# Patient Record
Sex: FEMALE | Race: WHITE | NOT HISPANIC OR LATINO | Employment: OTHER | URBAN - METROPOLITAN AREA
[De-identification: names, ages, dates, MRNs, and addresses within clinical notes are randomized per-mention and may not be internally consistent; named-entity substitution may affect disease eponyms.]

---

## 2018-03-07 ENCOUNTER — TELEPHONE (OUTPATIENT)
Dept: VASCULAR LAB | Facility: MEDICAL CENTER | Age: 83
End: 2018-03-07

## 2018-03-26 DIAGNOSIS — I48.91 ATRIAL FIBRILLATION, UNSPECIFIED TYPE (HCC): ICD-10-CM

## 2018-03-27 ENCOUNTER — TELEPHONE (OUTPATIENT)
Dept: VASCULAR LAB | Facility: MEDICAL CENTER | Age: 83
End: 2018-03-27

## 2018-03-27 NOTE — TELEPHONE ENCOUNTER
[facility]    Pt missed appt on 3/26.  Left message for pt to contact the clinic to establish another appt and establish care.    Chadd Celeste, PharmD

## 2018-03-29 ENCOUNTER — ANTICOAGULATION MONITORING (OUTPATIENT)
Dept: VASCULAR LAB | Facility: MEDICAL CENTER | Age: 83
End: 2018-03-29

## 2018-03-29 DIAGNOSIS — Z79.01 LONG TERM CURRENT USE OF ANTICOAGULANT: ICD-10-CM

## 2018-03-29 DIAGNOSIS — I48.91 ATRIAL FIBRILLATION, UNSPECIFIED TYPE (HCC): ICD-10-CM

## 2018-03-29 LAB — INR PPP: 1.7 (ref 2–3.5)

## 2018-03-29 NOTE — PROGRESS NOTES
Anticoagulation Summary  As of 3/29/2018    INR goal:   2.0-3.0   TTR:   --   Today's INR:      Maintenance plan:   5 mg (2.5 mg x 2) on Wed; 3.75 mg (2.5 mg x 1.5) all other days   Weekly total:   27.5 mg   Plan last modified:   SANDRITA Chacon (3/29/2018)   Next INR check:   4/5/2018   Target end date:   Indefinite    Indications    Atrial fibrillation (CMS-HCC) [I48.91] [I48.91]  Long term current use of anticoagulant [Z79.01]             Anticoagulation Episode Summary     INR check location:       Preferred lab:       Send INR reminders to:       Comments:         Anticoagulation Care Providers     Provider Role Specialty Phone number    [facility] Responsible  [phone]        Anticoagulation Patient Findings        Spoke with patient by phone. Patient is sub-therapeutic. She takes 2.5 mg tab and takes 1.5 tabs every day except on wed. Wed she takes 2 tabs. She has just moved to [place] from [place]. Is trying to find out from her grand-daughter when they can come into our clinic. She will call us back and let us know. History of At fib         Changes to current medical/health status since last appt: none.   Denies signs/symptoms of bleeding and/or thrombosis since the last appt.   Denies any interval changes to diet  Denies any interval changes to medications since last appt.   Denies any complications or cost restrictions with current therapy  Follow up appointment in 1 week(s).      Took 5 mg yesterday and will take 5 mg today and then back to 5 mg on Wed/ 3.75 mg rest of the week. Will need to call back for an appt in the clinic to establish care.   The patient is on a high risk medication and is sub- therapeutic. This could lead to clot formation or risk of stroke. Therefore this patient requires close monitoring and follow up.     The patient will go to the ER for falls with a head injury,  blood in urine or stool or any bleeding that last longer than 20 min.   .         CHUCKIE Chacon.

## 2018-04-03 ENCOUNTER — ANTICOAGULATION VISIT (OUTPATIENT)
Dept: VASCULAR LAB | Facility: MEDICAL CENTER | Age: 83
End: 2018-04-03
Attending: INTERNAL MEDICINE
Payer: MEDICARE

## 2018-04-03 VITALS — SYSTOLIC BLOOD PRESSURE: 129 MMHG | RESPIRATION RATE: 16 BRPM | DIASTOLIC BLOOD PRESSURE: 58 MMHG | HEART RATE: 63 BPM

## 2018-04-03 DIAGNOSIS — I48.91 ATRIAL FIBRILLATION, UNSPECIFIED TYPE (HCC): ICD-10-CM

## 2018-04-03 DIAGNOSIS — I50.9 ACUTE ON CHRONIC CONGESTIVE HEART FAILURE, UNSPECIFIED CONGESTIVE HEART FAILURE TYPE: ICD-10-CM

## 2018-04-03 DIAGNOSIS — I15.9 SECONDARY HYPERTENSION: ICD-10-CM

## 2018-04-03 DIAGNOSIS — Z95.0 PACEMAKER: ICD-10-CM

## 2018-04-03 DIAGNOSIS — Z79.01 LONG TERM CURRENT USE OF ANTICOAGULANT: ICD-10-CM

## 2018-04-03 DIAGNOSIS — R25.1 TREMORS OF NERVOUS SYSTEM: ICD-10-CM

## 2018-04-03 LAB
INR BLD: 1.9 (ref 0.9–1.2)
INR PPP: 1.9 (ref 2–3.5)

## 2018-04-03 PROCEDURE — 99212 OFFICE O/P EST SF 10 MIN: CPT | Performed by: NURSE PRACTITIONER

## 2018-04-03 PROCEDURE — 85610 PROTHROMBIN TIME: CPT

## 2018-04-03 RX ORDER — WARFARIN SODIUM 2.5 MG/1
TABLET ORAL
Qty: 180 TAB | Refills: 3 | Status: SHIPPED | OUTPATIENT
Start: 2018-04-03 | End: 2018-05-18

## 2018-04-03 NOTE — PROGRESS NOTES
Anticoagulation Summary  As of 4/3/2018    INR goal:   2.0-3.0   TTR:   --   Today's INR:   1.9!   Maintenance plan:   5 mg (2.5 mg x 2) on Tue, Thu; 3.75 mg (2.5 mg x 1.5) all other days   Weekly total:   28.75 mg   Plan last modified:   SANDRITA Chacon (4/3/2018)   Next INR check:   4/10/2018   Target end date:   Indefinite    Indications    Atrial fibrillation (CMS-HCC) [I48.91] [I48.91]  Long term current use of anticoagulant [Z79.01]             Anticoagulation Episode Summary     INR check location:       Preferred lab:       Send INR reminders to:       Comments:         Anticoagulation Care Providers     Provider Role Specialty Phone number    [facility] Referring  [phone]    Razia Sims D.O. Responsible Family Medicine [phone]    Sebastian Thomas M.D. Responsible Interventional Cardiology [phone]        Anticoagulation Patient Findings  New to this clinic not new to Warfarin. Has been on Warfarin for a long time due to history of at fib. Not sure of how long.  New to the area CHADS-VAS =5(age, female chf, hts). Has a new PCP not seen yet, and new cardiologist, not seen yet. Does not know all her meds they will fax back the information. Is not taking ASA. Denies bleeding issues on the this medication.     Andree Egan seen in clinic today, is on anticoagulation therapy with warfarin for At fib.   INR  sub-therapeutic.    Changes to current medical/health status since last appt: none.   Denies signs/symptoms of bleeding and/or thrombosis since the last appt.   Denies any interval changes to diet  Denies any interval changes to medications since last appt.   Denies any complications or cost restrictions with current therapy  Follow up appointment in 1 week(s).      The patient is on a high risk medication and is sub- therapeutic. This could lead to clot formation or risk of stroke. Therefore this patient requires close monitoring and follow up.     The  patient will go to the ER for falls with a head injury,  blood in urine or stool or any bleeding that last longer than 20 min.   Provided pt education on warfarin. Including how to take, what to do if missed dose, importance of INR monitoring, drug and food interactions. Patient is aware to avoid NSAIDs and ASA (unless directed by provider). Educated pt on s/s of bleeding and thrombosis. Patient is aware to seek medical attention for severe falls or injury to their heads, to report all medication changes to our office and to notify our office of unusual bleeding or bruising. Medication reviewed and updated.           CHUCKIE Chacon.

## 2018-04-07 ENCOUNTER — TELEPHONE (OUTPATIENT)
Dept: VASCULAR LAB | Facility: MEDICAL CENTER | Age: 83
End: 2018-04-07

## 2018-04-07 NOTE — TELEPHONE ENCOUNTER
Initial anticoag note and most recent coag referral reviewed.  Patient with afib and chads vasc = at least 5    Pending further recommendations, we will continue with indefinite anticoagulation with warfarin as directed by previous team.  Does have appt scheduled with PCP and cards  Will defer all management of rhythm, rate, and other cv issues, aside from anticoagulation, to PCP and cards    Michael Bloch, MD  Anticoagulation Clinic

## 2018-04-10 ENCOUNTER — ANTICOAGULATION VISIT (OUTPATIENT)
Dept: VASCULAR LAB | Facility: MEDICAL CENTER | Age: 83
End: 2018-04-10
Attending: INTERNAL MEDICINE
Payer: MEDICARE

## 2018-04-10 DIAGNOSIS — Z79.01 LONG TERM CURRENT USE OF ANTICOAGULANT: ICD-10-CM

## 2018-04-10 LAB
INR BLD: 2 (ref 0.9–1.2)
INR PPP: 2 (ref 2–3.5)

## 2018-04-10 PROCEDURE — 99211 OFF/OP EST MAY X REQ PHY/QHP: CPT

## 2018-04-10 PROCEDURE — 85610 PROTHROMBIN TIME: CPT

## 2018-04-10 NOTE — PROGRESS NOTES
Anticoagulation Summary  As of 4/10/2018    INR goal:   2.0-3.0   TTR:   0.0 % (2 d)   Today's INR:   2   Maintenance plan:   5 mg (2.5 mg x 2) on Tue, Thu; 3.75 mg (2.5 mg x 1.5) all other days   Weekly total:   28.75 mg   Plan last modified:   SANDRITA Chacon (4/3/2018)   Next INR check:   4/24/2018   Target end date:   Indefinite    Indications    Atrial fibrillation (CMS-HCC) [I48.91] [I48.91]  Long term current use of anticoagulant [Z79.01]             Anticoagulation Episode Summary     INR check location:       Preferred lab:       Send INR reminders to:       Comments:         Anticoagulation Care Providers     Provider Role Specialty Phone number    [facility] Referring  [phone]    Razia Sims D.O. Responsible Family Medicine [phone]    Sebastian Thomas M.D. Responsible Interventional Cardiology [phone]        Anticoagulation Patient Findings      HPI:  Andree Egan seen in clinic today, on anticoagulation therapy with warfarin for AF.  Changes to current medical/health status since last appt: none.   Denies signs/symptoms of bleeding and/or thrombosis since the last appt.    Denies any interval changes to diet  Denies any interval changes to medications since last appt.   Denies any complications or cost restrictions with current therapy.   Declined to remove jacket for vitals.  Confirmed dosing regimen.     A/P   INR  therapeutic.   Pt is to continue with current warfarin dosing regimen.     Follow up appointment in 2 week(s).    Chadd Celeste, PharmD

## 2018-04-24 ENCOUNTER — ANTICOAGULATION VISIT (OUTPATIENT)
Dept: VASCULAR LAB | Facility: MEDICAL CENTER | Age: 83
End: 2018-04-24
Attending: INTERNAL MEDICINE
Payer: MEDICARE

## 2018-04-24 DIAGNOSIS — Z79.01 LONG TERM CURRENT USE OF ANTICOAGULANT: ICD-10-CM

## 2018-04-24 LAB — INR PPP: 2.7 (ref 2–3.5)

## 2018-04-24 PROCEDURE — 99211 OFF/OP EST MAY X REQ PHY/QHP: CPT

## 2018-04-24 PROCEDURE — 85610 PROTHROMBIN TIME: CPT

## 2018-04-24 NOTE — PROGRESS NOTES
Anticoagulation Summary  As of 4/24/2018    INR goal:   2.0-3.0   TTR:   87.5 % (2.3 wk)   Today's INR:   2.7   Maintenance plan:   5 mg (2.5 mg x 2) on Tue, Thu; 3.75 mg (2.5 mg x 1.5) all other days   Weekly total:   28.75 mg   Plan last modified:   SANDRITA Chacon (4/3/2018)   Next INR check:   5/8/2018   Target end date:   Indefinite    Indications    Atrial fibrillation (CMS-HCC) [I48.91] [I48.91]  Long term current use of anticoagulant [Z79.01]             Anticoagulation Episode Summary     INR check location:       Preferred lab:       Send INR reminders to:       Comments:         Anticoagulation Care Providers     Provider Role Specialty Phone number    [facility] Referring  [phone]    Razia Sims D.O. Responsible Family Medicine [phone]    Sebastian Thomas M.D. Responsible Interventional Cardiology [phone]        Anticoagulation Patient Findings      HPI:  Andree Egan seen in clinic today, on anticoagulation therapy with warfarin for stroke prevention due to history of atrial fibrillation    Changes to current medical/health status since last appt: None  Denies signs/symptoms of bleeding and/or thrombosis since the last appt.    Denies any interval changes to diet  Denies any interval changes to medications since last appt.   Denies any complications or cost restrictions with current therapy.   BP recorded in vitals.  Confirmed dosing regimen.      A/P   INR  is therapeutic at 2.7    Pt is to continue with current warfarin dosing regimen.    Discussed possibility of changing to a DOAC, as she appears to be a good candidate; she will discuss with Dr. Thomas at her upcoming visit.    Follow up appointment in 2 week(s).    Chadd Celeste, PharmD

## 2018-04-26 LAB — INR BLD: 2.7 (ref 0.9–1.2)

## 2018-05-04 ENCOUNTER — OFFICE VISIT (OUTPATIENT)
Dept: CARDIOLOGY | Facility: MEDICAL CENTER | Age: 83
End: 2018-05-04
Payer: MEDICARE

## 2018-05-04 VITALS
WEIGHT: 136 LBS | HEIGHT: 64 IN | HEART RATE: 74 BPM | BODY MASS INDEX: 23.22 KG/M2 | SYSTOLIC BLOOD PRESSURE: 100 MMHG | DIASTOLIC BLOOD PRESSURE: 60 MMHG

## 2018-05-04 DIAGNOSIS — I48.91 ATRIAL FIBRILLATION, UNSPECIFIED TYPE (HCC): ICD-10-CM

## 2018-05-04 DIAGNOSIS — I15.9 SECONDARY HYPERTENSION: ICD-10-CM

## 2018-05-04 DIAGNOSIS — Z79.01 LONG TERM CURRENT USE OF ANTICOAGULANT: ICD-10-CM

## 2018-05-04 DIAGNOSIS — R06.09 DOE (DYSPNEA ON EXERTION): ICD-10-CM

## 2018-05-04 DIAGNOSIS — R60.0 BILATERAL LEG EDEMA: ICD-10-CM

## 2018-05-04 DIAGNOSIS — Z95.0 PACEMAKER: ICD-10-CM

## 2018-05-04 DIAGNOSIS — R01.1 UNDIAGNOSED CARDIAC MURMURS: ICD-10-CM

## 2018-05-04 PROCEDURE — 99203 OFFICE O/P NEW LOW 30 MIN: CPT | Mod: 25 | Performed by: INTERNAL MEDICINE

## 2018-05-04 PROCEDURE — 93000 ELECTROCARDIOGRAM COMPLETE: CPT | Performed by: INTERNAL MEDICINE

## 2018-05-04 RX ORDER — ALBUTEROL SULFATE 90 UG/1
2 AEROSOL, METERED RESPIRATORY (INHALATION) EVERY 6 HOURS PRN
COMMUNITY
End: 2018-07-31 | Stop reason: SDUPTHER

## 2018-05-04 RX ORDER — TORSEMIDE 20 MG/1
20 TABLET ORAL DAILY
COMMUNITY
End: 2018-05-14 | Stop reason: SDUPTHER

## 2018-05-04 RX ORDER — POTASSIUM CHLORIDE 750 MG/1
10 TABLET, EXTENDED RELEASE ORAL EVERY EVENING
COMMUNITY
End: 2018-06-13 | Stop reason: SDUPTHER

## 2018-05-04 RX ORDER — METOPROLOL SUCCINATE 25 MG/1
25 TABLET, EXTENDED RELEASE ORAL EVERY MORNING
Status: ON HOLD | COMMUNITY
End: 2018-08-20

## 2018-05-04 RX ORDER — TOPIRAMATE 25 MG/1
25 TABLET ORAL 2 TIMES DAILY
Status: ON HOLD | COMMUNITY
End: 2018-06-27 | Stop reason: SDUPTHER

## 2018-05-04 RX ORDER — OMEPRAZOLE 20 MG/1
20 CAPSULE, DELAYED RELEASE ORAL 2 TIMES DAILY
Status: ON HOLD | COMMUNITY
End: 2018-08-20

## 2018-05-04 RX ORDER — AMLODIPINE BESYLATE 5 MG/1
5 TABLET ORAL DAILY
COMMUNITY
End: 2018-05-14

## 2018-05-04 NOTE — PROGRESS NOTES
Chief Complaint   Patient presents with   • Atrial Fibrillation       Subjective:   Andree Egan is a 86 y.o. female who presents today for initial consultation after moving to the area from [place].  Her son is my patient.  She has a history of paroxysmal atrial fibrillation on oral anticoagulation with warfarin, permanent pacemaker placement, hypertension, and congestive heart failure for which no records are available.  She takes torsemide for lower extremity edema which has been worse since moving up here and she has been more short of breath.  She has a murmur and again no records are available.  Otherwise no exertional chest pain or shortness of breath and has no history of diabetes or stroke.    Denies any other cardiovascular symptoms including chest pain, shortness of breath, lightheadedness, syncope or presyncope,  PND, orthopnea or palpitations.      Past Medical History:   Diagnosis Date   • Atrial fibrillation (CMS-HCC) [I48.91] 3/29/2018   • Congestive heart failure (CHF) ([facility]) 4/3/2018   • BERTRAND (dyspnea on exertion) 5/4/2018   • Long term current use of anticoagulant 3/29/2018   • Pacemaker 4/3/2018   • Secondary hypertension 4/3/2018   • Tremors of nervous system 4/3/2018     History reviewed. No pertinent surgical history.  Family History   Problem Relation Age of Onset   • Fainting Neg Hx      Social History     Social History   • Marital status:      Spouse name: N/A   • Number of children: N/A   • Years of education: N/A     Occupational History   • Not on file.     Social History Main Topics   • Smoking status: Former Smoker     Packs/day: 0.50     Types: Cigarettes     Quit date: 1/1/1970   • Smokeless tobacco: Never Used   • Alcohol use 1.8 oz/week     3 Glasses of wine per week      Comment: glasses a night before Dinner   • Drug use: No   • Sexual activity: Not on file     Other Topics Concern   • Not on file     Social History Narrative   • No narrative on file     Allergies  "  Allergen Reactions   • Codeine      Outpatient Encounter Prescriptions as of 5/4/2018   Medication Sig Dispense Refill   • albuterol 108 (90 Base) MCG/ACT Aero Soln inhalation aerosol Inhale 2 Puffs by mouth every 6 hours as needed for Shortness of Breath.     • amLODIPine (NORVASC) 5 MG Tab Take 5 mg by mouth every day.     • sertraline (ZOLOFT) 50 MG Tab Take 50 mg by mouth every day.     • topiramate (TOPAMAX) 25 MG Tab Take 25 mg by mouth 2 times a day.     • potassium chloride SA (K-DUR) 10 MEQ Tab CR Take 10 mEq by mouth 2 times a day.     • metoprolol SR (TOPROL XL) 25 MG TABLET SR 24 HR Take 25 mg by mouth every day.     • torsemide (DEMADEX) 20 MG Tab Take 20 mg by mouth every day.     • omeprazole (PRILOSEC) 20 MG delayed-release capsule Take 20 mg by mouth every day.     • warfarin (COUMADIN) 2.5 MG Tab Take 1.5 to 2 tablets daily or as directed by [facility] 180 Tab 3     No facility-administered encounter medications on file as of 5/4/2018.      Review of Systems   All other systems reviewed and are negative.       Objective:   /60   Pulse 74   Ht 1.613 m (5' 3.5\")   Wt 61.7 kg (136 lb)   BMI 23.71 kg/m²     Physical Exam   Constitutional: She is oriented to person, place, and time. She appears well-developed and well-nourished. No distress.   HENT:   Head: Normocephalic and atraumatic.   Right Ear: External ear normal.   Left Ear: External ear normal.   Mouth/Throat: No oropharyngeal exudate.   Eyes: Conjunctivae and EOM are normal. Pupils are equal, round, and reactive to light. Right eye exhibits no discharge. Left eye exhibits no discharge. No scleral icterus.   Neck: Normal range of motion. Neck supple. No JVD present. No tracheal deviation present. No thyromegaly present.   Cardiovascular: Normal rate, regular rhythm, S1 normal, S2 normal and intact distal pulses.  PMI is not displaced.  Exam reveals no gallop, no S3, no S4 and no friction rub.    Murmur (2/6 systolic ejection " murmur right upper sternal border with preserved S2.  There is also an apical murmur.) heard.  Pulses:       Carotid pulses are 2+ on the right side, and 2+ on the left side.       Radial pulses are 2+ on the right side, and 2+ on the left side.        Popliteal pulses are 2+ on the right side, and 2+ on the left side.        Dorsalis pedis pulses are 2+ on the right side, and 2+ on the left side.        Posterior tibial pulses are 2+ on the right side, and 2+ on the left side.   Pulmonary/Chest: Effort normal and breath sounds normal. No respiratory distress. She has no wheezes. She has no rales. She exhibits no tenderness.   Abdominal: Soft. Bowel sounds are normal. She exhibits no distension. There is no tenderness.   Musculoskeletal: Normal range of motion. She exhibits edema (2-3+ bilateral lower extremities to knees). She exhibits no tenderness.   Neurological: She is alert and oriented to person, place, and time. No cranial nerve deficit (Cranial nerves II through XII grossly intact).   Skin: Skin is warm and dry. No rash noted. She is not diaphoretic. No erythema.   Psychiatric: She has a normal mood and affect. Her behavior is normal. Judgment and thought content normal.   Vitals reviewed.    LABS:    Lab Results   Component Value Date/Time    INR 2.7 04/24/2018          Assessment:     1. Atrial fibrillation, unspecified type (HCC)  EKG    LIPID PROFILE    COMP METABOLIC PANEL    BTYPE NATRIURETIC PEPTIDE   2. Pacemaker  PACER CHECK   3. Secondary hypertension     4. Long term current use of anticoagulant     5. Undiagnosed cardiac murmurs  ECHOCARDIOGRAM COMP W/O CONT   6. Bilateral leg edema  ECHOCARDIOGRAM COMP W/O CONT   7. BERTRAND (dyspnea on exertion)         Medical Decision Making:  Today's Assessment / Status / Plan:     Overall she is doing quite well from a cardiac perspective.  Paraventricular rates are well controlled.  She has not been seen in pacemaker clinic in quite some time, but was followed  closely by her cardiologist in [place].  She has a murmur.  I would like to request records.  She also like to switch from warfarin to 1 of the direct anticoagulants.  Given her murmurs I suggest we complete a echocardiogram as well as metabolic profile to assess her renal function.  With regard to her medications her blood pressure is excellent however the amlodipine may be contributing to her edema.  Recommend discontinuing this and increase her beta-blocker as needed.    1.  Echocardiogram  2.  Labs  3.  Pacemaker clinic  4.  If the above is reasonable then a change from warfarin to Xarelto would be recommended.  5.  Discontinue amlodipine and increase beta-blocker as needed for blood pressure control.    Follow-up 3 months

## 2018-05-05 LAB — EKG IMPRESSION: NORMAL

## 2018-05-07 ENCOUNTER — HOSPITAL ENCOUNTER (OUTPATIENT)
Dept: LAB | Facility: MEDICAL CENTER | Age: 83
End: 2018-05-07
Attending: INTERNAL MEDICINE
Payer: MEDICARE

## 2018-05-07 DIAGNOSIS — I48.91 ATRIAL FIBRILLATION, UNSPECIFIED TYPE (HCC): ICD-10-CM

## 2018-05-07 LAB
ALBUMIN SERPL BCP-MCNC: 3.8 G/DL (ref 3.2–4.9)
ALBUMIN/GLOB SERPL: 1.4 G/DL
ALP SERPL-CCNC: 114 U/L (ref 30–99)
ALT SERPL-CCNC: 17 U/L (ref 2–50)
ANION GAP SERPL CALC-SCNC: 9 MMOL/L (ref 0–11.9)
AST SERPL-CCNC: 28 U/L (ref 12–45)
BILIRUB SERPL-MCNC: 1.1 MG/DL (ref 0.1–1.5)
BNP SERPL-MCNC: 386 PG/ML (ref 0–100)
BUN SERPL-MCNC: 40 MG/DL (ref 8–22)
CALCIUM SERPL-MCNC: 9 MG/DL (ref 8.5–10.5)
CHLORIDE SERPL-SCNC: 102 MMOL/L (ref 96–112)
CHOLEST SERPL-MCNC: 83 MG/DL (ref 100–199)
CO2 SERPL-SCNC: 30 MMOL/L (ref 20–33)
CREAT SERPL-MCNC: 1.5 MG/DL (ref 0.5–1.4)
GLOBULIN SER CALC-MCNC: 2.7 G/DL (ref 1.9–3.5)
GLUCOSE SERPL-MCNC: 90 MG/DL (ref 65–99)
HDLC SERPL-MCNC: 54 MG/DL
LDLC SERPL CALC-MCNC: 18 MG/DL
POTASSIUM SERPL-SCNC: 3.9 MMOL/L (ref 3.6–5.5)
PROT SERPL-MCNC: 6.5 G/DL (ref 6–8.2)
SODIUM SERPL-SCNC: 141 MMOL/L (ref 135–145)
TRIGL SERPL-MCNC: 53 MG/DL (ref 0–149)

## 2018-05-07 PROCEDURE — 80061 LIPID PANEL: CPT

## 2018-05-07 PROCEDURE — 36415 COLL VENOUS BLD VENIPUNCTURE: CPT

## 2018-05-07 PROCEDURE — 83880 ASSAY OF NATRIURETIC PEPTIDE: CPT

## 2018-05-07 PROCEDURE — 80053 COMPREHEN METABOLIC PANEL: CPT

## 2018-05-08 ENCOUNTER — TELEPHONE (OUTPATIENT)
Dept: CARDIOLOGY | Facility: MEDICAL CENTER | Age: 83
End: 2018-05-08

## 2018-05-08 ENCOUNTER — ANTICOAGULATION VISIT (OUTPATIENT)
Dept: VASCULAR LAB | Facility: MEDICAL CENTER | Age: 83
End: 2018-05-08
Attending: INTERNAL MEDICINE
Payer: MEDICARE

## 2018-05-08 DIAGNOSIS — Z79.01 LONG TERM CURRENT USE OF ANTICOAGULANT: ICD-10-CM

## 2018-05-08 LAB — INR PPP: 3.1 (ref 2–3.5)

## 2018-05-08 PROCEDURE — 99212 OFFICE O/P EST SF 10 MIN: CPT

## 2018-05-08 PROCEDURE — 85610 PROTHROMBIN TIME: CPT

## 2018-05-08 NOTE — PROGRESS NOTES
[facility] Anticoagulation Service Note    Date: 5/8/2018  There were no vitals filed for this visit.    Anticoagulation Summary  As of 5/8/2018    INR goal:   2.0-3.0   TTR:   81.7 % (1 mo)   Today's INR:   3.1!   Warfarin maintenance plan:   5 mg (2.5 mg x 2) on Tue, Thu; 3.75 mg (2.5 mg x 1.5) all other days   Weekly warfarin total:   28.75 mg   Plan last modified:   SANDRITA Chacon (4/3/2018)   Next INR check:   6/1/2018   Target end date:   Indefinite    Indications    Atrial fibrillation (CMS-HCC) [I48.91] [I48.91]  Long term current use of anticoagulant [Z79.01]             Anticoagulation Episode Summary     INR check location:       Preferred lab:       Send INR reminders to:       Comments:         Anticoagulation Care Providers     Provider Role Specialty Phone number    [facility] Referring  [phone]    Razia Sims D.O. Responsible Family Medicine [phone]    Sebastian Thomas M.D. Responsible Interventional Cardiology [phone]        Anticoagulation Patient Findings      HPI:   Andree Egan seen in clinic today, they are here today for a INR check on anticoagulation therapy with warfarin because they have atrial fib    The reason for today's visit is to prevent morbidity and mortality from a stroke  and to reduce the risk of bleeding while on a anticoagulant.     Reason for today's visit (per our collaborative practice policy) is because their last INR was 2.7  on  4/24.   Intervention at the last visit:  none    Additional education provided today regarding reducing bleed risk and dietary constraints:  About diet     Any upcoming procedures:   none    Confirmed warfarin dosing regimen  Interval Changes with foods rich in vitamin K: No  Interval Changes in ETOH:   No  Interval Changes in smoking status:  No  Interval Changes in medication:  No   Cost restriction:  No    S/S of bleeding or bruising:  No  Signs/symptoms  thrombosis since the last appt:  No  Bleed  risk is:  moderate,     3 vitals included with today's appt :to many clothes   (BP, HR, weight, ht, RR)     Assessment:   INR  supra-therapeutic.     Possible reason(s) for INR today:   she went camping last weekend and might have taken her dose wrong.     Intervention required today to prevent:    They are at a increased risk of bleeding because INR above goal.      They have a TTR of 81.7  which is not at target (TTR target/goal is 100%) and requires close follow up to prevent a adverse event (the lower the TTR the higher risk of clots, strokes, or bleeding).       Plan:  2.5mg today then continue weekly warfarin dose as noted      Follow up:  Follow up appointment in 3 week(s)       Other info:  Pt educated to contact our clinic with any changes in medications or s/s of bleeding or thrombosis    CHEST guidelines recommend frequent INR monitoring at regular intervals (a few days up to a max of 12 weeks) to ensure they are on the proper dose of warfarin and not having any complications from therapy.  INRs can dramatically change over a short time period due to diet, medications, and medical conditions.

## 2018-05-10 NOTE — TELEPHONE ENCOUNTER
Her renal function and BNP are abnormal, and I am awaiting her echocardiogram results to give her final recommendations on anticoagulation.  If she is having more leg swelling despite stopping her amlodipine I would recommend increasing her diuresis with an extra pill daily.

## 2018-05-10 NOTE — TELEPHONE ENCOUNTER
"S/w pt about lab results. She notes that she is currently taking Torsemide 20mg, 2 tabs BID. Educated her that it is not recommended to take in the evening time due to having to go to the bathroom all the time. She states, \"some times I don't go very often or at all\". Educated her that I will FU with Dr. Thomas on recommendations   Pt reports and increase in edema even after stopping amlodipine.     To Dr. Thomas, please advise on recommendations.  "

## 2018-05-11 DIAGNOSIS — N28.9 RENAL INSUFFICIENCY: ICD-10-CM

## 2018-05-11 NOTE — TELEPHONE ENCOUNTER
"S/w Dr. Thomas on recommendations. Per TW, \"Please place referral to nephrology for pt to follow up on renal insufficiency. Also have her FU with her primary care provider. Will review echo and advise on final recommendations.\"    Called and s/w pt. Educated her on Dr. Thomas's recommendations. Informed her that referral will be placed for her to follow up with a nephrologist to evaluate her kidney function. Also educated her to please schedule a FU with her PCP as well. Pt states verbal understanding and will follow up next week if any issues.     Referral to nephrology placed.   "

## 2018-05-14 ENCOUNTER — TELEPHONE (OUTPATIENT)
Dept: MEDICAL GROUP | Facility: MEDICAL CENTER | Age: 83
End: 2018-05-14

## 2018-05-14 ENCOUNTER — TELEPHONE (OUTPATIENT)
Dept: CARDIOLOGY | Facility: MEDICAL CENTER | Age: 83
End: 2018-05-14

## 2018-05-14 ENCOUNTER — OFFICE VISIT (OUTPATIENT)
Dept: MEDICAL GROUP | Facility: MEDICAL CENTER | Age: 83
End: 2018-05-14
Payer: MEDICARE

## 2018-05-14 VITALS
RESPIRATION RATE: 16 BRPM | HEIGHT: 64 IN | DIASTOLIC BLOOD PRESSURE: 58 MMHG | SYSTOLIC BLOOD PRESSURE: 100 MMHG | OXYGEN SATURATION: 90 % | WEIGHT: 148 LBS | BODY MASS INDEX: 25.27 KG/M2 | TEMPERATURE: 97.8 F | HEART RATE: 67 BPM

## 2018-05-14 DIAGNOSIS — R60.0 BILATERAL LOWER EXTREMITY EDEMA: ICD-10-CM

## 2018-05-14 DIAGNOSIS — R09.02 HYPOXIA: ICD-10-CM

## 2018-05-14 DIAGNOSIS — J44.9 CHRONIC OBSTRUCTIVE PULMONARY DISEASE, UNSPECIFIED COPD TYPE (HCC): ICD-10-CM

## 2018-05-14 DIAGNOSIS — N18.30 CKD (CHRONIC KIDNEY DISEASE) STAGE 3, GFR 30-59 ML/MIN (HCC): ICD-10-CM

## 2018-05-14 DIAGNOSIS — I48.20 CHRONIC ATRIAL FIBRILLATION (HCC): ICD-10-CM

## 2018-05-14 DIAGNOSIS — I50.32 CHRONIC DIASTOLIC CONGESTIVE HEART FAILURE (HCC): ICD-10-CM

## 2018-05-14 DIAGNOSIS — Z79.899 POLYPHARMACY: ICD-10-CM

## 2018-05-14 PROBLEM — L40.9 PSORIASIS: Status: ACTIVE | Noted: 2018-05-14

## 2018-05-14 PROBLEM — F41.1 GAD (GENERALIZED ANXIETY DISORDER): Status: ACTIVE | Noted: 2017-11-29

## 2018-05-14 PROBLEM — M81.0 OSTEOPOROSIS: Status: ACTIVE | Noted: 2018-05-14

## 2018-05-14 PROBLEM — F43.21 GRIEF: Status: ACTIVE | Noted: 2018-02-28

## 2018-05-14 PROBLEM — K21.9 GERD (GASTROESOPHAGEAL REFLUX DISEASE): Status: ACTIVE | Noted: 2018-05-14

## 2018-05-14 PROBLEM — Z79.01 LONG TERM CURRENT USE OF ANTICOAGULANT: Status: RESOLVED | Noted: 2018-03-29 | Resolved: 2018-05-14

## 2018-05-14 PROBLEM — F43.22 ADJUSTMENT DISORDER WITH ANXIETY: Status: ACTIVE | Noted: 2018-02-28

## 2018-05-14 PROBLEM — I35.0 NONRHEUMATIC AORTIC VALVE STENOSIS: Status: ACTIVE | Noted: 2017-03-01

## 2018-05-14 PROBLEM — N39.0 RECURRENT UTI: Status: ACTIVE | Noted: 2017-10-23

## 2018-05-14 PROCEDURE — 99204 OFFICE O/P NEW MOD 45 MIN: CPT | Performed by: NURSE PRACTITIONER

## 2018-05-14 RX ORDER — LEVOTHYROXINE SODIUM 0.05 MG/1
50 TABLET ORAL
COMMUNITY
End: 2018-06-08 | Stop reason: SDUPTHER

## 2018-05-14 RX ORDER — TORSEMIDE 20 MG/1
40 TABLET ORAL 2 TIMES DAILY
Qty: 120 TAB | Refills: 11 | Status: ON HOLD | OUTPATIENT
Start: 2018-05-14 | End: 2018-05-24

## 2018-05-14 RX ORDER — TORSEMIDE 20 MG/1
20 TABLET ORAL 2 TIMES DAILY
Qty: 60 TAB | Refills: 11 | Status: SHIPPED | OUTPATIENT
Start: 2018-05-14 | End: 2018-05-14 | Stop reason: SDUPTHER

## 2018-05-14 RX ORDER — GABAPENTIN 300 MG/1
1200 CAPSULE ORAL EVERY EVENING
Status: ON HOLD | COMMUNITY
End: 2018-08-20

## 2018-05-14 RX ORDER — ALLOPURINOL 300 MG/1
300 TABLET ORAL EVERY EVENING
COMMUNITY
End: 2018-06-13 | Stop reason: SDUPTHER

## 2018-05-14 RX ORDER — WARFARIN SODIUM 2.5 MG/1
TABLET ORAL
COMMUNITY
End: 2018-05-14

## 2018-05-14 RX ORDER — CLOBETASOL PROPIONATE 0.5 MG/G
OINTMENT TOPICAL
COMMUNITY
Start: 2017-11-21 | End: 2018-05-18

## 2018-05-14 RX ORDER — ACETAMINOPHEN 500 MG
1000 TABLET ORAL
Status: ON HOLD | COMMUNITY
End: 2018-08-20

## 2018-05-14 ASSESSMENT — PATIENT HEALTH QUESTIONNAIRE - PHQ9: CLINICAL INTERPRETATION OF PHQ2 SCORE: 0

## 2018-05-14 NOTE — ASSESSMENT & PLAN NOTE
CMP revealed stage III CKD, no records were available at that time to see if this is a new or existing problem.  In reviewing past medical records, it appears that this has been a long-standing issue for the patient since around 2012, GFR has been ranging between 29 and 60, currently 33.  Patient states she drinks about 16 ounces of water per day.  Is currently taking torsemide 40 mg in the morning and 40 mg in the afternoon for a total of 80 mg per day per the recommendation of her previous cardiologist due to lower extremity edema and congestive heart failure.  She does not recall ever being seen by nephrology.  Past records do not indicate that she has had a renal ultrasound done.  Patient states that when she urinates it is only a trickle each time.  She feels that she is emptying her bladder, she just does not feel like she is producing very much urine.  She is not currently using NSAIDs. She does drink about 3 glasses of  wine per night with dinner.

## 2018-05-14 NOTE — ASSESSMENT & PLAN NOTE
Patient's granddaughter states she has very low exercise tolerance without becoming significantly short of breath and needing to rest, this is been going on for quite some time.  Her resting oxygen saturation today was between 88 and 90%.  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  She was previously seeing a pulmonologist about once every 3 years, she has not established locally.  She has never required oxygen during the day or at night.  She denies shortness of breath at rest.

## 2018-05-14 NOTE — ASSESSMENT & PLAN NOTE
Patient was recently seen by cardiology to establish care.      Last echocardiogram done March/2017 revealed:  Aortic stenosis has progressed. PA pressure is significantly elevated. Ejection fraction is: 65 %. The aortic cusps are mildly thickened. Aortic cusps appear moderately calcified. Trace aortic valve regurgitation. Severe aortic valve stenosis. Moderately severe tricuspid regurgitation. Right ventricular systolic pressure is consistent with severe pulmonary   hypertension. Estimated peak RVSP is 70 mmHg.

## 2018-05-14 NOTE — ASSESSMENT & PLAN NOTE
Last pulmonary function test was February 2017.  Showed mixed obstructive and restrictive defect per spirometry with severe reduction in diffusion capacity. Was following up with pulmonology every 3 years. No maintenance medications prescribed.

## 2018-05-14 NOTE — PATIENT INSTRUCTIONS
Call doctor office IF:    Weight gain of 2 POUNDS IN ONE DAY  OR 5 POUNDS IN ONE WEEK.      Weigh every morning, after urinating, without clothes.

## 2018-05-14 NOTE — ASSESSMENT & PLAN NOTE
Stable on coumadin and rate controlled with metoprolol ER 25 mg daily. Followed at [facility] anticoagulation clinic and [facility].

## 2018-05-14 NOTE — TELEPHONE ENCOUNTER
The referral to Nephrology has been faxed.    Sent to:   [facility]   [phone]     Noted.

## 2018-05-14 NOTE — PROGRESS NOTES
Andree Egan is a 86 y.o. female here to establish care:    HPI:      Chronic diastolic congestive heart failure (HCC)  Patient was recently seen by cardiology to establish care.      Last echocardiogram done March/2017 revealed:  Aortic stenosis has progressed. PA pressure is significantly elevated. Ejection fraction is: 65 %. The aortic cusps are mildly thickened. Aortic cusps appear moderately calcified. Trace aortic valve regurgitation. Severe aortic valve stenosis. Moderately severe tricuspid regurgitation. Right ventricular systolic pressure is consistent with severe pulmonary   hypertension. Estimated peak RVSP is 70 mmHg.    CKD (chronic kidney disease) stage 3, GFR 30-59 ml/min  CMP revealed stage III CKD, no records were available at that time to see if this is a new or existing problem.  In reviewing past medical records, it appears that this has been a long-standing issue for the patient since around 2012, GFR has been ranging between 29 and 60, currently 33.  Patient states she drinks about 16 ounces of water per day.  Is currently taking torsemide 40 mg in the morning and 40 mg in the afternoon for a total of 80 mg per day per the recommendation of her previous cardiologist due to lower extremity edema and congestive heart failure.  She does not recall ever being seen by nephrology.  Past records do not indicate that she has had a renal ultrasound done.  Patient states that when she urinates it is only a trickle each time.  She feels that she is emptying her bladder, she just does not feel like she is producing very much urine.  She is not currently using NSAIDs. She does drink about 3 glasses of  wine per night with dinner.     Hypoxia  Patient's granddaughter states she has very low exercise tolerance without becoming significantly short of breath and needing to rest, this is been going on for quite some time.  Her resting oxygen saturation today was between 88 and 90%.  She does have a history of  obstructive and restrictive COPD, currently only on albuterol as needed for shortness of breath.  She was previously seeing a pulmonologist about once every 3 years, she has not established locally.  She has never required oxygen during the day or at night.  She denies shortness of breath at rest.    Bilateral lower extremity edema  Baseline is generally 2-3+ pitting.  Patient wears compression stockings almost daily to help with this.  Currently taking torsemide 40 mg twice daily.    COPD (chronic obstructive pulmonary disease) ([facility])  Last pulmonary function test was February 2017.  Showed mixed obstructive and restrictive defect per spirometry with severe reduction in diffusion capacity. Was following up with pulmonology every 3 years. No maintenance medications prescribed.     Chronic atrial fibrillation (HCC)  Stable on coumadin and rate controlled with metoprolol ER 25 mg daily. Followed at [facility] anticoagulation clinic and [facility] Cardiology.     Current medicines (including changes today)  Current Outpatient Prescriptions   Medication Sig Dispense Refill   • levothyroxine (SYNTHROID) 50 MCG Tab Take 50 mcg by mouth Every morning on an empty stomach.     • allopurinol (ZYLOPRIM) 300 MG Tab Take 300 mg by mouth every day.     • gabapentin (NEURONTIN) 300 MG Cap Take 300 mg by mouth 3 times a day.     • torsemide (DEMADEX) 20 MG Tab Take 1 Tab by mouth 2 times a day. 60 Tab 11   • clobetasol (TEMOVATE) 0.05 % Ointment Apply to effected area daily, as needed.     • sertraline (ZOLOFT) 50 MG Tab Take 50 mg by mouth every day.     • topiramate (TOPAMAX) 25 MG Tab Take 25 mg by mouth 2 times a day.     • potassium chloride SA (K-DUR) 10 MEQ Tab CR Take 10 mEq by mouth 2 times a day.     • metoprolol SR (TOPROL XL) 25 MG TABLET SR 24 HR Take 25 mg by mouth every day.     • omeprazole (PRILOSEC) 20 MG delayed-release capsule Take 20 mg by mouth every day.     • warfarin (COUMADIN) 2.5 MG Tab Take 1.5 to 2 tablets daily  "or as directed by [facility] 180 Tab 3   • acetaminophen (TYLENOL) 500 MG Tab Take 1,000 mg by mouth 1 time daily as needed.     • Calcium-Vitamin D 500-125 MG-UNIT Tab Take 1 tablet by mouth.     • albuterol 108 (90 Base) MCG/ACT Aero Soln inhalation aerosol Inhale 2 Puffs by mouth every 6 hours as needed for Shortness of Breath.       No current facility-administered medications for this visit.      She  has a past medical history of Atrial fibrillation ([facility]) [I48.91] (3/29/2018); Congestive heart failure (CHF) ([facility]) (4/3/2018); BERTRAND (dyspnea on exertion) (5/4/2018); Long term current use of anticoagulant (3/29/2018); Pacemaker (4/3/2018); Secondary hypertension (4/3/2018); and Tremors of nervous system (4/3/2018).  She  has no past surgical history on file.  Social History   Substance Use Topics   • Smoking status: Former Smoker     Packs/day: 0.50     Types: Cigarettes     Quit date: 1/1/1970   • Smokeless tobacco: Never Used   • Alcohol use 1.8 oz/week     3 Glasses of wine per week      Comment: glasses a night before Dinner     Social History     Social History Narrative   • No narrative on file     Family History   Problem Relation Age of Onset   • Fainting Neg Hx      Family Status   Relation Status   • Neg Hx          ROS  No chest pain, no abdominal pain, no rash.  Positive ROS as per HPI.  All other systems reviewed and are negative      Objective:     Blood pressure 100/58, pulse 67, temperature 36.6 °C (97.8 °F), resp. rate 16, height 1.613 m (5' 3.5\"), weight 67.1 kg (148 lb), SpO2 90 %, not currently breastfeeding. Body mass index is 25.8 kg/m².     Physical Exam:    Constitutional: Alert, no distress.  Skin: Warm, dry, good turgor, no rashes in visible areas.  Eye: Equal, round and reactive, conjunctiva clear, lids normal.  ENMT: Lips without lesions, good dentition, oropharynx clear.  Neck: Trachea midline, no masses, no thyromegaly. No cervical or supraclavicular " lymphadenopathy.  Respiratory: Unlabored respiratory effort, lungs clear to auscultation, no wheezes, no ronchi.  Cardiovascular: Irregularly irregular rhythm. Murmur heard. 2-3+ bilateral lower extremity edema.   Abdomen: Soft, non-tender, no masses, no hepatosplenomegaly.  Psych: Alert and oriented x3, normal affect and mood.      Assessment and Plan:   The following treatment plan was discussed    1. Chronic diastolic congestive heart failure (HCC)  Stable.   Continue torsemide 40mg twice daily per cardiology recommendation.   Continue Metoprolol ER 25mg daily.   Stop Amlodipine to see if lower extremity edema improves. BP is well controlled.    - torsemide (DEMADEX) 20 MG Tab; Take 1 Tab by mouth 2 times a day.  Dispense: 60 Tab; Refill: 11      2. Bilateral lower extremity edema  Stable.  Continue torsemide 40 mg twice daily.  Stop amlodipine 5 mg daily.  Patient states she discussed this with the cardiologist but has not stopped the medication yet.  - torsemide (DEMADEX) 20 MG Tab; Take 2 Tabs by mouth 2 times a day.  Dispense: 120 Tab; Refill: 11      3. Polypharmacy  Follow-up with vascular medicine clinic for polypharmacy visit as patient would like to get off some of these medications if possible.  - REFERRAL TO VASCULAR MEDICINE Reason for Referral? Established Vascular Disease    4. Chronic obstructive pulmonary disease, unspecified COPD type (HCC)  Unstable.  Unsure if shortness of breath and hypoxia are due to worsening of COPD or due to progression of aortic valve stenosis to severe, last echo done over one year ago, awaiting repeat echo.   continue albuterol inhaler as needed, patient states she does not need to use it often.  - REFERRAL TO PULMONOLOGY    5. Hypoxia  Unstable.  Worsening COPD versus worsening aortic stenosis versus worsening congestive heart failure.  Ambulated patient about 5 feet in office, her oxygen saturation immediately dropped to 86% on room air.  Her oxygen saturation at rest  was 90%.  Continuous oxygen ordered at 2 L for home concentrator and portable O2 tank.    Follow-up with pulmonology for follow-up on PFTs and COPD.  Follow-up with cardiology after echocardiogram.  - REFERRAL TO PULMONOLOGY      6. CKD (chronic kidney disease) stage 3, GFR 30-59 ml/min  Stable.  GFR has remained fairly stable over the past few years.  BUN and creatinine are slightly more elevated than normal.  Patient is having very little urine output throughout the day, only drinking about 16 ounces of water per day and taking 80 mg of torsemide daily.  Advised patient to slightly increase her water intake to 24-32 ounces per day.  If she notices increased weight gain or increased lower extremity edema or shortness of breath, she can reduce her water intake again.  Follow-up with nephrology, they are ready to schedule.     7. Chronic atrial fibrillation (HCC)  Stable.  Continue metoprolol ER 25 mg daily.  Continue Coumadin per anticoagulation clinic dosing.      Records reviewed.  Followup: Return in about 4 weeks (around 6/11/2018) for Kidney function, echocardiogram.    I have placed the below orders and discussed them with an approved delegating provider. The MA is performing the below orders under the direction of Dr. Wang

## 2018-05-14 NOTE — TELEPHONE ENCOUNTER
Please notify patient that after reviewing her chart further it appears that the cardiologist that she recently saw does not want her torsemide lowered at this time.  Please have her continue her torsemide 40 mg twice daily as previously prescribed.  She still needs to discontinue the amlodipine and weigh herself daily.  We will follow-up on all of this after she has her echocardiogram done and we have more information on her heart function.  Please make sure patient has the phone number to schedule an appointment with nephrology as soon as possible.    MARIJA Patel.

## 2018-05-14 NOTE — ASSESSMENT & PLAN NOTE
Baseline is generally 2-3+ pitting.  Patient wears compression stockings almost daily to help with this.  Currently taking torsemide 40 mg twice daily.

## 2018-05-15 ENCOUNTER — TELEPHONE (OUTPATIENT)
Dept: VASCULAR LAB | Facility: MEDICAL CENTER | Age: 83
End: 2018-05-15

## 2018-05-15 NOTE — TELEPHONE ENCOUNTER
Left VM for pt regarding referral to pharmacothearpy Asked pt to please call back to establish care.     Lori Chaudhry, VeraD

## 2018-05-16 ENCOUNTER — TELEPHONE (OUTPATIENT)
Dept: MEDICAL GROUP | Facility: MEDICAL CENTER | Age: 83
End: 2018-05-16

## 2018-05-16 DIAGNOSIS — R09.02 HYPOXIA: ICD-10-CM

## 2018-05-16 DIAGNOSIS — J44.9 CHRONIC OBSTRUCTIVE PULMONARY DISEASE, UNSPECIFIED COPD TYPE (HCC): ICD-10-CM

## 2018-05-16 NOTE — TELEPHONE ENCOUNTER
1. Caller Name: Pt's grand-daughter                                         Call Back Number: [phone]      Patient approves a detailed voicemail message: yes    Spoke pt's grand-daughter who states that patient denied the delivery of the home oxygen tanks. She is requesting the shoulder carrying concentrator.   -Per Yudelka Sanchez Pt must use home unit and portable tanks for 3 month in order for insurance to cover shoulder carrying concentrator.

## 2018-05-16 NOTE — TELEPHONE ENCOUNTER
Informed Palma Rankin's granddaughter of insurance qualifications. She states she will talk to pt and call us back.

## 2018-05-16 NOTE — TELEPHONE ENCOUNTER
Received new VM from Palma requesting we place another order for oxygen   Form Completed and placed in [place] to complete.

## 2018-05-16 NOTE — TELEPHONE ENCOUNTER
Due to shortness of breath, history of COPD, and hypoxia in office, will check chest xray to rule out acute cardiopulmonary process.     MARIJA Patel.

## 2018-05-18 ENCOUNTER — HOSPITAL ENCOUNTER (INPATIENT)
Facility: MEDICAL CENTER | Age: 83
LOS: 6 days | DRG: 291 | End: 2018-05-25
Attending: EMERGENCY MEDICINE | Admitting: INTERNAL MEDICINE
Payer: MEDICARE

## 2018-05-18 ENCOUNTER — APPOINTMENT (OUTPATIENT)
Dept: RADIOLOGY | Facility: MEDICAL CENTER | Age: 83
DRG: 291 | End: 2018-05-18
Attending: EMERGENCY MEDICINE
Payer: MEDICARE

## 2018-05-18 ENCOUNTER — OFFICE VISIT (OUTPATIENT)
Dept: MEDICAL GROUP | Facility: CLINIC | Age: 83
End: 2018-05-18
Payer: MEDICARE

## 2018-05-18 VITALS
BODY MASS INDEX: 25.8 KG/M2 | RESPIRATION RATE: 16 BRPM | DIASTOLIC BLOOD PRESSURE: 64 MMHG | TEMPERATURE: 97.7 F | OXYGEN SATURATION: 92 % | WEIGHT: 148 LBS | SYSTOLIC BLOOD PRESSURE: 120 MMHG | HEART RATE: 84 BPM

## 2018-05-18 DIAGNOSIS — I48.20 CHRONIC ATRIAL FIBRILLATION (HCC): ICD-10-CM

## 2018-05-18 DIAGNOSIS — I27.20 MODERATE TO SEVERE PULMONARY HYPERTENSION (HCC): ICD-10-CM

## 2018-05-18 DIAGNOSIS — I50.33 ACUTE ON CHRONIC DIASTOLIC (CONGESTIVE) HEART FAILURE (HCC): ICD-10-CM

## 2018-05-18 DIAGNOSIS — I50.9 CONGESTIVE HEART FAILURE, UNSPECIFIED HF CHRONICITY, UNSPECIFIED HEART FAILURE TYPE (HCC): ICD-10-CM

## 2018-05-18 DIAGNOSIS — J44.9 CHRONIC OBSTRUCTIVE PULMONARY DISEASE, UNSPECIFIED COPD TYPE (HCC): ICD-10-CM

## 2018-05-18 DIAGNOSIS — I50.32 CHRONIC DIASTOLIC CONGESTIVE HEART FAILURE (HCC): ICD-10-CM

## 2018-05-18 LAB
ALBUMIN SERPL BCP-MCNC: 3.7 G/DL (ref 3.2–4.9)
ALBUMIN/GLOB SERPL: 1.4 G/DL
ALP SERPL-CCNC: 110 U/L (ref 30–99)
ALT SERPL-CCNC: 9 U/L (ref 2–50)
ANION GAP SERPL CALC-SCNC: 9 MMOL/L (ref 0–11.9)
APTT PPP: 43.9 SEC (ref 24.7–36)
AST SERPL-CCNC: 18 U/L (ref 12–45)
BASOPHILS # BLD AUTO: 0.9 % (ref 0–1.8)
BASOPHILS # BLD: 0.03 K/UL (ref 0–0.12)
BILIRUB SERPL-MCNC: 0.8 MG/DL (ref 0.1–1.5)
BNP SERPL-MCNC: 517 PG/ML (ref 0–100)
BUN SERPL-MCNC: 41 MG/DL (ref 8–22)
CALCIUM SERPL-MCNC: 8.7 MG/DL (ref 8.5–10.5)
CHLORIDE SERPL-SCNC: 104 MMOL/L (ref 96–112)
CO2 SERPL-SCNC: 27 MMOL/L (ref 20–33)
CREAT SERPL-MCNC: 1.47 MG/DL (ref 0.5–1.4)
EKG IMPRESSION: NORMAL
EOSINOPHIL # BLD AUTO: 0 K/UL (ref 0–0.51)
EOSINOPHIL NFR BLD: 0 % (ref 0–6.9)
ERYTHROCYTE [DISTWIDTH] IN BLOOD BY AUTOMATED COUNT: 55.9 FL (ref 35.9–50)
GLOBULIN SER CALC-MCNC: 2.7 G/DL (ref 1.9–3.5)
GLUCOSE SERPL-MCNC: 93 MG/DL (ref 65–99)
HCT VFR BLD AUTO: 34.6 % (ref 37–47)
HGB BLD-MCNC: 10.4 G/DL (ref 12–16)
IMM GRANULOCYTES # BLD AUTO: 0.01 K/UL (ref 0–0.11)
IMM GRANULOCYTES NFR BLD AUTO: 0.3 % (ref 0–0.9)
INR PPP: 2.81 (ref 0.87–1.13)
LIPASE SERPL-CCNC: 26 U/L (ref 11–82)
LYMPHOCYTES # BLD AUTO: 0.95 K/UL (ref 1–4.8)
LYMPHOCYTES NFR BLD: 30 % (ref 22–41)
MCH RBC QN AUTO: 28.9 PG (ref 27–33)
MCHC RBC AUTO-ENTMCNC: 30.1 G/DL (ref 33.6–35)
MCV RBC AUTO: 96.1 FL (ref 81.4–97.8)
MONOCYTES # BLD AUTO: 0.21 K/UL (ref 0–0.85)
MONOCYTES NFR BLD AUTO: 6.6 % (ref 0–13.4)
NEUTROPHILS # BLD AUTO: 1.97 K/UL (ref 2–7.15)
NEUTROPHILS NFR BLD: 62.2 % (ref 44–72)
NRBC # BLD AUTO: 0 K/UL
NRBC BLD-RTO: 0 /100 WBC
PLATELET # BLD AUTO: 153 K/UL (ref 164–446)
PMV BLD AUTO: 10.8 FL (ref 9–12.9)
POTASSIUM SERPL-SCNC: 4.2 MMOL/L (ref 3.6–5.5)
PROT SERPL-MCNC: 6.4 G/DL (ref 6–8.2)
PROTHROMBIN TIME: 29.3 SEC (ref 12–14.6)
RBC # BLD AUTO: 3.6 M/UL (ref 4.2–5.4)
SODIUM SERPL-SCNC: 140 MMOL/L (ref 135–145)
TROPONIN I SERPL-MCNC: 0.03 NG/ML (ref 0–0.04)
TSH SERPL DL<=0.005 MIU/L-ACNC: 1.99 UIU/ML (ref 0.38–5.33)
WBC # BLD AUTO: 3.2 K/UL (ref 4.8–10.8)

## 2018-05-18 PROCEDURE — 700111 HCHG RX REV CODE 636 W/ 250 OVERRIDE (IP): Performed by: INTERNAL MEDICINE

## 2018-05-18 PROCEDURE — 84484 ASSAY OF TROPONIN QUANT: CPT

## 2018-05-18 PROCEDURE — G0378 HOSPITAL OBSERVATION PER HR: HCPCS

## 2018-05-18 PROCEDURE — 96376 TX/PRO/DX INJ SAME DRUG ADON: CPT

## 2018-05-18 PROCEDURE — 96374 THER/PROPH/DIAG INJ IV PUSH: CPT

## 2018-05-18 PROCEDURE — 99212 OFFICE O/P EST SF 10 MIN: CPT | Performed by: NURSE PRACTITIONER

## 2018-05-18 PROCEDURE — 84443 ASSAY THYROID STIM HORMONE: CPT

## 2018-05-18 PROCEDURE — 83880 ASSAY OF NATRIURETIC PEPTIDE: CPT

## 2018-05-18 PROCEDURE — 80053 COMPREHEN METABOLIC PANEL: CPT

## 2018-05-18 PROCEDURE — 83690 ASSAY OF LIPASE: CPT

## 2018-05-18 PROCEDURE — 85610 PROTHROMBIN TIME: CPT

## 2018-05-18 PROCEDURE — 99285 EMERGENCY DEPT VISIT HI MDM: CPT

## 2018-05-18 PROCEDURE — 99220 PR INITIAL OBSERVATION CARE,LEVL III: CPT | Performed by: INTERNAL MEDICINE

## 2018-05-18 PROCEDURE — 85025 COMPLETE CBC W/AUTO DIFF WBC: CPT

## 2018-05-18 PROCEDURE — 93005 ELECTROCARDIOGRAM TRACING: CPT | Performed by: EMERGENCY MEDICINE

## 2018-05-18 PROCEDURE — 700111 HCHG RX REV CODE 636 W/ 250 OVERRIDE (IP): Performed by: EMERGENCY MEDICINE

## 2018-05-18 PROCEDURE — 36415 COLL VENOUS BLD VENIPUNCTURE: CPT

## 2018-05-18 PROCEDURE — 93005 ELECTROCARDIOGRAM TRACING: CPT

## 2018-05-18 PROCEDURE — 85730 THROMBOPLASTIN TIME PARTIAL: CPT

## 2018-05-18 PROCEDURE — 71045 X-RAY EXAM CHEST 1 VIEW: CPT

## 2018-05-18 RX ORDER — POLYETHYLENE GLYCOL 3350 17 G/17G
1 POWDER, FOR SOLUTION ORAL
Status: DISCONTINUED | OUTPATIENT
Start: 2018-05-18 | End: 2018-05-25 | Stop reason: HOSPADM

## 2018-05-18 RX ORDER — WARFARIN SODIUM 5 MG/1
5 TABLET ORAL
Status: DISCONTINUED | OUTPATIENT
Start: 2018-05-22 | End: 2018-05-20

## 2018-05-18 RX ORDER — LEVOTHYROXINE SODIUM 0.05 MG/1
50 TABLET ORAL
Status: DISCONTINUED | OUTPATIENT
Start: 2018-05-19 | End: 2018-05-25 | Stop reason: HOSPADM

## 2018-05-18 RX ORDER — BISACODYL 10 MG
10 SUPPOSITORY, RECTAL RECTAL
Status: DISCONTINUED | OUTPATIENT
Start: 2018-05-18 | End: 2018-05-25 | Stop reason: HOSPADM

## 2018-05-18 RX ORDER — POTASSIUM CHLORIDE 20 MEQ/1
10 TABLET, EXTENDED RELEASE ORAL 2 TIMES DAILY
Status: DISCONTINUED | OUTPATIENT
Start: 2018-05-19 | End: 2018-05-19

## 2018-05-18 RX ORDER — ALBUTEROL SULFATE 90 UG/1
2 AEROSOL, METERED RESPIRATORY (INHALATION) EVERY 6 HOURS PRN
Status: DISCONTINUED | OUTPATIENT
Start: 2018-05-18 | End: 2018-05-25 | Stop reason: HOSPADM

## 2018-05-18 RX ORDER — ONDANSETRON 2 MG/ML
4 INJECTION INTRAMUSCULAR; INTRAVENOUS EVERY 4 HOURS PRN
Status: DISCONTINUED | OUTPATIENT
Start: 2018-05-18 | End: 2018-05-18

## 2018-05-18 RX ORDER — METOPROLOL SUCCINATE 25 MG/1
25 TABLET, EXTENDED RELEASE ORAL DAILY
Status: DISCONTINUED | OUTPATIENT
Start: 2018-05-19 | End: 2018-05-25 | Stop reason: HOSPADM

## 2018-05-18 RX ORDER — TOPIRAMATE 25 MG/1
25 TABLET ORAL 2 TIMES DAILY
Status: DISCONTINUED | OUTPATIENT
Start: 2018-05-18 | End: 2018-05-25 | Stop reason: HOSPADM

## 2018-05-18 RX ORDER — OMEPRAZOLE 20 MG/1
20 CAPSULE, DELAYED RELEASE ORAL DAILY
Status: DISCONTINUED | OUTPATIENT
Start: 2018-05-19 | End: 2018-05-25 | Stop reason: HOSPADM

## 2018-05-18 RX ORDER — GABAPENTIN 300 MG/1
300 CAPSULE ORAL DAILY
Status: DISCONTINUED | OUTPATIENT
Start: 2018-05-19 | End: 2018-05-20

## 2018-05-18 RX ORDER — WARFARIN SODIUM 2.5 MG/1
2.5-3.75 TABLET ORAL EVERY EVENING
Status: ON HOLD | COMMUNITY
End: 2018-08-20

## 2018-05-18 RX ORDER — ACETAMINOPHEN 325 MG/1
650 TABLET ORAL EVERY 6 HOURS PRN
Status: DISCONTINUED | OUTPATIENT
Start: 2018-05-18 | End: 2018-05-25 | Stop reason: HOSPADM

## 2018-05-18 RX ORDER — ONDANSETRON 4 MG/1
4 TABLET, ORALLY DISINTEGRATING ORAL EVERY 4 HOURS PRN
Status: DISCONTINUED | OUTPATIENT
Start: 2018-05-18 | End: 2018-05-19

## 2018-05-18 RX ORDER — IPRATROPIUM BROMIDE AND ALBUTEROL SULFATE 2.5; .5 MG/3ML; MG/3ML
3 SOLUTION RESPIRATORY (INHALATION)
Status: DISCONTINUED | OUTPATIENT
Start: 2018-05-18 | End: 2018-05-25 | Stop reason: HOSPADM

## 2018-05-18 RX ORDER — WARFARIN SODIUM 7.5 MG/1
3.75 TABLET ORAL
Status: DISCONTINUED | OUTPATIENT
Start: 2018-05-19 | End: 2018-05-20

## 2018-05-18 RX ORDER — FUROSEMIDE 10 MG/ML
40 INJECTION INTRAMUSCULAR; INTRAVENOUS ONCE
Status: COMPLETED | OUTPATIENT
Start: 2018-05-18 | End: 2018-05-18

## 2018-05-18 RX ORDER — AMOXICILLIN 250 MG
2 CAPSULE ORAL 2 TIMES DAILY
Status: DISCONTINUED | OUTPATIENT
Start: 2018-05-18 | End: 2018-05-25 | Stop reason: HOSPADM

## 2018-05-18 RX ORDER — FUROSEMIDE 10 MG/ML
40 INJECTION INTRAMUSCULAR; INTRAVENOUS
Status: DISCONTINUED | OUTPATIENT
Start: 2018-05-18 | End: 2018-05-18

## 2018-05-18 RX ORDER — FUROSEMIDE 10 MG/ML
40 INJECTION INTRAMUSCULAR; INTRAVENOUS
Status: DISCONTINUED | OUTPATIENT
Start: 2018-05-19 | End: 2018-05-19

## 2018-05-18 RX ADMIN — FUROSEMIDE 40 MG: 10 INJECTION, SOLUTION INTRAMUSCULAR; INTRAVENOUS at 21:46

## 2018-05-18 RX ADMIN — FUROSEMIDE 40 MG: 10 INJECTION, SOLUTION INTRAMUSCULAR; INTRAVENOUS at 19:10

## 2018-05-18 ASSESSMENT — CHA2DS2 SCORE
AGE 65 TO 74: NO
DIABETES: NO
CHA2DS2 VASC SCORE: 6
SEX: FEMALE
AGE 75 OR GREATER: YES
VASCULAR DISEASE: YES
HYPERTENSION: YES
CHF OR LEFT VENTRICULAR DYSFUNCTION: YES
PRIOR STROKE OR TIA OR THROMBOEMBOLISM: NO

## 2018-05-18 ASSESSMENT — ENCOUNTER SYMPTOMS
ORTHOPNEA: 0
CHILLS: 0
FOCAL WEAKNESS: 0
BLOOD IN STOOL: 0
VOMITING: 0
COUGH: 1
FLANK PAIN: 0
PND: 0
DIARRHEA: 0
SHORTNESS OF BREATH: 1
ORTHOPNEA: 0
SHORTNESS OF BREATH: 1
BLURRED VISION: 0
SORE THROAT: 0
SEIZURES: 0
PALPITATIONS: 0
DEPRESSION: 0
MYALGIAS: 0
DIAPHORESIS: 0
HEADACHES: 0
DIZZINESS: 0
ABDOMINAL PAIN: 0
SPUTUM PRODUCTION: 0
FEVER: 0
FEVER: 0
PALPITATIONS: 0
COUGH: 0
SPUTUM PRODUCTION: 0
CHILLS: 0
BACK PAIN: 0
BRUISES/BLEEDS EASILY: 0
NAUSEA: 0
NECK PAIN: 0
WHEEZING: 0
WHEEZING: 0

## 2018-05-18 ASSESSMENT — COPD QUESTIONNAIRES
COPD SCREENING SCORE: 6
DURING THE PAST 4 WEEKS HOW MUCH DID YOU FEEL SHORT OF BREATH: SOME OF THE TIME
DO YOU EVER COUGH UP ANY MUCUS OR PHLEGM?: NO/ONLY WITH OCCASIONAL COLDS OR INFECTIONS
HAVE YOU SMOKED AT LEAST 100 CIGARETTES IN YOUR ENTIRE LIFE: YES

## 2018-05-18 ASSESSMENT — LIFESTYLE VARIABLES
DO YOU DRINK ALCOHOL: NO
EVER_SMOKED: YES

## 2018-05-18 ASSESSMENT — PATIENT HEALTH QUESTIONNAIRE - PHQ9
SUM OF ALL RESPONSES TO PHQ9 QUESTIONS 1 AND 2: 0
2. FEELING DOWN, DEPRESSED, IRRITABLE, OR HOPELESS: NOT AT ALL
1. LITTLE INTEREST OR PLEASURE IN DOING THINGS: NOT AT ALL

## 2018-05-18 ASSESSMENT — PAIN SCALES - GENERAL: PAINLEVEL_OUTOF10: 0

## 2018-05-18 NOTE — ED TRIAGE NOTES
c/o SOB, lower ext swelling, has gained 10 lbs since first of May, weighs self BID, hx of CHF, takes meds as prescribed, EKG done

## 2018-05-18 NOTE — PROGRESS NOTES
Chief Complaint   Patient presents with   • Edema     bilateral lower and upper extremity edemea associated w/redness        HISTORY OF PRESENT ILLNESS: Patient is a 86 y.o. female established patient who presents today due to intermittent sob, increase in BW 12 lbs since the beginning of May, bilateral lower extremity, moving from [place] in Feb, no medical record here but family reports pt has hx of CHF already, taking BB and torsemide 40 mg bid with little help. Pt was prescribed with oxygen by PCP but pt has returned it because she did not feel she needs it. She reports no chest pain, sob or wheezing at this time but the bilateral legs edema have gone worse from ankle up to thigh.     5/7 Cr. 1.5, GFR 33,     In addition, she is also noted to have supratherapeutic INR 3.1 on 5/8. She is on coumadin for pafib. Pt has no sign of bleeding at this time. She is scheduled to follow up with AC clinic but not until 6/1.      Patient Active Problem List    Diagnosis Date Noted   • Chronic diastolic congestive heart failure (HCC) 05/14/2018   • COPD (chronic obstructive pulmonary disease) ([facility]) 05/14/2018   • GERD (gastroesophageal reflux disease) 05/14/2018   • Osteoporosis 05/14/2018   • Psoriasis 05/14/2018   • Bilateral lower extremity edema 05/14/2018   • Polypharmacy 05/14/2018   • Hypoxia 05/14/2018   • CKD (chronic kidney disease) stage 3, GFR 30-59 ml/min 05/14/2018   • BERTRAND (dyspnea on exertion) 05/04/2018   • Secondary hypertension 04/03/2018   • Cardiac pacemaker in situ 04/03/2018   • Congestive heart failure (CHF) ([facility]) 04/03/2018   • Tremors of nervous system 04/03/2018   • Chronic atrial fibrillation (HCC) 03/29/2018   • Grief 02/28/2018   • Adjustment disorder with anxiety 02/28/2018   • ARELY (generalized anxiety disorder) 11/29/2017   • Recurrent UTI 10/23/2017   • Nonrheumatic aortic valve stenosis 03/01/2017   • Vaginal atrophy 05/27/2016   • Warfarin anticoagulation 02/02/2016   • ASCVD  (arteriosclerotic cardiovascular disease) 03/10/2015   • Persistent insomnia 05/26/2011   • Hypothyroidism 12/09/2009   • Sinoatrial node dysfunction (HCC) 12/09/2009   • Gout 06/01/2009   • Essential tremor 08/25/2008   • Essential hypertension 07/17/2008       Allergies:Codeine    Current Outpatient Prescriptions   Medication Sig Dispense Refill   • levothyroxine (SYNTHROID) 50 MCG Tab Take 50 mcg by mouth Every morning on an empty stomach.     • allopurinol (ZYLOPRIM) 300 MG Tab Take 300 mg by mouth every day.     • gabapentin (NEURONTIN) 300 MG Cap Take 300 mg by mouth 3 times a day.     • acetaminophen (TYLENOL) 500 MG Tab Take 1,000 mg by mouth 1 time daily as needed.     • Calcium-Vitamin D 500-125 MG-UNIT Tab Take 1 tablet by mouth.     • clobetasol (TEMOVATE) 0.05 % Ointment Apply to effected area daily, as needed.     • torsemide (DEMADEX) 20 MG Tab Take 2 Tabs by mouth 2 times a day. 120 Tab 11   • albuterol 108 (90 Base) MCG/ACT Aero Soln inhalation aerosol Inhale 2 Puffs by mouth every 6 hours as needed for Shortness of Breath.     • sertraline (ZOLOFT) 50 MG Tab Take 50 mg by mouth every day.     • topiramate (TOPAMAX) 25 MG Tab Take 25 mg by mouth 2 times a day.     • potassium chloride SA (K-DUR) 10 MEQ Tab CR Take 10 mEq by mouth 2 times a day.     • metoprolol SR (TOPROL XL) 25 MG TABLET SR 24 HR Take 25 mg by mouth every day.     • omeprazole (PRILOSEC) 20 MG delayed-release capsule Take 20 mg by mouth every day.     • warfarin (COUMADIN) 2.5 MG Tab Take 1.5 to 2 tablets daily or as directed by [facility] 180 Tab 3     No current facility-administered medications for this visit.        Social History   Substance Use Topics   • Smoking status: Former Smoker     Packs/day: 0.50     Types: Cigarettes     Quit date: 1/1/1970   • Smokeless tobacco: Never Used   • Alcohol use 1.8 oz/week     3 Glasses of wine per week      Comment: glasses a night before Dinner       Family Status   Relation Status    • Neg Hx      Family History   Problem Relation Age of Onset   • Fainting Neg Hx          ROS:  Review of Systems   Constitutional: Positive for malaise/fatigue. Negative for chills and fever.   Respiratory: Positive for cough and shortness of breath (intermittent). Negative for sputum production and wheezing.    Cardiovascular: Positive for leg swelling. Negative for chest pain, palpitations and orthopnea ( pt reports she is using one pillow and able to lay flat).        Objective:     Blood pressure 120/64, pulse 84, temperature 36.5 °C (97.7 °F), resp. rate 16, weight 67.1 kg (148 lb), SpO2 92 %.     Physical Exam:  Physical Exam   Constitutional: She is oriented to person, place, and time and well-developed, well-nourished, and in no distress.   HENT:   Head: Normocephalic and atraumatic.   Eyes: Conjunctivae are normal.   Neck: Neck supple.   Cardiovascular: Normal rate and regular rhythm.    Pulmonary/Chest: Effort normal. She has rales ( bilateral lower lobes).   Musculoskeletal: She exhibits edema.   Neurological: She is alert and oriented to person, place, and time.   Skin: Skin is warm. No erythema.   Vitals reviewed.        Assessment/Plan:  1. Chronic diastolic congestive heart failure (HCC)  5/7 Cr. 1.5, GFR 33,   Pt is in no acute distress but given her CHF hx with sign of CHF exacerbation, not responding to oral diuresis (torsemide 40 mg bid) with CKD (no baseline available). Pt is instructed to go to ER since she will need IV diuresis with close monitor for kidney function . She will also need CXR, Echo. These are all unable to be done over the weekend. In addition, she will also need to monitor on her INR level. ER visit is the better option for her at this time. Family verbalized understanding and they will drive pt over main hospital now.     Will request pt's medical record from previous hospital for future reference.     - Obtain Results: Other (see comment) (medical record regarding  heart failure, cardiac ultrasound. ); Obtain Results From: Other (see comment) ([facility] )

## 2018-05-18 NOTE — LETTER
[facility]  ELIZABETH Patel  [address]  [place] [address]  Fax: [phone]   Authorization for Release/Disclosure of   Protected Health Information   Name: JENIFFER BENEDICT : 1932 SSN: [id number]   Address: [address] [place] [address] [address]  [place] [address] Phone:    [phone] (home)    I authorize the entity listed below to release/disclose the PHI below to:   [facility]/ELIZABETH Patel and ELIZABETH Olivier   Provider or Entity Name:  [facility]      Address [place], [facility]  [address]  [place] [address]        Phone:  [phone]    Fax:     Reason for request: continuity of care   Information to be released:    [  ] LAST COLONOSCOPY,  including any PATH REPORT and follow-up  [  ] LAST FIT/COLOGUARD RESULT [  ] LAST DEXA  [  ] LAST MAMMOGRAM  [  ] LAST PAP  [  ] LAST LABS [  ] RETINA EXAM REPORT  [  ] IMMUNIZATION RECORDS  [X] Release all info      [  ] Check here and initial the line next to each item to release ALL health information INCLUDING  _____ Care and treatment for drug and / or alcohol abuse  _____ HIV testing, infection status, or AIDS  _____ Genetic Testing    DATES OF SERVICE OR TIME PERIOD TO BE DISCLOSED: 2005 til present   I understand and acknowledge that:  * This Authorization may be revoked at any time by you in writing, except if your health information has already been used or disclosed.  * Your health information that will be used or disclosed as a result of you signing this authorization could be re-disclosed by the recipient. If this occurs, your re-disclosed health information may no longer be protected by State or Federal laws.  * You may refuse to sign this Authorization. Your refusal will not affect your ability to obtain treatment.  * This Authorization becomes effective upon signing and will  on (date) __________.      If no date is indicated, this Authorization will  one (1) year from  the signature date.    Name: Andree Egan    Signature:   Date:     5/18/2018       PLEASE FAX REQUESTED RECORDS BACK TO: [phone]

## 2018-05-19 ENCOUNTER — PATIENT OUTREACH (OUTPATIENT)
Dept: HEALTH INFORMATION MANAGEMENT | Facility: OTHER | Age: 83
End: 2018-05-19

## 2018-05-19 PROBLEM — I50.33 ACUTE ON CHRONIC DIASTOLIC (CONGESTIVE) HEART FAILURE (HCC): Status: ACTIVE | Noted: 2018-05-18

## 2018-05-19 LAB
ALBUMIN SERPL BCP-MCNC: 3.5 G/DL (ref 3.2–4.9)
ALBUMIN/GLOB SERPL: 1.5 G/DL
ALP SERPL-CCNC: 111 U/L (ref 30–99)
ALT SERPL-CCNC: 8 U/L (ref 2–50)
ANION GAP SERPL CALC-SCNC: 10 MMOL/L (ref 0–11.9)
AST SERPL-CCNC: 14 U/L (ref 12–45)
BILIRUB SERPL-MCNC: 1 MG/DL (ref 0.1–1.5)
BUN SERPL-MCNC: 38 MG/DL (ref 8–22)
CALCIUM SERPL-MCNC: 8.5 MG/DL (ref 8.5–10.5)
CHLORIDE SERPL-SCNC: 106 MMOL/L (ref 96–112)
CO2 SERPL-SCNC: 27 MMOL/L (ref 20–33)
CREAT SERPL-MCNC: 1.29 MG/DL (ref 0.5–1.4)
GLOBULIN SER CALC-MCNC: 2.4 G/DL (ref 1.9–3.5)
GLUCOSE SERPL-MCNC: 88 MG/DL (ref 65–99)
INR PPP: 2.8 (ref 0.87–1.13)
LV EJECT FRACT  99904: 55
LV EJECT FRACT MOD 2C 99903: 53.95
LV EJECT FRACT MOD 4C 99902: 59.53
LV EJECT FRACT MOD BP 99901: 57.86
MAGNESIUM SERPL-MCNC: 2.2 MG/DL (ref 1.5–2.5)
POTASSIUM SERPL-SCNC: 3.3 MMOL/L (ref 3.6–5.5)
POTASSIUM SERPL-SCNC: 3.7 MMOL/L (ref 3.6–5.5)
PROT SERPL-MCNC: 5.9 G/DL (ref 6–8.2)
PROTHROMBIN TIME: 29.2 SEC (ref 12–14.6)
SODIUM SERPL-SCNC: 143 MMOL/L (ref 135–145)

## 2018-05-19 PROCEDURE — 700102 HCHG RX REV CODE 250 W/ 637 OVERRIDE(OP): Performed by: PHARMACIST

## 2018-05-19 PROCEDURE — 85610 PROTHROMBIN TIME: CPT

## 2018-05-19 PROCEDURE — 99233 SBSQ HOSP IP/OBS HIGH 50: CPT | Performed by: INTERNAL MEDICINE

## 2018-05-19 PROCEDURE — 700102 HCHG RX REV CODE 250 W/ 637 OVERRIDE(OP): Performed by: INTERNAL MEDICINE

## 2018-05-19 PROCEDURE — 93306 TTE W/DOPPLER COMPLETE: CPT

## 2018-05-19 PROCEDURE — 80053 COMPREHEN METABOLIC PANEL: CPT

## 2018-05-19 PROCEDURE — 83735 ASSAY OF MAGNESIUM: CPT

## 2018-05-19 PROCEDURE — 84132 ASSAY OF SERUM POTASSIUM: CPT

## 2018-05-19 PROCEDURE — 93010 ELECTROCARDIOGRAM REPORT: CPT | Mod: 26 | Performed by: INTERNAL MEDICINE

## 2018-05-19 PROCEDURE — A9270 NON-COVERED ITEM OR SERVICE: HCPCS | Performed by: INTERNAL MEDICINE

## 2018-05-19 PROCEDURE — 96376 TX/PRO/DX INJ SAME DRUG ADON: CPT

## 2018-05-19 PROCEDURE — 700111 HCHG RX REV CODE 636 W/ 250 OVERRIDE (IP): Performed by: INTERNAL MEDICINE

## 2018-05-19 PROCEDURE — 770020 HCHG ROOM/CARE - TELE (206)

## 2018-05-19 PROCEDURE — 700102 HCHG RX REV CODE 250 W/ 637 OVERRIDE(OP): Performed by: NURSE PRACTITIONER

## 2018-05-19 PROCEDURE — A9270 NON-COVERED ITEM OR SERVICE: HCPCS | Performed by: NURSE PRACTITIONER

## 2018-05-19 PROCEDURE — A9270 NON-COVERED ITEM OR SERVICE: HCPCS | Performed by: PHARMACIST

## 2018-05-19 RX ORDER — FUROSEMIDE 10 MG/ML
40 INJECTION INTRAMUSCULAR; INTRAVENOUS
Status: DISCONTINUED | OUTPATIENT
Start: 2018-05-20 | End: 2018-05-22

## 2018-05-19 RX ORDER — POTASSIUM CHLORIDE 20 MEQ/1
20 TABLET, EXTENDED RELEASE ORAL 2 TIMES DAILY
Status: DISCONTINUED | OUTPATIENT
Start: 2018-05-19 | End: 2018-05-24

## 2018-05-19 RX ORDER — POTASSIUM CHLORIDE 20 MEQ/1
40 TABLET, EXTENDED RELEASE ORAL ONCE
Status: COMPLETED | OUTPATIENT
Start: 2018-05-19 | End: 2018-05-19

## 2018-05-19 RX ADMIN — MAGNESIUM HYDROXIDE 30 ML: 400 SUSPENSION ORAL at 10:23

## 2018-05-19 RX ADMIN — TOPIRAMATE 25 MG: 25 TABLET, FILM COATED ORAL at 18:26

## 2018-05-19 RX ADMIN — FUROSEMIDE 40 MG: 10 INJECTION, SOLUTION INTRAMUSCULAR; INTRAVENOUS at 06:02

## 2018-05-19 RX ADMIN — TOPIRAMATE 25 MG: 25 TABLET, FILM COATED ORAL at 10:23

## 2018-05-19 RX ADMIN — POTASSIUM CHLORIDE 20 MEQ: 1500 TABLET, EXTENDED RELEASE ORAL at 18:27

## 2018-05-19 RX ADMIN — SERTRALINE 50 MG: 50 TABLET, FILM COATED ORAL at 10:23

## 2018-05-19 RX ADMIN — WARFARIN SODIUM 3.75 MG: 7.5 TABLET ORAL at 18:27

## 2018-05-19 RX ADMIN — GABAPENTIN 300 MG: 300 CAPSULE ORAL at 10:23

## 2018-05-19 RX ADMIN — LEVOTHYROXINE SODIUM 50 MCG: 50 TABLET ORAL at 06:20

## 2018-05-19 RX ADMIN — METOPROLOL SUCCINATE 25 MG: 25 TABLET, EXTENDED RELEASE ORAL at 10:23

## 2018-05-19 RX ADMIN — POTASSIUM CHLORIDE 40 MEQ: 1500 TABLET, EXTENDED RELEASE ORAL at 10:23

## 2018-05-19 RX ADMIN — OMEPRAZOLE 20 MG: 20 CAPSULE, DELAYED RELEASE ORAL at 10:23

## 2018-05-19 RX ADMIN — ACETAMINOPHEN 650 MG: 325 TABLET, FILM COATED ORAL at 06:17

## 2018-05-19 ASSESSMENT — ENCOUNTER SYMPTOMS
CHILLS: 0
SENSORY CHANGE: 0
NEUROLOGICAL NEGATIVE: 1
SPUTUM PRODUCTION: 0
PALPITATIONS: 0
CONSTIPATION: 0
BRUISES/BLEEDS EASILY: 0
HALLUCINATIONS: 0
SHORTNESS OF BREATH: 1
DIZZINESS: 0
NERVOUS/ANXIOUS: 0
DEPRESSION: 0
BRUISES/BLEEDS EASILY: 1
DIARRHEA: 0
NAUSEA: 0
HEADACHES: 0
WHEEZING: 0
FEVER: 0
FOCAL WEAKNESS: 0
SPEECH CHANGE: 0
DIAPHORESIS: 0
BLOOD IN STOOL: 0
INSOMNIA: 0
MEMORY LOSS: 0
EYES NEGATIVE: 1
FALLS: 0
VOMITING: 0
HEMOPTYSIS: 0
ABDOMINAL PAIN: 0
WEAKNESS: 0
COUGH: 0
WEIGHT LOSS: 0

## 2018-05-19 ASSESSMENT — COGNITIVE AND FUNCTIONAL STATUS - GENERAL
MOVING FROM LYING ON BACK TO SITTING ON SIDE OF FLAT BED: A LITTLE
WALKING IN HOSPITAL ROOM: A LITTLE
STANDING UP FROM CHAIR USING ARMS: A LITTLE
SUGGESTED CMS G CODE MODIFIER DAILY ACTIVITY: CJ
MOBILITY SCORE: 19
MOVING TO AND FROM BED TO CHAIR: A LITTLE
HELP NEEDED FOR BATHING: A LITTLE
CLIMB 3 TO 5 STEPS WITH RAILING: A LITTLE
SUGGESTED CMS G CODE MODIFIER MOBILITY: CK
DRESSING REGULAR UPPER BODY CLOTHING: A LITTLE
DAILY ACTIVITIY SCORE: 20
DRESSING REGULAR LOWER BODY CLOTHING: A LITTLE
TOILETING: A LITTLE

## 2018-05-19 ASSESSMENT — PAIN SCALES - GENERAL
PAINLEVEL_OUTOF10: 0
PAINLEVEL_OUTOF10: 6
PAINLEVEL_OUTOF10: 0
PAINLEVEL_OUTOF10: 0

## 2018-05-19 ASSESSMENT — LIFESTYLE VARIABLES
SUBSTANCE_ABUSE: 0
EVER_SMOKED: YES
ALCOHOL_USE: NO

## 2018-05-19 ASSESSMENT — PATIENT HEALTH QUESTIONNAIRE - PHQ9
2. FEELING DOWN, DEPRESSED, IRRITABLE, OR HOPELESS: NOT AT ALL
SUM OF ALL RESPONSES TO PHQ9 QUESTIONS 1 AND 2: 0
1. LITTLE INTEREST OR PLEASURE IN DOING THINGS: NOT AT ALL
2. FEELING DOWN, DEPRESSED, IRRITABLE, OR HOPELESS: NOT AT ALL
1. LITTLE INTEREST OR PLEASURE IN DOING THINGS: NOT AT ALL
SUM OF ALL RESPONSES TO PHQ9 QUESTIONS 1 AND 2: 0

## 2018-05-19 NOTE — ED PROVIDER NOTES
ED Provider Note    CHIEF COMPLAINT  Chief Complaint   Patient presents with   • Shortness of Breath   • Leg Swelling       HPI  Andree Egan is a 86 y.o. female who presents complaining of severe dyspnea and bipedal edema.  Patient states she has been getting progressively worse over the last 7 days.  Patient states she has gained 10 pounds.  She denies chest pain.  She has had dyspnea.  No further details of the history of present illness can be obtained within the constraints of the urgency of the patient's clinical condition    REVIEW OF SYSTEMS  See HPI for further details.  No fever and chills.  No cough or cold symptoms.  Positive dyspnea.  No chest pain.  No abdominal pain.  No vomiting or diarrhea.  All other systems are negative.    PAST MEDICAL HISTORY  Past Medical History:   Diagnosis Date   • Atrial fibrillation (CMS-HCC) [I48.91] 3/29/2018   • Congestive heart failure (CHF) (HCC) 4/3/2018   • BERTRAND (dyspnea on exertion) 5/4/2018   • Hyperlipidemia    • Hypertension    • Kidney disease    • Long term current use of anticoagulant 3/29/2018   • Pacemaker 4/3/2018   • Pulmonary emphysema (HCC)    • Secondary hypertension 4/3/2018   • Thyroid disease    • Tremors of nervous system 4/3/2018   • Urinary tract infection        FAMILY HISTORY  Family History   Problem Relation Age of Onset   • Fainting Neg Hx        SOCIAL HISTORY  Social History     Social History   • Marital status:      Spouse name: N/A   • Number of children: N/A   • Years of education: N/A     Social History Main Topics   • Smoking status: Former Smoker     Packs/day: 0.50     Types: Cigarettes     Quit date: 1/1/1970   • Smokeless tobacco: Never Used   • Alcohol use 1.8 oz/week     3 Glasses of wine per week      Comment: glasses a night before Dinner   • Drug use: No   • Sexual activity: Not on file     Other Topics Concern   • Not on file     Social History Narrative   • No narrative on file       SURGICAL HISTORY  No past  surgical history on file.    CURRENT MEDICATIONS  Home Medications     Reviewed by Odessa Pina (Pharmacy Tech) on 05/18/18 at 2148  Med List Status: Complete   Medication Last Dose Status   acetaminophen (TYLENOL) 500 MG Tab >4 days Active   albuterol 108 (90 Base) MCG/ACT Aero Soln inhalation aerosol >2 days Active   allopurinol (ZYLOPRIM) 300 MG Tab 5/17/2018 Active   Calcium-Vitamin D 500-125 MG-UNIT Tab 5/17/2018 Active   gabapentin (NEURONTIN) 300 MG Cap 5/18/2018 Active   levothyroxine (SYNTHROID) 50 MCG Tab 5/18/2018 Active   metoprolol SR (TOPROL XL) 25 MG TABLET SR 24 HR 5/18/2018 Active   omeprazole (PRILOSEC) 20 MG delayed-release capsule 5/18/2018 Active   potassium chloride SA (K-DUR) 10 MEQ Tab CR 5/17/2018 Active   sertraline (ZOLOFT) 50 MG Tab 5/17/2018 Active   topiramate (TOPAMAX) 25 MG Tab 5/18/2018 Active   torsemide (DEMADEX) 20 MG Tab 5/18/2018 Active   warfarin (COUMADIN) 2.5 MG Tab 5/17/2018 Active                ALLERGIES  Allergies   Allergen Reactions   • Codeine Vomiting       PHYSICAL EXAM  VITAL SIGNS: /77   Pulse 80   Temp 37 °C (98.6 °F)   Resp 16   Wt 67.1 kg (148 lb)   SpO2 95%   BMI 25.80 kg/m²   Constitutional: Well developed, Well nourished, pleasant elderly female  HENT: Normocephalic, Atraumatic, Bilateral external ears normal, Oropharynx moist, No oral exudates, Nose normal.   Eyes: TREVIN, EOMI, Conjunctiva normal, No discharge.   Neck: Normal range of motion, mild JVD  Lymphatic: No lymphadenopathy noted.   Cardiovascular: Irregular rate and rhythm with a loud systolic ejection murmur  Thorax & Lungs: Rales bilaterally  Abdomen: Bowel sounds normal, Soft, No tenderness, No masses, No pulsatile masses.   Skin: Warm, Dry, No erythema, No rash.   Back: No tenderness, No CVA tenderness.   Extremities:  Dorsalis pedis pulses 2+ equal bilaterally. Radial pulses 2+ equal bilaterally.  3+ pitting edema to the thigh  Neurologic: Alert & oriented x 3, Normal motor  function, Normal sensory function, No focal deficits noted.     EKG  Paced rhythm at a rate of 70.  No P waves.  Abnormal QRS.  Abnormal ST segments abnormal T waves.  Abnormal EKG showing a paced rhythm    RADIOLOGY/PROCEDURES  DX-CHEST-LIMITED (1 VIEW)   Final Result      1.  Enlarged cardiac silhouette with probable vascular congestion.   2.  Right lower lobe airspace disease with possible small right pleural effusion.      ECHOCARDIOGRAM COMP W/O CONT    (Results Pending)       Results for orders placed or performed during the hospital encounter of 05/18/18   Troponin   Result Value Ref Range    Troponin I 0.03 0.00 - 0.04 ng/mL   Btype Natriuretic Peptide   Result Value Ref Range    B Natriuretic Peptide 517 (H) 0 - 100 pg/mL   CBC with Differential   Result Value Ref Range    WBC 3.2 (L) 4.8 - 10.8 K/uL    RBC 3.60 (L) 4.20 - 5.40 M/uL    Hemoglobin 10.4 (L) 12.0 - 16.0 g/dL    Hematocrit 34.6 (L) 37.0 - 47.0 %    MCV 96.1 81.4 - 97.8 fL    MCH 28.9 27.0 - 33.0 pg    MCHC 30.1 (L) 33.6 - 35.0 g/dL    RDW 55.9 (H) 35.9 - 50.0 fL    Platelet Count 153 (L) 164 - 446 K/uL    MPV 10.8 9.0 - 12.9 fL    Neutrophils-Polys 62.20 44.00 - 72.00 %    Lymphocytes 30.00 22.00 - 41.00 %    Monocytes 6.60 0.00 - 13.40 %    Eosinophils 0.00 0.00 - 6.90 %    Basophils 0.90 0.00 - 1.80 %    Immature Granulocytes 0.30 0.00 - 0.90 %    Nucleated RBC 0.00 /100 WBC    Neutrophils (Absolute) 1.97 (L) 2.00 - 7.15 K/uL    Lymphs (Absolute) 0.95 (L) 1.00 - 4.80 K/uL    Monos (Absolute) 0.21 0.00 - 0.85 K/uL    Eos (Absolute) 0.00 0.00 - 0.51 K/uL    Baso (Absolute) 0.03 0.00 - 0.12 K/uL    Immature Granulocytes (abs) 0.01 0.00 - 0.11 K/uL    NRBC (Absolute) 0.00 K/uL   Complete Metabolic Panel (CMP)   Result Value Ref Range    Sodium 140 135 - 145 mmol/L    Potassium 4.2 3.6 - 5.5 mmol/L    Chloride 104 96 - 112 mmol/L    Co2 27 20 - 33 mmol/L    Anion Gap 9.0 0.0 - 11.9    Glucose 93 65 - 99 mg/dL    Bun 41 (H) 8 - 22 mg/dL     Creatinine 1.47 (H) 0.50 - 1.40 mg/dL    Calcium 8.7 8.5 - 10.5 mg/dL    AST(SGOT) 18 12 - 45 U/L    ALT(SGPT) 9 2 - 50 U/L    Alkaline Phosphatase 110 (H) 30 - 99 U/L    Total Bilirubin 0.8 0.1 - 1.5 mg/dL    Albumin 3.7 3.2 - 4.9 g/dL    Total Protein 6.4 6.0 - 8.2 g/dL    Globulin 2.7 1.9 - 3.5 g/dL    A-G Ratio 1.4 g/dL   Prothrombin Time   Result Value Ref Range    PT 29.3 (H) 12.0 - 14.6 sec    INR 2.81 (H) 0.87 - 1.13   APTT   Result Value Ref Range    APTT 43.9 (H) 24.7 - 36.0 sec   Lipase   Result Value Ref Range    Lipase 26 11 - 82 U/L   ESTIMATED GFR   Result Value Ref Range    GFR If  41 (A) >60 mL/min/1.73 m 2    GFR If Non  34 (A) >60 mL/min/1.73 m 2   TSH (Thyroid Stimulating Hormone)   Result Value Ref Range    TSH 1.990 0.380 - 5.330 uIU/mL   EKG (ER)   Result Value Ref Range    Report       [facility] Emergency Dept.    Test Date:  2018  Pt Name:    JENIFFER BENEDICT                  Department: ER  MRN:        [id number]                      Room:       12  Gender:     Female                       Technician: [id number]  :        [id number]                   Requested By:ER TRIAGE PROTOCOL  Order #:    [id number]                    Reading MD: REYNALDO RAMACHANDRAN MD    Measurements  Intervals                                Axis  Rate:       69                           P:          0  AL:         175                          QRS:        -66  QRSD:       160                          T:          102  QT:         508  QTc:        545    Interpretive Statements  VENTRICULAR-PACED RHYTHM  NO FURTHER ANALYSIS ATTEMPTED DUE TO PACED RHYTHM  BASELINE WANDER IN LEAD(S) I,II,aVR  Compared to ECG 2018 09:41:47  Sinus rhythm no longer present    Electronically Signed On 2018 23:17:11 PDT by REYNALDO RAMACHANDRAN MD         COURSE & MEDICAL DECISION MAKING  Pertinent Labs & Imaging studies reviewed. (See chart for details)  Patient was admitted by the [facility]  hospitalist.  Cardiology consultation was obtained.  Patient was given IV Lasix.  Patient has aortic stenosis and likely needs a repeat echo.  Patient was admitted in guarded condition      FINAL IMPRESSION  1.  CHF  2.  Aortic stenosis  3.      Please note that this dictation was created using voice recognition software. I have worked with consultants from the vendor as well as technical experts from [facility] to optimize the interface. I have made every reasonable attempt to correct obvious errors, but I expect that there are errors of grammar and possibly content that I did not discover before finalizing the note.      Electronically signed by: Keyur Osorio, 5/18/2018 10:20 PM

## 2018-05-19 NOTE — ASSESSMENT & PLAN NOTE
- Creatine improved, slowing rising w lasix  - Change lasix to PO  - BMP in am  - Continue to avoid nephrotoxic agents if possible

## 2018-05-19 NOTE — ASSESSMENT & PLAN NOTE
- Permanent pacemaker for bradycardia - ventricularly paced on a Medtronic pacemaker, single   chamber and has chronic atrial fibrillation

## 2018-05-19 NOTE — PROGRESS NOTES
Assessment completed. Pt A&O x4. Denies pain at this time. Respirations even and unlabored on room air. Swelling noted to BLE, per pt better than yesterday. Murmur heard on auscultation.  Updated on POC, communication board updated. Family at bedside. Bed locked and in lowest position. Call light and belongings within reach. Non-skid socks in place. Needs met, will continue to monitor.

## 2018-05-19 NOTE — PROGRESS NOTES
Pt transported from ED via gurney/  accompanied by ED tech and CDU RN, and pt's granddaughters. Weight and VS taken. Patient oriented to unit, room and BR location. POC discussed w/ pt and and family, verbalizes understanding. Admit profile and initial assessment done. Denies any SOB, bilat lower extrem edema noted. Elevated feet and legs w/ pillows. Toileting offered, BSC provided. Diet instructed. Safety and measures and fall precautions in place. Patient questions answered. Promoted rest and comfort. Call light within reach. Needs met.

## 2018-05-19 NOTE — ASSESSMENT & PLAN NOTE
- Abnormal Echo - Severe bi-atrial enlargement with EF of 55% further recommendations per cardiology   - Lasix 20 PO BID  - Metolazone 5 daily  - Continue to monitor I/O  - Increase activity  - Keep K+ > 4.0 and Mg > 2.0   - KCL 20mEq po BID ordered   - Fluid and salt restriction  - Metop Xl 25

## 2018-05-19 NOTE — PROGRESS NOTES
[facility] Hospitalist Progress Note    Date of Service: 2018    Chief Complaint  86 y.o. female admitted 2018 with CHF, AS, chronic A-Fib, pacemaker placement who presented 2018 with progressive SOB and dyspnea on exertion (BERTRAND) and BLE edema for the past 2 weeks.      Interval Problem Update  - BERTRAND which is significant with ambulation  - Approximately 3L output since last pm with improvement in breathing and BLE edema  - Abnormal Echo - Severe bi-atrial enlargement with EF of 55% further recommendations per cardiology     Consultants/Specialty  Cardiology     Disposition  Likely to d/c in am    Review of Systems   Constitutional: Positive for malaise/fatigue. Negative for chills, diaphoresis, fever and weight loss.   HENT: Negative.    Eyes: Negative.    Respiratory: Positive for shortness of breath. Negative for cough, hemoptysis, sputum production and wheezing.    Cardiovascular: Positive for leg swelling. Negative for chest pain and palpitations.   Gastrointestinal: Negative for abdominal pain, blood in stool, constipation, diarrhea, melena, nausea and vomiting.   Genitourinary: Negative.    Musculoskeletal: Negative for falls.   Skin: Negative for itching and rash.   Neurological: Negative for dizziness, sensory change, speech change, focal weakness, weakness and headaches.   Endo/Heme/Allergies: Bruises/bleeds easily.        Pt on Coumadin   Psychiatric/Behavioral: Negative for depression, hallucinations, memory loss, substance abuse and suicidal ideas. The patient is not nervous/anxious and does not have insomnia.         Recent death in family ( and son)      Physical Exam  Laboratory/Imaging   Hemodynamics  Temp (24hrs), Av.5 °C (97.7 °F), Min:36.3 °C (97.4 °F), Max:36.8 °C (98.2 °F)   Temperature: 36.8 °C (98.2 °F)  Pulse  Av.6  Min: 61  Max: 80    Blood Pressure : 109/65, NIBP: 131/61      Respiratory      Respiration: 18, Pulse Oximetry: 92 %     Work Of Breathing / Effort:  Mild  RUL Breath Sounds: (P) Diminished, RML Breath Sounds: (P) Diminished, RLL Breath Sounds: (P) Fine Crackles, ASHTYN Breath Sounds: (P) Diminished, LLL Breath Sounds: (P) Fine Crackles    Fluids    Intake/Output Summary (Last 24 hours) at 05/19/18 1611  Last data filed at 05/19/18 1300   Gross per 24 hour   Intake              240 ml   Output             3280 ml   Net            -3040 ml       Nutrition  Orders Placed This Encounter   Procedures   • Diet Order     Standing Status:   Standing     Number of Occurrences:   1     Order Specific Question:   Diet:     Answer:   2 Gram Sodium [7]     Order Specific Question:   Diet:     Answer:   Cardiac [6]     Order Specific Question:   Consistency/Fluid modifications:     Answer:   1500 ml Fluid Restriction [9]     Physical Exam   Constitutional: She is oriented to person, place, and time. Vital signs are normal. No distress.   HENT:   Head: Normocephalic.   Eyes: Pupils are equal, round, and reactive to light.   Neck: Trachea normal and normal range of motion. Neck supple. No JVD present. Carotid bruit is not present. No thyroid mass and no thyromegaly present.   Cardiovascular: Normal rate, regular rhythm and intact distal pulses.    Murmur heard.  Ventricular pacer   Pulmonary/Chest: Effort normal. No accessory muscle usage. No respiratory distress. She has rales in the right lower field and the left lower field.   Increased work of breathing with exertion    Abdominal: Soft. Normal appearance and bowel sounds are normal.   Musculoskeletal: Normal range of motion.   Neurological: She is alert and oriented to person, place, and time. She has normal strength. GCS eye subscore is 4. GCS verbal subscore is 5. GCS motor subscore is 6.   Skin: Skin is warm, dry and intact.   Psychiatric: She has a normal mood and affect. Her speech is normal and behavior is normal.   Recent death of patients  and son       Recent Labs      05/18/18   1820   WBC  3.2*   RBC  3.60*    HEMOGLOBIN  10.4*   HEMATOCRIT  34.6*   MCV  96.1   MCH  28.9   MCHC  30.1*   RDW  55.9*   PLATELETCT  153*   MPV  10.8     Recent Labs      05/18/18 1820  05/19/18 0408  05/19/18   1333   SODIUM  140  143   --    POTASSIUM  4.2  3.3*  3.7   CHLORIDE  104  106   --    CO2  27  27   --    GLUCOSE  93  88   --    BUN  41*  38*   --    CREATININE  1.47*  1.29   --    CALCIUM  8.7  8.5   --      Recent Labs      05/18/18 1820 05/19/18 0408   APTT  43.9*   --    INR  2.81*  2.80*     Recent Labs      05/18/18 1820   BNPBTYPENAT  517*              Assessment/Plan     Acute on chronic diastolic (congestive) heart failure (HCC)- (present on admission)   Assessment & Plan    - Abnormal Echo - Severe bi-atrial enlargement with EF of 55% further recommendations per cardiology   - Decreased Lasix to 40 mg once a day ( 3L output since last pm)  - Continue to monitor I/O  - Increase activity  - Keep K+ > 4.0 and Mg > 2.0   - KCL 20mEq po BID ordered   - Fluid and salt restriction  - Probable d/c in am              CKD (chronic kidney disease) stage 3, GFR 30-59 ml/min- (present on admission)   Assessment & Plan    - Creatine improved now 1.29 from 1.47 (baseline Cr 1.5)  - Lasix decreased to 40 mg IV daily starting in am (5/20/18)  - BMP in am  - Continue to avoid nephrotoxic agents if possible            Warfarin anticoagulation- (present on admission)   Assessment & Plan    - Continue Coumadin pharmacy to dose         Hypothyroidism- (present on admission)   Assessment & Plan    - Continue Synthroid         GERD (gastroesophageal reflux disease)- (present on admission)   Assessment & Plan    - Continue PPI        Essential hypertension- (present on admission)   Assessment & Plan    - Well controlled will continue Toprol XL 25 mg daily        Cardiac pacemaker in situ- (present on admission)   Assessment & Plan    - Permanent pacemaker for bradycardia - ventricularly paced on a Medtronic pacemaker, single   chamber  and has chronic atrial fibrillation        Chronic atrial fibrillation (HCC)- (present on admission)   Assessment & Plan    - Ventricular paced rhythm - Rate controlled  - Continue Toprol 25 mg daily  - INR 2.8 theraputic- Continue Coumadin, goal INR 2-3          Quality-Core Measures   Reviewed items::  EKG reviewed, Labs reviewed, Medications reviewed and Radiology images reviewed  Hunt catheter::  No Hunt  DVT prophylaxis pharmacological::  Warfarin (Coumadin)        ELIZABETH King

## 2018-05-19 NOTE — PROGRESS NOTES
Inpatient Anticoagulation Service Note    Date: 5/19/2018  Reason for Anticoagulation: Atrial Fibrillation   [id number] VASc Score: 6    Hemoglobin Value: (!) 10.4  Hematocrit Value: (!) 34.6  Lab Platelet Value: (!) 153  Target INR: 2.0 to 3.0    INR from last 7 days     Date/Time INR Value    05/18/18 1820 (!)  2.81        Dose from last 7 days     None        Significant Interactions: Proton Pump Inhibitor, Thyroid Medications, Other (Zoloft)  Bridge Therapy: No     Comments: Pt presents to ED with c/o bilateral leg swelling, weight gain, and increasing SOB. Pt on chronic anticoagulation with warfarin 5 mg po Tues and Thurs and warfarin 3.75 mg po AOD per anticoagulation clinic notes for afib with goal INR 2.0-3.0. Most recent INR therapeutic at 2.81. Most recent dose per med rec 5/17 pm.     Plan: Will continue home regimen per anticoagulation clinic and recheck INR. Pharmacy will monitor.     Pharmacist suggested discharge dosing: current home regimen     Nereida Johnson, PharmD

## 2018-05-19 NOTE — PROGRESS NOTES
Cardiology Progress Note               Author: James Martinez Date & Time created: 2018  10:25 AM     Interval History:  BERTRAND and edema.  Seen in consult last night for above.  Good diuresis and legs much less swollen.  Significant BERTRAND walking around house per daughter.  Echo is pending.    Review of Systems   Constitutional: Negative for fever.   Respiratory: Positive for shortness of breath.         Dyspnea on exertion.   Cardiovascular: Positive for leg swelling. Negative for chest pain and palpitations.   Neurological: Negative.    Endo/Heme/Allergies: Does not bruise/bleed easily.       Physical Exam   Constitutional: She is oriented to person, place, and time. No distress.   Cardiovascular: Normal rate and regular rhythm.    Murmur heard.   Crescendo decrescendo systolic murmur is present with a grade of 2/6   Carotid upstroke not delayed   Pulmonary/Chest: Breath sounds normal. No respiratory distress. She has no wheezes.   Musculoskeletal: She exhibits edema.   Neurological: She is alert and oriented to person, place, and time.   Skin: Skin is warm and dry.       Hemodynamics:  Temp (24hrs), Av.6 °C (97.9 °F), Min:36.4 °C (97.5 °F), Max:37 °C (98.6 °F)  Temperature: 36.5 °C (97.7 °F)  Pulse  Av.9  Min: 62  Max: 80   Blood Pressure : (!) 94/55, NIBP: 131/61     Respiratory:    Respiration: 20, Pulse Oximetry: 90 %     Work Of Breathing / Effort: Mild  RUL Breath Sounds: Diminished, RML Breath Sounds: Diminished, RLL Breath Sounds: Fine Crackles, ASHTYN Breath Sounds: Diminished, LLL Breath Sounds: Fine Crackles  Fluids:     Weight: 66.5 kg (146 lb 9.7 oz)  GI/Nutrition:  Orders Placed This Encounter   Procedures   • Diet Order     Standing Status:   Standing     Number of Occurrences:   1     Order Specific Question:   Diet:     Answer:   2 Gram Sodium [7]     Order Specific Question:   Diet:     Answer:   Cardiac [6]     Order Specific Question:   Consistency/Fluid modifications:     Answer:    1500 ml Fluid Restriction [9]     Lab Results:  Recent Labs      05/18/18 1820   WBC  3.2*   RBC  3.60*   HEMOGLOBIN  10.4*   HEMATOCRIT  34.6*   MCV  96.1   MCH  28.9   MCHC  30.1*   RDW  55.9*   PLATELETCT  153*   MPV  10.8     Recent Labs      05/18/18 1820  05/19/18   0408   SODIUM  140  143   POTASSIUM  4.2  3.3*   CHLORIDE  104  106   CO2  27  27   GLUCOSE  93  88   BUN  41*  38*   CREATININE  1.47*  1.29   CALCIUM  8.7  8.5     Recent Labs      05/18/18 1820  05/19/18   0408   APTT  43.9*   --    INR  2.81*  2.80*     Recent Labs      05/18/18 1820   BNPBTYPENAT  517*     Recent Labs      05/18/18 1820   TROPONINI  0.03   BNPBTYPENAT  517*             Medical Decision Making, by Problem:  Active Hospital Problems    Diagnosis   • Acute CHF (congestive heart failure) ([facility]) [I50.9]   • CKD (chronic kidney disease) stage 3, GFR 30-59 ml/min [N18.3]   • Chronic atrial fibrillation ([facility]) [I48.2]   • Essential hypertension [I10]       Plan:  Edema- improved.  Aortic stenosis: probably moderate by exam. Echo pending.  S/P PPM: paced rhythm  Increase activity.  Slow down on IV diuretics.  Anticipate discharge in the morning    Quality-Core Measures

## 2018-05-19 NOTE — H&P
Hospital Medicine History and Physical    Date of Service  5/18/2018    Chief Complaint  Chief Complaint   Patient presents with   • Shortness of Breath   • Leg Swelling       History of Presenting Illness  86 y.o. female with congestive heart failure, aortic stenosis, chronic atrial fibrillation, pacemaker placement who presented 5/18/2018 with progressive shortness of breath and lower extremity edema for the past 2 weeks. Patient reports dyspnea on exertion. She denies any fever, cough or chest pain. She has been taking her medications as prescribed. She denies any increase in salt salt or fluid intake. In the ER her vitals are stable. Initial workup revealed hemoglobin 10.4, BUN 41, creatinine 1.47, troponin 0.03, , INR 2.8. Chest x-ray revealed cardiomegaly with vascular congestion and small right pleural effusion.      Primary Care Physician  MARIJA Patel.    Consultants  Cardiology    Code Status  DNR    Review of Systems  Review of Systems   Constitutional: Positive for malaise/fatigue. Negative for chills, diaphoresis and fever.   HENT: Negative for hearing loss and sore throat.    Eyes: Negative for blurred vision.   Respiratory: Positive for shortness of breath. Negative for cough, sputum production and wheezing.    Cardiovascular: Positive for leg swelling. Negative for chest pain, palpitations, orthopnea and PND.   Gastrointestinal: Negative for abdominal pain, blood in stool, diarrhea, nausea and vomiting.   Genitourinary: Negative for dysuria, flank pain and urgency.   Musculoskeletal: Negative for back pain, joint pain, myalgias and neck pain.   Skin: Negative for rash.   Neurological: Negative for dizziness, focal weakness, seizures and headaches.   Endo/Heme/Allergies: Does not bruise/bleed easily.   Psychiatric/Behavioral: Negative for depression and suicidal ideas.   All other systems reviewed and are negative.       Past Medical History  Past Medical History:   Diagnosis Date    • BERTRAND (dyspnea on exertion) 5/4/2018   • Secondary hypertension 4/3/2018   • Pacemaker 4/3/2018   • Congestive heart failure (CHF) (Formerly [facility]) 4/3/2018   • Tremors of nervous system 4/3/2018   • Atrial fibrillation (CMS-HCC) [I48.91] 3/29/2018   • Long term current use of anticoagulant 3/29/2018   • Hyperlipidemia    • Hypertension    • Kidney disease    • Pulmonary emphysema (HCC)    • Thyroid disease    • Urinary tract infection        Surgical History  No pertinent surgical history    Medications  No current facility-administered medications on file prior to encounter.      Current Outpatient Prescriptions on File Prior to Encounter   Medication Sig Dispense Refill   • levothyroxine (SYNTHROID) 50 MCG Tab Take 50 mcg by mouth Every morning on an empty stomach.     • allopurinol (ZYLOPRIM) 300 MG Tab Take 300 mg by mouth every day.     • gabapentin (NEURONTIN) 300 MG Cap Take 300 mg by mouth 3 times a day.     • acetaminophen (TYLENOL) 500 MG Tab Take 1,000 mg by mouth 1 time daily as needed.     • Calcium-Vitamin D 500-125 MG-UNIT Tab Take 1 tablet by mouth.     • clobetasol (TEMOVATE) 0.05 % Ointment Apply to effected area daily, as needed.     • torsemide (DEMADEX) 20 MG Tab Take 2 Tabs by mouth 2 times a day. 120 Tab 11   • albuterol 108 (90 Base) MCG/ACT Aero Soln inhalation aerosol Inhale 2 Puffs by mouth every 6 hours as needed for Shortness of Breath.     • sertraline (ZOLOFT) 50 MG Tab Take 50 mg by mouth every day.     • topiramate (TOPAMAX) 25 MG Tab Take 25 mg by mouth 2 times a day.     • potassium chloride SA (K-DUR) 10 MEQ Tab CR Take 10 mEq by mouth 2 times a day.     • metoprolol SR (TOPROL XL) 25 MG TABLET SR 24 HR Take 25 mg by mouth every day.     • omeprazole (PRILOSEC) 20 MG delayed-release capsule Take 20 mg by mouth every day.     • warfarin (COUMADIN) 2.5 MG Tab Take 1.5 to 2 tablets daily or as directed by [facility] 180 Tab 3       Family History  Family History   Problem Relation Age  of Onset   • Fainting Neg Hx        Social History  Social History   Substance Use Topics   • Smoking status: Former Smoker     Packs/day: 0.50     Types: Cigarettes     Quit date: 1970   • Smokeless tobacco: Never Used   • Alcohol use 1.8 oz/week     3 Glasses of wine per week      Comment: glasses a night before Dinner       Allergies  Allergies   Allergen Reactions   • Codeine Vomiting        Physical Exam  Laboratory   Hemodynamics  Temp (24hrs), Av °C (98.6 °F), Min:37 °C (98.6 °F), Max:37 °C (98.6 °F)   Temperature: 37 °C (98.6 °F)  Pulse  Av  Min: 72  Max: 72    Blood Pressure : 130/62, NIBP: 137/73      Respiratory      Respiration: 16, Pulse Oximetry: 96 %     Work Of Breathing / Effort: Mild;Shallow       Physical Exam   Constitutional: She is oriented to person, place, and time. She appears well-developed and well-nourished. No distress.   HENT:   Head: Normocephalic and atraumatic.   Mouth/Throat: Oropharynx is clear and moist.   Eyes: Conjunctivae are normal. Pupils are equal, round, and reactive to light.   Neck: Neck supple.   Cardiovascular: Normal rate, regular rhythm and normal heart sounds.    Pulmonary/Chest: No respiratory distress. She has no wheezes. She has rales.   Bilateral crackles   Abdominal: Soft. Bowel sounds are normal. She exhibits no distension. There is no tenderness. There is no rebound.   Musculoskeletal: Normal range of motion. She exhibits edema (2+ pitting edema up to knees). She exhibits no tenderness.   Neurological: She is alert and oriented to person, place, and time. No cranial nerve deficit. Coordination normal.   Skin: Skin is warm and dry.   Psychiatric: She has a normal mood and affect. Her behavior is normal.   Nursing note and vitals reviewed.      Recent Labs      18   1820   WBC  3.2*   RBC  3.60*   HEMOGLOBIN  10.4*   HEMATOCRIT  34.6*   MCV  96.1   MCH  28.9   MCHC  30.1*   RDW  55.9*   PLATELETCT  153*   MPV  10.8     Recent Labs       05/18/18   1820   SODIUM  140   POTASSIUM  4.2   CHLORIDE  104   CO2  27   GLUCOSE  93   BUN  41*   CREATININE  1.47*   CALCIUM  8.7     Recent Labs      05/18/18 1820   ALTSGPT  9   ASTSGOT  18   ALKPHOSPHAT  110*   TBILIRUBIN  0.8   LIPASE  26   GLUCOSE  93     Recent Labs      05/18/18 1820   APTT  43.9*   INR  2.81*     Recent Labs      05/18/18 1820   BNPBTYPENAT  517*         Lab Results   Component Value Date    TROPONINI 0.03 05/18/2018     Urinalysis:  No results found for: SPECGRAVITY, GLUCOSEUR, KETONES, NITRITE, WBCURINE, RBCURINE, BACTERIA, EPITHELCELL     Imaging  DX-CHEST-LIMITED (1 VIEW)   Final Result      1.  Enlarged cardiac silhouette with probable vascular congestion.   2.  Right lower lobe airspace disease with possible small right pleural effusion.      ECHOCARDIOGRAM COMP W/O CONT    (Results Pending)        Assessment/Plan     I anticipate this patient is appropriate for observation status at this time.    Acute CHF (congestive heart failure) (HCC)- (present on admission)   Assessment & Plan    Patient will be started on IV Lasix 40 mg twice a day  Monitor I/O  Fluid and salt restriction  Check 2-D echo          CKD (chronic kidney disease) stage 3, GFR 30-59 ml/min- (present on admission)   Assessment & Plan    Lab Results   Component Value Date    BUN 41 (H) 05/18/2018    CREATININE 1.47 (H) 05/18/2018      Baseline Cr 1.5  Avoiding nephrotoxics: NSAIDs etc  Following Cr closely; to keep near baseline as possible          Essential hypertension- (present on admission)   Assessment & Plan    Well-controlled  Continue metoprolol        Chronic atrial fibrillation (HCC)- (present on admission)   Assessment & Plan    Rate controlled  Continue Toprol 5 mg daily  Continue Coumadin, goal INR 2-3            VTE prophylaxis: SCD.

## 2018-05-19 NOTE — CONSULTS
DATE OF SERVICE:  05/18/2018    CARDIOLOGY CONSULTATION    REFERRING PHYSICIAN:  Keyur Osorio MD    REASON FOR CONSULT:  Congestive heart failure, aortic stenosis, chronic atrial   fibrillation, ventricular pacing.    HISTORY OF PRESENT ILLNESS:  This is a pleasant 86-year-old white female.    History is obtained from the chart, the patient, and the patient's   granddaughter.  She comes in with 3-week history of worsening lower extremity   edema, shortness of breath, dyspnea on exertion.  Denies any PND or orthopnea.    Denied any chest pain.  Previous history of congestive heart failure,   history of preserved LV function with heart failure,  history of some aortic   stenosis by echocardiogram up in [place].   Also, permanent pacemaker for   bradycardia, which is ventricularly paced on a Medtronic pacemaker, single   chamber and has chronic atrial fibrillation.  He denies any chest pain,    Denies any PND, orthopnea.  Has had weakness and fatigue.  No syncope or   presyncope.  Echocardiogram was ordered for next week, also was to be referred   to pulmonary.    PAST MEDICAL HISTORY:  Includes some component of aortic stenosis, chronic   atrial fibrillation, bradycardia,  chronic anticoagulation, congestive heart   failure.  Past medical history also includes tremor, some insomnia,   hypertension,  sinoatrial atrial dysfunction,  hypothyroidism, chronic   diastolic congestive heart failure, GERD, aortic valve stenosis.    PAST SURGICAL HISTORY:  Includes pacemaker on July 21, Medtronic pacemaker,   cataract removal, cholecystectomy, cystocele repair, hemorrhoid surgery,   hysterectomy, mastectomy on the left, pacemaker is on the right,   tonsillectomy, adenoidectomy, urethral sling, vein ligation.    OUTPATIENT MEDICATIONS:  Include Tylenol, albuterol p.r.n., allopurinol,   amlodipine was recently discontinued, calcium, vitamin D, Clobetasol,   gabapentin,  levothyroxine, metoprolol, Bactroban,  nitrofurantoin,     omeprazole, potassium chloride, Demadex 40 mg b.i.d.,  Coumadin, Zoloft, and   potassium.    ALLERGIES:  SHE HAS ALLERGIES TO CODEINE OR INTOLERANCE TO CODEINE.    SOCIAL HISTORY:  The patient is .  She has 3 children, 1 son recently   passed away.  She recently lost her , was living in [place] and moved   out here to be closer to family.  Does not smoke.  Minimal alcohol use.    FAMILY HISTORY:  Positive for heart disease.    REVIEW OF SYSTEMS:  CONSTITUTIONAL:  No fever, chills, or sweat.  PULMONARY:  Shortness of breath.  GASTROINTESTINAL:  No change in bowels.  GENITOURINARY:  Negative.  CARDIAC:  As above.  PULMONARY:   As above.     Rest of review of systems is negative.    PHYSICAL EXAMINATION:  VITAL SIGNS:  Blood pressure is 137/73, pulse 89,  respiratory rate 16.  HEENT:   JVD is elevated.  Carotids are somewhat undelayed.  No bruits.  LUNGS:  Show scattered crackles at the base.  CARDIAC:  Regular rate and rhythm, normal S1, S2 with a loud late peaking   midsystolic murmur heard throughout the precordium.  ABDOMEN:  Soft, nontender without hepatosplenomegaly.  EXTREMITIES:  Without clubbing or cyanosis.  There is evidence of 3+ edema.  NEUROLOGIC:  Alert and oriented x3.  Mood and affect appropriate.  BACK:  No evidence of kyphosis.    LABORATORY DATA:  Showed white count 3.2,  hemoglobin 10.4, hematocrit 34.6,   platelet count 153,000.  Chemistries; BUN of 41, creatinine 1.7.  BNP of 517.    Troponin 0.03.  Other electrolytes appear to be normal.  EKG shows   ventricular pacing with underlying atrial fibrillation.  Chest x-ray showed   small right pleural effusion, some vascular congestion, permanent pacemaker   via the right subclavian vein, single lead, in the right ventricle.    ASSESSMENT:  Congestive heart failure with chronic atrial fibrillation,   ventricular pacing, and some component of aortic stenosis.    RECOMMENDATIONS:  1.  Gentle diuresis.  The patient needs a repeat  echocardiogram, certainly may   have had progression of aortic stenosis, which is precipitating the patient's   worsening congestive heart failure.  2.  Right ventricular pacing.  Medtronic device needs to be interrogated, may   benefit from a CRTP.  3.  Chronic atrial fibrillation, on anticoagulation.  4.  Previous history of mastectomy.       ____________________________________     MD BRITTON LINO / NTS    DD:  05/18/2018 20:49:26  DT:  05/18/2018 21:23:29    D#:  [id number]  Job#:  [id number]

## 2018-05-19 NOTE — ASSESSMENT & PLAN NOTE
- Ventricular paced rhythm - Rate controlled  - Continue Toprol 25 mg daily  - INR 2.8 theraputic- Continue Coumadin, goal INR 2-3

## 2018-05-19 NOTE — RESPIRATORY CARE
COPD EDUCATION by COPD CLINICAL EDUCATOR  5/19/2018 at 7:32 AM by Chelsea Winters     Patient reviewed by COPD education team. Patient does not qualify for COPD program.

## 2018-05-19 NOTE — ED NOTES
"Pt refusing to wear BP cuff and leads.  Pt states ,\"Her skin is so fragile and she is allergic to the adhesive.\"  RN educated patient and family about importance of continuous vitals, pt still refusing leads and BP cuff.  Vitals to be done hourly and before medication administration.   "

## 2018-05-19 NOTE — PROGRESS NOTES
Report called to T 824/02, All personal belongings, chart, and medications sent. Family at bedside. Pt transferred with ACLS RN.

## 2018-05-19 NOTE — DISCHARGE PLANNING
This CM spoke to the pt and gave her an Advance Directives packet.  No safety or other issues identified at this time.  This does not appear to be a difficult discharge.    Care Transition Team Assessment    Information Source  Orientation : Oriented x 4  Information Given By: Patient    Readmission Evaluation  Is this a readmission?: No    Elopement Risk  Legal Hold: No  Ambulatory or Self Mobile in Wheelchair: Yes  Elopement Risk: Not at Risk for Elopement    Interdisciplinary Discharge Planning  Does Admitting Nurse Feel This Could be a Complex Discharge?: No  Lives with - Patient's Self Care Capacity: Alone and Able to Care For Self  Support Systems: Family Member(s)  Housing / Facility: 1 Story Apartment / Condo  Do You Take your Prescribed Medications Regularly: Yes  Able to Return to Previous ADL's: Yes  Mobility Issues: No  Patient Expects to be Discharged to:: Home  Assistance Needed: No  Durable Medical Equipment: Walker, Other - Specify (Pt has wheelchair when needed.)    Discharge Preparedness  What is your plan after discharge?: Home with help  What are your discharge supports?: Other (comment) (Family)  Prior Functional Level: Ambulatory, Independent with Activities of Daily Living  Difficulity with ADLs: None    Functional Assesment  Prior Functional Level: Ambulatory, Independent with Activities of Daily Living    Finances  Financial Barriers to Discharge: No  Prescription Coverage: Yes         Values / Beliefs / Concerns  Values / Beliefs Concerns : No    Advance Directive  Advance Directive?: None  Advance Directive offered?: AD Booklet given              Discharge Risks or Barriers  Discharge risks or barriers?: No

## 2018-05-20 ENCOUNTER — APPOINTMENT (OUTPATIENT)
Dept: RADIOLOGY | Facility: MEDICAL CENTER | Age: 83
DRG: 291 | End: 2018-05-20
Attending: INTERNAL MEDICINE
Payer: MEDICARE

## 2018-05-20 PROBLEM — J90 PLEURAL EFFUSION: Status: ACTIVE | Noted: 2018-05-20

## 2018-05-20 LAB
ANION GAP SERPL CALC-SCNC: 8 MMOL/L (ref 0–11.9)
BASOPHILS # BLD AUTO: 0.8 % (ref 0–1.8)
BASOPHILS # BLD: 0.03 K/UL (ref 0–0.12)
BNP SERPL-MCNC: 677 PG/ML (ref 0–100)
BUN SERPL-MCNC: 34 MG/DL (ref 8–22)
CALCIUM SERPL-MCNC: 8.4 MG/DL (ref 8.5–10.5)
CHLORIDE SERPL-SCNC: 107 MMOL/L (ref 96–112)
CO2 SERPL-SCNC: 25 MMOL/L (ref 20–33)
CREAT SERPL-MCNC: 1.67 MG/DL (ref 0.5–1.4)
EOSINOPHIL # BLD AUTO: 0 K/UL (ref 0–0.51)
EOSINOPHIL NFR BLD: 0 % (ref 0–6.9)
ERYTHROCYTE [DISTWIDTH] IN BLOOD BY AUTOMATED COUNT: 53.5 FL (ref 35.9–50)
GLUCOSE SERPL-MCNC: 107 MG/DL (ref 65–99)
HCT VFR BLD AUTO: 33.7 % (ref 37–47)
HGB BLD-MCNC: 10.5 G/DL (ref 12–16)
IMM GRANULOCYTES # BLD AUTO: 0.02 K/UL (ref 0–0.11)
IMM GRANULOCYTES NFR BLD AUTO: 0.5 % (ref 0–0.9)
INR PPP: 2.17 (ref 0.87–1.13)
LYMPHOCYTES # BLD AUTO: 0.97 K/UL (ref 1–4.8)
LYMPHOCYTES NFR BLD: 26.4 % (ref 22–41)
MAGNESIUM SERPL-MCNC: 2.4 MG/DL (ref 1.5–2.5)
MCH RBC QN AUTO: 29.1 PG (ref 27–33)
MCHC RBC AUTO-ENTMCNC: 31.2 G/DL (ref 33.6–35)
MCV RBC AUTO: 93.4 FL (ref 81.4–97.8)
MONOCYTES # BLD AUTO: 0.22 K/UL (ref 0–0.85)
MONOCYTES NFR BLD AUTO: 6 % (ref 0–13.4)
NEUTROPHILS # BLD AUTO: 2.44 K/UL (ref 2–7.15)
NEUTROPHILS NFR BLD: 66.3 % (ref 44–72)
NRBC # BLD AUTO: 0 K/UL
NRBC BLD-RTO: 0 /100 WBC
PLATELET # BLD AUTO: 121 K/UL (ref 164–446)
PMV BLD AUTO: 10.9 FL (ref 9–12.9)
POTASSIUM SERPL-SCNC: 3.9 MMOL/L (ref 3.6–5.5)
PROTHROMBIN TIME: 23.9 SEC (ref 12–14.6)
RBC # BLD AUTO: 3.61 M/UL (ref 4.2–5.4)
SODIUM SERPL-SCNC: 140 MMOL/L (ref 135–145)
WBC # BLD AUTO: 3.7 K/UL (ref 4.8–10.8)

## 2018-05-20 PROCEDURE — 700102 HCHG RX REV CODE 250 W/ 637 OVERRIDE(OP): Performed by: NURSE PRACTITIONER

## 2018-05-20 PROCEDURE — 36415 COLL VENOUS BLD VENIPUNCTURE: CPT

## 2018-05-20 PROCEDURE — 85025 COMPLETE CBC W/AUTO DIFF WBC: CPT

## 2018-05-20 PROCEDURE — A9270 NON-COVERED ITEM OR SERVICE: HCPCS | Performed by: NURSE PRACTITIONER

## 2018-05-20 PROCEDURE — 700117 HCHG RX CONTRAST REV CODE 255: Performed by: INTERNAL MEDICINE

## 2018-05-20 PROCEDURE — A9270 NON-COVERED ITEM OR SERVICE: HCPCS | Performed by: INTERNAL MEDICINE

## 2018-05-20 PROCEDURE — 71275 CT ANGIOGRAPHY CHEST: CPT

## 2018-05-20 PROCEDURE — A9270 NON-COVERED ITEM OR SERVICE: HCPCS | Performed by: HOSPITALIST

## 2018-05-20 PROCEDURE — 80048 BASIC METABOLIC PNL TOTAL CA: CPT

## 2018-05-20 PROCEDURE — 700111 HCHG RX REV CODE 636 W/ 250 OVERRIDE (IP): Performed by: INTERNAL MEDICINE

## 2018-05-20 PROCEDURE — 700102 HCHG RX REV CODE 250 W/ 637 OVERRIDE(OP): Performed by: HOSPITALIST

## 2018-05-20 PROCEDURE — 99233 SBSQ HOSP IP/OBS HIGH 50: CPT | Performed by: HOSPITALIST

## 2018-05-20 PROCEDURE — 770020 HCHG ROOM/CARE - TELE (206)

## 2018-05-20 PROCEDURE — 700102 HCHG RX REV CODE 250 W/ 637 OVERRIDE(OP): Performed by: INTERNAL MEDICINE

## 2018-05-20 PROCEDURE — 83880 ASSAY OF NATRIURETIC PEPTIDE: CPT

## 2018-05-20 PROCEDURE — 83735 ASSAY OF MAGNESIUM: CPT

## 2018-05-20 PROCEDURE — 85610 PROTHROMBIN TIME: CPT

## 2018-05-20 RX ORDER — PHYTONADIONE 5 MG/1
1.25 TABLET ORAL ONCE
Status: COMPLETED | OUTPATIENT
Start: 2018-05-20 | End: 2018-05-20

## 2018-05-20 RX ORDER — ALUMINA, MAGNESIA, AND SIMETHICONE 2400; 2400; 240 MG/30ML; MG/30ML; MG/30ML
10 SUSPENSION ORAL 4 TIMES DAILY PRN
Status: DISCONTINUED | OUTPATIENT
Start: 2018-05-20 | End: 2018-05-25 | Stop reason: HOSPADM

## 2018-05-20 RX ORDER — GABAPENTIN 300 MG/1
300 CAPSULE ORAL EVERY EVENING
Status: DISCONTINUED | OUTPATIENT
Start: 2018-05-20 | End: 2018-05-25 | Stop reason: HOSPADM

## 2018-05-20 RX ADMIN — POTASSIUM CHLORIDE 20 MEQ: 1500 TABLET, EXTENDED RELEASE ORAL at 17:20

## 2018-05-20 RX ADMIN — OMEPRAZOLE 20 MG: 20 CAPSULE, DELAYED RELEASE ORAL at 05:42

## 2018-05-20 RX ADMIN — TOPIRAMATE 25 MG: 25 TABLET, FILM COATED ORAL at 17:20

## 2018-05-20 RX ADMIN — SERTRALINE 50 MG: 50 TABLET, FILM COATED ORAL at 17:20

## 2018-05-20 RX ADMIN — PHYTONADIONE 1.25 MG: 5 TABLET ORAL at 17:20

## 2018-05-20 RX ADMIN — LEVOTHYROXINE SODIUM 50 MCG: 50 TABLET ORAL at 05:43

## 2018-05-20 RX ADMIN — FUROSEMIDE 40 MG: 10 INJECTION, SOLUTION INTRAMUSCULAR; INTRAVENOUS at 05:42

## 2018-05-20 RX ADMIN — ACETAMINOPHEN 650 MG: 325 TABLET, FILM COATED ORAL at 00:14

## 2018-05-20 RX ADMIN — POTASSIUM CHLORIDE 20 MEQ: 1500 TABLET, EXTENDED RELEASE ORAL at 05:43

## 2018-05-20 RX ADMIN — ALUMINUM HYDROXIDE, MAGNESIUM HYDROXIDE,SIMETHICONE 10 ML: 400; 400; 40 LIQUID ORAL at 20:44

## 2018-05-20 RX ADMIN — METOPROLOL SUCCINATE 25 MG: 25 TABLET, EXTENDED RELEASE ORAL at 05:42

## 2018-05-20 RX ADMIN — IOHEXOL 70 ML: 350 INJECTION, SOLUTION INTRAVENOUS at 12:25

## 2018-05-20 RX ADMIN — GABAPENTIN 300 MG: 300 CAPSULE ORAL at 17:20

## 2018-05-20 RX ADMIN — TOPIRAMATE 25 MG: 25 TABLET, FILM COATED ORAL at 05:42

## 2018-05-20 ASSESSMENT — ENCOUNTER SYMPTOMS
ABDOMINAL PAIN: 0
CHILLS: 0
SENSORY CHANGE: 0
DIAPHORESIS: 0
WHEEZING: 0
EYES NEGATIVE: 1
HEADACHES: 0
WEIGHT LOSS: 0
SHORTNESS OF BREATH: 1
FALLS: 0
NAUSEA: 0
DEPRESSION: 0
FOCAL WEAKNESS: 0
PALPITATIONS: 0
HALLUCINATIONS: 0
WEAKNESS: 0
DIARRHEA: 0
HEMOPTYSIS: 0
DIZZINESS: 0
BRUISES/BLEEDS EASILY: 1
FEVER: 0
COUGH: 0
CONSTIPATION: 0
BLOOD IN STOOL: 0
SPEECH CHANGE: 0
MEMORY LOSS: 0
INSOMNIA: 0
SPUTUM PRODUCTION: 0
NERVOUS/ANXIOUS: 0
VOMITING: 0

## 2018-05-20 ASSESSMENT — PAIN SCALES - GENERAL
PAINLEVEL_OUTOF10: 0
PAINLEVEL_OUTOF10: 4

## 2018-05-20 ASSESSMENT — LIFESTYLE VARIABLES: SUBSTANCE_ABUSE: 0

## 2018-05-20 NOTE — PROGRESS NOTES
Cardiology Progress Note               Author: James Martinez Date & Time created: 2018  12:26 PM     Interval History:  Chief complaint: Shortness of breath.  Patient seen in consultation on the , with a three-week history of edema and shortness of breath.  Echo revealed EF of 55%, mean aortic valve gradient of 10 mmHg and severe pulmonary hypertension at 70 mmHg.  She was sent for CTA of the chest today to exclude pulmonary emboli. Results are pending.    ROS    Physical Exam    Hemodynamics:  Temp (24hrs), Av.6 °C (97.9 °F), Min:36.3 °C (97.4 °F), Max:36.8 °C (98.2 °F)  Temperature: 36.7 °C (98.1 °F)  Pulse  Av.5  Min: 61  Max: 80   Blood Pressure : 148/90     Respiratory:    Respiration: 16, Pulse Oximetry: 89 %     Work Of Breathing / Effort: Mild  RUL Breath Sounds: Clear, RML Breath Sounds: Diminished, RLL Breath Sounds: Diminished;Fine Crackles, ASHTYN Breath Sounds: Clear, LLL Breath Sounds: Fine Crackles;Diminished  Fluids:     Weight: 64.8 kg (142 lb 13.7 oz)  GI/Nutrition:  Orders Placed This Encounter   Procedures   • Diet Order     Standing Status:   Standing     Number of Occurrences:   1     Order Specific Question:   Diet:     Answer:   2 Gram Sodium [7]     Order Specific Question:   Diet:     Answer:   Cardiac [6]     Order Specific Question:   Consistency/Fluid modifications:     Answer:   1500 ml Fluid Restriction [9]     Lab Results:  Recent Labs      18   WBC  3.2*   RBC  3.60*   HEMOGLOBIN  10.4*   HEMATOCRIT  34.6*   MCV  96.1   MCH  28.9   MCHC  30.1*   RDW  55.9*   PLATELETCT  153*   MPV  10.8     Recent Labs      18   1820  18   0408  18   1333   SODIUM  140  143   --    POTASSIUM  4.2  3.3*  3.7   CHLORIDE  104  106   --    CO2  27  27   --    GLUCOSE  93  88   --    BUN  41*  38*   --    CREATININE  1.47*  1.29   --    CALCIUM  8.7  8.5   --      Recent Labs      18   1820  18   0408  18   0357   APTT  43.9*   --    --     INR  2.81*  2.80*  2.17*     Recent Labs      05/18/18   1820   BNPBTYPENAT  517*     Recent Labs      05/18/18   1820   TROPONINI  0.03   BNPBTYPENAT  517*             Medical Decision Making, by Problem:  Active Hospital Problems    Diagnosis   • Acute on chronic diastolic (congestive) heart failure (HCC) [I50.33]   • CKD (chronic kidney disease) stage 3, GFR 30-59 ml/min [N18.3]   • GERD (gastroesophageal reflux disease) [K21.9]   • Cardiac pacemaker in situ [Z95.0]   • Chronic atrial fibrillation (HCC) [I48.2]   • Warfarin anticoagulation [Z79.01]   • Hypothyroidism [E03.9]   • Essential hypertension [I10]       Plan:  Dyspnea: Appears to be secondary to primary lung process. She may have restrictive disease from her kyphosis. Pulmonary pressures are significantly elevated which explains her edema. Her LV function is preserved and should there is no severity stenosis.  She has a prior history of DVT. Need to exclude pulmonary emboli.    Quality-Core Measures

## 2018-05-20 NOTE — PROGRESS NOTES
Report received by day shift RN. Pt laying in bed awake alert and oriented x 4 with no c/o SOB. Pt updated to POC and verbalized understanding. Bed locked in low position with fall precautions in place and call light in reach.

## 2018-05-20 NOTE — CARE PLAN
Problem: Safety  Goal: Will remain free from injury    Intervention: Provide assistance with mobility  Pt ambulated with one assist and cane to bathroom.  Intervention: Collaborate with Interdisciplinary Team for safe transfer and mobilization techniques  Report given to CCT related to mobility.  Intervention: Educate patient and significant other/support system about adaptive mobility strategies and safe transfers  Pt oriented to call light and need for assistance when OOB. Pt verbalized and demonstrated understanding.       Problem: Knowledge Deficit  Goal: Knowledge of disease process/condition, treatment plan, diagnostic tests, and medications will improve    Intervention: Explain information regarding disease process/condition, treatment plan, diagnostic tests, and medications and document in education  Pt educated to the indication of lasix r/t disease process and lower extremity. Pt verbalized understanding.

## 2018-05-20 NOTE — PROGRESS NOTES
[facility] Hospitalist Progress Note    Date of Service: 2018    Chief Complaint  86 y.o. female admitted 2018 with CHF, AS, chronic A-Fib, pacemaker placement who presented 2018 with progressive SOB and dyspnea on exertion (BERTRAND) and BLE edema for the past 2 weeks.      Interval Problem Update  Patient seen and examined today.    Patient tolerating treatment and therapies.  All Data, Medication data reviewed.  Case discussed with nursing as available.  Plan of Care reviewed with patient and notified of changes.   the patient still has significant dyspnea and swelling although somewhat improved, she was undergoing CT scanning with cardiology orders to rule out embolic disease to account for severe pulmonary hypertension, patient denies lower extremity pain.    Consultants/Specialty  Cardiology     Disposition  TBA        Review of Systems   Constitutional: Positive for malaise/fatigue. Negative for chills, diaphoresis, fever and weight loss.   HENT: Negative.    Eyes: Negative.    Respiratory: Positive for shortness of breath. Negative for cough, hemoptysis, sputum production and wheezing.    Cardiovascular: Positive for leg swelling. Negative for chest pain and palpitations.   Gastrointestinal: Negative for abdominal pain, blood in stool, constipation, diarrhea, melena, nausea and vomiting.   Genitourinary: Negative.    Musculoskeletal: Negative for falls.   Skin: Negative for itching and rash.   Neurological: Negative for dizziness, sensory change, speech change, focal weakness, weakness and headaches.   Endo/Heme/Allergies: Bruises/bleeds easily.        Pt on Coumadin   Psychiatric/Behavioral: Negative for depression, hallucinations, memory loss, substance abuse and suicidal ideas. The patient is not nervous/anxious and does not have insomnia.         Recent death in family ( and son)      Physical Exam  Laboratory/Imaging   Hemodynamics  Temp (24hrs), Av.7 °C (98.1 °F), Min:36.3 °C (97.4  °F), Max:37.3 °C (99.1 °F)   Temperature: 37.3 °C (99.1 °F)  Pulse  Av.6  Min: 61  Max: 80    Blood Pressure : 129/77      Respiratory      Respiration: 16, Pulse Oximetry: 95 %     Work Of Breathing / Effort: Mild  RUL Breath Sounds: Clear, RML Breath Sounds: Diminished, RLL Breath Sounds: Diminished;Fine Crackles, ASHTYN Breath Sounds: Clear, LLL Breath Sounds: Fine Crackles;Diminished    Fluids    Intake/Output Summary (Last 24 hours) at 18 1637  Last data filed at 18 0000   Gross per 24 hour   Intake              220 ml   Output                0 ml   Net              220 ml       Nutrition  Orders Placed This Encounter   Procedures   • Diet Order     Standing Status:   Standing     Number of Occurrences:   1     Order Specific Question:   Diet:     Answer:   2 Gram Sodium [7]     Order Specific Question:   Diet:     Answer:   Cardiac [6]     Order Specific Question:   Consistency/Fluid modifications:     Answer:   1500 ml Fluid Restriction [9]     Physical Exam   Constitutional: She is oriented to person, place, and time. Vital signs are normal. No distress.   HENT:   Head: Normocephalic.   Eyes: Pupils are equal, round, and reactive to light.   Neck: Trachea normal and normal range of motion. Neck supple. No JVD present. Carotid bruit is not present. No thyroid mass and no thyromegaly present.   Cardiovascular: Normal rate, regular rhythm and intact distal pulses.    Murmur heard.  Ventricular pacer   Pulmonary/Chest: Effort normal. No accessory muscle usage. No respiratory distress. She has rales in the right lower field and the left lower field.   Increased work of breathing with exertion   Significance kyphosis of the spine  Scoliosis changes  Decreased breath sounds on the right   Abdominal: Soft. Normal appearance and bowel sounds are normal.   Musculoskeletal: Normal range of motion.   Neurological: She is alert and oriented to person, place, and time. She has normal strength. GCS eye  subscore is 4. GCS verbal subscore is 5. GCS motor subscore is 6.   Skin: Skin is warm, dry and intact.   Psychiatric: She has a normal mood and affect. Her speech is normal and behavior is normal.   Recent death of patients  and son       Recent Labs      05/18/18 1820 05/20/18   1508   WBC  3.2*  3.7*   RBC  3.60*  3.61*   HEMOGLOBIN  10.4*  10.5*   HEMATOCRIT  34.6*  33.7*   MCV  96.1  93.4   MCH  28.9  29.1   MCHC  30.1*  31.2*   RDW  55.9*  53.5*   PLATELETCT  153*  121*   MPV  10.8  10.9     Recent Labs      05/18/18 1820 05/19/18   0408  05/19/18   1333  05/20/18   1508   SODIUM  140  143   --   140   POTASSIUM  4.2  3.3*  3.7  3.9   CHLORIDE  104  106   --   107   CO2  27  27   --   25   GLUCOSE  93  88   --   107*   BUN  41*  38*   --   34*   CREATININE  1.47*  1.29   --   1.67*   CALCIUM  8.7  8.5   --   8.4*     Recent Labs      05/18/18 1820 05/19/18   0408  05/20/18   0357   APTT  43.9*   --    --    INR  2.81*  2.80*  2.17*     Recent Labs      05/18/18 1820 05/20/18   1508   BNPBTYPENAT  517*  677*              Assessment/Plan     Acute on chronic diastolic (congestive) heart failure (HCC)- (present on admission)   Assessment & Plan    - Abnormal Echo - Severe bi-atrial enlargement with EF of 55% further recommendations per cardiology   - Decreased Lasix to 40 mg once a day ( 3L output since last pm)  - Continue to monitor I/O  - Increase activity  - Keep K+ > 4.0 and Mg > 2.0   - KCL 20mEq po BID ordered   - Fluid and salt restriction                Pleural effusion   Assessment & Plan    Large lesion on the right  Compressive atelectasis  We likely unable to be resolving easily without removal        CKD (chronic kidney disease) stage 3, GFR 30-59 ml/min- (present on admission)   Assessment & Plan    - Creatine improved now 1.29 from 1.47 (baseline Cr 1.5)  - Lasix decreased to 40 mg IV daily starting in am (5/20/18)  - BMP in am  - Continue to avoid nephrotoxic agents if  possible            Warfarin anticoagulation- (present on admission)   Assessment & Plan    - Continue Coumadin pharmacy to dose         Hypothyroidism- (present on admission)   Assessment & Plan    - Continue Synthroid         GERD (gastroesophageal reflux disease)- (present on admission)   Assessment & Plan    - Continue PPI        Essential hypertension- (present on admission)   Assessment & Plan    - Well controlled will continue Toprol XL 25 mg daily        Cardiac pacemaker in situ- (present on admission)   Assessment & Plan    - Permanent pacemaker for bradycardia - ventricularly paced on a Medtronic pacemaker, single   chamber and has chronic atrial fibrillation        Chronic atrial fibrillation (HCC)- (present on admission)   Assessment & Plan    - Ventricular paced rhythm - Rate controlled  - Continue Toprol 25 mg daily  - INR 2.8 theraputic- Continue Coumadin, goal INR 2-3          Quality-Core Measures   Reviewed items::  EKG reviewed, Labs reviewed, Medications reviewed and Radiology images reviewed  Hunt catheter::  No Hunt  DVT prophylaxis pharmacological::  Warfarin (Coumadin)      Plan  CT scan reviewed  No evidence of emboli  Large right-sided effusion  We will order low-dose vitamin K to reverse INR and order a right-sided thoracentesis  Continue with current diuresis  Monitor renal function closely since the patient received IV contrast  See orders  Medically complex  More than 35 minutes was spent in pt exam, interview, and more than 50 % of this in discussion of diagnoses, prognosis and disposition with patient, family, nurse and case management coordinator.  Jesus Perales M.D.

## 2018-05-20 NOTE — ASSESSMENT & PLAN NOTE
Large lesion on the right, suspect transudative from CHF.  Attempt for R thora 5/21 w 80 ml removed (pt developed hemoptysis so procedure aborted)  -Repeat CXR in AM  -Compressive atelectasis  -No pneumothorax on follow-up chest x-ray  -Repeat US guided thora done 5/23 w 650mL removed

## 2018-05-20 NOTE — PROGRESS NOTES
Inpatient Anticoagulation Service Note    Date: 5/20/2018  Reason for Anticoagulation: Atrial Fibrillation   [id number] VASc Score: 6    Hemoglobin Value: (!) 10.4  Hematocrit Value: (!) 34.6  Lab Platelet Value: (!) 153  Target INR: 2.0 to 3.0    INR from last 7 days     Date/Time INR Value    05/20/18 0357 (!)  2.17    05/19/18 0408 (!)  2.8    05/18/18 1820 (!)  2.81        Dose from last 7 days     None        Significant Interactions: Proton Pump Inhibitor, Thyroid Medications, Other (Comments)  Bridge Therapy: No     Comments:   · Pt presents to ED with c/o bilateral leg swelling, weight gain, and increasing SOB.   · Pt on chronic anticoagulation with warfarin 5 mg po Tues and Thurs and warfarin 3.75 mg po AOD per anticoagulation clinic notes for afib with goal INR 2.0-3.0.   · INR @ goal.     Plan:   · Will continue home regimen per anticoagulation clinic and recheck INR. Warfarin 3.75mg po tonight. Pharmacy will monitor.     Pharmacist suggested discharge dosing: Current home regimen     Jerald Schmidt  PharmD BCPS

## 2018-05-21 ENCOUNTER — APPOINTMENT (OUTPATIENT)
Dept: RADIOLOGY | Facility: MEDICAL CENTER | Age: 83
DRG: 291 | End: 2018-05-21
Attending: HOSPITALIST
Payer: MEDICARE

## 2018-05-21 LAB
AMYLASE FLD-CCNC: 23 U/L
ANION GAP SERPL CALC-SCNC: 4 MMOL/L (ref 0–11.9)
APPEARANCE FLD: NORMAL
BNP SERPL-MCNC: 809 PG/ML (ref 0–100)
BODY FLD TYPE: NORMAL
BUN SERPL-MCNC: 32 MG/DL (ref 8–22)
CALCIUM SERPL-MCNC: 8.8 MG/DL (ref 8.5–10.5)
CHLORIDE SERPL-SCNC: 109 MMOL/L (ref 96–112)
CO2 SERPL-SCNC: 27 MMOL/L (ref 20–33)
COLOR FLD: NORMAL
CREAT SERPL-MCNC: 1.39 MG/DL (ref 0.5–1.4)
CSF COMMENTS 1658: NORMAL
EOSINOPHIL NFR FLD: 1 %
ERYTHROCYTE [DISTWIDTH] IN BLOOD BY AUTOMATED COUNT: 55.6 FL (ref 35.9–50)
GLUCOSE FLD-MCNC: 104 MG/DL
GLUCOSE SERPL-MCNC: 91 MG/DL (ref 65–99)
HCT VFR BLD AUTO: 32 % (ref 37–47)
HGB BLD-MCNC: 9.9 G/DL (ref 12–16)
INR PPP: 1.69 (ref 0.87–1.13)
LDH FLD L TO P-CCNC: 65 U/L
LYMPHOCYTES NFR FLD: 15 %
MCH RBC QN AUTO: 29.6 PG (ref 27–33)
MCHC RBC AUTO-ENTMCNC: 30.9 G/DL (ref 33.6–35)
MCV RBC AUTO: 95.5 FL (ref 81.4–97.8)
MESOTHL CELL NFR FLD: 1 %
NEUTROPHILS NFR FLD: 83 %
PH FLD: 7.6 [PH]
PLATELET # BLD AUTO: 136 K/UL (ref 164–446)
PMV BLD AUTO: 11 FL (ref 9–12.9)
POTASSIUM SERPL-SCNC: 4.2 MMOL/L (ref 3.6–5.5)
PROT FLD-MCNC: 2.3 G/DL
PROTHROMBIN TIME: 19.6 SEC (ref 12–14.6)
RBC # BLD AUTO: 3.35 M/UL (ref 4.2–5.4)
RBC # FLD: 25 CELLS/UL
SODIUM SERPL-SCNC: 140 MMOL/L (ref 135–145)
WBC # BLD AUTO: 2.6 K/UL (ref 4.8–10.8)
WBC # FLD: NORMAL CELLS/UL

## 2018-05-21 PROCEDURE — A9270 NON-COVERED ITEM OR SERVICE: HCPCS | Performed by: HOSPITALIST

## 2018-05-21 PROCEDURE — 82945 GLUCOSE OTHER FLUID: CPT

## 2018-05-21 PROCEDURE — 83880 ASSAY OF NATRIURETIC PEPTIDE: CPT

## 2018-05-21 PROCEDURE — 71045 X-RAY EXAM CHEST 1 VIEW: CPT

## 2018-05-21 PROCEDURE — 88112 CYTOPATH CELL ENHANCE TECH: CPT

## 2018-05-21 PROCEDURE — 84157 ASSAY OF PROTEIN OTHER: CPT

## 2018-05-21 PROCEDURE — 99233 SBSQ HOSP IP/OBS HIGH 50: CPT | Performed by: HOSPITALIST

## 2018-05-21 PROCEDURE — 89051 BODY FLUID CELL COUNT: CPT

## 2018-05-21 PROCEDURE — 85027 COMPLETE CBC AUTOMATED: CPT

## 2018-05-21 PROCEDURE — A9270 NON-COVERED ITEM OR SERVICE: HCPCS | Performed by: INTERNAL MEDICINE

## 2018-05-21 PROCEDURE — 32555 ASPIRATE PLEURA W/ IMAGING: CPT | Mod: RT

## 2018-05-21 PROCEDURE — 700102 HCHG RX REV CODE 250 W/ 637 OVERRIDE(OP): Performed by: INTERNAL MEDICINE

## 2018-05-21 PROCEDURE — 82150 ASSAY OF AMYLASE: CPT

## 2018-05-21 PROCEDURE — 770020 HCHG ROOM/CARE - TELE (206)

## 2018-05-21 PROCEDURE — 700102 HCHG RX REV CODE 250 W/ 637 OVERRIDE(OP): Performed by: HOSPITALIST

## 2018-05-21 PROCEDURE — 36415 COLL VENOUS BLD VENIPUNCTURE: CPT

## 2018-05-21 PROCEDURE — A9270 NON-COVERED ITEM OR SERVICE: HCPCS | Performed by: NURSE PRACTITIONER

## 2018-05-21 PROCEDURE — 83986 ASSAY PH BODY FLUID NOS: CPT

## 2018-05-21 PROCEDURE — 0W993ZX DRAINAGE OF RIGHT PLEURAL CAVITY, PERCUTANEOUS APPROACH, DIAGNOSTIC: ICD-10-PCS | Performed by: RADIOLOGY

## 2018-05-21 PROCEDURE — 700102 HCHG RX REV CODE 250 W/ 637 OVERRIDE(OP): Performed by: NURSE PRACTITIONER

## 2018-05-21 PROCEDURE — 80048 BASIC METABOLIC PNL TOTAL CA: CPT

## 2018-05-21 PROCEDURE — 85610 PROTHROMBIN TIME: CPT

## 2018-05-21 PROCEDURE — 88305 TISSUE EXAM BY PATHOLOGIST: CPT

## 2018-05-21 PROCEDURE — 87205 SMEAR GRAM STAIN: CPT

## 2018-05-21 PROCEDURE — 87070 CULTURE OTHR SPECIMN AEROBIC: CPT

## 2018-05-21 PROCEDURE — 83615 LACTATE (LD) (LDH) ENZYME: CPT

## 2018-05-21 PROCEDURE — 700111 HCHG RX REV CODE 636 W/ 250 OVERRIDE (IP): Performed by: INTERNAL MEDICINE

## 2018-05-21 RX ORDER — METOLAZONE 5 MG/1
5 TABLET ORAL
Status: DISCONTINUED | OUTPATIENT
Start: 2018-05-22 | End: 2018-05-25 | Stop reason: HOSPADM

## 2018-05-21 RX ORDER — OXYCODONE HYDROCHLORIDE 5 MG/1
2.5-5 TABLET ORAL EVERY 4 HOURS PRN
Status: DISCONTINUED | OUTPATIENT
Start: 2018-05-21 | End: 2018-05-25 | Stop reason: HOSPADM

## 2018-05-21 RX ADMIN — POTASSIUM CHLORIDE 20 MEQ: 1500 TABLET, EXTENDED RELEASE ORAL at 05:06

## 2018-05-21 RX ADMIN — ACETAMINOPHEN 650 MG: 325 TABLET, FILM COATED ORAL at 10:49

## 2018-05-21 RX ADMIN — HYDROCORTISONE 2.5%: 25 CREAM TOPICAL at 18:33

## 2018-05-21 RX ADMIN — LEVOTHYROXINE SODIUM 50 MCG: 50 TABLET ORAL at 05:06

## 2018-05-21 RX ADMIN — GABAPENTIN 300 MG: 300 CAPSULE ORAL at 18:31

## 2018-05-21 RX ADMIN — METOPROLOL SUCCINATE 25 MG: 25 TABLET, EXTENDED RELEASE ORAL at 05:07

## 2018-05-21 RX ADMIN — POTASSIUM CHLORIDE 20 MEQ: 1500 TABLET, EXTENDED RELEASE ORAL at 18:30

## 2018-05-21 RX ADMIN — OXYCODONE HYDROCHLORIDE 5 MG: 5 TABLET ORAL at 18:30

## 2018-05-21 RX ADMIN — FUROSEMIDE 40 MG: 10 INJECTION, SOLUTION INTRAMUSCULAR; INTRAVENOUS at 05:07

## 2018-05-21 RX ADMIN — TOPIRAMATE 25 MG: 25 TABLET, FILM COATED ORAL at 18:31

## 2018-05-21 RX ADMIN — OMEPRAZOLE 20 MG: 20 CAPSULE, DELAYED RELEASE ORAL at 05:08

## 2018-05-21 RX ADMIN — SERTRALINE 50 MG: 50 TABLET, FILM COATED ORAL at 18:31

## 2018-05-21 RX ADMIN — TOPIRAMATE 25 MG: 25 TABLET, FILM COATED ORAL at 05:09

## 2018-05-21 ASSESSMENT — ENCOUNTER SYMPTOMS
BRUISES/BLEEDS EASILY: 1
CLAUDICATION: 0
CONSTIPATION: 0
PND: 0
VOMITING: 0
DIZZINESS: 0
SENSORY CHANGE: 0
HALLUCINATIONS: 0
MEMORY LOSS: 0
WEIGHT LOSS: 0
COUGH: 0
DEPRESSION: 0
EYES NEGATIVE: 1
DIARRHEA: 0
NAUSEA: 0
HEMOPTYSIS: 1
BLOOD IN STOOL: 0
DIAPHORESIS: 0
ORTHOPNEA: 0
SPUTUM PRODUCTION: 0
WEAKNESS: 0
SHORTNESS OF BREATH: 0
NERVOUS/ANXIOUS: 0
HEMOPTYSIS: 0
HEADACHES: 0
CHILLS: 0
SHORTNESS OF BREATH: 1
ABDOMINAL PAIN: 0
INSOMNIA: 0
FEVER: 0
WHEEZING: 0
FOCAL WEAKNESS: 0
PALPITATIONS: 0
FALLS: 0
SPEECH CHANGE: 0

## 2018-05-21 ASSESSMENT — PAIN SCALES - GENERAL
PAINLEVEL_OUTOF10: 7
PAINLEVEL_OUTOF10: 0
PAINLEVEL_OUTOF10: 5
PAINLEVEL_OUTOF10: 0
PAINLEVEL_OUTOF10: 6
PAINLEVEL_OUTOF10: 0
PAINLEVEL_OUTOF10: 0
PAINLEVEL_OUTOF10: 8

## 2018-05-21 ASSESSMENT — LIFESTYLE VARIABLES: SUBSTANCE_ABUSE: 0

## 2018-05-21 NOTE — DISCHARGE PLANNING
Anticipated Discharge Disposition: Home with HH     Action: Per TCN note, pt may benefit from HH. Per IDT rounds, pt is not medically clear at this time      Barriers: None identified     Plan: Awaiting home health order, if appropriate, and medical clearance

## 2018-05-21 NOTE — DISCHARGE PLANNING
Transitional Care Navigator:    Chart reviewed for post acute needs.  Pt is an 86 yof who lives locally, admitted for CHF, progressive SOB and dyspnea on exertion.  Her LACE+ is 71, with current mobility of 15 feet.    This patient is an appropriate candidate for home health support for cardiac and respiratory monitoring.  Please consider an order for referral to HH.

## 2018-05-21 NOTE — PROGRESS NOTES
Report received at 7:00 AM from NOC BRONWYN Bernal, pt is resting in bed, awake and alert with no reports of pain. Pt reports discomfort of shortness of breath; thoracentesis scheduled for 9AM. Bed is locked in lowest position with call light, belongings within reach, white board updated, and POC discussed. All needs met at this time.

## 2018-05-21 NOTE — HEART FAILURE PROGRAM
Cardiovascular Nurse Navigator () Advanced Heart Failure Program Inpatient Progress Note:   Acutely decompensated HFpEF (55%). Ongoing IV diuresis, Cardiology following. INR reversal in process to facilitate a thoracentesis. Cardiology following. Patient has had one visit with Dr. Thomas in clinic.     [facility] Plan Notes:   CTTA indicating no concerns  Therapy Notes:   none  Insurance Coverage:  SCP and Medicaid  Patient Residence:  [place]  Advanced Care Planning:  POA on file. Full code status at the time of this note's filing.    Because the POA on file appears only to address financial legal matters, strongly recommend engaging palliative care when clinically appropriate to solicit goals of care and talk about and hopefully document patient's statements of desire.    Follow up appointment:   If discharged from acute care to home (exception hospice discharge), pt must have an appointment scheduled within 7 days of discharge (Cardiology, PCP, or DC Clinic).    If discharged to Transitional Care Facility (LTAC, SNF, IRH), appointment should be made about a month out for after TCF.     This [facility] has placed an order for Hospital Schedulers to arrange f/u appointment within seven calendar days of anticipated discharge date from acute on 5/23. Get into [facility] eventually if not for seven day appt for ongoing HF management.    Bedside Nursing Education:  Please provide HF packet and repeated, ongoing education while administering medications, weighing patient, discussing management of symptoms, diet and need to follow up and act on changes.     Bedside Nursing Weights:  Please weigh patient daily (standing scale if not clinically contraindicated) and have her write weight on HF Calendar. RN/CNA to document weight in Epic.    Please assess pt's understanding of what to do if weight gain occurs. Reinforce education to act (call telephone number on their HF Book) if they gain 3 or more pounds in a 24 hr period or 5 or  "more pounds in a week.    If pt does not own a working scale and cannot afford to purchase one, please provide one. Scales can be found in the Care Coordination Rooms on T-7&T[address]8.    Bedside Nursing Discharge:  When completing the after visit summary (discharge instructions) please select \"Cardiac Diagnosis, and Heart Failure\" in the special instructions section to populate the heart failure specific discharge instructions.     Referrals Placed:    Social Work   Please assess transportation and copay resources for: acquisition of medications and attendance at follow up appointments.  Registered Dietician  To provide education on HF diet.    Thank you and please call NERY Piper with any questions: [phone].     May 30, 2018  8:15 AM PDT  Heart Failure New Patient with Trinity Sharp M.D.  [facility] (--) [address], [facility] [address]  [place] [id number]  [phone]       "

## 2018-05-21 NOTE — PROGRESS NOTES
Report received from BRONWYN Elias. Assumed care of patient at 1845. Telemetry functioning properly. Fall precautions in place. Will continue to monitor.

## 2018-05-21 NOTE — PROGRESS NOTES
[facility] Hospitalist Progress Note    Date of Service: 5/21/2018    Chief Complaint  86 y.o. female admitted 5/18/2018 with CHF, AS, chronic A-Fib, pacemaker placement who presented 5/18/2018 with progressive SOB and dyspnea on exertion (BERTRAND) and BLE edema for the past 2 weeks.      Interval Problem Update  Patient seen and examined today.    Patient tolerating treatment and therapies.  All Data, Medication data reviewed.  Case discussed with nursing as available.  Plan of Care reviewed with patient and notified of changes.  5/20 the patient still has significant dyspnea and swelling although somewhat improved, she was undergoing CT scanning with cardiology orders to rule out embolic disease to account for severe pulmonary hypertension, patient denies lower extremity pain.  5/21 per CT the patient without pulmonary emboli, but significant right-sided pleural effusion, likely contributing to her dyspnea, attempted thoracentesis unfortunately did not result in much fluid removal and had some bloody fluid return.  Discussed with Dr. Pickens from radiology, patient anxious  Consultants/Specialty  Cardiology     Disposition  TBA        Review of Systems   Constitutional: Positive for malaise/fatigue. Negative for chills, diaphoresis, fever and weight loss.   HENT: Negative.    Eyes: Negative.    Respiratory: Positive for shortness of breath. Negative for cough, hemoptysis, sputum production and wheezing.    Cardiovascular: Positive for leg swelling. Negative for chest pain and palpitations.   Gastrointestinal: Negative for abdominal pain, blood in stool, constipation, diarrhea, melena, nausea and vomiting.   Genitourinary: Negative.    Musculoskeletal: Negative for falls.   Skin: Negative for itching and rash.   Neurological: Negative for dizziness, sensory change, speech change, focal weakness, weakness and headaches.   Endo/Heme/Allergies: Bruises/bleeds easily.        Pt on Coumadin   Psychiatric/Behavioral: Negative for  depression, hallucinations, memory loss, substance abuse and suicidal ideas. The patient is not nervous/anxious and does not have insomnia.         Recent death in family ( and son)      Physical Exam  Laboratory/Imaging   Hemodynamics  Temp (24hrs), Av.8 °C (98.2 °F), Min:36.4 °C (97.5 °F), Max:36.9 °C (98.5 °F)   Temperature: 36.9 °C (98.5 °F)  Pulse  Av.2  Min: 59  Max: 80    Blood Pressure : 147/65      Respiratory      Respiration: 16, Pulse Oximetry: 96 %     Work Of Breathing / Effort: Mild  RUL Breath Sounds: Clear, RML Breath Sounds: Diminished, RLL Breath Sounds: Diminished, ASHTYN Breath Sounds: Clear, LLL Breath Sounds: Diminished;Clear    Fluids    Intake/Output Summary (Last 24 hours) at 18 1533  Last data filed at 18 0816   Gross per 24 hour   Intake              270 ml   Output                0 ml   Net              270 ml       Nutrition  Orders Placed This Encounter   Procedures   • Diet Order     Standing Status:   Standing     Number of Occurrences:   1     Order Specific Question:   Diet:     Answer:   2 Gram Sodium [7]     Order Specific Question:   Diet:     Answer:   Cardiac [6]     Order Specific Question:   Consistency/Fluid modifications:     Answer:   1500 ml Fluid Restriction [9]     Physical Exam   Constitutional: She is oriented to person, place, and time. Vital signs are normal. No distress.   HENT:   Head: Normocephalic.   Eyes: Pupils are equal, round, and reactive to light.   Neck: Trachea normal and normal range of motion. Neck supple. No JVD present. Carotid bruit is not present. No thyroid mass and no thyromegaly present.   Cardiovascular: Normal rate, regular rhythm and intact distal pulses.    Murmur heard.  Ventricular pacer   Pulmonary/Chest: Effort normal. No accessory muscle usage. No respiratory distress. She has rales in the right lower field and the left lower field.   Increased work of breathing with exertion   Significance kyphosis of the  spine  Scoliosis changes  Decreased breath sounds on the right   Abdominal: Soft. Normal appearance and bowel sounds are normal.   Musculoskeletal: Normal range of motion.   Neurological: She is alert and oriented to person, place, and time. She has normal strength. GCS eye subscore is 4. GCS verbal subscore is 5. GCS motor subscore is 6.   Skin: Skin is warm, dry and intact.   Psychiatric: She has a normal mood and affect. Her speech is normal and behavior is normal.   Recent death of patients  and son       Recent Labs      05/18/18   1820  05/20/18   1508  05/21/18   0336   WBC  3.2*  3.7*  2.6*   RBC  3.60*  3.61*  3.35*   HEMOGLOBIN  10.4*  10.5*  9.9*   HEMATOCRIT  34.6*  33.7*  32.0*   MCV  96.1  93.4  95.5   MCH  28.9  29.1  29.6   MCHC  30.1*  31.2*  30.9*   RDW  55.9*  53.5*  55.6*   PLATELETCT  153*  121*  136*   MPV  10.8  10.9  11.0     Recent Labs      05/19/18   0408  05/19/18   1333  05/20/18   1508  05/21/18   0336   SODIUM  143   --   140  140   POTASSIUM  3.3*  3.7  3.9  4.2   CHLORIDE  106   --   107  109   CO2  27   --   25  27   GLUCOSE  88   --   107*  91   BUN  38*   --   34*  32*   CREATININE  1.29   --   1.67*  1.39   CALCIUM  8.5   --   8.4*  8.8     Recent Labs      05/18/18   1820  05/19/18   0408  05/20/18   0357  05/21/18   0336   APTT  43.9*   --    --    --    INR  2.81*  2.80*  2.17*  1.69*     Recent Labs      05/18/18   1820  05/20/18   1508  05/21/18   0336   BNPBTYPENAT  517*  677*  809*              Assessment/Plan     Acute on chronic diastolic (congestive) heart failure (HCC)- (present on admission)   Assessment & Plan    - Abnormal Echo - Severe bi-atrial enlargement with EF of 55% further recommendations per cardiology   - Decreased Lasix to 40 mg once a day ( 3L output since last pm)  - Continue to monitor I/O  - Increase activity  - Keep K+ > 4.0 and Mg > 2.0   - KCL 20mEq po BID ordered   - Fluid and salt restriction                Pleural effusion   Assessment &  Plan    Large lesion on the right  Compressive atelectasis  Unsuccessful thoracentesis attempt  No pneumothorax on follow-up chest x-ray        CKD (chronic kidney disease) stage 3, GFR 30-59 ml/min- (present on admission)   Assessment & Plan    - Creatine improved now 1.29 from 1.47 (baseline Cr 1.5)  - Lasix decreased to 40 mg IV daily starting in am (5/20/18)  - BMP in am  - Continue to avoid nephrotoxic agents if possible            Warfarin anticoagulation- (present on admission)   Assessment & Plan    - Continue Coumadin pharmacy to dose         Hypothyroidism- (present on admission)   Assessment & Plan    - Continue Synthroid         GERD (gastroesophageal reflux disease)- (present on admission)   Assessment & Plan    - Continue PPI        Essential hypertension- (present on admission)   Assessment & Plan    - Well controlled will continue Toprol XL 25 mg daily        Cardiac pacemaker in situ- (present on admission)   Assessment & Plan    - Permanent pacemaker for bradycardia - ventricularly paced on a Medtronic pacemaker, single   chamber and has chronic atrial fibrillation        Chronic atrial fibrillation (HCC)- (present on admission)   Assessment & Plan    - Ventricular paced rhythm - Rate controlled  - Continue Toprol 25 mg daily  - INR 2.8 theraputic- Continue Coumadin, goal INR 2-3          Quality-Core Measures   Reviewed items::  EKG reviewed, Labs reviewed, Medications reviewed and Radiology images reviewed  Hunt catheter::  No Hunt  DVT prophylaxis pharmacological::  Warfarin (Coumadin)      Plan  Post thoracentesis attempts treatment  No evidence of emboli  Moderate right-sided effusion, unable to be drained  Pulmonary hypertension for medical treatment only  Continue with current diuresis  Monitor renal function closely since the patient received IV contrast  See orders  Medically complex  More than 35 minutes was spent in pt exam, interview, and more than 50 % of this in discussion of  diagnoses, prognosis and disposition with patient, family, nurse and case management coordinator as well as consultants  A.m. labs and coy follow-up chest x-ray  Jesus Perales M.D.

## 2018-05-21 NOTE — CARE PLAN
Problem: Safety  Goal: Will remain free from falls  Outcome: PROGRESSING AS EXPECTED  Fall precautions in place; pt calling for assistance appropriately before getting up. Will continue to monitor.

## 2018-05-21 NOTE — PROGRESS NOTES
Cardiology Progress Note               Author: Didi Pleitez Date & Time created: 5/21/2018  9:48 AM     Interval History:  86 years old female who presented with worsening lower extremity edema and shortness of breath.  History of permanent pacemaker for bradycardia, congestive heart failure with preserved LV function, chronic atrial fibrillation, hypothyroidism, and aortic stenosis.    Consultation:  Dyspnea     5/21/18: got thoracentesis today, very anxious and having hemoptysis     Echocardiography Laboratory  5/19/18  Severe bi-atrial enlargement. Normal LV and RV systolic function. LVEF   is 55% by visual estimation. Diastolic function difficult to assess.   There is at least mild aortic stenosis but valve is opening. Mean   gradient is 10 mmHg, with V max of 2.01 m/s. RVSP is elevated at 70   mmHg suggesting pulmonary hypertension. No prior study is available for   comparison.     Review of Systems   Respiratory: Positive for hemoptysis. Negative for cough, sputum production, shortness of breath and wheezing.    Cardiovascular: Negative for chest pain, palpitations, orthopnea, claudication, leg swelling and PND.   Neurological: Negative for dizziness.       Physical Exam   Constitutional: She is oriented to person, place, and time. She appears well-developed and well-nourished. No distress.   HENT:   Head: Normocephalic.   Neck: Normal range of motion. No JVD present.   Cardiovascular: Normal rate, regular rhythm, normal heart sounds and intact distal pulses.    No murmur heard.  Pulmonary/Chest: Effort normal. No respiratory distress. She has no wheezes.   Abdominal: She exhibits no distension. There is no tenderness.   Musculoskeletal: She exhibits no edema or tenderness.   Neurological: She is alert and oriented to person, place, and time.   Skin: Skin is warm and dry. No rash noted. She is not diaphoretic.   Psychiatric: Her behavior is normal. Judgment normal.   Nursing note and vitals  reviewed.      Hemodynamics:  Temp (24hrs), Av.8 °C (98.3 °F), Min:36.4 °C (97.5 °F), Max:37.3 °C (99.1 °F)  Temperature: 36.9 °C (98.5 °F)  Pulse  Av.9  Min: 60  Max: 80   Blood Pressure : 154/99     Respiratory:    Respiration: 16, Pulse Oximetry: 95 %     Work Of Breathing / Effort: (P) Mild  RUL Breath Sounds: (P) Clear, RML Breath Sounds: (P) Diminished, RLL Breath Sounds: (P) Diminished, ASHTYN Breath Sounds: (P) Clear, LLL Breath Sounds: (P) Diminished;Clear  Fluids:  Date 18 0700 - 18 0659   Shift 0430-2898 8134-6448 2586-4993 24 Hour Total   I  N  T  A  K  E   P.O. 270   270    Shift Total 270   270   O  U  T  P  U  T   Shift Total       Weight (kg) 64.7 64.7 64.7 64.7       Weight: 64.7 kg (142 lb 10.2 oz)  GI/Nutrition:  Orders Placed This Encounter   Procedures   • Diet Order     Standing Status:   Standing     Number of Occurrences:   1     Order Specific Question:   Diet:     Answer:   2 Gram Sodium [7]     Order Specific Question:   Diet:     Answer:   Cardiac [6]     Order Specific Question:   Consistency/Fluid modifications:     Answer:   1500 ml Fluid Restriction [9]     Lab Results:  Recent Labs      18   1820  18   1508  18   0336   WBC  3.2*  3.7*  2.6*   RBC  3.60*  3.61*  3.35*   HEMOGLOBIN  10.4*  10.5*  9.9*   HEMATOCRIT  34.6*  33.7*  32.0*   MCV  96.1  93.4  95.5   MCH  28.9  29.1  29.6   MCHC  30.1*  31.2*  30.9*   RDW  55.9*  53.5*  55.6*   PLATELETCT  153*  121*  136*   MPV  10.8  10.9  11.0     Recent Labs      18   0408  18   1333  18   1508  18   0336   SODIUM  143   --   140  140   POTASSIUM  3.3*  3.7  3.9  4.2   CHLORIDE  106   --   107  109   CO2  27   --   25  27   GLUCOSE  88   --   107*  91   BUN  38*   --   34*  32*   CREATININE  1.29   --   1.67*  1.39   CALCIUM  8.5   --   8.4*  8.8     Recent Labs      18   1820  18   0408  18   0357  18   0336   APTT  43.9*   --    --    --    INR  2.81*   2.80*  2.17*  1.69*     Recent Labs      05/18/18   1820  05/20/18   1508  05/21/18   0336   BNPBTYPENAT  517*  677*  809*     Recent Labs      05/18/18   1820  05/20/18   1508  05/21/18   0336   TROPONINI  0.03   --    --    BNPBTYPENAT  517*  677*  809*             Medical Decision Making, by Problem:  Active Hospital Problems    Diagnosis   • Acute on chronic diastolic (congestive) heart failure (HCC) [I50.33]   • Pleural effusion [J90]   • CKD (chronic kidney disease) stage 3, GFR 30-59 ml/min [N18.3]   • GERD (gastroesophageal reflux disease) [K21.9]   • Cardiac pacemaker in situ [Z95.0]   • Chronic atrial fibrillation (HCC) [I48.2]   • Warfarin anticoagulation [Z79.01]   • Hypothyroidism [E03.9]   • Essential hypertension [I10]       Plan:  1. Dypnea:  - CT showed large pleural effusion on the right side. Thoracentesis today postprocedure had hemoptysis. Stat CXR was  performed and showed no evidence of right-sided pneumothorax.  Small to moderate right sided pleural effusion.  Small left-sided pleural effusion.  -On 2 L oxygen and no sign of distress noted    2. Acute on chronic diastolic congestive heart failure:  - ECHO; severe bi-atrial enlargement with ef 55%  - IV lasix 40mg qd and continue toprolo XL 25mg qd   - I/O -2.5K    Future Appointments  Date Time Provider Department Center   5/30/2018 8:15 AM Trinity Sharp M.D. [facility] None   6/1/2018 9:30 AM V EXAM 5 VMED None   6/1/2018 11:00 AM [facility] EXAM 4 [facility] None   6/18/2018 10:20 AM ELIZABETH Patel [facility]   7/5/2018 11:00 AM Sebastian Thomas M.D. [facility] None   7/11/2018 1:30 PM Neela Awad M.D. [facility] [address]   10/31/2018 9:45 AM PULMONARY DX 1 IPUL [address]   10/31/2018 10:00 AM PFT-RM1 PULM None   10/31/2018 11:00 AM A Rotation PULM None         Quality-Core Measures

## 2018-05-21 NOTE — PROGRESS NOTES
Pelon RN called to ultrasound to assist Dr. Pickens and Elham.  Pt coughing marlys blood, sitting in tripod position. RN placed pt on 2 liters NC, stat chest xray ordered.  The pt was monitored for 20 minutes in ultrasound, coughing and bleeding slowly resolved.  Pt was transported to T8 on 2 liters and bedside report given to Fran BARNHART, pt placed on continuous O2 monitoring.

## 2018-05-21 NOTE — PROGRESS NOTES
PROCEDURE: RT thoracentesis  SONOGRAPHER: Stacy DORSEY  RADIOLOGIST: Dr. Pickens    PT upright for US guided RT thoracentesis. PT's vitals were monitored and charted in epic. During procedure, PT began coughing blood and procedure was terminated. Joseph, IR RN, was notified and began monitoring PT. After several moments, PT started to feel better. Chest xray was done bedside and Joseph transported PT back up to room. Floor RNFran was notified of PT's arrival. 60 mL was removed from RT chest and sent to lab.

## 2018-05-22 ENCOUNTER — APPOINTMENT (OUTPATIENT)
Dept: RADIOLOGY | Facility: MEDICAL CENTER | Age: 83
DRG: 291 | End: 2018-05-22
Attending: HOSPITALIST
Payer: MEDICARE

## 2018-05-22 ENCOUNTER — APPOINTMENT (OUTPATIENT)
Dept: CARDIOLOGY | Facility: MEDICAL CENTER | Age: 83
End: 2018-05-22
Attending: INTERNAL MEDICINE
Payer: MEDICARE

## 2018-05-22 PROBLEM — D64.9 NORMOCYTIC ANEMIA: Status: ACTIVE | Noted: 2018-05-22

## 2018-05-22 PROBLEM — I27.20 MODERATE TO SEVERE PULMONARY HYPERTENSION (HCC): Status: ACTIVE | Noted: 2018-05-22

## 2018-05-22 PROBLEM — J96.01 ACUTE RESPIRATORY FAILURE WITH HYPOXIA (HCC): Status: ACTIVE | Noted: 2018-05-22

## 2018-05-22 LAB
ALBUMIN SERPL BCP-MCNC: 2.9 G/DL (ref 3.2–4.9)
ALBUMIN/GLOB SERPL: 1.1 G/DL
ALP SERPL-CCNC: 83 U/L (ref 30–99)
ALT SERPL-CCNC: <5 U/L (ref 2–50)
ANION GAP SERPL CALC-SCNC: 9 MMOL/L (ref 0–11.9)
AST SERPL-CCNC: 12 U/L (ref 12–45)
BASOPHILS # BLD AUTO: 1 % (ref 0–1.8)
BASOPHILS # BLD: 0.03 K/UL (ref 0–0.12)
BILIRUB SERPL-MCNC: 1.1 MG/DL (ref 0.1–1.5)
BUN SERPL-MCNC: 28 MG/DL (ref 8–22)
CALCIUM SERPL-MCNC: 8.8 MG/DL (ref 8.5–10.5)
CHLORIDE SERPL-SCNC: 110 MMOL/L (ref 96–112)
CO2 SERPL-SCNC: 24 MMOL/L (ref 20–33)
CREAT SERPL-MCNC: 1.08 MG/DL (ref 0.5–1.4)
EOSINOPHIL # BLD AUTO: 0 K/UL (ref 0–0.51)
EOSINOPHIL NFR BLD: 0 % (ref 0–6.9)
ERYTHROCYTE [DISTWIDTH] IN BLOOD BY AUTOMATED COUNT: 57 FL (ref 35.9–50)
GLOBULIN SER CALC-MCNC: 2.6 G/DL (ref 1.9–3.5)
GLUCOSE SERPL-MCNC: 96 MG/DL (ref 65–99)
GRAM STN SPEC: NORMAL
HCT VFR BLD AUTO: 31.9 % (ref 37–47)
HGB BLD-MCNC: 9.7 G/DL (ref 12–16)
IMM GRANULOCYTES # BLD AUTO: 0.02 K/UL (ref 0–0.11)
IMM GRANULOCYTES NFR BLD AUTO: 0.7 % (ref 0–0.9)
INR PPP: 1.25 (ref 0.87–1.13)
IRON SATN MFR SERPL: 9 % (ref 15–55)
IRON SERPL-MCNC: 28 UG/DL (ref 40–170)
LYMPHOCYTES # BLD AUTO: 0.83 K/UL (ref 1–4.8)
LYMPHOCYTES NFR BLD: 27.8 % (ref 22–41)
MAGNESIUM SERPL-MCNC: 2.4 MG/DL (ref 1.5–2.5)
MCH RBC QN AUTO: 29.5 PG (ref 27–33)
MCHC RBC AUTO-ENTMCNC: 30.4 G/DL (ref 33.6–35)
MCV RBC AUTO: 97 FL (ref 81.4–97.8)
MONOCYTES # BLD AUTO: 0.23 K/UL (ref 0–0.85)
MONOCYTES NFR BLD AUTO: 7.7 % (ref 0–13.4)
NEUTROPHILS # BLD AUTO: 1.88 K/UL (ref 2–7.15)
NEUTROPHILS NFR BLD: 62.8 % (ref 44–72)
NRBC # BLD AUTO: 0 K/UL
NRBC BLD-RTO: 0 /100 WBC
PHOSPHATE SERPL-MCNC: 3.5 MG/DL (ref 2.5–4.5)
PLATELET # BLD AUTO: 136 K/UL (ref 164–446)
PMV BLD AUTO: 10.8 FL (ref 9–12.9)
POTASSIUM SERPL-SCNC: 4.4 MMOL/L (ref 3.6–5.5)
PROT SERPL-MCNC: 5.5 G/DL (ref 6–8.2)
PROTHROMBIN TIME: 15.4 SEC (ref 12–14.6)
RBC # BLD AUTO: 3.29 M/UL (ref 4.2–5.4)
SIGNIFICANT IND 70042: NORMAL
SITE SITE: NORMAL
SODIUM SERPL-SCNC: 143 MMOL/L (ref 135–145)
SOURCE SOURCE: NORMAL
TIBC SERPL-MCNC: 295 UG/DL (ref 250–450)
WBC # BLD AUTO: 3 K/UL (ref 4.8–10.8)

## 2018-05-22 PROCEDURE — 85610 PROTHROMBIN TIME: CPT

## 2018-05-22 PROCEDURE — 36415 COLL VENOUS BLD VENIPUNCTURE: CPT

## 2018-05-22 PROCEDURE — 99232 SBSQ HOSP IP/OBS MODERATE 35: CPT | Performed by: INTERNAL MEDICINE

## 2018-05-22 PROCEDURE — 83540 ASSAY OF IRON: CPT

## 2018-05-22 PROCEDURE — A9270 NON-COVERED ITEM OR SERVICE: HCPCS | Performed by: INTERNAL MEDICINE

## 2018-05-22 PROCEDURE — A9270 NON-COVERED ITEM OR SERVICE: HCPCS | Performed by: HOSPITALIST

## 2018-05-22 PROCEDURE — 700111 HCHG RX REV CODE 636 W/ 250 OVERRIDE (IP): Performed by: INTERNAL MEDICINE

## 2018-05-22 PROCEDURE — 700111 HCHG RX REV CODE 636 W/ 250 OVERRIDE (IP): Performed by: NURSE PRACTITIONER

## 2018-05-22 PROCEDURE — 80053 COMPREHEN METABOLIC PANEL: CPT

## 2018-05-22 PROCEDURE — 71045 X-RAY EXAM CHEST 1 VIEW: CPT

## 2018-05-22 PROCEDURE — 770020 HCHG ROOM/CARE - TELE (206)

## 2018-05-22 PROCEDURE — 83550 IRON BINDING TEST: CPT

## 2018-05-22 PROCEDURE — 84100 ASSAY OF PHOSPHORUS: CPT

## 2018-05-22 PROCEDURE — 700102 HCHG RX REV CODE 250 W/ 637 OVERRIDE(OP): Performed by: INTERNAL MEDICINE

## 2018-05-22 PROCEDURE — 700102 HCHG RX REV CODE 250 W/ 637 OVERRIDE(OP): Performed by: HOSPITALIST

## 2018-05-22 PROCEDURE — 85025 COMPLETE CBC W/AUTO DIFF WBC: CPT

## 2018-05-22 PROCEDURE — 700102 HCHG RX REV CODE 250 W/ 637 OVERRIDE(OP): Performed by: NURSE PRACTITIONER

## 2018-05-22 PROCEDURE — 83735 ASSAY OF MAGNESIUM: CPT

## 2018-05-22 PROCEDURE — A9270 NON-COVERED ITEM OR SERVICE: HCPCS | Performed by: NURSE PRACTITIONER

## 2018-05-22 RX ORDER — FUROSEMIDE 10 MG/ML
40 INJECTION INTRAMUSCULAR; INTRAVENOUS
Status: DISCONTINUED | OUTPATIENT
Start: 2018-05-22 | End: 2018-05-23

## 2018-05-22 RX ADMIN — METOLAZONE 5 MG: 5 TABLET ORAL at 05:09

## 2018-05-22 RX ADMIN — TOPIRAMATE 25 MG: 25 TABLET, FILM COATED ORAL at 05:12

## 2018-05-22 RX ADMIN — LEVOTHYROXINE SODIUM 50 MCG: 50 TABLET ORAL at 05:11

## 2018-05-22 RX ADMIN — TOPIRAMATE 25 MG: 25 TABLET, FILM COATED ORAL at 17:13

## 2018-05-22 RX ADMIN — ENOXAPARIN SODIUM 60 MG: 100 INJECTION SUBCUTANEOUS at 17:18

## 2018-05-22 RX ADMIN — POTASSIUM CHLORIDE 20 MEQ: 1500 TABLET, EXTENDED RELEASE ORAL at 17:13

## 2018-05-22 RX ADMIN — GABAPENTIN 300 MG: 300 CAPSULE ORAL at 17:13

## 2018-05-22 RX ADMIN — METOPROLOL SUCCINATE 25 MG: 25 TABLET, EXTENDED RELEASE ORAL at 05:12

## 2018-05-22 RX ADMIN — POTASSIUM CHLORIDE 20 MEQ: 1500 TABLET, EXTENDED RELEASE ORAL at 05:11

## 2018-05-22 RX ADMIN — FUROSEMIDE 40 MG: 10 INJECTION, SOLUTION INTRAMUSCULAR; INTRAVENOUS at 12:41

## 2018-05-22 RX ADMIN — FUROSEMIDE 40 MG: 10 INJECTION, SOLUTION INTRAMUSCULAR; INTRAVENOUS at 05:49

## 2018-05-22 RX ADMIN — OXYCODONE HYDROCHLORIDE 5 MG: 5 TABLET ORAL at 20:59

## 2018-05-22 RX ADMIN — OMEPRAZOLE 20 MG: 20 CAPSULE, DELAYED RELEASE ORAL at 05:12

## 2018-05-22 RX ADMIN — SERTRALINE 50 MG: 50 TABLET, FILM COATED ORAL at 17:13

## 2018-05-22 ASSESSMENT — ENCOUNTER SYMPTOMS
HEADACHES: 0
WEAKNESS: 0
BLOOD IN STOOL: 0
ORTHOPNEA: 0
DIZZINESS: 0
FEVER: 0
SENSORY CHANGE: 0
INSOMNIA: 0
DIARRHEA: 0
CLAUDICATION: 0
CONSTIPATION: 0
EYES NEGATIVE: 1
SPEECH CHANGE: 0
WHEEZING: 0
FOCAL WEAKNESS: 0
FALLS: 0
SHORTNESS OF BREATH: 1
SPUTUM PRODUCTION: 0
WEIGHT LOSS: 0
MEMORY LOSS: 0
HEMOPTYSIS: 0
PALPITATIONS: 0
CHILLS: 0
HALLUCINATIONS: 0
NAUSEA: 0
PND: 0
BRUISES/BLEEDS EASILY: 1
ABDOMINAL PAIN: 0
DEPRESSION: 0
VOMITING: 0
DIAPHORESIS: 0
COUGH: 0
NERVOUS/ANXIOUS: 0

## 2018-05-22 ASSESSMENT — PAIN SCALES - GENERAL
PAINLEVEL_OUTOF10: 0
PAINLEVEL_OUTOF10: 6
PAINLEVEL_OUTOF10: 0
PAINLEVEL_OUTOF10: 0

## 2018-05-22 ASSESSMENT — LIFESTYLE VARIABLES: SUBSTANCE_ABUSE: 0

## 2018-05-22 NOTE — PROGRESS NOTES
Cardiology Progress Note               Author: Didi Pleitez Date & Time created: 5/22/2018  9:01 AM     Interval History:  86 years old female who presented with worsening lower extremity edema and shortness of breath.  History of permanent pacemaker for bradycardia, congestive heart failure with preserved LV function, chronic atrial fibrillation, hypothyroidism, and aortic stenosis.    Consultation:  Dyspnea     5/21/18: got thoracentesis today, very anxious and having hemoptysis     Echocardiography Laboratory  5/19/18  Severe bi-atrial enlargement. Normal LV and RV systolic function. LVEF   is 55% by visual estimation. Diastolic function difficult to assess.   There is at least mild aortic stenosis but valve is opening. Mean   gradient is 10 mmHg, with V max of 2.01 m/s. RVSP is elevated at 70   mmHg suggesting pulmonary hypertension. No prior study is available for   comparison.     Review of Systems   Constitutional: Positive for malaise/fatigue.   Respiratory: Positive for shortness of breath. Negative for cough, hemoptysis, sputum production and wheezing.    Cardiovascular: Negative for chest pain, palpitations, orthopnea, claudication, leg swelling and PND.   Neurological: Negative for dizziness.       Physical Exam   Constitutional: She is oriented to person, place, and time. She appears well-developed and well-nourished. No distress.   HENT:   Head: Normocephalic.   Neck: Normal range of motion. No JVD present.   Cardiovascular: Normal rate, regular rhythm, normal heart sounds and intact distal pulses.    No murmur heard.  Pacemaker present in left upper chest without evidence   of erosion, drainage,or erythema.     Pulmonary/Chest: Effort normal. No respiratory distress. She has decreased breath sounds in the right upper field, the right lower field and the left lower field. She has no wheezes.   Abdominal: She exhibits no distension. There is no tenderness.   Musculoskeletal: She exhibits no edema or  tenderness.   Neurological: She is alert and oriented to person, place, and time.   Skin: Skin is warm and dry. No rash noted. She is not diaphoretic.   Psychiatric: Her behavior is normal. Judgment normal.   Nursing note and vitals reviewed.      Hemodynamics:  Temp (24hrs), Av.6 °C (97.8 °F), Min:36.2 °C (97.2 °F), Max:36.9 °C (98.5 °F)  Temperature: 36.2 °C (97.2 °F)  Pulse  Av.6  Min: 59  Max: 80   Blood Pressure : 107/59     Respiratory:    Respiration: 16, Pulse Oximetry: 96 %     Work Of Breathing / Effort: Mild  RUL Breath Sounds: Clear, RML Breath Sounds: Diminished, RLL Breath Sounds: Diminished, ASHTYN Breath Sounds: Clear, LLL Breath Sounds: Diminished  Fluids:  Date 18 0700 - 18 0659   Shift 7353-8999 6185-9771 5899-0806 24 Hour Total   I  N  T  A  K  E   P.O. 270   270    Shift Total 270   270   O  U  T  P  U  T   Shift Total       Weight (kg) 64.7 64.7 64.7 64.7       Weight: 64.8 kg (142 lb 13.7 oz)  GI/Nutrition:  Orders Placed This Encounter   Procedures   • Diet Order     Standing Status:   Standing     Number of Occurrences:   1     Order Specific Question:   Diet:     Answer:   2 Gram Sodium [7]     Order Specific Question:   Diet:     Answer:   Cardiac [6]     Order Specific Question:   Consistency/Fluid modifications:     Answer:   1500 ml Fluid Restriction [9]     Lab Results:  Recent Labs      18   1508  18   0336  18   0552   WBC  3.7*  2.6*  3.0*   RBC  3.61*  3.35*  3.29*   HEMOGLOBIN  10.5*  9.9*  9.7*   HEMATOCRIT  33.7*  32.0*  31.9*   MCV  93.4  95.5  97.0   MCH  29.1  29.6  29.5   MCHC  31.2*  30.9*  30.4*   RDW  53.5*  55.6*  57.0*   PLATELETCT  121*  136*  136*   MPV  10.9  11.0  10.8     Recent Labs      18   1508  18   0336  18   0552   SODIUM  140  140  143   POTASSIUM  3.9  4.2  4.4   CHLORIDE  107  109  110   CO2  25  27  24   GLUCOSE  107*  91  96   BUN  34*  32*  28*   CREATININE  1.67*  1.39  1.08   CALCIUM  8.4*  8.8   8.8     Recent Labs      05/20/18   0357  05/21/18   0336  05/22/18   0552   INR  2.17*  1.69*  1.25*     Recent Labs      05/20/18   1508  05/21/18   0336   BNPBTYPENAT  677*  809*     Recent Labs      05/20/18   1508  05/21/18   0336   BNPBTYPENAT  677*  809*             Medical Decision Making, by Problem:  Active Hospital Problems    Diagnosis   • Acute on chronic diastolic (congestive) heart failure (HCC) [I50.33]   • Pleural effusion [J90]   • CKD (chronic kidney disease) stage 3, GFR 30-59 ml/min [N18.3]   • GERD (gastroesophageal reflux disease) [K21.9]   • Cardiac pacemaker in situ [Z95.0]   • Chronic atrial fibrillation (HCC) [I48.2]   • Warfarin anticoagulation [Z79.01]   • Hypothyroidism [E03.9]   • Essential hypertension [I10]       Plan:  1. Dypnea in the setting of pulmonary HTN/pleural effusions  -failed thoracentesis of R side yesterday, repeat today  -cont diuresis with lasix 40 IV, consider up titration to 40 BID or switching to torsemide-will discuss with Dr. Thomas  -cont metolazone 5 mg QD  -wean oxygen as tolerated    2. Acute on chronic diastolic congestive heart failure  - ECHO; severe bi-atrial enlargement with ef 55%  - I/O 24 hours: -200 ml  -cont bb, diuresis with lasix/metolazone    3. Chronic afib with PPM (Medtronic) on coumadin  -coumadin on hold for repeat thoracentesis today  -rate controlled and asymptomatic, occ palps  -restart coumadin post thoracentesis this evening  -cont metoprolol XL for rate control    We will follow alongside you in the care of this patient.    Future Appointments  Date Time Provider Department Center   5/30/2018 8:15 AM Trinity Sharp M.D. [facility] None   6/1/2018 9:30 AM [facility] EXAM 5 VMED None   6/1/2018 11:00 AM [facility] EXAM 4 VMED None   6/18/2018 10:20 AM ELIZABETH Patel [facility]IE Flor   7/5/2018 11:00 AM Sebastian Thomas M.D. [facility] None   7/11/2018 1:30 PM Neela Awad M.D. [facility] [address]   10/31/2018 9:45 AM PULMONARY DX 1 IPUL [address]    10/31/2018 10:00 AM PFT-RM1 PULM None   10/31/2018 11:00 AM A Rotation PULM None       Quality-Core Measures   Reviewed items::  EKG reviewed, Radiology images reviewed, Labs reviewed and Medications reviewed  Hunt catheter::  No Hunt  DVT prophylaxis pharmacological::  Contraindicated - High bleeding risk  DVT prophylaxis - mechanical:  Not indicated at this time, ambulatory  Ulcer Prophylaxis::  Not indicated

## 2018-05-22 NOTE — PROGRESS NOTES
Received bedside shift report from night  RN. PT AOx 4. PT educated on safety precautions and how to use call light, call bell within reach. Will continue to monitor.

## 2018-05-22 NOTE — PROGRESS NOTES
Pt arrived to the floor with IR RN. Patient alert and oriented with pain in her right upper back at the puncture sight. Pt is coughing up minimal amount of blood, on 2L NC. Family updated about outcome of procedure. Pt placed on , continue to monitor closely.

## 2018-05-22 NOTE — PROGRESS NOTES
Pt has not coughed up any blood in the past three hours, oxygen d/c and pt stating 93-96% on room air.

## 2018-05-22 NOTE — CARE PLAN
Problem: Mobility  Goal: Risk for activity intolerance will decrease  Outcome: PROGRESSING AS EXPECTED      Problem: Urinary Elimination:  Goal: Ability to reestablish a normal urinary elimination pattern will improve  Outcome: PROGRESSING SLOWER THAN EXPECTED

## 2018-05-22 NOTE — CARE PLAN
Problem: Safety  Goal: Will remain free from falls  Outcome: PROGRESSING AS EXPECTED  Fall risk assessed every shift and fall precautions in place.  Pt educated to not get up without assistance.  Verbalized understanding.   Patient is up with a single point cane, calls appropriately    Problem: Knowledge Deficit  Goal: Knowledge of the prescribed therapeutic regimen will improve  Outcome: PROGRESSING AS EXPECTED  Pt educated regarding activity, diet, meds and plan of care. Verbalized understanding.

## 2018-05-22 NOTE — ASSESSMENT & PLAN NOTE
Due to pulmonary edema and R effusion.  CTA negative.  No e/o RAD  -Lasix/ metolazone  -Wean o2  -Ambulate  -Repeat thora 5/23 w 650ml removed

## 2018-05-22 NOTE — PROGRESS NOTES
[facility] Hospitalist Progress Note    Date of Service: 2018    Chief Complaint  86 y.o. female admitted 2018 with chronic diastolic CHF, AS, chronic A-Fib, pacemaker placement on coumadin, who presented 2018 with progressive SOB and dyspnea on exertion (BERTRAND) and BLE edema for the past 2 weeks.      Interval Problem Update  : Still w edema, CTA negative for PE.  Echo w severe PAH  : R pleural effusion, attempted thoracentesis, 80mL drained, then pt got hemoptysis.  Coumadin held  : Coumadin held, pt would like repeat thora.  Tolerating lasix/metolazone.  Weaned to RA at rest, 1L w ambulation.  INR 1.25    Consultants/Specialty  Cardiology     Disposition  Thora in AM, wean o2        Review of Systems   Constitutional: Positive for malaise/fatigue. Negative for chills, diaphoresis, fever and weight loss.   HENT: Negative.    Eyes: Negative.    Respiratory: Positive for shortness of breath. Negative for cough, hemoptysis, sputum production and wheezing.    Cardiovascular: Positive for leg swelling (Resolved). Negative for chest pain and palpitations.   Gastrointestinal: Negative for abdominal pain, blood in stool, constipation, diarrhea, melena, nausea and vomiting.   Genitourinary: Negative.    Musculoskeletal: Negative for falls.   Skin: Negative for itching and rash.   Neurological: Negative for dizziness, sensory change, speech change, focal weakness, weakness and headaches.   Endo/Heme/Allergies: Bruises/bleeds easily.   Psychiatric/Behavioral: Negative for depression, hallucinations, memory loss, substance abuse and suicidal ideas. The patient is not nervous/anxious and does not have insomnia.       Physical Exam  Laboratory/Imaging   Hemodynamics  Temp (24hrs), Av.5 °C (97.7 °F), Min:36.2 °C (97.2 °F), Max:36.7 °C (98 °F)   Temperature: 36.7 °C (98 °F)  Pulse  Av.4  Min: 59  Max: 80    Blood Pressure : 123/56      Respiratory      Respiration: 17, Pulse Oximetry: 98 %     Work  Of Breathing / Effort: Mild  RUL Breath Sounds: Clear, RML Breath Sounds: Diminished, RLL Breath Sounds: Diminished, ASHTYN Breath Sounds: Clear, LLL Breath Sounds: Diminished    Fluids    Intake/Output Summary (Last 24 hours) at 05/22/18 1647  Last data filed at 05/22/18 1600   Gross per 24 hour   Intake              550 ml   Output             1430 ml   Net             -880 ml       Nutrition  Orders Placed This Encounter   Procedures   • Diet Order     Standing Status:   Standing     Number of Occurrences:   1     Order Specific Question:   Diet:     Answer:   2 Gram Sodium [7]     Order Specific Question:   Diet:     Answer:   Cardiac [6]     Order Specific Question:   Consistency/Fluid modifications:     Answer:   1500 ml Fluid Restriction [9]     Physical Exam   Constitutional: She is oriented to person, place, and time. Vital signs are normal. No distress.   HENT:   Head: Normocephalic.   Eyes: Pupils are equal, round, and reactive to light.   Neck: Trachea normal and normal range of motion. Neck supple. No JVD present. Carotid bruit is not present. No thyroid mass and no thyromegaly present.   Cardiovascular: Normal rate, regular rhythm and intact distal pulses.    Murmur heard.  Ventricular pacer   Pulmonary/Chest: Effort normal. No accessory muscle usage. No respiratory distress. She has decreased breath sounds (Right side). She has no rales.   Abdominal: Soft. Normal appearance and bowel sounds are normal.   Musculoskeletal: Normal range of motion.   Neurological: She is alert and oriented to person, place, and time. She has normal strength.   Skin: Skin is warm, dry and intact.   Psychiatric: She has a normal mood and affect. Her speech is normal and behavior is normal.       Recent Labs      05/20/18   1508  05/21/18   0336  05/22/18   0552   WBC  3.7*  2.6*  3.0*   RBC  3.61*  3.35*  3.29*   HEMOGLOBIN  10.5*  9.9*  9.7*   HEMATOCRIT  33.7*  32.0*  31.9*   MCV  93.4  95.5  97.0   MCH  29.1  29.6  29.5    MCHC  31.2*  30.9*  30.4*   RDW  53.5*  55.6*  57.0*   PLATELETCT  121*  136*  136*   MPV  10.9  11.0  10.8     Recent Labs      05/20/18   1508  05/21/18   0336  05/22/18   0552   SODIUM  140  140  143   POTASSIUM  3.9  4.2  4.4   CHLORIDE  107  109  110   CO2  25  27  24   GLUCOSE  107*  91  96   BUN  34*  32*  28*   CREATININE  1.67*  1.39  1.08   CALCIUM  8.4*  8.8  8.8     Recent Labs      05/20/18   0357  05/21/18   0336  05/22/18   0552   INR  2.17*  1.69*  1.25*     Recent Labs      05/20/18   1508  05/21/18   0336   BNPBTYPENAT  677*  809*              Assessment/Plan     * Acute on chronic diastolic (congestive) heart failure (HCC)- (present on admission)   Assessment & Plan    - Abnormal Echo - Severe bi-atrial enlargement with EF of 55% further recommendations per cardiology   - Lasix 40 IV daily (has had significant output)  - Metolazone 5 daily  - Continue to monitor I/O  - Increase activity  - Keep K+ > 4.0 and Mg > 2.0   - KCL 20mEq po BID ordered   - Fluid and salt restriction  - Metop Xl 25        Acute respiratory failure with hypoxia (HCC)   Assessment & Plan    Due to pulmonary edema and R effusion.  CTA negative.  No e/o RAD  -Lasix/ metolazone  -Wean o2  -Ambulate  -Repeat thora 5/23        Normocytic anemia   Assessment & Plan    Mild, stable  -Check iron studies        Moderate to severe pulmonary hypertension (HCC)   Assessment & Plan    RVSP 70        Pleural effusion   Assessment & Plan    Large lesion on the right, suspect transudative from CHF.  Attempt for R thora 5/21 w 80 ml removed (pt developed hemoptysis so procedure aborted)  -Repeat CXR in AM  -Compressive atelectasis  -No pneumothorax on follow-up chest x-ray  -Repeat US guided thora ordered, will be done 5/23        CKD (chronic kidney disease) stage 3, GFR 30-59 ml/min- (present on admission)   Assessment & Plan    - Creatine improved now 1.08  - Lasix decreased to 40 mg IV daily starting in am (5/20/18)  - BMP in am  -  Continue to avoid nephrotoxic agents if possible            Warfarin anticoagulation- (present on admission)   Assessment & Plan    Currently being held for thora  -Continue Coumadin pharmacy to dose after procedures are complete        Hypothyroidism- (present on admission)   Assessment & Plan    - Continue Synthroid         GERD (gastroesophageal reflux disease)- (present on admission)   Assessment & Plan    - Continue PPI        Essential hypertension- (present on admission)   Assessment & Plan    - Well controlled will continue Toprol XL 25 mg daily        COPD (chronic obstructive pulmonary disease) (HCC)- (present on admission)   Assessment & Plan    No e/o exacerbation        Cardiac pacemaker in situ- (present on admission)   Assessment & Plan    - Permanent pacemaker for bradycardia - ventricularly paced on a Medtronic pacemaker, single   chamber and has chronic atrial fibrillation        Chronic atrial fibrillation (HCC)- (present on admission)   Assessment & Plan    - Ventricular paced rhythm - Rate controlled  - Continue Toprol 25 mg daily  - INR 2.8 theraputic- Continue Coumadin, goal INR 2-3          Quality-Core Measures   Reviewed items::  EKG reviewed, Labs reviewed, Medications reviewed and Radiology images reviewed  Hunt catheter::  No Hunt  DVT prophylaxis pharmacological::  Enoxaparin (Lovenox)  DVT prophylaxis - mechanical:  SCDs  Lovenox until coumadin is resumed

## 2018-05-23 ENCOUNTER — APPOINTMENT (OUTPATIENT)
Dept: RADIOLOGY | Facility: MEDICAL CENTER | Age: 83
DRG: 291 | End: 2018-05-23
Attending: INTERNAL MEDICINE
Payer: MEDICARE

## 2018-05-23 ENCOUNTER — APPOINTMENT (OUTPATIENT)
Dept: RADIOLOGY | Facility: MEDICAL CENTER | Age: 83
DRG: 291 | End: 2018-05-23
Attending: NURSE PRACTITIONER
Payer: MEDICARE

## 2018-05-23 LAB
ALBUMIN SERPL BCP-MCNC: 3.4 G/DL (ref 3.2–4.9)
AMYLASE FLD-CCNC: 22 U/L
APPEARANCE FLD: NORMAL
BASOPHILS # BLD AUTO: 1.2 % (ref 0–1.8)
BASOPHILS # BLD: 0.04 K/UL (ref 0–0.12)
BASOPHILS NFR FLD: 1 %
BODY FLD TYPE: NORMAL
BUN SERPL-MCNC: 29 MG/DL (ref 8–22)
CALCIUM SERPL-MCNC: 9 MG/DL (ref 8.5–10.5)
CHLORIDE SERPL-SCNC: 108 MMOL/L (ref 96–112)
CO2 SERPL-SCNC: 27 MMOL/L (ref 20–33)
COLOR FLD: NORMAL
CREAT SERPL-MCNC: 1.26 MG/DL (ref 0.5–1.4)
CSF COMMENTS 1658: NORMAL
EOSINOPHIL # BLD AUTO: 0 K/UL (ref 0–0.51)
EOSINOPHIL NFR BLD: 0 % (ref 0–6.9)
EOSINOPHIL NFR FLD: 1 %
ERYTHROCYTE [DISTWIDTH] IN BLOOD BY AUTOMATED COUNT: 57.1 FL (ref 35.9–50)
FERRITIN SERPL-MCNC: 37 NG/ML (ref 10–291)
GLUCOSE FLD-MCNC: 106 MG/DL
GLUCOSE SERPL-MCNC: 100 MG/DL (ref 65–99)
HCT VFR BLD AUTO: 31.9 % (ref 37–47)
HGB BLD-MCNC: 9.7 G/DL (ref 12–16)
IMM GRANULOCYTES # BLD AUTO: 0.01 K/UL (ref 0–0.11)
IMM GRANULOCYTES NFR BLD AUTO: 0.3 % (ref 0–0.9)
INR PPP: 1.16 (ref 0.87–1.13)
LDH FLD L TO P-CCNC: 88 U/L
LYMPHOCYTES # BLD AUTO: 0.77 K/UL (ref 1–4.8)
LYMPHOCYTES NFR BLD: 23.5 % (ref 22–41)
LYMPHOCYTES NFR FLD: 16 %
MCH RBC QN AUTO: 29.6 PG (ref 27–33)
MCHC RBC AUTO-ENTMCNC: 30.4 G/DL (ref 33.6–35)
MCV RBC AUTO: 97.3 FL (ref 81.4–97.8)
MONOCYTES # BLD AUTO: 0.26 K/UL (ref 0–0.85)
MONOCYTES NFR BLD AUTO: 7.9 % (ref 0–13.4)
MONONUC CELLS NFR FLD: 4 %
NEUTROPHILS # BLD AUTO: 2.2 K/UL (ref 2–7.15)
NEUTROPHILS NFR BLD: 67.1 % (ref 44–72)
NEUTROPHILS NFR FLD: 75 %
NRBC # BLD AUTO: 0 K/UL
NRBC BLD-RTO: 0 /100 WBC
PH FLD: 7.6 [PH]
PHOSPHATE SERPL-MCNC: 3.8 MG/DL (ref 2.5–4.5)
PLATELET # BLD AUTO: 133 K/UL (ref 164–446)
PMV BLD AUTO: 10.7 FL (ref 9–12.9)
POTASSIUM SERPL-SCNC: 4.1 MMOL/L (ref 3.6–5.5)
PROT FLD-MCNC: 2.4 G/DL
PROTHROMBIN TIME: 14.5 SEC (ref 12–14.6)
RBC # BLD AUTO: 3.28 M/UL (ref 4.2–5.4)
RBC # FLD: NORMAL CELLS/UL
SODIUM SERPL-SCNC: 140 MMOL/L (ref 135–145)
WBC # BLD AUTO: 3.3 K/UL (ref 4.8–10.8)
WBC # FLD: 1953 CELLS/UL
WBC OTHER NFR FLD: 3 %

## 2018-05-23 PROCEDURE — 97165 OT EVAL LOW COMPLEX 30 MIN: CPT

## 2018-05-23 PROCEDURE — G8987 SELF CARE CURRENT STATUS: HCPCS | Mod: CI

## 2018-05-23 PROCEDURE — 80069 RENAL FUNCTION PANEL: CPT

## 2018-05-23 PROCEDURE — 87206 SMEAR FLUORESCENT/ACID STAI: CPT

## 2018-05-23 PROCEDURE — 82945 GLUCOSE OTHER FLUID: CPT

## 2018-05-23 PROCEDURE — 83615 LACTATE (LD) (LDH) ENZYME: CPT

## 2018-05-23 PROCEDURE — 87015 SPECIMEN INFECT AGNT CONCNTJ: CPT

## 2018-05-23 PROCEDURE — 84157 ASSAY OF PROTEIN OTHER: CPT

## 2018-05-23 PROCEDURE — A9270 NON-COVERED ITEM OR SERVICE: HCPCS | Performed by: INTERNAL MEDICINE

## 2018-05-23 PROCEDURE — 87102 FUNGUS ISOLATION CULTURE: CPT

## 2018-05-23 PROCEDURE — 700111 HCHG RX REV CODE 636 W/ 250 OVERRIDE (IP): Performed by: INTERNAL MEDICINE

## 2018-05-23 PROCEDURE — 770020 HCHG ROOM/CARE - TELE (206)

## 2018-05-23 PROCEDURE — 99232 SBSQ HOSP IP/OBS MODERATE 35: CPT | Performed by: INTERNAL MEDICINE

## 2018-05-23 PROCEDURE — 0W993ZZ DRAINAGE OF RIGHT PLEURAL CAVITY, PERCUTANEOUS APPROACH: ICD-10-PCS | Performed by: RADIOLOGY

## 2018-05-23 PROCEDURE — 36415 COLL VENOUS BLD VENIPUNCTURE: CPT

## 2018-05-23 PROCEDURE — 700102 HCHG RX REV CODE 250 W/ 637 OVERRIDE(OP): Performed by: INTERNAL MEDICINE

## 2018-05-23 PROCEDURE — G8989 SELF CARE D/C STATUS: HCPCS | Mod: CI

## 2018-05-23 PROCEDURE — 89051 BODY FLUID CELL COUNT: CPT

## 2018-05-23 PROCEDURE — 32555 ASPIRATE PLEURA W/ IMAGING: CPT | Mod: RT

## 2018-05-23 PROCEDURE — 700102 HCHG RX REV CODE 250 W/ 637 OVERRIDE(OP): Performed by: HOSPITALIST

## 2018-05-23 PROCEDURE — 87205 SMEAR GRAM STAIN: CPT

## 2018-05-23 PROCEDURE — 85025 COMPLETE CBC W/AUTO DIFF WBC: CPT

## 2018-05-23 PROCEDURE — 82728 ASSAY OF FERRITIN: CPT

## 2018-05-23 PROCEDURE — 85610 PROTHROMBIN TIME: CPT

## 2018-05-23 PROCEDURE — A9270 NON-COVERED ITEM OR SERVICE: HCPCS | Performed by: HOSPITALIST

## 2018-05-23 PROCEDURE — 82150 ASSAY OF AMYLASE: CPT

## 2018-05-23 PROCEDURE — A9270 NON-COVERED ITEM OR SERVICE: HCPCS | Performed by: NURSE PRACTITIONER

## 2018-05-23 PROCEDURE — 700111 HCHG RX REV CODE 636 W/ 250 OVERRIDE (IP): Performed by: NURSE PRACTITIONER

## 2018-05-23 PROCEDURE — 87070 CULTURE OTHR SPECIMN AEROBIC: CPT

## 2018-05-23 PROCEDURE — G8988 SELF CARE GOAL STATUS: HCPCS | Mod: CI

## 2018-05-23 PROCEDURE — 71045 X-RAY EXAM CHEST 1 VIEW: CPT

## 2018-05-23 PROCEDURE — 87116 MYCOBACTERIA CULTURE: CPT

## 2018-05-23 PROCEDURE — 700102 HCHG RX REV CODE 250 W/ 637 OVERRIDE(OP): Performed by: NURSE PRACTITIONER

## 2018-05-23 PROCEDURE — 83986 ASSAY PH BODY FLUID NOS: CPT

## 2018-05-23 RX ORDER — WARFARIN SODIUM 5 MG/1
5 TABLET ORAL
Status: COMPLETED | OUTPATIENT
Start: 2018-05-23 | End: 2018-05-23

## 2018-05-23 RX ORDER — FUROSEMIDE 20 MG/1
20 TABLET ORAL
Status: DISCONTINUED | OUTPATIENT
Start: 2018-05-24 | End: 2018-05-24

## 2018-05-23 RX ORDER — FUROSEMIDE 10 MG/ML
20 INJECTION INTRAMUSCULAR; INTRAVENOUS
Status: DISCONTINUED | OUTPATIENT
Start: 2018-05-24 | End: 2018-05-23

## 2018-05-23 RX ADMIN — OXYCODONE HYDROCHLORIDE 5 MG: 5 TABLET ORAL at 05:22

## 2018-05-23 RX ADMIN — LEVOTHYROXINE SODIUM 50 MCG: 50 TABLET ORAL at 05:16

## 2018-05-23 RX ADMIN — SERTRALINE 50 MG: 50 TABLET, FILM COATED ORAL at 17:17

## 2018-05-23 RX ADMIN — POTASSIUM CHLORIDE 20 MEQ: 1500 TABLET, EXTENDED RELEASE ORAL at 05:16

## 2018-05-23 RX ADMIN — ALUMINUM HYDROXIDE, MAGNESIUM HYDROXIDE,SIMETHICONE 10 ML: 400; 400; 40 LIQUID ORAL at 05:22

## 2018-05-23 RX ADMIN — METOLAZONE 5 MG: 5 TABLET ORAL at 05:15

## 2018-05-23 RX ADMIN — POTASSIUM CHLORIDE 20 MEQ: 1500 TABLET, EXTENDED RELEASE ORAL at 17:17

## 2018-05-23 RX ADMIN — TOPIRAMATE 25 MG: 25 TABLET, FILM COATED ORAL at 05:16

## 2018-05-23 RX ADMIN — FUROSEMIDE 40 MG: 10 INJECTION, SOLUTION INTRAMUSCULAR; INTRAVENOUS at 05:47

## 2018-05-23 RX ADMIN — FUROSEMIDE 40 MG: 10 INJECTION, SOLUTION INTRAMUSCULAR; INTRAVENOUS at 12:34

## 2018-05-23 RX ADMIN — ENOXAPARIN SODIUM 60 MG: 100 INJECTION SUBCUTANEOUS at 20:49

## 2018-05-23 RX ADMIN — OXYCODONE HYDROCHLORIDE 5 MG: 5 TABLET ORAL at 20:42

## 2018-05-23 RX ADMIN — METOPROLOL SUCCINATE 25 MG: 25 TABLET, EXTENDED RELEASE ORAL at 05:17

## 2018-05-23 RX ADMIN — TOPIRAMATE 25 MG: 25 TABLET, FILM COATED ORAL at 17:17

## 2018-05-23 RX ADMIN — OMEPRAZOLE 20 MG: 20 CAPSULE, DELAYED RELEASE ORAL at 05:16

## 2018-05-23 RX ADMIN — WARFARIN SODIUM 5 MG: 5 TABLET ORAL at 20:49

## 2018-05-23 RX ADMIN — GABAPENTIN 300 MG: 300 CAPSULE ORAL at 17:17

## 2018-05-23 RX ADMIN — OXYCODONE HYDROCHLORIDE 5 MG: 5 TABLET ORAL at 15:14

## 2018-05-23 ASSESSMENT — ENCOUNTER SYMPTOMS
SENSORY CHANGE: 0
FOCAL WEAKNESS: 0
EYES NEGATIVE: 1
NERVOUS/ANXIOUS: 0
PALPITATIONS: 0
DEPRESSION: 0
WHEEZING: 0
ABDOMINAL PAIN: 0
PND: 0
CLAUDICATION: 0
WEAKNESS: 0
BLOOD IN STOOL: 0
SHORTNESS OF BREATH: 1
VOMITING: 0
HEMOPTYSIS: 0
BRUISES/BLEEDS EASILY: 0
MEMORY LOSS: 0
SPEECH CHANGE: 0
DIARRHEA: 0
CONSTIPATION: 0
ORTHOPNEA: 0
NAUSEA: 0
HEADACHES: 0
COUGH: 0
INSOMNIA: 0
DIZZINESS: 0
CHILLS: 0
SPUTUM PRODUCTION: 0
HALLUCINATIONS: 0
WEIGHT LOSS: 0
DIAPHORESIS: 0
FALLS: 0
FEVER: 0

## 2018-05-23 ASSESSMENT — PAIN SCALES - GENERAL
PAINLEVEL_OUTOF10: 5
PAINLEVEL_OUTOF10: 0
PAINLEVEL_OUTOF10: 5
PAINLEVEL_OUTOF10: 5
PAINLEVEL_OUTOF10: 0

## 2018-05-23 ASSESSMENT — COGNITIVE AND FUNCTIONAL STATUS - GENERAL
HELP NEEDED FOR BATHING: A LITTLE
SUGGESTED CMS G CODE MODIFIER DAILY ACTIVITY: CI
DAILY ACTIVITIY SCORE: 23

## 2018-05-23 ASSESSMENT — ACTIVITIES OF DAILY LIVING (ADL): TOILETING: INDEPENDENT

## 2018-05-23 ASSESSMENT — CHA2DS2 SCORE
AGE 75 OR GREATER: YES
PRIOR STROKE OR TIA OR THROMBOEMBOLISM: NO
DIABETES: NO
SEX: FEMALE
CHF OR LEFT VENTRICULAR DYSFUNCTION: YES
VASCULAR DISEASE: NO
HYPERTENSION: YES
AGE 65 TO 74: NO
CHA2DS2 VASC SCORE: 5

## 2018-05-23 ASSESSMENT — LIFESTYLE VARIABLES: SUBSTANCE_ABUSE: 0

## 2018-05-23 NOTE — CARE PLAN
Problem: Safety  Goal: Will remain free from injury  Outcome: PROGRESSING AS EXPECTED  Pt calls for assistance appropriately, standby assist with cane to BR, steady gait noted    Problem: Respiratory:  Goal: Respiratory status will improve  Outcome: PROGRESSING AS EXPECTED  Pt tolerating RA at rest, ambulated with OT in hallway but required 1LNCO2, tolerated Thoracentesis well, no resp distress, cont to monitor, continuous pulse ox in place    Problem: Fluid Volume:  Goal: Will maintain balanced intake and output  Outcome: PROGRESSING AS EXPECTED  Pt had 650ml serosang drainage pulled off thoracentesis, IV Lasix as ordered, voiding freq, VSS, cont to monitor

## 2018-05-23 NOTE — CARE PLAN
Problem: Venous Thromboembolism (VTW)/Deep Vein Thrombosis (DVT) Prevention:  Goal: Patient will participate in Venous Thrombosis (VTE)/Deep Vein Thrombosis (DVT)Prevention Measures  Outcome: PROGRESSING AS EXPECTED   05/22/18 1925   Mechanical/VTE Prophylaxis   Mechanical Prophylaxis  MILAN Hose (Graduated Compression Stockings)   MILAN Hose (Graduated Compression Stockings) On   SCDs, Sequential Compression Device Off  (ambulating to bathroom)   OTHER   Risk Assessment Score 1   VTE RISK Moderate   Pharmacologic Prophylaxis Used LMWH: Enoxaparin(Lovenox)

## 2018-05-23 NOTE — DISCHARGE PLANNING
Anticipated Discharge Disposition: Home with HH.     Action: LSW met with pt at bedside to discuss discharge plan. LSW discussed HH with pt and pt's granddaughter, Palma. Pt signed choice form for [facility]. LSW explained Medicare rights to pt and pt's daughter signed IMM.     LSW faxed HH choice form to [facility].     Barriers to Discharge: None    Plan: Awaiting HH acceptance.

## 2018-05-23 NOTE — PROGRESS NOTES
650 ml was removed from right chest by thoracentesis without problems.  Chest x ray was ordered and fluid was taken to lab for testing.

## 2018-05-23 NOTE — FACE TO FACE
Face to Face Supporting Documentation - Home Health    The encounter with this patient was in whole or in part the primary reason for home health admission.    Date of encounter:   Patient:                    MRN:                       YOB: 2018  Andree Egan  [id number]  4/21/1932     Home health to see patient for:  Skilled Nursing care for assessment, interventions & education, Registered dietitian consult, Medical social work consult, Home health aide, Physical Therapy evaluation and treatment and Occupational therapy evaluation and treatment    Skilled need for:  Exacerbation of Chronic Disease State Heart Failure    Skilled nursing interventions to include:  Comment: None    Homebound status evidenced by:  Need the aid of supportive devices such as crutches, canes, wheelchairs or walkers. Leaving home requires a considerable and taxing effort. There is a normal inability to leave the home.    Community Physician to provide follow up care: ELIZABETH Patel     Optional Interventions? No      I certify the face to face encounter for this home health care referral meets the CMS requirements and the encounter/clinical assessment with the patient was, in whole, or in part, for the medical condition(s) listed above, which is the primary reason for home health care. Based on my clinical findings: the service(s) are medically necessary, support the need for home health care, and the homebound criteria are met.  I certify that this patient has had a face to face encounter by myself.  Sherita Gill D.O. - NPI: [id number]

## 2018-05-23 NOTE — THERAPY
"Occupational Therapy Evaluation completed.   Functional Status:  spv w/bed mobility, spv w/sit>stand walking in room w/spc and SBA, spv w/toilet txf, hygiene and standing at sink. Walked in room and hallway w/SPC did have c/o SOB after ~25feet, VSS. Attempted to monitor O2 sats however sensor was not reading RN aware. Pt remained up EOB w/family present and RN aware of session and pts efforts   Plan of Care: Patient with no further skilled OT needs in the acute care setting at this time  Discharge Recommendations:  Equipment: Shower Chair. Post-acute therapy Discharge to home with outpatient or home health for additional skilled therapy services.    See \"Rehab Therapy-Acute\" Patient Summary Report for complete documentation.      86 yr old female admitted for SOB and leg swelling, pt has a hx of CHF, acrotic stenosis, Afib, PPM, HTN, and CKD. Currently dc w/ acute on chronic CHF, acute respiratory failure w/hypoxia currently on 1L and pleural effusion. Pt is awaiting a thoracentesis, she does express generalized fatigue and deconditioning, however was able to demonstrate ability to complete basic ADL's. To note pt reports she recently lost her spouse and had been his primary caregiver, she states she is now trying to take better care of herself. Currently recommend continued daily OOB activity as well as HH to address home safety and modifications to bathing/shower task and environment. Pt also has good family support at d/c   "

## 2018-05-23 NOTE — PROGRESS NOTES
Report received from BRONWYN Hill. Assumed care of patient at 1845. Telemetry functioning properly. Fall precautions in place. Will continue to monitor.

## 2018-05-23 NOTE — THERAPY
PT eval order received. Pt currently receiving thoracentesis, will defer eval until appropriate. Based on patient's good mobility with OT and nursing combined with strong family support anticipate no barriers to home with home health.

## 2018-05-23 NOTE — OR SURGEON
HISTORY/REASON FOR EXAM:  Right pleural effusion         TECHNIQUE/EXAM DESCRIPTION:   Ultrasound-guided thoracentesis.    Indication:  RIGHT pleural fluid collection.    COMPARISON:  Chest CT May 20, 2018    PROCEDURE:     Informed consent was obtained. A timeout was taken. A right pleural effusion was localized with real-time ultrasound guidance. The right posterior chest wall was prepped and draped in a sterile manner. Following local anesthesia with 1% lidocaine, a 5 [employer] pigtail catheter was advanced into the pleural space with trocar technique and pleural fluid was drained. The patient tolerated the procedure well without evidence of complication. A post thoracentesis chest radiograph is forthcoming.    FINDINGS:  Ultrasound images demonstrate a moderate to large right pleural effusion.    Fluid was sent to the laboratory.    Fluid character: bloody    IMPRESSION:  1. Ultrasound guided right sided therapeutic and diagnostic thoracentesis.    2. 650 mL of fluid withdrawn.

## 2018-05-23 NOTE — PROGRESS NOTES
Assumed care of pt, call light w/in reach, and fall prec in place.  Bed locked in lowest position, instructed to call for assistance, pt placed on RA-was tolerating 1LNCO2 at 98%, will monitor.

## 2018-05-23 NOTE — PROGRESS NOTES
Cardiology Progress Note               Author: Razia Schwarz DNP Date & Time created: 5/23/2018  9:12 AM     Interval History:  86 years old female who presented with worsening lower extremity edema and shortness of breath.  History of permanent pacemaker for bradycardia, congestive heart failure with preserved LV function, chronic atrial fibrillation, hypothyroidism, and aortic stenosis.    Consultation:  Dyspnea     5/21/18: got thoracentesis today, very anxious and having hemoptysis   5/22/18: thoracentesis today, breathing still remains short with exertion  5/23/18: thoracentesis today hopefully, breathing slightly more improved with increase in diuretic    Review of Systems   Constitutional: Positive for malaise/fatigue.   Respiratory: Positive for shortness of breath. Negative for cough, hemoptysis, sputum production and wheezing.    Cardiovascular: Negative for chest pain, palpitations, orthopnea, claudication, leg swelling and PND.   Neurological: Negative for dizziness.       Physical Exam   Constitutional: She is oriented to person, place, and time. She appears well-developed and well-nourished. No distress.   HENT:   Head: Normocephalic.   Neck: Normal range of motion. No JVD present.   Cardiovascular: Normal rate, regular rhythm, normal heart sounds and intact distal pulses.    No murmur heard.  Pacemaker present in left upper chest without evidence   of erosion, drainage,or erythema.     Pulmonary/Chest: Effort normal. No respiratory distress. She has decreased breath sounds in the right upper field, the right lower field and the left lower field. She has no wheezes.   Abdominal: She exhibits no distension. There is no tenderness.   Musculoskeletal: She exhibits no edema or tenderness.   Neurological: She is alert and oriented to person, place, and time.   Skin: Skin is warm and dry. No rash noted. She is not diaphoretic.   Psychiatric: Her behavior is normal. Judgment normal.   Nursing note and  vitals reviewed.      Hemodynamics:  Temp (24hrs), Av.8 °C (98.2 °F), Min:36.2 °C (97.2 °F), Max:37.2 °C (99 °F)  Temperature: 36.6 °C (97.9 °F)  Pulse  Av.9  Min: 59  Max: 84   Blood Pressure : 115/55     Respiratory:    Respiration: 16, Pulse Oximetry: 100 %     Work Of Breathing / Effort: Mild  RUL Breath Sounds: Clear, RML Breath Sounds: Clear, RLL Breath Sounds: Diminished;Clear, ASHTYN Breath Sounds: Clear, LLL Breath Sounds: Diminished;Clear  Fluids:  Date 18 0700 - 18 0659   Shift 8426-0237 4968-1261 5721-5383 24 Hour Total   I  N  T  A  K  E   P.O. 270   270    Shift Total 270   270   O  U  T  P  U  T   Shift Total       Weight (kg) 64.7 64.7 64.7 64.7       Weight: 62.1 kg (136 lb 14.5 oz)  GI/Nutrition:  Orders Placed This Encounter   Procedures   • Diet Order     Standing Status:   Standing     Number of Occurrences:   1     Order Specific Question:   Diet:     Answer:   2 Gram Sodium [7]     Order Specific Question:   Diet:     Answer:   Cardiac [6]     Order Specific Question:   Consistency/Fluid modifications:     Answer:   1500 ml Fluid Restriction [9]     Lab Results:  Recent Labs      18   1508  18   03318   0552   WBC  3.7*  2.6*  3.0*   RBC  3.61*  3.35*  3.29*   HEMOGLOBIN  10.5*  9.9*  9.7*   HEMATOCRIT  33.7*  32.0*  31.9*   MCV  93.4  95.5  97.0   MCH  29.1  29.6  29.5   MCHC  31.2*  30.9*  30.4*   RDW  53.5*  55.6*  57.0*   PLATELETCT  121*  136*  136*   MPV  10.9  11.0  10.8     Recent Labs      18   1508  18   03318   0552   SODIUM  140  140  143   POTASSIUM  3.9  4.2  4.4   CHLORIDE  107  109  110   CO2  25  27  24   GLUCOSE  107*  91  96   BUN  34*  32*  28*   CREATININE  1.67*  1.39  1.08   CALCIUM  8.4*  8.8  8.8     Recent Labs      18   0336  18   0552   INR  1.69*  1.25*     Recent Labs      18   1508  18   0336   BNPBTYPENAT  677*  809*     Recent Labs      18   1508  18   0336    BNPBTYPENAT  677*  809*             Medical Decision Making, by Problem:  Active Hospital Problems    Diagnosis   • Acute on chronic diastolic (congestive) heart failure (HCC) [I50.33]   • Pleural effusion [J90]   • CKD (chronic kidney disease) stage 3, GFR 30-59 ml/min [N18.3]   • GERD (gastroesophageal reflux disease) [K21.9]   • Cardiac pacemaker in situ [Z95.0]   • Chronic atrial fibrillation (HCC) [I48.2]   • Warfarin anticoagulation [Z79.01]   • Hypothyroidism [E03.9]   • Essential hypertension [I10]     Plan:  1. Dypnea in the setting of pulmonary HTN/pleural effusions  -failed thoracentesis of R side previously due to complications, yesterday she was not added to schedule, repeat today  -cont diuresis with lasix 40 IV BID  -cont metolazone 5 mg QD  -wean oxygen as tolerated    2. Acute on chronic diastolic congestive heart failure  - ECHO; severe bi-atrial enlargement with ef 55%  - I/O 24 hours: -380 ml, consider switch to torsemide- would like to increase output post thoracentesis if able  -cont bb, diuresis with lasix/metolazone    3. Chronic afib with PPM (Medtronic) on coumadin  -coumadin on hold for repeat thoracentesis today  -rate controlled and asymptomatic, occ palps  -restart coumadin post thoracentesis this evening  -cont metoprolol XL for rate control    We will follow alongside you in the care of this patient.    Quality-Core Measures   Reviewed items::  EKG reviewed, Radiology images reviewed, Labs reviewed and Medications reviewed  Hunt catheter::  No Hunt  DVT prophylaxis pharmacological::  Contraindicated - High bleeding risk  DVT prophylaxis - mechanical:  Not indicated at this time, ambulatory  Ulcer Prophylaxis::  Not indicated

## 2018-05-24 ENCOUNTER — HOME HEALTH ADMISSION (OUTPATIENT)
Dept: HOME HEALTH SERVICES | Facility: HOME HEALTHCARE | Age: 83
End: 2018-05-24
Payer: MEDICARE

## 2018-05-24 ENCOUNTER — APPOINTMENT (OUTPATIENT)
Dept: RADIOLOGY | Facility: MEDICAL CENTER | Age: 83
DRG: 291 | End: 2018-05-24
Attending: NURSE PRACTITIONER
Payer: MEDICARE

## 2018-05-24 ENCOUNTER — TELEPHONE (OUTPATIENT)
Dept: MEDICAL GROUP | Facility: MEDICAL CENTER | Age: 83
End: 2018-05-24

## 2018-05-24 DIAGNOSIS — N28.9 ABNORMAL KIDNEY FUNCTION: ICD-10-CM

## 2018-05-24 LAB
ALBUMIN SERPL BCP-MCNC: 3.4 G/DL (ref 3.2–4.9)
BACTERIA FLD AEROBE CULT: NORMAL
BUN SERPL-MCNC: 29 MG/DL (ref 8–22)
CALCIUM SERPL-MCNC: 8.8 MG/DL (ref 8.5–10.5)
CHLORIDE SERPL-SCNC: 103 MMOL/L (ref 96–112)
CO2 SERPL-SCNC: 28 MMOL/L (ref 20–33)
CREAT SERPL-MCNC: 1.51 MG/DL (ref 0.5–1.4)
GLUCOSE SERPL-MCNC: 99 MG/DL (ref 65–99)
GRAM STN SPEC: NORMAL
GRAM STN SPEC: NORMAL
INR PPP: 1.17 (ref 0.87–1.13)
PHOSPHATE SERPL-MCNC: 3.5 MG/DL (ref 2.5–4.5)
POTASSIUM SERPL-SCNC: 3.9 MMOL/L (ref 3.6–5.5)
PROTHROMBIN TIME: 14.6 SEC (ref 12–14.6)
RHODAMINE-AURAMINE STN SPEC: NORMAL
SIGNIFICANT IND 70042: NORMAL
SITE SITE: NORMAL
SODIUM SERPL-SCNC: 141 MMOL/L (ref 135–145)
SOURCE SOURCE: NORMAL

## 2018-05-24 PROCEDURE — 700102 HCHG RX REV CODE 250 W/ 637 OVERRIDE(OP): Performed by: INTERNAL MEDICINE

## 2018-05-24 PROCEDURE — 36415 COLL VENOUS BLD VENIPUNCTURE: CPT

## 2018-05-24 PROCEDURE — G8978 MOBILITY CURRENT STATUS: HCPCS | Mod: CI

## 2018-05-24 PROCEDURE — 700102 HCHG RX REV CODE 250 W/ 637 OVERRIDE(OP): Performed by: HOSPITALIST

## 2018-05-24 PROCEDURE — 700111 HCHG RX REV CODE 636 W/ 250 OVERRIDE (IP): Performed by: INTERNAL MEDICINE

## 2018-05-24 PROCEDURE — G8980 MOBILITY D/C STATUS: HCPCS | Mod: CI

## 2018-05-24 PROCEDURE — 770020 HCHG ROOM/CARE - TELE (206)

## 2018-05-24 PROCEDURE — 700102 HCHG RX REV CODE 250 W/ 637 OVERRIDE(OP): Performed by: NURSE PRACTITIONER

## 2018-05-24 PROCEDURE — A9270 NON-COVERED ITEM OR SERVICE: HCPCS | Performed by: HOSPITALIST

## 2018-05-24 PROCEDURE — A9270 NON-COVERED ITEM OR SERVICE: HCPCS | Performed by: INTERNAL MEDICINE

## 2018-05-24 PROCEDURE — 80069 RENAL FUNCTION PANEL: CPT

## 2018-05-24 PROCEDURE — 99232 SBSQ HOSP IP/OBS MODERATE 35: CPT | Performed by: INTERNAL MEDICINE

## 2018-05-24 PROCEDURE — 85610 PROTHROMBIN TIME: CPT

## 2018-05-24 PROCEDURE — 97162 PT EVAL MOD COMPLEX 30 MIN: CPT

## 2018-05-24 PROCEDURE — G8979 MOBILITY GOAL STATUS: HCPCS | Mod: CI

## 2018-05-24 PROCEDURE — A9270 NON-COVERED ITEM OR SERVICE: HCPCS | Performed by: NURSE PRACTITIONER

## 2018-05-24 RX ORDER — WARFARIN SODIUM 7.5 MG/1
3.75 TABLET ORAL
Status: DISCONTINUED | OUTPATIENT
Start: 2018-05-25 | End: 2018-05-25

## 2018-05-24 RX ORDER — ALUMINA, MAGNESIA, AND SIMETHICONE 2400; 2400; 240 MG/30ML; MG/30ML; MG/30ML
10 SUSPENSION ORAL 4 TIMES DAILY PRN
Qty: 560 ML | COMMUNITY
Start: 2018-05-24 | End: 2018-06-01

## 2018-05-24 RX ORDER — FUROSEMIDE 20 MG/1
20 TABLET ORAL DAILY
Qty: 30 TAB | Refills: 2 | Status: SHIPPED | OUTPATIENT
Start: 2018-05-24 | End: 2018-06-13 | Stop reason: SDUPTHER

## 2018-05-24 RX ORDER — FUROSEMIDE 20 MG/1
20 TABLET ORAL
Status: DISCONTINUED | OUTPATIENT
Start: 2018-05-24 | End: 2018-05-25 | Stop reason: HOSPADM

## 2018-05-24 RX ORDER — METOLAZONE 5 MG/1
2.5 TABLET ORAL DAILY
Qty: 30 TAB | Refills: 2 | Status: ON HOLD | OUTPATIENT
Start: 2018-05-25 | End: 2018-07-05

## 2018-05-24 RX ORDER — SPIRONOLACTONE 25 MG/1
25 TABLET ORAL
Status: DISCONTINUED | OUTPATIENT
Start: 2018-05-24 | End: 2018-05-25 | Stop reason: HOSPADM

## 2018-05-24 RX ORDER — SPIRONOLACTONE 25 MG/1
25 TABLET ORAL DAILY
Qty: 30 TAB | Refills: 3 | Status: ON HOLD | OUTPATIENT
Start: 2018-05-25 | End: 2018-07-05

## 2018-05-24 RX ORDER — WARFARIN SODIUM 5 MG/1
5 TABLET ORAL
Status: DISCONTINUED | OUTPATIENT
Start: 2018-05-24 | End: 2018-05-25

## 2018-05-24 RX ORDER — FUROSEMIDE 40 MG/1
40 TABLET ORAL
Status: DISCONTINUED | OUTPATIENT
Start: 2018-05-24 | End: 2018-05-24

## 2018-05-24 RX ORDER — POTASSIUM CHLORIDE 20 MEQ/1
10 TABLET, EXTENDED RELEASE ORAL DAILY
Status: DISCONTINUED | OUTPATIENT
Start: 2018-05-25 | End: 2018-05-25 | Stop reason: HOSPADM

## 2018-05-24 RX ADMIN — METOPROLOL SUCCINATE 25 MG: 25 TABLET, EXTENDED RELEASE ORAL at 05:42

## 2018-05-24 RX ADMIN — TOPIRAMATE 25 MG: 25 TABLET, FILM COATED ORAL at 16:51

## 2018-05-24 RX ADMIN — ENOXAPARIN SODIUM 60 MG: 100 INJECTION SUBCUTANEOUS at 05:42

## 2018-05-24 RX ADMIN — METOLAZONE 5 MG: 5 TABLET ORAL at 05:43

## 2018-05-24 RX ADMIN — GABAPENTIN 300 MG: 300 CAPSULE ORAL at 16:51

## 2018-05-24 RX ADMIN — WARFARIN SODIUM 5 MG: 5 TABLET ORAL at 16:51

## 2018-05-24 RX ADMIN — FUROSEMIDE 20 MG: 20 TABLET ORAL at 06:26

## 2018-05-24 RX ADMIN — ENOXAPARIN SODIUM 60 MG: 100 INJECTION SUBCUTANEOUS at 16:57

## 2018-05-24 RX ADMIN — FUROSEMIDE 20 MG: 20 TABLET ORAL at 16:51

## 2018-05-24 RX ADMIN — TOPIRAMATE 25 MG: 25 TABLET, FILM COATED ORAL at 05:45

## 2018-05-24 RX ADMIN — POTASSIUM CHLORIDE 20 MEQ: 1500 TABLET, EXTENDED RELEASE ORAL at 05:45

## 2018-05-24 RX ADMIN — OMEPRAZOLE 20 MG: 20 CAPSULE, DELAYED RELEASE ORAL at 05:44

## 2018-05-24 RX ADMIN — SERTRALINE 50 MG: 50 TABLET, FILM COATED ORAL at 16:51

## 2018-05-24 RX ADMIN — SPIRONOLACTONE 25 MG: 25 TABLET, FILM COATED ORAL at 11:30

## 2018-05-24 RX ADMIN — LEVOTHYROXINE SODIUM 50 MCG: 50 TABLET ORAL at 05:44

## 2018-05-24 ASSESSMENT — GAIT ASSESSMENTS
GAIT LEVEL OF ASSIST: SUPERVISED
DISTANCE (FEET): 125
ASSISTIVE DEVICE: SINGLE POINT CANE
DEVIATION: ANTALGIC

## 2018-05-24 ASSESSMENT — ENCOUNTER SYMPTOMS
COUGH: 0
DIZZINESS: 0
CLAUDICATION: 0
PALPITATIONS: 0
PND: 0
HEMOPTYSIS: 0
SPUTUM PRODUCTION: 0
NAUSEA: 0
SHORTNESS OF BREATH: 0
ORTHOPNEA: 0
WEAKNESS: 0
VOMITING: 0
WHEEZING: 0

## 2018-05-24 ASSESSMENT — PAIN SCALES - GENERAL
PAINLEVEL_OUTOF10: 0

## 2018-05-24 ASSESSMENT — COGNITIVE AND FUNCTIONAL STATUS - GENERAL
WALKING IN HOSPITAL ROOM: A LITTLE
SUGGESTED CMS G CODE MODIFIER MOBILITY: CJ
MOBILITY SCORE: 22
CLIMB 3 TO 5 STEPS WITH RAILING: A LITTLE

## 2018-05-24 NOTE — PROGRESS NOTES
[facility] Hospitalist Progress Note    Date of Service: 2018    Chief Complaint  86 y.o. female admitted 2018 with chronic diastolic CHF, AS, chronic A-Fib, pacemaker placement on coumadin, who presented 2018 with progressive SOB and dyspnea on exertion (BERTRAND) and BLE edema for the past 2 weeks.      Interval Problem Update  : Still w edema, CTA negative for PE.  Echo w severe PAH  : R pleural effusion, attempted thoracentesis, 80mL drained, then pt got hemoptysis.  Coumadin held  : Coumadin held, pt would like repeat thora.  Tolerating lasix/metolazone.  Weaned to RA at rest, 1L w ambulation.  INR 1.25  : Thora 650mL removed, coumadin resumed.  Weaned to .5L O2    Consultants/Specialty  Cardiology     Disposition  Wean O2, ambulate  Poss DC in AM        Review of Systems   Constitutional: Positive for malaise/fatigue. Negative for chills, diaphoresis, fever and weight loss.   HENT: Negative.    Eyes: Negative.    Respiratory: Positive for shortness of breath. Negative for cough, hemoptysis, sputum production and wheezing.    Cardiovascular: Positive for leg swelling (Resolved). Negative for chest pain and palpitations.   Gastrointestinal: Negative for abdominal pain, blood in stool, constipation, diarrhea, melena, nausea and vomiting.   Genitourinary: Negative.    Musculoskeletal: Negative for falls.   Skin: Negative for itching and rash.   Neurological: Negative for dizziness, sensory change, speech change, focal weakness, weakness and headaches.   Endo/Heme/Allergies: Does not bruise/bleed easily.   Psychiatric/Behavioral: Negative for depression, hallucinations, memory loss, substance abuse and suicidal ideas. The patient is not nervous/anxious and does not have insomnia.       Physical Exam  Laboratory/Imaging   Hemodynamics  Temp (24hrs), Av.7 °C (98 °F), Min:36.2 °C (97.2 °F), Max:37.2 °C (99 °F)   Temperature: 36.7 °C (98.1 °F)  Pulse  Av.8  Min: 59  Max: 84    Blood  Pressure : 126/71      Respiratory      Respiration: 16, Pulse Oximetry: 98 %     Work Of Breathing / Effort: Mild  RUL Breath Sounds: Diminished, RML Breath Sounds: Diminished, RLL Breath Sounds: Diminished, ASHTYN Breath Sounds: Diminished, LLL Breath Sounds: Diminished    Fluids    Intake/Output Summary (Last 24 hours) at 05/23/18 1715  Last data filed at 05/23/18 1555   Gross per 24 hour   Intake              610 ml   Output              875 ml   Net             -265 ml       Nutrition  Orders Placed This Encounter   Procedures   • Diet Order     Standing Status:   Standing     Number of Occurrences:   1     Order Specific Question:   Diet:     Answer:   2 Gram Sodium [7]     Order Specific Question:   Diet:     Answer:   Cardiac [6]     Order Specific Question:   Consistency/Fluid modifications:     Answer:   1500 ml Fluid Restriction [9]     Physical Exam   Constitutional: She is oriented to person, place, and time. Vital signs are normal. No distress.   HENT:   Head: Normocephalic.   Eyes: Pupils are equal, round, and reactive to light.   Neck: Trachea normal and normal range of motion. Neck supple. No JVD present. Carotid bruit is not present. No thyroid mass and no thyromegaly present.   Cardiovascular: Normal rate, regular rhythm and intact distal pulses.    Murmur heard.  Pulmonary/Chest: Effort normal. No accessory muscle usage. No respiratory distress. She has decreased breath sounds (Right side). She has no rales.   Pacemaker   Abdominal: Soft. Normal appearance and bowel sounds are normal.   Musculoskeletal: Normal range of motion.   Neurological: She is alert and oriented to person, place, and time. She has normal strength.   Skin: Skin is warm, dry and intact.   Psychiatric: She has a normal mood and affect. Her speech is normal and behavior is normal.       Recent Labs      05/21/18   0336  05/22/18   0552  05/23/18   0854   WBC  2.6*  3.0*  3.3*   RBC  3.35*  3.29*  3.28*   HEMOGLOBIN  9.9*  9.7*   9.7*   HEMATOCRIT  32.0*  31.9*  31.9*   MCV  95.5  97.0  97.3   MCH  29.6  29.5  29.6   MCHC  30.9*  30.4*  30.4*   RDW  55.6*  57.0*  57.1*   PLATELETCT  136*  136*  133*   MPV  11.0  10.8  10.7     Recent Labs      05/21/18   0336  05/22/18   0552  05/23/18   0854   SODIUM  140  143  140   POTASSIUM  4.2  4.4  4.1   CHLORIDE  109  110  108   CO2  27  24  27   GLUCOSE  91  96  100*   BUN  32*  28*  29*   CREATININE  1.39  1.08  1.26   CALCIUM  8.8  8.8  9.0     Recent Labs      05/21/18   0336  05/22/18   0552  05/23/18   0854   INR  1.69*  1.25*  1.16*     Recent Labs      05/21/18   0336   BNPBTYPENAT  809*              Assessment/Plan     * Acute on chronic diastolic (congestive) heart failure (HCC)- (present on admission)   Assessment & Plan    - Abnormal Echo - Severe bi-atrial enlargement with EF of 55% further recommendations per cardiology   - Lasix 20 PO BID  - Metolazone 5 daily  - Continue to monitor I/O  - Increase activity  - Keep K+ > 4.0 and Mg > 2.0   - KCL 20mEq po BID ordered   - Fluid and salt restriction  - Metop Xl 25        Acute respiratory failure with hypoxia (HCC)   Assessment & Plan    Due to pulmonary edema and R effusion.  CTA negative.  No e/o RAD  -Lasix/ metolazone  -Wean o2  -Ambulate  -Repeat thora 5/23 w 650ml removed        Normocytic anemia   Assessment & Plan    Mild, stable  -Iron studies c/w mild deficiency        Moderate to severe pulmonary hypertension (HCC)   Assessment & Plan    RVSP 70        Pleural effusion   Assessment & Plan    Large lesion on the right, suspect transudative from CHF.  Attempt for R thora 5/21 w 80 ml removed (pt developed hemoptysis so procedure aborted)  -Repeat CXR in AM  -Compressive atelectasis  -No pneumothorax on follow-up chest x-ray  -Repeat US guided thora done 5/23 w 650mL removed        CKD (chronic kidney disease) stage 3, GFR 30-59 ml/min- (present on admission)   Assessment & Plan    - Creatine improved, slowing rising w lasix  -  Change lasix to PO  - BMP in am  - Continue to avoid nephrotoxic agents if possible            Warfarin anticoagulation- (present on admission)   Assessment & Plan    Was held for thora  -Resume coumadin  -Bridge w lovenox while in house  -INR in AM        Hypothyroidism- (present on admission)   Assessment & Plan    - Continue Synthroid         GERD (gastroesophageal reflux disease)- (present on admission)   Assessment & Plan    - Continue PPI        Essential hypertension- (present on admission)   Assessment & Plan    - Well controlled will continue Toprol XL 25 mg daily        COPD (chronic obstructive pulmonary disease) (HCC)- (present on admission)   Assessment & Plan    No e/o exacerbation        Cardiac pacemaker in situ- (present on admission)   Assessment & Plan    - Permanent pacemaker for bradycardia - ventricularly paced on a Medtronic pacemaker, single   chamber and has chronic atrial fibrillation        Chronic atrial fibrillation (HCC)- (present on admission)   Assessment & Plan    - Ventricular paced rhythm - Rate controlled  - Continue Toprol 25 mg daily  - INR 2.8 theraputic- Continue Coumadin, goal INR 2-3          Quality-Core Measures   Reviewed items::  EKG reviewed, Labs reviewed, Medications reviewed and Radiology images reviewed  Hunt catheter::  No Hunt  DVT prophylaxis pharmacological::  Enoxaparin (Lovenox)  DVT prophylaxis - mechanical:  SCDs  Coumadin/Lovenox

## 2018-05-24 NOTE — PROGRESS NOTES
Report received from BRONWYN Mckeon. Assumed care of patient at 1845. Telemetry functioning properly. Fall precautions in place. Will continue to monitor.

## 2018-05-24 NOTE — DISCHARGE PLANNING
Received Choice form at 7041  Agency/Facility Name:   Referral sent to [facility] per Choice form @ 2354.

## 2018-05-24 NOTE — FACE TO FACE
Face to Face Note  -  Durable Medical Equipment    Sherita Gill D.O. - NPI: [id number]  I certify that this patient is under my care and that they had a durable medical equipment(DME)face to face encounter by myself that meets the physician DME face-to-face encounter requirements with this patient on:    Date of encounter:   Patient:                    MRN:                       YOB: 2018  Andree Egan  [id number]  4/21/1932     The encounter with the patient was in whole, or in part, for the following medical condition, which is the primary reason for durable medical equipment:  COPD    I certify that, based on my findings, the following durable medical equipment is medically necessary:  Oxygen.    HOME O2 Saturation Measurements:(Values must be present for Home Oxygen orders)  Room air sat at rest: 87  Room air sat with amb: 80  With liters of O2: 96, O2 sat at rest with O2: 1  With Liters of O2: 2, O2 sat with amb with O2 : 93  Is the patient mobile?: Yes    My Clinical findings support the need for the above equipment due to:  Hypoxia    Supporting Symptoms: SOB with rest and exercise/ambulation

## 2018-05-24 NOTE — DISCHARGE PLANNING
ATTN: Case Management  RE: Referral for Home Health                We would like to take this opportunity to thank you for submitting a referral for your patient to continue care with [facility]. Our skilled team is dedicated to helping all patients recover and gain independence in the home setting.            As of 5/24/18  we have accepted the above patient into our service. A [facility] clinician will be out to see the patient within 48 hours to conduct our initial visit. If you have any questions or concerns regarding the patient’s transition to Home Health, please do not hesitate to contact us. We are open for referrals 7 days a week from 8AM to 5PM at [phone].      We look forward to collaborating with you,  [facility] Team

## 2018-05-24 NOTE — DISCHARGE PLANNING
Received Choice form at 0738  Agency/Facility Name:   Referral sent to [facility] per Choice form @ 0764.

## 2018-05-24 NOTE — HEART FAILURE PROGRAM
Cardiovascular Nurse Navigator () Advanced Heart Failure Program Inpatient Progress Note:    Cardiology signed off today. Patient has been accepted by [facility].   May 30, 2018  8:15 AM PDT  Heart Failure New Patient with Trinity Sharp M.D.  [facility] (--) [address], [address]  [place] [id number]  [phone]           Thank you and please call with questions, Veronique

## 2018-05-24 NOTE — PROGRESS NOTES
Cardiology Progress Note               Author: Didi Pleitez Date & Time created: 5/24/2018  7:51 AM     Interval History:  86 years old female who presented with worsening lower extremity edema and shortness of breath.  History of permanent pacemaker for bradycardia, congestive heart failure with preserved LV function, chronic atrial fibrillation, hypothyroidism, and aortic stenosis.    Chief Complaint:  Dyspnea     5/24: feeling much better, got up and ambulated to the bathroom without oxygen and tolerated well   5/23: thoracentesis with 650ml fluid on the right side   5/21: Failed thoracentesis for pleural effusion. Continued dyspnea, now hemoptysis post procedure.    Echocardiography Laboratory  5/19/18  Severe bi-atrial enlargement. Normal LV and RV systolic function. LVEF   is 55% by visual estimation. Diastolic function difficult to assess.   There is at least mild aortic stenosis but valve is opening. Mean   gradient is 10 mmHg, with V max of 2.01 m/s. RVSP is elevated at 70   mmHg suggesting pulmonary hypertension. No prior study is available for   comparison.     Review of Systems   Constitutional: Negative for malaise/fatigue.   Respiratory: Negative for cough, hemoptysis, sputum production, shortness of breath and wheezing.    Cardiovascular: Negative for chest pain, palpitations, orthopnea, claudication, leg swelling and PND.   Gastrointestinal: Negative for nausea and vomiting.   Neurological: Negative for dizziness and weakness.       Physical Exam   Constitutional: She is oriented to person, place, and time. She appears well-developed and well-nourished. No distress.   HENT:   Head: Normocephalic.   Neck: Normal range of motion. No JVD present.   Cardiovascular: Normal rate, regular rhythm, normal heart sounds and intact distal pulses.    No murmur heard.  Pulmonary/Chest: Effort normal and breath sounds normal. No respiratory distress. She has no wheezes.   Abdominal: She exhibits no distension.  There is no tenderness.   Musculoskeletal: She exhibits no edema or tenderness.   Neurological: She is alert and oriented to person, place, and time.   Skin: Skin is warm and dry. No rash noted. She is not diaphoretic.   Psychiatric: Her behavior is normal. Judgment normal.   Nursing note and vitals reviewed.      Hemodynamics:  Temp (24hrs), Av.6 °C (97.8 °F), Min:36.4 °C (97.5 °F), Max:36.7 °C (98.1 °F)  Temperature: 36.4 °C (97.6 °F)  Pulse  Av.6  Min: 59  Max: 84   Blood Pressure : 111/62     Respiratory:    Respiration: 12, Pulse Oximetry: 90 %     Work Of Breathing / Effort: Mild  RUL Breath Sounds: Clear, RML Breath Sounds: Diminished;Fine Crackles, RLL Breath Sounds: Diminished;Fine Crackles, ASHTYN Breath Sounds: Clear, LLL Breath Sounds: Diminished;Clear  Fluids:     Weight: 60.5 kg (133 lb 6.1 oz)  GI/Nutrition:  Orders Placed This Encounter   Procedures   • Diet Order     Standing Status:   Standing     Number of Occurrences:   1     Order Specific Question:   Diet:     Answer:   2 Gram Sodium [7]     Order Specific Question:   Diet:     Answer:   Cardiac [6]     Order Specific Question:   Consistency/Fluid modifications:     Answer:   1500 ml Fluid Restriction [9]     Lab Results:  Recent Labs      18   0552  18   0854   WBC  3.0*  3.3*   RBC  3.29*  3.28*   HEMOGLOBIN  9.7*  9.7*   HEMATOCRIT  31.9*  31.9*   MCV  97.0  97.3   MCH  29.5  29.6   MCHC  30.4*  30.4*   RDW  57.0*  57.1*   PLATELETCT  136*  133*   MPV  10.8  10.7     Recent Labs      18   0552  18   0854   SODIUM  143  140   POTASSIUM  4.4  4.1   CHLORIDE  110  108   CO2  24  27   GLUCOSE  96  100*   BUN  28*  29*   CREATININE  1.08  1.26   CALCIUM  8.8  9.0     Recent Labs      18   0552  18   0854   INR  1.25*  1.16*                     Medical Decision Making, by Problem:  Active Hospital Problems    Diagnosis   • *Acute on chronic diastolic (congestive) heart failure (HCC) [I50.33]   • Moderate  to severe pulmonary hypertension (HCC) [I27.20]   • Normocytic anemia [D64.9]   • Acute respiratory failure with hypoxia (HCC) [J96.01]   • Pleural effusion [J90]   • CKD (chronic kidney disease) stage 3, GFR 30-59 ml/min [N18.3]   • GERD (gastroesophageal reflux disease) [K21.9]   • COPD (chronic obstructive pulmonary disease) (HCC) [J44.9]   • Cardiac pacemaker in situ [Z95.0]   • Chronic atrial fibrillation (HCC) [I48.2]   • Warfarin anticoagulation [Z79.01]   • Hypothyroidism [E03.9]   • Essential hypertension [I10]       Plan:  1. Dypnea in the setting of pulmonary HTN/pleural effusions  -failed thoracentesis of R side previously due to complications 5/21  - repeat thoracentesis 5/23 650ml pulled on the right side   -switch to furosemide 20mg BID qd  -cont metolazone 5 mg QD   - off oxygen      2. Acute on chronic diastolic congestive heart failure  - ECHO; severe bi-atrial enlargement with ef 55%  - I/O 24 hours: -315, weight down 15lbs since admission   -cont bb, diuresis with lasix/metolazone  - fluid restriction 1500     3. Chronic afib with PPM (Medtronic) on coumadin  - resume coumadin   - cont metoprolol XL for rate control    Cardiology will sign off, follow up as outpatient. Please call us with any questions.     Future Appointments  Date Time Provider Department Center   5/30/2018 8:15 AM Trinity Sharp M.D. [facility] None   6/1/2018 9:30 AM [facility] EXAM 5 [facility]MED None   6/1/2018 11:00 AM [facility] EXAM 4 VMED None   6/18/2018 10:20 AM ELIZABETH Patel [facility]   7/5/2018 11:00 AM Sebastian Thomas M.D. [facility] None   7/11/2018 1:30 PM Neela Awad M.D. [facility] [address]   10/31/2018 9:45 AM PULMONARY DX 1 IPUL [address]   10/31/2018 10:00 AM PFT-RM1 [facility]ULM None   10/31/2018 11:00 AM A Rotation PULM None         Quality-Core Measures

## 2018-05-24 NOTE — THERAPY
"Physical Therapy Evaluation completed.   Bed Mobility:  Supine to Sit: Modified Independent  Transfers: Sit to Stand: Supervised  Gait: Level Of Assist: Supervised with Single Point Cane       Plan of Care: Patient with no further skilled PT needs in the acute care setting at this time  Discharge Recommendations: Equipment: No Equipment Needed. Post-acute therapy Discharge to home with outpatient or home health for additional skilled therapy services.    See \"Rehab Therapy-Acute\" Patient Summary Report for complete documentation.   Ms. Egan presents with slightly decreased activity tolerance although this may be her baseline. She has decreased balance with head turns and pivot turns to the left but not to the right; she states this is her baseline. She desaturated to 85% SaO2 with ambualtion without supplemental oxygen and only partially recovered with deep breaths; she required 2L O2 to fully recover SaO2 levels. Since she is largely at baseline, she will currently not be actively followed for physical therapy services at this time. However, she may be seen at the request of the attending provider for an additional visit to address any discharge or equipment needs within 30 days from evaluation. She would benefit from additional physical therapy at home or outpatient to address her painful hip and antalgic gait as well as for higher level balance training.   "

## 2018-05-24 NOTE — TELEPHONE ENCOUNTER
1. Caller Name:Petrona-Patient care intake                    Call Back Number: [phone]  2. Message: Patient is being released from [facility] today, she needs to re-check creatinine level Monday. PLease place orders.     3. Patient approves office to leave a detailed voicemail/MyChart message: yes

## 2018-05-24 NOTE — CARE PLAN
Problem: Bowel/Gastric:  Goal: Will not experience complications related to bowel motility  Outcome: PROGRESSING AS EXPECTED   05/23/18 2000   OTHER   Last BM 05/22/18   Number of Times Stooled 0     Pt denies complications related to bowel motility. Will continue to monitor.

## 2018-05-24 NOTE — DISCHARGE PLANNING
Anticipated Discharge Disposition: Home with HH and Home O2    Action: LSW called [facility] to inquire about status of referral for home O2. [facility] stated that they are currently evaluating referral and will call when they have an update. LSW gave [facility] bedside RN's number.     Barriers to Discharge: None    Plan: Awaiting DME acceptance for O2.

## 2018-05-24 NOTE — PROGRESS NOTES
Inpatient Anticoagulation Service Note    Date: 5/23/2018  Reason for Anticoagulation: Atrial Fibrillation   [id number] VASc Score: 5    Hemoglobin Value: (!) 9.7  Hematocrit Value: (!) 31.9  Lab Platelet Value: (!) 133  Target INR: 2.0 to 3.0    INR from last 7 days     Date/Time INR Value    05/23/18 0854 (!)  1.16    05/22/18 0552 (!)  1.25    05/21/18 0336 (!)  1.69    05/20/18 0357 (!)  2.17    05/19/18 0408 (!)  2.8    05/18/18 1820 (!)  2.81        Dose from last 7 days     Date/Time Dose (mg)    05/23/18 1700  5    05/20/18 1000  3.75        Significant Interactions: Proton Pump Inhibitor, Thyroid Medications (SSRI)  Bridge Therapy: Yes     Assessment: 87 yo female chronically anticoagulated with warfarin for h/o Afib ([id number] VASc 5, moderate risk).   INR subtherapeutic, back to baseline 1.16. Patient has not received warfarin inpatient since 5/19/18. Underwent US guided thoracentesis this afternoon. Bridging back to therapeutic INR with Lovenox 1mg/kg subQ every 12 hours. Patient is followed outpatient by [facility] and per records patient is prescribed warfarin 5mg po every Tue, Thurs and warfarin 3.75mg po on all other days (Patient has warfarin 2.5mg tablets at home to achieve this dosing regimen).     Plan:  Warfarin 5mg po x one tonight then likely will resume usual home dosing regimen. INR monitoring daily for now.     Education Material Provided?: No (Chronic tx)    Pharmacist suggested discharge dosing: warfarin 5mg po every Tue, Thurs and warfarin 3.75mg po on all other days      Shae Tucker, PharmD

## 2018-05-24 NOTE — DISCHARGE PLANNING
Anticipated Discharge Disposition: Home with HH and Home O2    Action: LSW called [facility] to inquire about referral for O2. [facility] stated they cancelled the order due to pt having [employer] as the insurance. [facility] does not take [employer]. LSW discussed DME companies with pt and pt agreed for referral to be sent to [facility]. LSW faxed referral to [facility] (F:[phone]).     Barriers to Discharge: None    Plan: Follow-up with [place] re: O2 referral.

## 2018-05-24 NOTE — PROGRESS NOTES
Bedside report received from night shift RN. Assumed care. Pt is A&Ox4. Pt is in bed. Pt denies pain at this time. Pt was updated on the plan of care for the day. All questions answered. Pt has call light within reach and bed is in lowest position. All fall precautions in place. Pt has non-slip socks on, and appropriate signs are on the door. Pt has no other needs at this time.

## 2018-05-24 NOTE — PROGRESS NOTES
Inpatient Anticoagulation Service Note    Date: 5/24/2018  Reason for Anticoagulation: Atrial Fibrillation   [id number] VASc Score: 5    Hemoglobin Value: (!) 9.7  Hematocrit Value: (!) 31.9  Lab Platelet Value: (!) 133  Target INR: 2.0 to 3.0    INR from last 7 days     Date/Time INR Value    05/24/18 0902 (!)  1.17    05/23/18 0854 (!)  1.16    05/22/18 0552 (!)  1.25    05/21/18 0336 (!)  1.69    05/20/18 0357 (!)  2.17    05/19/18 0408 (!)  2.8    05/18/18 1820 (!)  2.81        Dose from last 7 days     Date/Time Dose (mg)    05/24/18 1400  5    05/23/18 1700  5    05/20/18 1000  3.75        Significant Interactions: Proton Pump Inhibitor, Thyroid Medications, Other (Comments) (SSRI)  Bridge Therapy: Yes     HPI: 87 yo female chronically anticoagulated with warfarin for h/o Afib ([id number] VASc 5, moderate risk).   Patient has not received warfarin inpatient since 5/19/18. US guided ultrasound attempted 5/21 w/ 80mL removal, repeated 5/23 w/ 650mL removal. Bridging back to therapeutic INR with Lovenox 1mg/kg subQ every 12 hours. [facility] and per records patient is prescribed warfarin 5mg po every Tue, Thurs and warfarin 3.75mg po on all other days (Patient has warfarin 2.5mg tablets at home to achieve this dosing regimen).     Assessment: INR remains SUbtherapeutic, 1.17 from 1.16, following a dose of warfarin 5mg last night. Warfarin has been held since 5/19/18.     Potential drug-drug interactions identified with acute inpatient medications: None   Potential drug-drug interactions identified with chronic home medications: Omeprazole, Levothyroxine and Sertraline which should be established interactions with warfarin.   Inpatient Diet: Cardiac, 2gm sodium     Plan: Resume usual home dosing regimen. Warfarin 5mg again tonight. INR monitoring daily until re-stabilized within therapeutic range. DC lovenox when INR is therapeutic.     Education Material Provided?: No (Chronic  tx)    Pharmacist suggested discharge dosing: warfarin 5mg po every Tue, Thurs and warfarin 3.75mg po on all other days (Patient has warfarin 2.5mg tablets at home to achieve this dosing regimen).     Vera PatelD

## 2018-05-25 VITALS
HEIGHT: 63 IN | WEIGHT: 135.36 LBS | TEMPERATURE: 97.4 F | OXYGEN SATURATION: 97 % | BODY MASS INDEX: 23.98 KG/M2 | HEART RATE: 66 BPM | SYSTOLIC BLOOD PRESSURE: 108 MMHG | RESPIRATION RATE: 16 BRPM | DIASTOLIC BLOOD PRESSURE: 54 MMHG

## 2018-05-25 LAB
INR PPP: 1.22 (ref 0.87–1.13)
PROTHROMBIN TIME: 15.1 SEC (ref 12–14.6)

## 2018-05-25 PROCEDURE — 700102 HCHG RX REV CODE 250 W/ 637 OVERRIDE(OP): Performed by: INTERNAL MEDICINE

## 2018-05-25 PROCEDURE — 700111 HCHG RX REV CODE 636 W/ 250 OVERRIDE (IP): Performed by: INTERNAL MEDICINE

## 2018-05-25 PROCEDURE — 36415 COLL VENOUS BLD VENIPUNCTURE: CPT

## 2018-05-25 PROCEDURE — 700102 HCHG RX REV CODE 250 W/ 637 OVERRIDE(OP): Performed by: HOSPITALIST

## 2018-05-25 PROCEDURE — 85610 PROTHROMBIN TIME: CPT

## 2018-05-25 PROCEDURE — A9270 NON-COVERED ITEM OR SERVICE: HCPCS | Performed by: HOSPITALIST

## 2018-05-25 PROCEDURE — A9270 NON-COVERED ITEM OR SERVICE: HCPCS | Performed by: INTERNAL MEDICINE

## 2018-05-25 RX ORDER — WARFARIN SODIUM 5 MG/1
5 TABLET ORAL
Status: DISCONTINUED | OUTPATIENT
Start: 2018-05-25 | End: 2018-05-25 | Stop reason: HOSPADM

## 2018-05-25 RX ADMIN — LEVOTHYROXINE SODIUM 50 MCG: 50 TABLET ORAL at 05:33

## 2018-05-25 RX ADMIN — POTASSIUM CHLORIDE 10 MEQ: 1500 TABLET, EXTENDED RELEASE ORAL at 05:34

## 2018-05-25 RX ADMIN — TOPIRAMATE 25 MG: 25 TABLET, FILM COATED ORAL at 05:33

## 2018-05-25 RX ADMIN — METOLAZONE 5 MG: 5 TABLET ORAL at 05:33

## 2018-05-25 RX ADMIN — ENOXAPARIN SODIUM 60 MG: 100 INJECTION SUBCUTANEOUS at 05:34

## 2018-05-25 RX ADMIN — SPIRONOLACTONE 25 MG: 25 TABLET, FILM COATED ORAL at 06:10

## 2018-05-25 RX ADMIN — FUROSEMIDE 20 MG: 20 TABLET ORAL at 06:10

## 2018-05-25 RX ADMIN — METOPROLOL SUCCINATE 25 MG: 25 TABLET, EXTENDED RELEASE ORAL at 05:33

## 2018-05-25 RX ADMIN — OMEPRAZOLE 20 MG: 20 CAPSULE, DELAYED RELEASE ORAL at 05:33

## 2018-05-25 ASSESSMENT — PAIN SCALES - GENERAL
PAINLEVEL_OUTOF10: 0
PAINLEVEL_OUTOF10: 0

## 2018-05-25 NOTE — CARE PLAN
Problem: Knowledge Deficit  Goal: Knowledge of disease process/condition, treatment plan, diagnostic tests, and medications will improve  Outcome: PROGRESSING AS EXPECTED  Pt receptive of care plan and hospital processes. Pt used teach back method to demonstrate understanding regarding pending home oxygen arrival and discharge to follow.

## 2018-05-25 NOTE — DISCHARGE SUMMARY
CHIEF COMPLAINT ON ADMISSION  Chief Complaint   Patient presents with   • Shortness of Breath   • Leg Swelling       CODE STATUS  DNR    HPI & HOSPITAL COURSE  86 y.o. female admitted 5/18/2018 with chronic diastolic CHF, AS, chronic A-Fib, pacemaker placement on coumadin, who presented 5/18/2018 with progressive SOB and dyspnea on exertion (BERTRAND) and BLE edema for 2 weeks prior to admission       5/20: Still w edema, CTA negative for PE.  Echo w severe PAH  5/21: R pleural effusion, attempted thoracentesis, 80mL drained, then pt got hemoptysis.  Coumadin held  5/22: Coumadin held, pt would like repeat thora.  Tolerating lasix/metolazone.  Weaned to RA at rest, 1L w ambulation.  INR 1.25  5/23: Thora 650mL removed, coumadin resumed.  Weaned to .5L O2  5/24: Unable to wean from O2, home O2 arranged.    The patient met 2-midnight criteria for an inpatient stay at the time of discharge.    Therefore, she is discharged in good and stable condition with close outpatient follow-up.    SPECIFIC OUTPATIENT FOLLOW-UP  Primary care  cardiology    DISCHARGE PROBLEM LIST  Principal Problem:    Acute on chronic diastolic (congestive) heart failure (HCC) POA: Yes  Active Problems:    Chronic atrial fibrillation (HCC) POA: Yes      Overview: Overview:       History of AF, Coumadin initiated 03/2015.              Last Assessment & Plan:       Chronic atrial fibrillation for which patient is asymptomatic.  Rate is       well controlled and she remained therapeutically anticoagulated with       warfarin as monitored by the [facility] anticoagulation clinic.    Cardiac pacemaker in situ POA: Yes      Overview: Overview:       Medtronic Sensia [id number].  Single chamber pacemaker.  Implanted 07/25/2011.        Dr Decker.  No landline, no longer using [employer].              Last Assessment & Plan:       Device evaluation shows that device is functioning appropriately.      Estimated remaining longevity is 2.5 - 4 years       Recheck in 3  months via home monitor       Pacemaker dependent for surgical purposes, underlying AF with rates in the       30's     COPD (chronic obstructive pulmonary disease) (HCC) POA: Yes      Overview: Overview:       PFT 2017            Last Assessment & Plan:       Mixed obstructive and restrictive defect per spirometry with severe       reduction in diffusion capacity per PFTs 2/2017.  Continue routine       follow-up with PCP in pulmonology as indicated.    Essential hypertension POA: Yes      Overview: Last Assessment & Plan:       Blood pressure is well controlled with current therapy.    GERD (gastroesophageal reflux disease) POA: Yes      Overview: Overview:       (with diaphragmatic hernia)            Last Assessment & Plan:       On omeprazole daily.    Hypothyroidism POA: Yes      Overview: Last Assessment & Plan:       On thyroid replacement, normal labs 11/2017.    Warfarin anticoagulation POA: Yes      Overview: Last Assessment & Plan:       Noted    CKD (chronic kidney disease) stage 3, GFR 30-59 ml/min POA: Yes    Pleural effusion POA: Unknown    Moderate to severe pulmonary hypertension (HCC) POA: Unknown    Normocytic anemia POA: Unknown    Acute respiratory failure with hypoxia (HCC) POA: Unknown  Resolved Problems:    * No resolved hospital problems. *      FOLLOW UP  Future Appointments  Date Time Provider Department Center   5/30/2018 8:15 AM Trinity Sharp M.D. [facility] None   6/1/2018 9:30 AM [facility] EXAM 5 VMED None   6/1/2018 11:00 AM [facility] EXAM 4 VMED None   6/18/2018 10:20 AM ELIZABETH Patel [facility] VALERIE Flor   7/5/2018 11:00 AM Sebastian Thomas M.D. [facility] None   7/11/2018 1:30 PM Neela Awad M.D. [facility] [address]   10/31/2018 9:45 AM PULMONARY DX 1 IPUL [address]   10/31/2018 10:00 AM PFT-RM1 PULM None   10/31/2018 11:00 AM A Rotation PULM None     No follow-up provider specified.    MEDICATIONS ON DISCHARGE   Andree Egan   Home Medication Instructions ERIN:[id number]    Printed on:05/25/18  1305   Medication Information                      acetaminophen (TYLENOL) 500 MG Tab  Take 1,000 mg by mouth 1 time daily as needed.             albuterol 108 (90 Base) MCG/ACT Aero Soln inhalation aerosol  Inhale 2 Puffs by mouth every 6 hours as needed for Shortness of Breath.             allopurinol (ZYLOPRIM) 300 MG Tab  Take 300 mg by mouth every evening.             Calcium-Vitamin D 500-125 MG-UNIT Tab  Take 1 tablet by mouth every evening.             furosemide (LASIX) 20 MG Tab  Take 1 Tab by mouth every day.             gabapentin (NEURONTIN) 300 MG Cap  Take 300 mg by mouth 3 times a day.             levothyroxine (SYNTHROID) 50 MCG Tab  Take 50 mcg by mouth Every morning on an empty stomach.             mag hydrox-al hydrox-simeth (MAALOX PLUS ES OR MYLANTA DS) 400-400-40 MG/5ML Suspension  Take 10 mL by mouth 4 times a day as needed (acid reflux).             metOLazone (ZAROXOLYN) 5 MG Tab  Take 0.5 Tabs by mouth every day.             metoprolol SR (TOPROL XL) 25 MG TABLET SR 24 HR  Take 25 mg by mouth every morning.             omeprazole (PRILOSEC) 20 MG delayed-release capsule  Take 20 mg by mouth every morning.             potassium chloride SA (K-DUR) 10 MEQ Tab CR  Take 10 mEq by mouth every evening.             sertraline (ZOLOFT) 50 MG Tab  Take 50 mg by mouth every evening.             spironolactone (ALDACTONE) 25 MG Tab  Take 1 Tab by mouth every day.             topiramate (TOPAMAX) 25 MG Tab  Take 25 mg by mouth 2 times a day.             warfarin (COUMADIN) 2.5 MG Tab  Take 2.5-3.75 mg by mouth every evening. 3.75mg on Tuesday/Thursday, 2.5mg on all other days                 DIET  Orders Placed This Encounter   Procedures   • Diet Order     Standing Status:   Standing     Number of Occurrences:   1     Order Specific Question:   Diet:     Answer:   2 Gram Sodium [7]     Order Specific Question:   Diet:     Answer:   Cardiac [6]     Order Specific Question:   Consistency/Fluid  modifications:     Answer:   1500 ml Fluid Restriction [9]       ACTIVITY  As tolerated    CONSULTATIONS  Cardiology (Martha Triana)  NGOC Naqvi MD    PROCEDURES  HISTORY/REASON FOR EXAM:  Right pleural effusion          TECHNIQUE/EXAM DESCRIPTION:   Ultrasound-guided thoracentesis.     Indication:  RIGHT pleural fluid collection.     COMPARISON:  Chest CT May 20, 2018     PROCEDURE:     Informed consent was obtained. A timeout was taken. A right pleural effusion was localized with real-time ultrasound guidance. The right posterior chest wall was prepped and draped in a sterile manner. Following local anesthesia with 1% lidocaine, a 5 Yoruba Yueh pigtail catheter was advanced into the pleural space with trocar technique and pleural fluid was drained. The patient tolerated the procedure well without evidence of complication. A post thoracentesis chest radiograph is forthcoming.     FINDINGS:  Ultrasound images demonstrate a moderate to large right pleural effusion.     Fluid was sent to the laboratory.     Fluid character: bloody     IMPRESSION:  1. Ultrasound guided right sided therapeutic and diagnostic thoracentesis.     2. 650 mL of fluid withdrawn.       LABORATORY  Lab Results   Component Value Date/Time    SODIUM 141 05/24/2018 09:02 AM    POTASSIUM 3.9 05/24/2018 09:02 AM    CHLORIDE 103 05/24/2018 09:02 AM    CO2 28 05/24/2018 09:02 AM    GLUCOSE 99 05/24/2018 09:02 AM    BUN 29 (H) 05/24/2018 09:02 AM    CREATININE 1.51 (H) 05/24/2018 09:02 AM        Lab Results   Component Value Date/Time    WBC 3.3 (L) 05/23/2018 08:54 AM    HEMOGLOBIN 9.7 (L) 05/23/2018 08:54 AM    HEMATOCRIT 31.9 (L) 05/23/2018 08:54 AM    PLATELETCT 133 (L) 05/23/2018 08:54 AM        ECHO  FINDINGS  Left Ventricle  Normal left ventricular chamber size. Mild concentric left ventricular   hypertrophy. Normal left ventricular systolic function. Left   ventricular ejection fraction is visually estimated to be 55%. Abnormal    septal motion. Diastolic function is abnormal, but grade cannot be   determined.    Right Ventricle  Right ventricle size is upper limits of normal. Normal right   ventricular systolic function.  Pacer/ICD wire seen in right ventricle.    Right Atrium  Severely enlarged right atrium. Dilated inferior vena cava without   inspiratory collapse.    Left Atrium  Severely dilated left atrium. Left atrial volume index is 50 mL/sq m.    Mitral Valve  Mitral annular calcification. No mitral stenosis. Mild mitral   regurgitation.    Aortic Valve  Tricuspid aortic valve. Calcified aortic valve leaflets. Aortic valve   area calculated from the continuity equation is 1.1 cm². Vmax is 2.01   m/s. Transvalvular gradients are - Peak: 16 mmHg, Mean: 10  mmHg.   Dimensionless index is 0.41.    Tricuspid Valve  Structurally normal tricuspid valve without significant stenosis.   Severe tricuspid regurgitation. Estimated right ventricular systolic   pressure is greater than 70 mmHg. Right atrial pressure is estimated to   be 15 mmHg.    Pulmonic Valve  Structurally normal pulmonic valve without significant stenosis. Mild   pulmonic insufficiency.    Pericardium  Normal pericardium without effusion.    Aorta  The aortic root is normal.  Ascending aorta diameter is 3.7 cm.

## 2018-05-25 NOTE — DISCHARGE PLANNING
Anticipated Discharge Disposition: Home with Home O2    Action: LSW called [facility] and inquired about status of pt's O2. [facility] stated that pt's O2 was delivered at 1200. LSW checked with pt and O2 has been delivered to bedside.     Barriers to Discharge: None    Plan: No further discharge needs at this time.

## 2018-05-25 NOTE — DISCHARGE PLANNING
Anticipated Discharge Disposition: Home with Home O2    Action: LSW called [facility] to inquire about status of referral for home O2. [facility] stated that they are no longer in contract with [employer] insurance. LSW sent choice form to ROSA Shearer for [facility].     Barriers to Discharge: Insurance coverage of O2.     Plan: Awaiting DME acceptance for O2.

## 2018-05-25 NOTE — PROGRESS NOTES
Received report from nightshift RN, assumed care of patient. Patient is A&O x 4, no bed alarm indicated. Patient educated on importance of calling if in need of assistance. Verbalizes understanding. Patient declines pain at this time. Patient assisted to BR to void. No difficulties noted. Patient updated on plan of care, voices no concerns at this time. Will continue to monitor for safety and comfort.

## 2018-05-25 NOTE — PROGRESS NOTES
Report received from BRONWYN Díaz. Assumed care of patient at 1845. Telemetry functioning properly. Fall precautions in place. Will continue to monitor.

## 2018-05-25 NOTE — CARE PLAN
Problem: Knowledge Deficit  Goal: Knowledge of disease process/condition, treatment plan, diagnostic tests, and medications will improve  Patient updated on POC. All questions answered at this time.

## 2018-05-25 NOTE — DISCHARGE PLANNING
Received Choice form at 0851  Agency/Facility Name:   Referral sent to [facility] per Choice form @ 0945.

## 2018-05-25 NOTE — DISCHARGE INSTRUCTIONS
Discharge Instructions    Discharged to home by car with relative. Discharged via wheelchair, hospital escort: Yes.  Special equipment needed: Oxygen    Be sure to schedule a follow-up appointment with your primary care doctor or any specialists as instructed.     Discharge Plan:   Diet Plan: Discussed  Activity Level: Discussed  Confirmed Follow up Appointment: Appointment Scheduled  Confirmed Symptoms Management: Discussed  Medication Reconciliation Updated: Yes  Influenza Vaccine Indication: Not indicated: Previously immunized this influenza season and > 8 years of age    I understand that a diet low in cholesterol, fat, and sodium is recommended for good health. Unless I have been given specific instructions below for another diet, I accept this instruction as my diet prescription.   Other diet: 2 gram sodium, low fat    Special Instructions:   [facility] Patient Discharge Instructions  · Monitor your weight daily, and maintain a weight chart, to track your weight changes.   · Activity as tolerated, unless your Doctor has ordered otherwise. Other activity order: n/a.  · Follow a low fat, low cholesterol, low salt diet unless instructed otherwise by your Doctor. Read the labels on the back of food products and track your intake of fat, cholesterol and salt.   · Fluid Restriction Yes. If a Fluid Restriction has been ordered by your Doctor, measure fluids with a measuring cup to ensure that you are not exceeding the restriction. 1500 mL  · No smoking.  · Oxygen Yes. If your Doctor has ordered that you wear Oxygen at home, it is important to wear it as ordered.  · Did you receive an explanation from staff on the importance of taking each of your medications and why it is necessary to keep taking them unless your doctor says to stop? Yes  · Were all of your questions answered about how to manage your heart failure and what to do if you have increased signs and symptoms after you go home? Yes  · Do you feel like your heart  failure care team involved you in the care treatment plan and allowed you to make decisions regarding your care while in the hospital and addressed any discharge needs you might have? Yes    See the educational handout provided at discharge for more information on monitoring your daily weight, activity and diet. This also explains more about Heart Failure, symptoms of a flare-up and some of the tests that you have undergone.     Warning Signs of a Flare-Up include:  · Swelling in the ankles or lower legs.  · Shortness of breath, while at rest, or while doing normal activities.   · Shortness of breath at night when in bed, or coughing in bed.   · Requiring more pillows to sleep at night, or needing to sit up at night to sleep.  · Feeling weak, dizzy or fatigued.     When to call your Doctor:  · Call [facility] seven days a week from 8:00 a.m. to 8:00 p.m. for medical questions [phone].  · Call your Primary Care Physician or Cardiologist if:   1. You experience any pain radiating to your jaw or neck.  2. You have any difficulty breathing.  3. You experience weight gain of 3 lbs in a day or 5 lbs in a week.   4. You feel any palpitations or irregular heartbeats.  5. You become dizzy or lose consciousness.   If you have had an angiogram or had a pacemaker or AICD placed, and experience:  1. Bleeding, drainage or swelling at the surgical / puncture site.  2. Fever greater than 100.0 F  3. Shock from internal defibrillator.  4. Cool and / or numb extremities.      · Is patient discharged on Warfarin / Coumadin?   Yes    You are receiving the drug warfarin. Please understand the importance of monitoring warfarin with scheduled PT/INR blood draws.  Follow-up with the [facility] in one week for INR lab..    IMPORTANT: HOW TO USE THIS INFORMATION:  This is a summary and does NOT have all possible information about this product. This information does not assure that this product is safe, effective, or  "appropriate for you. This information is not individual medical advice and does not substitute for the advice of your health care professional. Always ask your health care professional for complete information about this product and your specific health needs.      WARFARIN - ORAL (WARF-uh-rin)      COMMON BRAND NAME(S): Coumadin      WARNING:  Warfarin can cause very serious (possibly fatal) bleeding. This is more likely to occur when you first start taking this medication or if you take too much warfarin. To decrease your risk for bleeding, your doctor or other health care provider will monitor you closely and check your lab results (INR test) to make sure you are not taking too much warfarin. Keep all medical and laboratory appointments. Tell your doctor right away if you notice any signs of serious bleeding. See also Side Effects section.      USES:  This medication is used to treat blood clots (such as in deep vein thrombosis-DVT or pulmonary embolus-PE) and/or to prevent new clots from forming in your body. Preventing harmful blood clots helps to reduce the risk of a stroke or heart attack. Conditions that increase your risk of developing blood clots include a certain type of irregular heart rhythm (atrial fibrillation), heart valve replacement, recent heart attack, and certain surgeries (such as hip/knee replacement). Warfarin is commonly called a \"blood thinner,\" but the more correct term is \"anticoagulant.\" It helps to keep blood flowing smoothly in your body by decreasing the amount of certain substances (clotting proteins) in your blood.      HOW TO USE:  Read the Medication Guide provided by your pharmacist before you start taking warfarin and each time you get a refill. If you have any questions, ask your doctor or pharmacist. Take this medication by mouth with or without food as directed by your doctor or other health care professional, usually once a day. It is very important to take it exactly as " directed. Do not increase the dose, take it more frequently, or stop using it unless directed by your doctor. Dosage is based on your medical condition, laboratory tests (such as INR), and response to treatment. Your doctor or other health care provider will monitor you closely while you are taking this medication to determine the right dose for you. Use this medication regularly to get the most benefit from it. To help you remember, take it at the same time each day. It is important to eat a balanced, consistent diet while taking warfarin. Some foods can affect how warfarin works in your body and may affect your treatment and dose. Avoid sudden large increases or decreases in your intake of foods high in vitamin K (such as broccoli, cauliflower, cabbage, brussels sprouts, kale, spinach, and other green leafy vegetables, liver, green tea, certain vitamin supplements). If you are trying to lose weight, check with your doctor before you try to go on a diet. Cranberry products may also affect how your warfarin works. Limit the amount of cranberry juice (16 ounces/480 milliliters a day) or other cranberry products you may drink or eat.      SIDE EFFECTS:  Nausea, loss of appetite, or stomach/abdominal pain may occur. If any of these effects persist or worsen, tell your doctor or pharmacist promptly. Remember that your doctor has prescribed this medication because he or she has judged that the benefit to you is greater than the risk of side effects. Many people using this medication do not have serious side effects. This medication can cause serious bleeding if it affects your blood clotting proteins too much (shown by unusually high INR lab results). Even if your doctor stops your medication, this risk of bleeding can continue for up to a week. Tell your doctor right away if you have any signs of serious bleeding, including: unusual pain/swelling/discomfort, unusual/easy bruising, prolonged bleeding from cuts or gums,  persistent/frequent nosebleeds, unusually heavy/prolonged menstrual flow, pink/dark urine, coughing up blood, vomit that is bloody or looks like coffee grounds, severe headache, dizziness/fainting, unusual or persistent tiredness/weakness, bloody/black/tarry stools, chest pain, shortness of breath, difficulty swallowing. Tell your doctor right away if any of these unlikely but serious side effects occur: persistent nausea/vomiting, severe stomach/abdominal pain, yellowing eyes/skin. This drug rarely has caused very serious (possibly fatal) problems if its effects lead to small blood clots (usually at the beginning of treatment). This can lead to severe skin/tissue damage that may require surgery or amputation if left untreated. Patients with certain blood conditions (protein C or S deficiency) may be at greater risk. Get medical help right away if any of these rare but serious side effects occur: painful/red/purplish patches on the skin (such as on the toe, breast, abdomen), change in the amount of urine, vision changes, confusion, slurred speech, weakness on one side of the body. A very serious allergic reaction to this drug is rare. However, get medical help right away if you notice any symptoms of a serious allergic reaction, including: rash, itching/swelling (especially of the face/tongue/throat), severe dizziness, trouble breathing. This is not a complete list of possible side effects. If you notice other effects not listed above, contact your doctor or pharmacist. In the [place] - Call your doctor for medical advice about side effects. You may report side effects to FDA at 5-601-NJE-5283. In [place] - Call your doctor for medical advice about side effects. You may report side effects to [facility] [place] at 1-237.973.5991.      PRECAUTIONS:  Before taking warfarin, tell your doctor or pharmacist if you are allergic to it; or if you have any other allergies. This product may contain inactive ingredients, which can cause  allergic reactions or other problems. Talk to your pharmacist for more details. Before using this medication, tell your doctor or pharmacist your medical history, especially of: blood disorders (such as anemia, hemophilia), bleeding problems (such as bleeding of the stomach/intestines, bleeding in the brain), blood vessel disorders (such as aneurysms), recent major injury/surgery, liver disease, alcohol use, mental/mood disorders (including memory problems), frequent falls/injuries. It is important that all your doctors and dentists know that you take warfarin. Before having surgery or any medical/dental procedures, tell your doctor or dentist that you are taking this medication and about all the products you use (including prescription drugs, nonprescription drugs, and herbal products). Avoid getting injections into the muscles. If you must have an injection into a muscle (for example, a flu shot), it should be given in the arm. This way, it will be easier to check for bleeding and/or apply pressure bandages. This medication may cause stomach bleeding. Daily use of alcohol while using this medicine will increase your risk for stomach bleeding and may also affect how this medication works. Limit or avoid alcoholic beverages. If you have not been eating well, if you have an illness or infection that causes fever, vomiting, or diarrhea for more than 2 days, or if you start using any antibiotic medications, contact your doctor or pharmacist immediately because these conditions can affect how warfarin works. This medication can cause heavy bleeding. To lower the chance of getting cut, bruised, or injured, use great caution with sharp objects like safety razors and nail cutters. Use an electric razor when shaving and a soft toothbrush when brushing your teeth. Avoid activities such as contact sports. If you fall or injure yourself, especially if you hit your head, call your doctor immediately. Your doctor may need to  "check you. The Food & Drug Administration has stated that generic warfarin products are interchangeable. However, consult your doctor or pharmacist before switching warfarin products. Be careful not to take more than one medication that contains warfarin unless specifically directed by the doctor or health care provider who is monitoring your warfarin treatment. Older adults may be at greater risk for bleeding while using this drug. This medication is not recommended for use during pregnancy because of serious (possibly fatal) harm to an unborn baby. Discuss the use of reliable forms of birth control with your doctor. If you become pregnant or think you may be pregnant, tell your doctor immediately. If you are planning pregnancy, discuss a plan for managing your condition with your doctor before you become pregnant. Your doctor may switch the type of medication you use during pregnancy. Very small amounts of this medication may pass into breast milk but is unlikely to harm a nursing infant. Consult your doctor before breast-feeding.      DRUG INTERACTIONS:  Drug interactions may change how your medications work or increase your risk for serious side effects. This document does not contain all possible drug interactions. Keep a list of all the products you use (including prescription/nonprescription drugs and herbal products) and share it with your doctor and pharmacist. Do not start, stop, or change the dosage of any medicines without your doctor's approval. Warfarin interacts with many prescription, nonprescription, vitamin, and herbal products. This includes medications that are applied to the skin or inside the vagina or rectum. The interactions with warfarin usually result in an increase or decrease in the \"blood-thinning\" (anticoagulant) effect. Your doctor or other health care professional should closely monitor you to prevent serious bleeding or clotting problems. While taking warfarin, it is very important " to tell your doctor or pharmacist of any changes in medications, vitamins, or herbal products that you are taking. Some products that may interact with this drug include: capecitabine, imatinib, mifepristone. Aspirin, aspirin-like drugs (salicylates), and nonsteroidal anti-inflammatory drugs (NSAIDs such as ibuprofen, naproxen, celecoxib) may have effects similar to warfarin. These drugs may increase the risk of bleeding problems if taken during treatment with warfarin. Carefully check all prescription/nonprescription product labels (including drugs applied to the skin such as pain-relieving creams) since the products may contain NSAIDs or salicylates. Talk to your doctor about using a different medication (such as acetaminophen) to treat pain/fever. Low-dose aspirin and related drugs (such as clopidogrel, ticlopidine) should be continued if prescribed by your doctor for specific medical reasons such as heart attack or stroke prevention. Consult your doctor or pharmacist for more details. Many herbal products interact with warfarin. Tell your doctor before taking any herbal products, especially bromelains, coenzyme Q10, cranberry, danshen, dong quai, fenugreek, garlic, ginkgo biloba, ginseng, and Diego's wort, among others. This medication may interfere with a certain laboratory test to measure theophylline levels, possibly causing false test results. Make sure laboratory personnel and all your doctors know you use this drug.      OVERDOSE:  If overdose is suspected, contact a poison control center or emergency room immediately. [place] residents can call the [place] National Poison Hotline at 1-352.171.4801. [place] residents can call a provincial poison control center. Symptoms of overdose may include: bloody/black/tarry stools, pink/dark urine, unusual/prolonged bleeding.      NOTES:  Do not share this medication with others. Laboratory and/or medical tests (such as INR, complete blood count) must be performed  periodically to monitor your progress or check for side effects. Consult your doctor for more details.      MISSED DOSE:  For the best possible benefit, do not miss any doses. If you do miss a dose and remember on the same day, take it as soon as you remember. If you remember on the next day, skip the missed dose and resume your usual dosing schedule. Do not double the dose to catch up because this could increase your risk for bleeding. Keep a record of missed doses to give to your doctor or pharmacist. Contact your doctor or pharmacist if you miss 2 or more doses in a row.      STORAGE:  Store at room temperature away from light and moisture. Do not store in the bathroom. Keep all medications away from children and pets. Do not flush medications down the toilet or pour them into a drain unless instructed to do so. Properly discard this product when it is  or no longer needed. Consult your pharmacist or local waste disposal company for more details about how to safely discard your product.      MEDICAL ALERT:  Your condition and medication can cause complications in a medical emergency. For information about enrolling in MedicAlert, call 1-168.773.7330 ([place]) or 1-440.924.4854 ([place]).      Information last revised 2010 Copyright(c) 2010 [employer]             Depression / Suicide Risk    As [facility] are [facility], it is important to learn how to keep safe from harming yourself.    Recognize the warning signs:  · Abrupt changes in personality, positive or negative- including increase in energy   · Giving away possessions  · Change in eating patterns- significant weight changes-  positive or negative  · Change in sleeping patterns- unable to sleep or sleeping all the time   · Unwillingness or inability to communicate  · Depression  · Unusual sadness, discouragement and loneliness  · Talk of wanting to die  · Neglect of personal appearance   · Rebelliousness-  reckless behavior  · Withdrawal from people/activities they love  · Confusion- inability to concentrate     If you or a loved one observes any of these behaviors or has concerns about self-harm, here's what you can do:  · Talk about it- your feelings and reasons for harming yourself  · Remove any means that you might use to hurt yourself (examples: pills, rope, extension cords, firearm)  · Get professional help from the community (Mental Health, Substance Abuse, psychological counseling)  · Do not be alone:Call your Safe Contact- someone whom you trust who will be there for you.  · Call your local CRISIS HOTLINE 161-3338 or 764-601-7915  · Call your local Children's Mobile Crisis Response Team [place] (858) 032-7317 or www.[employer]  · Call the toll free National Suicide Prevention Hotlines   · National Suicide Prevention Lifeline 172-001-AMFU (6925)  · National Hope Line Network 800-SUICIDE (028-9159)

## 2018-05-25 NOTE — PROGRESS NOTES
Inpatient Anticoagulation Service Note    Date: 5/25/2018  Reason for Anticoagulation: Atrial Fibrillation   [id number] VASc Score: 5    Hemoglobin Value: (!) 9.7  Hematocrit Value: (!) 31.9  Lab Platelet Value: (!) 133  Target INR: 2.0 to 3.0    INR from last 7 days     Date/Time INR Value    05/25/18 0836 (!)  1.22    05/24/18 0902 (!)  1.17    05/23/18 0854 (!)  1.16    05/22/18 0552 (!)  1.25    05/21/18 0336 (!)  1.69    05/20/18 0357 (!)  2.17    05/19/18 0408 (!)  2.8    05/18/18 1820 (!)  2.81        Dose from last 7 days     Date/Time Dose (mg)    05/25/18 0900  5    05/24/18 1400  5    05/23/18 1700  5    05/20/18 1000 0        Significant Interactions: Proton Pump Inhibitor, Thyroid Medications, Other (Comments) (SSRI)  Bridge Therapy: Yes     HPI: 85 yo female chronically anticoagulated with warfarin for h/o Afib (ECO3OG2 VASc 5, moderate risk).   Patient has not received warfarin inpatient since 5/19/18. US guided ultrasound attempted 5/21 w/ 80mL removal, repeated 5/23 w/ 650mL removal. Bridging back to therapeutic INR with Lovenox 1mg/kg subQ every 12 hours. Patient is followed outpatient by the [facility] and per records patient is prescribed warfarin 5mg po every Tue, Thurs and warfarin 3.75mg po on all other days (Patient has warfarin 2.5mg tablets at home to achieve this dosing regimen).      Assessment:   · INR remains SUbtherapeutic. Warfarin has been held since 5/19/18.      Plan:    · Warfarin 5mg again tonight. INR monitoring daily until re-stabilized within therapeutic range. DC lovenox when INR is therapeutic.      Education Material Provided?: No (Chronic tx)  Pharmacist suggested discharge dosing: warfarin 5mg po every Tue, Thurs and warfarin 3.75mg po on all other days (Patient has warfarin 2.5mg tablets at home to achieve this dosing regimen).      Jerald Schmidt, PharmD, BCPS

## 2018-05-26 LAB
BACTERIA FLD AEROBE CULT: NORMAL
GRAM STN SPEC: NORMAL
SIGNIFICANT IND 70042: NORMAL
SITE SITE: NORMAL
SOURCE SOURCE: NORMAL

## 2018-05-29 ENCOUNTER — PATIENT OUTREACH (OUTPATIENT)
Dept: HEALTH INFORMATION MANAGEMENT | Facility: OTHER | Age: 83
End: 2018-05-29

## 2018-05-30 ENCOUNTER — TELEPHONE (OUTPATIENT)
Dept: OTHER | Facility: MEDICAL CENTER | Age: 83
End: 2018-05-30

## 2018-05-30 ENCOUNTER — NON-PROVIDER VISIT (OUTPATIENT)
Dept: CARDIOLOGY | Facility: MEDICAL CENTER | Age: 83
End: 2018-05-30
Payer: MEDICARE

## 2018-05-30 ENCOUNTER — OFFICE VISIT (OUTPATIENT)
Dept: CARDIOLOGY | Facility: MEDICAL CENTER | Age: 83
End: 2018-05-30
Payer: MEDICARE

## 2018-05-30 VITALS
WEIGHT: 132.4 LBS | BODY MASS INDEX: 22.61 KG/M2 | HEART RATE: 92 BPM | OXYGEN SATURATION: 90 % | SYSTOLIC BLOOD PRESSURE: 130 MMHG | HEIGHT: 64 IN | DIASTOLIC BLOOD PRESSURE: 70 MMHG | RESPIRATION RATE: 16 BRPM

## 2018-05-30 DIAGNOSIS — I50.30 ACC/AHA STAGE C HEART FAILURE WITH PRESERVED EJECTION FRACTION (HCC): ICD-10-CM

## 2018-05-30 DIAGNOSIS — Z79.899 HIGH RISK MEDICATION USE: ICD-10-CM

## 2018-05-30 DIAGNOSIS — I27.20 PULMONARY HYPERTENSION (HCC): ICD-10-CM

## 2018-05-30 DIAGNOSIS — Z95.0 PACEMAKER: ICD-10-CM

## 2018-05-30 DIAGNOSIS — I48.20 CHRONIC ATRIAL FIBRILLATION (HCC): ICD-10-CM

## 2018-05-30 DIAGNOSIS — I07.1 SEVERE TRICUSPID REGURGITATION: ICD-10-CM

## 2018-05-30 DIAGNOSIS — I50.30 NYHA CLASS 3 HEART FAILURE WITH PRESERVED EJECTION FRACTION (HCC): ICD-10-CM

## 2018-05-30 DIAGNOSIS — R06.09 DOE (DYSPNEA ON EXERTION): ICD-10-CM

## 2018-05-30 DIAGNOSIS — Z95.0 CARDIAC PACEMAKER IN SITU: ICD-10-CM

## 2018-05-30 DIAGNOSIS — Z79.01 LONG TERM CURRENT USE OF ANTICOAGULANT: ICD-10-CM

## 2018-05-30 PROCEDURE — 93279 PRGRMG DEV EVAL PM/LDLS PM: CPT | Performed by: INTERNAL MEDICINE

## 2018-05-30 PROCEDURE — 99215 OFFICE O/P EST HI 40 MIN: CPT | Mod: 25 | Performed by: INTERNAL MEDICINE

## 2018-05-30 PROCEDURE — 94618 PULMONARY STRESS TESTING: CPT | Performed by: INTERNAL MEDICINE

## 2018-05-30 ASSESSMENT — ENCOUNTER SYMPTOMS
BLURRED VISION: 0
MEMORY LOSS: 0
DIAPHORESIS: 0
ABDOMINAL PAIN: 0
DEPRESSION: 0
DOUBLE VISION: 0
HEADACHES: 0
SHORTNESS OF BREATH: 1
DIZZINESS: 0
BRUISES/BLEEDS EASILY: 0
PALPITATIONS: 0
SENSORY CHANGE: 0
FALLS: 0
MYALGIAS: 0
COUGH: 0
FEVER: 0

## 2018-05-30 ASSESSMENT — MINNESOTA LIVING WITH HEART FAILURE QUESTIONNAIRE (MLHF)
DIFFICULTY GOING AWAY FROM HOME: 5
DIFFICULTY SOCIALIZING WITH FAMILY OR FRIENDS: 4
DIFFICULTY WITH SEXUAL ACTIVITIES: 0
SWELLING IN ANKLES OR LEGS: 5
EATING LESS FOODS YOU LIKE: 1
MAKING YOU WORRY: 5
GIVING YOU SIDE EFFECTS FROM TREATMENTS: 0
COSTING YOU MONEY FOR MEDICAL CARE: 5
DIFFICULTY SLEEPING WELL AT NIGHT: 3
FEELING LIKE A BURDEN TO FAMILY AND FRIENDS: 5
MAKING YOU FEEL DEPRESSED: 0
WALKING ABOUT OR CLIMBING STAIRS DIFFICULT: 5
DIFFICULTY TO CONCENTRATE OR REMEMBERING THINGS: 5
HAVING TO SIT OR LIE DOWN DURING THE DAY: 5
WORKING AROUND THE HOUSE OR YARD DIFFICULT: 5
LOSS OF SELF CONTROL IN YOUR LIFE: 0
MAKING YOU STAY IN A HOSPITAL: 5
DIFFICULTY WITH RECREATIONAL PASTIMES, SPORTS, HOBBIES: 4
TIRED, FATIGUED OR LOW ON ENERGY: 5
TOTAL_SCORE: 72
MAKING YOU SHORT OF BREATH: 5
DIFFICULTY WORKING TO EARN A LIVING: 0

## 2018-05-30 ASSESSMENT — 6 MINUTE WALK TEST (6MWT): TOTAL DISTANCE WALKED (METERS): 73.1

## 2018-05-30 NOTE — LETTER
[facility]   [address] [facility], [facility] [address]  [place] [address]  Phone: [phone]  Fax: [phone]              Andree Egan  4/21/1932    Encounter Date: 5/30/2018    Trinity Sharp M.D.          PROGRESS NOTE:  Chief Complaint   Patient presents with   • CHF (Diastolic)       Subjective:   Andree Egan is a 86 y.o. female who presents today for cardiac care and evaluation in our heart failure program because of recent hospital stay for heart failure exacerbation. Patient was diagnosed with heart failure with preserved left ventricular systolic function.  She was diuresed.  During her hospital stay, she was scheduled to undergo thoracentesis but that was not successful. She developed hemoptysis afterwards. Her left ventricular systolic function was diagnosed to be 55%. Patient also has history of chronic atrial fibrillation. She also had pacemaker placement. Of note, there was some mentioning of restrictive cardiomyopathy in her documentation. However, I do not have any records specific workup for restrictive cardiomyopathy such as cardiac MRI nor cardiac catheterization. She has moved from [place] to [place].    Patient was able to complete 73 m during his 6 minute walk test. her O2 saturation at baseline was 93% and at the end of the test, the O2 saturation was 92%. she reported 4 level of dyspnea on Ganga scale.      Past Medical History:   Diagnosis Date   • Atrial fibrillation (CMS-HCC) [I48.91] 3/29/2018   • Congestive heart failure (CHF) (HCC) 4/3/2018   • BERTRAND (dyspnea on exertion) 5/4/2018   • Hyperlipidemia    • Hypertension    • Kidney disease    • Long term current use of anticoagulant 3/29/2018   • Pacemaker 4/3/2018   • Pulmonary emphysema (HCC)    • Secondary hypertension 4/3/2018   • Thyroid disease    • Tremors of nervous system 4/3/2018   • Urinary tract infection      History reviewed. No pertinent surgical history.  Family History   Problem  Relation Age of Onset   • Fainting Neg Hx      Social History     Social History   • Marital status:      Spouse name: N/A   • Number of children: N/A   • Years of education: N/A     Occupational History   • Not on file.     Social History Main Topics   • Smoking status: Former Smoker     Packs/day: 0.50     Types: Cigarettes     Quit date: 1/1/1970   • Smokeless tobacco: Never Used   • Alcohol use 1.8 oz/week     3 Glasses of wine per week      Comment: glasses a night before Dinner   • Drug use: No   • Sexual activity: Not on file     Other Topics Concern   • Not on file     Social History Narrative   • No narrative on file     Allergies   Allergen Reactions   • Codeine Vomiting     Outpatient Encounter Prescriptions as of 5/30/2018   Medication Sig Dispense Refill   • mag hydrox-al hydrox-simeth (MAALOX PLUS ES OR MYLANTA DS) 400-400-40 MG/5ML Suspension Take 10 mL by mouth 4 times a day as needed (acid reflux). 560 mL    • furosemide (LASIX) 20 MG Tab Take 1 Tab by mouth every day. 30 Tab 2   • metOLazone (ZAROXOLYN) 5 MG Tab Take 0.5 Tabs by mouth every day. 30 Tab 2   • spironolactone (ALDACTONE) 25 MG Tab Take 1 Tab by mouth every day. 30 Tab 3   • warfarin (COUMADIN) 2.5 MG Tab Take 2.5-3.75 mg by mouth every evening. 3.75mg on Tuesday/Thursday, 2.5mg on all other days     • levothyroxine (SYNTHROID) 50 MCG Tab Take 50 mcg by mouth Every morning on an empty stomach.     • allopurinol (ZYLOPRIM) 300 MG Tab Take 300 mg by mouth every evening.     • gabapentin (NEURONTIN) 300 MG Cap Take 300 mg by mouth 3 times a day.     • acetaminophen (TYLENOL) 500 MG Tab Take 1,000 mg by mouth 1 time daily as needed.     • Calcium-Vitamin D 500-125 MG-UNIT Tab Take 1 tablet by mouth every evening.     • albuterol 108 (90 Base) MCG/ACT Aero Soln inhalation aerosol Inhale 2 Puffs by mouth every 6 hours as needed for Shortness of Breath.     • sertraline (ZOLOFT) 50 MG Tab Take 50 mg by mouth every evening.     •  "topiramate (TOPAMAX) 25 MG Tab Take 25 mg by mouth 2 times a day.     • potassium chloride SA (K-DUR) 10 MEQ Tab CR Take 10 mEq by mouth every evening.     • metoprolol SR (TOPROL XL) 25 MG TABLET SR 24 HR Take 25 mg by mouth every morning.     • omeprazole (PRILOSEC) 20 MG delayed-release capsule Take 20 mg by mouth every morning.       No facility-administered encounter medications on file as of 5/30/2018.      Review of Systems   Constitutional: Negative for diaphoresis and fever.   HENT: Negative for nosebleeds.    Eyes: Negative for blurred vision and double vision.   Respiratory: Positive for shortness of breath. Negative for cough.    Cardiovascular: Negative for chest pain and palpitations.   Gastrointestinal: Negative for abdominal pain.   Genitourinary: Negative for dysuria and frequency.   Musculoskeletal: Negative for falls and myalgias.   Skin: Negative for rash.   Neurological: Negative for dizziness, sensory change and headaches.   Endo/Heme/Allergies: Does not bruise/bleed easily.   Psychiatric/Behavioral: Negative for depression and memory loss.        Objective:   /70   Pulse 92   Resp 16   Ht 1.613 m (5' 3.5\")   Wt 60.1 kg (132 lb 6.4 oz)   SpO2 90%   BMI 23.09 kg/m²      Physical Exam   Constitutional: She is oriented to person, place, and time. No distress.   HENT:   Head: Normocephalic and atraumatic.   Right Ear: External ear normal.   Left Ear: External ear normal.   Eyes: Right eye exhibits no discharge. Left eye exhibits no discharge.   Neck: No JVD present. No thyromegaly present.   Cardiovascular: Normal rate, regular rhythm, normal heart sounds and intact distal pulses.  Exam reveals no gallop and no friction rub.    No murmur heard.  Pulmonary/Chest: Breath sounds normal. No respiratory distress.   Abdominal: Bowel sounds are normal. She exhibits no distension. There is no tenderness.   Musculoskeletal: She exhibits edema. She exhibits no tenderness.   Neurological: She is " alert and oriented to person, place, and time. No cranial nerve deficit.   Skin: Skin is warm and dry. She is not diaphoretic.   Psychiatric: She has a normal mood and affect. Her behavior is normal.   Nursing note and vitals reviewed.      Assessment:     1. ACC/AHA stage C heart failure with preserved ejection fraction (HCC)     2. NYHA class 3 heart failure with preserved ejection fraction (HCC)     3. Chronic atrial fibrillation (HCC)     4. BERTRAND (dyspnea on exertion)     5. Pacemaker     6. Severe tricuspid regurgitation     7. Pulmonary hypertension (HCC)     8. High risk medication use     9. Long term current use of anticoagulant         Medical Decision Making:  Today's Assessment / Status / Plan:   Today, based on physical examination findings, patient is euvolemic. No JVD, lungs are clear to auscultation, no pitting edema in bilateral lower extremities, no ascites.    Dry weight is 132 lbs.    Condition is guarded although patient is functional at home.    We will continue current regimen of diuretics including furosemide 20 mg by mouth once a day, Metolazone 2.5 mg po daily. Spironolactone 25 mg by mouth once a day.    Continue Toprol-XL 25 mg by mouth once a day. Rate control strategy. No benefits from sinus restoration or maintenance at this time.    Continue anticoagulation therapy with Coumadin.    Based on the overall clinical history and profile, patient is a good candidate for Cardiomems implantation system to remotely monitor intracardiac pressures. she will benefit from reduced recurrent hospitalization for heart failure exacerbation and also quality of life improvement. Patient also has a good support system and has shown compliance to medical therapy. she is motivated and will be a successful candidate. We will screen her for procedure.    Will continue to closely monitor for side effects of patient's high risk medication(s) including liver, renal function and electrolytes.    I will see  patient back in our Heart Failure Clinic with lab tests and studies results in 1 weeks.    I thank you for referring patient to our Heart Failure Clinic today.        Yudelka Sanchez, IMELDAR.N.  [address] [facility] [address]  [facility] [address]  VIA In Basket

## 2018-05-30 NOTE — PROGRESS NOTES
Chief Complaint   Patient presents with   • CHF (Diastolic)       Subjective:   Andree Egan is a 86 y.o. female who presents today for cardiac care and evaluation in our heart failure program because of recent hospital stay for heart failure exacerbation. Patient was diagnosed with heart failure with preserved left ventricular systolic function.  She was diuresed.  During her hospital stay, she was scheduled to undergo thoracentesis but that was not successful. She developed hemoptysis afterwards. Her left ventricular systolic function was diagnosed to be 55%. Patient also has history of chronic atrial fibrillation. She also had pacemaker placement. Of note, there was some mentioning of restrictive cardiomyopathy in her documentation. However, I do not have any records specific workup for restrictive cardiomyopathy such as cardiac MRI nor cardiac catheterization. She has moved from [place] to [place].    Patient was able to complete 73 m during his 6 minute walk test. her O2 saturation at baseline was 93% and at the end of the test, the O2 saturation was 92%. she reported 4 level of dyspnea on Ganga scale.      Past Medical History:   Diagnosis Date   • Atrial fibrillation (CMS-HCC) [I48.91] 3/29/2018   • Congestive heart failure (CHF) (HCC) 4/3/2018   • BERTRAND (dyspnea on exertion) 5/4/2018   • Hyperlipidemia    • Hypertension    • Kidney disease    • Long term current use of anticoagulant 3/29/2018   • Pacemaker 4/3/2018   • Pulmonary emphysema (HCC)    • Secondary hypertension 4/3/2018   • Thyroid disease    • Tremors of nervous system 4/3/2018   • Urinary tract infection      History reviewed. No pertinent surgical history.  Family History   Problem Relation Age of Onset   • Fainting Neg Hx      Social History     Social History   • Marital status:      Spouse name: N/A   • Number of children: N/A   • Years of education: N/A     Occupational History   • Not on file.     Social History Main Topics   • Smoking  status: Former Smoker     Packs/day: 0.50     Types: Cigarettes     Quit date: 1/1/1970   • Smokeless tobacco: Never Used   • Alcohol use 1.8 oz/week     3 Glasses of wine per week      Comment: glasses a night before Dinner   • Drug use: No   • Sexual activity: Not on file     Other Topics Concern   • Not on file     Social History Narrative   • No narrative on file     Allergies   Allergen Reactions   • Codeine Vomiting     Outpatient Encounter Prescriptions as of 5/30/2018   Medication Sig Dispense Refill   • mag hydrox-al hydrox-simeth (MAALOX PLUS ES OR MYLANTA DS) 400-400-40 MG/5ML Suspension Take 10 mL by mouth 4 times a day as needed (acid reflux). 560 mL    • furosemide (LASIX) 20 MG Tab Take 1 Tab by mouth every day. 30 Tab 2   • metOLazone (ZAROXOLYN) 5 MG Tab Take 0.5 Tabs by mouth every day. 30 Tab 2   • spironolactone (ALDACTONE) 25 MG Tab Take 1 Tab by mouth every day. 30 Tab 3   • warfarin (COUMADIN) 2.5 MG Tab Take 2.5-3.75 mg by mouth every evening. 3.75mg on Tuesday/Thursday, 2.5mg on all other days     • levothyroxine (SYNTHROID) 50 MCG Tab Take 50 mcg by mouth Every morning on an empty stomach.     • allopurinol (ZYLOPRIM) 300 MG Tab Take 300 mg by mouth every evening.     • gabapentin (NEURONTIN) 300 MG Cap Take 300 mg by mouth 3 times a day.     • acetaminophen (TYLENOL) 500 MG Tab Take 1,000 mg by mouth 1 time daily as needed.     • Calcium-Vitamin D 500-125 MG-UNIT Tab Take 1 tablet by mouth every evening.     • albuterol 108 (90 Base) MCG/ACT Aero Soln inhalation aerosol Inhale 2 Puffs by mouth every 6 hours as needed for Shortness of Breath.     • sertraline (ZOLOFT) 50 MG Tab Take 50 mg by mouth every evening.     • topiramate (TOPAMAX) 25 MG Tab Take 25 mg by mouth 2 times a day.     • potassium chloride SA (K-DUR) 10 MEQ Tab CR Take 10 mEq by mouth every evening.     • metoprolol SR (TOPROL XL) 25 MG TABLET SR 24 HR Take 25 mg by mouth every morning.     • omeprazole (PRILOSEC) 20 MG  "delayed-release capsule Take 20 mg by mouth every morning.       No facility-administered encounter medications on file as of 5/30/2018.      Review of Systems   Constitutional: Negative for diaphoresis and fever.   HENT: Negative for nosebleeds.    Eyes: Negative for blurred vision and double vision.   Respiratory: Positive for shortness of breath. Negative for cough.    Cardiovascular: Negative for chest pain and palpitations.   Gastrointestinal: Negative for abdominal pain.   Genitourinary: Negative for dysuria and frequency.   Musculoskeletal: Negative for falls and myalgias.   Skin: Negative for rash.   Neurological: Negative for dizziness, sensory change and headaches.   Endo/Heme/Allergies: Does not bruise/bleed easily.   Psychiatric/Behavioral: Negative for depression and memory loss.        Objective:   /70   Pulse 92   Resp 16   Ht 1.613 m (5' 3.5\")   Wt 60.1 kg (132 lb 6.4 oz)   SpO2 90%   BMI 23.09 kg/m²     Physical Exam   Constitutional: She is oriented to person, place, and time. No distress.   HENT:   Head: Normocephalic and atraumatic.   Right Ear: External ear normal.   Left Ear: External ear normal.   Eyes: Right eye exhibits no discharge. Left eye exhibits no discharge.   Neck: No JVD present. No thyromegaly present.   Cardiovascular: Normal rate, regular rhythm, normal heart sounds and intact distal pulses.  Exam reveals no gallop and no friction rub.    No murmur heard.  Pulmonary/Chest: Breath sounds normal. No respiratory distress.   Abdominal: Bowel sounds are normal. She exhibits no distension. There is no tenderness.   Musculoskeletal: She exhibits edema. She exhibits no tenderness.   Neurological: She is alert and oriented to person, place, and time. No cranial nerve deficit.   Skin: Skin is warm and dry. She is not diaphoretic.   Psychiatric: She has a normal mood and affect. Her behavior is normal.   Nursing note and vitals reviewed.      Assessment:     1. ACC/AHA stage C " heart failure with preserved ejection fraction (HCC)     2. NYHA class 3 heart failure with preserved ejection fraction (HCC)     3. Chronic atrial fibrillation (HCC)     4. BERTRAND (dyspnea on exertion)     5. Pacemaker     6. Severe tricuspid regurgitation     7. Pulmonary hypertension (HCC)     8. High risk medication use     9. Long term current use of anticoagulant         Medical Decision Making:  Today's Assessment / Status / Plan:   Today, based on physical examination findings, patient is euvolemic. No JVD, lungs are clear to auscultation, no pitting edema in bilateral lower extremities, no ascites.    Dry weight is 132 lbs.    Condition is guarded although patient is functional at home.    We will continue current regimen of diuretics including furosemide 20 mg by mouth once a day, Metolazone 2.5 mg po daily. Spironolactone 25 mg by mouth once a day.    Continue Toprol-XL 25 mg by mouth once a day. Rate control strategy. No benefits from sinus restoration or maintenance at this time.    Continue anticoagulation therapy with Coumadin.    Based on the overall clinical history and profile, patient is a good candidate for Cardiomems implantation system to remotely monitor intracardiac pressures. she will benefit from reduced recurrent hospitalization for heart failure exacerbation and also quality of life improvement. Patient also has a good support system and has shown compliance to medical therapy. she is motivated and will be a successful candidate. We will screen her for procedure.    Will continue to closely monitor for side effects of patient's high risk medication(s) including liver, renal function and electrolytes.    I will see patient back in our Heart Failure Clinic with lab tests and studies results in 1 weeks.    I thank you for referring patient to our Heart Failure Clinic today.

## 2018-05-31 ENCOUNTER — PATIENT OUTREACH (OUTPATIENT)
Dept: HEALTH INFORMATION MANAGEMENT | Facility: OTHER | Age: 83
End: 2018-05-31

## 2018-06-01 ENCOUNTER — NON-PROVIDER VISIT (OUTPATIENT)
Dept: VASCULAR LAB | Facility: MEDICAL CENTER | Age: 83
End: 2018-06-01
Attending: INTERNAL MEDICINE
Payer: MEDICARE

## 2018-06-01 VITALS — SYSTOLIC BLOOD PRESSURE: 110 MMHG | DIASTOLIC BLOOD PRESSURE: 60 MMHG | HEART RATE: 65 BPM

## 2018-06-01 DIAGNOSIS — I48.20 CHRONIC ATRIAL FIBRILLATION (HCC): ICD-10-CM

## 2018-06-01 LAB — INR PPP: 1.5 (ref 2–3.5)

## 2018-06-01 PROCEDURE — 99213 OFFICE O/P EST LOW 20 MIN: CPT

## 2018-06-01 PROCEDURE — 85610 PROTHROMBIN TIME: CPT

## 2018-06-01 NOTE — PROGRESS NOTES
Anticoagulation Summary  As of 6/1/2018    INR goal:   2.0-3.0   TTR:   77.6 % (1.4 mo)   Today's INR:   1.5!   Warfarin maintenance plan:   5 mg (2.5 mg x 2) on Tue, Thu; 3.75 mg (2.5 mg x 1.5) all other days   Weekly warfarin total:   28.75 mg   Plan last modified:   SANDRITA Chacon (4/3/2018)   Next INR check:   6/8/2018   Target end date:   Indefinite    Indications    Chronic atrial fibrillation (HCC) [I48.2]  Long term current use of anticoagulant (Resolved) [Z79.01]             Anticoagulation Episode Summary     INR check location:       Preferred lab:       Send INR reminders to:       Comments:         Anticoagulation Care Providers     Provider Role Specialty Phone number    [facility] Referring  [phone]    Razia Sims D.O. Responsible Family Medicine [phone]    Sebastian Thomas M.D. Responsible Interventional Cardiology [phone]        Anticoagulation Patient Findings      HPI:  Andree Egan seen in clinic today, on anticoagulation therapy with warfarin for AF.  Changes to current medical/health status since last appt: Pt has been hospitalized recently.  Pt's diuretics were changed.  No new DDI of clinical significance.   Denies signs/symptoms of bleeding and/or thrombosis since the last appt.    Denies any interval changes to diet  Denies any interval changes to medications since last appt.   Denies any complications or cost restrictions with current therapy.   BP recorded in vitals.  Pt was discharged on a reduced regimen of warfarin.     A/P   INR  SUB-therapeutic.   Bolus today then resume original regimen.    Follow up appointment in 1 week for anticoagulation.        Polypharmacy visit:    HPI:  Pt was told by her physician that she was on too many medications and the pt was concerned about what she should be taking.  However since that visit with her provider she was hospitalized for BERTRAND and underwent thoracentesis which removed 650 mL.   Appears she has been fluid positive and thus diuretics where changed.  Pt is on metolazone blockade to improve furosemide MOA.     Pt states her medications were changed from her recent hospitalization and has multiple questions.    Current Outpatient Prescriptions on File Prior to Visit   Medication Sig Dispense Refill   • furosemide (LASIX) 20 MG Tab Take 1 Tab by mouth every day. 30 Tab 2   • metOLazone (ZAROXOLYN) 5 MG Tab Take 0.5 Tabs by mouth every day. 30 Tab 2   • spironolactone (ALDACTONE) 25 MG Tab Take 1 Tab by mouth every day. 30 Tab 3   • warfarin (COUMADIN) 2.5 MG Tab Take 2.5-3.75 mg by mouth every evening. 3.75mg on Tuesday/Thursday, 2.5mg on all other days     • levothyroxine (SYNTHROID) 50 MCG Tab Take 50 mcg by mouth Every morning on an empty stomach.     • allopurinol (ZYLOPRIM) 300 MG Tab Take 300 mg by mouth every evening.     • gabapentin (NEURONTIN) 300 MG Cap Take 600 mg by mouth every bedtime.     • acetaminophen (TYLENOL) 500 MG Tab Take 1,000 mg by mouth 1 time daily as needed.     • Calcium-Vitamin D 500-125 MG-UNIT Tab Take 1 tablet by mouth every evening.     • albuterol 108 (90 Base) MCG/ACT Aero Soln inhalation aerosol Inhale 2 Puffs by mouth every 6 hours as needed for Shortness of Breath.     • sertraline (ZOLOFT) 50 MG Tab Take 25 mg by mouth every morning.     • topiramate (TOPAMAX) 25 MG Tab Take 25 mg by mouth 2 times a day.     • potassium chloride SA (K-DUR) 10 MEQ Tab CR Take 10 mEq by mouth every evening.     • metoprolol SR (TOPROL XL) 25 MG TABLET SR 24 HR Take 25 mg by mouth every morning.     • omeprazole (PRILOSEC) 20 MG delayed-release capsule Take 20 mg by mouth every morning.       No current facility-administered medications on file prior to visit.       Lab Results   Component Value Date/Time    SODIUM 141 05/24/2018 09:02 AM    POTASSIUM 3.9 05/24/2018 09:02 AM    CHLORIDE 103 05/24/2018 09:02 AM    CO2 28 05/24/2018 09:02 AM    GLUCOSE 99 05/24/2018 09:02 AM     BUN 29 (H) 05/24/2018 09:02 AM    CREATININE 1.51 (H) 05/24/2018 09:02 AM       Changes to medication are shown in Bold below:    Diuretics and KCl:  No changes, pt is following up with cardiology on 6/4.  Denies orthostatic hypotension or s/s of hypovolemia.   Medications are renally dosed and no high concern with recent BMP.    Allopurinol: 300 mg daily  No changes given recent changes to diuretics, but once she is stable obtaining uric acid level and determining if allopurinol is still required should be assessed.  Will follow up at next visit.    Topiramate: 25 mg BID  Pt states it was started for her tremors.  She is going to schedule with neurology to follow up with topiramate.  No changes.    Levothyroxine:50 mcg daily  Could be exacerbating tremors, however recent TSH WNL.  No unexplained weight loss, but difficult to determine in setting of fluid imbalance.  Pt is taking medication correctly.  Continue to follow.     Gabapentin: Unclear how pt is taking it at home at this time.   Prior to hospital admission pt was taking only at night for restless leg syndrome.  Denies daytime/nighttime dizziness.  Currently she was instructed to take it TID. Will have pt resume taking 600 mg QHS.    Acetaminophen: 500 mg PRN pain  Pt never exceeds 1500 mg/day.  No change.    Sertraline: 50 mg daily (nighttime)  Pt has taken this medication for years.  She is unsure why she started the medication, possibly due to a death in the family.  She feels stable and doesn't feel her grief should be as dramatic as it was in the past.  She is willing to trial a reduce dose to start tapering off of the medication.  Pt to start taking reduced dose of 25 mg and take it in the AM instead of PM.    Metoprolol: XL 25 mg daily  HR is WNL, no change.  After pt is seen by neurologist, may want to see if non-selective beta-blocker would improve rate control and tremors.    Omeprazole: 20 mg  Pt has hiatal hernia, no changes.  She takes it  correctly and is on Calcium-VitaminD supplementation.  Pt prefers to take tums over Malox. D/C Malox.    Warfarin:  See above.    Answered pt questions and clarified how pt should be taking her medications.  Will follow up with the status of her medication changes in 6 weeks.    Chadd Celeste, PharmD   CC Dr Michael Bloch, Yudelka BARTON

## 2018-06-02 ENCOUNTER — HOME CARE VISIT (OUTPATIENT)
Dept: HOME HEALTH SERVICES | Facility: HOME HEALTHCARE | Age: 83
End: 2018-06-02
Payer: MEDICARE

## 2018-06-02 VITALS
WEIGHT: 129 LBS | DIASTOLIC BLOOD PRESSURE: 64 MMHG | SYSTOLIC BLOOD PRESSURE: 140 MMHG | HEIGHT: 64 IN | BODY MASS INDEX: 22.02 KG/M2 | RESPIRATION RATE: 18 BRPM | TEMPERATURE: 97.8 F | HEART RATE: 61 BPM | OXYGEN SATURATION: 98 %

## 2018-06-02 PROCEDURE — 665001 SOC-HOME HEALTH

## 2018-06-02 PROCEDURE — G0493 RN CARE EA 15 MIN HH/HOSPICE: HCPCS

## 2018-06-02 SDOH — ECONOMIC STABILITY: HOUSING INSECURITY: HOME SAFETY: IALS PRESENT IN THE HOME.

## 2018-06-02 SDOH — ECONOMIC STABILITY: HOUSING INSECURITY
HOME SAFETY: OXYGEN SAFETY RISK ASSESSMENT PERFORMED. PATIENT DOES NOT HAVE A NO SMOKING SIGN POSTED IN THE HOME., PT WAS GIVEN A NO SMOKING SIGN AND PROVIDED EDUCATION ABOUT WHY IT IS IMPORTANT TO PLACE ONE. PATIENT DOES HAVE A WORKING FIRE EXTINGUISHER PRESENT

## 2018-06-02 SDOH — ECONOMIC STABILITY: HOUSING INSECURITY: UNSAFE APPLIANCES: 0

## 2018-06-02 SDOH — ECONOMIC STABILITY: HOUSING INSECURITY
HOME SAFETY: IN THE HOME. SMOKE ALARMS ARE PRESENT AND FUNCTIONAL ON EACH LEVEL OF THE HOME. PATIENT DOES HAVE A FIRE ESCAPE PLAN DEVELOPED. PATIENT DOES NOT HAVE FLAMMABLE MATERIALS PRESENT IN THE HOME PRESENTING A FIRE HAZARD. NO EVIDENCE FOUND OF SMOKING MATER

## 2018-06-02 SDOH — ECONOMIC STABILITY: HOUSING INSECURITY: UNSAFE COOKING RANGE AREA: 0

## 2018-06-02 ASSESSMENT — ACTIVITIES OF DAILY LIVING (ADL)
OASIS_M1830: 03
HOME_HEALTH_OASIS: 01

## 2018-06-02 ASSESSMENT — PATIENT HEALTH QUESTIONNAIRE - PHQ9
1. LITTLE INTEREST OR PLEASURE IN DOING THINGS: 00
2. FEELING DOWN, DEPRESSED, IRRITABLE, OR HOPELESS: 00

## 2018-06-04 ENCOUNTER — OFFICE VISIT (OUTPATIENT)
Dept: CARDIOLOGY | Facility: MEDICAL CENTER | Age: 83
End: 2018-06-04
Payer: MEDICARE

## 2018-06-04 ENCOUNTER — HOME CARE VISIT (OUTPATIENT)
Dept: HOME HEALTH SERVICES | Facility: HOME HEALTHCARE | Age: 83
End: 2018-06-04
Payer: MEDICARE

## 2018-06-04 ENCOUNTER — TELEPHONE (OUTPATIENT)
Dept: HEALTH INFORMATION MANAGEMENT | Facility: OTHER | Age: 83
End: 2018-06-04

## 2018-06-04 VITALS
WEIGHT: 132 LBS | HEIGHT: 63 IN | HEART RATE: 76 BPM | OXYGEN SATURATION: 93 % | DIASTOLIC BLOOD PRESSURE: 68 MMHG | BODY MASS INDEX: 23.39 KG/M2 | SYSTOLIC BLOOD PRESSURE: 114 MMHG

## 2018-06-04 DIAGNOSIS — Z95.0 PACEMAKER: ICD-10-CM

## 2018-06-04 DIAGNOSIS — I48.20 CHRONIC ATRIAL FIBRILLATION (HCC): ICD-10-CM

## 2018-06-04 DIAGNOSIS — I50.9 HEART FAILURE, NYHA CLASS 2 (HCC): ICD-10-CM

## 2018-06-04 DIAGNOSIS — I50.30 ACC/AHA STAGE C HEART FAILURE WITH PRESERVED EJECTION FRACTION (HCC): ICD-10-CM

## 2018-06-04 DIAGNOSIS — Z79.01 LONG TERM CURRENT USE OF ANTICOAGULANT: ICD-10-CM

## 2018-06-04 DIAGNOSIS — I27.20 PULMONARY HYPERTENSION (HCC): ICD-10-CM

## 2018-06-04 PROCEDURE — 99214 OFFICE O/P EST MOD 30 MIN: CPT | Performed by: NURSE PRACTITIONER

## 2018-06-04 ASSESSMENT — ENCOUNTER SYMPTOMS
COUGH: 0
PND: 0
MYALGIAS: 0
ABDOMINAL PAIN: 0
PALPITATIONS: 0
ORTHOPNEA: 0
CLAUDICATION: 0
SHORTNESS OF BREATH: 0
FEVER: 0
DIZZINESS: 0

## 2018-06-04 NOTE — TELEPHONE ENCOUNTER
Received referral from [facility]. Medications reviewed against discharge summary. Interactions noted between allopurinol and warfarin and levothyroxine and warfarin. Patient is being followed closely by [facility]. Interaction noted between spironolactone and potassium. Patient's last potassium level on 5/24/18 was 3.9 which is WNL. Continue to monitor. No other clinically significant medication issues noted.     Nellie Pierre, PharmD

## 2018-06-04 NOTE — PROGRESS NOTES
Chief Complaint   Patient presents with   • Congestive Heart Failure     F/V: 1 WEEK       Subjective:   Andree Egan is a 86 y.o. female who presents today for follow up on her Heart failure with her granddaughter, Palma.    New patient to the heart failure clinic.  Patient was initially seen in the heart failure clinic on 5/30/2018 with Dr. Sharp.  During her last visit, no changes were made to her medication therapy. Patient and granddaughter did discuss the CardioMEMs intracardiac pressure monitoring device and would like proceed with the implantation.     For her symptoms, patient reports she has been feeling better through the week.  Patient states she essentially has no symptoms of being winded with at rest and with ADLs.  Patient does not exert herself much outside of her apartment.  Patient continues to have mild lower extremity edema.  She denies chest pain, palpitations, orthopnea, PND, dizziness/lightheadedness.    Her home weights have been stable at 129 pounds.    Patient recently relocated to the [facility] from [place].    Additonally, patient has the following medical problems:    -Recent hospitalization for heart failure exacerbation. Pt had a thoracentesis (failed 1st attempt and 2nd attempt successful) for a right Pleural effusion.     -Chronic atrial fibrillation: Rate controlled, taking warfarin, followed by the anticoagulation clinic    -Has permanent pacemaker    -Aortic stenosis    -Pulmonary HTN    -Hypertension    -Hyperlipidemia    -Kidney disease: has an upcoming appointment with Neph.    -Hypothyroidism: Taking levothyroxine.    -COPD    Past Medical History:   Diagnosis Date   • Atrial fibrillation (CMS-HCC) [I48.91] 3/29/2018   • Congestive heart failure (CHF) ([facility]) 4/3/2018   • BERTRAND (dyspnea on exertion) 5/4/2018   • Hyperlipidemia    • Hypertension    • Kidney disease    • Long term current use of anticoagulant 3/29/2018   • Pacemaker 4/3/2018   • Pulmonary emphysema ([facility])    •  Secondary hypertension 4/3/2018   • Thyroid disease    • Tremors of nervous system 4/3/2018   • Urinary tract infection      History reviewed. No pertinent surgical history.  Family History   Problem Relation Age of Onset   • Fainting Neg Hx      Social History     Social History   • Marital status:      Spouse name: N/A   • Number of children: N/A   • Years of education: N/A     Occupational History   • Not on file.     Social History Main Topics   • Smoking status: Former Smoker     Packs/day: 0.50     Types: Cigarettes     Quit date: 1/1/1970   • Smokeless tobacco: Never Used   • Alcohol use 1.8 oz/week     3 Glasses of wine per week      Comment: glasses a night before Dinner   • Drug use: No   • Sexual activity: Not on file     Other Topics Concern   • Not on file     Social History Narrative   • No narrative on file     Allergies   Allergen Reactions   • Codeine Vomiting     Outpatient Encounter Prescriptions as of 6/4/2018   Medication Sig Dispense Refill   • furosemide (LASIX) 20 MG Tab Take 1 Tab by mouth every day. 30 Tab 2   • metOLazone (ZAROXOLYN) 5 MG Tab Take 0.5 Tabs by mouth every day. 30 Tab 2   • spironolactone (ALDACTONE) 25 MG Tab Take 1 Tab by mouth every day. 30 Tab 3   • warfarin (COUMADIN) 2.5 MG Tab Take 2.5-3.75 mg by mouth every evening. 3.75mg on Tuesday/Thursday, 2.5mg on all other days      • levothyroxine (SYNTHROID) 50 MCG Tab Take 50 mcg by mouth Every morning on an empty stomach.     • allopurinol (ZYLOPRIM) 300 MG Tab Take 300 mg by mouth every evening.     • gabapentin (NEURONTIN) 300 MG Cap Take 600 mg by mouth every bedtime.     • acetaminophen (TYLENOL) 500 MG Tab Take 1,000 mg by mouth 1 time daily as needed for Mild Pain.     • Calcium-Vitamin D 500-125 MG-UNIT Tab Take 1 tablet by mouth every evening.     • albuterol 108 (90 Base) MCG/ACT Aero Soln inhalation aerosol Inhale 2 Puffs by mouth every 6 hours as needed for Shortness of Breath.     • sertraline (ZOLOFT) 50  "MG Tab Take 25 mg by mouth every bedtime.     • topiramate (TOPAMAX) 25 MG Tab Take 25 mg by mouth 2 times a day.     • potassium chloride SA (K-DUR) 10 MEQ Tab CR Take 10 mEq by mouth every evening.     • metoprolol SR (TOPROL XL) 25 MG TABLET SR 24 HR Take 25 mg by mouth every morning.     • omeprazole (PRILOSEC) 20 MG delayed-release capsule Take 20 mg by mouth every morning.     • non-formulary med Spray 2 L/min in nose as needed (shortness of breath).       No facility-administered encounter medications on file as of 6/4/2018.      Review of Systems   Constitutional: Negative for fever and malaise/fatigue.   Respiratory: Negative for cough and shortness of breath.    Cardiovascular: Positive for leg swelling. Negative for chest pain, palpitations, orthopnea, claudication and PND.   Gastrointestinal: Negative for abdominal pain.   Musculoskeletal: Negative for myalgias.   Neurological: Negative for dizziness.   All other systems reviewed and are negative.       Objective:   /68   Pulse 76   Ht 1.6 m (5' 3\")   Wt 59.9 kg (132 lb)   SpO2 93%   BMI 23.38 kg/m²     Physical Exam   Constitutional: She is oriented to person, place, and time. She appears well-developed and well-nourished.   HENT:   Head: Normocephalic and atraumatic.   Eyes: EOM are normal. Pupils are equal, round, and reactive to light.   Neck: Normal range of motion. Neck supple. No JVD present.   Cardiovascular: Normal rate and regular rhythm.    Murmur heard.  Pulmonary/Chest: Effort normal and breath sounds normal. No respiratory distress. She has no wheezes. She has no rales.   Right Chest PPM-no erosion drainage or  erythema   Musculoskeletal: She exhibits edema (trace Bilateral LE edema).   Neurological: She is alert and oriented to person, place, and time.   Skin: Skin is warm and dry.   Psychiatric: She has a normal mood and affect. Her behavior is normal.     Lab Results   Component Value Date/Time    CHOLSTRLTOT 83 (L) 05/07/2018 " 12:03 PM    LDL 18 05/07/2018 12:03 PM    HDL 54 05/07/2018 12:03 PM    TRIGLYCERIDE 53 05/07/2018 12:03 PM       Lab Results   Component Value Date/Time    SODIUM 141 05/24/2018 09:02 AM    POTASSIUM 3.9 05/24/2018 09:02 AM    CHLORIDE 103 05/24/2018 09:02 AM    CO2 28 05/24/2018 09:02 AM    GLUCOSE 99 05/24/2018 09:02 AM    BUN 29 (H) 05/24/2018 09:02 AM    CREATININE 1.51 (H) 05/24/2018 09:02 AM     Lab Results   Component Value Date/Time    ALKPHOSPHAT 83 05/22/2018 05:52 AM    ASTSGOT 12 05/22/2018 05:52 AM    ALTSGPT <5 05/22/2018 05:52 AM    TBILIRUBIN 1.1 05/22/2018 05:52 AM      Lab Results   Component Value Date/Time    BNPBTYPENAT 809 (H) 05/21/2018 03:36 AM      Transthoracic Echo Report 5/19/18  Severe bi-atrial enlargement. Normal LV and RV systolic function. LVEF is 55% by visual estimation. Diastolic function difficult to assess.   There is at least mild aortic stenosis but valve is opening. Mean   gradient is 10 mmHg, with V max of 2.01 m/s. RVSP is elevated at 70   mmHg suggesting pulmonary hypertension. No prior study is available for   comparison.       Assessment:     1. ACC/AHA stage C heart failure with preserved ejection fraction (HCC)  BASIC METABOLIC PANEL   2. Heart failure, NYHA class 2 (HCC)  BASIC METABOLIC PANEL   3. Pulmonary hypertension (HCC)     4. Chronic atrial fibrillation (HCC)     5. Long term current use of anticoagulant     6. Pacemaker         Medical Decision Making:  Today's Assessment / Status / Plan:   1. HFpEF, stage C, class 2 and Pulmonary HTN: pt continues to have LE edema  -Continue furosemide 20 mg daily  -Continue metolazone 2.5 mg daily  -Continue spironolactone 25 mg daily  -Continue potassium (patient and granddaughter to bring in correct dose at next visit)   -BMP in 1 week (if med adjustment needed contact Granddaughter, Palma villasenor)  -Patient to monitor weights at home daily. Pt to call office if weight increasing >3 lbs in 1 day or >5 lbs in 1 week.    -Reinforced s/sx of worsening heart failure with patient and weight monitoring. Pt verbalizes understanding. Pt to call office or RTC if present.   -Discussed with patient and granddaughter that they will be contacted by our nurses to schedule formal CardioMEMs teaching and schedule for procedure in the upcoming month.  -Reviewed risks and benefits of the procedure and Answered all questions related to the cardioMEMs, no other questions at this time    2.  Atrial fibrillation: Rate controlled  -Continue Toprol XL 25 mg daily  -Continue Coumadin, followed by the anticoagulation clinic    3.  Pacemaker:  -Patient is established with the device clinic last check 5/30/2018    FU in clinic in 1 month with Dr. Sharp. Sooner if needed.    Patient verbalizes understanding and agrees with the plan of care.     Collaborating MD: Tanner Sun MD

## 2018-06-05 ENCOUNTER — HOME CARE VISIT (OUTPATIENT)
Dept: HOME HEALTH SERVICES | Facility: HOME HEALTHCARE | Age: 83
End: 2018-06-05
Payer: MEDICARE

## 2018-06-06 ENCOUNTER — HOME CARE VISIT (OUTPATIENT)
Dept: HOME HEALTH SERVICES | Facility: HOME HEALTHCARE | Age: 83
End: 2018-06-06
Payer: MEDICARE

## 2018-06-06 VITALS
TEMPERATURE: 99 F | OXYGEN SATURATION: 99 % | HEART RATE: 64 BPM | SYSTOLIC BLOOD PRESSURE: 115 MMHG | RESPIRATION RATE: 17 BRPM | DIASTOLIC BLOOD PRESSURE: 60 MMHG

## 2018-06-06 VITALS
TEMPERATURE: 99 F | SYSTOLIC BLOOD PRESSURE: 115 MMHG | DIASTOLIC BLOOD PRESSURE: 60 MMHG | RESPIRATION RATE: 17 BRPM | OXYGEN SATURATION: 99 % | HEART RATE: 64 BPM

## 2018-06-06 DIAGNOSIS — I48.20 CHRONIC ATRIAL FIBRILLATION (HCC): ICD-10-CM

## 2018-06-06 PROCEDURE — G0151 HHCP-SERV OF PT,EA 15 MIN: HCPCS

## 2018-06-06 PROCEDURE — G0156 HHCP-SVS OF AIDE,EA 15 MIN: HCPCS

## 2018-06-06 ASSESSMENT — ENCOUNTER SYMPTOMS
INSOMNIA: 0
BLOOD IN STOOL: 0
NAUSEA: 0
PALPITATIONS: 0
NERVOUS/ANXIOUS: 0
HALLUCINATIONS: 0
DIZZINESS: 0
FALLS: 0
WHEEZING: 0
CONSTIPATION: 0
SHORTNESS OF BREATH: 1
DIARRHEA: 0
ABDOMINAL PAIN: 0
VOMITING: 0
SPEECH CHANGE: 0
DEPRESSION: 0
COUGH: 0
DIAPHORESIS: 0
FOCAL WEAKNESS: 0
MEMORY LOSS: 0
HEMOPTYSIS: 0
WEIGHT LOSS: 0
FEVER: 0
EYES NEGATIVE: 1
BRUISES/BLEEDS EASILY: 0
WEAKNESS: 0
HEADACHES: 0
CHILLS: 0
SPUTUM PRODUCTION: 0
SENSORY CHANGE: 0

## 2018-06-06 ASSESSMENT — LIFESTYLE VARIABLES: SUBSTANCE_ABUSE: 0

## 2018-06-06 NOTE — PROGRESS NOTES
[facility] Hospitalist Progress Note    Date of Service: 2018    Chief Complaint  86 y.o. female admitted 2018 with chronic diastolic CHF, AS, chronic A-Fib, pacemaker placement on coumadin, who presented 2018 with progressive SOB and dyspnea on exertion (BERTRAND) and BLE edema for the past 2 weeks.      Interval Problem Update  : Still w edema, CTA negative for PE.  Echo w severe PAH  : R pleural effusion, attempted thoracentesis, 80mL drained, then pt got hemoptysis.  Coumadin held  : Coumadin held, pt would like repeat thora.  Tolerating lasix/metolazone.  Weaned to RA at rest, 1L w ambulation.  INR 1.25  : Thora 650mL removed, coumadin resumed.  Weaned to .5L O2  : Pending Home O2 to be arranged    Consultants/Specialty  Cardiology     Disposition  Wean O2, ambulate  Poss DC in AM        Review of Systems   Constitutional: Positive for malaise/fatigue. Negative for chills, diaphoresis, fever and weight loss.   HENT: Negative.    Eyes: Negative.    Respiratory: Positive for shortness of breath. Negative for cough, hemoptysis, sputum production and wheezing.    Cardiovascular: Positive for leg swelling (Resolved). Negative for chest pain and palpitations.   Gastrointestinal: Negative for abdominal pain, blood in stool, constipation, diarrhea, melena, nausea and vomiting.   Genitourinary: Negative.    Musculoskeletal: Negative for falls.   Skin: Negative for itching and rash.   Neurological: Negative for dizziness, sensory change, speech change, focal weakness, weakness and headaches.   Endo/Heme/Allergies: Does not bruise/bleed easily.   Psychiatric/Behavioral: Negative for depression, hallucinations, memory loss, substance abuse and suicidal ideas. The patient is not nervous/anxious and does not have insomnia.       Physical Exam  Laboratory/Imaging   Hemodynamics  No data recorded.      Pulse  Av.2  Min: 59  Max: 84           Respiratory                    Fluids  No intake or  output data in the 24 hours ending 06/06/18 1530    Nutrition  No orders of the defined types were placed in this encounter.    Physical Exam   Constitutional: She is oriented to person, place, and time. Vital signs are normal. No distress.   HENT:   Head: Normocephalic.   Eyes: Pupils are equal, round, and reactive to light.   Neck: Trachea normal and normal range of motion. Neck supple. No JVD present. Carotid bruit is not present. No thyroid mass and no thyromegaly present.   Cardiovascular: Normal rate, regular rhythm and intact distal pulses.    Murmur heard.  Pulmonary/Chest: Effort normal. No accessory muscle usage. No respiratory distress. She has decreased breath sounds (Right side). She has no rales.   Pacemaker   Abdominal: Soft. Normal appearance and bowel sounds are normal.   Musculoskeletal: Normal range of motion.   Neurological: She is alert and oriented to person, place, and time. She has normal strength.   Skin: Skin is warm, dry and intact.   Psychiatric: She has a normal mood and affect. Her speech is normal and behavior is normal.                                Assessment/Plan     * Acute on chronic diastolic (congestive) heart failure (HCC)- (present on admission)   Assessment & Plan    - Abnormal Echo - Severe bi-atrial enlargement with EF of 55% further recommendations per cardiology   - Lasix 20 PO BID  - Metolazone 5 daily  - Continue to monitor I/O  - Increase activity  - Keep K+ > 4.0 and Mg > 2.0   - KCL 20mEq po BID ordered   - Fluid and salt restriction  - Metop Xl 25        Acute respiratory failure with hypoxia (HCC)   Assessment & Plan    Due to pulmonary edema and R effusion.  CTA negative.  No e/o RAD  -Lasix/ metolazone  -Wean o2  -Ambulate  -Repeat thora 5/23 w 650ml removed        Normocytic anemia   Assessment & Plan    Mild, stable  -Iron studies c/w mild deficiency        Moderate to severe pulmonary hypertension (HCC)   Assessment & Plan    RVSP 70        Pleural effusion    Assessment & Plan    Large lesion on the right, suspect transudative from CHF.  Attempt for R thora 5/21 w 80 ml removed (pt developed hemoptysis so procedure aborted)  -Repeat CXR in AM  -Compressive atelectasis  -No pneumothorax on follow-up chest x-ray  -Repeat US guided thora done 5/23 w 650mL removed        CKD (chronic kidney disease) stage 3, GFR 30-59 ml/min- (present on admission)   Assessment & Plan    - Creatine improved, slowing rising w lasix  - Change lasix to PO  - BMP in am  - Continue to avoid nephrotoxic agents if possible            Warfarin anticoagulation- (present on admission)   Assessment & Plan    Was held for thora  -Resume coumadin  -Bridge w lovenox while in house  -INR in AM        Hypothyroidism- (present on admission)   Assessment & Plan    - Continue Synthroid         GERD (gastroesophageal reflux disease)- (present on admission)   Assessment & Plan    - Continue PPI        Essential hypertension- (present on admission)   Assessment & Plan    - Well controlled will continue Toprol XL 25 mg daily        COPD (chronic obstructive pulmonary disease) (HCC)- (present on admission)   Assessment & Plan    No e/o exacerbation        Cardiac pacemaker in situ- (present on admission)   Assessment & Plan    - Permanent pacemaker for bradycardia - ventricularly paced on a Medtronic pacemaker, single   chamber and has chronic atrial fibrillation        Chronic atrial fibrillation (HCC)- (present on admission)   Assessment & Plan    - Ventricular paced rhythm - Rate controlled  - Continue Toprol 25 mg daily  - INR 2.8 theraputic- Continue Coumadin, goal INR 2-3          Quality-Core Measures   Reviewed items::  EKG reviewed, Labs reviewed, Medications reviewed and Radiology images reviewed  Hunt catheter::  No Hunt  DVT prophylaxis pharmacological::  Enoxaparin (Lovenox)  DVT prophylaxis - mechanical:  SCDs  Coumadin/Lovenox

## 2018-06-07 ENCOUNTER — ANTICOAGULATION MONITORING (OUTPATIENT)
Dept: VASCULAR LAB | Facility: MEDICAL CENTER | Age: 83
End: 2018-06-07

## 2018-06-07 ENCOUNTER — HOME CARE VISIT (OUTPATIENT)
Dept: HOME HEALTH SERVICES | Facility: HOME HEALTHCARE | Age: 83
End: 2018-06-07
Payer: MEDICARE

## 2018-06-07 VITALS
DIASTOLIC BLOOD PRESSURE: 65 MMHG | OXYGEN SATURATION: 97 % | HEART RATE: 87 BPM | TEMPERATURE: 98.7 F | SYSTOLIC BLOOD PRESSURE: 118 MMHG | RESPIRATION RATE: 16 BRPM

## 2018-06-07 VITALS
HEART RATE: 87 BPM | OXYGEN SATURATION: 97 % | BODY MASS INDEX: 23.21 KG/M2 | DIASTOLIC BLOOD PRESSURE: 65 MMHG | SYSTOLIC BLOOD PRESSURE: 118 MMHG | TEMPERATURE: 98.7 F | WEIGHT: 131 LBS | RESPIRATION RATE: 16 BRPM

## 2018-06-07 DIAGNOSIS — I48.20 CHRONIC ATRIAL FIBRILLATION (HCC): ICD-10-CM

## 2018-06-07 LAB — INR PPP: 2.1 (ref 2–3.5)

## 2018-06-07 PROCEDURE — G0152 HHCP-SERV OF OT,EA 15 MIN: HCPCS

## 2018-06-07 PROCEDURE — G0299 HHS/HOSPICE OF RN EA 15 MIN: HCPCS

## 2018-06-07 ASSESSMENT — ENCOUNTER SYMPTOMS
SEVERE DYSPNEA: 1
VOMITING: DENIES
NAUSEA: DENIES
SEVERE DYSPNEA: 1

## 2018-06-07 ASSESSMENT — ACTIVITIES OF DAILY LIVING (ADL)
IADLS_COMMENTS: <!--EPICS-->SEE OT ASSESSMENT<!--EPICE-->
HOUSEKEEPING_ASSISTANCE: 5
SHOPPING_ASSISTANCE: 6
EATING_ASSISTANCE: 0
TOILETING_ASSISTANCE: 0
BATHING_ASSISTIVE_EQUIPMENT_NEEDED: SHOWER CHAIR
ORAL_CARE_ASSISTANCE: 0
DRESSING_UB_ASSISTANCE: 0
BATHING_ASSISTIVE_EQUIPMENT_USED: GRAB BARS, HANDHELD SHOWER
GROOMING_ASSISTANCE: 0
LAUNDRY_ASSISTANCE: 6
TRANSPORTATION_ASSISTANCE: 6
BATHING_ASSISTANCE: 4
DRESSING_LB_ASSISTANCE: 0
BATHING_ASSISTANCE: 2
TELEPHONE_ASSISTANCE: 0
ADLS_COMMENTS: <!--EPICS-->SEE OT ASSESSMENT<!--EPICE-->
MEAL_PREP_ASSISTANCE: 0

## 2018-06-07 NOTE — PROGRESS NOTES
Anticoagulation Summary  As of 6/7/2018    INR goal:   2.0-3.0   TTR:   69.9 % (1.6 mo)   Today's INR:   2.1   Warfarin maintenance plan:   5 mg (2.5 mg x 2) on Tue, Thu; 3.75 mg (2.5 mg x 1.5) all other days   Weekly warfarin total:   28.75 mg   Plan last modified:   SANDRITA Chacon (4/3/2018)   Next INR check:   6/14/2018   Target end date:   Indefinite    Indications    Chronic atrial fibrillation (HCC) [I48.2]  Long term current use of anticoagulant (Resolved) [Z79.01]             Anticoagulation Episode Summary     INR check location:       Preferred lab:       Send INR reminders to:       Comments:   [facility]      Anticoagulation Care Providers     Provider Role Specialty Phone number    [facility] Referring  [phone]    Razia Sims D.O. Responsible Family Medicine [phone]    Sebastian Thomas M.D. Responsible Interventional Cardiology [phone]        Anticoagulation Patient Findings    Spoke to patient on the phone.   INR  therapeutic.   Denies signs/symptoms of bleeding and/or thrombosis.   Denies changes to diet or medications.   Follow up appointment in 1 week(s).    Continue weekly warfarin dose as noted      Scottie Younger, PharmD

## 2018-06-08 ENCOUNTER — HOME CARE VISIT (OUTPATIENT)
Dept: HOME HEALTH SERVICES | Facility: HOME HEALTHCARE | Age: 83
End: 2018-06-08
Payer: MEDICARE

## 2018-06-08 ENCOUNTER — APPOINTMENT (OUTPATIENT)
Dept: VASCULAR LAB | Facility: MEDICAL CENTER | Age: 83
End: 2018-06-08
Attending: INTERNAL MEDICINE
Payer: MEDICARE

## 2018-06-08 RX ORDER — LEVOTHYROXINE SODIUM 0.05 MG/1
50 TABLET ORAL
Qty: 90 TAB | Refills: 3 | Status: ON HOLD | OUTPATIENT
Start: 2018-06-08 | End: 2018-08-20

## 2018-06-09 ENCOUNTER — HOME CARE VISIT (OUTPATIENT)
Dept: HOME HEALTH SERVICES | Facility: HOME HEALTHCARE | Age: 83
End: 2018-06-09
Payer: MEDICARE

## 2018-06-11 ENCOUNTER — HOME CARE VISIT (OUTPATIENT)
Dept: HOME HEALTH SERVICES | Facility: HOME HEALTHCARE | Age: 83
End: 2018-06-11
Payer: MEDICARE

## 2018-06-11 ENCOUNTER — HOSPITAL ENCOUNTER (OUTPATIENT)
Dept: LAB | Facility: MEDICAL CENTER | Age: 83
End: 2018-06-11
Attending: NURSE PRACTITIONER
Payer: MEDICARE

## 2018-06-11 DIAGNOSIS — I50.30 ACC/AHA STAGE C HEART FAILURE WITH PRESERVED EJECTION FRACTION (HCC): ICD-10-CM

## 2018-06-11 DIAGNOSIS — I50.9 HEART FAILURE, NYHA CLASS 2 (HCC): ICD-10-CM

## 2018-06-11 DIAGNOSIS — Z79.01 CHRONIC ANTICOAGULATION: ICD-10-CM

## 2018-06-11 LAB
ANION GAP SERPL CALC-SCNC: 9 MMOL/L (ref 0–11.9)
BUN SERPL-MCNC: 32 MG/DL (ref 8–22)
CALCIUM SERPL-MCNC: 9 MG/DL (ref 8.5–10.5)
CHLORIDE SERPL-SCNC: 100 MMOL/L (ref 96–112)
CO2 SERPL-SCNC: 27 MMOL/L (ref 20–33)
CREAT SERPL-MCNC: 1.29 MG/DL (ref 0.5–1.4)
GLUCOSE SERPL-MCNC: 81 MG/DL (ref 65–99)
POTASSIUM SERPL-SCNC: 4.2 MMOL/L (ref 3.6–5.5)
SODIUM SERPL-SCNC: 136 MMOL/L (ref 135–145)

## 2018-06-11 PROCEDURE — 36415 COLL VENOUS BLD VENIPUNCTURE: CPT

## 2018-06-11 PROCEDURE — 80048 BASIC METABOLIC PNL TOTAL CA: CPT

## 2018-06-12 ENCOUNTER — HOME CARE VISIT (OUTPATIENT)
Dept: HOME HEALTH SERVICES | Facility: HOME HEALTHCARE | Age: 83
End: 2018-06-12
Payer: MEDICARE

## 2018-06-13 ENCOUNTER — OFFICE VISIT (OUTPATIENT)
Dept: CARDIOLOGY | Facility: MEDICAL CENTER | Age: 83
End: 2018-06-13
Payer: MEDICARE

## 2018-06-13 ENCOUNTER — TELEPHONE (OUTPATIENT)
Dept: CARDIOLOGY | Facility: MEDICAL CENTER | Age: 83
End: 2018-06-13

## 2018-06-13 VITALS
DIASTOLIC BLOOD PRESSURE: 64 MMHG | HEIGHT: 63 IN | OXYGEN SATURATION: 96 % | WEIGHT: 133.2 LBS | BODY MASS INDEX: 23.6 KG/M2 | SYSTOLIC BLOOD PRESSURE: 110 MMHG | HEART RATE: 74 BPM

## 2018-06-13 DIAGNOSIS — I27.20 MODERATE TO SEVERE PULMONARY HYPERTENSION (HCC): ICD-10-CM

## 2018-06-13 DIAGNOSIS — I50.32 CHRONIC DIASTOLIC CONGESTIVE HEART FAILURE (HCC): ICD-10-CM

## 2018-06-13 DIAGNOSIS — I48.20 CHRONIC ATRIAL FIBRILLATION (HCC): ICD-10-CM

## 2018-06-13 DIAGNOSIS — J96.01 ACUTE RESPIRATORY FAILURE WITH HYPOXIA (HCC): ICD-10-CM

## 2018-06-13 DIAGNOSIS — I25.10 ASCVD (ARTERIOSCLEROTIC CARDIOVASCULAR DISEASE): ICD-10-CM

## 2018-06-13 DIAGNOSIS — I50.33 ACUTE ON CHRONIC DIASTOLIC (CONGESTIVE) HEART FAILURE (HCC): ICD-10-CM

## 2018-06-13 DIAGNOSIS — I15.9 SECONDARY HYPERTENSION: ICD-10-CM

## 2018-06-13 DIAGNOSIS — J44.9 CHRONIC OBSTRUCTIVE PULMONARY DISEASE, UNSPECIFIED COPD TYPE (HCC): ICD-10-CM

## 2018-06-13 DIAGNOSIS — I10 ESSENTIAL HYPERTENSION: ICD-10-CM

## 2018-06-13 DIAGNOSIS — J90 PLEURAL EFFUSION: ICD-10-CM

## 2018-06-13 DIAGNOSIS — G25.0 ESSENTIAL TREMOR: ICD-10-CM

## 2018-06-13 DIAGNOSIS — N18.30 CKD (CHRONIC KIDNEY DISEASE) STAGE 3, GFR 30-59 ML/MIN (HCC): ICD-10-CM

## 2018-06-13 DIAGNOSIS — I35.0 NONRHEUMATIC AORTIC VALVE STENOSIS: ICD-10-CM

## 2018-06-13 DIAGNOSIS — F41.1 GAD (GENERALIZED ANXIETY DISORDER): ICD-10-CM

## 2018-06-13 PROCEDURE — 99215 OFFICE O/P EST HI 40 MIN: CPT | Performed by: INTERNAL MEDICINE

## 2018-06-13 PROCEDURE — G0180 MD CERTIFICATION HHA PATIENT: HCPCS | Performed by: FAMILY MEDICINE

## 2018-06-13 RX ORDER — POTASSIUM CHLORIDE 750 MG/1
10 TABLET, EXTENDED RELEASE ORAL EVERY EVENING
Qty: 90 TAB | Refills: 1 | Status: SHIPPED | OUTPATIENT
Start: 2018-06-13 | End: 2018-07-17 | Stop reason: SDUPTHER

## 2018-06-13 RX ORDER — ALLOPURINOL 300 MG/1
300 TABLET ORAL EVERY EVENING
Qty: 90 TAB | Refills: 0 | Status: SHIPPED | OUTPATIENT
Start: 2018-06-13 | End: 2018-07-31 | Stop reason: SDUPTHER

## 2018-06-13 RX ORDER — FUROSEMIDE 20 MG/1
20 TABLET ORAL 2 TIMES DAILY
Qty: 60 TAB | Refills: 11 | Status: ON HOLD | OUTPATIENT
Start: 2018-06-13 | End: 2018-07-11

## 2018-06-13 ASSESSMENT — ENCOUNTER SYMPTOMS
GASTROINTESTINAL NEGATIVE: 1
PND: 0
SPUTUM PRODUCTION: 0
WHEEZING: 0
CHILLS: 0
LOSS OF CONSCIOUSNESS: 0
BRUISES/BLEEDS EASILY: 0
STRIDOR: 0
NEUROLOGICAL NEGATIVE: 1
EYES NEGATIVE: 1
CLAUDICATION: 0
PALPITATIONS: 0
WEAKNESS: 0
SHORTNESS OF BREATH: 1
SORE THROAT: 0
HEMOPTYSIS: 0
MUSCULOSKELETAL NEGATIVE: 1
CONSTITUTIONAL NEGATIVE: 1
COUGH: 0
ORTHOPNEA: 0
DIZZINESS: 0
FEVER: 0

## 2018-06-13 NOTE — PROGRESS NOTES
Chief Complaint   Patient presents with   • Congestive Heart Failure     HF est.       Subjective:   Andree Egan is a 86 y.o. female who presents today as an urgent visit for worsening lower extremity edema.  She was recently in the hospital for edema and was started on metolazone spironolactone and low-dose Lasix.  She was previously taking torsemide 20 mg twice per day which had this increased to 40 twice per day which was not working.  She is short of breath after walking.  During her hospitalization she had an echocardiogram that showed a preserved LV systolic function with a RV systolic pressure of 70.  Of note her aortic valve was noted to have a aortic valve area of 1.1 by planimetry however I think this is overestimated.  Furthermore her stroke-volume was given is 46 with an index of 26 giving her a low flow state.  Otherwise her blood pressure is controlled.  She has not had any syncope or chest discomfort.    Past Medical History:   Diagnosis Date   • Atrial fibrillation (CMS-[facility]) [I48.91] 3/29/2018   • Congestive heart failure (CHF) ([facility]) 4/3/2018   • BERTRAND (dyspnea on exertion) 5/4/2018   • Hyperlipidemia    • Hypertension    • Kidney disease    • Long term current use of anticoagulant 3/29/2018   • Pacemaker 4/3/2018   • Pulmonary emphysema ([facility])    • Secondary hypertension 4/3/2018   • Thyroid disease    • Tremors of nervous system 4/3/2018   • Urinary tract infection      No past surgical history on file.  Family History   Problem Relation Age of Onset   • Fainting Neg Hx      Social History     Social History   • Marital status:      Spouse name: N/A   • Number of children: N/A   • Years of education: N/A     Occupational History   • Not on file.     Social History Main Topics   • Smoking status: Former Smoker     Packs/day: 0.50     Types: Cigarettes     Quit date: 1/1/1970   • Smokeless tobacco: Never Used   • Alcohol use 1.8 oz/week     3 Glasses of wine per week      Comment: glasses a  night before Dinner   • Drug use: No   • Sexual activity: Not on file     Other Topics Concern   • Not on file     Social History Narrative   • No narrative on file     Allergies   Allergen Reactions   • Codeine Vomiting     Outpatient Encounter Prescriptions as of 6/13/2018   Medication Sig Dispense Refill   • furosemide (LASIX) 20 MG Tab Take 1 Tab by mouth 2 times a day. 60 Tab 11   • levothyroxine (SYNTHROID) 50 MCG Tab Take 1 Tab by mouth Every morning on an empty stomach. 90 Tab 3   • metOLazone (ZAROXOLYN) 5 MG Tab Take 0.5 Tabs by mouth every day. 30 Tab 2   • spironolactone (ALDACTONE) 25 MG Tab Take 1 Tab by mouth every day. 30 Tab 3   • warfarin (COUMADIN) 2.5 MG Tab Take 2.5-3.75 mg by mouth every evening. 3.75mg on Tuesday/Thursday, 2.5mg on all other days      • allopurinol (ZYLOPRIM) 300 MG Tab Take 300 mg by mouth every evening.     • gabapentin (NEURONTIN) 300 MG Cap Take 600 mg by mouth every bedtime.     • acetaminophen (TYLENOL) 500 MG Tab Take 1,000 mg by mouth 1 time daily as needed for Mild Pain.     • Calcium-Vitamin D 500-125 MG-UNIT Tab Take 1 tablet by mouth every evening.     • albuterol 108 (90 Base) MCG/ACT Aero Soln inhalation aerosol Inhale 2 Puffs by mouth every 6 hours as needed for Shortness of Breath.     • sertraline (ZOLOFT) 50 MG Tab Take 25 mg by mouth every bedtime.     • topiramate (TOPAMAX) 25 MG Tab Take 25 mg by mouth 2 times a day.     • potassium chloride SA (K-DUR) 10 MEQ Tab CR Take 10 mEq by mouth every evening.     • metoprolol SR (TOPROL XL) 25 MG TABLET SR 24 HR Take 25 mg by mouth every morning.     • omeprazole (PRILOSEC) 20 MG delayed-release capsule Take 20 mg by mouth every morning.     • non-formulary med Spray 2 L/min in nose as needed (shortness of breath).     • [DISCONTINUED] furosemide (LASIX) 20 MG Tab Take 1 Tab by mouth every day. 30 Tab 2     No facility-administered encounter medications on file as of 6/13/2018.      Review of Systems  "  Constitutional: Negative.  Negative for chills, fever and malaise/fatigue.   HENT: Negative.  Negative for sore throat.    Eyes: Negative.    Respiratory: Positive for shortness of breath. Negative for cough, hemoptysis, sputum production, wheezing and stridor.    Cardiovascular: Positive for leg swelling. Negative for chest pain, palpitations, orthopnea, claudication and PND.   Gastrointestinal: Negative.    Genitourinary: Negative.    Musculoskeletal: Negative.    Skin: Negative.    Neurological: Negative.  Negative for dizziness, loss of consciousness and weakness.   Endo/Heme/Allergies: Negative.  Does not bruise/bleed easily.   All other systems reviewed and are negative.       Objective:   /64   Pulse 74   Ht 1.6 m (5' 3\")   Wt 60.4 kg (133 lb 3.2 oz)   SpO2 96%   BMI 23.60 kg/m²     Physical Exam   Constitutional: She appears well-developed and well-nourished. No distress.   HENT:   Head: Normocephalic and atraumatic.   Right Ear: External ear normal.   Left Ear: External ear normal.   Nose: Nose normal.   Mouth/Throat: No oropharyngeal exudate.   Eyes: Conjunctivae and EOM are normal. Pupils are equal, round, and reactive to light. Right eye exhibits no discharge. Left eye exhibits no discharge. No scleral icterus.   Neck: Neck supple. No JVD present.   Cardiovascular: Normal rate, regular rhythm and intact distal pulses.  Exam reveals no gallop and no friction rub.    Murmur heard.  Pulmonary/Chest: Effort normal. No stridor. No respiratory distress. She has no wheezes. She has no rales. She exhibits no tenderness.   Abdominal: Soft. She exhibits no distension. There is no guarding.   Musculoskeletal: Normal range of motion. She exhibits no edema, tenderness or deformity.   Neurological: She is alert. She has normal reflexes. She displays normal reflexes. No cranial nerve deficit. She exhibits normal muscle tone. Coordination normal.   Skin: Skin is warm and dry. No rash noted. She is not " diaphoretic. No erythema. No pallor.   Psychiatric: She has a normal mood and affect. Her behavior is normal. Judgment and thought content normal.   Nursing note and vitals reviewed.      Assessment:     1. Acute on chronic diastolic (congestive) heart failure (HCC)     2. Acute respiratory failure with hypoxia (HCC)     3. ASCVD (arteriosclerotic cardiovascular disease)     4. Chronic atrial fibrillation (HCC)     5. Chronic diastolic congestive heart failure (HCC)     6. CKD (chronic kidney disease) stage 3, GFR 30-59 ml/min  furosemide (LASIX) 20 MG Tab    BASIC METABOLIC PANEL   7. Chronic obstructive pulmonary disease, unspecified COPD type (HCC)  furosemide (LASIX) 20 MG Tab   8. Essential hypertension     9. Essential tremor     10. ARELY (generalized anxiety disorder)     11. Moderate to severe pulmonary hypertension (HCC)     12. Nonrheumatic aortic valve stenosis     13. Pleural effusion     14. Secondary hypertension         Medical Decision Making:  Today's Assessment / Status / Plan:     86-year-old female with what appears to be at least moderate potentially severe aortic stenosis.  Given her symptoms and lower extremity edema which is refractory to diuretics I will have her increase her Lasix to 20 mg twice per day and continue on the metolazone and spironolactone.  In the meantime I think we need to further investigate her aortic valve.  We will send her for an invasive valve study.  If this is negative or showing only moderate aortic stenosis then at that point we can consider an implantable PA monitor however for now I think we need to clarify the valve issue.    Thank for you allowing me to take part in your patient's care, please call should you have any questions or would like to discuss this patient.

## 2018-06-13 NOTE — LETTER
[facility]   [address] [facility] [address]  [place] [address]  Phone: [phone]  Fax: [phone]              Andree Egan  4/21/1932    Encounter Date: 6/13/2018    Jose Simms M.D.          PROGRESS NOTE:  Chief Complaint   Patient presents with   • Congestive Heart Failure     HF est.       Subjective:   Andree Egan is a 86 y.o. female who presents today as an urgent visit for worsening lower extremity edema.  She was recently in the hospital for edema and was started on metolazone spironolactone and low-dose Lasix.  She was previously taking torsemide 20 mg twice per day which had this increased to 40 twice per day which was not working.  She is short of breath after walking.  During her hospitalization she had an echocardiogram that showed a preserved LV systolic function with a RV systolic pressure of 70.  Of note her aortic valve was noted to have a aortic valve area of 1.1 by planimetry however I think this is overestimated.  Furthermore her stroke-volume was given is 46 with an index of 26 giving her a low flow state.  Otherwise her blood pressure is controlled.  She has not had any syncope or chest discomfort.    Past Medical History:   Diagnosis Date   • Atrial fibrillation (CMS-HCC) [I48.91] 3/29/2018   • Congestive heart failure (CHF) ([facility]) 4/3/2018   • BERTRAND (dyspnea on exertion) 5/4/2018   • Hyperlipidemia    • Hypertension    • Kidney disease    • Long term current use of anticoagulant 3/29/2018   • Pacemaker 4/3/2018   • Pulmonary emphysema ([facility])    • Secondary hypertension 4/3/2018   • Thyroid disease    • Tremors of nervous system 4/3/2018   • Urinary tract infection      No past surgical history on file.  Family History   Problem Relation Age of Onset   • Fainting Neg Hx      Social History     Social History   • Marital status:      Spouse name: N/A   • Number of children: N/A   • Years of education: N/A     Occupational History    • Not on file.     Social History Main Topics   • Smoking status: Former Smoker     Packs/day: 0.50     Types: Cigarettes     Quit date: 1/1/1970   • Smokeless tobacco: Never Used   • Alcohol use 1.8 oz/week     3 Glasses of wine per week      Comment: glasses a night before Dinner   • Drug use: No   • Sexual activity: Not on file     Other Topics Concern   • Not on file     Social History Narrative   • No narrative on file     Allergies   Allergen Reactions   • Codeine Vomiting     Outpatient Encounter Prescriptions as of 6/13/2018   Medication Sig Dispense Refill   • furosemide (LASIX) 20 MG Tab Take 1 Tab by mouth 2 times a day. 60 Tab 11   • levothyroxine (SYNTHROID) 50 MCG Tab Take 1 Tab by mouth Every morning on an empty stomach. 90 Tab 3   • metOLazone (ZAROXOLYN) 5 MG Tab Take 0.5 Tabs by mouth every day. 30 Tab 2   • spironolactone (ALDACTONE) 25 MG Tab Take 1 Tab by mouth every day. 30 Tab 3   • warfarin (COUMADIN) 2.5 MG Tab Take 2.5-3.75 mg by mouth every evening. 3.75mg on Tuesday/Thursday, 2.5mg on all other days      • allopurinol (ZYLOPRIM) 300 MG Tab Take 300 mg by mouth every evening.     • gabapentin (NEURONTIN) 300 MG Cap Take 600 mg by mouth every bedtime.     • acetaminophen (TYLENOL) 500 MG Tab Take 1,000 mg by mouth 1 time daily as needed for Mild Pain.     • Calcium-Vitamin D 500-125 MG-UNIT Tab Take 1 tablet by mouth every evening.     • albuterol 108 (90 Base) MCG/ACT Aero Soln inhalation aerosol Inhale 2 Puffs by mouth every 6 hours as needed for Shortness of Breath.     • sertraline (ZOLOFT) 50 MG Tab Take 25 mg by mouth every bedtime.     • topiramate (TOPAMAX) 25 MG Tab Take 25 mg by mouth 2 times a day.     • potassium chloride SA (K-DUR) 10 MEQ Tab CR Take 10 mEq by mouth every evening.     • metoprolol SR (TOPROL XL) 25 MG TABLET SR 24 HR Take 25 mg by mouth every morning.     • omeprazole (PRILOSEC) 20 MG delayed-release capsule Take 20 mg by mouth every morning.     •  "non-formulary med Spray 2 L/min in nose as needed (shortness of breath).     • [DISCONTINUED] furosemide (LASIX) 20 MG Tab Take 1 Tab by mouth every day. 30 Tab 2     No facility-administered encounter medications on file as of 6/13/2018.      Review of Systems   Constitutional: Negative.  Negative for chills, fever and malaise/fatigue.   HENT: Negative.  Negative for sore throat.    Eyes: Negative.    Respiratory: Positive for shortness of breath. Negative for cough, hemoptysis, sputum production, wheezing and stridor.    Cardiovascular: Positive for leg swelling. Negative for chest pain, palpitations, orthopnea, claudication and PND.   Gastrointestinal: Negative.    Genitourinary: Negative.    Musculoskeletal: Negative.    Skin: Negative.    Neurological: Negative.  Negative for dizziness, loss of consciousness and weakness.   Endo/Heme/Allergies: Negative.  Does not bruise/bleed easily.   All other systems reviewed and are negative.       Objective:   /64   Pulse 74   Ht 1.6 m (5' 3\")   Wt 60.4 kg (133 lb 3.2 oz)   SpO2 96%   BMI 23.60 kg/m²      Physical Exam   Constitutional: She appears well-developed and well-nourished. No distress.   HENT:   Head: Normocephalic and atraumatic.   Right Ear: External ear normal.   Left Ear: External ear normal.   Nose: Nose normal.   Mouth/Throat: No oropharyngeal exudate.   Eyes: Conjunctivae and EOM are normal. Pupils are equal, round, and reactive to light. Right eye exhibits no discharge. Left eye exhibits no discharge. No scleral icterus.   Neck: Neck supple. No JVD present.   Cardiovascular: Normal rate, regular rhythm and intact distal pulses.  Exam reveals no gallop and no friction rub.    Murmur heard.  Pulmonary/Chest: Effort normal. No stridor. No respiratory distress. She has no wheezes. She has no rales. She exhibits no tenderness.   Abdominal: Soft. She exhibits no distension. There is no guarding.   Musculoskeletal: Normal range of motion. She " exhibits no edema, tenderness or deformity.   Neurological: She is alert. She has normal reflexes. She displays normal reflexes. No cranial nerve deficit. She exhibits normal muscle tone. Coordination normal.   Skin: Skin is warm and dry. No rash noted. She is not diaphoretic. No erythema. No pallor.   Psychiatric: She has a normal mood and affect. Her behavior is normal. Judgment and thought content normal.   Nursing note and vitals reviewed.      Assessment:     1. Acute on chronic diastolic (congestive) heart failure (HCC)     2. Acute respiratory failure with hypoxia (HCC)     3. ASCVD (arteriosclerotic cardiovascular disease)     4. Chronic atrial fibrillation (HCC)     5. Chronic diastolic congestive heart failure (HCC)     6. CKD (chronic kidney disease) stage 3, GFR 30-59 ml/min  furosemide (LASIX) 20 MG Tab    BASIC METABOLIC PANEL   7. Chronic obstructive pulmonary disease, unspecified COPD type (HCC)  furosemide (LASIX) 20 MG Tab   8. Essential hypertension     9. Essential tremor     10. ARELY (generalized anxiety disorder)     11. Moderate to severe pulmonary hypertension (HCC)     12. Nonrheumatic aortic valve stenosis     13. Pleural effusion     14. Secondary hypertension         Medical Decision Making:  Today's Assessment / Status / Plan:     86-year-old female with what appears to be at least moderate potentially severe aortic stenosis.  Given her symptoms and lower extremity edema which is refractory to diuretics I will have her increase her Lasix to 20 mg twice per day and continue on the metolazone and spironolactone.  In the meantime I think we need to further investigate her aortic valve.  We will send her for an invasive valve study.  If this is negative or showing only moderate aortic stenosis then at that point we can consider an implantable PA monitor however for now I think we need to clarify the valve issue.    Thank for you allowing me to take part in your patient's care, please call  should you have any questions or would like to discuss this patient.      IMELDA PatelR.N.  [address]  [facility] [address]  VIA In Basket

## 2018-06-13 NOTE — TELEPHONE ENCOUNTER
Patient is scheduled on 6-26-18 for a [facility] w/poss with Dr. Garcia per Dr. Simms. Patient was told to hold coumadin for 5 days prior and to hold lasix am day of procedure. Patient was told to check in at 6:00am for a 7:30 procedure. H&P was done on 6-13-18 by . Pre-admit is scheduled on 6-22-18 at 11:15.

## 2018-06-14 ENCOUNTER — HOME CARE VISIT (OUTPATIENT)
Dept: HOME HEALTH SERVICES | Facility: HOME HEALTHCARE | Age: 83
End: 2018-06-14
Payer: MEDICARE

## 2018-06-14 ENCOUNTER — ANTICOAGULATION MONITORING (OUTPATIENT)
Dept: VASCULAR LAB | Facility: MEDICAL CENTER | Age: 83
End: 2018-06-14

## 2018-06-14 VITALS
SYSTOLIC BLOOD PRESSURE: 128 MMHG | OXYGEN SATURATION: 96 % | HEART RATE: 83 BPM | TEMPERATURE: 98.5 F | RESPIRATION RATE: 18 BRPM | DIASTOLIC BLOOD PRESSURE: 70 MMHG

## 2018-06-14 DIAGNOSIS — I48.20 CHRONIC ATRIAL FIBRILLATION (HCC): ICD-10-CM

## 2018-06-14 LAB — INR PPP: 1.5 (ref 2–3.5)

## 2018-06-14 PROCEDURE — G0299 HHS/HOSPICE OF RN EA 15 MIN: HCPCS

## 2018-06-14 PROCEDURE — G0157 HHC PT ASSISTANT EA 15: HCPCS

## 2018-06-14 NOTE — PROGRESS NOTES
Anticoagulation Summary  As of 6/14/2018    INR goal:   2.0-3.0   TTR:   63.0 % (1.8 mo)   Today's INR:   1.5!   Warfarin maintenance plan:   3.75 mg (2.5 mg x 1.5) on Tue, Fri, Sat; 5 mg (2.5 mg x 2) on Thu; 2.5 mg (2.5 mg x 1) all other days   Weekly warfarin total:   23.75 mg   Plan last modified:   SANDRITA Chacon (6/14/2018)   Next INR check:   6/18/2018   Target end date:   Indefinite    Indications    Chronic atrial fibrillation (HCC) [I48.2]  Long term current use of anticoagulant (Resolved) [Z79.01]             Anticoagulation Episode Summary     INR check location:       Preferred lab:       Send INR reminders to:       Comments:   [facility]      Anticoagulation Care Providers     Provider Role Specialty Phone number    [facility] Referring  [phone]    Razia Sims D.O. Responsible Family Medicine [phone]    Sebastian Thomas M.D. Responsible Interventional Cardiology [phone]        Anticoagulation Patient Findings        Spoke with patient by phone. She was not taking the dose we had listed for her. She was taking 3.75 mg on Tue & thur and 2.5 mg rest of the week. Taking the tabs at noon. Her grand-daughter did not get the dosage change last time so she was going on the discharge summary dosage. Will start taking in pm starting today. Please call to grand daughter also POA. Pt is having a cardiac cath on 6/26. Her last Warfarin will be 6/20 per Dr. Garcia. ChadsVas 5 but chads 2, no bridge required. No history of stroke. Holding for 5 days.       Andree Egan seen in clinic today, is on anticoagulation therapy with warfarin for At fib   INR  sub-therapeutic.    Changes to current medical/health status since last appt: none.   Denies signs/symptoms of bleeding and/or thrombosis since the last appt.   Denies any interval changes to diet  Denies any interval changes to medications since last appt.   Denies any complications or cost restrictions with  current therapy  Follow up appointment in 4 days(s).      Take 5 mg tonight and tomorrow night then 2.5 mg on sat and 3.75 mg sun test on Monday with [facility]. Fx to granddaughter at [phone].   The patient is on a high risk medication and is sub- therapeutic. This could lead to clot formation or risk of stroke. Therefore this patient requires close monitoring and follow up.     The patient will go to the ER for falls with a head injury,  blood in urine or stool or any bleeding that last longer than 20 min.        CHUCKIE Chacon.

## 2018-06-15 ENCOUNTER — HOME CARE VISIT (OUTPATIENT)
Dept: HOME HEALTH SERVICES | Facility: HOME HEALTHCARE | Age: 83
End: 2018-06-15
Payer: MEDICARE

## 2018-06-15 PROCEDURE — G0157 HHC PT ASSISTANT EA 15: HCPCS

## 2018-06-15 ASSESSMENT — ENCOUNTER SYMPTOMS
RESPIRATORY SYMPTOMS COMMENTS: NO S/S OF RESP DSITRESS
SHORTNESS OF BREATH: T

## 2018-06-17 VITALS
HEART RATE: 80 BPM | WEIGHT: 131 LBS | RESPIRATION RATE: 18 BRPM | TEMPERATURE: 97.5 F | BODY MASS INDEX: 23.21 KG/M2 | OXYGEN SATURATION: 96 % | SYSTOLIC BLOOD PRESSURE: 110 MMHG | DIASTOLIC BLOOD PRESSURE: 70 MMHG

## 2018-06-17 VITALS
RESPIRATION RATE: 16 BRPM | HEART RATE: 72 BPM | OXYGEN SATURATION: 96 % | DIASTOLIC BLOOD PRESSURE: 60 MMHG | WEIGHT: 133 LBS | BODY MASS INDEX: 23.56 KG/M2 | SYSTOLIC BLOOD PRESSURE: 110 MMHG | TEMPERATURE: 98.9 F

## 2018-06-17 SDOH — ECONOMIC STABILITY: HOUSING INSECURITY: UNSAFE APPLIANCES: 0

## 2018-06-17 SDOH — ECONOMIC STABILITY: HOUSING INSECURITY: UNSAFE COOKING RANGE AREA: 0

## 2018-06-17 ASSESSMENT — ENCOUNTER SYMPTOMS
DEPRESSED MOOD: 1
SEVERE DYSPNEA: 1
DEBILITATING PAIN: 1

## 2018-06-18 ENCOUNTER — HOME CARE VISIT (OUTPATIENT)
Dept: HOME HEALTH SERVICES | Facility: HOME HEALTHCARE | Age: 83
End: 2018-06-18
Payer: MEDICARE

## 2018-06-18 ENCOUNTER — OFFICE VISIT (OUTPATIENT)
Dept: MEDICAL GROUP | Facility: MEDICAL CENTER | Age: 83
End: 2018-06-18
Payer: MEDICARE

## 2018-06-18 ENCOUNTER — HOSPITAL ENCOUNTER (OUTPATIENT)
Dept: LAB | Facility: MEDICAL CENTER | Age: 83
End: 2018-06-18
Attending: NURSE PRACTITIONER
Payer: MEDICARE

## 2018-06-18 ENCOUNTER — ANTICOAGULATION MONITORING (OUTPATIENT)
Dept: VASCULAR LAB | Facility: MEDICAL CENTER | Age: 83
End: 2018-06-18

## 2018-06-18 VITALS
DIASTOLIC BLOOD PRESSURE: 70 MMHG | SYSTOLIC BLOOD PRESSURE: 120 MMHG | TEMPERATURE: 98.4 F | BODY MASS INDEX: 22.85 KG/M2 | HEART RATE: 67 BPM | RESPIRATION RATE: 18 BRPM | OXYGEN SATURATION: 99 % | WEIGHT: 129 LBS

## 2018-06-18 VITALS
SYSTOLIC BLOOD PRESSURE: 110 MMHG | WEIGHT: 130 LBS | OXYGEN SATURATION: 92 % | TEMPERATURE: 98.4 F | HEART RATE: 80 BPM | BODY MASS INDEX: 23.04 KG/M2 | DIASTOLIC BLOOD PRESSURE: 60 MMHG | HEIGHT: 63 IN

## 2018-06-18 DIAGNOSIS — R60.0 PEDAL EDEMA: ICD-10-CM

## 2018-06-18 DIAGNOSIS — D64.9 NORMOCYTIC ANEMIA: ICD-10-CM

## 2018-06-18 DIAGNOSIS — I27.20 MODERATE TO SEVERE PULMONARY HYPERTENSION (HCC): ICD-10-CM

## 2018-06-18 DIAGNOSIS — I48.20 CHRONIC ATRIAL FIBRILLATION (HCC): ICD-10-CM

## 2018-06-18 LAB
ALBUMIN SERPL BCP-MCNC: 3.7 G/DL (ref 3.2–4.9)
ALBUMIN/GLOB SERPL: 1.5 G/DL
ALP SERPL-CCNC: 94 U/L (ref 30–99)
ALT SERPL-CCNC: 7 U/L (ref 2–50)
ANION GAP SERPL CALC-SCNC: 9 MMOL/L (ref 0–11.9)
AST SERPL-CCNC: 11 U/L (ref 12–45)
BASOPHILS # BLD AUTO: 0.9 % (ref 0–1.8)
BASOPHILS # BLD: 0.03 K/UL (ref 0–0.12)
BILIRUB SERPL-MCNC: 0.7 MG/DL (ref 0.1–1.5)
BNP SERPL-MCNC: 404 PG/ML (ref 0–100)
BUN SERPL-MCNC: 42 MG/DL (ref 8–22)
CALCIUM SERPL-MCNC: 8.8 MG/DL (ref 8.5–10.5)
CHLORIDE SERPL-SCNC: 97 MMOL/L (ref 96–112)
CO2 SERPL-SCNC: 31 MMOL/L (ref 20–33)
CREAT SERPL-MCNC: 1.5 MG/DL (ref 0.5–1.4)
EOSINOPHIL # BLD AUTO: 0 K/UL (ref 0–0.51)
EOSINOPHIL NFR BLD: 0 % (ref 0–6.9)
ERYTHROCYTE [DISTWIDTH] IN BLOOD BY AUTOMATED COUNT: 53.1 FL (ref 35.9–50)
GLOBULIN SER CALC-MCNC: 2.5 G/DL (ref 1.9–3.5)
GLUCOSE SERPL-MCNC: 105 MG/DL (ref 65–99)
HCT VFR BLD AUTO: 31.1 % (ref 37–47)
HGB BLD-MCNC: 9.9 G/DL (ref 12–16)
IMM GRANULOCYTES # BLD AUTO: 0.01 K/UL (ref 0–0.11)
IMM GRANULOCYTES NFR BLD AUTO: 0.3 % (ref 0–0.9)
INR PPP: 2.2 (ref 2–3.5)
LYMPHOCYTES # BLD AUTO: 0.87 K/UL (ref 1–4.8)
LYMPHOCYTES NFR BLD: 26.9 % (ref 22–41)
MCH RBC QN AUTO: 29.3 PG (ref 27–33)
MCHC RBC AUTO-ENTMCNC: 31.8 G/DL (ref 33.6–35)
MCV RBC AUTO: 92 FL (ref 81.4–97.8)
MONOCYTES # BLD AUTO: 0.25 K/UL (ref 0–0.85)
MONOCYTES NFR BLD AUTO: 7.7 % (ref 0–13.4)
NEUTROPHILS # BLD AUTO: 2.07 K/UL (ref 2–7.15)
NEUTROPHILS NFR BLD: 64.2 % (ref 44–72)
NRBC # BLD AUTO: 0 K/UL
NRBC BLD-RTO: 0 /100 WBC
PLATELET # BLD AUTO: 168 K/UL (ref 164–446)
PMV BLD AUTO: 11.6 FL (ref 9–12.9)
POTASSIUM SERPL-SCNC: 3.4 MMOL/L (ref 3.6–5.5)
PROT SERPL-MCNC: 6.2 G/DL (ref 6–8.2)
RBC # BLD AUTO: 3.38 M/UL (ref 4.2–5.4)
SODIUM SERPL-SCNC: 137 MMOL/L (ref 135–145)
WBC # BLD AUTO: 3.2 K/UL (ref 4.8–10.8)

## 2018-06-18 PROCEDURE — 83880 ASSAY OF NATRIURETIC PEPTIDE: CPT

## 2018-06-18 PROCEDURE — 80053 COMPREHEN METABOLIC PANEL: CPT

## 2018-06-18 PROCEDURE — 85025 COMPLETE CBC W/AUTO DIFF WBC: CPT

## 2018-06-18 PROCEDURE — G0299 HHS/HOSPICE OF RN EA 15 MIN: HCPCS

## 2018-06-18 PROCEDURE — 36415 COLL VENOUS BLD VENIPUNCTURE: CPT

## 2018-06-18 PROCEDURE — 99214 OFFICE O/P EST MOD 30 MIN: CPT | Performed by: NURSE PRACTITIONER

## 2018-06-18 SDOH — ECONOMIC STABILITY: HOUSING INSECURITY: UNSAFE COOKING RANGE AREA: 0

## 2018-06-18 SDOH — ECONOMIC STABILITY: HOUSING INSECURITY: UNSAFE APPLIANCES: 0

## 2018-06-18 ASSESSMENT — ENCOUNTER SYMPTOMS
RESPIRATORY SYMPTOMS COMMENTS: NO S/S OF RESP DSITRESS
DEPRESSED MOOD: 1

## 2018-06-18 NOTE — PROGRESS NOTES
Subjective:     Andree Egan is a 86 y.o. female who presents with hosp for CHF.    HPI:     Seen in f/u after hosp for CHF.  She is seen by C RN.  They heard crackles in her chest.  No change in SOB.  She has lost wt.  She is having some edema with left worse than rt.   Her last echo in hosp showed RVSP 70.  Severe biatrial enlargment.  EF 55%.    Her cardiac inc her lasix last week.  She is due updated lab.    Her cbc in hosp showed anemai.  Will recheck cbc.  No sx of bleeding.  No black tarry stool  Her lab BNP was 517.    Pt seen in w/u today with granddaughter.     Patient Active Problem List    Diagnosis Date Noted   • Acute on chronic diastolic (congestive) heart failure (HCC) 05/18/2018     Priority: High   • Moderate to severe pulmonary hypertension (HCC) 05/22/2018   • Normocytic anemia 05/22/2018   • Acute respiratory failure with hypoxia ([facility]) 05/22/2018   • Pleural effusion 05/20/2018   • Chronic diastolic congestive heart failure (HCC) 05/14/2018   • COPD (chronic obstructive pulmonary disease) ([facility]) 05/14/2018   • GERD (gastroesophageal reflux disease) 05/14/2018   • Osteoporosis 05/14/2018   • Psoriasis 05/14/2018   • Bilateral lower extremity edema 05/14/2018   • Polypharmacy 05/14/2018   • Hypoxia 05/14/2018   • CKD (chronic kidney disease) stage 3, GFR 30-59 ml/min 05/14/2018   • BERTRAND (dyspnea on exertion) 05/04/2018   • Secondary hypertension 04/03/2018   • Cardiac pacemaker in situ 04/03/2018   • Congestive heart failure (CHF) ([facility]) 04/03/2018   • Tremors of nervous system 04/03/2018   • Chronic atrial fibrillation (HCC) 03/29/2018   • Grief 02/28/2018   • Adjustment disorder with anxiety 02/28/2018   • ARELY (generalized anxiety disorder) 11/29/2017   • Recurrent UTI 10/23/2017   • Nonrheumatic aortic valve stenosis 03/01/2017   • Vaginal atrophy 05/27/2016   • Warfarin anticoagulation 02/02/2016   • ASCVD (arteriosclerotic cardiovascular disease) 03/10/2015   • Persistent insomnia  05/26/2011   • Hypothyroidism 12/09/2009   • Sinoatrial node dysfunction (HCC) 12/09/2009   • Gout 06/01/2009   • Essential tremor 08/25/2008   • Essential hypertension 07/17/2008       Current medicines (including changes today)  Current Outpatient Prescriptions   Medication Sig Dispense Refill   • allopurinol (ZYLOPRIM) 300 MG Tab Take 1 Tab by mouth every evening. 90 Tab 0   • potassium chloride SA (K-DUR) 10 MEQ Tab CR Take 1 Tab by mouth every evening. 90 Tab 1   • furosemide (LASIX) 20 MG Tab Take 1 Tab by mouth 2 times a day. 60 Tab 11   • levothyroxine (SYNTHROID) 50 MCG Tab Take 1 Tab by mouth Every morning on an empty stomach. 90 Tab 3   • metOLazone (ZAROXOLYN) 5 MG Tab Take 0.5 Tabs by mouth every day. 30 Tab 2   • spironolactone (ALDACTONE) 25 MG Tab Take 1 Tab by mouth every day. 30 Tab 3   • warfarin (COUMADIN) 2.5 MG Tab Take 2.5-3.75 mg by mouth every evening. 3.75mg on Tuesday/Thursday, 2.5mg on all other days      • gabapentin (NEURONTIN) 300 MG Cap Take 600 mg by mouth every bedtime.     • acetaminophen (TYLENOL) 500 MG Tab Take 1,000 mg by mouth 1 time daily as needed for Mild Pain.     • Calcium-Vitamin D 500-125 MG-UNIT Tab Take 1 tablet by mouth every evening.     • albuterol 108 (90 Base) MCG/ACT Aero Soln inhalation aerosol Inhale 2 Puffs by mouth every 6 hours as needed for Shortness of Breath.     • sertraline (ZOLOFT) 50 MG Tab Take 25 mg by mouth every bedtime.     • topiramate (TOPAMAX) 25 MG Tab Take 25 mg by mouth 2 times a day.     • metoprolol SR (TOPROL XL) 25 MG TABLET SR 24 HR Take 25 mg by mouth every morning.     • omeprazole (PRILOSEC) 20 MG delayed-release capsule Take 20 mg by mouth every morning.       No current facility-administered medications for this visit.        Allergies   Allergen Reactions   • Codeine Vomiting       ROS  Constitutional: Negative. Negative for fever, chills, weight loss, malaise/fatigue and diaphoresis.   HENT: Negative. Negative for hearing loss,  "ear pain, nosebleeds, congestion, sore throat, neck pain, tinnitus and ear discharge.   Respiratory: Negative. Negative for cough, hemoptysis, sputum production, wheezing and stridor.   Cardiovascular: Negative. Negative for chest pain, palpitations, orthopnea, claudication, PND.   Gastrointestinal: Denies nausea, vomiting, diarrhea, constipation, heartburn, melena or hematochezia.  Genitourinary: Denies dysuria, hematuria, urinary incontinence, frequency or urgency.        Objective:     Blood pressure 110/60, pulse 80, temperature 36.9 °C (98.4 °F), height 1.6 m (5' 3\"), weight 59 kg (130 lb), SpO2 92 %, not currently breastfeeding. Body mass index is 23.03 kg/m².    Physical Exam:  Vitals reviewed.  Constitutional: Oriented to person, place, and time. appears well-developed and well-nourished. No distress.   Cardiovascular: Normal rate, regular rhythm, normal heart sounds and intact distal pulses. Exam reveals no gallop and no friction rub. No murmur heard. No carotid bruits.   Pulmonary/Chest: Effort normal. No stridor. No respiratory distress. no wheezes.  Few crackles RLL.   exhibits no tenderness.   Musculoskeletal: exhibits 1+ rt and 2+ left pedal edema. josr pedal pulses 2+.  Lymphadenopathy: No cervical or supraclavicular adenopathy.   Neurological: Alert and oriented to person, place, and time.  Skin: Skin is warm and dry. No diaphoresis.   Psychiatric: Normal mood and affect. Behavior is normal.      Assessment and Plan:     The following treatment plan was discussed:    1. Moderate to severe pulmonary hypertension (HCC)  COMP METABOLIC PANEL    BTYPE NATRIURETIC PEPTIDE    f/u with cardaci as sched.  has cath set for 6/26/18.     2. Normocytic anemia  CBC WITH DIFFERENTIAL    recheck cbc.  f/u with pt with results.     3. Pedal edema  COMP METABOLIC PANEL    BTYPE NATRIURETIC PEPTIDE    inc since hosp dc.  no wt gain.  alb and protein low on cmp, willrecheck lab.  wear MILAN hose.  low na diet.  keep feet " elevated with sitting.          Followup: Return if symptoms worsen or fail to improve.

## 2018-06-19 ENCOUNTER — TELEPHONE (OUTPATIENT)
Dept: MEDICAL GROUP | Facility: MEDICAL CENTER | Age: 83
End: 2018-06-19

## 2018-06-19 ENCOUNTER — HOME CARE VISIT (OUTPATIENT)
Dept: HOME HEALTH SERVICES | Facility: HOME HEALTHCARE | Age: 83
End: 2018-06-19
Payer: MEDICARE

## 2018-06-19 VITALS
SYSTOLIC BLOOD PRESSURE: 130 MMHG | BODY MASS INDEX: 22.67 KG/M2 | RESPIRATION RATE: 18 BRPM | TEMPERATURE: 98.9 F | DIASTOLIC BLOOD PRESSURE: 60 MMHG | WEIGHT: 128 LBS | OXYGEN SATURATION: 96 % | HEART RATE: 78 BPM

## 2018-06-19 DIAGNOSIS — N18.30 CKD (CHRONIC KIDNEY DISEASE) STAGE 3, GFR 30-59 ML/MIN (HCC): ICD-10-CM

## 2018-06-19 PROCEDURE — G0157 HHC PT ASSISTANT EA 15: HCPCS

## 2018-06-19 NOTE — TELEPHONE ENCOUNTER
Please let pt know that the cbc shows her anemia is sl better.  BNP/monitor for CHF is still high but only 1/2 of what it was in hosp.   GFR is still dec and sl worse than last time.    CMP shows her cr/kidney function is worse.  Is she drinking enough water?  Does she see a neph?  If not she needs to see one and be sure to tell the cardiologist about her dec renal function before the cath. The dye in the cath can hurt the kidneys.

## 2018-06-20 LAB
FUNGUS SPEC CULT: NORMAL
SIGNIFICANT IND 70042: NORMAL
SITE SITE: NORMAL
SOURCE SOURCE: NORMAL

## 2018-06-21 ENCOUNTER — HOME CARE VISIT (OUTPATIENT)
Dept: HOME HEALTH SERVICES | Facility: HOME HEALTHCARE | Age: 83
End: 2018-06-21
Payer: MEDICARE

## 2018-06-21 VITALS
WEIGHT: 128 LBS | BODY MASS INDEX: 22.67 KG/M2 | RESPIRATION RATE: 18 BRPM | OXYGEN SATURATION: 95 % | DIASTOLIC BLOOD PRESSURE: 60 MMHG | SYSTOLIC BLOOD PRESSURE: 105 MMHG | HEART RATE: 70 BPM | TEMPERATURE: 98.9 F

## 2018-06-21 PROCEDURE — G0157 HHC PT ASSISTANT EA 15: HCPCS

## 2018-06-22 ENCOUNTER — TELEPHONE (OUTPATIENT)
Dept: CARDIOLOGY | Facility: MEDICAL CENTER | Age: 83
End: 2018-06-22

## 2018-06-22 NOTE — TELEPHONE ENCOUNTER
"calling about letter she just received with lab results   Received: Today   Message Contents   Chelita Lombardo R.N.             RO/Samina       Patient is calling about test results she got in the mail. She is calling to go over the results. She can be reached at [phone].      Contacted patient, she had received a lab slip \"order\" from 6/13 based on her description.  OV with RO was on 6/13. Only BMP ordered.  Most likely the lab slip wasn't given to patient and mailed to her.  Since then patient has had a CMP on 6/18.  Instructed patient she could throw order away that 6/18 labs (from another ordering provider) would suffice.   "

## 2018-06-25 ENCOUNTER — HOME CARE VISIT (OUTPATIENT)
Dept: HOME HEALTH SERVICES | Facility: HOME HEALTHCARE | Age: 83
End: 2018-06-25
Payer: MEDICARE

## 2018-06-25 NOTE — TELEPHONE ENCOUNTER
Pt informed- pt states she may not be drinking enough water- she does not see neph. Place referral please

## 2018-06-26 ENCOUNTER — APPOINTMENT (OUTPATIENT)
Dept: RADIOLOGY | Facility: MEDICAL CENTER | Age: 83
End: 2018-06-26
Attending: INTERNAL MEDICINE
Payer: MEDICARE

## 2018-06-26 ENCOUNTER — PATIENT OUTREACH (OUTPATIENT)
Dept: HEALTH INFORMATION MANAGEMENT | Facility: OTHER | Age: 83
End: 2018-06-26

## 2018-06-26 ENCOUNTER — HOSPITAL ENCOUNTER (OUTPATIENT)
Facility: MEDICAL CENTER | Age: 83
End: 2018-06-26
Attending: INTERNAL MEDICINE | Admitting: INTERNAL MEDICINE
Payer: MEDICARE

## 2018-06-26 VITALS
BODY MASS INDEX: 23.57 KG/M2 | OXYGEN SATURATION: 98 % | SYSTOLIC BLOOD PRESSURE: 125 MMHG | HEART RATE: 64 BPM | HEIGHT: 63 IN | DIASTOLIC BLOOD PRESSURE: 64 MMHG | RESPIRATION RATE: 18 BRPM | WEIGHT: 133 LBS | TEMPERATURE: 97 F

## 2018-06-26 LAB
ALBUMIN SERPL BCP-MCNC: 3.6 G/DL (ref 3.2–4.9)
ALBUMIN/GLOB SERPL: 1.2 G/DL
ALP SERPL-CCNC: 107 U/L (ref 30–99)
ALT SERPL-CCNC: 7 U/L (ref 2–50)
ANION GAP SERPL CALC-SCNC: 8 MMOL/L (ref 0–11.9)
APTT PPP: 34.2 SEC (ref 24.7–36)
AST SERPL-CCNC: 26 U/L (ref 12–45)
BILIRUB SERPL-MCNC: 1.2 MG/DL (ref 0.1–1.5)
BUN SERPL-MCNC: 46 MG/DL (ref 8–22)
CALCIUM SERPL-MCNC: 8.8 MG/DL (ref 8.5–10.5)
CHLORIDE SERPL-SCNC: 90 MMOL/L (ref 96–112)
CO2 SERPL-SCNC: 30 MMOL/L (ref 20–33)
CREAT SERPL-MCNC: 1.56 MG/DL (ref 0.5–1.4)
EKG IMPRESSION: NORMAL
ERYTHROCYTE [DISTWIDTH] IN BLOOD BY AUTOMATED COUNT: 53.8 FL (ref 35.9–50)
GLOBULIN SER CALC-MCNC: 3 G/DL (ref 1.9–3.5)
GLUCOSE SERPL-MCNC: 94 MG/DL (ref 65–99)
HCT VFR BLD AUTO: 29.6 % (ref 37–47)
HGB BLD-MCNC: 9.3 G/DL (ref 12–16)
INR PPP: 1.06 (ref 0.87–1.13)
MCH RBC QN AUTO: 28.6 PG (ref 27–33)
MCHC RBC AUTO-ENTMCNC: 31.4 G/DL (ref 33.6–35)
MCV RBC AUTO: 91.1 FL (ref 81.4–97.8)
PLATELET # BLD AUTO: 173 K/UL (ref 164–446)
PMV BLD AUTO: 10.9 FL (ref 9–12.9)
POTASSIUM SERPL-SCNC: 4.2 MMOL/L (ref 3.6–5.5)
PROT SERPL-MCNC: 6.6 G/DL (ref 6–8.2)
PROTHROMBIN TIME: 13.5 SEC (ref 12–14.6)
RBC # BLD AUTO: 3.25 M/UL (ref 4.2–5.4)
SODIUM SERPL-SCNC: 128 MMOL/L (ref 135–145)
WBC # BLD AUTO: 3.6 K/UL (ref 4.8–10.8)

## 2018-06-26 PROCEDURE — 700111 HCHG RX REV CODE 636 W/ 250 OVERRIDE (IP)

## 2018-06-26 PROCEDURE — 71046 X-RAY EXAM CHEST 2 VIEWS: CPT

## 2018-06-26 PROCEDURE — 93010 ELECTROCARDIOGRAM REPORT: CPT | Performed by: INTERNAL MEDICINE

## 2018-06-26 PROCEDURE — 85027 COMPLETE CBC AUTOMATED: CPT

## 2018-06-26 PROCEDURE — 85730 THROMBOPLASTIN TIME PARTIAL: CPT

## 2018-06-26 PROCEDURE — 85610 PROTHROMBIN TIME: CPT

## 2018-06-26 PROCEDURE — 93005 ELECTROCARDIOGRAM TRACING: CPT | Performed by: INTERNAL MEDICINE

## 2018-06-26 PROCEDURE — 80053 COMPREHEN METABOLIC PANEL: CPT

## 2018-06-26 RX ORDER — SODIUM CHLORIDE 9 MG/ML
INJECTION, SOLUTION INTRAVENOUS
Status: DISCONTINUED | OUTPATIENT
Start: 2018-06-26 | End: 2018-06-28 | Stop reason: HOSPADM

## 2018-06-26 RX ORDER — HEPARIN SODIUM,PORCINE 1000/ML
VIAL (ML) INJECTION
Status: COMPLETED
Start: 2018-06-26 | End: 2018-06-26

## 2018-06-26 RX ORDER — VERAPAMIL HYDROCHLORIDE 2.5 MG/ML
INJECTION, SOLUTION INTRAVENOUS
Status: COMPLETED
Start: 2018-06-26 | End: 2018-06-26

## 2018-06-26 RX ORDER — MIDAZOLAM HYDROCHLORIDE 1 MG/ML
INJECTION INTRAMUSCULAR; INTRAVENOUS
Status: COMPLETED
Start: 2018-06-26 | End: 2018-06-26

## 2018-06-26 RX ADMIN — VERAPAMIL HYDROCHLORIDE 5 MG: 2.5 INJECTION, SOLUTION INTRAVENOUS at 07:24

## 2018-06-26 RX ADMIN — SODIUM CHLORIDE: 9 INJECTION, SOLUTION INTRAVENOUS at 07:11

## 2018-06-26 RX ADMIN — NITROGLYCERIN 10 ML: 20 INJECTION INTRAVENOUS at 07:24

## 2018-06-26 RX ADMIN — HEPARIN SODIUM 2000 UNITS: 200 INJECTION, SOLUTION INTRAVENOUS at 07:24

## 2018-06-26 RX ADMIN — HEPARIN SODIUM: 1000 INJECTION, SOLUTION INTRAVENOUS; SUBCUTANEOUS at 07:24

## 2018-06-26 RX ADMIN — LIDOCAINE HYDROCHLORIDE 200 MG: 20 INJECTION, SOLUTION INTRAVENOUS at 07:15

## 2018-06-26 NOTE — TELEPHONE ENCOUNTER
Per Dr. Garcia patient had to be rescheduled due to her labs being abnormal. Per Dr. Garcia pt to hold dieretics for 2 days prior and to have pre admit 1 day prior. Patient has been rescheduled to 7-3-18 for [facility] w/poss with Dr. Garcia per Dr. Simms's request. Patient was told to call coumadin clinic to get instructions on what to do with coumadin. Patient was told to check in at 6:00am for an 8:00 procedure.

## 2018-06-26 NOTE — OR NURSING
Case canceled re: kidney function tests.  Pt discharged home. Discharge instructions given to pt prior to leaving unit including when to see PCP, and new medications if applicable. PIV removed. All questions answered. Verbalized understanding. All belongings with pt when leaving unit via wheelchair with CCT.

## 2018-06-27 ENCOUNTER — HOME CARE VISIT (OUTPATIENT)
Dept: HOME HEALTH SERVICES | Facility: HOME HEALTHCARE | Age: 83
End: 2018-06-27
Payer: MEDICARE

## 2018-06-27 ENCOUNTER — ANTICOAGULATION MONITORING (OUTPATIENT)
Dept: VASCULAR LAB | Facility: MEDICAL CENTER | Age: 83
End: 2018-06-27

## 2018-06-27 DIAGNOSIS — I48.20 CHRONIC ATRIAL FIBRILLATION (HCC): ICD-10-CM

## 2018-06-27 RX ORDER — TOPIRAMATE 25 MG/1
25 TABLET ORAL 2 TIMES DAILY
Qty: 60 TAB | Refills: 3 | Status: ON HOLD | OUTPATIENT
Start: 2018-06-27 | End: 2018-08-20

## 2018-06-27 NOTE — PROGRESS NOTES
Anticoagulation Summary  As of 6/27/2018    INR goal:   2.0-3.0   TTR:   55.3 % (2.2 mo)   Today's INR:      Warfarin maintenance plan:   2.5 mg (2.5 mg x 1) on Mon, Wed, Fri; 3.75 mg (2.5 mg x 1.5) all other days   Weekly warfarin total:   22.5 mg   Plan last modified:   Vera CordovaD (6/18/2018)   Next INR check:   7/3/2018   Target end date:   Indefinite    Indications    Chronic atrial fibrillation (HCC) [I48.2]  Long term current use of anticoagulant (Resolved) [Z79.01]             Anticoagulation Episode Summary     INR check location:       Preferred lab:       Send INR reminders to:       Comments:   [facility]         Anticoagulation Care Providers     Provider Role Specialty Phone number    [facility] Referring  [phone]    Razia Sims D.O. Responsible Family Medicine [phone]    Sebastian Thomas M.D. Responsible Interventional Cardiology [phone]        Anticoagulation Patient Findings    Spoke with patient.  Pt's procedure, which she has been holding her warfarin for 5 days prior, on 6/26/18, was rescheduled to 7/3/18.    Spoke with pt, she states that she was instructed to hold warfarin 3 days prior to the 7/3/18. So pt to take warfarin after discharge and then stop on Saturday, 6/30/18.    Repeat INR in 1 week after resumption of warfarin post procedure.     Elva Eddy, PharmD

## 2018-06-28 ENCOUNTER — HOME CARE VISIT (OUTPATIENT)
Dept: HOME HEALTH SERVICES | Facility: HOME HEALTHCARE | Age: 83
End: 2018-06-28
Payer: MEDICARE

## 2018-06-28 ENCOUNTER — TELEPHONE (OUTPATIENT)
Dept: HEALTH INFORMATION MANAGEMENT | Facility: OTHER | Age: 83
End: 2018-06-28

## 2018-06-28 VITALS
OXYGEN SATURATION: 96 % | TEMPERATURE: 98.5 F | RESPIRATION RATE: 16 BRPM | SYSTOLIC BLOOD PRESSURE: 110 MMHG | HEART RATE: 61 BPM | BODY MASS INDEX: 23.91 KG/M2 | DIASTOLIC BLOOD PRESSURE: 58 MMHG | WEIGHT: 135 LBS

## 2018-06-28 VITALS
RESPIRATION RATE: 16 BRPM | WEIGHT: 135 LBS | TEMPERATURE: 98.5 F | HEART RATE: 61 BPM | BODY MASS INDEX: 23.91 KG/M2 | SYSTOLIC BLOOD PRESSURE: 110 MMHG | OXYGEN SATURATION: 96 % | DIASTOLIC BLOOD PRESSURE: 50 MMHG

## 2018-06-28 PROCEDURE — G0299 HHS/HOSPICE OF RN EA 15 MIN: HCPCS

## 2018-06-28 PROCEDURE — G0151 HHCP-SERV OF PT,EA 15 MIN: HCPCS

## 2018-06-28 ASSESSMENT — ENCOUNTER SYMPTOMS
NAUSEA: DENIES
VOMITING: DENIES

## 2018-06-28 NOTE — TELEPHONE ENCOUNTER
Received referral from [facility]. Notified by [facility] RN that patient had GLF yesterday. Per [facility] RN patient was evaluated by paramedics who assessed patient and did not note signs of bleeding. Spoke with patient, she states she does not have any bleeding and is following plan from coumadin clinic to take dose tonight and tomorrow and stopping dose on 6/30 for procedure scheduled 7/03/18. Left message with [facility] coumadin clinic regarding patient's GLF. Medications reviewed. Interaction noted between spironolactone and potassium for increased risk of hyperkalemia. Patient's last potassium level on 6/26/18 was 4.2 which is WNL. Continue to monitor.     Nellie Pierre, VeraD

## 2018-06-29 ENCOUNTER — HOME CARE VISIT (OUTPATIENT)
Dept: HOME HEALTH SERVICES | Facility: HOME HEALTHCARE | Age: 83
End: 2018-06-29
Payer: MEDICARE

## 2018-06-30 ENCOUNTER — APPOINTMENT (OUTPATIENT)
Dept: RADIOLOGY | Facility: MEDICAL CENTER | Age: 83
End: 2018-06-30
Attending: EMERGENCY MEDICINE
Payer: MEDICARE

## 2018-06-30 ENCOUNTER — HOME CARE VISIT (OUTPATIENT)
Dept: HOME HEALTH SERVICES | Facility: HOME HEALTHCARE | Age: 83
End: 2018-06-30
Payer: MEDICARE

## 2018-06-30 ENCOUNTER — HOSPITAL ENCOUNTER (EMERGENCY)
Facility: MEDICAL CENTER | Age: 83
End: 2018-06-30
Attending: EMERGENCY MEDICINE
Payer: MEDICARE

## 2018-06-30 VITALS
HEIGHT: 63 IN | RESPIRATION RATE: 14 BRPM | DIASTOLIC BLOOD PRESSURE: 49 MMHG | BODY MASS INDEX: 24.61 KG/M2 | HEART RATE: 61 BPM | SYSTOLIC BLOOD PRESSURE: 114 MMHG | TEMPERATURE: 98.3 F | OXYGEN SATURATION: 97 % | WEIGHT: 138.89 LBS

## 2018-06-30 VITALS
OXYGEN SATURATION: 94 % | TEMPERATURE: 97.1 F | HEART RATE: 64 BPM | BODY MASS INDEX: 24.09 KG/M2 | DIASTOLIC BLOOD PRESSURE: 64 MMHG | RESPIRATION RATE: 18 BRPM | SYSTOLIC BLOOD PRESSURE: 122 MMHG | WEIGHT: 136 LBS

## 2018-06-30 DIAGNOSIS — M54.2 NECK PAIN: ICD-10-CM

## 2018-06-30 DIAGNOSIS — R53.1 WEAKNESS: ICD-10-CM
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
## 2018-06-30 DIAGNOSIS — S00.03XA CONTUSION OF SCALP, INITIAL ENCOUNTER: ICD-10-CM

## 2018-06-30 LAB
ALBUMIN SERPL BCP-MCNC: 3.4 G/DL (ref 3.2–4.9)
ALBUMIN/GLOB SERPL: 1.2 G/DL
ALP SERPL-CCNC: 98 U/L (ref 30–99)
ALT SERPL-CCNC: 7 U/L (ref 2–50)
ANION GAP SERPL CALC-SCNC: 10 MMOL/L (ref 0–11.9)
APTT PPP: 37.7 SEC (ref 24.7–36)
AST SERPL-CCNC: 16 U/L (ref 12–45)
BASOPHILS # BLD AUTO: 0.6 % (ref 0–1.8)
BASOPHILS # BLD: 0.02 K/UL (ref 0–0.12)
BILIRUB SERPL-MCNC: 1 MG/DL (ref 0.1–1.5)
BUN SERPL-MCNC: 38 MG/DL (ref 8–22)
CALCIUM SERPL-MCNC: 9.3 MG/DL (ref 8.5–10.5)
CHLORIDE SERPL-SCNC: 81 MMOL/L (ref 96–112)
CO2 SERPL-SCNC: 31 MMOL/L (ref 20–33)
CREAT SERPL-MCNC: 1.3 MG/DL (ref 0.5–1.4)
EOSINOPHIL # BLD AUTO: 0 K/UL (ref 0–0.51)
EOSINOPHIL NFR BLD: 0 % (ref 0–6.9)
ERYTHROCYTE [DISTWIDTH] IN BLOOD BY AUTOMATED COUNT: 48.3 FL (ref 35.9–50)
GLOBULIN SER CALC-MCNC: 2.9 G/DL (ref 1.9–3.5)
GLUCOSE SERPL-MCNC: 109 MG/DL (ref 65–99)
HCT VFR BLD AUTO: 28.5 % (ref 37–47)
HGB BLD-MCNC: 9.5 G/DL (ref 12–16)
IMM GRANULOCYTES # BLD AUTO: 0.02 K/UL (ref 0–0.11)
IMM GRANULOCYTES NFR BLD AUTO: 0.6 % (ref 0–0.9)
INR PPP: 1.03 (ref 0.87–1.13)
LYMPHOCYTES # BLD AUTO: 0.62 K/UL (ref 1–4.8)
LYMPHOCYTES NFR BLD: 18.7 % (ref 22–41)
MCH RBC QN AUTO: 28.7 PG (ref 27–33)
MCHC RBC AUTO-ENTMCNC: 33.3 G/DL (ref 33.6–35)
MCV RBC AUTO: 86.1 FL (ref 81.4–97.8)
MONOCYTES # BLD AUTO: 0.28 K/UL (ref 0–0.85)
MONOCYTES NFR BLD AUTO: 8.5 % (ref 0–13.4)
NEUTROPHILS # BLD AUTO: 2.37 K/UL (ref 2–7.15)
NEUTROPHILS NFR BLD: 71.6 % (ref 44–72)
NRBC # BLD AUTO: 0 K/UL
NRBC BLD-RTO: 0 /100 WBC
PLATELET # BLD AUTO: 146 K/UL (ref 164–446)
PMV BLD AUTO: 10.6 FL (ref 9–12.9)
POTASSIUM SERPL-SCNC: 3.4 MMOL/L (ref 3.6–5.5)
PROT SERPL-MCNC: 6.3 G/DL (ref 6–8.2)
PROTHROMBIN TIME: 13.2 SEC (ref 12–14.6)
RBC # BLD AUTO: 3.31 M/UL (ref 4.2–5.4)
SODIUM SERPL-SCNC: 122 MMOL/L (ref 135–145)
WBC # BLD AUTO: 3.3 K/UL (ref 4.8–10.8)

## 2018-06-30 PROCEDURE — G0493 RN CARE EA 15 MIN HH/HOSPICE: HCPCS

## 2018-06-30 PROCEDURE — 70450 CT HEAD/BRAIN W/O DYE: CPT

## 2018-06-30 PROCEDURE — 80053 COMPREHEN METABOLIC PANEL: CPT

## 2018-06-30 PROCEDURE — 85730 THROMBOPLASTIN TIME PARTIAL: CPT

## 2018-06-30 PROCEDURE — 99284 EMERGENCY DEPT VISIT MOD MDM: CPT | Mod: 25

## 2018-06-30 PROCEDURE — 85610 PROTHROMBIN TIME: CPT

## 2018-06-30 PROCEDURE — 85025 COMPLETE CBC W/AUTO DIFF WBC: CPT

## 2018-06-30 PROCEDURE — 72125 CT NECK SPINE W/O DYE: CPT

## 2018-06-30 ASSESSMENT — PAIN SCALES - GENERAL: PAINLEVEL_OUTOF10: 3

## 2018-06-30 NOTE — ED PROVIDER NOTES
ED Provider Note    Scribed for Casey Palomares M.D. by Sebastián Weinstein. 6/30/2018  3:22 PM    Primary Care Provider: ELIZABETH Patel  Means of arrival: Walk-in  History limited by: None    CHIEF COMPLAINT  Chief Complaint   Patient presents with   • T-5000 GLF     tripped and hit her head 4 days ago, takes coumadin. Denies headache, c/o dizziness and neck pain since event. no midline neck tenderness.    • Neck Pain   • Dizziness       HPI  Andree Egan is a 86 y.o. female who presents to the ED complaining of injuries sustained during a fall that occurred 4 days ago. Patient says she tripped on her own feet while walking, falling to the ground and striking her head. She denies sustaining any injuries but says she was unable to get off the ground for 2-3 minutes before EMS arrived. Since the fall, she has been experiencing light headedness and neck pain. Otherwise, she feels normal and had been doing her regular daily activities before her home care nurse recommended she present to the ED due to her symptoms. Prior to onset, patient has been experiencing dizziness when turning her head.   She confirms coumadin use secondary to atrial fibrillation, but has not taken a dose for the last 3 days. She has also been admitted in the past for removal of fluid in her lungs.    Patient denies headache, sore throat, blurred vision, abdominal pain, chest pain, shortness of breath, nausea, vomiting, skin rash, changes in weakness, numbness.    REVIEW OF SYSTEMS    CONSTITUTIONAL:  Denies changes in weakness.  EYES:  Denies blurred vision.  ENT:  Denies sore throat  CARDIOVASCULAR:  Denies chest pain  RESPIRATORY:  Denies shortness of breath  GI:  Denies nausea, vomiting  MUSCULOSKELETAL:  Denies changes in weakness  SKIN:  No rash  NEUROLOGIC:  Confirms dizziness. Denies focal weakness or numbness.  PSYCHIATRIC:  Denies depression.     PAST MEDICAL HISTORY  Past Medical History:   Diagnosis Date   • Atrial  fibrillation ([facility]) [I48.91] 3/29/2018   • Congestive heart failure (CHF) ([facility]) 4/3/2018   • BERTRAND (dyspnea on exertion) 5/4/2018   • Hyperlipidemia    • Hypertension    • Kidney disease    • Long term current use of anticoagulant 3/29/2018   • Pacemaker 4/3/2018   • Pulmonary emphysema ([facility])    • Secondary hypertension 4/3/2018   • Thyroid disease    • Tremors of nervous system 4/3/2018   • Urinary tract infection        FAMILY HISTORY  Family History   Problem Relation Age of Onset   • Fainting Neg Hx        SOCIAL HISTORY   reports that she quit smoking about 48 years ago. Her smoking use included Cigarettes. She smoked 0.50 packs per day. She has never used smokeless tobacco. She reports that she drinks about 1.8 oz of alcohol per week . She reports that she does not use drugs.    SURGICAL HISTORY  History reviewed. No pertinent surgical history.    CURRENT MEDICATIONS    Current Outpatient Prescriptions on File Prior to Encounter   Medication Sig Dispense Refill   • topiramate (TOPAMAX) 25 MG Tab Take 1 Tab by mouth 2 times a day. 60 Tab 3   • allopurinol (ZYLOPRIM) 300 MG Tab Take 1 Tab by mouth every evening. 90 Tab 0   • potassium chloride SA (K-DUR) 10 MEQ Tab CR Take 1 Tab by mouth every evening. 90 Tab 1   • furosemide (LASIX) 20 MG Tab Take 1 Tab by mouth 2 times a day. 60 Tab 11   • levothyroxine (SYNTHROID) 50 MCG Tab Take 1 Tab by mouth Every morning on an empty stomach. 90 Tab 3   • metOLazone (ZAROXOLYN) 5 MG Tab Take 0.5 Tabs by mouth every day. 30 Tab 2   • spironolactone (ALDACTONE) 25 MG Tab Take 1 Tab by mouth every day. 30 Tab 3   • warfarin (COUMADIN) 2.5 MG Tab Take 2.5-3.75 mg by mouth every evening. 3.75mg on Tuesday/Thursday, 2.5mg on all other days      • gabapentin (NEURONTIN) 300 MG Cap Take 600 mg by mouth every bedtime.     • acetaminophen (TYLENOL) 500 MG Tab Take 1,000 mg by mouth 1 time daily as needed for Mild Pain.     • Calcium-Vitamin D 500-125 MG-UNIT Tab Take 1 tablet by  "mouth every evening.     • albuterol 108 (90 Base) MCG/ACT Aero Soln inhalation aerosol Inhale 2 Puffs by mouth every 6 hours as needed for Shortness of Breath.     • sertraline (ZOLOFT) 50 MG Tab Take 25 mg by mouth every bedtime.     • metoprolol SR (TOPROL XL) 25 MG TABLET SR 24 HR Take 25 mg by mouth every morning.     • omeprazole (PRILOSEC) 20 MG delayed-release capsule Take 20 mg by mouth every morning.        ALLERGIES  Allergies   Allergen Reactions   • Codeine Vomiting       PHYSICAL EXAM  VITAL SIGNS: /49   Pulse 64   Temp 36.8 °C (98.3 °F) (Temporal)   Resp 14   Ht 1.6 m (5' 3\")   Wt 63 kg (138 lb 14.2 oz)   SpO2 97%   BMI 24.60 kg/m²      Constitutional: Patient is awake and alert. No acute respiratory distress. Well developed, Well nourished, Non-toxic appearance.  HENT: Normocephalic, 0.5 cm bruise to forehead, no step offs, valdovinos signs, or raccoon eyes. Bilateral external ears normal, Oropharynx pink moist with no exudates, Nose patent.  Eyes: PERRLA, EOMI, Sclera and conjunctiva clear, No discharge.   Neck:  Supple no nuchal rigidity, no thyromegaly or mass. Non-tender  Cardiovascular: Heart is regular rate and rhythm, large murmur, rub or thrill.   Thorax & Lungs: Chest is symmetrical, with good breath sounds. Crackles in the right base. No wheezing. No respiratory distress, No chest tenderness.   Abdomen: Soft, No tenderness no hepatosplenomegaly there is no guarding or rebound, No masses, No pulsatile masses. Bowel sounds are present.  Skin: Warm, Dry, no petechia, purpura, or rash.   Back: Non tender with palpation, No CVA tenderness.   Extremities: 1+ edema bilaterally. Non tender.   Musculoskeletal: Good range of motion to upper and lower extremities.    Neurologic: Alert & oriented to person, time, and place.  Strength is 5 over 5 and symmetric in bilateral upper and lower extremities.  Sensory is intact to light touch to face, arms, and legs.  DTRs are symmetrical in biceps " brachioradialis, patella and Achilles.   Psychiatric: Normal affect    LABS  Results for orders placed or performed during the hospital encounter of 06/30/18   CBC WITH DIFFERENTIAL   Result Value Ref Range    WBC 3.3 (L) 4.8 - 10.8 K/uL    RBC 3.31 (L) 4.20 - 5.40 M/uL    Hemoglobin 9.5 (L) 12.0 - 16.0 g/dL    Hematocrit 28.5 (L) 37.0 - 47.0 %    MCV 86.1 81.4 - 97.8 fL    MCH 28.7 27.0 - 33.0 pg    MCHC 33.3 (L) 33.6 - 35.0 g/dL    RDW 48.3 35.9 - 50.0 fL    Platelet Count 146 (L) 164 - 446 K/uL    MPV 10.6 9.0 - 12.9 fL    Neutrophils-Polys 71.60 44.00 - 72.00 %    Lymphocytes 18.70 (L) 22.00 - 41.00 %    Monocytes 8.50 0.00 - 13.40 %    Eosinophils 0.00 0.00 - 6.90 %    Basophils 0.60 0.00 - 1.80 %    Immature Granulocytes 0.60 0.00 - 0.90 %    Nucleated RBC 0.00 /100 WBC    Neutrophils (Absolute) 2.37 2.00 - 7.15 K/uL    Lymphs (Absolute) 0.62 (L) 1.00 - 4.80 K/uL    Monos (Absolute) 0.28 0.00 - 0.85 K/uL    Eos (Absolute) 0.00 0.00 - 0.51 K/uL    Baso (Absolute) 0.02 0.00 - 0.12 K/uL    Immature Granulocytes (abs) 0.02 0.00 - 0.11 K/uL    NRBC (Absolute) 0.00 K/uL   PROTHROMBIN TIME   Result Value Ref Range    PT 13.2 12.0 - 14.6 sec    INR 1.03 0.87 - 1.13   APTT   Result Value Ref Range    APTT 37.7 (H) 24.7 - 36.0 sec      All labs reviewed by me.    RADIOLOGY/PROCEDURES  CT-CSPINE WITHOUT PLUS RECONS   Final Result         1.  Negative CT scan of the cervical spine.  No fracture or subluxation.      2.  Degenerative changes at C4-5 and C5-6.      3.  2.7 mm anterolisthesis of C3 on C4 which appears to be secondary to bilateral facet arthropathy at this level.      CT-HEAD W/O   Final Result      1.  No acute intracranial process.      2.  Atrophy and small vessel ischemic change.          The radiologist's interpretations of all radiological studies have been reviewed by me.     COURSE & MEDICAL DECISION MAKING  Pertinent Labs & Imaging studies reviewed. (See chart for details)    Differential diagnoses  include but are not limited to intercranial bleed, medication side effects, electrolyte imbalance, neck fracture.    3:22 PM - Patient seen and examined at bedside. Informed patient that she will require CT scans and labs to assess. Coumadin use is concerning for bleeds. Ordered CT head, CT C-spine, CBC, CMP, prothrombin, and APTT.    1830.  Patient's resting comfortably wants to go home.  Long discussion with the patient and her son and daughter-in-law.  They understand that she has been anemic that her sodium level is low that her kidney function is actually little bit improved.  So there is a lot of underlying laboratory abnormalities although some of them looked more improved in the sodium levels little bit lower.  This time though she understands about these things she was offered admission she wants to go home and get followed up by her primary care doctors on Monday.  She plans to have surgery to correct her heart valve this coming week as well.  There is no intracranial bleed no neck fracture.  I believe that she could be discharged home in stable condition she knows that she will take it quite easy at home.  And she will return if she changes her mind about admission or any other problems arise.    Decision Making  Patient who had a fall 4 days ago bumped her head.  She has some neck pain just feeling generalized weak although this is not changed from previously.  She has a large heart murmur and is to have a bowel surgery of this coming week.  She been taken off her Coumadin.  She was seen by the home health care nurse for further evaluation.  CAT scans of her head and neck show no fractures or bleeding.  Her labs are abnormal but they appear to be an abnormal 4 days ago as well.  She is requesting to go home to follow-up as an outpatient with her primary care doctors.  She knows she is at risk for having severe illnesses or side effects or bad outcomes however again she is competent shows capacitance  would like to go home and be treated as an outpatient.    Stable for discharge    FINAL IMPRESSION  1.  Fall  2.  Forehead contusion  3.  Coagulopathy off Coumadin now  4.  Large heart murmur with progressive pleural effusions and leg edema  5.  Anemia chronic  6.  Renal failure chronic improving  7.  Hyponatremia    PLAN  1.  Follow-up primary care doctor on Monday and cardiology  2.  Rest.  3.  Head trauma information sheet/anemia information sheet/hyponatremia information sheet/renal failure information sheet  4.. Return to the emergency department for increased pains, fevers, vomiting or change in condition.     Sebastián FARIAS (Scribe), am scribing for, and in the presence of, Casey Palomares M.D..    Electronically signed by: Sebastián Weinstein (Misael), 6/30/2018    Casey FARIAS M.D. personally performed the services described in this documentation, as scribed by Sebastián Weinstein in my presence, and it is both accurate and complete.    The note accurately reflects work and decisions made by me.  Casey Palomares  6/30/2018  6:40 PM

## 2018-06-30 NOTE — ED TRIAGE NOTES
"Chief Complaint   Patient presents with   • T-5000 GLF     tripped and hit her head 4 days ago, takes coumadin. Denies headache, c/o dizziness and neck pain since event. no midline neck tenderness.    • Neck Pain   • Dizziness     BIB family to triage via w/c for above. Reports she was seen by medics whom recommended she come in following event, however didn't. Was seen by home health nurse today, told to come in. VSS. A&Ox4.     Pt to senior davy. Educated on triage process and to inform staff of any changes.      /49   Pulse 64   Temp 36.8 °C (98.3 °F) (Temporal)   Resp 14   Ht 1.6 m (5' 3\")   Wt 63 kg (138 lb 14.2 oz)   SpO2 97%   BMI 24.60 kg/m²     "

## 2018-07-01 NOTE — DISCHARGE INSTRUCTIONS
"Blunt Trauma  You have been evaluated for injuries. You have been examined and your caregiver has not found injuries serious enough to require hospitalization.  It is common to have multiple bruises and sore muscles following an accident. These tend to feel worse for the first 24 hours. You will feel more stiffness and soreness over the next several hours and worse when you wake up the first morning after your accident. After this point, you should begin to improve with each passing day. The amount of improvement depends on the amount of damage done in the accident.  Following your accident, if some part of your body does not work as it should, or if the pain in any area continues to increase, you should return to the Emergency Department for re-evaluation.   HOME CARE INSTRUCTIONS   Routine care for sore areas should include:  · Ice to sore areas every 2 hours for 20 minutes while awake for the next 2 days.  · Drink extra fluids (not alcohol).  · Take a hot or warm shower or bath once or twice a day to increase blood flow to sore muscles. This will help you \"limber up\".  · Activity as tolerated. Lifting may aggravate neck or back pain.  · Only take over-the-counter or prescription medicines for pain, discomfort, or fever as directed by your caregiver. Do not use aspirin. This may increase bruising or increase bleeding if there are small areas where this is happening.  SEEK IMMEDIATE MEDICAL CARE IF:  · Numbness, tingling, weakness, or problem with the use of your arms or legs.  · A severe headache is not relieved with medications.  · There is a change in bowel or bladder control.  · Increasing pain in any areas of the body.  · Short of breath or dizzy.  · Nauseated, vomiting, or sweating.  · Increasing belly (abdominal) discomfort.  · Blood in urine, stool, or vomiting blood.  · Pain in either shoulder in an area where a shoulder strap would be.  · Feelings of lightheadedness or if you have a fainting " episode.  Sometimes it is not possible to identify all injuries immediately after the trauma. It is important that you continue to monitor your condition after the emergency department visit. If you feel you are not improving, or improving more slowly than should be expected, call your physician. If you feel your symptoms (problems) are worsening, return to the Emergency Department immediately.  Document Released: 09/13/2002 Document Revised: 03/11/2013 Document Reviewed: 08/05/2009  [employer]® Patient Information ©2014 [employer].      Fatigue  Introduction  Fatigue is feeling tired all of the time, a lack of energy, or a lack of motivation. Occasional or mild fatigue is often a normal response to activity or life in general. However, long-lasting (chronic) or extreme fatigue may indicate an underlying medical condition.  Follow these instructions at home:  Watch your fatigue for any changes. The following actions may help to lessen any discomfort you are feeling:  · Talk to your health care provider about how much sleep you need each night. Try to get the required amount every night.  · Take medicines only as directed by your health care provider.  · Eat a healthy and nutritious diet. Ask your health care provider if you need help changing your diet.  · Drink enough fluid to keep your urine clear or pale yellow.  · Practice ways of relaxing, such as yoga, meditation, massage therapy, or acupuncture.  · Exercise regularly.  · Change situations that cause you stress. Try to keep your work and personal routine reasonable.  · Do not abuse illegal drugs.  · Limit alcohol intake to no more than 1 drink per day for nonpregnant women and 2 drinks per day for men. One drink equals 12 ounces of beer, 5 ounces of wine, or 1½ ounces of hard liquor.  · Take a multivitamin, if directed by your health care provider.  Contact a health care provider if:  · Your fatigue does not get better.  · You have a fever.  · You have  unintentional weight loss or gain.  · You have headaches.  · You have difficulty:  ¨ Falling asleep.  ¨ Sleeping throughout the night.  · You feel angry, guilty, anxious, or sad.  · You are unable to have a bowel movement (constipation).  · You skin is dry.  · Your legs or another part of your body is swollen.  Get help right away if:  · You feel confused.  · Your vision is blurry.  · You feel faint or pass out.  · You have a severe headache.  · You have severe abdominal, pelvic, or back pain.  · You have chest pain, shortness of breath, or an irregular or fast heartbeat.  · You are unable to urinate or you urinate less than normal.  · You develop abnormal bleeding, such as bleeding from the rectum, vagina, nose, lungs, or nipples.  · You vomit blood.  · You have thoughts about harming yourself or committing suicide.  · You are worried that you might harm someone else.  This information is not intended to replace advice given to you by your health care provider. Make sure you discuss any questions you have with your health care provider.  Document Released: 10/14/2008 Document Revised: 05/25/2017 Document Reviewed: 04/21/2015  © 2017 [employer]  Anemia, Nonspecific  Anemia is a condition in which the concentration of red blood cells or hemoglobin in the blood is below normal. Hemoglobin is a substance in red blood cells that carries oxygen to the tissues of the body. Anemia results in not enough oxygen reaching these tissues.  What are the causes?  Common causes of anemia include:  · Excessive bleeding. Bleeding may be internal or external. This includes excessive bleeding from periods (in women) or from the intestine.  · Poor nutrition.  · Chronic kidney, thyroid, and liver disease.  · Bone marrow disorders that decrease red blood cell production.  · Cancer and treatments for cancer.  · HIV, AIDS, and their treatments.  · Spleen problems that increase red blood cell destruction.  · Blood disorders.  · Excess  destruction of red blood cells due to infection, medicines, and autoimmune disorders.  What are the signs or symptoms?  · Minor weakness.  · Dizziness.  · Headache.  · Palpitations.  · Shortness of breath, especially with exercise.  · Paleness.  · Cold sensitivity.  · Indigestion.  · Nausea.  · Difficulty sleeping.  · Difficulty concentrating.  Symptoms may occur suddenly or they may develop slowly.  How is this diagnosed?  Additional blood tests are often needed. These help your health care provider determine the best treatment. Your health care provider will check your stool for blood and look for other causes of blood loss.  How is this treated?  Treatment varies depending on the cause of the anemia. Treatment can include:  · Supplements of iron, vitamin B12, or folic acid.  · Hormone medicines.  · A blood transfusion. This may be needed if blood loss is severe.  · Hospitalization. This may be needed if there is significant continual blood loss.  · Dietary changes.  · Spleen removal.  Follow these instructions at home:  Keep all follow-up appointments. It often takes many weeks to correct anemia, and having your health care provider check on your condition and your response to treatment is very important.  Get help right away if:  · You develop extreme weakness, shortness of breath, or chest pain.  · You become dizzy or have trouble concentrating.  · You develop heavy vaginal bleeding.  · You develop a rash.  · You have bloody or black, tarry stools.  · You faint.  · You vomit up blood.  · You vomit repeatedly.  · You have abdominal pain.  · You have a fever or persistent symptoms for more than 2-3 days.  · You have a fever and your symptoms suddenly get worse.  · You are dehydrated.  This information is not intended to replace advice given to you by your health care provider. Make sure you discuss any questions you have with your health care provider.  Document Released: 01/25/2006 Document Revised: 05/31/2017  Document Reviewed: 06/13/2014  [employer] Interactive Patient Education © 2017 [employer]    Hyponatremia  Introduction  Hyponatremia is when the amount of salt (sodium) in your blood is too low. When salt levels are low, your cells absorb extra water and they swell. The swelling happens throughout the body, but it mostly affects the brain.  Follow these instructions at home:  · Take medicines only as told by your doctor. Many medicines can make this condition worse. Talk with your doctor about any medicines that you are currently taking.  · Carefully follow a recommended diet as told by your doctor.  · Carefully follow instructions from your doctor about fluid restrictions.  · Keep all follow-up visits as told by your doctor. This is important.  · Do not drink alcohol.  Contact a doctor if:  · You feel sicker to your stomach (nauseous).  · You feel more confused.  · You feel more tired (fatigued).  · Your headache gets worse.  · You feel weaker.  · Your symptoms go away and then they come back.  · You have trouble following the diet instructions.  Get help right away if:  · You start to twitch and shake (have a seizure).  · You pass out (faint).  · You keep having watery poop (diarrhea).  · You keep throwing up (vomiting).  This information is not intended to replace advice given to you by your health care provider. Make sure you discuss any questions you have with your health care provider.  Document Released: 08/29/2012 Document Revised: 05/25/2017 Document Reviewed: 12/14/2015  © 2017 [employer]

## 2018-07-01 NOTE — ED NOTES
Discharge instructions given to pt and family, verbalized understanding, left with all belongings, wheeled to ED lobby.

## 2018-07-02 ENCOUNTER — HOME CARE VISIT (OUTPATIENT)
Dept: HOME HEALTH SERVICES | Facility: HOME HEALTHCARE | Age: 83
End: 2018-07-02
Payer: MEDICARE

## 2018-07-02 ENCOUNTER — APPOINTMENT (OUTPATIENT)
Dept: ADMISSIONS | Facility: MEDICAL CENTER | Age: 83
End: 2018-07-02
Attending: INTERNAL MEDICINE
Payer: MEDICARE

## 2018-07-02 ENCOUNTER — HOSPITAL ENCOUNTER (OUTPATIENT)
Dept: RADIOLOGY | Facility: MEDICAL CENTER | Age: 83
End: 2018-07-02
Attending: INTERNAL MEDICINE | Admitting: INTERNAL MEDICINE
Payer: MEDICARE

## 2018-07-02 DIAGNOSIS — Z01.811 PRE-OPERATIVE RESPIRATORY EXAMINATION: ICD-10-CM

## 2018-07-02 DIAGNOSIS — Z01.810 PRE-OPERATIVE CARDIOVASCULAR EXAMINATION: ICD-10-CM

## 2018-07-02 LAB — EKG IMPRESSION: NORMAL

## 2018-07-02 PROCEDURE — 71046 X-RAY EXAM CHEST 2 VIEWS: CPT

## 2018-07-03 ENCOUNTER — HOME CARE VISIT (OUTPATIENT)
Dept: HOME HEALTH SERVICES | Facility: HOME HEALTHCARE | Age: 83
End: 2018-07-03
Payer: MEDICARE

## 2018-07-03 ENCOUNTER — HOSPITAL ENCOUNTER (OUTPATIENT)
Facility: MEDICAL CENTER | Age: 83
End: 2018-07-05
Attending: INTERNAL MEDICINE | Admitting: INTERNAL MEDICINE
Payer: MEDICARE

## 2018-07-03 DIAGNOSIS — I48.20 CHRONIC ATRIAL FIBRILLATION (HCC): ICD-10-CM

## 2018-07-03 DIAGNOSIS — I50.32 CHRONIC DIASTOLIC CHF (CONGESTIVE HEART FAILURE) (HCC): ICD-10-CM

## 2018-07-03 LAB
ANION GAP SERPL CALC-SCNC: 8 MMOL/L (ref 0–11.9)
ANION GAP SERPL CALC-SCNC: 9 MMOL/L (ref 0–11.9)
BUN SERPL-MCNC: 26 MG/DL (ref 8–22)
BUN SERPL-MCNC: 27 MG/DL (ref 8–22)
CALCIUM SERPL-MCNC: 8.6 MG/DL (ref 8.5–10.5)
CALCIUM SERPL-MCNC: 8.8 MG/DL (ref 8.5–10.5)
CHLORIDE SERPL-SCNC: 87 MMOL/L (ref 96–112)
CHLORIDE SERPL-SCNC: 90 MMOL/L (ref 96–112)
CO2 SERPL-SCNC: 27 MMOL/L (ref 20–33)
CO2 SERPL-SCNC: 27 MMOL/L (ref 20–33)
CREAT SERPL-MCNC: 0.92 MG/DL (ref 0.5–1.4)
CREAT SERPL-MCNC: 1.08 MG/DL (ref 0.5–1.4)
EKG IMPRESSION: NORMAL
GLUCOSE SERPL-MCNC: 107 MG/DL (ref 65–99)
GLUCOSE SERPL-MCNC: 95 MG/DL (ref 65–99)
INR PPP: 1 (ref 0.87–1.13)
POTASSIUM SERPL-SCNC: 2.9 MMOL/L (ref 3.6–5.5)
POTASSIUM SERPL-SCNC: 3.4 MMOL/L (ref 3.6–5.5)
PROTHROMBIN TIME: 12.9 SEC (ref 12–14.6)
SODIUM SERPL-SCNC: 122 MMOL/L (ref 135–145)
SODIUM SERPL-SCNC: 126 MMOL/L (ref 135–145)

## 2018-07-03 PROCEDURE — C1769 GUIDE WIRE: HCPCS

## 2018-07-03 PROCEDURE — 360979 HCHG DIAGNOSTIC CATH

## 2018-07-03 PROCEDURE — 85610 PROTHROMBIN TIME: CPT

## 2018-07-03 PROCEDURE — C1887 CATHETER, GUIDING: HCPCS

## 2018-07-03 PROCEDURE — 99152 MOD SED SAME PHYS/QHP 5/>YRS: CPT

## 2018-07-03 PROCEDURE — 93460 R&L HRT ART/VENTRICLE ANGIO: CPT

## 2018-07-03 PROCEDURE — 304952 HCHG R 2 PADS

## 2018-07-03 PROCEDURE — G0378 HOSPITAL OBSERVATION PER HR: HCPCS

## 2018-07-03 PROCEDURE — C1894 INTRO/SHEATH, NON-LASER: HCPCS

## 2018-07-03 PROCEDURE — 36415 COLL VENOUS BLD VENIPUNCTURE: CPT

## 2018-07-03 PROCEDURE — 99153 MOD SED SAME PHYS/QHP EA: CPT

## 2018-07-03 PROCEDURE — A9270 NON-COVERED ITEM OR SERVICE: HCPCS | Performed by: NURSE PRACTITIONER

## 2018-07-03 PROCEDURE — A9270 NON-COVERED ITEM OR SERVICE: HCPCS | Performed by: INTERNAL MEDICINE

## 2018-07-03 PROCEDURE — 700111 HCHG RX REV CODE 636 W/ 250 OVERRIDE (IP): Mod: JG

## 2018-07-03 PROCEDURE — 700102 HCHG RX REV CODE 250 W/ 637 OVERRIDE(OP): Performed by: NURSE PRACTITIONER

## 2018-07-03 PROCEDURE — 93010 ELECTROCARDIOGRAM REPORT: CPT | Performed by: INTERNAL MEDICINE

## 2018-07-03 PROCEDURE — C1760 CLOSURE DEV, VASC: HCPCS

## 2018-07-03 PROCEDURE — 80048 BASIC METABOLIC PNL TOTAL CA: CPT

## 2018-07-03 PROCEDURE — C1725 CATH, TRANSLUMIN NON-LASER: HCPCS

## 2018-07-03 PROCEDURE — C9600 PERC DRUG-EL COR STENT SING: HCPCS | Mod: RC

## 2018-07-03 PROCEDURE — 305478 HCHG 7.5FR EDWARDS SWAN/THERMO

## 2018-07-03 PROCEDURE — 160002 HCHG RECOVERY MINUTES (STAT)

## 2018-07-03 PROCEDURE — 700117 HCHG RX CONTRAST REV CODE 255: Performed by: INTERNAL MEDICINE

## 2018-07-03 PROCEDURE — 93005 ELECTROCARDIOGRAM TRACING: CPT | Performed by: INTERNAL MEDICINE

## 2018-07-03 PROCEDURE — 700102 HCHG RX REV CODE 250 W/ 637 OVERRIDE(OP): Performed by: INTERNAL MEDICINE

## 2018-07-03 PROCEDURE — 700102 HCHG RX REV CODE 250 W/ 637 OVERRIDE(OP)

## 2018-07-03 PROCEDURE — C1874 STENT, COATED/COV W/DEL SYS: HCPCS

## 2018-07-03 PROCEDURE — A9270 NON-COVERED ITEM OR SERVICE: HCPCS

## 2018-07-03 PROCEDURE — 99219 PR INITIAL OBSERVATION CARE,LEVL II: CPT | Performed by: INTERNAL MEDICINE

## 2018-07-03 PROCEDURE — 700105 HCHG RX REV CODE 258: Performed by: INTERNAL MEDICINE

## 2018-07-03 RX ORDER — METOLAZONE 5 MG/1
2.5 TABLET ORAL DAILY
Status: DISCONTINUED | OUTPATIENT
Start: 2018-07-03 | End: 2018-07-05

## 2018-07-03 RX ORDER — WARFARIN SODIUM 7.5 MG/1
3.75 TABLET ORAL
Status: DISCONTINUED | OUTPATIENT
Start: 2018-07-05 | End: 2018-07-05 | Stop reason: HOSPADM

## 2018-07-03 RX ORDER — DEXAMETHASONE SODIUM PHOSPHATE 4 MG/ML
4 INJECTION, SOLUTION INTRA-ARTICULAR; INTRALESIONAL; INTRAMUSCULAR; INTRAVENOUS; SOFT TISSUE
Status: DISCONTINUED | OUTPATIENT
Start: 2018-07-03 | End: 2018-07-05 | Stop reason: HOSPADM

## 2018-07-03 RX ORDER — HALOPERIDOL 5 MG/ML
1 INJECTION INTRAMUSCULAR EVERY 6 HOURS PRN
Status: DISCONTINUED | OUTPATIENT
Start: 2018-07-03 | End: 2018-07-05 | Stop reason: HOSPADM

## 2018-07-03 RX ORDER — CLOPIDOGREL 300 MG/1
TABLET, FILM COATED ORAL
Status: COMPLETED
Start: 2018-07-03 | End: 2018-07-03

## 2018-07-03 RX ORDER — FUROSEMIDE 20 MG/1
20 TABLET ORAL 2 TIMES DAILY
Status: DISCONTINUED | OUTPATIENT
Start: 2018-07-03 | End: 2018-07-05 | Stop reason: HOSPADM

## 2018-07-03 RX ORDER — OMEPRAZOLE 20 MG/1
20 CAPSULE, DELAYED RELEASE ORAL EVERY MORNING
Status: DISCONTINUED | OUTPATIENT
Start: 2018-07-03 | End: 2018-07-05 | Stop reason: HOSPADM

## 2018-07-03 RX ORDER — WARFARIN SODIUM 2.5 MG/1
2.5 TABLET ORAL
Status: DISCONTINUED | OUTPATIENT
Start: 2018-07-04 | End: 2018-07-04

## 2018-07-03 RX ORDER — ACETAMINOPHEN 500 MG
1000 TABLET ORAL
Status: DISCONTINUED | OUTPATIENT
Start: 2018-07-03 | End: 2018-07-05 | Stop reason: HOSPADM

## 2018-07-03 RX ORDER — AMOXICILLIN 250 MG
2 CAPSULE ORAL 2 TIMES DAILY
Status: DISCONTINUED | OUTPATIENT
Start: 2018-07-03 | End: 2018-07-05 | Stop reason: HOSPADM

## 2018-07-03 RX ORDER — SPIRONOLACTONE 25 MG/1
25 TABLET ORAL DAILY
Status: DISCONTINUED | OUTPATIENT
Start: 2018-07-03 | End: 2018-07-05

## 2018-07-03 RX ORDER — BIVALIRUDIN 250 MG/5ML
INJECTION, POWDER, LYOPHILIZED, FOR SOLUTION INTRAVENOUS
Status: COMPLETED
Start: 2018-07-03 | End: 2018-07-03

## 2018-07-03 RX ORDER — HEPARIN SODIUM,PORCINE 1000/ML
VIAL (ML) INJECTION
Status: COMPLETED
Start: 2018-07-03 | End: 2018-07-03

## 2018-07-03 RX ORDER — BISACODYL 10 MG
10 SUPPOSITORY, RECTAL RECTAL
Status: DISCONTINUED | OUTPATIENT
Start: 2018-07-03 | End: 2018-07-05 | Stop reason: HOSPADM

## 2018-07-03 RX ORDER — POLYETHYLENE GLYCOL 3350 17 G/17G
1 POWDER, FOR SOLUTION ORAL
Status: DISCONTINUED | OUTPATIENT
Start: 2018-07-03 | End: 2018-07-05 | Stop reason: HOSPADM

## 2018-07-03 RX ORDER — SODIUM CHLORIDE 9 MG/ML
INJECTION, SOLUTION INTRAVENOUS
Status: DISCONTINUED | OUTPATIENT
Start: 2018-07-03 | End: 2018-07-03

## 2018-07-03 RX ORDER — SODIUM CHLORIDE 9 MG/ML
INJECTION, SOLUTION INTRAVENOUS CONTINUOUS
Status: DISPENSED | OUTPATIENT
Start: 2018-07-03 | End: 2018-07-03

## 2018-07-03 RX ORDER — GABAPENTIN 300 MG/1
600 CAPSULE ORAL
Status: DISCONTINUED | OUTPATIENT
Start: 2018-07-03 | End: 2018-07-05 | Stop reason: HOSPADM

## 2018-07-03 RX ORDER — DIPHENHYDRAMINE HYDROCHLORIDE 50 MG/ML
25 INJECTION INTRAMUSCULAR; INTRAVENOUS EVERY 6 HOURS PRN
Status: DISCONTINUED | OUTPATIENT
Start: 2018-07-03 | End: 2018-07-05 | Stop reason: HOSPADM

## 2018-07-03 RX ORDER — WARFARIN SODIUM 2.5 MG/1
2.5-3.75 TABLET ORAL EVERY EVENING
Status: DISCONTINUED | OUTPATIENT
Start: 2018-07-03 | End: 2018-07-03

## 2018-07-03 RX ORDER — SODIUM CHLORIDE 9 MG/ML
INJECTION, SOLUTION INTRAVENOUS CONTINUOUS
Status: ACTIVE | OUTPATIENT
Start: 2018-07-03 | End: 2018-07-04

## 2018-07-03 RX ORDER — ALBUTEROL SULFATE 90 UG/1
2 AEROSOL, METERED RESPIRATORY (INHALATION)
Status: DISCONTINUED | OUTPATIENT
Start: 2018-07-03 | End: 2018-07-05 | Stop reason: HOSPADM

## 2018-07-03 RX ORDER — POTASSIUM CHLORIDE 20 MEQ/1
40 TABLET, EXTENDED RELEASE ORAL ONCE
Status: COMPLETED | OUTPATIENT
Start: 2018-07-03 | End: 2018-07-03

## 2018-07-03 RX ORDER — ONDANSETRON 2 MG/ML
4 INJECTION INTRAMUSCULAR; INTRAVENOUS EVERY 4 HOURS PRN
Status: DISCONTINUED | OUTPATIENT
Start: 2018-07-03 | End: 2018-07-05 | Stop reason: HOSPADM

## 2018-07-03 RX ORDER — METOPROLOL SUCCINATE 25 MG/1
25 TABLET, EXTENDED RELEASE ORAL EVERY MORNING
Status: DISCONTINUED | OUTPATIENT
Start: 2018-07-03 | End: 2018-07-05 | Stop reason: HOSPADM

## 2018-07-03 RX ORDER — CLOPIDOGREL BISULFATE 75 MG/1
75 TABLET ORAL DAILY
Status: DISCONTINUED | OUTPATIENT
Start: 2018-07-04 | End: 2018-07-05 | Stop reason: HOSPADM

## 2018-07-03 RX ORDER — MIDAZOLAM HYDROCHLORIDE 1 MG/ML
INJECTION INTRAMUSCULAR; INTRAVENOUS
Status: COMPLETED
Start: 2018-07-03 | End: 2018-07-03

## 2018-07-03 RX ORDER — LEVOTHYROXINE SODIUM 0.05 MG/1
50 TABLET ORAL
Status: DISCONTINUED | OUTPATIENT
Start: 2018-07-03 | End: 2018-07-05 | Stop reason: HOSPADM

## 2018-07-03 RX ORDER — SCOLOPAMINE TRANSDERMAL SYSTEM 1 MG/1
1 PATCH, EXTENDED RELEASE TRANSDERMAL
Status: DISCONTINUED | OUTPATIENT
Start: 2018-07-03 | End: 2018-07-05 | Stop reason: HOSPADM

## 2018-07-03 RX ORDER — WARFARIN SODIUM 6 MG/1
6 TABLET ORAL
Status: COMPLETED | OUTPATIENT
Start: 2018-07-03 | End: 2018-07-03

## 2018-07-03 RX ORDER — IBUPROFEN 200 MG
500 CAPSULE ORAL EVERY EVENING
Status: DISCONTINUED | OUTPATIENT
Start: 2018-07-03 | End: 2018-07-05 | Stop reason: HOSPADM

## 2018-07-03 RX ORDER — VERAPAMIL HYDROCHLORIDE 2.5 MG/ML
INJECTION, SOLUTION INTRAVENOUS
Status: COMPLETED
Start: 2018-07-03 | End: 2018-07-03

## 2018-07-03 RX ORDER — ALLOPURINOL 300 MG/1
300 TABLET ORAL EVERY EVENING
Status: DISCONTINUED | OUTPATIENT
Start: 2018-07-03 | End: 2018-07-05 | Stop reason: HOSPADM

## 2018-07-03 RX ORDER — TOPIRAMATE 25 MG/1
25 TABLET ORAL 2 TIMES DAILY
Status: DISCONTINUED | OUTPATIENT
Start: 2018-07-03 | End: 2018-07-05 | Stop reason: HOSPADM

## 2018-07-03 RX ADMIN — LIDOCAINE HYDROCHLORIDE 100 MG: 20 INJECTION, SOLUTION INTRAVENOUS at 09:19

## 2018-07-03 RX ADMIN — LIDOCAINE HYDROCHLORIDE 100 MG: 20 INJECTION, SOLUTION INTRAVENOUS at 08:56

## 2018-07-03 RX ADMIN — HEPARIN SODIUM 2000 UNITS: 200 INJECTION, SOLUTION INTRAVENOUS at 08:57

## 2018-07-03 RX ADMIN — GABAPENTIN 600 MG: 300 CAPSULE ORAL at 18:23

## 2018-07-03 RX ADMIN — IOHEXOL 214 ML: 350 INJECTION, SOLUTION INTRAVENOUS at 10:48

## 2018-07-03 RX ADMIN — Medication 500 MG: at 18:25

## 2018-07-03 RX ADMIN — OMEPRAZOLE 20 MG: 20 CAPSULE, DELAYED RELEASE ORAL at 15:32

## 2018-07-03 RX ADMIN — CLOPIDOGREL BISULFATE 600 MG: 300 TABLET, FILM COATED ORAL at 10:02

## 2018-07-03 RX ADMIN — FUROSEMIDE 20 MG: 20 TABLET ORAL at 15:27

## 2018-07-03 RX ADMIN — CHOLECALCIFEROL TAB 10 MCG (400 UNIT) 200 UNITS: 10 TAB at 20:30

## 2018-07-03 RX ADMIN — LEVOTHYROXINE SODIUM 50 MCG: 50 TABLET ORAL at 15:22

## 2018-07-03 RX ADMIN — HEPARIN SODIUM: 1000 INJECTION, SOLUTION INTRAVENOUS; SUBCUTANEOUS at 08:56

## 2018-07-03 RX ADMIN — POTASSIUM CHLORIDE 40 MEQ: 1500 TABLET, EXTENDED RELEASE ORAL at 17:01

## 2018-07-03 RX ADMIN — FENTANYL CITRATE 100 MCG: 50 INJECTION, SOLUTION INTRAMUSCULAR; INTRAVENOUS at 09:58

## 2018-07-03 RX ADMIN — ALLOPURINOL 300 MG: 300 TABLET ORAL at 18:25

## 2018-07-03 RX ADMIN — ASPIRIN 81 MG: 81 TABLET, COATED ORAL at 15:26

## 2018-07-03 RX ADMIN — SPIRONOLACTONE 25 MG: 25 TABLET, FILM COATED ORAL at 15:26

## 2018-07-03 RX ADMIN — BIVALIRUDIN 250 MG: 250 INJECTION, POWDER, LYOPHILIZED, FOR SOLUTION INTRAVENOUS at 09:58

## 2018-07-03 RX ADMIN — SERTRALINE 25 MG: 50 TABLET, FILM COATED ORAL at 18:24

## 2018-07-03 RX ADMIN — SODIUM CHLORIDE: 9 INJECTION, SOLUTION INTRAVENOUS at 15:35

## 2018-07-03 RX ADMIN — MIDAZOLAM 2 MG: 1 INJECTION INTRAMUSCULAR; INTRAVENOUS at 09:29

## 2018-07-03 RX ADMIN — TOPIRAMATE 25 MG: 25 TABLET, FILM COATED ORAL at 20:30

## 2018-07-03 RX ADMIN — WARFARIN SODIUM 6 MG: 6 TABLET ORAL at 18:26

## 2018-07-03 RX ADMIN — NITROGLYCERIN 10 ML: 20 INJECTION INTRAVENOUS at 08:57

## 2018-07-03 RX ADMIN — METOLAZONE 2.5 MG: 2.5 TABLET ORAL at 15:22

## 2018-07-03 ASSESSMENT — COGNITIVE AND FUNCTIONAL STATUS - GENERAL
HELP NEEDED FOR BATHING: A LITTLE
TOILETING: A LITTLE
CLIMB 3 TO 5 STEPS WITH RAILING: A LITTLE
DRESSING REGULAR UPPER BODY CLOTHING: A LITTLE
EATING MEALS: A LITTLE
SUGGESTED CMS G CODE MODIFIER DAILY ACTIVITY: CK
SUGGESTED CMS G CODE MODIFIER MOBILITY: CK
PERSONAL GROOMING: A LITTLE
STANDING UP FROM CHAIR USING ARMS: A LITTLE
MOVING FROM LYING ON BACK TO SITTING ON SIDE OF FLAT BED: A LITTLE
MOVING TO AND FROM BED TO CHAIR: A LITTLE
TURNING FROM BACK TO SIDE WHILE IN FLAT BAD: A LITTLE
DAILY ACTIVITIY SCORE: 18
MOBILITY SCORE: 18
DRESSING REGULAR LOWER BODY CLOTHING: A LITTLE
WALKING IN HOSPITAL ROOM: A LITTLE

## 2018-07-03 ASSESSMENT — PAIN SCALES - GENERAL
PAINLEVEL_OUTOF10: 0

## 2018-07-03 ASSESSMENT — CHA2DS2 SCORE
PRIOR STROKE OR TIA OR THROMBOEMBOLISM: NO
DIABETES: NO
CHA2DS2 VASC SCORE: 6
SEX: FEMALE
AGE 65 TO 74: NO
VASCULAR DISEASE: YES
HYPERTENSION: YES
CHF OR LEFT VENTRICULAR DYSFUNCTION: YES
AGE 75 OR GREATER: YES

## 2018-07-03 ASSESSMENT — PATIENT HEALTH QUESTIONNAIRE - PHQ9
2. FEELING DOWN, DEPRESSED, IRRITABLE, OR HOPELESS: NOT AT ALL
SUM OF ALL RESPONSES TO PHQ9 QUESTIONS 1 AND 2: 0
1. LITTLE INTEREST OR PLEASURE IN DOING THINGS: NOT AT ALL

## 2018-07-03 NOTE — DISCHARGE PLANNING
Transitional Care Navigator:    Chart reviewed for post acute needs.  Pt is an 86 yof who lives alone locally. With LACE+ of 61, recent procedure and hx of CHF and COPD, this patient is an appropriate candidate for home health support.    Please consider an order for HH. Pt has been on service previously with [facility].

## 2018-07-03 NOTE — PROGRESS NOTES
Inpatient Anticoagulation Service Note    Date: 7/3/2018  Reason for Anticoagulation: Atrial Fibrillation   [id number] VASc Score: 6    Target INR: 2.0 to 3.0    INR from last 7 days     Date/Time INR Value    07/03/18 0718  1        Dose from last 7 days     Date/Time Dose (mg)    07/03/18 1500  6        Average Dose (mg): 3.21 (Home regimen: 2.5 mg M/W/F, 3.75 mg all other days)  Significant Interactions: Aspirin, Clopidogrel, Proton Pump Inhibitor, Thyroid Medications (sertraline, spironolactone)  Bridge Therapy: No  Reversal Agent Administered: Not Applicable    Comments: Warfarin to resume for hx afib s/p heart cath procedure and stent placement x2.  Warfarin has been on hold PTA for the procedure.  INR 1 on 7/3.  Patient recently had a fall on 6/26 and suffered a forehead contusion.  CBC 6/30 showed H/H and plt low, stable.  Patient is followed by the [facility].  Drug interactions noted above, most preexisting PTA, aspirin and plavix are new.  Home warfarin dosing is noted above.  CHADSVASc = 6, no bridge therapy ordered at this time.  Will resume dosing with warfarin 6 mg due today.  Plan to resume home regimen on 7/4.  INR daily x3 ordered.  Pharmacy will continue to monitor.    Plan:  Wrafarin 6 mg on 7/3.  Resume home regimen on 7/4.  INR daily x3.  Trend INR for further dose adjustments.  Education Material Provided?: No (Chronic warfarin therapy)  Pharmacist suggested discharge dosing: Warfarin 2.5 mg M/W/F, 3.75 mg all other days.  INR follow up within 3 days of discharge.     Sage GamezD

## 2018-07-03 NOTE — PROGRESS NOTES
1400: Pt received from [facility]PU, AOx4, denies any pain. Pt flat until 1500.  R groin site c/d/I, no hematoma. See flowsheet for full assessment.  Pt is paced on tele.    1530: Upon reviewing previous labs pts Na was 122 and K was 3.4--MD notified, orders for redraw obtained.     1630: Labs resulted --Na 122 and K 2.9.  Dr. Garcia paged.    1635: Verbal orders for PO potassium ordered and consult to medicine.    1940: Beef broth (containing sodium) given to patient.

## 2018-07-03 NOTE — OR NURSING
1111 Patient arrived from cath lab, arouses to voice.  Right groin CDI, soft.  Patient and family updated on plan of care.  Patient voided on bedpan.  1200 Oozing to right groin, pressure held x 5 minutes.  1330 Dressing changed to right groin, no bleeding noted when dressing removed.  Report called to Huong BARNHART.  1349 Patient transferred to [facility], hand off report to Huong BARNHART.

## 2018-07-03 NOTE — PROCEDURES
DATE OF SERVICE:  07/03/2018    REFERRING PHYSICIAN:  Jose Simms MD    PROCEDURES:  1.  Right heart catheterization.  2.  Left heart catheterization.  3.  Coronary angiography.  4.  PTCA/stent placement of the mid left anterior descending artery.  5.  PTCA/stent placement of the proximal left anterior descending artery.  6.  Left ventriculogram.  7.  Ultrasound-guided right femoral arterial and venous access.  8.  Angio-Seal closure.    PREPROCEDURE DIAGNOSES:  Aortic stenosis and congestive heart failure.    POSTPROCEDURE DIAGNOSES:  1.  No evidence of aortic stenosis with 0 mmHg peak to peak gradient, mean gradient   of 8 mmHg, calculated CHRISTIANE of 2.09 cm2.  2.  Coronary artery disease with high grade proximal and high grade mid left   anterior descending artery.  3.  Successful percutaneous transluminal coronary angioplasty/stent placement   of the mid left anterior descending artery with 2.25x12 mm Synergy   drug-eluting stent.  4.  Successful percutaneous transluminal coronary angioplasty/stent placement   of the proximal left anterior descending artery with 2.5x12 mm Synergy   drug-eluting stent.  5.  Normal left ventricular systolic function with ejection fraction of 55%.  6.  Normal left ventricular end-diastolic pressure.  7.  Elevated right heart pressure with PA systolic pressure of 60 mmHg.  8.  Successful Angio-Seal closure.    INDICATION:  The patient is an 86-year-old female with past medical history   significant for congestive heart failure, possible aortic stenosis, which is   mild, atrial fibrillation with pacemaker, on warfarin and COPD.  For her heart   failure she was scheduled for cardiac catheterization.    DESCRIPTION OF PROCEDURE:  After informed consent was signed by the   patient, the patient was brought to the cardiac catheterization laboratory.  She   was prepped and draped in usual sterile manner.  The right inguinal area was   anesthetized with 2% Xylocaine.  A 4-Malay  sheath was inserted into the right   femoral artery using the modified Seldinger technique under ultrasound   guidance.  A 6-Nauruan sheath was inserted into the right femoral vein using   ultrasound guidance.  Through the venous sheath, a Imboden-Miguel catheter   waspositioned at the pulmonary artery.  Thermodilution cardiac outputs were   obtained.  Right heart pressure measurements were obtained.  The Imboden-Miguel   catheter was removed.  Through the arterial sheath, a pigtail catheter was   positioned into the left ventricle.  Left angiography was performed.  Pullback   was performed measuring both left ventricular and aortic pressures.  This   catheter was exchanged for a JL 4.5 catheter, which was positioned into the   left main coronary artery.  Coronary angiography was performed.  This was   exchanged for a 3DRC catheter, which was positioned into the right coronary   artery.  Coronary angiography was performed.  This catheter was removed.  The   sheath was upgraded to 6-Nauruan.  IV Angiomax was started.  An EBU 4.0 guide   catheter was positioned into the left main coronary artery.  A Prowater wire   was used to cross the identified stenosis.  Due to difficulties of advancing   the balloon, an Iron Man wire was positioned for support as well as   Guidezilla.  Then mid stenosis was predilated with 2.5x8 mm Emerge balloon.  A   2.25x12 mm Synergy drug-eluting stent was successfully positioned and   deployed.  This stent was postdilated with 2.25x8 and 2.5x8 mm NC Emerge   balloons.  The proximal lesion was predilated with 2.5x8 mm Emerge balloon.  A   2.5x12 mm Synergy drug-eluting stent was successfully positioned and   deployed.  This stent was postdilated with 2.5x8 mm NC Emerge balloon.  The   patient tolerated the procedure well.  At the end of procedure, all wires,   balloons, guide, and sheaths were removed.  The right femoral arteriotomy site   was closed via the Angio-Seal system.  She was then transferred  to PPU to   telemetry in stable condition.    HEMODYNAMIC DATA:  Hemodynamic data shows right heart pressures of RA 20 mmHg,   RV 60/12 with RVEDP of 18 mmHg, PA 60/25 with mean of 38 mmHg.  Cardiac   output by thermodilution 3.0, cardiac index 1.82    AORTIC VALVE:  Peak to peak gradient 0 mmHg, mean gradient 8 mmHg, calculated   CHRISTIANE 2.09 cm2.    LEFT VENTRICULOGRAM:  A 10 mL of contrast was delivered for 3 seconds.    Ejection fraction was estimated to be 65%.  There was no segmental wall motion   abnormalities noted.    ANGIOGRAM:  Left main coronary artery:  Left main coronary artery is a moderate length,   large caliber vessel without significant stenosis.    Left anterior descending artery:  Left anterior descending artery is a long,   moderate to small caliber vessel which approaches the apex.  The proximal to   mid portion of the vessel is heavily calcified.  Mid portion of the vessel,   there is a concentric 99% stenosis.  Proximal portion of the vessel, there is   a concentric 80% stenosis.  There are small diagonal branch and one moderate   diagonal branch, all noted to be free of disease.    Left circumflex artery:  Left circumflex artery is a nondominant   moderate-caliber vessel with proximal eccentric 30-40% stenosis.  Distally,   there is a moderate caliber tortuous first obtuse marginal branch with   proximal eccentric 30% stenosis.  Distally, there is a small bifurcating   obtuse marginal branch noted free of disease.    Right coronary artery:  Right coronary artery is a dominant large caliber   vessel with diffuse luminal irregularities of 30-40%.  Distally, there is a   long large caliber posterior descending artery and posterolateral branches   noted free of disease.    PERCUTANEOUS INTERVENTION:  1.  Mid left anterior descending artery, concentric 99% stenosis with 20%   residual.  Predilatation with 2.5x8 mm Emerge balloon.  Stent with 2.25x12 mm   Synergy drug-eluting stent.  Postdilatation  with 2.25x8 and 2.5x8 mm NC Emerge   balloon.  2.  Proximal left anterior descending artery concentric 80% stenosis with 0%   residual.  Predilatation with 2.5x8 mm Emerge balloons.  Stent with 2.5x12 mm   Synergy drug-eluting stent, postdilatation with 2.5x8 mm NC Emerge balloon.    IMPRESSION:  1.  No evidence of aortic stenosis with 0 mmHg peak to peak gradient, mean gradient   of 8 mmHg, calculated CHRISTIANE of 2.09 cm2.  2.  Coronary artery disease with high grade proximal and high grade mid left   anterior descending artery.  3.  Successful percutaneous transluminal coronary angioplasty/stent placement   of the mid left anterior descending artery with 2.25x12 mm Synergy   drug-eluting stent.  4.  Successful percutaneous transluminal coronary angioplasty/stent placement   of the proximal left anterior descending artery with 2.5x12 mm Synergy   drug-eluting stent.  5.  Normal left ventricular systolic function with ejection fraction of 55%.  6.  Normal left ventricular end-diastolic pressure.  7.  Elevated right heart pressure with PA systolic pressure of 60 mmHg.  8.  Successful Angio-Seal closure.    RECOMMENDATIONS:  Recommend medical therapy with addition of Plavix.       ____________________________________     MD MARYAM CHOI / KAREN    DD:  07/03/2018 10:50:44  DT:  07/03/2018 11:32:27    D#:  [id number]  Job#:  [id number]

## 2018-07-04 ENCOUNTER — PATIENT OUTREACH (OUTPATIENT)
Dept: HEALTH INFORMATION MANAGEMENT | Facility: OTHER | Age: 83
End: 2018-07-04

## 2018-07-04 PROBLEM — E87.1 HYPONATREMIA: Status: ACTIVE | Noted: 2018-07-04

## 2018-07-04 PROBLEM — Z98.61 S/P CORONARY ANGIOPLASTY: Status: ACTIVE | Noted: 2018-07-04

## 2018-07-04 LAB
ANION GAP SERPL CALC-SCNC: 10 MMOL/L (ref 0–11.9)
ANION GAP SERPL CALC-SCNC: 8 MMOL/L (ref 0–11.9)
ANION GAP SERPL CALC-SCNC: 8 MMOL/L (ref 0–11.9)
BUN SERPL-MCNC: 24 MG/DL (ref 8–22)
BUN SERPL-MCNC: 26 MG/DL (ref 8–22)
BUN SERPL-MCNC: 29 MG/DL (ref 8–22)
CALCIUM SERPL-MCNC: 8.7 MG/DL (ref 8.5–10.5)
CALCIUM SERPL-MCNC: 8.8 MG/DL (ref 8.5–10.5)
CALCIUM SERPL-MCNC: 9.2 MG/DL (ref 8.5–10.5)
CHLORIDE SERPL-SCNC: 90 MMOL/L (ref 96–112)
CHLORIDE SERPL-SCNC: 91 MMOL/L (ref 96–112)
CHLORIDE SERPL-SCNC: 92 MMOL/L (ref 96–112)
CO2 SERPL-SCNC: 26 MMOL/L (ref 20–33)
CO2 SERPL-SCNC: 26 MMOL/L (ref 20–33)
CO2 SERPL-SCNC: 28 MMOL/L (ref 20–33)
CREAT SERPL-MCNC: 1.15 MG/DL (ref 0.5–1.4)
CREAT SERPL-MCNC: 1.24 MG/DL (ref 0.5–1.4)
CREAT SERPL-MCNC: 1.55 MG/DL (ref 0.5–1.4)
ERYTHROCYTE [DISTWIDTH] IN BLOOD BY AUTOMATED COUNT: 51.6 FL (ref 35.9–50)
GLUCOSE SERPL-MCNC: 114 MG/DL (ref 65–99)
GLUCOSE SERPL-MCNC: 87 MG/DL (ref 65–99)
GLUCOSE SERPL-MCNC: 91 MG/DL (ref 65–99)
HCT VFR BLD AUTO: 27.8 % (ref 37–47)
HGB BLD-MCNC: 8.7 G/DL (ref 12–16)
INR PPP: 1.09 (ref 0.87–1.13)
MCH RBC QN AUTO: 27.7 PG (ref 27–33)
MCHC RBC AUTO-ENTMCNC: 31.3 G/DL (ref 33.6–35)
MCV RBC AUTO: 88.5 FL (ref 81.4–97.8)
PLATELET # BLD AUTO: 147 K/UL (ref 164–446)
PMV BLD AUTO: 10.6 FL (ref 9–12.9)
POTASSIUM SERPL-SCNC: 3.3 MMOL/L (ref 3.6–5.5)
POTASSIUM SERPL-SCNC: 3.4 MMOL/L (ref 3.6–5.5)
POTASSIUM SERPL-SCNC: 4.1 MMOL/L (ref 3.6–5.5)
PROTHROMBIN TIME: 13.8 SEC (ref 12–14.6)
RBC # BLD AUTO: 3.14 M/UL (ref 4.2–5.4)
SODIUM SERPL-SCNC: 126 MMOL/L (ref 135–145)
SODIUM SERPL-SCNC: 126 MMOL/L (ref 135–145)
SODIUM SERPL-SCNC: 127 MMOL/L (ref 135–145)
WBC # BLD AUTO: 3 K/UL (ref 4.8–10.8)

## 2018-07-04 PROCEDURE — 80048 BASIC METABOLIC PNL TOTAL CA: CPT

## 2018-07-04 PROCEDURE — 700102 HCHG RX REV CODE 250 W/ 637 OVERRIDE(OP): Performed by: NURSE PRACTITIONER

## 2018-07-04 PROCEDURE — 700102 HCHG RX REV CODE 250 W/ 637 OVERRIDE(OP): Performed by: HOSPITALIST

## 2018-07-04 PROCEDURE — 97161 PT EVAL LOW COMPLEX 20 MIN: CPT

## 2018-07-04 PROCEDURE — 99225 PR SUBSEQUENT OBSERVATION CARE,LEVEL II: CPT | Performed by: HOSPITALIST

## 2018-07-04 PROCEDURE — G8979 MOBILITY GOAL STATUS: HCPCS | Mod: CI

## 2018-07-04 PROCEDURE — 85610 PROTHROMBIN TIME: CPT

## 2018-07-04 PROCEDURE — A9270 NON-COVERED ITEM OR SERVICE: HCPCS | Performed by: NURSE PRACTITIONER

## 2018-07-04 PROCEDURE — 85027 COMPLETE CBC AUTOMATED: CPT

## 2018-07-04 PROCEDURE — 36415 COLL VENOUS BLD VENIPUNCTURE: CPT

## 2018-07-04 PROCEDURE — 33210 INSERT ELECTRD/PM CATH SNGL: CPT

## 2018-07-04 PROCEDURE — G0378 HOSPITAL OBSERVATION PER HR: HCPCS

## 2018-07-04 PROCEDURE — A9270 NON-COVERED ITEM OR SERVICE: HCPCS | Performed by: INTERNAL MEDICINE

## 2018-07-04 PROCEDURE — G8980 MOBILITY D/C STATUS: HCPCS | Mod: CI

## 2018-07-04 PROCEDURE — G8978 MOBILITY CURRENT STATUS: HCPCS | Mod: CI

## 2018-07-04 PROCEDURE — 700102 HCHG RX REV CODE 250 W/ 637 OVERRIDE(OP): Performed by: INTERNAL MEDICINE

## 2018-07-04 PROCEDURE — A9270 NON-COVERED ITEM OR SERVICE: HCPCS | Performed by: HOSPITALIST

## 2018-07-04 RX ORDER — WARFARIN SODIUM 5 MG/1
5 TABLET ORAL
Status: COMPLETED | OUTPATIENT
Start: 2018-07-04 | End: 2018-07-04

## 2018-07-04 RX ORDER — SODIUM CHLORIDE 1 G/1
1 TABLET ORAL
Status: DISCONTINUED | OUTPATIENT
Start: 2018-07-04 | End: 2018-07-05 | Stop reason: HOSPADM

## 2018-07-04 RX ORDER — POTASSIUM CHLORIDE 20 MEQ/1
40 TABLET, EXTENDED RELEASE ORAL ONCE
Status: COMPLETED | OUTPATIENT
Start: 2018-07-04 | End: 2018-07-04

## 2018-07-04 RX ORDER — WARFARIN SODIUM 2.5 MG/1
2.5 TABLET ORAL
Status: DISCONTINUED | OUTPATIENT
Start: 2018-07-06 | End: 2018-07-05 | Stop reason: HOSPADM

## 2018-07-04 RX ADMIN — METOPROLOL SUCCINATE 25 MG: 25 TABLET, EXTENDED RELEASE ORAL at 05:01

## 2018-07-04 RX ADMIN — SODIUM CHLORIDE TAB 1 GM 1 G: 1 TAB at 17:45

## 2018-07-04 RX ADMIN — FUROSEMIDE 20 MG: 20 TABLET ORAL at 05:02

## 2018-07-04 RX ADMIN — LEVOTHYROXINE SODIUM 50 MCG: 50 TABLET ORAL at 05:01

## 2018-07-04 RX ADMIN — GABAPENTIN 600 MG: 300 CAPSULE ORAL at 17:46

## 2018-07-04 RX ADMIN — FUROSEMIDE 20 MG: 20 TABLET ORAL at 17:48

## 2018-07-04 RX ADMIN — SERTRALINE 25 MG: 50 TABLET, FILM COATED ORAL at 17:48

## 2018-07-04 RX ADMIN — WARFARIN SODIUM 5 MG: 5 TABLET ORAL at 17:45

## 2018-07-04 RX ADMIN — SPIRONOLACTONE 25 MG: 25 TABLET, FILM COATED ORAL at 05:02

## 2018-07-04 RX ADMIN — METOLAZONE 2.5 MG: 2.5 TABLET ORAL at 05:14

## 2018-07-04 RX ADMIN — ALLOPURINOL 300 MG: 300 TABLET ORAL at 17:48

## 2018-07-04 RX ADMIN — CLOPIDOGREL 75 MG: 75 TABLET, FILM COATED ORAL at 05:02

## 2018-07-04 RX ADMIN — POTASSIUM CHLORIDE 40 MEQ: 1500 TABLET, EXTENDED RELEASE ORAL at 13:37

## 2018-07-04 RX ADMIN — ACETAMINOPHEN 1000 MG: 500 TABLET, FILM COATED ORAL at 05:02

## 2018-07-04 RX ADMIN — TOPIRAMATE 25 MG: 25 TABLET, FILM COATED ORAL at 05:01

## 2018-07-04 RX ADMIN — Medication 500 MG: at 17:47

## 2018-07-04 RX ADMIN — ASPIRIN 81 MG: 81 TABLET, COATED ORAL at 05:02

## 2018-07-04 RX ADMIN — TOPIRAMATE 25 MG: 25 TABLET, FILM COATED ORAL at 17:47

## 2018-07-04 RX ADMIN — CHOLECALCIFEROL TAB 10 MCG (400 UNIT) 200 UNITS: 10 TAB at 17:44

## 2018-07-04 ASSESSMENT — ENCOUNTER SYMPTOMS
TREMORS: 0
PHOTOPHOBIA: 0
WEAKNESS: 0
COUGH: 0
NECK PAIN: 0
PND: 0
SHORTNESS OF BREATH: 0
BLURRED VISION: 0
EYE PAIN: 0
SPEECH CHANGE: 0
VOMITING: 0
BACK PAIN: 0
DIZZINESS: 0
MYALGIAS: 0
WEIGHT LOSS: 0
CLAUDICATION: 0
SENSORY CHANGE: 0
PALPITATIONS: 0
HEMOPTYSIS: 0
HEARTBURN: 0
ORTHOPNEA: 0
HEADACHES: 0
TINGLING: 0
ABDOMINAL PAIN: 0
DOUBLE VISION: 0

## 2018-07-04 ASSESSMENT — COGNITIVE AND FUNCTIONAL STATUS - GENERAL
SUGGESTED CMS G CODE MODIFIER MOBILITY: CJ
WALKING IN HOSPITAL ROOM: A LITTLE
CLIMB 3 TO 5 STEPS WITH RAILING: A LOT
STANDING UP FROM CHAIR USING ARMS: A LITTLE
MOBILITY SCORE: 20

## 2018-07-04 ASSESSMENT — PATIENT HEALTH QUESTIONNAIRE - PHQ9
2. FEELING DOWN, DEPRESSED, IRRITABLE, OR HOPELESS: NOT AT ALL
1. LITTLE INTEREST OR PLEASURE IN DOING THINGS: NOT AT ALL
SUM OF ALL RESPONSES TO PHQ9 QUESTIONS 1 AND 2: 0

## 2018-07-04 ASSESSMENT — PAIN SCALES - GENERAL
PAINLEVEL_OUTOF10: 4
PAINLEVEL_OUTOF10: 3
PAINLEVEL_OUTOF10: 0
PAINLEVEL_OUTOF10: 0
PAINLEVEL_OUTOF10: 4
PAINLEVEL_OUTOF10: 3
PAINLEVEL_OUTOF10: 0

## 2018-07-04 ASSESSMENT — GAIT ASSESSMENTS
DISTANCE (FEET): 60
GAIT LEVEL OF ASSIST: SUPERVISED
ASSISTIVE DEVICE: SINGLE POINT CANE
DEVIATION: ANTALGIC;BRADYKINETIC;SHUFFLED GAIT

## 2018-07-04 NOTE — CARE PLAN
Problem: Bowel/Gastric:  Goal: Normal bowel function is maintained or improved    Intervention: Educate patient and significant other/support system about diet, fluid intake, medications and activity to promote bowel function  Patient educated on proper diet, fluid intake and activity to help promote regular bowel movements. Patient verbalized understanding

## 2018-07-04 NOTE — PROGRESS NOTES
Inpatient Anticoagulation Service Note    Date: 7/4/2018  Reason for Anticoagulation: Atrial Fibrillation   ZEG0CK2 VASc Score: 6  Hemoglobin Value: (!) 8.7  Hematocrit Value: (!) 27.8  Lab Platelet Value: (!) 147  Target INR: 2.0 to 3.0    INR from last 7 days     Date/Time INR Value    07/04/18 0520  1.09    07/03/18 0718  1        Dose from last 7 days     Date/Time Dose (mg)    07/04/18 0800  5    07/03/18 1500  6        Average Dose (mg): 3.21 (Home regimen: 2.5 mg M/W/F, 3.75 mg all other days)  Significant Interactions: Aspirin, Clopidogrel, Proton Pump Inhibitor, Thyroid Medications (sertraline, spironolactone)  Bridge Therapy: No   Reversal Agent Administered: Not Applicable    Comments: Warfarin resumed 7/3 with initial dose of 6 mg given.  INR was 1 on admit.  H/H low, decreased today.  Plt low, stable.  INR 1.09 today.  Several drug interactions noted above.  CHADSVASc = 6, no bridge therapy ordered at this time. No s/sx of overt bleeding per chart.  Hospitalist is consulting for management of hyponatremia.  Plan for warfarin 5 mg x1 today, then resume home regimen on 7/5.  INR daily through 7/6 ordered.  Pharmacy will continue to monitor, trend INR and dose adjust accordingly.  [facility].     Plan:  Warfarin 5 mg x1 7/4.  Resume home regimen on 7/5.  INR daily through 7/6 ordered. Trend INR for further dose adjustments.  Education Material Provided?: No (Chronic warfarin therapy)  Pharmacist suggested discharge dosing: Warfarin 2.5 mg M/W/F, 3.75 mg all other days.  INR follow up within 3 days of discharge     Sage Dunn PharmD

## 2018-07-04 NOTE — PROGRESS NOTES
Bedside report received from AM RN; assumed pt care; pt in bed, awake, pt alert and oriented x4; pt denies pain or shortness of breath; right groin site with dressing on, clean, dry, soft; small bruising noted; plan of care discussed; pt verbalized understanding; needs met; pt denies other needs for now; call light within reach; bed in lowest position; will continue to monitor.

## 2018-07-04 NOTE — CARE PLAN
Problem: Safety  Goal: Will remain free from falls    Intervention: Implement fall precautions  Patient educated on fall prevention program. Patient verbalized understanding and calls appropriately

## 2018-07-04 NOTE — DIETARY
Nutrition services:  Day 0 of admit. Pt admitted secondary to hyponatremia which was noted during outpatient angiography. Consult received for Cardiac Rehab.  Diet education not indicated at this time.  Will follow per department policy.

## 2018-07-04 NOTE — CONSULTS
DATE OF SERVICE:  07/03/2018    CONSULT REQUESTED BY:  Cardiology, Dr. Garcia.    CHIEF COMPLAINT:  Hyponatremia.    HISTORY OF PRESENT ILLNESS:  The patient is an 86-year-old female who actually   was here in the hospital today for coronary angiography, which was done this   morning requiring some stent placement by cardiology and she tolerated the   procedure well, but she also was found to be hyponatremic, so hospitalist   service was also consulted for management of her hyponatremia.  At this time   she denies any chest pain, shortness of breath, nausea, or vomiting.  No fever   or chills.  Patient has a history of congestive heart failure and history of   atrial fibrillation, and history of hypothyroidism.    REVIEW OF SYSTEMS:  All negative except as per HPI.    PAST MEDICAL HISTORY:  As above.  The patient has a history of congestive   heart failure, atrial fibrillation, hyperlipidemia, and hypertension.    MEDICATIONS:  1.  Allopurinol 300 mg 1 time evening.  2.  Lasix 20 mg twice a day.  3.  Levothyroxine 50 mcg every morning.  4.  Metolazone 5 mg every day.  5.  Spironolactone 25 mg every day.  6.  Warfarin 2.5 to 3.7 mg every evening.  7.  Neurontin 600 mg every bedtime.  8.  Albuterol 2 puffs every 6 hours as needed.  9.  Metoprolol 25 mg by mouth every morning.  10.  Omeprazole 20 mg every morning.    PHYSICAL EXAMINATION:  VITAL SIGNS:  Blood pressure 113/64, respirations 18, pulse 61.  GENERAL:  No acute distress, nontoxic appearance.  HEENT:  Normocephalic, atraumatic.  Pupils are equal, round and reactive to   light.  NECK:  Supple, no adenopathy.  CARDIOVASCULAR:  S1 and S2.  No gallops appreciated.  LUNGS:  Clear to auscultation bilaterally.  No rales, rhonchi or wheezing.  ABDOMEN:  Soft, nontender, positive bowel sounds.  EXTREMITIES:  No edema, cyanosis, or clubbing.  NEUROLOGIC:  No focal deficit.  Alert and oriented x4.  EXTREMITIES:  No edema, cyanosis, or clubbing.  No erythema or rash  seen.    ASSESSMENT AND PLAN:  1.  Hyponatremia.  The patient stated that she has been feeling dehydrated   recently, starting her on IV fluid.  We will check urine sodium.  We will   continue to do BMPs every 6 hours to note go above the 10 mEq in the next 24   hours.  We will continue to monitor.  2.  History of coronary artery disease.  Patient had scheduled outpatient   coronary angiography today requiring placement in the left anterior descending   artery.  Cardiology has been following.  We appreciate their recommendation.  3.  History of chronic obstructive pulmonary disease, stable at this time.  We   will continue outpatient regimen.  4.  History of congestive heart failure.  We will continue outpatient regimen   and continue to monitor.  5.  History of atrial fibrillation.  Patient is on warfarin and metoprolol,   stable at this time.  Continue to monitor.  6.  Prophylactic deep venous thrombosis.  Patient is already on warfarin.  7.  Code status:  Patient is a full code.       ____________________________________     MD LEYLA DE LUNA / KAREN    DD:  07/03/2018 22:18:51  DT:  07/03/2018 23:39:55    D#:  [id number]  Job#:  [id number]

## 2018-07-04 NOTE — PROGRESS NOTES
Received report from previous shift. Plan of care discussed with patient. no concerns voiced at this time. Patient is A&O 4, bed alarm on.  Patient educated on the importance of calling if in need of assistance.  Patient verbalized understanding.  Bed controls on, bed locked and in lowest position. Patient with pain at this time. Will continue to monitor for safety and comfort.

## 2018-07-04 NOTE — PROGRESS NOTES
Cardiology Progress Note               Author: Nora Iraheta Date & Time created: 7/4/2018  4:34 PM     Interval History:  86 year old admitted on 7/3/18 after elective angiogram to evaluate her aortic stenosis her. Angiogram did not show aortic stenosis however did discover LAD CAD. Patient underwent successful PCI with BRITTON to the mLAD and pLAD. Post angiogram patient was noted to be hyponatremic so the hospitalist service was consulted.    Past medical history significant for  congestive heart failure, possible aortic stenosis, which is   mild, atrial fibrillation with pacemaker, on warfarin and COPD.     Chief Complaint:  Aortic stenosis    7/4/18: Patient sitting up in bed eating dinner. Has no complaints. Denies chest pain, shortness of breath or palpitations.     Monitor: Paced 60-66    Labs:  WBC 3.0  RBC 3.14  Hgb 8.7  Hct 27.8    Na 126  K 3.3  Cl 90  Bun 26  Cr 1.24  GFR 41    Review of Systems   Constitutional: Negative for malaise/fatigue and weight loss.   Respiratory: Negative for shortness of breath.    Cardiovascular: Negative for chest pain, palpitations, orthopnea, claudication, leg swelling and PND.   Neurological: Negative for dizziness and weakness.   All other systems reviewed and are negative.      Physical Exam   Constitutional: She is oriented to person, place, and time. She appears well-developed and well-nourished.   HENT:   Head: Normocephalic.   Eyes: EOM are normal.   Neck: No JVD present.   Cardiovascular: Normal rate, regular rhythm, normal heart sounds and intact distal pulses.    Pulmonary/Chest: Effort normal and breath sounds normal.   Abdominal: Soft. There is no tenderness.   Musculoskeletal: Normal range of motion. She exhibits edema.   Neurological: She is alert and oriented to person, place, and time.   Skin: Skin is warm and dry.   Psychiatric: She has a normal mood and affect. Her behavior is normal.   Nursing note and vitals reviewed.      Hemodynamics:  Temp  (24hrs), Av.7 °C (98 °F), Min:36.3 °C (97.4 °F), Max:37.2 °C (98.9 °F)  Temperature: 36.5 °C (97.7 °F)  Pulse  Av.9  Min: 60  Max: 82   Blood Pressure : 101/59     Respiratory:    Respiration: 16, Pulse Oximetry: 96 %           Fluids:  Date 18 07 - 18 0659   Shift 5322-1950 2718-8382 3129-6555 24 Hour Total   I  N  T  A  K  E   Shift Total       O  U  T  P  U  T   Urine 300   300    Shift Total 300   300   Weight (kg) 64.3 64.3 64.3 64.3       Weight: 64.3 kg (141 lb 12.1 oz)  GI/Nutrition:  Orders Placed This Encounter   Procedures   • Diet Order Cardiac     Standing Status:   Standing     Number of Occurrences:   1     Order Specific Question:   Diet:     Answer:   Cardiac [6]     Lab Results:  Recent Labs      18   0520   WBC  3.0*   RBC  3.14*   HEMOGLOBIN  8.7*   HEMATOCRIT  27.8*   MCV  88.5   MCH  27.7   MCHC  31.3*   RDW  51.6*   PLATELETCT  147*   MPV  10.6     Recent Labs      18   2227  18   0520  18   1152   SODIUM  126*  126*  126*   POTASSIUM  3.4*  3.4*  3.3*   CHLORIDE  90*  92*  90*   CO2  27  26  26   GLUCOSE  107*  91  114*   BUN  27*  24*  26*   CREATININE  1.08  1.15  1.24   CALCIUM  8.8  8.7  8.8     Recent Labs      18   0718  18   0520   INR  1.00  1.09                 Coronary Angiogram 7/3/18:  POSTPROCEDURE DIAGNOSES:  1.  No evidence of aortic stenosis with 0 mmHg peak to peak gradient, mean gradient   of 8 mmHg, calculated CHRISTIANE of 2.09 cm2.  2.  Coronary artery disease with high grade proximal and high grade mid left   anterior descending artery.  3.  Successful percutaneous transluminal coronary angioplasty/stent placement   of the mid left anterior descending artery with 2.25x12 mm Synergy   drug-eluting stent.  4.  Successful percutaneous transluminal coronary angioplasty/stent placement   of the proximal left anterior descending artery with 2.5x12 mm Synergy   drug-eluting stent.  5.  Normal left ventricular systolic function  with ejection fraction of 55%.  6.  Normal left ventricular end-diastolic pressure.  7.  Elevated right heart pressure with PA systolic pressure of 60 mmHg.  8.  Successful Angio-Seal closure.    Medical Decision Making, by Problem:  Active Hospital Problems    Diagnosis   • Hyponatremia [E87.1]   • S/P coronary angioplasty [Z98.61]   • Chronic diastolic CHF (congestive heart failure) ([facility]) [I50.32]       Plan:  CAD:  -S/P PCI with BRITTON X 2 to the LAD.  -DAPT(ASA 81 mg and Plavix 75 mg) X one year.     Chronic Diastolic CHF:  -Angiogram revealed that the aortic valve is not the culprit.  -Chronic BLE.   -Continue Lasix 20 mg BID and metolazone 2.5 mg qd.  -Order placed for strict I&O's and daily weights.     Hyponatremia:  -Hospitalist consulted and following.  -Na tablet replacement.     Atrial Fibrillation:  -PPM, paced 100%.  -AC with coumadin per pharmacy protocol.     No chest pain. No significant issues overnight or today. Will await electrolytes to stabilize prior to discharge.       Quality-Core Measures   Reviewed items::  EKG reviewed, Labs reviewed, Medications reviewed and Radiology images reviewed

## 2018-07-04 NOTE — PROGRESS NOTES
[facility] Hospitalist Progress Note    Date of Service: 2018    Chief Complaint  86 y.o. female admitted 7/3/2018 with CAD for heart cath    Interval Problem Update    s/p heart cath on 7/3 - see op report- 2 stents placed  Sodium was continuously dropping, as low as 122 so Medicine consulted    Patient denies drinking excessive free water    She is on lasix, zaroxyln, aldactone    Sodium now 126    Consultants/Specialty  medicine    Disposition  home        Review of Systems   Constitutional: Positive for malaise/fatigue.   HENT: Negative for ear discharge, ear pain, hearing loss, nosebleeds and tinnitus.    Eyes: Negative for blurred vision, double vision, photophobia and pain.   Respiratory: Negative for cough, hemoptysis and shortness of breath.    Cardiovascular: Positive for leg swelling.   Gastrointestinal: Negative for abdominal pain, heartburn and vomiting.   Genitourinary: Negative for dysuria, frequency and urgency.   Musculoskeletal: Negative for back pain, joint pain, myalgias and neck pain.   Neurological: Negative for dizziness, tingling, tremors, sensory change, speech change and headaches.   All other systems reviewed and are negative.     Physical Exam  Laboratory/Imaging   Hemodynamics  Temp (24hrs), Av.6 °C (97.8 °F), Min:36.2 °C (97.2 °F), Max:37.2 °C (98.9 °F)   Temperature: 36.5 °C (97.7 °F)  Pulse  Av.9  Min: 60  Max: 82 Heart Rate (Monitored): 61  Blood Pressure : 101/59, NIBP: 113/64      Respiratory      Respiration: 16, Pulse Oximetry: 96 %             Fluids    Intake/Output Summary (Last 24 hours) at 18 1257  Last data filed at 18 1100   Gross per 24 hour   Intake              290 ml   Output              300 ml   Net              -10 ml       Nutrition  Orders Placed This Encounter   Procedures   • Diet Order Cardiac     Standing Status:   Standing     Number of Occurrences:   1     Order Specific Question:   Diet:     Answer:   Cardiac [6]     Physical Exam    Constitutional: She is oriented to person, place, and time. No distress.   Eyes: Right eye exhibits no discharge. Left eye exhibits no discharge. No scleral icterus.   Neck: No JVD present. No tracheal deviation present. No thyromegaly present.   Cardiovascular: Normal rate.  Exam reveals no gallop and no friction rub.    No murmur heard.  Pulmonary/Chest: No respiratory distress. She has no wheezes. She has no rales.   Abdominal: She exhibits no distension. There is no tenderness. There is no rebound.   Musculoskeletal: She exhibits edema (+1 edema both lower ext).   Neurological: She is alert and oriented to person, place, and time. No cranial nerve deficit. Coordination normal.   Skin: She is not diaphoretic.       Recent Labs      07/04/18   0520   WBC  3.0*   RBC  3.14*   HEMOGLOBIN  8.7*   HEMATOCRIT  27.8*   MCV  88.5   MCH  27.7   MCHC  31.3*   RDW  51.6*   PLATELETCT  147*   MPV  10.6     Recent Labs      07/03/18   2227  07/04/18   0520  07/04/18   1152   SODIUM  126*  126*  126*   POTASSIUM  3.4*  3.4*  3.3*   CHLORIDE  90*  92*  90*   CO2  27  26  26   GLUCOSE  107*  91  114*   BUN  27*  24*  26*   CREATININE  1.08  1.15  1.24   CALCIUM  8.8  8.7  8.8     Recent Labs      07/03/18   0718  07/04/18   0520   INR  1.00  1.09                  Assessment/Plan     S/P coronary angioplasty   Assessment & Plan    2 stents    Medications as per cards        Hyponatremia   Assessment & Plan    Probably due to diurectics    Check urine studies    Restrict free water    Would recommends sodium > 130 prior to discharge    Salt tabs started        Chronic diastolic CHF (congestive heart failure) (HCC)- (present on admission)   Assessment & Plan    Not in acte exacerbation    Cont home diurectics , as per cardiology          Quality-Core Measures   Hunt catheter::  No Hunt  DVT prophylaxis - mechanical:  SCDs

## 2018-07-04 NOTE — CARE PLAN
Problem: Safety  Goal: Will remain free from falls  Outcome: PROGRESSING AS EXPECTED  Pt teaching given regarding safety and fall precautions; pt verbalized understanding; bed in lowest position; bed alarm on for pt safety; treaded socks on; appropriate sign on door placed for number of assistance needed; pt belongings and call light within reach.      Problem: Knowledge Deficit  Goal: Knowledge of disease process/condition, treatment plan, diagnostic tests, and medications will improve  Outcome: PROGRESSING AS EXPECTED  Pt teaching given about unit routine, medications, activity, plan of care discussed.

## 2018-07-04 NOTE — ASSESSMENT & PLAN NOTE
Probably due to diurectics- cardiology will remove one of the diurectics    urine studies unrevealing    Restrict free water    f/u bmp as outpatient      Salt tabs started

## 2018-07-04 NOTE — THERAPY
"Physical Therapy Evaluation completed.   Bed Mobility:  Supine to Sit: Supervised  Transfers: Sit to Stand: Supervised  Gait: Level Of Assist: Supervised with Single Point Cane       Plan of Care: Patient with no further skilled PT needs in the acute care setting at this time  Discharge Recommendations: Equipment: No Equipment Needed.     Ms. Egan is an 87 y/o female who presents to acute secondary to acute CHF s/p cardiac catheterization and stent placement. EF 65% per cath. As pt is on beta blockers, max HR calculated to be 103+/-15 bpm with exercise goal of 72 +/- 10 bpm. Toponins not taken this admit and patient's EF puts her at low risk. EOB vitals 109/62 mmHg, 82 bpm, and 93%. Pt asymptomatic with gait up to 45 ft. Severe SOB at 60ft, vitals 115/64 mmHg, 85 bpm, and 94%. Discussed that symptoms demonstrate failure response and how to modify activity at home. Provided with cardiac rehab handout and discussed walkng program, talk test, and RPE scale to manage activity. Pt demonstrated understanding. Due to patient's limited tolerance to acivity recommend outpaitent cardiac rehab. Additionally recommend home health therapies due to her limited standing tolerance and concerns this poses with IADLs. Of note- she has an antalgic gait pattern at baseline due to old hip injury, however reports current gait is her baseline. Functionally able to perform basic mobility without physical assist. No additional acute physical therapy needs at this time.    See \"Rehab Therapy-Acute\" Patient Summary Report for complete documentation.     "

## 2018-07-05 ENCOUNTER — HOME CARE VISIT (OUTPATIENT)
Dept: HOME HEALTH SERVICES | Facility: HOME HEALTHCARE | Age: 83
End: 2018-07-05
Payer: MEDICARE

## 2018-07-05 VITALS
DIASTOLIC BLOOD PRESSURE: 55 MMHG | BODY MASS INDEX: 26.99 KG/M2 | OXYGEN SATURATION: 92 % | TEMPERATURE: 97.9 F | SYSTOLIC BLOOD PRESSURE: 102 MMHG | WEIGHT: 152.34 LBS | HEIGHT: 63 IN | RESPIRATION RATE: 16 BRPM | HEART RATE: 65 BPM

## 2018-07-05 DIAGNOSIS — E87.1 HYPONATREMIA: ICD-10-CM

## 2018-07-05 LAB
ANION GAP SERPL CALC-SCNC: 6 MMOL/L (ref 0–11.9)
ANION GAP SERPL CALC-SCNC: 7 MMOL/L (ref 0–11.9)
BNP SERPL-MCNC: 696 PG/ML (ref 0–100)
BUN SERPL-MCNC: 30 MG/DL (ref 8–22)
BUN SERPL-MCNC: 31 MG/DL (ref 8–22)
CALCIUM SERPL-MCNC: 8.8 MG/DL (ref 8.5–10.5)
CALCIUM SERPL-MCNC: 9.4 MG/DL (ref 8.5–10.5)
CHLORIDE SERPL-SCNC: 93 MMOL/L (ref 96–112)
CHLORIDE SERPL-SCNC: 94 MMOL/L (ref 96–112)
CHLORIDE UR-SCNC: 51 MMOL/L
CO2 SERPL-SCNC: 27 MMOL/L (ref 20–33)
CO2 SERPL-SCNC: 30 MMOL/L (ref 20–33)
CREAT SERPL-MCNC: 1.55 MG/DL (ref 0.5–1.4)
CREAT SERPL-MCNC: 1.62 MG/DL (ref 0.5–1.4)
CREAT UR-MCNC: 83 MG/DL
GLUCOSE SERPL-MCNC: 107 MG/DL (ref 65–99)
GLUCOSE SERPL-MCNC: 98 MG/DL (ref 65–99)
INR PPP: 1.12 (ref 0.87–1.13)
OSMOLALITY UR: 335 MOSM/KG H2O (ref 300–900)
POTASSIUM SERPL-SCNC: 4.2 MMOL/L (ref 3.6–5.5)
POTASSIUM SERPL-SCNC: 4.8 MMOL/L (ref 3.6–5.5)
POTASSIUM UR-SCNC: 61.7 MMOL/L
PROT UR-MCNC: 24.2 MG/DL (ref 0–15)
PROTHROMBIN TIME: 14.1 SEC (ref 12–14.6)
SODIUM SERPL-SCNC: 127 MMOL/L (ref 135–145)
SODIUM SERPL-SCNC: 130 MMOL/L (ref 135–145)
SODIUM UR-SCNC: 14 MMOL/L

## 2018-07-05 PROCEDURE — 99225 PR SUBSEQUENT OBSERVATION CARE,LEVEL II: CPT | Performed by: HOSPITALIST

## 2018-07-05 PROCEDURE — A9270 NON-COVERED ITEM OR SERVICE: HCPCS | Performed by: HOSPITALIST

## 2018-07-05 PROCEDURE — 80048 BASIC METABOLIC PNL TOTAL CA: CPT | Mod: 91

## 2018-07-05 PROCEDURE — 36415 COLL VENOUS BLD VENIPUNCTURE: CPT

## 2018-07-05 PROCEDURE — 82436 ASSAY OF URINE CHLORIDE: CPT

## 2018-07-05 PROCEDURE — 84133 ASSAY OF URINE POTASSIUM: CPT

## 2018-07-05 PROCEDURE — 84156 ASSAY OF PROTEIN URINE: CPT

## 2018-07-05 PROCEDURE — 83880 ASSAY OF NATRIURETIC PEPTIDE: CPT

## 2018-07-05 PROCEDURE — G0378 HOSPITAL OBSERVATION PER HR: HCPCS

## 2018-07-05 PROCEDURE — 82570 ASSAY OF URINE CREATININE: CPT

## 2018-07-05 PROCEDURE — A9270 NON-COVERED ITEM OR SERVICE: HCPCS | Performed by: INTERNAL MEDICINE

## 2018-07-05 PROCEDURE — 85610 PROTHROMBIN TIME: CPT

## 2018-07-05 PROCEDURE — 700102 HCHG RX REV CODE 250 W/ 637 OVERRIDE(OP): Performed by: HOSPITALIST

## 2018-07-05 PROCEDURE — 84300 ASSAY OF URINE SODIUM: CPT

## 2018-07-05 PROCEDURE — 83935 ASSAY OF URINE OSMOLALITY: CPT

## 2018-07-05 PROCEDURE — A9270 NON-COVERED ITEM OR SERVICE: HCPCS | Performed by: NURSE PRACTITIONER

## 2018-07-05 PROCEDURE — 700102 HCHG RX REV CODE 250 W/ 637 OVERRIDE(OP): Performed by: NURSE PRACTITIONER

## 2018-07-05 PROCEDURE — 700102 HCHG RX REV CODE 250 W/ 637 OVERRIDE(OP): Performed by: INTERNAL MEDICINE

## 2018-07-05 RX ORDER — CLOPIDOGREL BISULFATE 75 MG/1
75 TABLET ORAL DAILY
Qty: 90 TAB | Refills: 3 | Status: ON HOLD | OUTPATIENT
Start: 2018-07-06 | End: 2018-08-20

## 2018-07-05 RX ORDER — SPIRONOLACTONE 25 MG/1
12.5 TABLET ORAL DAILY
Qty: 30 TAB | Refills: 3 | Status: SHIPPED | OUTPATIENT
Start: 2018-07-06 | End: 2018-07-05

## 2018-07-05 RX ORDER — SODIUM CHLORIDE 1 G/1
1 TABLET ORAL
Qty: 90 TAB | Refills: 0 | Status: SHIPPED | OUTPATIENT
Start: 2018-07-05 | End: 2018-07-20

## 2018-07-05 RX ORDER — SPIRONOLACTONE 25 MG/1
12.5 TABLET ORAL DAILY
Qty: 30 TAB | Refills: 3 | Status: ON HOLD | OUTPATIENT
Start: 2018-07-06 | End: 2018-07-11

## 2018-07-05 RX ORDER — ASPIRIN 81 MG/1
81 TABLET ORAL DAILY
Qty: 30 TAB | Refills: 0 | Status: ON HOLD | OUTPATIENT
Start: 2018-07-06 | End: 2018-07-11

## 2018-07-05 RX ORDER — SPIRONOLACTONE 25 MG/1
12.5 TABLET ORAL DAILY
Status: DISCONTINUED | OUTPATIENT
Start: 2018-07-06 | End: 2018-07-05 | Stop reason: HOSPADM

## 2018-07-05 RX ADMIN — TOPIRAMATE 25 MG: 25 TABLET, FILM COATED ORAL at 05:04

## 2018-07-05 RX ADMIN — STANDARDIZED SENNA CONCENTRATE AND DOCUSATE SODIUM 2 TABLET: 8.6; 5 TABLET, FILM COATED ORAL at 05:05

## 2018-07-05 RX ADMIN — LEVOTHYROXINE SODIUM 50 MCG: 50 TABLET ORAL at 05:03

## 2018-07-05 RX ADMIN — SODIUM CHLORIDE TAB 1 GM 1 G: 1 TAB at 05:05

## 2018-07-05 RX ADMIN — SPIRONOLACTONE 25 MG: 25 TABLET, FILM COATED ORAL at 05:04

## 2018-07-05 RX ADMIN — CLOPIDOGREL 75 MG: 75 TABLET, FILM COATED ORAL at 05:04

## 2018-07-05 RX ADMIN — METOLAZONE 2.5 MG: 2.5 TABLET ORAL at 05:04

## 2018-07-05 RX ADMIN — ACETAMINOPHEN 1000 MG: 500 TABLET, FILM COATED ORAL at 06:17

## 2018-07-05 RX ADMIN — FUROSEMIDE 20 MG: 20 TABLET ORAL at 05:03

## 2018-07-05 RX ADMIN — SODIUM CHLORIDE TAB 1 GM 1 G: 1 TAB at 11:25

## 2018-07-05 RX ADMIN — METOPROLOL SUCCINATE 25 MG: 25 TABLET, EXTENDED RELEASE ORAL at 05:04

## 2018-07-05 RX ADMIN — ASPIRIN 81 MG: 81 TABLET, COATED ORAL at 05:05

## 2018-07-05 ASSESSMENT — ENCOUNTER SYMPTOMS
VOMITING: 0
PHOTOPHOBIA: 0
TREMORS: 0
TINGLING: 0
SPEECH CHANGE: 0
HEADACHES: 0
ABDOMINAL PAIN: 0
HEMOPTYSIS: 0
SHORTNESS OF BREATH: 0
COUGH: 0
DIZZINESS: 0
BLURRED VISION: 0
EYE PAIN: 0
DOUBLE VISION: 0
HEARTBURN: 0
NECK PAIN: 0
BACK PAIN: 0
SENSORY CHANGE: 0
MYALGIAS: 0

## 2018-07-05 NOTE — PROGRESS NOTES
Patient discharged from hospital. Tele monitor and IV removed. Discussed prescriptions, discharge education, symptoms management with patient. Patient aware of follow-up appointment. Transported to car via wheelchair.  All personal belongings with patient.  Patient provided discharge instructions. All questions addressed.

## 2018-07-05 NOTE — CARE PLAN
Problem: Infection  Goal: Will remain free from infection  Outcome: PROGRESSING AS EXPECTED  Right groin cath site w/o redness or pain. No bruise noted. Vitals WNL.

## 2018-07-05 NOTE — PROGRESS NOTES
Received report from previous shift. Plan of care discussed with patient. no concerns voiced at this time. Patient is A&O 4, bed alarm on.  Patient educated on the importance of calling if in need of assistance.  Patient verbalized understanding.  Bed controls on, bed locked and in lowest position. Patient without pain at this time. Will continue to monitor for safety and comfort.

## 2018-07-05 NOTE — PROGRESS NOTES
Post Discharge Review  Cardiovascular Nurse Navigator ([id number]) Note:    Reviewed ACS/PCI medications:  Dual Antiplatelet Therapy (DAPT):  aspirin + clopidogrel  Beta-Blocker:  metoprolol  Statin:  Held due to LDL < 70 (per discussion with APRN)  ACEI/ARB:  N/A (EF 55%)  Aldosterone blocking agent:   Spironolactone    Intensive Cardiac Rehab (ICR) Referral:  Referred on 7/3/18; has current inpatient orders for nutrition consult & PT for Phase I ICR  ICR follow-up and contact info added to AVS:  Yes    Cardiology Follow-Up:  July 20, 2018      Inpatient & Discharge Patient Education:  Bedside nursing to continually provide patient education on ACS meds, signs and symptoms to monitor for, and risk factor modification. Also at discharge please complete the “ACS” special instructions on the AVS.  Thank you and please call with questions.

## 2018-07-05 NOTE — CARE PLAN
Problem: Safety  Goal: Will remain free from injury  Outcome: PROGRESSING AS EXPECTED  Pt calls for assistance bed alarm on. Pt alert oriented times 4. No injury at this time.

## 2018-07-05 NOTE — DISCHARGE PLANNING
Anticipated Discharge Disposition: D/C to Home with HH    Action: LSW met with pt at bedside to discuss d/c planning and referrals. Pt is aware of HH referral and would like this service. Pt signed choice for [facility]. LSW sent to [facility] for processing.    Barriers to Discharge: None.    Plan: Pt will d/c to home once HH has been approved. No further d/c planning needs at this time.

## 2018-07-05 NOTE — FACE TO FACE
Face to Face Supporting Documentation - Home Health    The encounter with this patient was in whole or in part the primary reason for home health admission.    Date of encounter:   Patient:                    MRN:                       YOB: 2018  Andree Egan  [id number]  4/21/1932     Home health to see patient for:  Skilled Nursing care for assessment, interventions & education    Skilled need for:  Exacerbation of Chronic Disease State chf    Skilled nursing interventions to include:  Comment: medications    Homebound status evidenced by:  Need the aid of supportive devices such as crutches, canes, wheelchairs or walkers. Leaving home requires a considerable and taxing effort. There is a normal inability to leave the home.    Community Physician to provide follow up care: ELIZABETH Patel     Optional Interventions? No      I certify the face to face encounter for this home health care referral meets the CMS requirements and the encounter/clinical assessment with the patient was, in whole, or in part, for the medical condition(s) listed above, which is the primary reason for home health care. Based on my clinical findings: the service(s) are medically necessary, support the need for home health care, and the homebound criteria are met.  I certify that this patient has had a face to face encounter by myself.  Mau Berry M.D. - NPI: [id number]

## 2018-07-05 NOTE — DISCHARGE INSTRUCTIONS
Discharge Instructions    Discharged to home by car with relative. Discharged via wheelchair, hospital escort: Yes.  Special equipment needed: Not Applicable    Be sure to schedule a follow-up appointment with your primary care doctor or any specialists as instructed.     Discharge Plan:   Diet Plan: Discussed  Activity Level: Discussed  Confirmed Follow up Appointment: Appointment Scheduled  Medication Reconciliation Updated: Yes  Influenza Vaccine Indication: Not indicated: Previously immunized this influenza season and > 8 years of age    I understand that a diet low in cholesterol, fat, and sodium is recommended for good health. Unless I have been given specific instructions below for another diet, I accept this instruction as my diet prescription.   Other diet: cardiac    Special Instructions:   HF Patient Discharge Instructions  · Monitor your weight daily, and maintain a weight chart, to track your weight changes.   · Activity as tolerated, unless your Doctor has ordered otherwise. Other activity order: none.  · Follow a low fat, low cholesterol, low salt diet unless instructed otherwise by your Doctor. Read the labels on the back of food products and track your intake of fat, cholesterol and salt.   · Fluid Restriction No. If a Fluid Restriction has been ordered by your Doctor, measure fluids with a measuring cup to ensure that you are not exceeding the restriction.   · No smoking.  · Oxygen No. If your Doctor has ordered that you wear Oxygen at home, it is important to wear it as ordered.  · Did you receive an explanation from staff on the importance of taking each of your medications and why it is necessary to keep taking them unless your doctor says to stop? Yes  · Were all of your questions answered about how to manage your heart failure and what to do if you have increased signs and symptoms after you go home? Yes  · Do you feel like your heart failure care team involved you in the care treatment plan  and allowed you to make decisions regarding your care while in the hospital and addressed any discharge needs you might have? Yes  ·     See the educational handout provided at discharge for more information on monitoring your daily weight, activity and diet. This also explains more about Heart Failure, symptoms of a flare-up and some of the tests that you have undergone.     Warning Signs of a Flare-Up include:  · Swelling in the ankles or lower legs.  · Shortness of breath, while at rest, or while doing normal activities.   · Shortness of breath at night when in bed, or coughing in bed.   · Requiring more pillows to sleep at night, or needing to sit up at night to sleep.  · Feeling weak, dizzy or fatigued.     When to call your Doctor:  · Call [employer] seven days a week from 8:00 a.m. to 8:00 p.m. for medical questions [phone].  · Call your Primary Care Physician or Cardiologist if:   1. You experience any pain radiating to your jaw or neck.  2. You have any difficulty breathing.  3. You experience weight gain of 3 lbs in a day or 5 lbs in a week.   4. You feel any palpitations or irregular heartbeats.  5. You become dizzy or lose consciousness.   If you have had an angiogram or had a pacemaker or AICD placed, and experience:  1. Bleeding, drainage or swelling at the surgical / puncture site.  2. Fever greater than 100.0 F  3. Shock from internal defibrillator.  4. Cool and / or numb extremities.      · Is patient discharged on Warfarin / Coumadin?   Yes    You are receiving the drug warfarin. Please understand the importance of monitoring warfarin with scheduled PT/INR blood draws.  Follow-up with the [facility] in one week for INR lab..    IMPORTANT: HOW TO USE THIS INFORMATION:  This is a summary and does NOT have all possible information about this product. This information does not assure that this product is safe, effective, or appropriate for you. This information is not individual  "medical advice and does not substitute for the advice of your health care professional. Always ask your health care professional for complete information about this product and your specific health needs.      WARFARIN - ORAL (WARF-uh-rin)      COMMON BRAND NAME(S): Coumadin      WARNING:  Warfarin can cause very serious (possibly fatal) bleeding. This is more likely to occur when you first start taking this medication or if you take too much warfarin. To decrease your risk for bleeding, your doctor or other health care provider will monitor you closely and check your lab results (INR test) to make sure you are not taking too much warfarin. Keep all medical and laboratory appointments. Tell your doctor right away if you notice any signs of serious bleeding. See also Side Effects section.      USES:  This medication is used to treat blood clots (such as in deep vein thrombosis-DVT or pulmonary embolus-PE) and/or to prevent new clots from forming in your body. Preventing harmful blood clots helps to reduce the risk of a stroke or heart attack. Conditions that increase your risk of developing blood clots include a certain type of irregular heart rhythm (atrial fibrillation), heart valve replacement, recent heart attack, and certain surgeries (such as hip/knee replacement). Warfarin is commonly called a \"blood thinner,\" but the more correct term is \"anticoagulant.\" It helps to keep blood flowing smoothly in your body by decreasing the amount of certain substances (clotting proteins) in your blood.      HOW TO USE:  Read the Medication Guide provided by your pharmacist before you start taking warfarin and each time you get a refill. If you have any questions, ask your doctor or pharmacist. Take this medication by mouth with or without food as directed by your doctor or other health care professional, usually once a day. It is very important to take it exactly as directed. Do not increase the dose, take it more " frequently, or stop using it unless directed by your doctor. Dosage is based on your medical condition, laboratory tests (such as INR), and response to treatment. Your doctor or other health care provider will monitor you closely while you are taking this medication to determine the right dose for you. Use this medication regularly to get the most benefit from it. To help you remember, take it at the same time each day. It is important to eat a balanced, consistent diet while taking warfarin. Some foods can affect how warfarin works in your body and may affect your treatment and dose. Avoid sudden large increases or decreases in your intake of foods high in vitamin K (such as broccoli, cauliflower, cabbage, brussels sprouts, kale, spinach, and other green leafy vegetables, liver, green tea, certain vitamin supplements). If you are trying to lose weight, check with your doctor before you try to go on a diet. Cranberry products may also affect how your warfarin works. Limit the amount of cranberry juice (16 ounces/480 milliliters a day) or other cranberry products you may drink or eat.      SIDE EFFECTS:  Nausea, loss of appetite, or stomach/abdominal pain may occur. If any of these effects persist or worsen, tell your doctor or pharmacist promptly. Remember that your doctor has prescribed this medication because he or she has judged that the benefit to you is greater than the risk of side effects. Many people using this medication do not have serious side effects. This medication can cause serious bleeding if it affects your blood clotting proteins too much (shown by unusually high INR lab results). Even if your doctor stops your medication, this risk of bleeding can continue for up to a week. Tell your doctor right away if you have any signs of serious bleeding, including: unusual pain/swelling/discomfort, unusual/easy bruising, prolonged bleeding from cuts or gums, persistent/frequent nosebleeds, unusually  heavy/prolonged menstrual flow, pink/dark urine, coughing up blood, vomit that is bloody or looks like coffee grounds, severe headache, dizziness/fainting, unusual or persistent tiredness/weakness, bloody/black/tarry stools, chest pain, shortness of breath, difficulty swallowing. Tell your doctor right away if any of these unlikely but serious side effects occur: persistent nausea/vomiting, severe stomach/abdominal pain, yellowing eyes/skin. This drug rarely has caused very serious (possibly fatal) problems if its effects lead to small blood clots (usually at the beginning of treatment). This can lead to severe skin/tissue damage that may require surgery or amputation if left untreated. Patients with certain blood conditions (protein C or S deficiency) may be at greater risk. Get medical help right away if any of these rare but serious side effects occur: painful/red/purplish patches on the skin (such as on the toe, breast, abdomen), change in the amount of urine, vision changes, confusion, slurred speech, weakness on one side of the body. A very serious allergic reaction to this drug is rare. However, get medical help right away if you notice any symptoms of a serious allergic reaction, including: rash, itching/swelling (especially of the face/tongue/throat), severe dizziness, trouble breathing. This is not a complete list of possible side effects. If you notice other effects not listed above, contact your doctor or pharmacist. In the [place] - Call your doctor for medical advice about side effects. You may report side effects to FDA at 5-727-WZE-4377. In [place] - Call your doctor for medical advice about side effects. You may report side effects to [facility] [place] at 1-566.677.9310.      PRECAUTIONS:  Before taking warfarin, tell your doctor or pharmacist if you are allergic to it; or if you have any other allergies. This product may contain inactive ingredients, which can cause allergic reactions or other problems. Talk  to your pharmacist for more details. Before using this medication, tell your doctor or pharmacist your medical history, especially of: blood disorders (such as anemia, hemophilia), bleeding problems (such as bleeding of the stomach/intestines, bleeding in the brain), blood vessel disorders (such as aneurysms), recent major injury/surgery, liver disease, alcohol use, mental/mood disorders (including memory problems), frequent falls/injuries. It is important that all your doctors and dentists know that you take warfarin. Before having surgery or any medical/dental procedures, tell your doctor or dentist that you are taking this medication and about all the products you use (including prescription drugs, nonprescription drugs, and herbal products). Avoid getting injections into the muscles. If you must have an injection into a muscle (for example, a flu shot), it should be given in the arm. This way, it will be easier to check for bleeding and/or apply pressure bandages. This medication may cause stomach bleeding. Daily use of alcohol while using this medicine will increase your risk for stomach bleeding and may also affect how this medication works. Limit or avoid alcoholic beverages. If you have not been eating well, if you have an illness or infection that causes fever, vomiting, or diarrhea for more than 2 days, or if you start using any antibiotic medications, contact your doctor or pharmacist immediately because these conditions can affect how warfarin works. This medication can cause heavy bleeding. To lower the chance of getting cut, bruised, or injured, use great caution with sharp objects like safety razors and nail cutters. Use an electric razor when shaving and a soft toothbrush when brushing your teeth. Avoid activities such as contact sports. If you fall or injure yourself, especially if you hit your head, call your doctor immediately. Your doctor may need to check you. The Food & Drug Administration has  "stated that generic warfarin products are interchangeable. However, consult your doctor or pharmacist before switching warfarin products. Be careful not to take more than one medication that contains warfarin unless specifically directed by the doctor or health care provider who is monitoring your warfarin treatment. Older adults may be at greater risk for bleeding while using this drug. This medication is not recommended for use during pregnancy because of serious (possibly fatal) harm to an unborn baby. Discuss the use of reliable forms of birth control with your doctor. If you become pregnant or think you may be pregnant, tell your doctor immediately. If you are planning pregnancy, discuss a plan for managing your condition with your doctor before you become pregnant. Your doctor may switch the type of medication you use during pregnancy. Very small amounts of this medication may pass into breast milk but is unlikely to harm a nursing infant. Consult your doctor before breast-feeding.      DRUG INTERACTIONS:  Drug interactions may change how your medications work or increase your risk for serious side effects. This document does not contain all possible drug interactions. Keep a list of all the products you use (including prescription/nonprescription drugs and herbal products) and share it with your doctor and pharmacist. Do not start, stop, or change the dosage of any medicines without your doctor's approval. Warfarin interacts with many prescription, nonprescription, vitamin, and herbal products. This includes medications that are applied to the skin or inside the vagina or rectum. The interactions with warfarin usually result in an increase or decrease in the \"blood-thinning\" (anticoagulant) effect. Your doctor or other health care professional should closely monitor you to prevent serious bleeding or clotting problems. While taking warfarin, it is very important to tell your doctor or pharmacist of any " changes in medications, vitamins, or herbal products that you are taking. Some products that may interact with this drug include: capecitabine, imatinib, mifepristone. Aspirin, aspirin-like drugs (salicylates), and nonsteroidal anti-inflammatory drugs (NSAIDs such as ibuprofen, naproxen, celecoxib) may have effects similar to warfarin. These drugs may increase the risk of bleeding problems if taken during treatment with warfarin. Carefully check all prescription/nonprescription product labels (including drugs applied to the skin such as pain-relieving creams) since the products may contain NSAIDs or salicylates. Talk to your doctor about using a different medication (such as acetaminophen) to treat pain/fever. Low-dose aspirin and related drugs (such as clopidogrel, ticlopidine) should be continued if prescribed by your doctor for specific medical reasons such as heart attack or stroke prevention. Consult your doctor or pharmacist for more details. Many herbal products interact with warfarin. Tell your doctor before taking any herbal products, especially bromelains, coenzyme Q10, cranberry, danshen, dong quai, fenugreek, garlic, ginkgo biloba, ginseng, and Diego's wort, among others. This medication may interfere with a certain laboratory test to measure theophylline levels, possibly causing false test results. Make sure laboratory personnel and all your doctors know you use this drug.      OVERDOSE:  If overdose is suspected, contact a poison control center or emergency room immediately. [place] residents can call the [place] National Poison Hotline at 1-612.554.3726. [place] residents can call a provincial poison control center. Symptoms of overdose may include: bloody/black/tarry stools, pink/dark urine, unusual/prolonged bleeding.      NOTES:  Do not share this medication with others. Laboratory and/or medical tests (such as INR, complete blood count) must be performed periodically to monitor your progress or check for  side effects. Consult your doctor for more details.      MISSED DOSE:  For the best possible benefit, do not miss any doses. If you do miss a dose and remember on the same day, take it as soon as you remember. If you remember on the next day, skip the missed dose and resume your usual dosing schedule. Do not double the dose to catch up because this could increase your risk for bleeding. Keep a record of missed doses to give to your doctor or pharmacist. Contact your doctor or pharmacist if you miss 2 or more doses in a row.      STORAGE:  Store at room temperature away from light and moisture. Do not store in the bathroom. Keep all medications away from children and pets. Do not flush medications down the toilet or pour them into a drain unless instructed to do so. Properly discard this product when it is  or no longer needed. Consult your pharmacist or local waste disposal company for more details about how to safely discard your product.      MEDICAL ALERT:  Your condition and medication can cause complications in a medical emergency. For information about enrolling in MedicAlert, call 1-383.185.8260 ([place]) or 1-728.821.5844 ([place]).      Information last revised 2010 Copyright(c) 2010 [employer]             Depression / Suicide Risk    As you are discharged from this [facility] facility, it is important to learn how to keep safe from harming yourself.    Recognize the warning signs:  · Abrupt changes in personality, positive or negative- including increase in energy   · Giving away possessions  · Change in eating patterns- significant weight changes-  positive or negative  · Change in sleeping patterns- unable to sleep or sleeping all the time   · Unwillingness or inability to communicate  · Depression  · Unusual sadness, discouragement and loneliness  · Talk of wanting to die  · Neglect of personal appearance   · Rebelliousness- reckless behavior  · Withdrawal from people/activities  they love  · Confusion- inability to concentrate     If you or a loved one observes any of these behaviors or has concerns about self-harm, here's what you can do:  · Talk about it- your feelings and reasons for harming yourself  · Remove any means that you might use to hurt yourself (examples: pills, rope, extension cords, firearm)  · Get professional help from the community (Mental Health, Substance Abuse, psychological counseling)  · Do not be alone:Call your Safe Contact- someone whom you trust who will be there for you.  · Call your local CRISIS HOTLINE 435-5968 or 299-631-8838  · Call your local Children's Mobile Crisis Response Team [place] (749) 665-6707 or www.[employer]  · Call the toll free National Suicide Prevention Hotlines   · National Suicide Prevention Lifeline 738-270-RPRA (0797)  · [place] [facility] 800-SUICIDE (645-2551)      Hyponatremia  Introduction  Hyponatremia is when the amount of salt (sodium) in your blood is too low. When salt levels are low, your cells absorb extra water and they swell. The swelling happens throughout the body, but it mostly affects the brain.  Follow these instructions at home:  · Take medicines only as told by your doctor. Many medicines can make this condition worse. Talk with your doctor about any medicines that you are currently taking.  · Carefully follow a recommended diet as told by your doctor.  · Carefully follow instructions from your doctor about fluid restrictions.  · Keep all follow-up visits as told by your doctor. This is important.  · Do not drink alcohol.  Contact a doctor if:  · You feel sicker to your stomach (nauseous).  · You feel more confused.  · You feel more tired (fatigued).  · Your headache gets worse.  · You feel weaker.  · Your symptoms go away and then they come back.  · You have trouble following the diet instructions.  Get help right away if:  · You start to twitch and shake (have a seizure).  · You pass out  (faint).  · You keep having watery poop (diarrhea).  · You keep throwing up (vomiting).  This information is not intended to replace advice given to you by your health care provider. Make sure you discuss any questions you have with your health care provider.  Document Released: 08/29/2012 Document Revised: 05/25/2017 Document Reviewed: 12/14/2015  © 2017 [employer]        Coronary Angiogram With Stent  Coronary angiogram with stent placement is a procedure to widen or open a narrow blood vessel of the heart (coronary artery). Arteries may become blocked by cholesterol buildup (plaques) in the lining or wall. When a coronary artery becomes partially blocked, blood flow to that area decreases. This may lead to chest pain or a heart attack (myocardial infarction).  A stent is a small piece of metal that looks like mesh or a spring. Stent placement may be done as treatment for a heart attack or right after a coronary angiogram in which a blocked artery is found.  Let your health care provider know about:  · Any allergies you have.  · All medicines you are taking, including vitamins, herbs, eye drops, creams, and over-the-counter medicines.  · Any problems you or family members have had with anesthetic medicines.  · Any blood disorders you have.  · Any surgeries you have had.  · Any medical conditions you have.  · Whether you are pregnant or may be pregnant.  What are the risks?  Generally, this is a safe procedure. However, problems may occur, including:  · Damage to the heart or its blood vessels.  · A return of blockage.  · Bleeding, infection, or bruising at the insertion site.  · A collection of blood under the skin (hematoma) at the insertion site.  · A blood clot in another part of the body.  · Kidney injury.  · Allergic reaction to the dye or contrast that is used.  · Bleeding into the abdomen (retroperitoneal bleeding).  What happens before the procedure?  Staying hydrated   Follow instructions from your health  care provider about hydration, which may include:  · Up to 2 hours before the procedure - you may continue to drink clear liquids, such as water, clear fruit juice, black coffee, and plain tea.  Eating and drinking restrictions   Follow instructions from your health care provider about eating and drinking, which may include:  · 8 hours before the procedure - stop eating heavy meals or foods such as meat, fried foods, or fatty foods.  · 6 hours before the procedure - stop eating light meals or foods, such as toast or cereal.  · 2 hours before the procedure - stop drinking clear liquids.  Ask your health care provider about:  · Changing or stopping your regular medicines. This is especially important if you are taking diabetes medicines or blood thinners.  · Taking medicines such as ibuprofen. These medicines can thin your blood. Do not take these medicines before your procedure if your health care provider instructs you not to. Generally, aspirin is recommended before a procedure of passing a small, thin tube (catheter) through a blood vessel and into the heart (cardiac catheterization).  What happens during the procedure?  · An IV tube will be inserted into one of your veins.  · You will be given one or more of the following:  ¨ A medicine to help you relax (sedative).  ¨ A medicine to numb the area where the catheter will be inserted into an artery (local anesthetic).  · To reduce your risk of infection:  ¨ Your health care team will wash or sanitize their hands.  ¨ Your skin will be washed with soap.  ¨ Hair may be removed from the area where the catheter will be inserted.  · Using a guide wire, the catheter will be inserted into an artery. The location may be in your groin, in your wrist, or in the fold of your arm (near your elbow).  · A type of X-ray (fluoroscopy) will be used to help guide the catheter to the opening of the arteries in the heart.  · A dye will be injected into the catheter, and X-rays will be  taken. The dye will help to show where any narrowing or blockages are located in the arteries.  · A tiny wire will be guided to the blocked spot, and a balloon will be inflated to make the artery wider.  · The stent will be expanded and will crush the plaques into the wall of the vessel. The stent will hold the area open and improve the blood flow. Most stents have a drug coating to reduce the risk of the stent narrowing over time.  · The artery may be made wider using a drill, laser, or other tools to remove plaques.  · When the blood flow is better, the catheter will be removed. The lining of the artery will grow over the stent, which stays where it was placed.  This procedure may vary among health care providers and hospitals.  What happens after the procedure?  · If the procedure is done through the leg, you will be kept in bed lying flat for about 6 hours. You will be instructed to not bend and not cross your legs.  · The insertion site will be checked frequently.  · The pulse in your foot or wrist will be checked frequently.  · You may have additional blood tests, X-rays, and a test that records the electrical activity of your heart (electrocardiogram, or ECG).  This information is not intended to replace advice given to you by your health care provider. Make sure you discuss any questions you have with your health care provider.  Document Released: 06/23/2004 Document Revised: 08/17/2017 Document Reviewed: 07/23/2017  Elsevier Interactive Patient Education © 2017 [employer]

## 2018-07-05 NOTE — CARE PLAN
Problem: Pain Management  Goal: Pain level will decrease to patient's comfort goal    Intervention: Follow pain managment plan developed in collaboration with patient and Interdisciplinary Team  Assessed patient for pain. Patient declined any pain at this time. Discussed pain management plan with patient

## 2018-07-05 NOTE — DISCHARGE PLANNING
Received Choice form at [id number]  Agency/Facility Name: [facility]  Referral sent per Choice form @ [phone]

## 2018-07-05 NOTE — DISCHARGE SUMMARY
Discharge Summary    CHIEF COMPLAINT ON ADMISSION  Post Elective Cardiac Catheterization on 7/3/18.    CODE STATUS  Full     HPI & HOSPITAL COURSE  This is a pleasant 86 year old female here for elective cardiac catheterization for aortic stenosis and diastolic heart failure. The patient presented to the office with symptoms of worsening lower extremit edema. Significant medical history of CHF, atrial fibrillation with PPM, chronic anticoagulation with Coumadin and COPD. They were therefore sent for elective cardiac catheterization and cardiac catheterization showed coronary artery disease with high grade proximal and high grade mid left   anterior descending artery and no evidence of aortic stenosis with a 00 mmHg peak to peak gradient. Post-cath, the patient was noted to have hyponatremia. Hospitalist services was consulted for assistance with management of her sodium levels. Patient was started on sodium tablets and her Metolazone was discontinued and aldactone dosage was decreased. Discussed with patient that she will be on triple therapy for one month and to not continue taking the ASA after one month unless cleared by Dr. Jose Simms.    The right femoral groin site was clean, dry, and intact with no signs of hematoma, bleeding, or infection. The patient understands discharge instructions related to groin site care and concerning signs to watch for. They were able to ambulate the halls without any exertional angina. Patient vitals are stable. The patient was given thorough discussion of his discharge instructions per nursing and myself. DAPT adherence was discussed in detail. LDL on 5/7/18 was 18, at goal of < 70 so need for statin at this time. Patient was discharged to home with family with no further questions/concerns, their outpatient follow up has been made by our office. Patient will continue to follow up with the anticoagulation clinic to manage her coumadin dosing.    Patient was given a  laboratory slip to have a BMP drawn in 3 days and in 7 days from today to recheck Na levels and renal function. Patient has a follow up appointment with her Nephrologist  on the 7/11/18 and Dr. Simms on 7/20/18. Patient will also be discharged with home health.     PROCEDURES  LEFT CARDIAC CATH on 7/3/18  POST-OPERATIVE DIAGNOSES:   1.  No evidence of aortic stenosis with 0 mmHg peak to peak gradient, mean gradient   of 8 mmHg, calculated CHRISTIANE of 2.09 cm2.  2.  Coronary artery disease with high grade proximal and high grade mid left   anterior descending artery.  3.  Successful percutaneous transluminal coronary angioplasty/stent placement   of the mid left anterior descending artery with 2.25x12 mm Synergy   drug-eluting stent.  4.  Successful percutaneous transluminal coronary angioplasty/stent placement   of the proximal left anterior descending artery with 2.5x12 mm Synergy   drug-eluting stent.  5.  Normal left ventricular systolic function with ejection fraction of 55%.  6.  Normal left ventricular end-diastolic pressure.  7.  Elevated right heart pressure with PA systolic pressure of 60 mmHg.  8.  Successful Angio-Seal closure.         FOLLOW UP  Future Appointments  Date Time Provider Department Center   7/6/2018 To Be Determined Elizabeth Moncada R.N. [facility] None   7/6/2018 To Be Determined Carlene Geiger MSW [facility] None   7/10/2018 9:00 AM Rena Ha, PTA [facility] None   7/11/2018 1:30 PM Neela Awad M.D. [facility].   7/12/2018 To Be Determined Elizabeth Moncada R.N. [facility] None   7/13/2018 9:15 AM [facility] EXAM 4 [facility] None   7/17/2018 To Be Determined Joan K Ormonde, PT [facility] None   7/18/2018 To Be Determined Elizabeth Moncada R.N. [facility] None   7/20/2018 9:15 AM Jose Simms M.D. [facility] None   7/23/2018 10:20 AM ELIZABETH Patel [facility] [place]   7/25/2018 To Be Determined Elizabeth Moncada R.N. [facility] None   7/31/2018 To Be Determined Elizabeth Moncada R.N. [facility] None   10/31/2018 9:45 AM PULMONARY DX 1 IPUL W  [address]   10/31/2018 10:00 AM PFT-RM1 PULM None   10/31/2018 11:00 AM A Rotation PULM None   11/27/2018 10:00 AM PACER CHECK-CAM B [facility] None     Yudelka Sanchez, A.P.R.N.  [address]  [address]  [place] [address]  [phone]    Schedule an appointment as soon as possible for a visit in 1 week  Follow up appointment      MEDICATIONS ON DISCHARGE     Medication List      START taking these medications      Instructions   aspirin 81 MG EC tablet  Start taking on:  7/6/2018   Doctor's comments:  Take for only one month. Talk to cardiologist about continuing after one month.  Take 1 Tab by mouth every day.  Dose:  81 mg     clopidogrel 75 MG Tabs  Start taking on:  7/6/2018  Commonly known as:  PLAVIX   Take 1 Tab by mouth every day.  Dose:  75 mg     sodium chloride 1 GM Tabs  Commonly known as:  SALT   Take 1 Tab by mouth 3 times a day, with meals.  Dose:  1 g        CHANGE how you take these medications      Instructions   spironolactone 25 MG Tabs  Start taking on:  7/6/2018  What changed:  how much to take  Commonly known as:  ALDACTONE   Take 0.5 Tabs by mouth every day.  Dose:  12.5 mg        CONTINUE taking these medications      Instructions   acetaminophen 500 MG Tabs  Commonly known as:  TYLENOL   Take 1,000 mg by mouth 1 time daily as needed for Mild Pain.  Dose:  1000 mg     albuterol 108 (90 Base) MCG/ACT Aers inhalation aerosol   Inhale 2 Puffs by mouth every 6 hours as needed for Shortness of Breath.  Dose:  2 Puff     allopurinol 300 MG Tabs  Commonly known as:  ZYLOPRIM   Take 1 Tab by mouth every evening.  Dose:  300 mg     Calcium-Vitamin D 500-125 MG-UNIT Tabs   Take 1 tablet by mouth every evening.  Dose:  1 tablet     furosemide 20 MG Tabs  Commonly known as:  LASIX   Take 1 Tab by mouth 2 times a day.  Dose:  20 mg     gabapentin 300 MG Caps  Commonly known as:  NEURONTIN   Take 600 mg by mouth every bedtime.  Dose:  600 mg     levothyroxine 50 MCG Tabs  Commonly known as:  SYNTHROID    Take 1 Tab by mouth Every morning on an empty stomach.  Dose:  50 mcg     metoprolol SR 25 MG Tb24  Commonly known as:  TOPROL XL   Take 25 mg by mouth every morning.  Dose:  25 mg     omeprazole 20 MG delayed-release capsule  Commonly known as:  PRILOSEC   Take 20 mg by mouth every morning.  Dose:  20 mg     potassium chloride SA 10 MEQ Tbcr  Commonly known as:  K-DUR   Take 1 Tab by mouth every evening.  Dose:  10 mEq     sertraline 50 MG Tabs  Commonly known as:  ZOLOFT   Take 25 mg by mouth every bedtime.  Dose:  25 mg     topiramate 25 MG Tabs  Commonly known as:  TOPAMAX   Take 1 Tab by mouth 2 times a day.  Dose:  25 mg     warfarin 2.5 MG Tabs  Commonly known as:  COUMADIN   Take 2.5-3.75 mg by mouth every evening. 3.75mg on Tuesday/Thursday, 2.5mg on all other days  Dose:  2.5-3.75 mg        STOP taking these medications    metOLazone 5 MG Tabs  Commonly known as:  ZAROXOLYN            Allergies  Allergies   Allergen Reactions   • Codeine Vomiting         CONSULTATIONS  Hospitalists        LABORATORY  Lab Results   Component Value Date    SODIUM 127 (L) 07/05/2018    POTASSIUM 4.2 07/05/2018    CHLORIDE 94 (L) 07/05/2018    CO2 27 07/05/2018    GLUCOSE 98 07/05/2018    BUN 30 (H) 07/05/2018    CREATININE 1.62 (H) 07/05/2018        Lab Results   Component Value Date    WBC 3.0 (L) 07/04/2018    HEMOGLOBIN 8.7 (L) 07/04/2018    HEMATOCRIT 27.8 (L) 07/04/2018    PLATELETCT 147 (L) 07/04/2018

## 2018-07-05 NOTE — PROGRESS NOTES
[facility] Hospitalist Progress Note    Date of Service: 2018    Chief Complaint  86 y.o. female admitted 7/3/2018 with CAD for heart cath    Interval Problem Update    s/p heart cath on 7/3 - see op report- 2 stents placed  Sodium was continuously dropping, as low as 122 so Medicine consulted    Patient denies drinking excessive free water    She feels fine.    No sob or dizziness    She is on lasix, zaroxyln, aldactone    Sodium now 126--> 130--> 127    Case d/w cardiology - they will try to remove 1 of her diurectics    Consultants/Specialty  medicine    Disposition  home        Review of Systems   Constitutional: Positive for malaise/fatigue.   HENT: Negative for ear discharge, ear pain, hearing loss, nosebleeds and tinnitus.    Eyes: Negative for blurred vision, double vision, photophobia and pain.   Respiratory: Negative for cough, hemoptysis and shortness of breath.    Cardiovascular: Positive for leg swelling.   Gastrointestinal: Negative for abdominal pain, heartburn and vomiting.   Genitourinary: Negative for dysuria, frequency and urgency.   Musculoskeletal: Negative for back pain, joint pain, myalgias and neck pain.   Neurological: Negative for dizziness, tingling, tremors, sensory change, speech change and headaches.   All other systems reviewed and are negative.     Physical Exam  Laboratory/Imaging   Hemodynamics  Temp (24hrs), Av.4 °C (97.6 °F), Min:36.2 °C (97.1 °F), Max:36.8 °C (98.3 °F)   Temperature: 36.6 °C (97.9 °F)  Pulse  Av.3  Min: 60  Max: 82    Blood Pressure : 102/55      Respiratory      Respiration: 16, Pulse Oximetry: 92 %        RUL Breath Sounds: Diminished, RML Breath Sounds: Diminished, RLL Breath Sounds: Diminished, ASHTYN Breath Sounds: Diminished, LLL Breath Sounds: Diminished    Fluids  No intake or output data in the 24 hours ending 18 1341    Nutrition  Orders Placed This Encounter   Procedures   • Diet Order Cardiac     Standing Status:   Standing     Number of  Occurrences:   1     Order Specific Question:   Diet:     Answer:   Cardiac [6]     Physical Exam   Constitutional: She is oriented to person, place, and time. No distress.   Eyes: Right eye exhibits no discharge. Left eye exhibits no discharge. No scleral icterus.   Neck: JVD present. No tracheal deviation present. No thyromegaly present.   Cardiovascular: Normal rate.  Exam reveals no gallop and no friction rub.    No murmur heard.  Pulmonary/Chest: No respiratory distress. She has no wheezes. She has no rales.   Abdominal: She exhibits no distension. There is no tenderness. There is no rebound.   Musculoskeletal: She exhibits edema (+1 edema both lower ext).   Neurological: She is alert and oriented to person, place, and time. No cranial nerve deficit. Coordination normal.   Skin: She is not diaphoretic.       Recent Labs      07/04/18   0520   WBC  3.0*   RBC  3.14*   HEMOGLOBIN  8.7*   HEMATOCRIT  27.8*   MCV  88.5   MCH  27.7   MCHC  31.3*   RDW  51.6*   PLATELETCT  147*   MPV  10.6     Recent Labs      07/04/18   1811  07/05/18   0055  07/05/18   0543   SODIUM  127*  130*  127*   POTASSIUM  4.1  4.8  4.2   CHLORIDE  91*  93*  94*   CO2  28  30  27   GLUCOSE  87  107*  98   BUN  29*  31*  30*   CREATININE  1.55*  1.55*  1.62*   CALCIUM  9.2  9.4  8.8     Recent Labs      07/03/18   0718  07/04/18   0520  07/05/18   0055   INR  1.00  1.09  1.12     Recent Labs      07/05/18   0810   BNPBTYPENAT  696*              Assessment/Plan     S/P coronary angioplasty   Assessment & Plan    2 stents    Medications as per cards        Hyponatremia   Assessment & Plan    Probably due to diurectics- cardiology will remove one of the diurectics    urine studies unrevealing    Restrict free water    f/u bmp as outpatient      Salt tabs started        Chronic diastolic CHF (congestive heart failure) (HCC)- (present on admission)   Assessment & Plan    Not in acte exacerbation    Cont home diurectics , as per cardiology         ok for discharge medically  Quality-Core Measures   Hunt catheter::  No Hunt  DVT prophylaxis - mechanical:  SCDs

## 2018-07-05 NOTE — CARE PLAN
Problem: Knowledge Deficit  Goal: Knowledge of disease process/condition, treatment plan, diagnostic tests, and medications will improve    Intervention: Explain information regarding disease process/condition, treatment plan, diagnostic tests, and medications and document in education  Discussed todays plan of care, testing and medications with patient. Patient verbalized understanding

## 2018-07-05 NOTE — PROGRESS NOTES
Informed Dr. Berry of patient INR at 1.1. Dr. Berry ok'ed patient to discharge and follow up with coumadin clinic.

## 2018-07-06 DIAGNOSIS — I48.20 CHRONIC ATRIAL FIBRILLATION (HCC): ICD-10-CM

## 2018-07-06 NOTE — PROGRESS NOTES
Pt DC with [facility] .   Placed order for INR draw on 7/9/18.  Pt was provided with dosing instructions.     Vera MartinezD

## 2018-07-07 ENCOUNTER — APPOINTMENT (OUTPATIENT)
Dept: RADIOLOGY | Facility: MEDICAL CENTER | Age: 83
DRG: 699 | End: 2018-07-07
Attending: EMERGENCY MEDICINE
Payer: MEDICARE

## 2018-07-07 ENCOUNTER — HOSPITAL ENCOUNTER (INPATIENT)
Facility: MEDICAL CENTER | Age: 83
LOS: 3 days | DRG: 699 | End: 2018-07-11
Attending: EMERGENCY MEDICINE | Admitting: HOSPITALIST
Payer: MEDICARE

## 2018-07-07 DIAGNOSIS — R79.89 ELEVATED TROPONIN: ICD-10-CM

## 2018-07-07 DIAGNOSIS — J96.01 ACUTE RESPIRATORY FAILURE WITH HYPOXIA (HCC): ICD-10-CM

## 2018-07-07 DIAGNOSIS — J44.9 CHRONIC OBSTRUCTIVE PULMONARY DISEASE, UNSPECIFIED COPD TYPE (HCC): ICD-10-CM

## 2018-07-07 DIAGNOSIS — R06.09 DOE (DYSPNEA ON EXERTION): ICD-10-CM

## 2018-07-07 DIAGNOSIS — I50.9 ACUTE ON CHRONIC CONGESTIVE HEART FAILURE, UNSPECIFIED HEART FAILURE TYPE (HCC): ICD-10-CM

## 2018-07-07 DIAGNOSIS — J90 PLEURAL EFFUSION: ICD-10-CM

## 2018-07-07 DIAGNOSIS — I27.20 MODERATE TO SEVERE PULMONARY HYPERTENSION (HCC): ICD-10-CM

## 2018-07-07 DIAGNOSIS — N17.9 ACUTE RENAL FAILURE, UNSPECIFIED ACUTE RENAL FAILURE TYPE (HCC): ICD-10-CM

## 2018-07-07 DIAGNOSIS — Z95.0 CARDIAC PACEMAKER IN SITU: ICD-10-CM

## 2018-07-07 DIAGNOSIS — N18.30 CKD (CHRONIC KIDNEY DISEASE) STAGE 3, GFR 30-59 ML/MIN (HCC): ICD-10-CM

## 2018-07-07 DIAGNOSIS — N18.3 ACUTE RENAL FAILURE SUPERIMPOSED ON STAGE 3 CHRONIC KIDNEY DISEASE, UNSPECIFIED ACUTE RENAL FAILURE TYPE: ICD-10-CM

## 2018-07-07 DIAGNOSIS — Z98.61 S/P CORONARY ANGIOPLASTY: ICD-10-CM

## 2018-07-07 DIAGNOSIS — I48.20 CHRONIC ATRIAL FIBRILLATION (HCC): ICD-10-CM

## 2018-07-07 DIAGNOSIS — Z79.01 WARFARIN ANTICOAGULATION: ICD-10-CM

## 2018-07-07 DIAGNOSIS — N17.9 ACUTE RENAL FAILURE SUPERIMPOSED ON STAGE 3 CHRONIC KIDNEY DISEASE, UNSPECIFIED ACUTE RENAL FAILURE TYPE: ICD-10-CM

## 2018-07-07 DIAGNOSIS — R25.1 TREMORS OF NERVOUS SYSTEM: ICD-10-CM

## 2018-07-07 LAB
APTT PPP: 38.9 SEC (ref 24.7–36)
BASOPHILS # BLD AUTO: 1.4 % (ref 0–1.8)
BASOPHILS # BLD: 0.05 K/UL (ref 0–0.12)
EKG IMPRESSION: NORMAL
EOSINOPHIL # BLD AUTO: 0 K/UL (ref 0–0.51)
EOSINOPHIL NFR BLD: 0 % (ref 0–6.9)
ERYTHROCYTE [DISTWIDTH] IN BLOOD BY AUTOMATED COUNT: 55.8 FL (ref 35.9–50)
HCT VFR BLD AUTO: 27.7 % (ref 37–47)
HGB BLD-MCNC: 8.5 G/DL (ref 12–16)
IMM GRANULOCYTES # BLD AUTO: 0.02 K/UL (ref 0–0.11)
IMM GRANULOCYTES NFR BLD AUTO: 0.5 % (ref 0–0.9)
INR PPP: 1.34 (ref 0.87–1.13)
LYMPHOCYTES # BLD AUTO: 0.79 K/UL (ref 1–4.8)
LYMPHOCYTES NFR BLD: 21.6 % (ref 22–41)
MCH RBC QN AUTO: 28.1 PG (ref 27–33)
MCHC RBC AUTO-ENTMCNC: 30.7 G/DL (ref 33.6–35)
MCV RBC AUTO: 91.7 FL (ref 81.4–97.8)
MONOCYTES # BLD AUTO: 0.23 K/UL (ref 0–0.85)
MONOCYTES NFR BLD AUTO: 6.3 % (ref 0–13.4)
NEUTROPHILS # BLD AUTO: 2.57 K/UL (ref 2–7.15)
NEUTROPHILS NFR BLD: 70.2 % (ref 44–72)
NRBC # BLD AUTO: 0 K/UL
NRBC BLD-RTO: 0 /100 WBC
PLATELET # BLD AUTO: 150 K/UL (ref 164–446)
PMV BLD AUTO: 10.3 FL (ref 9–12.9)
PROTHROMBIN TIME: 16.3 SEC (ref 12–14.6)
RBC # BLD AUTO: 3.02 M/UL (ref 4.2–5.4)
WBC # BLD AUTO: 3.7 K/UL (ref 4.8–10.8)

## 2018-07-07 PROCEDURE — 85730 THROMBOPLASTIN TIME PARTIAL: CPT

## 2018-07-07 PROCEDURE — 85025 COMPLETE CBC W/AUTO DIFF WBC: CPT

## 2018-07-07 PROCEDURE — 85610 PROTHROMBIN TIME: CPT

## 2018-07-07 PROCEDURE — 83880 ASSAY OF NATRIURETIC PEPTIDE: CPT

## 2018-07-07 PROCEDURE — 93005 ELECTROCARDIOGRAM TRACING: CPT | Performed by: EMERGENCY MEDICINE

## 2018-07-07 PROCEDURE — 93005 ELECTROCARDIOGRAM TRACING: CPT

## 2018-07-07 PROCEDURE — 94760 N-INVAS EAR/PLS OXIMETRY 1: CPT

## 2018-07-07 PROCEDURE — 36415 COLL VENOUS BLD VENIPUNCTURE: CPT

## 2018-07-07 PROCEDURE — 71045 X-RAY EXAM CHEST 1 VIEW: CPT

## 2018-07-07 PROCEDURE — 84484 ASSAY OF TROPONIN QUANT: CPT

## 2018-07-07 PROCEDURE — 80053 COMPREHEN METABOLIC PANEL: CPT

## 2018-07-07 PROCEDURE — 99285 EMERGENCY DEPT VISIT HI MDM: CPT

## 2018-07-07 ASSESSMENT — PAIN SCALES - GENERAL: PAINLEVEL_OUTOF10: 0

## 2018-07-08 ENCOUNTER — APPOINTMENT (OUTPATIENT)
Dept: RADIOLOGY | Facility: MEDICAL CENTER | Age: 83
DRG: 699 | End: 2018-07-08
Attending: HOSPITALIST
Payer: MEDICARE

## 2018-07-08 ENCOUNTER — HOME CARE VISIT (OUTPATIENT)
Dept: HOME HEALTH SERVICES | Facility: HOME HEALTHCARE | Age: 83
End: 2018-07-08
Payer: MEDICARE

## 2018-07-08 PROBLEM — N17.9 ACUTE ON CHRONIC RENAL FAILURE (HCC): Status: ACTIVE | Noted: 2018-07-08

## 2018-07-08 PROBLEM — R79.89 ELEVATED TROPONIN: Status: ACTIVE | Noted: 2018-07-08

## 2018-07-08 PROBLEM — N18.9 ACUTE ON CHRONIC RENAL FAILURE (HCC): Status: ACTIVE | Noted: 2018-07-08

## 2018-07-08 LAB
ALBUMIN SERPL BCP-MCNC: 3.5 G/DL (ref 3.2–4.9)
ALBUMIN/GLOB SERPL: 1.3 G/DL
ALP SERPL-CCNC: 99 U/L (ref 30–99)
ALT SERPL-CCNC: <5 U/L (ref 2–50)
ANION GAP SERPL CALC-SCNC: 10 MMOL/L (ref 0–11.9)
ANION GAP SERPL CALC-SCNC: 11 MMOL/L (ref 0–11.9)
AST SERPL-CCNC: 15 U/L (ref 12–45)
BILIRUB SERPL-MCNC: 0.6 MG/DL (ref 0.1–1.5)
BNP SERPL-MCNC: 620 PG/ML (ref 0–100)
BUN SERPL-MCNC: 50 MG/DL (ref 8–22)
BUN SERPL-MCNC: 53 MG/DL (ref 8–22)
CALCIUM SERPL-MCNC: 8.5 MG/DL (ref 8.5–10.5)
CALCIUM SERPL-MCNC: 8.8 MG/DL (ref 8.5–10.5)
CHLORIDE SERPL-SCNC: 93 MMOL/L (ref 96–112)
CHLORIDE SERPL-SCNC: 95 MMOL/L (ref 96–112)
CHLORIDE UR-SCNC: 93 MMOL/L
CK MB SERPL-MCNC: 1.1 NG/ML (ref 0–5)
CO2 SERPL-SCNC: 21 MMOL/L (ref 20–33)
CO2 SERPL-SCNC: 24 MMOL/L (ref 20–33)
CREAT SERPL-MCNC: 3.13 MG/DL (ref 0.5–1.4)
CREAT SERPL-MCNC: 3.48 MG/DL (ref 0.5–1.4)
CREAT UR-MCNC: 28.2 MG/DL
EST. AVERAGE GLUCOSE BLD GHB EST-MCNC: 114 MG/DL
FERRITIN SERPL-MCNC: 30.4 NG/ML (ref 10–291)
FOLATE SERPL-MCNC: 9.2 NG/ML
GLOBULIN SER CALC-MCNC: 2.6 G/DL (ref 1.9–3.5)
GLUCOSE SERPL-MCNC: 111 MG/DL (ref 65–99)
GLUCOSE SERPL-MCNC: 95 MG/DL (ref 65–99)
HBA1C MFR BLD: 5.6 % (ref 0–5.6)
HGB RETIC QN AUTO: 25.2 PG/CELL (ref 29–35)
IMM RETICS NFR: 10.1 % (ref 9.3–17.4)
IRON SATN MFR SERPL: 5 % (ref 15–55)
IRON SERPL-MCNC: 15 UG/DL (ref 40–170)
LV EJECT FRACT  99904: 50
LV EJECT FRACT MOD 2C 99903: 68
LV EJECT FRACT MOD 4C 99902: 40.52
LV EJECT FRACT MOD BP 99901: 55.72
POTASSIUM SERPL-SCNC: 4.9 MMOL/L (ref 3.6–5.5)
POTASSIUM SERPL-SCNC: 5.4 MMOL/L (ref 3.6–5.5)
POTASSIUM UR-SCNC: 50 MMOL/L
PROT SERPL-MCNC: 6.1 G/DL (ref 6–8.2)
PROT UR-MCNC: 13.3 MG/DL (ref 0–15)
RETICS # AUTO: 0.04 M/UL (ref 0.04–0.06)
RETICS/RBC NFR: 1.6 % (ref 0.8–2.1)
SODIUM SERPL-SCNC: 125 MMOL/L (ref 135–145)
SODIUM SERPL-SCNC: 129 MMOL/L (ref 135–145)
SODIUM UR-SCNC: 58 MMOL/L
TIBC SERPL-MCNC: 328 UG/DL (ref 250–450)
TROPONIN I SERPL-MCNC: 0.17 NG/ML (ref 0–0.04)
TROPONIN I SERPL-MCNC: 0.18 NG/ML (ref 0–0.04)
TROPONIN I SERPL-MCNC: 0.21 NG/ML (ref 0–0.04)
TSH SERPL DL<=0.005 MIU/L-ACNC: 1.61 UIU/ML (ref 0.38–5.33)
VIT B12 SERPL-MCNC: 176 PG/ML (ref 211–911)

## 2018-07-08 PROCEDURE — 93325 DOPPLER ECHO COLOR FLOW MAPG: CPT

## 2018-07-08 PROCEDURE — 76936 ECHO GUIDE FOR ARTERY REPAIR: CPT

## 2018-07-08 PROCEDURE — 99223 1ST HOSP IP/OBS HIGH 75: CPT | Mod: AI | Performed by: HOSPITALIST

## 2018-07-08 PROCEDURE — 93306 TTE W/DOPPLER COMPLETE: CPT | Mod: 26 | Performed by: INTERNAL MEDICINE

## 2018-07-08 PROCEDURE — 36415 COLL VENOUS BLD VENIPUNCTURE: CPT

## 2018-07-08 PROCEDURE — A9270 NON-COVERED ITEM OR SERVICE: HCPCS | Performed by: HOSPITALIST

## 2018-07-08 PROCEDURE — 93308 TTE F-UP OR LMTD: CPT

## 2018-07-08 PROCEDURE — 83540 ASSAY OF IRON: CPT

## 2018-07-08 PROCEDURE — 82570 ASSAY OF URINE CREATININE: CPT

## 2018-07-08 PROCEDURE — 84133 ASSAY OF URINE POTASSIUM: CPT

## 2018-07-08 PROCEDURE — 84443 ASSAY THYROID STIM HORMONE: CPT

## 2018-07-08 PROCEDURE — 82607 VITAMIN B-12: CPT

## 2018-07-08 PROCEDURE — 84484 ASSAY OF TROPONIN QUANT: CPT

## 2018-07-08 PROCEDURE — 83550 IRON BINDING TEST: CPT

## 2018-07-08 PROCEDURE — 96376 TX/PRO/DX INJ SAME DRUG ADON: CPT

## 2018-07-08 PROCEDURE — 96374 THER/PROPH/DIAG INJ IV PUSH: CPT

## 2018-07-08 PROCEDURE — 76775 US EXAM ABDO BACK WALL LIM: CPT

## 2018-07-08 PROCEDURE — 82436 ASSAY OF URINE CHLORIDE: CPT

## 2018-07-08 PROCEDURE — 84300 ASSAY OF URINE SODIUM: CPT

## 2018-07-08 PROCEDURE — 700111 HCHG RX REV CODE 636 W/ 250 OVERRIDE (IP): Performed by: EMERGENCY MEDICINE

## 2018-07-08 PROCEDURE — 82728 ASSAY OF FERRITIN: CPT

## 2018-07-08 PROCEDURE — 85046 RETICYTE/HGB CONCENTRATE: CPT

## 2018-07-08 PROCEDURE — 700102 HCHG RX REV CODE 250 W/ 637 OVERRIDE(OP): Performed by: INTERNAL MEDICINE

## 2018-07-08 PROCEDURE — 82553 CREATINE MB FRACTION: CPT

## 2018-07-08 PROCEDURE — 93321 DOPPLER ECHO F-UP/LMTD STD: CPT

## 2018-07-08 PROCEDURE — 84156 ASSAY OF PROTEIN URINE: CPT

## 2018-07-08 PROCEDURE — 83036 HEMOGLOBIN GLYCOSYLATED A1C: CPT

## 2018-07-08 PROCEDURE — 700111 HCHG RX REV CODE 636 W/ 250 OVERRIDE (IP): Performed by: HOSPITALIST

## 2018-07-08 PROCEDURE — 770020 HCHG ROOM/CARE - TELE (206)

## 2018-07-08 PROCEDURE — 82746 ASSAY OF FOLIC ACID SERUM: CPT

## 2018-07-08 PROCEDURE — 80048 BASIC METABOLIC PNL TOTAL CA: CPT

## 2018-07-08 PROCEDURE — 99223 1ST HOSP IP/OBS HIGH 75: CPT | Performed by: INTERNAL MEDICINE

## 2018-07-08 PROCEDURE — A9270 NON-COVERED ITEM OR SERVICE: HCPCS | Performed by: INTERNAL MEDICINE

## 2018-07-08 PROCEDURE — 700102 HCHG RX REV CODE 250 W/ 637 OVERRIDE(OP): Performed by: HOSPITALIST

## 2018-07-08 RX ORDER — BISACODYL 10 MG
10 SUPPOSITORY, RECTAL RECTAL
Status: DISCONTINUED | OUTPATIENT
Start: 2018-07-08 | End: 2018-07-11 | Stop reason: HOSPADM

## 2018-07-08 RX ORDER — FUROSEMIDE 10 MG/ML
40 INJECTION INTRAMUSCULAR; INTRAVENOUS ONCE
Status: COMPLETED | OUTPATIENT
Start: 2018-07-08 | End: 2018-07-08

## 2018-07-08 RX ORDER — SODIUM CHLORIDE 1 G/1
1 TABLET ORAL
Status: DISCONTINUED | OUTPATIENT
Start: 2018-07-08 | End: 2018-07-08

## 2018-07-08 RX ORDER — ALBUTEROL SULFATE 90 UG/1
2 AEROSOL, METERED RESPIRATORY (INHALATION) EVERY 4 HOURS PRN
Status: DISCONTINUED | OUTPATIENT
Start: 2018-07-08 | End: 2018-07-11 | Stop reason: HOSPADM

## 2018-07-08 RX ORDER — METOPROLOL SUCCINATE 25 MG/1
25 TABLET, EXTENDED RELEASE ORAL EVERY MORNING
Status: DISCONTINUED | OUTPATIENT
Start: 2018-07-08 | End: 2018-07-11 | Stop reason: HOSPADM

## 2018-07-08 RX ORDER — FUROSEMIDE 10 MG/ML
40 INJECTION INTRAMUSCULAR; INTRAVENOUS
Status: DISCONTINUED | OUTPATIENT
Start: 2018-07-08 | End: 2018-07-09

## 2018-07-08 RX ORDER — TRAZODONE HYDROCHLORIDE 50 MG/1
50 TABLET ORAL ONCE
Status: COMPLETED | OUTPATIENT
Start: 2018-07-08 | End: 2018-07-08

## 2018-07-08 RX ORDER — TRAZODONE HYDROCHLORIDE 50 MG/1
50 TABLET ORAL
Status: DISCONTINUED | OUTPATIENT
Start: 2018-07-08 | End: 2018-07-08

## 2018-07-08 RX ORDER — OMEPRAZOLE 20 MG/1
20 CAPSULE, DELAYED RELEASE ORAL EVERY MORNING
Status: DISCONTINUED | OUTPATIENT
Start: 2018-07-08 | End: 2018-07-11 | Stop reason: HOSPADM

## 2018-07-08 RX ORDER — ONDANSETRON 4 MG/1
4 TABLET, ORALLY DISINTEGRATING ORAL EVERY 4 HOURS PRN
Status: DISCONTINUED | OUTPATIENT
Start: 2018-07-08 | End: 2018-07-11 | Stop reason: HOSPADM

## 2018-07-08 RX ORDER — CLOPIDOGREL BISULFATE 75 MG/1
75 TABLET ORAL DAILY
Status: DISCONTINUED | OUTPATIENT
Start: 2018-07-08 | End: 2018-07-11 | Stop reason: HOSPADM

## 2018-07-08 RX ORDER — ALBUTEROL SULFATE 90 UG/1
2 AEROSOL, METERED RESPIRATORY (INHALATION)
Status: DISCONTINUED | OUTPATIENT
Start: 2018-07-08 | End: 2018-07-08

## 2018-07-08 RX ORDER — FUROSEMIDE 10 MG/ML
20 INJECTION INTRAMUSCULAR; INTRAVENOUS
Status: DISCONTINUED | OUTPATIENT
Start: 2018-07-08 | End: 2018-07-08

## 2018-07-08 RX ORDER — LEVOTHYROXINE SODIUM 0.05 MG/1
50 TABLET ORAL
Status: DISCONTINUED | OUTPATIENT
Start: 2018-07-08 | End: 2018-07-11 | Stop reason: HOSPADM

## 2018-07-08 RX ORDER — WARFARIN SODIUM 5 MG/1
5 TABLET ORAL
Status: COMPLETED | OUTPATIENT
Start: 2018-07-08 | End: 2018-07-08

## 2018-07-08 RX ORDER — TOPIRAMATE 25 MG/1
25 TABLET ORAL 2 TIMES DAILY
Status: DISCONTINUED | OUTPATIENT
Start: 2018-07-08 | End: 2018-07-11 | Stop reason: HOSPADM

## 2018-07-08 RX ORDER — SPIRONOLACTONE 25 MG/1
12.5 TABLET ORAL DAILY
Status: DISCONTINUED | OUTPATIENT
Start: 2018-07-08 | End: 2018-07-09

## 2018-07-08 RX ORDER — AMOXICILLIN 250 MG
2 CAPSULE ORAL 2 TIMES DAILY
Status: DISCONTINUED | OUTPATIENT
Start: 2018-07-08 | End: 2018-07-11 | Stop reason: HOSPADM

## 2018-07-08 RX ORDER — ONDANSETRON 2 MG/ML
4 INJECTION INTRAMUSCULAR; INTRAVENOUS EVERY 4 HOURS PRN
Status: DISCONTINUED | OUTPATIENT
Start: 2018-07-08 | End: 2018-07-11 | Stop reason: HOSPADM

## 2018-07-08 RX ORDER — POLYETHYLENE GLYCOL 3350 17 G/17G
1 POWDER, FOR SOLUTION ORAL
Status: DISCONTINUED | OUTPATIENT
Start: 2018-07-08 | End: 2018-07-11 | Stop reason: HOSPADM

## 2018-07-08 RX ADMIN — ASPIRIN 81 MG: 81 TABLET, COATED ORAL at 09:41

## 2018-07-08 RX ADMIN — TOPIRAMATE 25 MG: 25 TABLET, FILM COATED ORAL at 17:26

## 2018-07-08 RX ADMIN — SODIUM CHLORIDE TAB 1 GM 1 G: 1 TAB at 12:49

## 2018-07-08 RX ADMIN — FUROSEMIDE 40 MG: 10 INJECTION, SOLUTION INTRAMUSCULAR; INTRAVENOUS at 16:54

## 2018-07-08 RX ADMIN — SODIUM CHLORIDE TAB 1 GM 1 G: 1 TAB at 07:34

## 2018-07-08 RX ADMIN — OMEPRAZOLE 20 MG: 20 CAPSULE, DELAYED RELEASE ORAL at 09:41

## 2018-07-08 RX ADMIN — LEVOTHYROXINE SODIUM 50 MCG: 50 TABLET ORAL at 07:35

## 2018-07-08 RX ADMIN — SERTRALINE 25 MG: 50 TABLET, FILM COATED ORAL at 17:27

## 2018-07-08 RX ADMIN — WARFARIN SODIUM 5 MG: 5 TABLET ORAL at 10:37

## 2018-07-08 RX ADMIN — FUROSEMIDE 40 MG: 10 INJECTION, SOLUTION INTRAMUSCULAR; INTRAVENOUS at 07:34

## 2018-07-08 RX ADMIN — METOPROLOL SUCCINATE 25 MG: 25 TABLET, EXTENDED RELEASE ORAL at 09:41

## 2018-07-08 RX ADMIN — CLOPIDOGREL 75 MG: 75 TABLET, FILM COATED ORAL at 09:41

## 2018-07-08 RX ADMIN — SPIRONOLACTONE 12.5 MG: 25 TABLET, FILM COATED ORAL at 09:41

## 2018-07-08 RX ADMIN — TRAZODONE HYDROCHLORIDE 50 MG: 50 TABLET ORAL at 22:54

## 2018-07-08 RX ADMIN — FUROSEMIDE 40 MG: 10 INJECTION, SOLUTION INTRAMUSCULAR; INTRAVENOUS at 00:54

## 2018-07-08 RX ADMIN — TOPIRAMATE 25 MG: 25 TABLET, FILM COATED ORAL at 10:37

## 2018-07-08 ASSESSMENT — CHA2DS2 SCORE
HYPERTENSION: YES
DIABETES: NO
CHF OR LEFT VENTRICULAR DYSFUNCTION: YES
SEX: FEMALE
AGE 75 OR GREATER: YES
VASCULAR DISEASE: NO
AGE 65 TO 74: NO
PRIOR STROKE OR TIA OR THROMBOEMBOLISM: NO
CHA2DS2 VASC SCORE: 5

## 2018-07-08 ASSESSMENT — ENCOUNTER SYMPTOMS
DIZZINESS: 0
FOCAL WEAKNESS: 0
HEARTBURN: 0
FLANK PAIN: 0
MYALGIAS: 0
NAUSEA: 0
DEPRESSION: 0
FEVER: 0
CHILLS: 0
SENSORY CHANGE: 0
HALLUCINATIONS: 0
WEAKNESS: 0
BRUISES/BLEEDS EASILY: 0
SHORTNESS OF BREATH: 1
COUGH: 0
ABDOMINAL PAIN: 0
VOMITING: 0
BLURRED VISION: 0
EYE DISCHARGE: 0
PALPITATIONS: 0
DOUBLE VISION: 0
SPEECH CHANGE: 0
HEMOPTYSIS: 0

## 2018-07-08 ASSESSMENT — COPD QUESTIONNAIRES
HAVE YOU SMOKED AT LEAST 100 CIGARETTES IN YOUR ENTIRE LIFE: YES
COPD SCREENING SCORE: 8
DURING THE PAST 4 WEEKS HOW MUCH DID YOU FEEL SHORT OF BREATH: MOST  OR ALL OF THE TIME
DO YOU EVER COUGH UP ANY MUCUS OR PHLEGM?: YES, A FEW DAYS A WEEK OR MONTH

## 2018-07-08 ASSESSMENT — LIFESTYLE VARIABLES
SUBSTANCE_ABUSE: 0
EVER_SMOKED: YES

## 2018-07-08 ASSESSMENT — PAIN SCALES - GENERAL
PAINLEVEL_OUTOF10: 0

## 2018-07-08 NOTE — ASSESSMENT & PLAN NOTE
Cardiology md feels fluid overload is related to oliguric renal failure     BB  7/10 no spironolactone per nephrology for Acute contrast induced nephropathy.

## 2018-07-08 NOTE — ASSESSMENT & PLAN NOTE
This patient presents with acute on chronic renal failure after cardiac cath  Suspect contrast-induced nephropathy      Monitor bmp    Avoid nephrotoxins    Renal dose all medications

## 2018-07-08 NOTE — PROGRESS NOTES
2 RN Skin Assessment done with Chelsea BARNHART    Generalized bruising on BULE  Bruising to Right groin from old cath site  +3 pitting edema bilat LE  Bilat red/blanching/boggy heels - floating on pillow  Sacrum - pink/blanching  Large bruise on back of right thigh  Scab on left knee

## 2018-07-08 NOTE — PROGRESS NOTES
Received report from BRONWYN Kwon from ED.  Pt transported to [place] [id number] room 827 bed 2.  Pt on telemetry no c/o sob or chest pain.  Report given to day shift RN.

## 2018-07-08 NOTE — H&P
Hospital Medicine History and Physical    Date of Service  7/8/2018    Chief Complaint  Chief Complaint   Patient presents with   • Post-Op Complications     Pt. was discharged Thursday for a heart cath with two stents placed through her right femoral. Pt. and pt's family pt. has been gaining water weight since that time with no results with Lasix use. Pt. states increasing SOB but no s/s of diff breathing noted in triage. Per pt's family pt. has been low on potassium and sodium electrolytes as well.       History of Presenting Illness  86 y.o. female who presented 7/7/2018 with postoperative volume overload patient has a history of congestive heart failure has bilateral lower extremity swelling started about 2 days ago.  Recently had a cardiac catheterization was discharged on Thursday.  She has been gaining water weight since that time she has been taking oral Lasix without any improvement of urine output.  She was decreased on her diuretic dose at the time of discharge.  She is to be on metolazone they discontinue that at the time of discharge.  She has had increased shortness of breath and difficulty breathing.  Lower extremity edema all the way up to her mid abdomen.  She has also had decreased urine output she has had very little urine output since the time of surgery.  She has chronic kidney disease and now has acute renal failure.  She is currently on anticoagulation with warfarin for atrial fibrillation.  No other alleviating or exacerbating factors to her symptoms.    Primary Care Physician  MARIJA Patel.    Consultants  none    Code Status  full    Review of Systems  Review of Systems   Constitutional: Negative for chills and fever.   HENT: Negative for congestion, hearing loss and tinnitus.    Eyes: Negative for blurred vision, double vision and discharge.   Respiratory: Positive for shortness of breath. Negative for cough and hemoptysis.    Cardiovascular: Positive for leg swelling.  Negative for chest pain and palpitations.   Gastrointestinal: Negative for abdominal pain, heartburn, nausea and vomiting.   Genitourinary: Negative for dysuria and flank pain.   Musculoskeletal: Negative for joint pain and myalgias.   Skin: Negative for rash.   Neurological: Negative for dizziness, sensory change, speech change, focal weakness and weakness.   Endo/Heme/Allergies: Negative for environmental allergies. Does not bruise/bleed easily.   Psychiatric/Behavioral: Negative for depression, hallucinations and substance abuse.        Past Medical History  Past Medical History:   Diagnosis Date   • BERTRAND (dyspnea on exertion) 5/4/2018   • Secondary hypertension 4/3/2018   • Pacemaker 4/3/2018   • Congestive heart failure (CHF) ([facility]) 4/3/2018   • Tremors of nervous system 4/3/2018   • Atrial fibrillation (CMS-HCC) [I48.91] 3/29/2018   • Long term current use of anticoagulant 3/29/2018   • Arthritis     back/neck   • Blood clotting disorder (HCC)    • Breath shortness     water retention   • Cancer (HCC)     breast   • Cataract    • Heart burn    • Heart valve disease    • Hemorrhagic disorder (HCC)    • Hiatus hernia syndrome    • Hyperlipidemia    • Hypertension    • Kidney disease    • Pulmonary emphysema (HCC)    • Thyroid disease    • Urinary incontinence    • Urinary tract infection        Surgical History  No past surgical history on file.    Medications  No current facility-administered medications on file prior to encounter.      Current Outpatient Prescriptions on File Prior to Encounter   Medication Sig Dispense Refill   • aspirin EC 81 MG EC tablet Take 1 Tab by mouth every day. 30 Tab 0   • sodium chloride (SALT) 1 GM Tab Take 1 Tab by mouth 3 times a day, with meals. 90 Tab 0   • clopidogrel (PLAVIX) 75 MG Tab Take 1 Tab by mouth every day. 90 Tab 3   • spironolactone (ALDACTONE) 25 MG Tab Take 0.5 Tabs by mouth every day. 30 Tab 3   • topiramate (TOPAMAX) 25 MG Tab Take 1 Tab by mouth 2 times a day.  60 Tab 3   • allopurinol (ZYLOPRIM) 300 MG Tab Take 1 Tab by mouth every evening. 90 Tab 0   • potassium chloride SA (K-DUR) 10 MEQ Tab CR Take 1 Tab by mouth every evening. 90 Tab 1   • furosemide (LASIX) 20 MG Tab Take 1 Tab by mouth 2 times a day. 60 Tab 11   • levothyroxine (SYNTHROID) 50 MCG Tab Take 1 Tab by mouth Every morning on an empty stomach. 90 Tab 3   • warfarin (COUMADIN) 2.5 MG Tab Take 2.5-3.75 mg by mouth every evening. 3.75mg on Tuesday/Thursday, 2.5mg on all other days      • gabapentin (NEURONTIN) 300 MG Cap Take 600 mg by mouth every bedtime.     • acetaminophen (TYLENOL) 500 MG Tab Take 1,000 mg by mouth 1 time daily as needed for Mild Pain.     • Calcium-Vitamin D 500-125 MG-UNIT Tab Take 1 tablet by mouth every evening.     • albuterol 108 (90 Base) MCG/ACT Aero Soln inhalation aerosol Inhale 2 Puffs by mouth every 6 hours as needed for Shortness of Breath.     • sertraline (ZOLOFT) 50 MG Tab Take 25 mg by mouth every bedtime.     • metoprolol SR (TOPROL XL) 25 MG TABLET SR 24 HR Take 25 mg by mouth every morning.     • omeprazole (PRILOSEC) 20 MG delayed-release capsule Take 20 mg by mouth every morning.         Family History  Family History   Problem Relation Age of Onset   • Fainting Neg Hx        Social History  Social History   Substance Use Topics   • Smoking status: Former Smoker     Packs/day: 0.50     Types: Cigarettes     Quit date: 1970   • Smokeless tobacco: Never Used   • Alcohol use 1.8 oz/week     3 Glasses of wine per week      Comment: 1-2 glasses wine before dinner       Allergies  Allergies   Allergen Reactions   • Codeine Vomiting        Physical Exam  Laboratory   Hemodynamics  Temp (24hrs), Av.8 °C (98.2 °F), Min:36.8 °C (98.2 °F), Max:36.8 °C (98.2 °F)   Temperature: 36.8 °C (98.2 °F)  Pulse  Av.4  Min: 61  Max: 86 Heart Rate (Monitored): 66  Blood Pressure : 115/59, NIBP: 125/62      Respiratory      Respiration: 17, Pulse Oximetry: 97 %              Physical Exam   Constitutional: She is oriented to person, place, and time. She appears well-developed and well-nourished. No distress.   HENT:   Head: Normocephalic and atraumatic.   Eyes: Conjunctivae and EOM are normal. Pupils are equal, round, and reactive to light.   Neck: Normal range of motion. Neck supple. No JVD present.   Cardiovascular: Normal rate, regular rhythm and intact distal pulses.    Murmur heard.  Pulmonary/Chest: Effort normal and breath sounds normal. No respiratory distress. She has no wheezes.   Abdominal: Soft. Bowel sounds are normal. She exhibits no distension. There is no tenderness.   Musculoskeletal: Normal range of motion. She exhibits edema.   Pitting edema bilateral from legs to abdomen   Neurological: She is alert and oriented to person, place, and time. She exhibits normal muscle tone.   Skin: Skin is warm and dry.   Psychiatric: She has a normal mood and affect. Her behavior is normal. Judgment and thought content normal.   Nursing note and vitals reviewed.      Recent Labs      07/07/18 2302   WBC  3.7*   RBC  3.02*   HEMOGLOBIN  8.5*   HEMATOCRIT  27.7*   MCV  91.7   MCH  28.1   MCHC  30.7*   RDW  55.8*   PLATELETCT  150*   MPV  10.3     Recent Labs      07/05/18 0055 07/05/18 0543 07/07/18 2302   SODIUM  130*  127*  125*   POTASSIUM  4.8  4.2  5.4   CHLORIDE  93*  94*  93*   CO2  30  27  21   GLUCOSE  107*  98  111*   BUN  31*  30*  50*   CREATININE  1.55*  1.62*  3.13*   CALCIUM  9.4  8.8  8.5     Recent Labs      07/05/18 0055 07/05/18 0543  07/07/18 2302   ALTSGPT   --    --   <5   ASTSGOT   --    --   15   ALKPHOSPHAT   --    --   99   TBILIRUBIN   --    --   0.6   GLUCOSE  107*  98  111*     Recent Labs      07/05/18 0055  07/07/18 2302   APTT   --   38.9*   INR  1.12  1.34*     Recent Labs      07/05/18   0810  07/07/18 2302   BNPBTYPENAT  696*  620*         Lab Results   Component Value Date    TROPONINI 0.21 (H) 07/07/2018     Urinalysis:  No  results found for: SPECGRAVITY, GLUCOSEUR, KETONES, NITRITE, WBCURINE, RBCURINE, BACTERIA, EPITHELCELL     Imaging  DX-CHEST-PORTABLE (1 VIEW) [[id number]] Resulted: 07/07/18 2351   Order Status: Completed Updated: 07/07/18 2353   Narrative:       7/7/2018 11:34 PM    HISTORY/REASON FOR EXAM:  Chest Pain.      TECHNIQUE/EXAM DESCRIPTION AND NUMBER OF VIEWS:  Single portable view of the chest.    COMPARISON: 7/2/2018    FINDINGS:    The cardiomediastinal silhouette is enlarged. There is a right-sided pacer. Atherosclerotic calcification is seen.  There is a prominent hiatal hernia. There is a small right pleural effusion. Minimal interstitial prominence may represent minimal edema.   No pneumothorax is identified.   Impression:       Prominent cardiomegaly.    Prominent hiatal hernia.    Minimal interstitial prominence may represent minimal edema.    Small right pleural effusion.        Assessment/Plan     I anticipate this patient will require at least two midnights for appropriate medical management, necessitating inpatient admission.    * Acute on chronic renal failure (HCC)   Assessment & Plan    This patient presents with acute on chronic renal failure after cardiac cath  Suspect contrast-induced nephropathy  Patient with oliguria will give Lasix as patient is volume overloaded  Urine lites renal ultrasound ordered  Would consider nephrology consultation in the morning        Elevated troponin   Assessment & Plan    Likely demand from volume overload   Continue to trend   Check echo        S/P coronary angioplasty- (present on admission)   Assessment & Plan    Recently discharged on Thursday          Normocytic anemia- (present on admission)   Assessment & Plan    Lab workup ordered no signs of acute bleeding        Moderate to severe pulmonary hypertension (HCC)- (present on admission)   Assessment & Plan    Hx of   Repeating echo        Pleural effusion- (present on admission)   Assessment & Plan    Small  continue to monitor        Warfarin anticoagulation- (present on admission)   Assessment & Plan    2/2 to afibb   Pharmacy to dose        GERD (gastroesophageal reflux disease)- (present on admission)   Assessment & Plan    Cont ppi        COPD (chronic obstructive pulmonary disease) ([facility])- (present on admission)   Assessment & Plan    resp care protocol ordered  Not in acute exaceration        Congestive heart failure (CHF) ([facility])- (present on admission)   Assessment & Plan    Hx of repeating echo   Suspect volume overload 2/2 to oliguric renal failure   Will continue on diuresis   BB, SPrinolactone           Secondary hypertension- (present on admission)   Assessment & Plan    Resume home aldactone and BB        Chronic atrial fibrillation (HCC)- (present on admission)   Assessment & Plan    Resume comadin   And BB            VTE prophylaxis: Coumadin

## 2018-07-08 NOTE — ED PROVIDER NOTES
ED Provider Note    Scribed for Taiwo Emanuel M.D. by Cherelle Willis. 7/7/2018, 11:04 PM.    Primary care provider: ELIZABETH Patel  Means of arrival: walk-in  History obtained from: patient  History limited by: none     CHIEF COMPLAINT  Chief Complaint   Patient presents with   • Post-Op Complications     Pt. was discharged Thursday for a heart cath with two stents placed through her right femoral. Pt. and pt's family pt. has been gaining water weight since that time with no results with Lasix use. Pt. states increasing SOB but no s/s of diff breathing noted in triage. Per pt's family pt. has been low on potassium and sodium electrolytes as well.       HPI  Andree Egan is a 86 y.o. female with a history of congestive heart failure who presents to the Emergency Department for evaluation of bilateral leg swelling onset two days ago after being discharged for cardiac catherization by Dr. Garcia, Cardiologist. Patient endorses an associated shortness of breath, which is exacerbated while walking. Per patient's daughter, the patient's diuretics dosage was decreased by half following surgery. Patient denies chest pain coughing, fever, chills, nausea, or vomiting. Patient has a history of DVT. She is currently on Warfarin.       REVIEW OF SYSTEMS  Pertinent positives include bilateral leg swelling, and shortness of breath. Pertinent negatives include chest pain coughing, fever, chills, nausea, or vomiting. All other systems negative. C.     PAST MEDICAL HISTORY   has a past medical history of Arthritis; Atrial fibrillation (CMS-HCC) [I48.91] (3/29/2018); Blood clotting disorder ([facility]); Breath shortness; Cancer ([facility]); Cataract; Congestive heart failure (CHF) (Formerly [facility]) (4/3/2018); BERTRAND (dyspnea on exertion) (5/4/2018); Heart burn; Heart valve disease; Hemorrhagic disorder ([facility]); Hiatus hernia syndrome; Hyperlipidemia; Hypertension; Kidney disease; Long term current use of anticoagulant (3/29/2018); Pacemaker  (4/3/2018); Pulmonary emphysema (HCC); Secondary hypertension (4/3/2018); Thyroid disease; Tremors of nervous system (4/3/2018); Urinary incontinence; and Urinary tract infection.    SURGICAL HISTORY  patient denies any surgical history    SOCIAL HISTORY  Social History   Substance Use Topics   • Smoking status: Former Smoker     Packs/day: 0.50     Types: Cigarettes     Quit date: 1/1/1970   • Smokeless tobacco: Never Used   • Alcohol use 1.8 oz/week     3 Glasses of wine per week      Comment: 1-2 glasses wine before dinner      History   Drug Use No       FAMILY HISTORY  Family History   Problem Relation Age of Onset   • Fainting Neg Hx        CURRENT MEDICATIONS    Current Facility-Administered Medications:   •  aspirin EC (ECOTRIN) tablet 81 mg, 81 mg, Oral, DAILY, Adam Parr M.D.  •  clopidogrel (PLAVIX) tablet 75 mg, 75 mg, Oral, DAILY, Adam Parr M.D.  •  levothyroxine (SYNTHROID) tablet 50 mcg, 50 mcg, Oral, AM , Adam Parr M.D.  •  metoprolol SR (TOPROL XL) tablet 25 mg, 25 mg, Oral, QA, Adam Parr M.D.  •  omeprazole (PRILOSEC) capsule 20 mg, 20 mg, Oral, LAUREN, Adam Parr M.D.  •  sertraline (ZOLOFT) tablet 25 mg, 25 mg, Oral, QHS, Adam Parr M.D.  •  sodium chloride (SALT) tablet 1 g, 1 g, Oral, TID WITH MEALS, Adam Parr M.D.  •  spironolactone (ALDACTONE) tablet 12.5 mg, 12.5 mg, Oral, DAILY, Adam Parr M.D.  •  topiramate (TOPAMAX) tablet 25 mg, 25 mg, Oral, BID, Adam Parr M.D.  •  MD ALERT... warfarin (COUMADIN) per pharmacy protocol, , Other, pharmacy to dose, Adam Parr M.D.  •  senna-docusate (PERICOLACE or SENOKOT S) 8.6-50 MG per tablet 2 Tab, 2 Tab, Oral, BID **AND** polyethylene glycol/lytes (MIRALAX) PACKET 1 Packet, 1 Packet, Oral, QDAY PRN **AND** magnesium hydroxide (MILK OF MAGNESIA) suspension 30 mL, 30 mL, Oral, QDAY PRN **AND** bisacodyl (DULCOLAX) suppository 10 mg, 10 mg, Rectal, QDAY PRN, Adam Parr,  M.D.  •  Respiratory Care per Protocol, , Nebulization, Continuous RT, Adam Parr M.D.  •  ondansetron (ZOFRAN) syringe/vial injection 4 mg, 4 mg, Intravenous, Q4HRS PRN, Adam Parr M.D.  •  ondansetron (ZOFRAN ODT) dispertab 4 mg, 4 mg, Oral, Q4HRS PRN, Adam Parr M.D.  •  warfarin (COUMADIN) tablet 5 mg, 5 mg, Oral, COUMADIN-ONCE, Adam Parr M.D.  •  albuterol inhaler 2 Puff, 2 Puff, Inhalation, Q4HRS PRN, Adam Parr M.D.  •  furosemide (LASIX) injection 40 mg, 40 mg, Intravenous, BID DIURETIC, Adam Parr M.D.    Current Outpatient Prescriptions:   •  aspirin EC 81 MG EC tablet, Take 1 Tab by mouth every day., Disp: 30 Tab, Rfl: 0  •  sodium chloride (SALT) 1 GM Tab, Take 1 Tab by mouth 3 times a day, with meals., Disp: 90 Tab, Rfl: 0  •  clopidogrel (PLAVIX) 75 MG Tab, Take 1 Tab by mouth every day., Disp: 90 Tab, Rfl: 3  •  spironolactone (ALDACTONE) 25 MG Tab, Take 0.5 Tabs by mouth every day., Disp: 30 Tab, Rfl: 3  •  topiramate (TOPAMAX) 25 MG Tab, Take 1 Tab by mouth 2 times a day., Disp: 60 Tab, Rfl: 3  •  allopurinol (ZYLOPRIM) 300 MG Tab, Take 1 Tab by mouth every evening., Disp: 90 Tab, Rfl: 0  •  potassium chloride SA (K-DUR) 10 MEQ Tab CR, Take 1 Tab by mouth every evening., Disp: 90 Tab, Rfl: 1  •  furosemide (LASIX) 20 MG Tab, Take 1 Tab by mouth 2 times a day., Disp: 60 Tab, Rfl: 11  •  levothyroxine (SYNTHROID) 50 MCG Tab, Take 1 Tab by mouth Every morning on an empty stomach., Disp: 90 Tab, Rfl: 3  •  warfarin (COUMADIN) 2.5 MG Tab, Take 2.5-3.75 mg by mouth every evening. 3.75mg on Tuesday/Thursday, 2.5mg on all other days , Disp: , Rfl:   •  gabapentin (NEURONTIN) 300 MG Cap, Take 600 mg by mouth every bedtime., Disp: , Rfl:   •  acetaminophen (TYLENOL) 500 MG Tab, Take 1,000 mg by mouth 1 time daily as needed for Mild Pain., Disp: , Rfl:   •  Calcium-Vitamin D 500-125 MG-UNIT Tab, Take 1 tablet by mouth every evening., Disp: , Rfl:   •  albuterol 108  "(90 Base) MCG/ACT Aero Soln inhalation aerosol, Inhale 2 Puffs by mouth every 6 hours as needed for Shortness of Breath., Disp: , Rfl:   •  sertraline (ZOLOFT) 50 MG Tab, Take 25 mg by mouth every bedtime., Disp: , Rfl:   •  metoprolol SR (TOPROL XL) 25 MG TABLET SR 24 HR, Take 25 mg by mouth every morning., Disp: , Rfl:   •  omeprazole (PRILOSEC) 20 MG delayed-release capsule, Take 20 mg by mouth every morning., Disp: , Rfl:       ALLERGIES  Allergies   Allergen Reactions   • Codeine Vomiting       PHYSICAL EXAM  VITAL SIGNS: /59   Pulse 63   Temp 36.8 °C (98.2 °F) (Temporal)   Resp 17   Ht 1.6 m (5' 3\")   Wt 64 kg (141 lb)   LMP  (LMP Unknown)   SpO2 96%   BMI 24.98 kg/m²     Constitutional: Well developed, Well nourished, mild distress.   HENT: Normocephalic, Atraumatic, Oropharynx moist.   Eyes: Conjunctiva normal, No discharge.   Cardiovascular: Normal heart rate, Normal rhythm, No murmurs, equal pulses.   Pulmonary: Bilateral rales from base to mid. Normal breath sounds, No respiratory distress, No wheezing, No rhonchi.  Chest: No chest wall tenderness or deformity.   Abdomen:Soft, No tenderness, No masses, no rebound, no guarding.   Back: No CVA tenderness.   Musculoskeletal: No major deformities noted, No tenderness. No palpable mass in right thigh. 2+ pitting edema to mid thigh.  Skin: Warm, Dry, No erythema, No rash. Ecchymosis over right linguinal area and down mid thigh.  Neurologic: Alert & oriented x 3, Normal motor function,  No focal deficits noted.   Psychiatric: Affect normal, Judgment normal, Mood normal.     LABS  Results for orders placed or performed during the hospital encounter of 07/07/18   CBC w/ Differential   Result Value Ref Range    WBC 3.7 (L) 4.8 - 10.8 K/uL    RBC 3.02 (L) 4.20 - 5.40 M/uL    Hemoglobin 8.5 (L) 12.0 - 16.0 g/dL    Hematocrit 27.7 (L) 37.0 - 47.0 %    MCV 91.7 81.4 - 97.8 fL    MCH 28.1 27.0 - 33.0 pg    MCHC 30.7 (L) 33.6 - 35.0 g/dL    RDW 55.8 (H) " 35.9 - 50.0 fL    Platelet Count 150 (L) 164 - 446 K/uL    MPV 10.3 9.0 - 12.9 fL    Neutrophils-Polys 70.20 44.00 - 72.00 %    Lymphocytes 21.60 (L) 22.00 - 41.00 %    Monocytes 6.30 0.00 - 13.40 %    Eosinophils 0.00 0.00 - 6.90 %    Basophils 1.40 0.00 - 1.80 %    Immature Granulocytes 0.50 0.00 - 0.90 %    Nucleated RBC 0.00 /100 WBC    Neutrophils (Absolute) 2.57 2.00 - 7.15 K/uL    Lymphs (Absolute) 0.79 (L) 1.00 - 4.80 K/uL    Monos (Absolute) 0.23 0.00 - 0.85 K/uL    Eos (Absolute) 0.00 0.00 - 0.51 K/uL    Baso (Absolute) 0.05 0.00 - 0.12 K/uL    Immature Granulocytes (abs) 0.02 0.00 - 0.11 K/uL    NRBC (Absolute) 0.00 K/uL   Complete Metabolic Panel (CMP)   Result Value Ref Range    Sodium 125 (L) 135 - 145 mmol/L    Potassium 5.4 3.6 - 5.5 mmol/L    Chloride 93 (L) 96 - 112 mmol/L    Co2 21 20 - 33 mmol/L    Anion Gap 11.0 0.0 - 11.9    Glucose 111 (H) 65 - 99 mg/dL    Bun 50 (H) 8 - 22 mg/dL    Creatinine 3.13 (H) 0.50 - 1.40 mg/dL    Calcium 8.5 8.5 - 10.5 mg/dL    AST(SGOT) 15 12 - 45 U/L    ALT(SGPT) <5 2 - 50 U/L    Alkaline Phosphatase 99 30 - 99 U/L    Total Bilirubin 0.6 0.1 - 1.5 mg/dL    Albumin 3.5 3.2 - 4.9 g/dL    Total Protein 6.1 6.0 - 8.2 g/dL    Globulin 2.6 1.9 - 3.5 g/dL    A-G Ratio 1.3 g/dL   Troponin STAT   Result Value Ref Range    Troponin I 0.21 (H) 0.00 - 0.04 ng/mL   Btype Natriuretic Peptide   Result Value Ref Range    B Natriuretic Peptide 620 (H) 0 - 100 pg/mL   PT/INR   Result Value Ref Range    PT 16.3 (H) 12.0 - 14.6 sec    INR 1.34 (H) 0.87 - 1.13   APTT   Result Value Ref Range    APTT 38.9 (H) 24.7 - 36.0 sec   ESTIMATED GFR   Result Value Ref Range    GFR If  17 (A) >60 mL/min/1.73 m 2    GFR If Non  14 (A) >60 mL/min/1.73 m 2   CKMB   Result Value Ref Range    CK-Mb 1.1 0.0 - 5.0 ng/mL   Troponin - Q4 Hrs X 3   Result Value Ref Range    Troponin I 0.18 (H) 0.00 - 0.04 ng/mL   Troponin - Q4 Hrs X 3   Result Value Ref Range    Troponin I  0.17 (H) 0.00 - 0.04 ng/mL   TSH (Thyroid Stimulating Hormone)   Result Value Ref Range    TSH 1.610 0.380 - 5.330 uIU/mL   IRON/TOTAL IRON BIND   Result Value Ref Range    Iron 15 (L) 40 - 170 ug/dL    Total Iron Binding 328 250 - 450 ug/dL    % Saturation 5 (L) 15 - 55 %   FERRITIN   Result Value Ref Range    Ferritin 30.4 10.0 - 291.0 ng/mL   VITAMIN B12   Result Value Ref Range    Vitamin B12 -True Cobalamin 176 (L) 211 - 911 pg/mL   FOLATE   Result Value Ref Range    Folate -Folic Acid 9.2 >4.0 ng/mL   EKG (ER)   Result Value Ref Range    Report       [facility] Emergency Dept.    Test Date:  2018  Pt Name:    JENIFFER BENEDICT                  Department: ER  MRN:        [id number]                      Room:  Gender:     Female                       Technician: [id number]  :        [id number]                   Requested By:ER TRIAGE PROTOCOL  Order #:    [id number]                    Reading MD:    Measurements  Intervals                                Axis  Rate:       62                           P:  OR:                                      QRS:        -73  QRSD:       164                          T:          111  QT:         480  QTc:        488    Interpretive Statements  AFIB/FLUTTER AND VENTRICULAR-PACED RHYTHM  NO FURTHER ANALYSIS ATTEMPTED DUE TO PACED RHYTHM  Compared to ECG 2018 11:42:13  No significant changes     All labs reviewed by me.    EKG  12 Lead EKG interpreted by me shows atrial ventricular paced rhythm rhythm at a rate of 62. Axis normal. No ST elevations. No T wave inversions. Final impression: atrial ventricular paced rhythm rhythm.    RADIOLOGY  LE ART DUPLX/IMAG (Specify in Comments Left, Right Or Bilateral)   Final Result      DX-CHEST-PORTABLE (1 VIEW)   Final Result      Prominent cardiomegaly.      Prominent hiatal hernia.      Minimal interstitial prominence may represent minimal edema.      Small right pleural effusion.         US-RENAL    (Results Pending)    ECHOCARDIOGRAM COMP W/O CONT    (Results Pending)   The radiologist's interpretation of all radiological studies have been reviewed by me.    COURSE & MEDICAL DECISION MAKING  Pertinent Labs & Imaging studies reviewed. (See chart for details)    11:04 PM - Patient seen and examined at bedside. Ordered EKG, CBC, CMP, Troponin, BNP, PT/INR, APTT, DX-Chest, and Estimated GFR to evaluate her symptoms. The differential diagnoses include but are not limited to: CHF, volume overload, and renal failure.     12:20 AM - I reviewed patient's labs and radiology results at this time that indicates    12:24 AM - Consulted Dr. Parr Hospitalist, who agrees to admit the patient.     12:26 AM - Patient was reevaluated at bedside. She is resting comfortably in bed. Discussed lab and radiology results with the patient and informed them that they will be admitted. Patient agrees with admit.     Medical Decision Making: At this point time it appears patient's increased edema is secondary to acute renal failure and decrease in her diuretics.  We will give her a dose of Lasix.  I think patient will need to be admitted for careful further diuresis.  Ultrasound was done of the patient's right thigh because she is having increased pain and significant ecchymosis this does not show any pseudoaneurysm.      DISPOSITION:  Patient will be admitted to Dr. Parr, Hospitalist, in guarded condition.       FINAL IMPRESSION  1. Acute renal failure, unspecified acute renal failure type (HCC)    2. Acute on chronic congestive heart failure, unspecified heart failure type (HCC)    3. Elevated troponin          I, Cherelle Willis (Misael), am scribing for, and in the presence of, Taiwo Emanuel M.D.    Electronically signed by: Cherelle Willis (Misael), 7/7/2018    Taiwo FARIAS M.D. personally performed the services described in this documentation, as scribed by Cherelle Willis in my presence, and it is both accurate and complete.    The note  accurately reflects work and decisions made by me.  Taiwo Emanuel  7/8/2018  4:10 AM

## 2018-07-08 NOTE — PROGRESS NOTES
Just admitted    See h+p    Cr 3    Fluid over/overload/chf    Hx afib, pacer, CAD    I have consulted renal md dr amaro and cardiology dr gambino

## 2018-07-08 NOTE — PROGRESS NOTES
Inpatient Anticoagulation Service Note    Date: 7/8/2018  Reason for Anticoagulation: Atrial Fibrillation   [id number] VASc Score: 5    Hemoglobin Value: (!) 8.5  Hematocrit Value: (!) 27.7  Lab Platelet Value: (!) 150  Target INR: 2.0 to 3.0    INR from last 7 days     Date/Time INR Value    07/07/18 2302 (!)  1.34        Dose from last 7 days     Date/Time Dose (mg)    07/08/18 0000  5        Significant Interactions: Aspirin, Clopidogrel, Thyroid Medications, Proton Pump Inhibitor  Bridge Therapy: No     Reversal Agent Administered: Not Applicable  Comments: Warfarin resumed from home for Afib.  INR is subtherapeutic on arrival.  Patient was recently holding warfarin due to a procedure.  No bridge therapy ordered at this time.  Drug interactions noted.      Plan:  Give warfarin 5 mg now.  Follow daily INRs until therapeutic.    Education Material Provided?: No  Pharmacist suggested discharge dosing: Resume home dose once INR is therapeutic.  Follow up INR within 3 days of discharge.      Scottie Flores, PharmD

## 2018-07-08 NOTE — ED NOTES
Pt assisted to commode, minimal void amount, but had BM. Unable to send urine sample as urine contaminated by BM

## 2018-07-08 NOTE — PROGRESS NOTES
Received report from night shift RN. Assumed care of patient. Patient is paced on the monitor at this time. Patient is laying in bed with family present at bedside at this time. Patient declines any needs at this time. Plan of care discussed with patient. Bed locked and in the lowest position with call light within reach.

## 2018-07-08 NOTE — DISCHARGE PLANNING
[facility]. Please write home care orders upon discharge to home for continuum of care.[facility] at  /[phone] if there are any questions.

## 2018-07-08 NOTE — CONSULTS
Cardiology Consult Note    Date & Time note created:    7/8/2018   1:50 PM       Patient ID:   Name:             Andree Egan   YOB: 1932  Age:                 86 y.o.  female   MRN:               [id number]               Referring Physician: Dr. Berry                                                  Reason for Consult:      Cardiomyopathy, worsening renal function.    History of Present Illness:    86-year-old with hypertension, hyperlipidemia, atrial fibrillation and history of cardiomyopathy with a known normal ejection fraction who presented to the hospital with weight gain and lower extremity edema.      5 days ago patient underwent a coronary angiogram that showed severe disease and she underwent PCI to the proximal and mid LAD with 2 drug-eluting stents.  Right ventricular systolic pressure 60 mmHg.  She was started on triple therapy at this time with plan to discontinue aspirin and 1 month after the PCI.  Also during her last admission, she was found to be hyponatremic for which hospitalist was consulted.  Patient's metolazone was discontinued, Aldactone dose was decreased and she was started on sodium tablets.    Since discharge, patient reports worsening lower extremity edema.  She has gained 4 pounds since discharge.  She has been using her Lasix as prescribed without much benefit.  She also reports worsening dyspnea on exertion.  Previously she could walk about 10-20 yards but now she can only walk to the bathroom before she has to stop due to dyspnea.  Denies any dyspnea at rest.  No associated chest discomfort.  Since admission, reports that her lower extremity edema has improved.  Reports compliance with all medications.    Review of Systems:      A full review of systems was completed and all pertinent positives and negatives are included in the HPI above. All others reviewed and negative.     Past Medical History:   Past Medical History:   Diagnosis Date   • Arthritis      back/neck   • Atrial fibrillation (CMS-HCC) [I48.91] 3/29/2018   • Blood clotting disorder (HCC)    • Breath shortness     water retention   • Cancer (HCC)     breast   • Cataract    • Congestive heart failure (CHF) (HCC) 4/3/2018   • BERTRAND (dyspnea on exertion) 5/4/2018   • Heart burn    • Heart valve disease    • Hemorrhagic disorder (HCC)    • Hiatus hernia syndrome    • Hyperlipidemia    • Hypertension    • Kidney disease    • Long term current use of anticoagulant 3/29/2018   • Pacemaker 4/3/2018   • Pulmonary emphysema (HCC)    • Secondary hypertension 4/3/2018   • Thyroid disease    • Tremors of nervous system 4/3/2018   • Urinary incontinence    • Urinary tract infection        Past Surgical History:  No past surgical history on file.    Family History:  Family History   Problem Relation Age of Onset   • Fainting Neg Hx        Social History:  Social History     Social History   • Marital status: Single     Spouse name: N/A   • Number of children: N/A   • Years of education: N/A     Occupational History   • Not on file.     Social History Main Topics   • Smoking status: Former Smoker     Packs/day: 0.50     Types: Cigarettes     Quit date: 1/1/1970   • Smokeless tobacco: Never Used   • Alcohol use 1.8 oz/week     3 Glasses of wine per week      Comment: 1-2 glasses wine before dinner   • Drug use: No   • Sexual activity: Not on file     Other Topics Concern   • Not on file     Social History Narrative   • No narrative on file       Hospital Medications:    Current Facility-Administered Medications:   •  aspirin EC (ECOTRIN) tablet 81 mg, 81 mg, Oral, DAILY, Adam Parr M.D., 81 mg at 07/08/18 0941  •  clopidogrel (PLAVIX) tablet 75 mg, 75 mg, Oral, DAILY, Adam Parr M.D., 75 mg at 07/08/18 0941  •  levothyroxine (SYNTHROID) tablet 50 mcg, 50 mcg, Oral, Adam ALCAZAR M.D., 50 mcg at 07/08/18 0735  •  metoprolol SR (TOPROL XL) tablet 25 mg, 25 mg, Oral, Adam CARD M.D., 25 mg  "at 07/08/18 0941  •  omeprazole (PRILOSEC) capsule 20 mg, 20 mg, Oral, QAM, Adam Parr M.D., 20 mg at 07/08/18 0941  •  sertraline (ZOLOFT) tablet 25 mg, 25 mg, Oral, QHS, Adam Parr M.D.  •  sodium chloride (SALT) tablet 1 g, 1 g, Oral, TID WITH MEALS, Adam Parr M.D., 1 g at 07/08/18 1249  •  spironolactone (ALDACTONE) tablet 12.5 mg, 12.5 mg, Oral, DAILY, Adam Parr M.D., 12.5 mg at 07/08/18 0941  •  topiramate (TOPAMAX) tablet 25 mg, 25 mg, Oral, BID, Adam Parr M.D., 25 mg at 07/08/18 1037  •  MD ALERT... warfarin (COUMADIN) per pharmacy protocol, , Other, pharmacy to dose, Adam Parr M.D.  •  senna-docusate (PERICOLACE or SENOKOT S) 8.6-50 MG per tablet 2 Tab, 2 Tab, Oral, BID, Stopped at 07/08/18 0936 **AND** polyethylene glycol/lytes (MIRALAX) PACKET 1 Packet, 1 Packet, Oral, QDAY PRN **AND** magnesium hydroxide (MILK OF MAGNESIA) suspension 30 mL, 30 mL, Oral, QDAY PRN **AND** bisacodyl (DULCOLAX) suppository 10 mg, 10 mg, Rectal, QDAY PRN, Adam Parr M.D.  •  Respiratory Care per Protocol, , Nebulization, Continuous RT, Adam Parr M.D.  •  ondansetron (ZOFRAN) syringe/vial injection 4 mg, 4 mg, Intravenous, Q4HRS PRN, Adam Parr M.D.  •  ondansetron (ZOFRAN ODT) dispertab 4 mg, 4 mg, Oral, Q4HRS PRN, Adam Parr M.D.  •  albuterol inhaler 2 Puff, 2 Puff, Inhalation, Q4HRS PRN, Adam Parr M.D.  •  furosemide (LASIX) injection 40 mg, 40 mg, Intravenous, BID DIURETIC, Adam Parr M.D., 40 mg at 07/08/18 0734    Medication Allergy:  Allergies   Allergen Reactions   • Codeine Vomiting       Physical Exam:  Vitals/ General Appearance:   Weight/BMI: Body mass index is 25.58 kg/m².  Blood pressure 116/76, pulse 76, temperature 36.8 °C (98.3 °F), resp. rate 18, height 1.6 m (5' 3\"), weight 65.5 kg (144 lb 6.4 oz), SpO2 98 %.  Vitals:    07/08/18 0501 07/08/18 0700 07/08/18 0924 07/08/18 1229   BP:  124/74 (!) 90/56 116/76   Pulse: 60 " 65 86 76   Resp: 14 15 16 18   Temp:  36.3 °C (97.4 °F) 36.3 °C (97.3 °F) 36.8 °C (98.3 °F)   TempSrc:       SpO2: 98% 99% 98% 98%   Weight:  65.5 kg (144 lb 6.4 oz)     Height:           Constitutional:   Well developed, Well nourished, No acute distress   HEENT:  Normocephalic, Atraumatic  Eyes: Conjunctiva normal  Neck:  Normal range of motion, no JVD.  Cardiovascular:  Normal heart rate, Normal rhythm, No rubs, No gallops.  Grade 3/6 holosystolic murmur heard best at the base.  Extremities with 2+ pitting edema.  Lungs:  Normal breath sounds, breath sounds clear to auscultation bilaterally,  no crackles, no wheezing.   Abdomen:  Soft, No tenderness  Skin: No rash  Neurologic: Alert & oriented x 3   Psychiatric: Affect normal     MDM (Data Review):     Records reviewed and summarized in current documentation    Lab Data Review:  Recent Labs      07/07/18 2302   WBC  3.7*   RBC  3.02*   HEMOGLOBIN  8.5*   HEMATOCRIT  27.7*   MCV  91.7   MCH  28.1   MCHC  30.7*   RDW  55.8*   PLATELETCT  150*   MPV  10.3     Recent Labs      07/07/18 2302 07/08/18   1139   SODIUM  125*  129*   POTASSIUM  5.4  4.9   CHLORIDE  93*  95*   CO2  21  24   GLUCOSE  111*  95   BUN  50*  53*   CREATININE  3.13*  3.48*   CALCIUM  8.5  8.8     Recent Labs      07/07/18 2302 07/08/18   0228   CKMB   --   1.1   TROPONINI  0.21*  0.17*  0.18*   BNPBTYPENAT  620*   --        Labs reviewed as noted above.    Imaging/Procedures Review:      EKG: Performed on admission was personally reviewed and showed ventricular paced rhythm.  Underlying rhythm atrial fibrillation.    ECHO: Performed in May 2018 was personally reviewed and showed a normal LV systolic function.  Concern for mild aortic stenosis.  RVSP at 70 mmHg.    MDM (Assessment and Plan):     Active Hospital Problems    Diagnosis   • Acute on chronic renal failure (HCC) [N17.9, N18.9]   • Elevated troponin [R74.8]   • S/P coronary angioplasty [Z98.61]   • Normocytic anemia [D64.9]   •  Moderate to severe pulmonary hypertension (HCC) [I27.20]   • Pleural effusion [J90]   • GERD (gastroesophageal reflux disease) [K21.9]   • COPD (chronic obstructive pulmonary disease) (HCC) [J44.9]   • Secondary hypertension [I15.9]   • Congestive heart failure (CHF) (HCC) [I50.9]   • Chronic atrial fibrillation (HCC) [I48.2]   • Warfarin anticoagulation [Z79.01]     Acute kidney injury:  Pulmonary hypertension:  Worsening renal function could be secondary to contrast-induced nephropathy along with a combination of being off of metolazone in the setting of salt tablets.  Would not recommend continuation of salt tablets.  I have discontinued those today.  I also ordered a limited echocardiogram to evaluate her LV function along with the right-sided pressures.  I personally reviewed this echocardiogram which shows no significant change in the right ventricular systolic pressure.  IVC is dilated and collapsible.  Overall I think she needs further diuresis and restarting metolazone or starting diuril.  However nephrology has been consulted as well.  Will await their recommendations as well.    Coronary artery disease: Status post PCI.  Patient is free of anginal symptoms.  On aspirin and Plavix.  After 1 month of therapy, her aspirin will be discontinued as she is also on Coumadin.    Patient's caregiver is Palma, her granddaughter. Patient has requested that we update her granddaughter at [phone].  We will plan to update caregiver tomorrow once we have a plan established.    Will follow.     Thank you for allowing me to participate in the care of this patient. Please do not hesitate to contact me with any questions.    Freya Avina MD  Cardiologist  [facility]

## 2018-07-08 NOTE — ED TRIAGE NOTES
"Andree Egan  86 y.o. female  Chief Complaint   Patient presents with   • Post-Op Complications     Pt. was discharged Thursday for a heart cath with two stents placed through her right femoral. Pt. and pt's family pt. has been gaining water weight since that time with no results with Lasix use. Pt. states increasing SOB but no s/s of diff breathing noted in triage. Per pt's family pt. has been low on potassium and sodium electrolytes as well.     /59   Pulse 86   Temp 36.8 °C (98.2 °F) (Temporal)   Resp 16   Ht 1.6 m (5' 3\")   Wt 64 kg (141 lb)   LMP  (LMP Unknown)   SpO2 92%   BMI 24.98 kg/m²     Pt amb to triage via wheelchair. Pt. Has bilateral 3+ pitting edema in both legs. EKG to be completed in triage. Per pt. And pt's family pt. Was told to come to ER due to possible kidney problems secondary to recent heart cath and stent placement. Pt. Does have a pacemaker in place. Charge RN notified of this pt.  Pt is alert and oriented, speaking in full sentences, follows commands and responds appropriately to questions. NAD. Resp are even and unlabored.  Pt placed in lobby. Pt educated on triage process. Pt encouraged to alert staff for any changes.  "

## 2018-07-08 NOTE — ED NOTES
Tele 8 called to notify US being completed, and then pt will be ready for transport to floor in approx 15 mins.

## 2018-07-08 NOTE — ED NOTES
Pt asking when room will be ready, tele 8 called and room remains dirty at this time but housekeeping is aware. Updated pt, pt is understanding

## 2018-07-08 NOTE — CONSULTS
DATE OF SERVICE:  07/08/2018    NEPHROLOGY CONSULTATION    REQUESTING PHYSICIAN:  Mau Berry MD    REASON FOR CONSULTATION:  Evaluate patient with acute kidney injury and   hypervolemia.    HISTORY OF PRESENT ILLNESS:  The patient is an 86-year-old female with chronic   kidney disease stage III, baseline creatinine level of 1.4-1.6, who recently   underwent coronary catheterization with coronary stenting placement,   readmitted within next 24 hours with worsening lower extremity edema as well   as dyspnea on exertion.  Currently, the patient states doing better, less   short of breath after received Lasix.  Denies any headache or dizziness.  No   fever or chills.  No chest pain.    REVIEW OF SYSTEMS:  GENERAL:  Positive for fatigue.  No fever or chills.  HEENT:  No nasal congestion, no sore throat.  No difficulties to swallow.  EYES:  No double or blurry vision, no pain.  NECK:  No pain, no stiffness.  RESPIRATORY:  No cough, no hemoptysis, no wheezes.  CARDIOVASCULAR:  Positive for leg swelling, shortness of breath.  No chest   pain, no palpitations.  GASTROINTESTINAL:  No nausea, vomiting, diarrhea.  GENITOURINARY:  No dysuria or hematuria.  No flank pain.  All other systems reviewed and are negative.    PAST MEDICAL HISTORY:  Arthritis, atrial fibrillation, congestive heart   failure, heart valve disease, hiatus hernia, hyperlipidemia, hypertension,   chronic kidney disease, thyroid disease, urinary tract infection, urinary   incontinence.    PAST SURGICAL HISTORY:  Recent coronary stenting.    SOCIAL HISTORY:  Used to smoke tobacco, quit long time ago.  Occasionally   drinks wine.  No drugs.    FAMILY HISTORY:  No history of kidney disease.    ALLERGIES:  ALLERGIC TO CODEINE.    OUTPATIENT MEDICATIONS:  Reviewed.    PHYSICAL EXAMINATION:  VITAL SIGNS:  Blood pressure 90/56, heart rate 86, temperature 36.3 Celsius.  GENERAL APPEARANCE:  Well-developed, well-nourished female, in no acute   distress.  HEENT:   Normocephalic, atraumatic.  Pupils are equal, round, reactive to   light.  Extraocular movements intact.  Nares patent.  Oropharynx clear, moist   mucosa, no erythema or exudate.  NECK:  Supple.  No lymphadenopathy, no thyromegaly appreciated.  LUNGS:  Decreased breath sounds at bases with basilar crackles.  No wheezing,   no rhonchi.  HEART:  Regular rate.  No rub or gallop.  ABDOMEN:  Soft, nontender, and nondistended.  Bowel sounds present.  EXTREMITIES:  2+ pitting edema bilaterally.  No cyanosis.  NEUROLOGIC:  Alert and oriented x3.  No focal deficits.  Cranial nerves II-XII   grossly intact.    LABORATORY RESULTS:  Hemoglobin level 8.5.  Sodium 125, potassium 5.4, BUN 15,   creatinine 3.1.  Urine spot sodium 58, total protein 13.3.  Urinalysis   pending.    Renal ultrasound, no hydronephrosis.    ASSESSMENT AND PLAN:  The patient is a very pleasant 86-year-old female with   multiple medical problems, recently underwent coronary artery stenting, now in   acute kidney injury and hypervolemia.  1.  Acute kidney injury could be multifactorial from IV contrast nephropathy   as well as cardiorenal syndrome, hypoperfusion.  2.  Electrolytes, hyponatremia, can be hypervolemic.  Continue Lasix.  3.  Hypotension, likely through poor systolic function.  Repeat   echocardiogram, cardiology consulted.  4.  Volume, continue Lasix.    RECOMMENDATIONS:  1.  No need for emergent dialysis; however, if renal function continues to   worsen, the patient may need renal replacement therapy soon.  2.  Cardiology consult.  3.  Continue Lasix.  4.  Daily monitoring of basic metabolic panel.  5.  Avoid nephrotoxic agents.  6.  We will follow the patient closely.    Thank you for the consult.  Above plan was discussed with Dr. Mau Berry.       ____________________________________     MD ALLEN FREY / KAREN    DD:  07/08/2018 14:48:34  DT:  07/08/2018 15:28:34    D#:  [id number]  Job#:  [id number]

## 2018-07-09 PROBLEM — I50.32 CHRONIC DIASTOLIC CONGESTIVE HEART FAILURE (HCC): Status: ACTIVE | Noted: 2018-04-03

## 2018-07-09 LAB
ALBUMIN SERPL BCP-MCNC: 3.3 G/DL (ref 3.2–4.9)
ALBUMIN/GLOB SERPL: 1.5 G/DL
ALP SERPL-CCNC: 97 U/L (ref 30–99)
ALT SERPL-CCNC: 5 U/L (ref 2–50)
ANION GAP SERPL CALC-SCNC: 10 MMOL/L (ref 0–11.9)
AST SERPL-CCNC: 11 U/L (ref 12–45)
BASOPHILS # BLD AUTO: 1.9 % (ref 0–1.8)
BASOPHILS # BLD: 0.05 K/UL (ref 0–0.12)
BILIRUB SERPL-MCNC: 0.7 MG/DL (ref 0.1–1.5)
BUN SERPL-MCNC: 53 MG/DL (ref 8–22)
CALCIUM SERPL-MCNC: 8.4 MG/DL (ref 8.5–10.5)
CHLORIDE SERPL-SCNC: 97 MMOL/L (ref 96–112)
CHOLEST SERPL-MCNC: 67 MG/DL (ref 100–199)
CO2 SERPL-SCNC: 25 MMOL/L (ref 20–33)
CREAT SERPL-MCNC: 3.35 MG/DL (ref 0.5–1.4)
EOSINOPHIL # BLD AUTO: 0 K/UL (ref 0–0.51)
EOSINOPHIL NFR BLD: 0 % (ref 0–6.9)
ERYTHROCYTE [DISTWIDTH] IN BLOOD BY AUTOMATED COUNT: 54.2 FL (ref 35.9–50)
GLOBULIN SER CALC-MCNC: 2.2 G/DL (ref 1.9–3.5)
GLUCOSE SERPL-MCNC: 113 MG/DL (ref 65–99)
HCT VFR BLD AUTO: 26 % (ref 37–47)
HDLC SERPL-MCNC: 40 MG/DL
HGB BLD-MCNC: 8.2 G/DL (ref 12–16)
IMM GRANULOCYTES # BLD AUTO: 0.02 K/UL (ref 0–0.11)
IMM GRANULOCYTES NFR BLD AUTO: 0.8 % (ref 0–0.9)
INR PPP: 1.58 (ref 0.87–1.13)
LDLC SERPL CALC-MCNC: 17 MG/DL
LYMPHOCYTES # BLD AUTO: 0.65 K/UL (ref 1–4.8)
LYMPHOCYTES NFR BLD: 25.2 % (ref 22–41)
MCH RBC QN AUTO: 28.2 PG (ref 27–33)
MCHC RBC AUTO-ENTMCNC: 31.5 G/DL (ref 33.6–35)
MCV RBC AUTO: 89.3 FL (ref 81.4–97.8)
MONOCYTES # BLD AUTO: 0.24 K/UL (ref 0–0.85)
MONOCYTES NFR BLD AUTO: 9.3 % (ref 0–13.4)
NEUTROPHILS # BLD AUTO: 1.62 K/UL (ref 2–7.15)
NEUTROPHILS NFR BLD: 62.8 % (ref 44–72)
NRBC # BLD AUTO: 0 K/UL
NRBC BLD-RTO: 0 /100 WBC
PLATELET # BLD AUTO: 139 K/UL (ref 164–446)
PMV BLD AUTO: 9.8 FL (ref 9–12.9)
POTASSIUM SERPL-SCNC: 4 MMOL/L (ref 3.6–5.5)
PROT SERPL-MCNC: 5.5 G/DL (ref 6–8.2)
PROTHROMBIN TIME: 18.5 SEC (ref 12–14.6)
RBC # BLD AUTO: 2.91 M/UL (ref 4.2–5.4)
SODIUM SERPL-SCNC: 132 MMOL/L (ref 135–145)
TRIGL SERPL-MCNC: 50 MG/DL (ref 0–149)
WBC # BLD AUTO: 2.6 K/UL (ref 4.8–10.8)

## 2018-07-09 PROCEDURE — 85025 COMPLETE CBC W/AUTO DIFF WBC: CPT

## 2018-07-09 PROCEDURE — 99233 SBSQ HOSP IP/OBS HIGH 50: CPT | Performed by: INTERNAL MEDICINE

## 2018-07-09 PROCEDURE — 80061 LIPID PANEL: CPT

## 2018-07-09 PROCEDURE — 36415 COLL VENOUS BLD VENIPUNCTURE: CPT

## 2018-07-09 PROCEDURE — 700111 HCHG RX REV CODE 636 W/ 250 OVERRIDE (IP): Performed by: HOSPITALIST

## 2018-07-09 PROCEDURE — A9270 NON-COVERED ITEM OR SERVICE: HCPCS | Performed by: HOSPITALIST

## 2018-07-09 PROCEDURE — 770020 HCHG ROOM/CARE - TELE (206)

## 2018-07-09 PROCEDURE — 85610 PROTHROMBIN TIME: CPT

## 2018-07-09 PROCEDURE — 99232 SBSQ HOSP IP/OBS MODERATE 35: CPT | Performed by: HOSPITALIST

## 2018-07-09 PROCEDURE — 700102 HCHG RX REV CODE 250 W/ 637 OVERRIDE(OP): Performed by: HOSPITALIST

## 2018-07-09 PROCEDURE — 80053 COMPREHEN METABOLIC PANEL: CPT

## 2018-07-09 PROCEDURE — 96376 TX/PRO/DX INJ SAME DRUG ADON: CPT

## 2018-07-09 RX ORDER — WARFARIN SODIUM 7.5 MG/1
3.75 TABLET ORAL
Status: DISCONTINUED | OUTPATIENT
Start: 2018-07-10 | End: 2018-07-11 | Stop reason: HOSPADM

## 2018-07-09 RX ORDER — WARFARIN SODIUM 2.5 MG/1
2.5 TABLET ORAL
Status: DISCONTINUED | OUTPATIENT
Start: 2018-07-09 | End: 2018-07-11 | Stop reason: HOSPADM

## 2018-07-09 RX ORDER — FUROSEMIDE 10 MG/ML
20 INJECTION INTRAMUSCULAR; INTRAVENOUS ONCE
Status: ACTIVE | OUTPATIENT
Start: 2018-07-09 | End: 2018-07-10

## 2018-07-09 RX ADMIN — WARFARIN SODIUM 2.5 MG: 5 TABLET ORAL at 17:31

## 2018-07-09 RX ADMIN — TOPIRAMATE 25 MG: 25 TABLET, FILM COATED ORAL at 17:30

## 2018-07-09 RX ADMIN — ASPIRIN 81 MG: 81 TABLET, COATED ORAL at 05:59

## 2018-07-09 RX ADMIN — SERTRALINE 25 MG: 50 TABLET, FILM COATED ORAL at 17:30

## 2018-07-09 RX ADMIN — OMEPRAZOLE 20 MG: 20 CAPSULE, DELAYED RELEASE ORAL at 05:59

## 2018-07-09 RX ADMIN — METOPROLOL SUCCINATE 25 MG: 25 TABLET, EXTENDED RELEASE ORAL at 05:59

## 2018-07-09 RX ADMIN — CLOPIDOGREL 75 MG: 75 TABLET, FILM COATED ORAL at 06:00

## 2018-07-09 RX ADMIN — LEVOTHYROXINE SODIUM 50 MCG: 50 TABLET ORAL at 06:00

## 2018-07-09 RX ADMIN — FUROSEMIDE 40 MG: 10 INJECTION, SOLUTION INTRAMUSCULAR; INTRAVENOUS at 06:00

## 2018-07-09 RX ADMIN — SPIRONOLACTONE 12.5 MG: 25 TABLET, FILM COATED ORAL at 05:57

## 2018-07-09 ASSESSMENT — ENCOUNTER SYMPTOMS
HEMOPTYSIS: 0
MUSCULOSKELETAL NEGATIVE: 1
PALPITATIONS: 0
CONSTITUTIONAL NEGATIVE: 1
DIZZINESS: 0
SPUTUM PRODUCTION: 0
CLAUDICATION: 0
WEAKNESS: 0
NAUSEA: 0
CHILLS: 0
FEVER: 0
NEUROLOGICAL NEGATIVE: 1
VOMITING: 0
RESPIRATORY NEGATIVE: 1
SHORTNESS OF BREATH: 0
ORTHOPNEA: 0
PSYCHIATRIC NEGATIVE: 1
WHEEZING: 0
GASTROINTESTINAL NEGATIVE: 1
COUGH: 0
PND: 0

## 2018-07-09 ASSESSMENT — PAIN SCALES - GENERAL
PAINLEVEL_OUTOF10: 0

## 2018-07-09 NOTE — PROGRESS NOTES
Inpatient Anticoagulation Service Note    Date: 7/8/2018  Reason for Anticoagulation: Atrial Fibrillation   [id number] VASc Score: 5    Hemoglobin Value: (!) 8.5  Hematocrit Value: (!) 27.7  Lab Platelet Value: (!) 150  Target INR: 2.0 to 3.0    INR from last 7 days     Date/Time INR Value    07/07/18 2302 (!)  1.34        Dose from last 7 days     Date/Time Dose (mg)    07/08/18 1700  --    07/08/18 0000  5        Average Dose (mg):  (Reported warfarin home regimen: 2.5 mg AOD, 3.75 Tue/Thurs)  Significant Interactions: Aspirin, Clopidogrel, Thyroid Medications, Proton Pump Inhibitor  Bridge Therapy: No     Reversal Agent Administered: Not Applicable    Comments:   Warfarin resumed for Afib.  INR is subtherapeutic on arrival.  Patient was recently holding warfarin due to a procedure.  No bridge therapy ordered at this time.  Drug interactions noted.      Plan:   Patient received  warfarin 5 mg this AM @ 1037, no dose tonight.  Follow daily INRs until therapeutic.      Education Material Provided?: No  Pharmacist suggested discharge dosing: Resume home dose once INR is therapeutic.  Follow up INR within 3 days of discharge.      Jerald Schmidt PharmD BCPS

## 2018-07-09 NOTE — PROGRESS NOTES
Received report from night shift RN. Assumed care of patient. Patient is paced on the monitor at this time. Patient is sitting up in bed with no family present at this time. Patient declines any needs at this time. Plan of care discussed with patient. Bed locked and in the lowest position with call light within reach.

## 2018-07-09 NOTE — RESPIRATORY CARE
COPD EDUCATION by COPD CLINICAL EDUCATOR  7/9/2018  at  9:30 AM by Tequila Lewis     Patient interviewed by COPD education team.  Patient unable to participate in full program.  Short intervention has been conducted.  A comprehensive packet including information about COPD, treatments, and smoking cessation given.

## 2018-07-09 NOTE — PROGRESS NOTES
Patient Andree Egan admitted for shortness of breath and leg swelling on 5/18/18, and was discharged on 5/25/18. [facility] Patient Advocate assisted with discharge orders including one cardiology appointment with Dr. Sharp, one INR check, one cardiology appointment with Karina Lopez, and one PCP appointment with Twyla Li. Patient is scheduled for a nephrology appointment with Dr. Awad on 7/11/18, an INR check on 7/13/18, a Cardiology appointment with Dr. Simms on 7/20/18, a pulmonology appointment on 10/31/18, and a pacer check on 11/27/18. Patient is also on services with [facility], and receives Oxygen from [facility]. Patient Advocate referred patient to the Homemaker Program through [facility] as well as through [facility]. PPS score is 70%.

## 2018-07-09 NOTE — PROGRESS NOTES
Cardiovascular Nurse Navigator () Advanced Heart Failure Program Inpatient Progress Note:    Conversation with Dr. Sharp who is rounding on Meliza today. Per Dr. Sharp, patient is not in acute heart failure.    LE likely related to MARICRUZ secondary to contrast induced nephropathy.    Therefore, patient will not require a seven day HF exacerbation f/u appointment.    Thank you and please call with questions, Veronique

## 2018-07-09 NOTE — PROGRESS NOTES
Cardiology Progress Note               Author: Didi Pleitez Date & Time created: 7/9/2018  8:32 AM     Interval History:  85 years old male present with weight gain and lower extremity edema.  HX: hypertension, hyperlipidemia, atrial fibrillation and history of cardiomyopathy with normal ef. Had PCI to proximal and mid LAD. RVSP 60 mmHg on 7/3/18.     Chief Complaint:  Worsening edema    Cardiology consult: cardiomyopathy and worsening renal function    7/9: swelling has improved, denies any chest pain or BERTRAND. She is sitting up in the edge of the bed, talking in full sentence.     Paced overnight       Echocardiography Laboratory  7/8/18  Prior done 05-.  Compared to prior study, the right-sided   chambers appear dilated.    Left ventricular systolic function is low normal.  Left ventricular ejection fraction is visually estimated to be 50-55%.  Abnormal septal motion consistent with right ventricular (RV) volume overload and/or elevated RV end-diastolic pressure.  Dilated right ventricle with reduced systolic function.  Severe tricuspid regurgitation. Estimated right ventricular systolic   pressure  is 55 mmHg.    Review of Systems   Constitutional: Negative for malaise/fatigue.   Respiratory: Negative for cough, hemoptysis, sputum production, shortness of breath and wheezing.    Cardiovascular: Positive for leg swelling. Negative for chest pain, palpitations, orthopnea, claudication and PND.   Gastrointestinal: Negative for nausea and vomiting.   Neurological: Negative for dizziness and weakness.       Physical Exam   Constitutional: She is oriented to person, place, and time. She appears well-developed and well-nourished. No distress.   HENT:   Head: Normocephalic.   Neck: Normal range of motion. No JVD present.   Cardiovascular: Normal rate, regular rhythm and intact distal pulses.    Murmur heard.  Pulmonary/Chest: Effort normal and breath sounds normal. No respiratory distress. She has no wheezes.    Abdominal: She exhibits no distension. There is no tenderness.   Musculoskeletal: She exhibits edema (2+ pitting edema bilateral  lower extermity ). She exhibits no tenderness.   Neurological: She is alert and oriented to person, place, and time.   Skin: Skin is warm and dry. No rash noted. She is not diaphoretic.   Psychiatric: Her behavior is normal. Judgment normal.   Nursing note and vitals reviewed.      Hemodynamics:  Temp (24hrs), Av.5 °C (97.7 °F), Min:36.2 °C (97.1 °F), Max:36.8 °C (98.3 °F)  Temperature: 36.2 °C (97.1 °F)  Pulse  Av.9  Min: 56  Max: 91   Blood Pressure : 110/65     Respiratory:    Respiration: 18, Pulse Oximetry: 100 %     Work Of Breathing / Effort: Mild  RUL Breath Sounds: Clear, RML Breath Sounds: Diminished, RLL Breath Sounds: Diminished, ASHTYN Breath Sounds: Clear, LLL Breath Sounds: Diminished  Fluids:     Weight: 66.3 kg (146 lb 2.6 oz)  GI/Nutrition:  Orders Placed This Encounter   Procedures   • Diet Order Regular     Standing Status:   Standing     Number of Occurrences:   1     Order Specific Question:   Diet:     Answer:   Regular [1]     Lab Results:  Recent Labs      18   WBC  3.7*  2.6*   RBC  3.02*  2.91*   HEMOGLOBIN  8.5*  8.2*   HEMATOCRIT  27.7*  26.0*   MCV  91.7  89.3   MCH  28.1  28.2   MCHC  30.7*  31.5*   RDW  55.8*  54.2*   PLATELETCT  150*  139*   MPV  10.3  9.8     Recent Labs      189  18   SODIUM  125*  129*  132*   POTASSIUM  5.4  4.9  4.0   CHLORIDE  93*  95*  97   CO2  21  24  25   GLUCOSE  111*  95  113*   BUN  50*  53*  53*   CREATININE  3.13*  3.48*  3.35*   CALCIUM  8.5  8.8  8.4*     Recent Labs      18   APTT  38.9*   --    INR  1.34*  1.58*     Recent Labs      18   BNPBTYPENAT  620*     Recent Labs      18   0228   CKMB   --   1.1   TROPONINI  0.21*  0.17*  0.18*   BNPBTYPENAT  620*   --      Recent Labs       07/09/18   0222   TRIGLYCERIDE  50   HDL  40   LDL  17         Medical Decision Making, by Problem:  Active Hospital Problems    Diagnosis   • *Acute on chronic renal failure (HCC) [N17.9, N18.9]   • Elevated troponin [R74.8]   • S/P coronary angioplasty [Z98.61]   • Normocytic anemia [D64.9]   • Moderate to severe pulmonary hypertension (HCC) [I27.20]   • Pleural effusion [J90]   • GERD (gastroesophageal reflux disease) [K21.9]   • COPD (chronic obstructive pulmonary disease) (HCC) [J44.9]   • Secondary hypertension [I15.9]   • Congestive heart failure (CHF) ([facility]) [I50.9]   • Chronic atrial fibrillation (HCC) [I48.2]   • Warfarin anticoagulation [Z79.01]       Plan:  1. Acute kidney injury:  - nephrology consulted   - creatinine 3.35 improving     2. CAD:  - s/p PCI   - chest pain free   - continue DAPT therapy (asa and plavix)  - continue metoprolol XL 25mg qd       3. Pulmonary hypertension:  - ECHO: ef 50-55% and RVSP 55mHg  - continue furosemide 40mg BID   - I/O : -1.4 K in 24 hrs     4. Severe tricuspid regurgitation:  - most likely related to pulmonary hypertension    Future Appointments  Date Time Provider Department Center   7/11/2018 1:30 PM Neela Awad M.D. [facility] [address]   7/13/2018 9:15 AM [facility] EXAM 4 VMED None   7/20/2018 9:15 AM Jose Simms M.D. [facility] None   7/23/2018 10:20 AM ELIZABETH Patel [facility] [place]   10/31/2018 9:45 AM PULMONARY DX 1 IP[facility] [address]   10/31/2018 10:00 AM PFT-RM1 PULM None   10/31/2018 11:00 AM A Rotation PULM None   11/27/2018 10:00 AM PACER CHECK-CAM B [facility] None         Quality-Core Measures   Reviewed items::  EKG reviewed, Labs reviewed and Medications reviewed

## 2018-07-09 NOTE — PROGRESS NOTES
Hospital Medicine Progress Note, Adult, Complex               Author: Fadi Najjar Date & Time created: 7/9/2018  4:17 PM     Interval History:  Pt is 85 YO lady with CHF, CKD, S/P coronary angiogram, developed MARICRUZ / contrast induced nephropathy  Responding to Lasix    Review of Systems:  Review of Systems   Constitutional: Negative for chills and fever.   Respiratory: Negative for cough and hemoptysis.    Cardiovascular: Negative for chest pain and leg swelling.   All other systems reviewed and are negative.      Physical Exam:  Physical Exam   Constitutional: She is oriented to person, place, and time. No distress.   HENT:   Head: Normocephalic and atraumatic.   Nose: Nose normal.   Eyes: Conjunctivae are normal. Right eye exhibits no discharge. Left eye exhibits no discharge. No scleral icterus.   Neck: No JVD present. No tracheal deviation present. No thyromegaly present.   Cardiovascular: Normal rate and regular rhythm.    Murmur heard.  Pulmonary/Chest: Effort normal and breath sounds normal. No respiratory distress. She has no wheezes. She has no rales.   Abdominal: Soft. Bowel sounds are normal. She exhibits no distension. There is no tenderness. There is no rebound.   Musculoskeletal: She exhibits edema. She exhibits no tenderness.   Neurological: She is alert and oriented to person, place, and time. No cranial nerve deficit.   Skin: Skin is warm and dry. She is not diaphoretic. No erythema.   Psychiatric: She has a normal mood and affect. Her behavior is normal.   Nursing note and vitals reviewed.      Labs:        Invalid input(s): [id number]  Recent Labs      07/07/18   2302  07/08/18   0228   CKMB   --   1.1   TROPONINI  0.21*  0.17*  0.18*   BNPBTYPENAT  620*   --      Recent Labs      07/07/18   2302  07/08/18   1139  07/09/18   0222   SODIUM  125*  129*  132*   POTASSIUM  5.4  4.9  4.0   CHLORIDE  93*  95*  97   CO2  21  24  25   BUN  50*  53*  53*   CREATININE  3.13*  3.48*  3.35*   CALCIUM  8.5   8.8  8.4*     Recent Labs      18   2302  18   1139  18   0222   ALTSGPT  <5   --   5   ASTSGOT  15   --   11*   ALKPHOSPHAT  99   --   97   TBILIRUBIN  0.6   --   0.7   GLUCOSE  111*  95  113*     Recent Labs      18   2302  188  18   RBC  3.02*   --   2.91*   HEMOGLOBIN  8.5*   --   8.2*   HEMATOCRIT  27.7*   --   26.0*   PLATELETCT  150*   --   139*   PROTHROMBTM  16.3*   --   18.5*   APTT  38.9*   --    --    INR  1.34*   --   1.58*   IRON   --   15*   --    FERRITIN   --   30.4   --    TOTIRONBC   --   328   --      Recent Labs      18   WBC  3.7*  2.6*   NEUTSPOLYS  70.20  62.80   LYMPHOCYTES  21.60*  25.20   MONOCYTES  6.30  9.30   EOSINOPHILS  0.00  0.00   BASOPHILS  1.40  1.90*   ASTSGOT  15  11*   ALTSGPT  <5  5   ALKPHOSPHAT  99  97   TBILIRUBIN  0.6  0.7           Hemodynamics:  Temp (24hrs), Av.6 °C (97.9 °F), Min:36.2 °C (97.1 °F), Max:36.9 °C (98.4 °F)  Temperature: 36.9 °C (98.4 °F)  Pulse  Av.8  Min: 56  Max: 91   Blood Pressure : 109/70     Respiratory:    Respiration: 15, Pulse Oximetry: 94 %     Work Of Breathing / Effort: Mild  RUL Breath Sounds: Clear, RML Breath Sounds: Diminished, RLL Breath Sounds: Diminished, ASHTYN Breath Sounds: Clear, LLL Breath Sounds: Diminished  Fluids:    Intake/Output Summary (Last 24 hours) at 18 1617  Last data filed at 18 1400   Gross per 24 hour   Intake                0 ml   Output             2090 ml   Net            -2090 ml     Weight: 66.3 kg (146 lb 2.6 oz)  GI/Nutrition:  Orders Placed This Encounter   Procedures   • Diet Order 2 Gram Sodium, Cardiac     Standing Status:   Standing     Number of Occurrences:   1     Order Specific Question:   Diet:     Answer:   2 Gram Sodium [7]     Order Specific Question:   Diet:     Answer:   Cardiac [6]     Medical Decision Making, by Problem:  Active Hospital Problems    Diagnosis   • *Acute on chronic renal failure  (HCC) [N17.9, N18.9]   • Elevated troponin [R74.8]   • S/P coronary angioplasty [Z98.61]   • Normocytic anemia [D64.9]   • Moderate to severe pulmonary hypertension (HCC) [I27.20]   • Pleural effusion [J90]   • GERD (gastroesophageal reflux disease) [K21.9]   • COPD (chronic obstructive pulmonary disease) ([facility]) [J44.9]   • Secondary hypertension [I15.9]   • Chronic congestive heart failure ([facility]) NO HEART FAILURE EXACERBATION DURING THIS HOSPITAL STAY [I50.9]   • Chronic atrial fibrillation ([facility]) [I48.2]   • Warfarin anticoagulation [Z79.01]       Quality-Core Measures   Reviewed items::  Labs reviewed, Medications reviewed and Radiology images reviewed    1 MARICRUZ on CKD: sec to BETSY.  2 volume overload  3 Anemia  4 Hyponatremia  Plan  no acute need for HD  Lasix as needed  D/C spironolactoe   Daily labs  Renal dose all meds  Avoid nephrotoxins  Prognosis guarded.

## 2018-07-09 NOTE — PROGRESS NOTES
Inpatient Anticoagulation Service Note    Date: 7/9/2018  Reason for Anticoagulation: Atrial Fibrillation   [id number] VASc Score: 5    Hemoglobin Value: (!) 8.2  Hematocrit Value: (!) 26  Lab Platelet Value: (!) 139  Target INR: 2.0 to 3.0    INR from last 7 days     Date/Time INR Value    07/09/18 0222 (!)  1.58    07/07/18 2302 (!)  1.34        Dose from last 7 days     Date/Time Dose (mg)    07/08/18 1700  --    07/08/18 0000  5        Average Dose (mg):  (Reported warfarin home regimen: 2.5 mg AOD, 3.75 Tue/Thurs)  Significant Interactions: Aspirin, Clopidogrel, Proton Pump Inhibitor, Thyroid Medications, Other (Comments) (SSRI)  Bridge Therapy: No   Reversal Agent Administered: Not Applicable    HPI: 85 yo female admitted on 7/7/18 for Post-op Complications, MARICRUZ following LHC (discharged last Thursday). Warfarin on hold prior to LHC. Warfarin is managed outpatient by the [facility] and per records, patient is prescribed warfarin 2.5mg po every Mon, Wed, Fri and warfarin 3.75mg po on all other days with mostly therapeutic INRs on file on this dosing regimen. Patient is also on DAPT s/p PCI for one month per cardiology then ASA will be discontinued.     Assessment: INR remains subtherapeutic trending upward, 1.58 from 1.34.   Potential drug-drug interactions listed above.   Inpatient diet: 2gm sodium, cardiac     Plan:  Re-initiate usual home dosing regimen and continue to monitor INR daily until re-stabilized within therapeutic range. Foregoing bolus dosing for now d/t concomitant DAPT.     Education Material Provided?: No (Chronic warfarin patient)    Pharmacist suggested discharge dosing:  warfarin 2.5mg po every Mon, Wed, Fri and warfarin 3.75mg po on all other days     Shae Tucker, PharmD

## 2018-07-10 ENCOUNTER — HOME CARE VISIT (OUTPATIENT)
Dept: HOME HEALTH SERVICES | Facility: HOME HEALTHCARE | Age: 83
End: 2018-07-10
Payer: MEDICARE

## 2018-07-10 LAB
ANION GAP SERPL CALC-SCNC: 12 MMOL/L (ref 0–11.9)
BUN SERPL-MCNC: 57 MG/DL (ref 8–22)
CALCIUM SERPL-MCNC: 8 MG/DL (ref 8.5–10.5)
CHLORIDE SERPL-SCNC: 96 MMOL/L (ref 96–112)
CO2 SERPL-SCNC: 23 MMOL/L (ref 20–33)
CREAT SERPL-MCNC: 2.76 MG/DL (ref 0.5–1.4)
GLUCOSE SERPL-MCNC: 89 MG/DL (ref 65–99)
INR PPP: 1.7 (ref 0.87–1.13)
POTASSIUM SERPL-SCNC: 3.5 MMOL/L (ref 3.6–5.5)
PROTHROMBIN TIME: 19.7 SEC (ref 12–14.6)
SODIUM SERPL-SCNC: 131 MMOL/L (ref 135–145)

## 2018-07-10 PROCEDURE — 700102 HCHG RX REV CODE 250 W/ 637 OVERRIDE(OP): Performed by: HOSPITALIST

## 2018-07-10 PROCEDURE — A9270 NON-COVERED ITEM OR SERVICE: HCPCS | Performed by: HOSPITALIST

## 2018-07-10 PROCEDURE — 99232 SBSQ HOSP IP/OBS MODERATE 35: CPT | Performed by: INTERNAL MEDICINE

## 2018-07-10 PROCEDURE — 99232 SBSQ HOSP IP/OBS MODERATE 35: CPT | Performed by: HOSPITALIST

## 2018-07-10 PROCEDURE — 770020 HCHG ROOM/CARE - TELE (206)

## 2018-07-10 PROCEDURE — 85610 PROTHROMBIN TIME: CPT

## 2018-07-10 PROCEDURE — 80048 BASIC METABOLIC PNL TOTAL CA: CPT

## 2018-07-10 PROCEDURE — 36415 COLL VENOUS BLD VENIPUNCTURE: CPT

## 2018-07-10 RX ADMIN — SERTRALINE 25 MG: 50 TABLET, FILM COATED ORAL at 17:01

## 2018-07-10 RX ADMIN — OMEPRAZOLE 20 MG: 20 CAPSULE, DELAYED RELEASE ORAL at 06:13

## 2018-07-10 RX ADMIN — LEVOTHYROXINE SODIUM 50 MCG: 50 TABLET ORAL at 06:13

## 2018-07-10 RX ADMIN — CLOPIDOGREL 75 MG: 75 TABLET, FILM COATED ORAL at 06:13

## 2018-07-10 RX ADMIN — WARFARIN SODIUM 3.75 MG: 7.5 TABLET ORAL at 17:02

## 2018-07-10 RX ADMIN — TOPIRAMATE 25 MG: 25 TABLET, FILM COATED ORAL at 06:13

## 2018-07-10 RX ADMIN — TOPIRAMATE 25 MG: 25 TABLET, FILM COATED ORAL at 17:02

## 2018-07-10 RX ADMIN — METOPROLOL SUCCINATE 25 MG: 25 TABLET, EXTENDED RELEASE ORAL at 06:13

## 2018-07-10 RX ADMIN — ASPIRIN 81 MG: 81 TABLET, COATED ORAL at 06:13

## 2018-07-10 ASSESSMENT — COGNITIVE AND FUNCTIONAL STATUS - GENERAL
MOBILITY SCORE: 18
WALKING IN HOSPITAL ROOM: A LITTLE
TOILETING: A LITTLE
SUGGESTED CMS G CODE MODIFIER MOBILITY: CK
HELP NEEDED FOR BATHING: A LITTLE
MOVING TO AND FROM BED TO CHAIR: A LITTLE
EATING MEALS: A LITTLE
PERSONAL GROOMING: A LITTLE
SUGGESTED CMS G CODE MODIFIER DAILY ACTIVITY: CK
DAILY ACTIVITIY SCORE: 18
DRESSING REGULAR LOWER BODY CLOTHING: A LITTLE
MOVING FROM LYING ON BACK TO SITTING ON SIDE OF FLAT BED: A LITTLE
STANDING UP FROM CHAIR USING ARMS: A LITTLE
DRESSING REGULAR UPPER BODY CLOTHING: A LITTLE
CLIMB 3 TO 5 STEPS WITH RAILING: A LOT

## 2018-07-10 ASSESSMENT — ENCOUNTER SYMPTOMS
CLAUDICATION: 0
HEMOPTYSIS: 0
NAUSEA: 0
CHILLS: 0
COUGH: 0
NEUROLOGICAL NEGATIVE: 1
HEADACHES: 0
PND: 0
PSYCHIATRIC NEGATIVE: 1
SPUTUM PRODUCTION: 0
PALPITATIONS: 0
MUSCULOSKELETAL NEGATIVE: 1
ORTHOPNEA: 0
FEVER: 0
RESPIRATORY NEGATIVE: 1
CONSTITUTIONAL NEGATIVE: 1
WHEEZING: 0
VOMITING: 0
GASTROINTESTINAL NEGATIVE: 1
SHORTNESS OF BREATH: 0
DIZZINESS: 0

## 2018-07-10 ASSESSMENT — PAIN SCALES - GENERAL
PAINLEVEL_OUTOF10: 0

## 2018-07-10 NOTE — PROGRESS NOTES
Spoke to Dr Ramirez regarding pt's Lasix order placed by Dr Recinos. Pt's Creatinine 3.35, concerned about pt's kidneys. Orders to hold lasix per Dr Ramirez.

## 2018-07-10 NOTE — CARE PLAN
Problem: Communication  Goal: The ability to communicate needs accurately and effectively will improve  Outcome: PROGRESSING AS EXPECTED  Pt communicates needs effectively and calls for assistance appropriately.    Problem: Safety  Goal: Will remain free from falls  Outcome: PROGRESSING AS EXPECTED  Pt verbalizes understanding of fall precautions, and calls for assistance appropriately. Bed lock and in lowest position, non slip socks in place. Call light within reach.

## 2018-07-10 NOTE — PROGRESS NOTES
Spoke to Dr Recinos regarding clarification on pt's spironolactone. Conflicting notes in place from nephrology and Dr Berry regarding whether or not to d/c spironolactone and give PRN lasix, but no orders in place to d/c spironolactone. Dr Recinos to put in orders.

## 2018-07-10 NOTE — PROGRESS NOTES
Inpatient Anticoagulation Service Note    Date: 7/10/2018  Reason for Anticoagulation: Atrial Fibrillation   [id number] VASc Score: 5    Hemoglobin Value: (!) 8.2  Hematocrit Value: (!) 26  Lab Platelet Value: (!) 139  Target INR: 2.0 to 3.0    INR from last 7 days     Date/Time INR Value    07/10/18 0217 (!)  1.7    07/09/18 0222 (!)  1.58    07/07/18 2302 (!)  1.34        Dose from last 7 days     Date/Time Dose (mg)    07/08/18 1700  --    07/08/18 0000  5        Average Dose (mg):  (Reported warfarin home regimen: 2.5 mg AOD, 3.75 Tue/Thurs)  Significant Interactions: Aspirin, Clopidogrel, Proton Pump Inhibitor, Thyroid Medications, Other (Comments) (SSRI)  Bridge Therapy: No   Reversal Agent Administered: Not Applicable    HPI: 87 yo female admitted on 7/7/18 for Post-op Complications, MARICRUZ following LHC (discharged last Thursday). Warfarin on hold prior to LHC. Warfarin is managed outpatient by the [facility] and per records, patient is prescribed warfarin 2.5mg po every Mon, Wed, Fri and warfarin 3.75mg po on all other days with mostly therapeutic INRs on file on this dosing regimen. Patient is also on DAPT s/p PCI for one month per cardiology then ASA will be discontinued.      Assessment: INR remains subtherapeutic with trend upward 1.7 from 1.58.   Potential drug-drug interactions listed above.   Inpatient diet: 2gm sodium, cardiac      Plan: Continue usual home dosing regimen and continue to monitor INR daily until re-stabilized within therapeutic range. Foregoing bolus dosing for now d/t concomitant DAPT.      Education Material Provided?: No (Chronic warfarin patient)     Pharmacist suggested discharge dosing:  warfarin 2.5mg po every Mon, Wed, Fri and warfarin 3.75mg po on all other days      Shae K. Garrett, PharmD

## 2018-07-10 NOTE — PROGRESS NOTES
Cardiology Progress Note               Author: ORALIA Resendez Date & Time created: 7/10/2018  9:55 AM     Interval History:  85 years old male present with weight gain and lower extremity edema.  Patient has  hypertension, hyperlipidemia, atrial fibrillation and history of cardiomyopathy with normal EF. Had PCI to proximal and mid LAD. RVSP 60 mmHg on 7/3/18.     Chief Complaint:  Worsening edema    Cardiology consult: cardiomyopathy and worsening renal function    7/9/18: Swelling has improved, denies any chest pain or BERTRAND. She is sitting up in the edge of the bed, talking in full sentence.   7/10/18: Patient lying in bed in no distress. She has some dyspnea with ambulation. She denies CP and palpitations.     Telemetry: Paced 60-80s, F PVCs r couplets and trigeminy  Labs: Na 131, K 3.5, BUN 57, Creat 2.76, GFR 16    Review of Systems   Constitutional: Negative for chills and fever.   Respiratory: Negative for cough, hemoptysis, sputum production, shortness of breath and wheezing.    Cardiovascular: Positive for leg swelling. Negative for chest pain, palpitations, orthopnea, claudication and PND.   Gastrointestinal: Negative for nausea and vomiting.   Neurological: Negative for dizziness and headaches.     Physical Exam   Constitutional: She is oriented to person, place, and time. She appears well-developed and well-nourished. No distress.   HENT:   Head: Normocephalic and atraumatic.   Eyes: EOM are normal.   Neck: Normal range of motion. Neck supple. No JVD present.   Cardiovascular: Normal rate, regular rhythm and intact distal pulses.    Murmur heard.   Systolic murmur is present with a grade of 2/6   Pulmonary/Chest: Effort normal and breath sounds normal. No respiratory distress. She has no wheezes.   Musculoskeletal: She exhibits edema (2+ pitting edema bilateral  lower extermity ). She exhibits no tenderness.   2+ BLE   Neurological: She is alert and oriented to person, place, and time.   Skin: Skin is  warm and dry. No rash noted. She is not diaphoretic.   Psychiatric: She has a normal mood and affect. Her behavior is normal. Judgment and thought content normal.   Nursing note and vitals reviewed.    Hemodynamics:  Temp (24hrs), Av.7 °C (98.1 °F), Min:36.4 °C (97.6 °F), Max:37.2 °C (99 °F)  Temperature: 36.8 °C (98.3 °F)  Pulse  Av.1  Min: 56  Max: 91   Blood Pressure : 113/57     Respiratory:    Respiration: 16, Pulse Oximetry: 94 %  Fluids:  Weight: 63.4 kg (139 lb 12.4 oz)  GI/Nutrition:  Orders Placed This Encounter   Procedures   • Diet Order 2 Gram Sodium, Cardiac     Standing Status:   Standing     Number of Occurrences:   1     Order Specific Question:   Diet:     Answer:   2 Gram Sodium [7]     Order Specific Question:   Diet:     Answer:   Cardiac [6]     Lab Results:  Recent Labs      18   WBC  3.7*  2.6*   RBC  3.02*  2.91*   HEMOGLOBIN  8.5*  8.2*   HEMATOCRIT  27.7*  26.0*   MCV  91.7  89.3   MCH  28.1  28.2   MCHC  30.7*  31.5*   RDW  55.8*  54.2*   PLATELETCT  150*  139*   MPV  10.3  9.8     Recent Labs      07/08/18   1139  07/09/18   0222  07/10/18   0217   SODIUM  129*  132*  131*   POTASSIUM  4.9  4.0  3.5*   CHLORIDE  95*  97  96   CO2  24  25  23   GLUCOSE  95  113*  89   BUN  53*  53*  57*   CREATININE  3.48*  3.35*  2.76*   CALCIUM  8.8  8.4*  8.0*     Recent Labs      07/07/18   2302  07/09/18   0222  07/10/18   0217   APTT  38.9*   --    --    INR  1.34*  1.58*  1.70*     Recent Labs      18   BNPBTYPENAT  620*     Recent Labs      18   CKMB   --   1.1   TROPONINI  0.21*  0.17*  0.18*   BNPBTYPENAT  620*   --      Recent Labs      07/09/18   0222   TRIGLYCERIDE  50   HDL  40   LDL  17     CARDIAC CATHETERIZATION 7/3/18  POSTPROCEDURE DIAGNOSES:  1.  No evidence of aortic stenosis with 0 mmHg peak to peak gradient, mean gradient of 8 mmHg, calculated CHRISTIANE of 2.09 cm2.  2.  Coronary artery disease with high  grade proximal and high grade mid left anterior descending artery.  3.  Successful percutaneous transluminal coronary angioplasty/stent placement of the mid left anterior descending artery with 2.25x12 mm Synergy drug-eluting stent.  4.  Successful percutaneous transluminal coronary angioplasty/stent placement of the proximal left anterior descending artery with 2.5x12 mm Synergy drug-eluting stent.  5.  Normal left ventricular systolic function with ejection fraction of 55%.  6.  Normal left ventricular end-diastolic pressure.  7.  Elevated right heart pressure with PA systolic pressure of 60 mmHg.  8.  Successful Angio-Seal closure.    ECHOCARDIOGRAM 7/8/18  Prior done 05-.  Compared to prior study, the right-sided chambers appear dilated.    Left ventricular systolic function is low normal.  Left ventricular ejection fraction is visually estimated to be 50-55%.  Abnormal septal motion consistent with right ventricular (RV) volume overload and/or elevated RV end-diastolic pressure.  Dilated right ventricle with reduced systolic function.  Severe tricuspid regurgitation. Estimated right ventricular systolic pressure  is 55 mmHg.    Medical Decision Making, by Problem:  Active Hospital Problems    Diagnosis   • *Acute on chronic renal failure (HCC) [N17.9, N18.9]   • Elevated troponin [R74.8]   • S/P coronary angioplasty [Z98.61]   • Normocytic anemia [D64.9]   • Moderate to severe pulmonary hypertension ([facility]) [I27.20]   • Pleural effusion [J90]   • GERD (gastroesophageal reflux disease) [K21.9]   • COPD (chronic obstructive pulmonary disease) ([facility]) [J44.9]   • Secondary hypertension [I15.9]   • Congestive heart failure (CHF) ([facility]) [I50.9]   • Chronic atrial fibrillation ([facility]) [I48.2]   • Warfarin anticoagulation [Z79.01]     Plan:  1. Acute kidney injury:  -Question related to   -Nephrology consulted   -Creat 2.76 improving  -Hold furosemide and Aldactone    2. CAD:  -S/P BRITTON to mLAD, BRITTON pLAD (Prior  admission)  -Chest pain free   -Continue ASA 81 and clopidogrel 75 mg daily  -Continue metoprolol XL 25mg qd     3. Pulmonary hypertension:  - ECHO: ef 50-55% and RVSP 55mHg   - I/O : -3,075 since admit weight, is down almost 3 kg    4. Severe tricuspid regurgitation:  - most likely related to pulmonary hypertension    Future Appointments  Date Time Provider Department Center   7/11/2018 1:30 PM Neela Awad M.D. NE[facility] [address]   7/13/2018 9:15 AM [facility] EXAM 4 VMED None   7/20/2018 9:15 AM Jose Simms M.D. [facility] None   7/23/2018 10:20 AM ELIZABETH Patel [facility] [place]   10/31/2018 9:45 AM PULMONARY DX 1 IP[facility] [address]   10/31/2018 10:00 AM PFT-RM1 PULM None   10/31/2018 11:00 AM A Rotation PULM None   11/27/2018 10:00 AM PACER CHECK-CAM B [facility] None     Quality-Core Measures   Reviewed items::  EKG reviewed, Labs reviewed and Medications reviewed  Hunt catheter::  No Hunt  DVT prophylaxis - mechanical:  SCDs

## 2018-07-10 NOTE — DISCHARGE PLANNING
Anticipated Discharge Disposition: Home with HH    Action: LSW spoke with pt at bedside to discuss discharge planning. Pt was previously on service with [facility]. Pt stated she would like to continue with [facility]. LSW faxed choice form to [facility].     Pt stated that she has a ride home upon discharge. LSW explained Medicare rights to pt and pt does not wish to appeal at this time.     Barriers to Discharge: None    Plan: Awaiting HH acceptance.

## 2018-07-10 NOTE — PROGRESS NOTES
[facility]    Date of Service: 2018    Chief Complaint  86 y.o. female admitted 2018 with fluid retention and mehdi    Interval Problem Update  Cr 3.35 today    Still has leg edema    Denies sob at rest    Afebrile    Echo noted    Consultants/Specialty  Cards dr kathleen berry renal    Disposition    home        Review of Systems   Constitutional: Negative.    HENT: Negative.    Respiratory: Negative.    Cardiovascular: Positive for leg swelling.   Gastrointestinal: Negative.    Genitourinary: Negative.    Musculoskeletal: Negative.    Skin: Negative.    Neurological: Negative.    Psychiatric/Behavioral: Negative.    All other systems reviewed and are negative.     Physical Exam  Laboratory/Imaging   Hemodynamics  Temp (24hrs), Av.7 °C (98.1 °F), Min:36.2 °C (97.1 °F), Max:37.2 °C (99 °F)   Temperature: 37.2 °C (99 °F)  Pulse  Av.2  Min: 56  Max: 91    Blood Pressure : 129/80      Respiratory      Respiration: 15, Pulse Oximetry: 96 %     Work Of Breathing / Effort: Mild  RUL Breath Sounds: Clear, RML Breath Sounds: Diminished, RLL Breath Sounds: Diminished, ASHTYN Breath Sounds: Clear, LLL Breath Sounds: Diminished    Fluids    Intake/Output Summary (Last 24 hours) at 18 1833  Last data filed at 18 1800   Gross per 24 hour   Intake                0 ml   Output             2390 ml   Net            -2390 ml       Nutrition  Orders Placed This Encounter   Procedures   • Diet Order 2 Gram Sodium, Cardiac     Standing Status:   Standing     Number of Occurrences:   1     Order Specific Question:   Diet:     Answer:   2 Gram Sodium [7]     Order Specific Question:   Diet:     Answer:   Cardiac [6]     Physical Exam   Constitutional: She is oriented to person, place, and time. No distress.   HENT:   Head: Atraumatic.   Mouth/Throat: No oropharyngeal exudate.   Eyes: Left eye exhibits no discharge. No scleral icterus.   Neck: Normal range of motion. Neck supple. JVD present.  No thyromegaly present.   Cardiovascular: Normal rate and regular rhythm.  Exam reveals no gallop and no friction rub.    No murmur heard.  Pulmonary/Chest: Effort normal and breath sounds normal. No respiratory distress. She has no wheezes. She has no rales.   Abdominal: Soft. Bowel sounds are normal. She exhibits no distension. There is no tenderness. There is no rebound.   Musculoskeletal: She exhibits edema (+1 pitting edema both legs).   Neurological: She is alert and oriented to person, place, and time. No cranial nerve deficit. Coordination normal.   Skin: No rash noted. She is not diaphoretic. No erythema. No pallor.       Recent Labs      07/07/18 2302 07/09/18 0222   WBC  3.7*  2.6*   RBC  3.02*  2.91*   HEMOGLOBIN  8.5*  8.2*   HEMATOCRIT  27.7*  26.0*   MCV  91.7  89.3   MCH  28.1  28.2   MCHC  30.7*  31.5*   RDW  55.8*  54.2*   PLATELETCT  150*  139*   MPV  10.3  9.8     Recent Labs      07/07/18 2302 07/08/18   1139  07/09/18 0222   SODIUM  125*  129*  132*   POTASSIUM  5.4  4.9  4.0   CHLORIDE  93*  95*  97   CO2  21  24  25   GLUCOSE  111*  95  113*   BUN  50*  53*  53*   CREATININE  3.13*  3.48*  3.35*   CALCIUM  8.5  8.8  8.4*     Recent Labs      07/07/18 2302 07/09/18 0222   APTT  38.9*   --    INR  1.34*  1.58*     Recent Labs      07/07/18 2302   BNPBTYPENAT  620*     Recent Labs      07/09/18 0222   TRIGLYCERIDE  50   HDL  40   LDL  17          Assessment/Plan     * Acute on chronic renal failure (HCC)- (present on admission)   Assessment & Plan    This patient presents with acute on chronic renal failure after cardiac cath  Suspect contrast-induced nephropathy      Monitor bmp    Avoid nephrotoxins    Renal dose all medications        Elevated troponin- (present on admission)   Assessment & Plan    Likely demand from volume overload   Continue to trend           S/P coronary angioplasty- (present on admission)   Assessment & Plan    Recently discharged on Thursday           Hyponatremia- (present on admission)   Assessment & Plan    Appears chronic  Check am bmp        Normocytic anemia- (present on admission)   Assessment & Plan     no signs of acute bleeding        Moderate to severe pulmonary hypertension (HCC)- (present on admission)   Assessment & Plan    Hx of     Echo noted          Pleural effusion- (present on admission)   Assessment & Plan    Small     continue to monitor        Warfarin anticoagulation- (present on admission)   Assessment & Plan    2/2 to afib   Pharmacy to dose        GERD (gastroesophageal reflux disease)- (present on admission)   Assessment & Plan    Cont ppi        COPD (chronic obstructive pulmonary disease) (HCC)- (present on admission)   Assessment & Plan    resp care protocol ordered  Not in acute exaceration        Chronic diastolic congestive heart failure (HCC)- (present on admission)   Assessment & Plan    Cardiology md feels fluid overload is related to oliguric renal failure     BB, SPrinolactone           Secondary hypertension- (present on admission)   Assessment & Plan    Cont lopressor        Chronic atrial fibrillation (HCC)- (present on admission)   Assessment & Plan    Resume comadin   And BB          Quality-Core Measures   Hunt catheter::  No Hunt  DVT prophylaxis - mechanical:  SCDs      check am bmp, bnp

## 2018-07-10 NOTE — DISCHARGE PLANNING
Received Choice form at 1519  Agency/Facility Name: [facility]  Referral sent per Choice form @ 4802

## 2018-07-10 NOTE — CARE PLAN
Problem: Safety  Goal: Will remain free from injury  Outcome: PROGRESSING AS EXPECTED  Discussed with the patient the importance of calling for assistance prior to getting out of bed in order to help prevent falls. Patient accepting of the information. All questions answered at this time.     Problem: Venous Thromboembolism (VTW)/Deep Vein Thrombosis (DVT) Prevention:  Goal: Patient will participate in Venous Thrombosis (VTE)/Deep Vein Thrombosis (DVT)Prevention Measures  Outcome: PROGRESSING AS EXPECTED  Discussed with the patient the importance of taking her VTE while in the hospital to help prevent blood clots. Patient accepting of the information. All questions answered at this time.

## 2018-07-10 NOTE — DISCHARGE PLANNING
ATTN: Case Management  RE: Referral for Home Health                We would like to take this opportunity to thank you for submitting a referral for your patient to continue care with [facility]. Our skilled team is dedicated to helping all patients recover and gain independence in the home setting.            As of 7/10/2018, we have accepted the above patient into our service. A [facility] clinician will be out to see the patient within 48 hours to conduct our initial visit. If you have any questions or concerns regarding the patient’s transition to Home Health, please do not hesitate to contact us. We are open for referrals 7 days a week from 8AM to 5PM at [phone].      We look forward to collaborating with you,  [facility] Team

## 2018-07-10 NOTE — FACE TO FACE
Face to Face Note  -  Durable Medical Equipment    Shellie Ulloa M.D. - NPI: [id number]  I certify that this patient is under my care and that they have had a durable medical equipment(DME)face to face encounter by myself that meets the physician DME face-to-face encounter requirements with this patient on:    Date of encounter:   Patient:                    MRN:                       YOB: 2018  Andree gEan  [id number]  4/21/1932     The encounter with the patient was in whole, or in part, for the following medical condition, which is the primary reason for durable medical equipment:  Cardiac Disease    I certify that, based on my findings, the following durable medical equipment is medically necessary:  Walkers.    HOME O2 Saturation Measurements:(Values must be present for Home Oxygen orders)         ,     ,         My Clinical findings support the need for the above equipment due to:  Abnormal Gait    Supporting Symptoms: resume home health services for PT/OT/strengthening    ------------------------------------------------------------------------------------------------------------------    Face to Face Supporting Documentation - Home Health    The encounter with this patient was in whole or in part the primary reason for home health admission.    Date of encounter:   Patient:                    MRN:                       YOB: 2018  Andree Egan  [id number]  4/21/1932     Home health to see patient for:  Skilled Nursing care for assessment, interventions & education, Home health aide, Physical Therapy evaluation and treatment and Occupational therapy evaluation and treatment    Skilled need for:  Recent Deterioration of Health Status had cardiac stent with MARICRUZ, generalized weakness on coumadin    Skilled nursing interventions to include:  Comment: home safety    Homebound evidenced status by:  Need the aid of supportive devices such as crutches, canes, wheelchairs  or walkers. Leaving home must require a considerable and taxing effort. There must exist a normal inability to leave the home.    Community Physician to provide follow up care: ELIZABETH Patel     Optional Interventions    Wound information & treatment:    Home Infusion Therapy orders:    Line/Drain/Airway:    I certify the face to face encounter for this home care referral meets the CMS requirements and the encounter/clinical assessment with the patient was, in whole, or in part, for the medical condition(s) listed above, which is the primary reason for home health care. Based on my clinical findings: the service(s) are medically necessary, support the need for home health care, and the homebound criteria are met.  I certify that this patient has had a face to face encounter by myself.  Shellie Ulloa M.D. - NPI: [id number]    *Debility, frailty and advanced age in the absence of an acute deterioration or exacerbation of a condition do not qualify a patient for home health.

## 2018-07-10 NOTE — PROGRESS NOTES
Received report from night shift RN Don/Sara. Assumed care of patient. Patient is paced 100% on the monitor at this time. Patient is sitting up in bed with no family present at bedside at this time. Patient declines any needs a this time. Plan of care discussed with patient. Bed locked and in the lowest position with call light within reach.

## 2018-07-11 ENCOUNTER — APPOINTMENT (OUTPATIENT)
Dept: NEPHROLOGY | Facility: MEDICAL CENTER | Age: 83
End: 2018-07-11
Payer: MEDICARE

## 2018-07-11 VITALS
TEMPERATURE: 97.7 F | RESPIRATION RATE: 16 BRPM | HEART RATE: 66 BPM | HEIGHT: 63 IN | OXYGEN SATURATION: 96 % | DIASTOLIC BLOOD PRESSURE: 62 MMHG | SYSTOLIC BLOOD PRESSURE: 125 MMHG | BODY MASS INDEX: 24.65 KG/M2 | WEIGHT: 139.11 LBS

## 2018-07-11 PROBLEM — D50.9 IRON DEFICIENCY ANEMIA: Status: ACTIVE | Noted: 2018-05-22

## 2018-07-11 PROBLEM — R79.89 ELEVATED TROPONIN: Status: RESOLVED | Noted: 2018-07-08 | Resolved: 2018-07-11

## 2018-07-11 PROBLEM — I07.1 SEVERE TRICUSPID REGURGITATION: Status: ACTIVE | Noted: 2018-07-11

## 2018-07-11 LAB
ANION GAP SERPL CALC-SCNC: 8 MMOL/L (ref 0–11.9)
BUN SERPL-MCNC: 43 MG/DL (ref 8–22)
CALCIUM SERPL-MCNC: 8.6 MG/DL (ref 8.5–10.5)
CHLORIDE SERPL-SCNC: 98 MMOL/L (ref 96–112)
CO2 SERPL-SCNC: 27 MMOL/L (ref 20–33)
CREAT SERPL-MCNC: 1.87 MG/DL (ref 0.5–1.4)
GLUCOSE SERPL-MCNC: 131 MG/DL (ref 65–99)
INR PPP: 1.64 (ref 0.87–1.13)
IRON SATN MFR SERPL: 7 % (ref 15–55)
IRON SERPL-MCNC: 24 UG/DL (ref 40–170)
POTASSIUM SERPL-SCNC: 2.9 MMOL/L (ref 3.6–5.5)
PROTHROMBIN TIME: 19.1 SEC (ref 12–14.6)
SODIUM SERPL-SCNC: 133 MMOL/L (ref 135–145)
TIBC SERPL-MCNC: 332 UG/DL (ref 250–450)

## 2018-07-11 PROCEDURE — 99239 HOSP IP/OBS DSCHRG MGMT >30: CPT | Performed by: HOSPITALIST

## 2018-07-11 PROCEDURE — 80048 BASIC METABOLIC PNL TOTAL CA: CPT

## 2018-07-11 PROCEDURE — 700102 HCHG RX REV CODE 250 W/ 637 OVERRIDE(OP): Performed by: HOSPITALIST

## 2018-07-11 PROCEDURE — 85610 PROTHROMBIN TIME: CPT

## 2018-07-11 PROCEDURE — 83540 ASSAY OF IRON: CPT

## 2018-07-11 PROCEDURE — 99232 SBSQ HOSP IP/OBS MODERATE 35: CPT | Performed by: INTERNAL MEDICINE

## 2018-07-11 PROCEDURE — 83550 IRON BINDING TEST: CPT

## 2018-07-11 PROCEDURE — 700102 HCHG RX REV CODE 250 W/ 637 OVERRIDE(OP): Performed by: NURSE PRACTITIONER

## 2018-07-11 PROCEDURE — A9270 NON-COVERED ITEM OR SERVICE: HCPCS | Performed by: HOSPITALIST

## 2018-07-11 PROCEDURE — 36415 COLL VENOUS BLD VENIPUNCTURE: CPT

## 2018-07-11 PROCEDURE — A9270 NON-COVERED ITEM OR SERVICE: HCPCS | Performed by: NURSE PRACTITIONER

## 2018-07-11 RX ORDER — ROSUVASTATIN CALCIUM 10 MG/1
10 TABLET, COATED ORAL EVERY EVENING
Qty: 30 TAB | Refills: 11 | Status: ON HOLD | OUTPATIENT
Start: 2018-07-11 | End: 2018-08-20

## 2018-07-11 RX ORDER — FERROUS SULFATE 325(65) MG
325 TABLET ORAL DAILY
Qty: 30 TAB | Refills: 3 | Status: ON HOLD | OUTPATIENT
Start: 2018-07-11 | End: 2018-07-28

## 2018-07-11 RX ORDER — ASCORBIC ACID 500 MG
500 TABLET ORAL DAILY
Qty: 30 TAB | Refills: 3 | Status: ON HOLD | OUTPATIENT
Start: 2018-07-11 | End: 2018-08-20

## 2018-07-11 RX ORDER — ROSUVASTATIN CALCIUM 10 MG/1
10 TABLET, COATED ORAL EVERY EVENING
Status: DISCONTINUED | OUTPATIENT
Start: 2018-07-11 | End: 2018-07-11 | Stop reason: HOSPADM

## 2018-07-11 RX ORDER — ROSUVASTATIN CALCIUM 20 MG/1
20 TABLET, COATED ORAL EVERY EVENING
Status: DISCONTINUED | OUTPATIENT
Start: 2018-07-11 | End: 2018-07-11

## 2018-07-11 RX ORDER — ASPIRIN 81 MG/1
81 TABLET ORAL DAILY
Qty: 28 TAB | Refills: 0 | Status: SHIPPED | OUTPATIENT
Start: 2018-07-12 | End: 2018-08-09

## 2018-07-11 RX ORDER — POTASSIUM CHLORIDE 20 MEQ/1
40 TABLET, EXTENDED RELEASE ORAL ONCE
Status: COMPLETED | OUTPATIENT
Start: 2018-07-11 | End: 2018-07-11

## 2018-07-11 RX ORDER — FUROSEMIDE 20 MG/1
20 TABLET ORAL DAILY
Qty: 30 TAB | Refills: 0 | Status: SHIPPED | OUTPATIENT
Start: 2018-07-11 | End: 2018-07-17 | Stop reason: SDUPTHER

## 2018-07-11 RX ADMIN — POTASSIUM CHLORIDE 40 MEQ: 1500 TABLET, EXTENDED RELEASE ORAL at 13:57

## 2018-07-11 RX ADMIN — METOPROLOL SUCCINATE 25 MG: 25 TABLET, EXTENDED RELEASE ORAL at 05:18

## 2018-07-11 RX ADMIN — TOPIRAMATE 25 MG: 25 TABLET, FILM COATED ORAL at 05:19

## 2018-07-11 RX ADMIN — ASPIRIN 81 MG: 81 TABLET, COATED ORAL at 05:18

## 2018-07-11 RX ADMIN — OMEPRAZOLE 20 MG: 20 CAPSULE, DELAYED RELEASE ORAL at 05:18

## 2018-07-11 RX ADMIN — LEVOTHYROXINE SODIUM 50 MCG: 50 TABLET ORAL at 05:18

## 2018-07-11 RX ADMIN — CLOPIDOGREL 75 MG: 75 TABLET, FILM COATED ORAL at 05:19

## 2018-07-11 ASSESSMENT — ENCOUNTER SYMPTOMS
MYALGIAS: 0
DEPRESSION: 0
CLAUDICATION: 0
WEIGHT LOSS: 0
NAUSEA: 0
WEAKNESS: 0
ABDOMINAL PAIN: 0
PALPITATIONS: 0
PND: 0
DIZZINESS: 0
BLOOD IN STOOL: 0
EYE DISCHARGE: 0
HEMOPTYSIS: 0
DOUBLE VISION: 0
ORTHOPNEA: 0
SPEECH CHANGE: 0
LOSS OF CONSCIOUSNESS: 0
HALLUCINATIONS: 0
FALLS: 0
FEVER: 0
WHEEZING: 0
SHORTNESS OF BREATH: 0
SENSORY CHANGE: 0
CHILLS: 0
VOMITING: 0
COUGH: 0
EYE PAIN: 0
BRUISES/BLEEDS EASILY: 0
HEADACHES: 0
SPUTUM PRODUCTION: 0
BLURRED VISION: 0

## 2018-07-11 ASSESSMENT — PAIN SCALES - GENERAL
PAINLEVEL_OUTOF10: 0
PAINLEVEL_OUTOF10: 0

## 2018-07-11 NOTE — PROGRESS NOTES
Discharge  Pt discharged to home, reviewed home care, follow-up appointments, medications, daily weights, etc. With patient and granddaughter (who is primary care-giver for patient/grandma), all questions encouraged and answered completely.  Pt escorted to private car by CNA, son at pt's side.  Wheelchair w/ supplemental 02 used to escort pt to car, son's car has wheelchair and supplemental 02 inside to continue pt's care.

## 2018-07-11 NOTE — PROGRESS NOTES
Received bedside report from mulugetajazz RN.  Pt awake, a/o x 4, denies pain/discomfort at this time.  Pt in bed, wheels locked and bed in lowest position.  Call light at pt's side.

## 2018-07-11 NOTE — PROGRESS NOTES
Hospital Medicine Progress Note, Adult, Complex               Author: Fadi Najjar Date & Time created: 7/10/2018  6:41 PM     Interval History:  Pt is 85 YO lady with CHF, CKD, S/P coronary angiogram, developed MARICRUZ / contrast induced nephropathy  Doing better    Review of Systems:  Review of Systems   Constitutional: Negative for chills and fever.   Respiratory: Negative for cough, hemoptysis and shortness of breath.    Cardiovascular: Negative for chest pain and leg swelling.   Gastrointestinal: Negative for nausea and vomiting.       Physical Exam:  Physical Exam   Constitutional: She is oriented to person, place, and time. No distress.   HENT:   Head: Normocephalic and atraumatic.   Nose: Nose normal.   Eyes: Conjunctivae are normal. Right eye exhibits no discharge. Left eye exhibits no discharge. No scleral icterus.   Cardiovascular: Normal rate and regular rhythm.    Murmur heard.  Pulmonary/Chest: Effort normal and breath sounds normal. No respiratory distress. She has no wheezes. She has no rales.   Abdominal: Soft. Bowel sounds are normal. She exhibits no distension. There is no tenderness. There is no rebound.   Musculoskeletal: She exhibits edema.   Neurological: She is alert and oriented to person, place, and time.   Skin: She is not diaphoretic.   Psychiatric: She has a normal mood and affect.   Nursing note and vitals reviewed.      Labs:        Invalid input(s): [id number]  Recent Labs      07/07/18 2302 07/08/18 0228   CKMB   --   1.1   TROPONINI  0.21*  0.17*  0.18*   BNPBTYPENAT  620*   --      Recent Labs      07/08/18   1139  07/09/18   0222  07/10/18   0217   SODIUM  129*  132*  131*   POTASSIUM  4.9  4.0  3.5*   CHLORIDE  95*  97  96   CO2  24  25  23   BUN  53*  53*  57*   CREATININE  3.48*  3.35*  2.76*   CALCIUM  8.8  8.4*  8.0*     Recent Labs      07/07/18   2302  07/08/18   1139  07/09/18   0222  07/10/18   0217   ALTSGPT  <5   --   5   --    ASTSGOT  15   --   11*   --     ALKPHOSPHAT  99   --   97   --    TBILIRUBIN  0.6   --   0.7   --    GLUCOSE  111*  95  113*  89     Recent Labs      07/07/18   2302  07/08/18   0228  07/09/18   0222  07/10/18   0217   RBC  3.02*   --   2.91*   --    HEMOGLOBIN  8.5*   --   8.2*   --    HEMATOCRIT  27.7*   --   26.0*   --    PLATELETCT  150*   --   139*   --    PROTHROMBTM  16.3*   --   18.5*  19.7*   APTT  38.9*   --    --    --    INR  1.34*   --   1.58*  1.70*   IRON   --   15*   --    --    FERRITIN   --   30.4   --    --    TOTIRONBC   --   328   --    --      Recent Labs      18   WBC  3.7*  2.6*   NEUTSPOLYS  70.20  62.80   LYMPHOCYTES  21.60*  25.20   MONOCYTES  6.30  9.30   EOSINOPHILS  0.00  0.00   BASOPHILS  1.40  1.90*   ASTSGOT  15  11*   ALTSGPT  <5  5   ALKPHOSPHAT  99  97   TBILIRUBIN  0.6  0.7           Hemodynamics:  Temp (24hrs), Av.7 °C (98.1 °F), Min:36.4 °C (97.6 °F), Max:37 °C (98.6 °F)  Temperature: 37 °C (98.6 °F)  Pulse  Av.6  Min: 56  Max: 91   Blood Pressure : 103/59     Respiratory:    Respiration: 16, Pulse Oximetry: 95 %     Work Of Breathing / Effort: Mild  RUL Breath Sounds: Clear, RML Breath Sounds: Diminished, RLL Breath Sounds: Diminished, ASHTYN Breath Sounds: Clear, LLL Breath Sounds: Clear;Diminished  Fluids:    Intake/Output Summary (Last 24 hours) at 07/10/18 1841  Last data filed at 07/10/18 1501   Gross per 24 hour   Intake              240 ml   Output              725 ml   Net             -485 ml     Weight: 63.4 kg (139 lb 12.4 oz)  GI/Nutrition:  Orders Placed This Encounter   Procedures   • Diet Order 2 Gram Sodium, Cardiac     Standing Status:   Standing     Number of Occurrences:   1     Order Specific Question:   Diet:     Answer:   2 Gram Sodium [7]     Order Specific Question:   Diet:     Answer:   Cardiac [6]     Medical Decision Making, by Problem:  Active Hospital Problems    Diagnosis   • *Acute on chronic renal failure (HCC) [N17.9, N18.9]   • Elevated  troponin [R74.8]   • S/P coronary angioplasty [Z98.61]   • Normocytic anemia [D64.9]   • Moderate to severe pulmonary hypertension (HCC) [I27.20]   • Pleural effusion [J90]   • GERD (gastroesophageal reflux disease) [K21.9]   • COPD (chronic obstructive pulmonary disease) (HCC) [J44.9]   • Secondary hypertension [I15.9]   • Chronic congestive heart failure (HCC) NO HEART FAILURE EXACERBATION DURING THIS HOSPITAL STAY [I50.9]   • Chronic atrial fibrillation (HCC) [I48.2]   • Warfarin anticoagulation [Z79.01]       Quality-Core Measures   Reviewed items::  Labs reviewed, Medications reviewed and Radiology images reviewed    1 MARICRUZ on CKD: sec to BETSY, cr improving.  2 volume overload  3 Anemia  4 Hyponatremia  Plan  no acute need for HD  Lasix as needed  Daily labs  Renal dose all meds  Avoid nephrotoxins  Prognosis guarded.  D/W Dr Ulloa

## 2018-07-11 NOTE — PROGRESS NOTES
[facility] Hospitalist Progress Note    Date of Service: 7/10/2018    Chief Complaint  86 y.o. female admitted 2018 with fluid retention and mehdi.  Had BRITTON placed proximal and mid LAD on 7/3/18.  H/o afib on coumadin.    Interval Problem Update  7/10: feeling better, renal function improving, paced rhythm 65-85.  INR 1.70, on coumadin, plavix and ASA    Consultants/Specialty  Cards dr kathleen berry renal    Disposition  Home with family, uses cane at home, supportive family, 7/10 resume HH orders placed.        Review of Systems   Constitutional: Negative.    HENT: Negative.    Respiratory: Negative.    Cardiovascular: Positive for leg swelling.   Gastrointestinal: Negative.    Genitourinary: Negative.    Musculoskeletal: Negative.    Skin: Negative.    Neurological: Negative.    Psychiatric/Behavioral: Negative.    All other systems reviewed and are negative.     Physical Exam  Laboratory/Imaging   Hemodynamics  Temp (24hrs), Av.7 °C (98.1 °F), Min:36.4 °C (97.6 °F), Max:37 °C (98.6 °F)   Temperature: 37 °C (98.6 °F)  Pulse  Av.2  Min: 56  Max: 91    Blood Pressure : 103/59      Respiratory      Respiration: 16, Pulse Oximetry: 95 %     Work Of Breathing / Effort: Mild  RUL Breath Sounds: Clear, RML Breath Sounds: Diminished, RLL Breath Sounds: Diminished, ASHTYN Breath Sounds: Clear, LLL Breath Sounds: Clear;Diminished    Fluids    Intake/Output Summary (Last 24 hours) at 07/10/18 2020  Last data filed at 07/10/18 1501   Gross per 24 hour   Intake              240 ml   Output              575 ml   Net             -335 ml       Nutrition  Orders Placed This Encounter   Procedures   • Diet Order 2 Gram Sodium, Cardiac     Standing Status:   Standing     Number of Occurrences:   1     Order Specific Question:   Diet:     Answer:   2 Gram Sodium [7]     Order Specific Question:   Diet:     Answer:   Cardiac [6]     Physical Exam   Constitutional: She is oriented to person, place, and time. She appears  well-developed and well-nourished. No distress.   HENT:   Head: Normocephalic and atraumatic.   Nose: Nose normal.   Mouth/Throat: Oropharynx is clear and moist. No oropharyngeal exudate.   Eyes: Conjunctivae and EOM are normal. Pupils are equal, round, and reactive to light. Right eye exhibits no discharge. Left eye exhibits no discharge. No scleral icterus.   Neck: Normal range of motion. Neck supple. No JVD present. No tracheal deviation present. No thyromegaly present.   Cardiovascular: Normal rate, regular rhythm, normal heart sounds and intact distal pulses.  Exam reveals no gallop and no friction rub.    No murmur heard.  Pulmonary/Chest: Effort normal and breath sounds normal. No stridor. No respiratory distress. She has no wheezes. She has no rales. She exhibits no tenderness.   Abdominal: Soft. Bowel sounds are normal. She exhibits no distension and no mass. There is no tenderness. There is no rebound and no guarding.   Musculoskeletal: Normal range of motion. She exhibits edema (mild b/l lower leg). She exhibits no tenderness.   Lymphadenopathy:     She has no cervical adenopathy.   Neurological: She is alert and oriented to person, place, and time. No cranial nerve deficit. She exhibits normal muscle tone. Coordination normal.   Skin: Skin is warm and dry. No rash noted. She is not diaphoretic. No erythema.   Psychiatric: She has a normal mood and affect. Her behavior is normal. Judgment and thought content normal.   Nursing note and vitals reviewed.      Recent Labs      07/07/18   2302  07/09/18   0222   WBC  3.7*  2.6*   RBC  3.02*  2.91*   HEMOGLOBIN  8.5*  8.2*   HEMATOCRIT  27.7*  26.0*   MCV  91.7  89.3   MCH  28.1  28.2   MCHC  30.7*  31.5*   RDW  55.8*  54.2*   PLATELETCT  150*  139*   MPV  10.3  9.8     Recent Labs      07/08/18   1139  07/09/18   0222  07/10/18   0217   SODIUM  129*  132*  131*   POTASSIUM  4.9  4.0  3.5*   CHLORIDE  95*  97  96   CO2  24  25  23   GLUCOSE  95  113*  89   BUN   53*  53*  57*   CREATININE  3.48*  3.35*  2.76*   CALCIUM  8.8  8.4*  8.0*     Recent Labs      07/07/18   2302  07/09/18 0222  07/10/18   0217   APTT  38.9*   --    --    INR  1.34*  1.58*  1.70*     Recent Labs      07/07/18 2302   BNPBTYPENAT  620*     Recent Labs      07/09/18 0222   TRIGLYCERIDE  50   HDL  40   LDL  17          Assessment/Plan     * Acute on chronic renal failure (HCC)- (present on admission)   Assessment & Plan    This patient presents with acute on chronic renal failure after cardiac cath  Suspect contrast-induced nephropathy      Monitor bmp    Avoid nephrotoxins    Renal dose all medications        Elevated troponin- (present on admission)   Assessment & Plan    Likely demand from volume overload   Continue to trend           S/P coronary angioplasty- (present on admission)   Assessment & Plan    Recently discharged on Thursday          Hyponatremia- (present on admission)   Assessment & Plan    Appears chronic  Check am bmp        Normocytic anemia- (present on admission)   Assessment & Plan     no signs of acute bleeding        Moderate to severe pulmonary hypertension (HCC)- (present on admission)   Assessment & Plan    Hx of     Echo noted          Pleural effusion- (present on admission)   Assessment & Plan    Small     continue to monitor        Warfarin anticoagulation- (present on admission)   Assessment & Plan    2/2 to afib   Pharmacy to dose        GERD (gastroesophageal reflux disease)- (present on admission)   Assessment & Plan    Cont ppi        COPD (chronic obstructive pulmonary disease) ([facility])- (present on admission)   Assessment & Plan    resp care protocol ordered  Not in acute exaceration        Chronic diastolic congestive heart failure (HCC)- (present on admission)   Assessment & Plan    Cardiology md feels fluid overload is related to oliguric renal failure     BB  7/10 no spironolactone per nephrology for Acute contrast induced nephropathy.          Cardiac  pacemaker in situ- (present on admission)   Assessment & Plan    Paced rhythm on monitor.        Secondary hypertension- (present on admission)   Assessment & Plan    Cont lopressor        Chronic atrial fibrillation (HCC)- (present on admission)   Assessment & Plan    Resume comadin   And BB          Quality-Core Measures   Hunt catheter::  No Hunt  DVT prophylaxis - mechanical:  SCDs

## 2018-07-11 NOTE — CARE PLAN
Problem: Knowledge Deficit  Goal: Knowledge of disease process/condition, treatment plan, diagnostic tests, and medications will improve  Outcome: PROGRESSING AS EXPECTED  Discussed POC with pt, pt verbalizes understanding and has no questions at this time    Problem: Respiratory:  Goal: Respiratory status will improve  Outcome: PROGRESSING AS EXPECTED  Pt currently on room air and showing no signs of respiratory distress

## 2018-07-11 NOTE — PROGRESS NOTES
Hospital Medicine Progress Note, Adult, Complex               Author: Ar Najjar Date & Time created: 7/11/2018  3:21 PM     Interval History:  Pt is 85 YO lady with CHF, CKD, S/P coronary angiogram, developed MARICRUZ / contrast induced nephropathy  Doing better  No new complaints  Review of Systems:  Review of Systems   Constitutional: Negative for chills and fever.   Respiratory: Negative for cough, hemoptysis and shortness of breath.    Cardiovascular: Negative for chest pain and leg swelling.   Gastrointestinal: Negative for nausea and vomiting.   Genitourinary: Negative for dysuria and hematuria.       Physical Exam:  Physical Exam   Constitutional: She is oriented to person, place, and time. No distress.   HENT:   Head: Normocephalic and atraumatic.   Nose: Nose normal.   Eyes: Conjunctivae are normal. Right eye exhibits no discharge. Left eye exhibits no discharge. No scleral icterus.   Cardiovascular: Normal rate and regular rhythm.    Murmur heard.  Pulmonary/Chest: Effort normal and breath sounds normal. No respiratory distress. She has no wheezes. She has no rales.   Abdominal: Soft. Bowel sounds are normal. She exhibits no distension. There is no tenderness. There is no rebound.   Musculoskeletal: She exhibits edema.   Neurological: She is alert and oriented to person, place, and time.   Skin: She is not diaphoretic.   Psychiatric: She has a normal mood and affect.   Nursing note and vitals reviewed.      Labs:        Invalid input(s): [id number]      Recent Labs      07/09/18   0222  07/10/18   0217  07/11/18   0909   SODIUM  132*  131*  133*   POTASSIUM  4.0  3.5*  2.9*   CHLORIDE  97  96  98   CO2  25  23  27   BUN  53*  57*  43*   CREATININE  3.35*  2.76*  1.87*   CALCIUM  8.4*  8.0*  8.6     Recent Labs      07/09/18   0222  07/10/18   0217  07/11/18   0909   ALTSGPT  5   --    --    ASTSGOT  11*   --    --    ALKPHOSPHAT  97   --    --    TBILIRUBIN  0.7   --    --    GLUCOSE  113*  89  131*      Recent Labs      18   0222  07/10/18   0217  18   0321  18   0909   RBC  2.91*   --    --    --    HEMOGLOBIN  8.2*   --    --    --    HEMATOCRIT  26.0*   --    --    --    PLATELETCT  139*   --    --    --    PROTHROMBTM  18.5*  19.7*  19.1*   --    INR  1.58*  1.70*  1.64*   --    IRON   --    --    --   24*   TOTIRONBC   --    --    --   332     Recent Labs      18   0222   WBC  2.6*   NEUTSPOLYS  62.80   LYMPHOCYTES  25.20   MONOCYTES  9.30   EOSINOPHILS  0.00   BASOPHILS  1.90*   ASTSGOT  11*   ALTSGPT  5   ALKPHOSPHAT  97   TBILIRUBIN  0.7           Hemodynamics:  Temp (24hrs), Av.7 °C (98 °F), Min:36.3 °C (97.3 °F), Max:37 °C (98.6 °F)  Temperature: 36.5 °C (97.7 °F)  Pulse  Av.8  Min: 56  Max: 91   Blood Pressure : 125/62     Respiratory:    Respiration: 16, Pulse Oximetry: 96 %     Work Of Breathing / Effort: Mild  RUL Breath Sounds: Clear, RML Breath Sounds: Clear, RLL Breath Sounds: Clear, ASHTYN Breath Sounds: Clear, LLL Breath Sounds: Clear  Fluids:    Intake/Output Summary (Last 24 hours) at 18 1521  Last data filed at 18 1200   Gross per 24 hour   Intake                0 ml   Output              553 ml   Net             -553 ml     Weight: 63.1 kg (139 lb 1.8 oz)  GI/Nutrition:  Orders Placed This Encounter   Procedures   • Diet Order 2 Gram Sodium, Cardiac     Standing Status:   Standing     Number of Occurrences:   1     Order Specific Question:   Diet:     Answer:   2 Gram Sodium [7]     Order Specific Question:   Diet:     Answer:   Cardiac [6]     Medical Decision Making, by Problem:  Active Hospital Problems    Diagnosis   • *Acute on chronic renal failure (HCC) [N17.9, N18.9]   • Elevated troponin [R74.8]   • S/P coronary angioplasty [Z98.61]   • Normocytic anemia [D64.9]   • Moderate to severe pulmonary hypertension (HCC) [I27.20]   • Pleural effusion [J90]   • GERD (gastroesophageal reflux disease) [K21.9]   • COPD (chronic obstructive pulmonary  disease) (HCC) [J44.9]   • Secondary hypertension [I15.9]   • Chronic congestive heart failure (HCC) NO HEART FAILURE EXACERBATION DURING THIS HOSPITAL STAY [I50.9]   • Chronic atrial fibrillation (HCC) [I48.2]   • Warfarin anticoagulation [Z79.01]       Quality-Core Measures   Reviewed items::  Labs reviewed, Medications reviewed and Radiology images reviewed    1 MARICRUZ on CKD: sec to BETSY, cr improving.  2 volume overload  3 Anemia  4 Hyponatremia  Plan  no acute need for HD  f/u as outpt  Renal diet  Renal dose all meds  Avoid nephrotoxins  Prognosis guarded.  D/W Dr Ulloa

## 2018-07-11 NOTE — PROGRESS NOTES
Cardiology Progress Note               Author: Trinity To Date & Time created: 7/11/2018  12:04 PM     Interval History:  No acute events overnight.  No telemetry events.    Negative 0.8 liters over the last 24 hours.  Negative 3.7 liters during hospital stay.      Chief Complaint:  Palpitation, atrial fibrillation, CAD s/p stents.    Review of Systems   Constitutional: Negative for chills, fever, malaise/fatigue and weight loss.   HENT: Negative for ear discharge, ear pain, hearing loss and nosebleeds.    Eyes: Negative for blurred vision, double vision, pain and discharge.   Respiratory: Negative for cough and shortness of breath.    Cardiovascular: Negative for chest pain, palpitations, orthopnea, claudication, leg swelling and PND.   Gastrointestinal: Negative for abdominal pain, blood in stool, melena, nausea and vomiting.   Genitourinary: Negative for dysuria and hematuria.   Musculoskeletal: Negative for falls, joint pain and myalgias.   Skin: Negative for itching and rash.   Neurological: Negative for dizziness, sensory change, speech change, loss of consciousness and headaches.   Endo/Heme/Allergies: Negative for environmental allergies. Does not bruise/bleed easily.   Psychiatric/Behavioral: Negative for depression, hallucinations and suicidal ideas.       Physical Exam   Constitutional: She is oriented to person, place, and time. She appears well-developed and well-nourished. No distress.   HENT:   Head: Normocephalic and atraumatic.   Eyes: EOM are normal. Right eye exhibits no discharge. Left eye exhibits no discharge.   Neck: No JVD present.   Cardiovascular: Normal rate, regular rhythm, normal heart sounds and intact distal pulses.  Exam reveals no gallop and no friction rub.    No murmur heard.  There is presence of an irregularly irregular heartbeats.     Pulmonary/Chest: No respiratory distress. She has no wheezes. She has no rales. She exhibits no tenderness.   Abdominal: She exhibits no  distension. There is no tenderness. There is no rebound and no guarding.   Musculoskeletal: She exhibits no edema or tenderness.   Lymphadenopathy:     She has no cervical adenopathy.   Neurological: She is alert and oriented to person, place, and time.   Skin: Skin is warm and dry.   Psychiatric: She has a normal mood and affect.   Nursing note and vitals reviewed.      Hemodynamics:  Temp (24hrs), Av.7 °C (98.1 °F), Min:36.3 °C (97.3 °F), Max:37 °C (98.6 °F)  Temperature: 36.3 °C (97.3 °F)  Pulse  Av.4  Min: 56  Max: 91   Blood Pressure : 100/57     Respiratory:    Respiration: 18, Pulse Oximetry: 95 %     Work Of Breathing / Effort: Mild  RUL Breath Sounds: Clear, RML Breath Sounds: Clear;Diminished, RLL Breath Sounds: Clear;Diminished, ASHTYN Breath Sounds: Clear, LLL Breath Sounds: Clear;Diminished  Fluids:     Weight: 63.1 kg (139 lb 1.8 oz)  GI/Nutrition:  Orders Placed This Encounter   Procedures   • Diet Order 2 Gram Sodium, Cardiac     Standing Status:   Standing     Number of Occurrences:   1     Order Specific Question:   Diet:     Answer:   2 Gram Sodium [7]     Order Specific Question:   Diet:     Answer:   Cardiac [6]     Lab Results:  Recent Labs      18   WBC  2.6*   RBC  2.91*   HEMOGLOBIN  8.2*   HEMATOCRIT  26.0*   MCV  89.3   MCH  28.2   MCHC  31.5*   RDW  54.2*   PLATELETCT  139*   MPV  9.8     Recent Labs      07/09/18   0222  07/10/18   0217   SODIUM  132*  131*   POTASSIUM  4.0  3.5*   CHLORIDE  97  96   CO2  25  23   GLUCOSE  113*  89   BUN  53*  57*   CREATININE  3.35*  2.76*   CALCIUM  8.4*  8.0*     Recent Labs      07/09/18   0222  07/10/18   02118   0321   INR  1.58*  1.70*  1.64*             Recent Labs      18   TRIGLYCERIDE  50   HDL  40   LDL  17         Medical Decision Making, by Problem:  Active Hospital Problems    Diagnosis   • *Acute on chronic renal failure (HCC) [N17.9, N18.9]   • Elevated troponin [R74.8]   • S/P coronary  angioplasty [Z98.61]   • Hyponatremia [E87.1]   • Normocytic anemia [D64.9]   • Moderate to severe pulmonary hypertension (HCC) [I27.20]   • Pleural effusion [J90]   • GERD (gastroesophageal reflux disease) [K21.9]   • COPD (chronic obstructive pulmonary disease) (HCC) [J44.9]   • Secondary hypertension [I15.9]   • Chronic diastolic congestive heart failure (HCC) [I50.32]   • Cardiac pacemaker in situ [Z95.0]   • Chronic atrial fibrillation (HCC) [I48.2]   • Warfarin anticoagulation [Z79.01]       Plan:    Renal function is improving.  Cont current medications at current dose.   Continue to hold diuretics for now.  No spironolactone.  Continue dual antiplatelet therapy. Start Rosuvastatin 20 mg po qhs.    Will sign off at this time, please call us with further questions.  Patient will be followed in the outpatient cardiology clinic for further cardiac care.    Thank you for referring this patient to our cardiology service.    Trinity Sharp MD.  [facility].      Quality-Core Measures

## 2018-07-11 NOTE — PROGRESS NOTES
Cardiology Progress Note               Author: Didi Pleitez Date & Time created: 7/11/2018  8:08 AM     Interval History:  85 years old male present with weight gain and lower extremity edema.  HX: hypertension, hyperlipidemia, atrial fibrillation and history of cardiomyopathy with normal ef. Had PCI to proximal and mid LAD. RVSP 60 mmHg on 7/3/18.     Chief Complaint:  Worsening edema    Cardiology consult: cardiomyopathy and worsening renal function    7/9: swelling has improved, denies any chest pain or BERTRAND. She is sitting up in the edge of the bed, talking in full sentence.   7/10: Patient lying in bed in no distress. She has some dyspnea with ambulation. She denies CP and palpitations.   7/11: patient is doing well, swelling has resolved. Breathing has improved. Vital sign is stable     Paced overnight       Echocardiography Laboratory  7/8/18  Prior done 05-.  Compared to prior study, the right-sided   chambers appear dilated.    Left ventricular systolic function is low normal.  Left ventricular ejection fraction is visually estimated to be 50-55%.  Abnormal septal motion consistent with right ventricular (RV) volume overload and/or elevated RV end-diastolic pressure.  Dilated right ventricle with reduced systolic function.  Severe tricuspid regurgitation. Estimated right ventricular systolic   pressure  is 55 mmHg.    Review of Systems   Constitutional: Negative for malaise/fatigue.   Respiratory: Negative for cough, hemoptysis, sputum production, shortness of breath and wheezing.    Cardiovascular: Negative for chest pain, palpitations, orthopnea, claudication, leg swelling and PND.   Gastrointestinal: Negative for nausea and vomiting.   Neurological: Negative for dizziness and weakness.       Physical Exam   Constitutional: She is oriented to person, place, and time. She appears well-developed and well-nourished. No distress.   HENT:   Head: Normocephalic.   Neck: Normal range of motion. No JVD  present.   Cardiovascular: Normal rate, regular rhythm and intact distal pulses.    Murmur heard.  Pulmonary/Chest: Effort normal and breath sounds normal. No respiratory distress. She has no wheezes.   Abdominal: She exhibits no distension. There is no tenderness.   Musculoskeletal: She exhibits edema. She exhibits no tenderness.   Neurological: She is alert and oriented to person, place, and time.   Skin: Skin is warm and dry. No rash noted. She is not diaphoretic.   Psychiatric: Her behavior is normal. Judgment normal.   Nursing note and vitals reviewed.      Hemodynamics:  Temp (24hrs), Av.8 °C (98.2 °F), Min:36.3 °C (97.3 °F), Max:37 °C (98.6 °F)  Temperature: 36.3 °C (97.3 °F)  Pulse  Av.4  Min: 56  Max: 91   Blood Pressure : 100/57     Respiratory:    Respiration: 18, Pulse Oximetry: 95 %     Work Of Breathing / Effort: Mild  RUL Breath Sounds: Clear, RML Breath Sounds: Clear;Diminished, RLL Breath Sounds: Clear;Diminished, ASHTYN Breath Sounds: Clear, LLL Breath Sounds: Clear;Diminished  Fluids:     Weight: 63.1 kg (139 lb 1.8 oz)  GI/Nutrition:  Orders Placed This Encounter   Procedures   • Diet Order 2 Gram Sodium, Cardiac     Standing Status:   Standing     Number of Occurrences:   1     Order Specific Question:   Diet:     Answer:   2 Gram Sodium [7]     Order Specific Question:   Diet:     Answer:   Cardiac [6]     Lab Results:  Recent Labs      18   WBC  2.6*   RBC  2.91*   HEMOGLOBIN  8.2*   HEMATOCRIT  26.0*   MCV  89.3   MCH  28.2   MCHC  31.5*   RDW  54.2*   PLATELETCT  139*   MPV  9.8     Recent Labs      18   1139  07/09/18   0222  07/10/18   0217   SODIUM  129*  132*  131*   POTASSIUM  4.9  4.0  3.5*   CHLORIDE  95*  97  96   CO2  24  25  23   GLUCOSE  95  113*  89   BUN  53*  53*  57*   CREATININE  3.48*  3.35*  2.76*   CALCIUM  8.8  8.4*  8.0*     Recent Labs      18   0222  07/10/18   0217  18   0321   INR  1.58*  1.70*  1.64*             Recent Labs       07/09/18   0222   TRIGLYCERIDE  50   HDL  40   LDL  17         Medical Decision Making, by Problem:  Active Hospital Problems    Diagnosis   • *Acute on chronic renal failure (HCC) [N17.9, N18.9]   • Elevated troponin [R74.8]   • S/P coronary angioplasty [Z98.61]   • Normocytic anemia [D64.9]   • Moderate to severe pulmonary hypertension (HCC) [I27.20]   • Pleural effusion [J90]   • GERD (gastroesophageal reflux disease) [K21.9]   • COPD (chronic obstructive pulmonary disease) (HCC) [J44.9]   • Secondary hypertension [I15.9]   • Congestive heart failure (CHF) (HCC) [I50.9]   • Chronic atrial fibrillation (HCC) [I48.2]   • Warfarin anticoagulation [Z79.01]       Plan:  1. Acute kidney injury:  - nephrology consulted, most likely due to contract induced nephropathy  - creatinine 2.76 improving GFR 16    2. CAD:  - s/p PCI   - chest pain free   - continue DAPT therapy (asa and plavix)  - continue metoprolol XL 25mg qd       3. Pulmonary hypertension:  - ECHO: ef 50-55% and RVSP 55mHg  - weight down 2lbs  - I/O : -800 in 24 hrs     4. Severe tricuspid regurgitation:  - most likely related to pulmonary hypertension    Cardiology will sign off, [facility] as outpatient prior to follow up appt with Dr. Simms.       Future Appointments  Date Time Provider Department Center   7/11/2018 1:30 PM Neela Awad M.D. [facility] [address]   7/13/2018 9:15 AM V EXAM 4 [facility] None   7/20/2018 9:15 AM Jose Simms M.D. [facility] None   7/23/2018 10:20 AM ELIZABETH Patel [facility] [place]   10/31/2018 9:45 AM PULMONARY DX 1 [facility] [address]   10/31/2018 10:00 AM PFT-RM1 PULM None   10/31/2018 11:00 AM A Rotation PULM None   11/27/2018 10:00 AM PACER CHECK-CAM B [facility] None         Quality-Core Measures   Reviewed items::  EKG reviewed, Labs reviewed and Medications reviewed

## 2018-07-11 NOTE — DISCHARGE INSTRUCTIONS
Discharge Instructions    Discharged to home by car with relative. Discharged via wheelchair, hospital escort: Yes.  Special equipment needed: Not Applicable    Be sure to schedule a follow-up appointment with your primary care doctor or any specialists as instructed.     Discharge Plan:        I understand that a diet low in cholesterol, fat, and sodium is recommended for good health. Unless I have been given specific instructions below for another diet, I accept this instruction as my diet prescription.   Other diet: as tolerated    Special Instructions: None    · Is patient discharged on Warfarin / Coumadin?   Yes    You are receiving the drug warfarin. Please understand the importance of monitoring warfarin with scheduled PT/INR blood draws.  Follow-up with a call to your personal Doctor's office in 3 days to schedule a PT/INR. .    IMPORTANT: HOW TO USE THIS INFORMATION:  This is a summary and does NOT have all possible information about this product. This information does not assure that this product is safe, effective, or appropriate for you. This information is not individual medical advice and does not substitute for the advice of your health care professional. Always ask your health care professional for complete information about this product and your specific health needs.      WARFARIN - ORAL (WARF-uh-rin)      COMMON BRAND NAME(S): Coumadin      WARNING:  Warfarin can cause very serious (possibly fatal) bleeding. This is more likely to occur when you first start taking this medication or if you take too much warfarin. To decrease your risk for bleeding, your doctor or other health care provider will monitor you closely and check your lab results (INR test) to make sure you are not taking too much warfarin. Keep all medical and laboratory appointments. Tell your doctor right away if you notice any signs of serious bleeding. See also Side Effects section.      USES:  This medication is used to treat  "blood clots (such as in deep vein thrombosis-DVT or pulmonary embolus-PE) and/or to prevent new clots from forming in your body. Preventing harmful blood clots helps to reduce the risk of a stroke or heart attack. Conditions that increase your risk of developing blood clots include a certain type of irregular heart rhythm (atrial fibrillation), heart valve replacement, recent heart attack, and certain surgeries (such as hip/knee replacement). Warfarin is commonly called a \"blood thinner,\" but the more correct term is \"anticoagulant.\" It helps to keep blood flowing smoothly in your body by decreasing the amount of certain substances (clotting proteins) in your blood.      HOW TO USE:  Read the Medication Guide provided by your pharmacist before you start taking warfarin and each time you get a refill. If you have any questions, ask your doctor or pharmacist. Take this medication by mouth with or without food as directed by your doctor or other health care professional, usually once a day. It is very important to take it exactly as directed. Do not increase the dose, take it more frequently, or stop using it unless directed by your doctor. Dosage is based on your medical condition, laboratory tests (such as INR), and response to treatment. Your doctor or other health care provider will monitor you closely while you are taking this medication to determine the right dose for you. Use this medication regularly to get the most benefit from it. To help you remember, take it at the same time each day. It is important to eat a balanced, consistent diet while taking warfarin. Some foods can affect how warfarin works in your body and may affect your treatment and dose. Avoid sudden large increases or decreases in your intake of foods high in vitamin K (such as broccoli, cauliflower, cabbage, brussels sprouts, kale, spinach, and other green leafy vegetables, liver, green tea, certain vitamin supplements). If you are trying to " lose weight, check with your doctor before you try to go on a diet. Cranberry products may also affect how your warfarin works. Limit the amount of cranberry juice (16 ounces/480 milliliters a day) or other cranberry products you may drink or eat.      SIDE EFFECTS:  Nausea, loss of appetite, or stomach/abdominal pain may occur. If any of these effects persist or worsen, tell your doctor or pharmacist promptly. Remember that your doctor has prescribed this medication because he or she has judged that the benefit to you is greater than the risk of side effects. Many people using this medication do not have serious side effects. This medication can cause serious bleeding if it affects your blood clotting proteins too much (shown by unusually high INR lab results). Even if your doctor stops your medication, this risk of bleeding can continue for up to a week. Tell your doctor right away if you have any signs of serious bleeding, including: unusual pain/swelling/discomfort, unusual/easy bruising, prolonged bleeding from cuts or gums, persistent/frequent nosebleeds, unusually heavy/prolonged menstrual flow, pink/dark urine, coughing up blood, vomit that is bloody or looks like coffee grounds, severe headache, dizziness/fainting, unusual or persistent tiredness/weakness, bloody/black/tarry stools, chest pain, shortness of breath, difficulty swallowing. Tell your doctor right away if any of these unlikely but serious side effects occur: persistent nausea/vomiting, severe stomach/abdominal pain, yellowing eyes/skin. This drug rarely has caused very serious (possibly fatal) problems if its effects lead to small blood clots (usually at the beginning of treatment). This can lead to severe skin/tissue damage that may require surgery or amputation if left untreated. Patients with certain blood conditions (protein C or S deficiency) may be at greater risk. Get medical help right away if any of these rare but serious side effects  occur: painful/red/purplish patches on the skin (such as on the toe, breast, abdomen), change in the amount of urine, vision changes, confusion, slurred speech, weakness on one side of the body. A very serious allergic reaction to this drug is rare. However, get medical help right away if you notice any symptoms of a serious allergic reaction, including: rash, itching/swelling (especially of the face/tongue/throat), severe dizziness, trouble breathing. This is not a complete list of possible side effects. If you notice other effects not listed above, contact your doctor or pharmacist. In the [place] - Call your doctor for medical advice about side effects. You may report side effects to FDA at 2-733-ZPA-4704. In [place] - Call your doctor for medical advice about side effects. You may report side effects to [facility] [place] at 1-901.167.6956.      PRECAUTIONS:  Before taking warfarin, tell your doctor or pharmacist if you are allergic to it; or if you have any other allergies. This product may contain inactive ingredients, which can cause allergic reactions or other problems. Talk to your pharmacist for more details. Before using this medication, tell your doctor or pharmacist your medical history, especially of: blood disorders (such as anemia, hemophilia), bleeding problems (such as bleeding of the stomach/intestines, bleeding in the brain), blood vessel disorders (such as aneurysms), recent major injury/surgery, liver disease, alcohol use, mental/mood disorders (including memory problems), frequent falls/injuries. It is important that all your doctors and dentists know that you take warfarin. Before having surgery or any medical/dental procedures, tell your doctor or dentist that you are taking this medication and about all the products you use (including prescription drugs, nonprescription drugs, and herbal products). Avoid getting injections into the muscles. If you must have an injection into a muscle (for example, a  flu shot), it should be given in the arm. This way, it will be easier to check for bleeding and/or apply pressure bandages. This medication may cause stomach bleeding. Daily use of alcohol while using this medicine will increase your risk for stomach bleeding and may also affect how this medication works. Limit or avoid alcoholic beverages. If you have not been eating well, if you have an illness or infection that causes fever, vomiting, or diarrhea for more than 2 days, or if you start using any antibiotic medications, contact your doctor or pharmacist immediately because these conditions can affect how warfarin works. This medication can cause heavy bleeding. To lower the chance of getting cut, bruised, or injured, use great caution with sharp objects like safety razors and nail cutters. Use an electric razor when shaving and a soft toothbrush when brushing your teeth. Avoid activities such as contact sports. If you fall or injure yourself, especially if you hit your head, call your doctor immediately. Your doctor may need to check you. The Food & Drug Administration has stated that generic warfarin products are interchangeable. However, consult your doctor or pharmacist before switching warfarin products. Be careful not to take more than one medication that contains warfarin unless specifically directed by the doctor or health care provider who is monitoring your warfarin treatment. Older adults may be at greater risk for bleeding while using this drug. This medication is not recommended for use during pregnancy because of serious (possibly fatal) harm to an unborn baby. Discuss the use of reliable forms of birth control with your doctor. If you become pregnant or think you may be pregnant, tell your doctor immediately. If you are planning pregnancy, discuss a plan for managing your condition with your doctor before you become pregnant. Your doctor may switch the type of medication you use during pregnancy. Very  "small amounts of this medication may pass into breast milk but is unlikely to harm a nursing infant. Consult your doctor before breast-feeding.      DRUG INTERACTIONS:  Drug interactions may change how your medications work or increase your risk for serious side effects. This document does not contain all possible drug interactions. Keep a list of all the products you use (including prescription/nonprescription drugs and herbal products) and share it with your doctor and pharmacist. Do not start, stop, or change the dosage of any medicines without your doctor's approval. Warfarin interacts with many prescription, nonprescription, vitamin, and herbal products. This includes medications that are applied to the skin or inside the vagina or rectum. The interactions with warfarin usually result in an increase or decrease in the \"blood-thinning\" (anticoagulant) effect. Your doctor or other health care professional should closely monitor you to prevent serious bleeding or clotting problems. While taking warfarin, it is very important to tell your doctor or pharmacist of any changes in medications, vitamins, or herbal products that you are taking. Some products that may interact with this drug include: capecitabine, imatinib, mifepristone. Aspirin, aspirin-like drugs (salicylates), and nonsteroidal anti-inflammatory drugs (NSAIDs such as ibuprofen, naproxen, celecoxib) may have effects similar to warfarin. These drugs may increase the risk of bleeding problems if taken during treatment with warfarin. Carefully check all prescription/nonprescription product labels (including drugs applied to the skin such as pain-relieving creams) since the products may contain NSAIDs or salicylates. Talk to your doctor about using a different medication (such as acetaminophen) to treat pain/fever. Low-dose aspirin and related drugs (such as clopidogrel, ticlopidine) should be continued if prescribed by your doctor for specific medical " reasons such as heart attack or stroke prevention. Consult your doctor or pharmacist for more details. Many herbal products interact with warfarin. Tell your doctor before taking any herbal products, especially bromelains, coenzyme Q10, cranberry, danshen, dong quai, fenugreek, garlic, ginkgo biloba, ginseng, and Diego's wort, among others. This medication may interfere with a certain laboratory test to measure theophylline levels, possibly causing false test results. Make sure laboratory personnel and all your doctors know you use this drug.      OVERDOSE:  If overdose is suspected, contact a poison control center or emergency room immediately. [place] residents can call the [place] National Poison Hotline at 1-455.468.4809. [place] residents can call a provincial poison control center. Symptoms of overdose may include: bloody/black/tarry stools, pink/dark urine, unusual/prolonged bleeding.      NOTES:  Do not share this medication with others. Laboratory and/or medical tests (such as INR, complete blood count) must be performed periodically to monitor your progress or check for side effects. Consult your doctor for more details.      MISSED DOSE:  For the best possible benefit, do not miss any doses. If you do miss a dose and remember on the same day, take it as soon as you remember. If you remember on the next day, skip the missed dose and resume your usual dosing schedule. Do not double the dose to catch up because this could increase your risk for bleeding. Keep a record of missed doses to give to your doctor or pharmacist. Contact your doctor or pharmacist if you miss 2 or more doses in a row.      STORAGE:  Store at room temperature away from light and moisture. Do not store in the bathroom. Keep all medications away from children and pets. Do not flush medications down the toilet or pour them into a drain unless instructed to do so. Properly discard this product when it is  or no longer needed. Consult your  pharmacist or local waste disposal company for more details about how to safely discard your product.      MEDICAL ALERT:  Your condition and medication can cause complications in a medical emergency. For information about enrolling in MedicAlert, call 1-803.960.5146 ([place]) or 1-958.606.5766 ([place]).      Information last revised October 2010 Copyright(c) 2010 [employer]               Depression / Suicide Risk    As you are discharged from this [facility], it is important to learn how to keep safe from harming yourself.    Recognize the warning signs:  · Abrupt changes in personality, positive or negative- including increase in energy   · Giving away possessions  · Change in eating patterns- significant weight changes-  positive or negative  · Change in sleeping patterns- unable to sleep or sleeping all the time   · Unwillingness or inability to communicate  · Depression  · Unusual sadness, discouragement and loneliness  · Talk of wanting to die  · Neglect of personal appearance   · Rebelliousness- reckless behavior  · Withdrawal from people/activities they love  · Confusion- inability to concentrate     If you or a loved one observes any of these behaviors or has concerns about self-harm, here's what you can do:  · Talk about it- your feelings and reasons for harming yourself  · Remove any means that you might use to hurt yourself (examples: pills, rope, extension cords, firearm)  · Get professional help from the community (Mental Health, Substance Abuse, psychological counseling)  · Do not be alone:Call your Safe Contact- someone whom you trust who will be there for you.  · Call your local CRISIS HOTLINE 846-3424 or 886-164-4365  · Call your local Children's Mobile Crisis Response Team [place] (178) 468-1273 or www.[employer]  · Call the toll free National Suicide Prevention Hotlines   · National Suicide Prevention Lifeline 445-849-WVFS (2425)  · National [employer] [place] Line Network  800-SUICIDE (061-0030)

## 2018-07-11 NOTE — DISCHARGE SUMMARY
Discharge Summary    CHIEF COMPLAINT ON ADMISSION  Chief Complaint   Patient presents with   • Post-Op Complications     Pt. was discharged Thursday for a heart cath with two stents placed through her right femoral. Pt. and pt's family pt. has been gaining water weight since that time with no results with Lasix use. Pt. states increasing SOB but no s/s of diff breathing noted in triage. Per pt's family pt. has been low on potassium and sodium electrolytes as well.       Reason for Admission  Post Op Complications; Extremity edema    Admission Date  7/7/2018    CODE STATUS  Full Code    HPI & HOSPITAL COURSE  This is a 86 y.o. female admitted 7/7/2018 with fluid retention and mehdi.  Had BRITTON placed proximal and mid LAD on 7/3/18.  H/o afib on coumadin.  She has known CKD, but likely developed contrast induced nephropathy from recent PCI on 7/3.  She has been on lasix 20 mg bid, K and spironolactone 12.5 mg daily at baseline for right sided heart failure, severe tricuspid regurgitation and severe pulmonary hypertension. Her spironolactone and lasix were held.  No HD given.  Her renal function improved daily.  Her INR at dc was 1.64.  She was found to have iron deficiency anemia with iron level 24, started on replacement.  Due to recent BRITTON she will need to continue ASA, plavix and coumadin for afib, but discontinue ASA after 4 weeks.  Her lower extremity edema was decreased at time of discharge, lungs CTA b/l and GFR improved to 26.  Due to her rt sided heart failure she will need to continue a lower dose of lasix at 20mg daily, no spironolactone.  Her K was replaced with 40meq x1 prior to dc.   HH PT/OT resumed at discharge.  She was very anxious to go home with family.  No chest pain experienced during this hospitalization.       Therefore, she is discharged in good and stable condition to home with close outpatient follow-up.    The patient met 2-midnight criteria for an inpatient stay at the time of  discharge.    Discharge Date  7/11/18    FOLLOW UP ITEMS POST DISCHARGE  Follow up Dr. Sharp in 2 weeks to recheck on fluid level, right sided HF, pulmonary hypertension.  Follow up Dr. Najjar regarding acute on chronic renal failure.      DISCHARGE DIAGNOSES  Principal Problem:    Acute on chronic renal failure (HCC) POA: Yes  Active Problems:    Chronic atrial fibrillation (HCC) POA: Yes      Overview: Overview:       History of AF, Coumadin initiated 03/2015.              Last Assessment & Plan:       Chronic atrial fibrillation for which patient is asymptomatic.  Rate is       well controlled and she remained therapeutically anticoagulated with       warfarin as monitored by the [facility] anticoagulation clinic.    Secondary hypertension POA: Yes    Cardiac pacemaker in situ POA: Yes      Overview: Overview:       [employer]tronic Sensia [id number].  Single chamber pacemaker.  Implanted 07/25/2011.        Dr Decker.  [place] landline, no longer using [facility].              Last Assessment & Plan:       Device evaluation shows that device is functioning appropriately.      Estimated remaining longevity is 2.5 - 4 years       Recheck in 3 months via home monitor       Pacemaker dependent for surgical purposes, underlying AF with rates in the       30's     Chronic diastolic congestive heart failure (HCC) POA: Yes    COPD (chronic obstructive pulmonary disease) (HCC) POA: Yes      Overview: Overview:       PFT 2017            Last Assessment & Plan:       Mixed obstructive and restrictive defect per spirometry with severe       reduction in diffusion capacity per PFTs 2/2017.  Continue routine       follow-up with PCP in pulmonology as indicated.    GERD (gastroesophageal reflux disease) POA: Yes      Overview: Overview:       (with diaphragmatic hernia)            Last Assessment & Plan:       On omeprazole daily.    Warfarin anticoagulation POA: Yes      Overview: Last Assessment & Plan:       Noted    Pleural effusion POA: Yes     Moderate to severe pulmonary hypertension (HCC) POA: Yes    Iron deficiency anemia POA: Yes    Hyponatremia POA: Yes    S/P coronary angioplasty POA: Yes    Severe tricuspid regurgitation POA: Unknown  Resolved Problems:    Elevated troponin POA: Yes      FOLLOW UP  Future Appointments  Date Time Provider Department Center   7/13/2018 9:15 AM [facility] EXAM 4 VMED None   7/20/2018 9:15 AM Jose Simms M.D. [facility] None   7/23/2018 10:20 AM ELIZABETH Patel [facility] [place]   10/31/2018 9:45 AM PULMONARY DX 1 IP[facility] [address]   10/31/2018 10:00 AM PFT-RM1 PULM None   10/31/2018 11:00 AM A Rotation PULM None   11/27/2018 10:00 AM PACER CHECK-CAM B [facility] None     No follow-up provider specified.    MEDICATIONS ON DISCHARGE     Medication List      START taking these medications      Instructions   ascorbic acid 500 MG tablet  Commonly known as:  VITAMIN C   Take 1 Tab by mouth every day.  Dose:  500 mg     ferrous sulfate 325 (65 Fe) MG tablet   Take 1 Tab by mouth every day.  Dose:  325 mg     rosuvastatin 10 MG Tabs  Commonly known as:  CRESTOR   Take 1 Tab by mouth every evening.  Dose:  10 mg        CHANGE how you take these medications      Instructions   furosemide 20 MG Tabs  What changed:  when to take this  Commonly known as:  LASIX   Take 1 Tab by mouth every day.  Dose:  20 mg        CONTINUE taking these medications      Instructions   acetaminophen 500 MG Tabs  Commonly known as:  TYLENOL   Take 1,000 mg by mouth 1 time daily as needed for Mild Pain.  Dose:  1000 mg     albuterol 108 (90 Base) MCG/ACT Aers inhalation aerosol   Inhale 2 Puffs by mouth every 6 hours as needed for Shortness of Breath.  Dose:  2 Puff     allopurinol 300 MG Tabs  Commonly known as:  ZYLOPRIM   Take 1 Tab by mouth every evening.  Dose:  300 mg     aspirin 81 MG EC tablet  Start taking on:  7/12/2018   Take 1 Tab by mouth every day for 28 days.  Dose:  81 mg     Calcium-Vitamin D 500-125 MG-UNIT Tabs   Take 1 tablet by mouth  every evening.  Dose:  1 tablet     clopidogrel 75 MG Tabs  Commonly known as:  PLAVIX   Take 1 Tab by mouth every day.  Dose:  75 mg     gabapentin 300 MG Caps  Commonly known as:  NEURONTIN   Take 600 mg by mouth every bedtime.  Dose:  600 mg     levothyroxine 50 MCG Tabs  Commonly known as:  SYNTHROID   Take 1 Tab by mouth Every morning on an empty stomach.  Dose:  50 mcg     metoprolol SR 25 MG Tb24  Commonly known as:  TOPROL XL   Take 25 mg by mouth every morning.  Dose:  25 mg     omeprazole 20 MG delayed-release capsule  Commonly known as:  PRILOSEC   Take 20 mg by mouth every morning.  Dose:  20 mg     potassium chloride SA 10 MEQ Tbcr  Commonly known as:  K-DUR   Take 1 Tab by mouth every evening.  Dose:  10 mEq     sertraline 50 MG Tabs  Commonly known as:  ZOLOFT   Take 25 mg by mouth every bedtime.  Dose:  25 mg     sodium chloride 1 GM Tabs  Commonly known as:  SALT   Take 1 Tab by mouth 3 times a day, with meals.  Dose:  1 g     topiramate 25 MG Tabs  Commonly known as:  TOPAMAX   Take 1 Tab by mouth 2 times a day.  Dose:  25 mg     warfarin 2.5 MG Tabs  Commonly known as:  COUMADIN   Take 2.5-3.75 mg by mouth every evening. 3.75mg on Tuesday/Thursday, 2.5mg on all other days  Dose:  2.5-3.75 mg        STOP taking these medications    spironolactone 25 MG Tabs  Commonly known as:  ALDACTONE            Allergies  Allergies   Allergen Reactions   • Codeine Vomiting       DIET  Orders Placed This Encounter   Procedures   • Diet Order 2 Gram Sodium, Cardiac     Standing Status:   Standing     Number of Occurrences:   1     Order Specific Question:   Diet:     Answer:   2 Gram Sodium [7]     Order Specific Question:   Diet:     Answer:   Cardiac [6]       ACTIVITY  As tolerated.  Weight bearing as tolerated    CONSULTATIONS  Dr. Najjar Dr. To    PROCEDURES  Transthoracic  Echo Report      Echocardiography Laboratory    CONCLUSIONS  Prior done 05-.  Compared to prior study, the right-sided    chambers appear dilated.    Left ventricular systolic function is low normal.  Left ventricular ejection fraction is visually estimated to be 50-55%.  Abnormal septal motion consistent with right ventricular (RV) volume   overload and/or elevated RV end-diastolic pressure.  Dilated right ventricle with reduced systolic function.  Severe tricuspid regurgitation. Estimated right ventricular systolic   pressure  is 55 mmHg.  Dilated inferior vena cava without inspiratory collapse.    JENIFFER BENEDICT  Exam Date:         07/08/2018                      14:04    LABORATORY  Lab Results   Component Value Date    SODIUM 133 (L) 07/11/2018    POTASSIUM 2.9 (L) 07/11/2018    CHLORIDE 98 07/11/2018    CO2 27 07/11/2018    GLUCOSE 131 (H) 07/11/2018    BUN 43 (H) 07/11/2018    CREATININE 1.87 (H) 07/11/2018        Lab Results   Component Value Date    WBC 2.6 (L) 07/09/2018    HEMOGLOBIN 8.2 (L) 07/09/2018    HEMATOCRIT 26.0 (L) 07/09/2018    PLATELETCT 139 (L) 07/09/2018        Total time of the discharge process exceeds 50 minutes.

## 2018-07-12 ENCOUNTER — TELEPHONE (OUTPATIENT)
Dept: VASCULAR LAB | Facility: MEDICAL CENTER | Age: 83
End: 2018-07-12

## 2018-07-12 ENCOUNTER — PATIENT OUTREACH (OUTPATIENT)
Dept: HEALTH INFORMATION MANAGEMENT | Facility: OTHER | Age: 83
End: 2018-07-12

## 2018-07-12 DIAGNOSIS — I48.20 CHRONIC ATRIAL FIBRILLATION (HCC): ICD-10-CM

## 2018-07-12 DIAGNOSIS — I48.91 ATRIAL FIBRILLATION, UNSPECIFIED TYPE (HCC): ICD-10-CM

## 2018-07-13 ENCOUNTER — TELEPHONE (OUTPATIENT)
Dept: HEALTH INFORMATION MANAGEMENT | Facility: OTHER | Age: 83
End: 2018-07-13

## 2018-07-13 ENCOUNTER — ANTICOAGULATION MONITORING (OUTPATIENT)
Dept: VASCULAR LAB | Facility: MEDICAL CENTER | Age: 83
End: 2018-07-13

## 2018-07-13 ENCOUNTER — HOME CARE VISIT (OUTPATIENT)
Dept: HOME HEALTH SERVICES | Facility: HOME HEALTHCARE | Age: 83
End: 2018-07-13
Payer: MEDICARE

## 2018-07-13 VITALS
SYSTOLIC BLOOD PRESSURE: 110 MMHG | DIASTOLIC BLOOD PRESSURE: 60 MMHG | HEIGHT: 64 IN | TEMPERATURE: 98.4 F | RESPIRATION RATE: 16 BRPM | BODY MASS INDEX: 23.25 KG/M2 | WEIGHT: 136.2 LBS | OXYGEN SATURATION: 98 % | HEART RATE: 62 BPM

## 2018-07-13 DIAGNOSIS — I48.20 CHRONIC ATRIAL FIBRILLATION (HCC): ICD-10-CM

## 2018-07-13 LAB — INR PPP: 1.7 (ref 2–3.5)

## 2018-07-13 PROCEDURE — G0493 RN CARE EA 15 MIN HH/HOSPICE: HCPCS

## 2018-07-13 SDOH — ECONOMIC STABILITY: HOUSING INSECURITY: UNSAFE APPLIANCES: 0

## 2018-07-13 SDOH — ECONOMIC STABILITY: HOUSING INSECURITY
HOME SAFETY: AZARD. NO EVIDENCE FOUND OF SMOKING MATERIALS PRESENT IN THE HOME.    PATIENT HAS ONE OXYGEN TANK IN HOME AND HAS NEVER USED AND IS RETURNING ASAP.

## 2018-07-13 SDOH — ECONOMIC STABILITY: HOUSING INSECURITY
HOME SAFETY: .OXYGEN SAFETY RISK ASSESSMENT PERFORMED. PATIENT PT WAS GIVEN A NO SMOKING SIGN AND PROVIDED EDUCATION ABOUT WHY IT IS IMPORTANT TO PLACE ONE.PT WAS GIVEN A NO SMOKING SIGN AND PROVIDED EDUCATION ABOUT WHY IT IS IMPORTANT TO PLACE ONE. PATIENT DOES

## 2018-07-13 SDOH — ECONOMIC STABILITY: HOUSING INSECURITY
HOME SAFETY: HAVE A WORKING FIRE EXTINGUISHER PRESENT IN THE HOME. SMOKE ALARMS ARE PRESENT AND FUNCTIONAL ON EACH LEVEL OF THE HOME. PATIENT DOES HAVE A FIRE ESCAPE PLAN DEVELOPED. PATIENT DOES NOT HAVE FLAMMABLE MATERIALS PRESENT IN THE HOME PRESENTING A FIRE H

## 2018-07-13 SDOH — ECONOMIC STABILITY: HOUSING INSECURITY: UNSAFE COOKING RANGE AREA: 0

## 2018-07-13 ASSESSMENT — ENCOUNTER SYMPTOMS
SHORTNESS OF BREATH: T
VOMITING: DENIES

## 2018-07-13 ASSESSMENT — ACTIVITIES OF DAILY LIVING (ADL)
HOME_HEALTH_OASIS: 01
TRANSPORTATION COMMENTS: FATIGUES EASILY

## 2018-07-13 NOTE — TELEPHONE ENCOUNTER
Received referral from [facility]. Medications reviewed against discharge summary. Interactions noted between warfarin, ASA, and clopidogrel. [facility]. INR ordered to be drawn 7/13. Of note, recommended renal dosing of allopurinol with CrCl of 10-20 mL/min is 200 mg/ day. Patient's calculated CrCl is 18 mL/min. Will send message to PCP.     Nellie Pierre, VeraD

## 2018-07-13 NOTE — PROGRESS NOTES
Anticoagulation Summary  As of 7/13/2018    INR goal:   2.0-3.0   TTR:   47.0 % (2.6 mo)   Today's INR:   1.7!   Warfarin maintenance plan:   2.5 mg (2.5 mg x 1) on Mon, Wed, Fri; 3.75 mg (2.5 mg x 1.5) all other days   Weekly warfarin total:   22.5 mg   Plan last modified:   Lori Chaudhry, PharmD (6/18/2018)   Next INR check:   7/16/2018   Target end date:   Indefinite    Indications    Chronic atrial fibrillation (HCC) [I48.2]  Long term current use of anticoagulant (Resolved) [Z79.01]             Anticoagulation Episode Summary     INR check location:       Preferred lab:       Send INR reminders to:       Comments:   [facility]        Anticoagulation Care Providers     Provider Role Specialty Phone number    [facility] Referring  [phone]    Razia Sims D.O. Responsible Family Medicine [phone]    Sebastian Thomas M.D. Responsible Interventional Cardiology [phone]        Anticoagulation Patient Findings  Patient Findings     Positives:   Hospital admission    Negatives:   Signs/symptoms of thrombosis, Signs/symptoms of bleeding, Laboratory test error suspected, Change in health, Change in alcohol use, Change in activity, Upcoming invasive procedure, Emergency department visit, Upcoming dental procedure, Missed doses, Extra doses, Change in medications, Change in diet/appetite, Bruising, Other complaints        Spoke with patient today regarding subtherapeutic INR of 1.7.  Patient denies any signs/symptoms of bruising or bleeding or any changes in diet and medications.  Instructed patient to call clinic with any questions or concerns.  She is unclear on warfarin dosing since discharge, but according to discharge notes they instructed her to remain on previously documented regimen.  Asked that she bolus with 5mg X 1, then resume current warfarin regimen.  Follow up in 3 days, to reduce risk of adverse events related to this high risk medication,  Warfarin.    Taiwo PADILLA  Carcamo, PharmD

## 2018-07-13 NOTE — TELEPHONE ENCOUNTER
Dear Yudelka,     Med review completed for [facility]. Patient's calculated CrCl is 18 mL/min. Recommended renal dosing of allopurinol for CrCl 10 to 20 mL/min is 200 mg/ day. Patient is taking 300 mg once daily. You are scheduled to see patient on 7/23.     Thank you,   Nellie Pierre, PharmD  Ext [phone]

## 2018-07-16 ENCOUNTER — ANTICOAGULATION MONITORING (OUTPATIENT)
Dept: VASCULAR LAB | Facility: MEDICAL CENTER | Age: 83
End: 2018-07-16

## 2018-07-16 ENCOUNTER — HOME CARE VISIT (OUTPATIENT)
Dept: HOME HEALTH SERVICES | Facility: HOME HEALTHCARE | Age: 83
End: 2018-07-16
Payer: MEDICARE

## 2018-07-16 ENCOUNTER — TELEPHONE (OUTPATIENT)
Dept: CARDIOLOGY | Facility: MEDICAL CENTER | Age: 83
End: 2018-07-16

## 2018-07-16 VITALS
TEMPERATURE: 99 F | DIASTOLIC BLOOD PRESSURE: 58 MMHG | SYSTOLIC BLOOD PRESSURE: 110 MMHG | BODY MASS INDEX: 24.33 KG/M2 | WEIGHT: 140 LBS | RESPIRATION RATE: 16 BRPM | HEART RATE: 61 BPM | OXYGEN SATURATION: 96 %

## 2018-07-16 DIAGNOSIS — I48.20 CHRONIC ATRIAL FIBRILLATION (HCC): ICD-10-CM

## 2018-07-16 LAB — INR PPP: 2.2 (ref 2–3.5)

## 2018-07-16 PROCEDURE — G0299 HHS/HOSPICE OF RN EA 15 MIN: HCPCS

## 2018-07-16 ASSESSMENT — ENCOUNTER SYMPTOMS
NAUSEA: DENIES
VOMITING: DENIES

## 2018-07-16 NOTE — PROGRESS NOTES
OP Anticoagulation Service Note    Date: 7/16/2018  Anticoagulation Summary  As of 7/16/2018    INR goal:   2.0-3.0   TTR:   46.7 % (2.7 mo)   Today's INR:   2.2   Warfarin maintenance plan:   2.5 mg (2.5 mg x 1) on Mon, Wed, Fri; 3.75 mg (2.5 mg x 1.5) all other days   Weekly warfarin total:   22.5 mg   No change documented:   [facility] Ass't   Plan last modified:   Lori Chaudhry, PharmD (6/18/2018)   Next INR check:   7/23/2018   Target end date:   Indefinite    Indications    Chronic atrial fibrillation (HCC) [I48.2]  Long term current use of anticoagulant (Resolved) [Z79.01]             Anticoagulation Episode Summary     INR check location:       Preferred lab:       Send INR reminders to:       Comments:   [facility]         Anticoagulation Care Providers     Provider Role Specialty Phone number    [facility] Referring  [phone]    Razia Sims D.O. Responsible Family Medicine [phone]    Sebastian Thomas M.D. Responsible Interventional Cardiology [phone]        Anticoagulation Patient Findings  Patient Findings     Negatives:   Signs/symptoms of thrombosis, Signs/symptoms of bleeding, Laboratory test error suspected, Change in health, Change in alcohol use, Change in activity, Upcoming invasive procedure, Emergency department visit, Upcoming dental procedure, Missed doses, Extra doses, Change in medications, Change in diet/appetite, Hospital admission, Bruising, Other complaints        Plan: Left patient a message. Patient is therapeutic and will remain on the same dose. Patient was instructed to call back if needed to report any unusual bleeding or bruising or any changes to medication or diet. Patient is to be checked again in 1 week.    Yung Sousa. Ass't  [facility]    I have reviewed and concur with the above plan     Scottie Younger, VeraD

## 2018-07-16 NOTE — TELEPHONE ENCOUNTER
Veronique, Home Health RN called to give more information before tomorrows appointment.    Pt c/o SOB with walking.      Objective info from  RN:    4 lb weight gain within 3 days  2+ pitting edema bilateral  (patient on Lasix 20 mg Q Day)  Crackles in bilat lung bases    VSS   O2 sats: 96% RA  HR 61   RR 16 (sitting)  /58    Advised to have patient avoid sodium (however patient was placed on sodium replacement due to low sodium), elevate legs, compression stockings if available.  If symptoms worsen go to ER.    Last labs 7/11:    Sodium: 133 (patient on NaCl 1 GM)  K+ 2.9  (patient on K+ 10 mEq Q SHONA)        To SARITHA Collins (in anticipation for 7/17 appt) or to advise for today

## 2018-07-17 ENCOUNTER — OFFICE VISIT (OUTPATIENT)
Dept: CARDIOLOGY | Facility: MEDICAL CENTER | Age: 83
End: 2018-07-17
Payer: MEDICARE

## 2018-07-17 ENCOUNTER — OFFICE VISIT (OUTPATIENT)
Dept: MEDICAL GROUP | Facility: MEDICAL CENTER | Age: 83
End: 2018-07-17
Payer: MEDICARE

## 2018-07-17 VITALS
BODY MASS INDEX: 25.69 KG/M2 | DIASTOLIC BLOOD PRESSURE: 64 MMHG | OXYGEN SATURATION: 97 % | HEIGHT: 63 IN | HEART RATE: 80 BPM | SYSTOLIC BLOOD PRESSURE: 124 MMHG | WEIGHT: 145 LBS

## 2018-07-17 VITALS
HEIGHT: 63 IN | TEMPERATURE: 98 F | SYSTOLIC BLOOD PRESSURE: 118 MMHG | RESPIRATION RATE: 16 BRPM | HEART RATE: 87 BPM | BODY MASS INDEX: 25.52 KG/M2 | OXYGEN SATURATION: 94 % | DIASTOLIC BLOOD PRESSURE: 60 MMHG | WEIGHT: 144 LBS

## 2018-07-17 DIAGNOSIS — I50.9 HEART FAILURE, NYHA CLASS 3 (HCC): ICD-10-CM

## 2018-07-17 DIAGNOSIS — Z95.0 CARDIAC PACEMAKER IN SITU: ICD-10-CM

## 2018-07-17 DIAGNOSIS — Z98.61 S/P CORONARY ANGIOPLASTY: ICD-10-CM

## 2018-07-17 DIAGNOSIS — I48.20 CHRONIC ATRIAL FIBRILLATION (HCC): ICD-10-CM

## 2018-07-17 DIAGNOSIS — N17.9 ACUTE RENAL FAILURE SUPERIMPOSED ON CHRONIC KIDNEY DISEASE, UNSPECIFIED CKD STAGE, UNSPECIFIED ACUTE RENAL FAILURE TYPE: ICD-10-CM

## 2018-07-17 DIAGNOSIS — D50.9 IRON DEFICIENCY ANEMIA, UNSPECIFIED IRON DEFICIENCY ANEMIA TYPE: ICD-10-CM

## 2018-07-17 DIAGNOSIS — R09.02 HYPOXIA: ICD-10-CM

## 2018-07-17 DIAGNOSIS — N18.9 ACUTE RENAL FAILURE SUPERIMPOSED ON CHRONIC KIDNEY DISEASE, UNSPECIFIED CKD STAGE, UNSPECIFIED ACUTE RENAL FAILURE TYPE: ICD-10-CM

## 2018-07-17 DIAGNOSIS — I50.32 CHRONIC DIASTOLIC CHF (CONGESTIVE HEART FAILURE) (HCC): ICD-10-CM

## 2018-07-17 DIAGNOSIS — I25.10 CORONARY ARTERY DISEASE INVOLVING NATIVE CORONARY ARTERY OF NATIVE HEART WITHOUT ANGINA PECTORIS: ICD-10-CM

## 2018-07-17 DIAGNOSIS — I25.10 ASCVD (ARTERIOSCLEROTIC CARDIOVASCULAR DISEASE): ICD-10-CM

## 2018-07-17 DIAGNOSIS — I50.30 ACC/AHA STAGE C HEART FAILURE WITH PRESERVED EJECTION FRACTION (HCC): ICD-10-CM

## 2018-07-17 DIAGNOSIS — E87.1 HYPONATREMIA: ICD-10-CM

## 2018-07-17 DIAGNOSIS — N18.30 CKD (CHRONIC KIDNEY DISEASE) STAGE 3, GFR 30-59 ML/MIN (HCC): ICD-10-CM

## 2018-07-17 DIAGNOSIS — F41.1 GAD (GENERALIZED ANXIETY DISORDER): ICD-10-CM

## 2018-07-17 DIAGNOSIS — Z79.01 LONG TERM CURRENT USE OF ANTICOAGULANT: ICD-10-CM

## 2018-07-17 PROBLEM — J90 PLEURAL EFFUSION: Status: RESOLVED | Noted: 2018-05-20 | Resolved: 2018-07-17

## 2018-07-17 PROBLEM — J96.01 ACUTE RESPIRATORY FAILURE WITH HYPOXIA (HCC): Status: RESOLVED | Noted: 2018-05-22 | Resolved: 2018-07-17

## 2018-07-17 PROCEDURE — 99214 OFFICE O/P EST MOD 30 MIN: CPT | Performed by: NURSE PRACTITIONER

## 2018-07-17 RX ORDER — FUROSEMIDE 40 MG/1
40 TABLET ORAL 2 TIMES DAILY
Qty: 60 TAB | Refills: 11 | Status: SHIPPED | OUTPATIENT
Start: 2018-07-17 | End: 2018-07-20

## 2018-07-17 RX ORDER — POTASSIUM CHLORIDE 20 MEQ/1
20 TABLET, EXTENDED RELEASE ORAL 2 TIMES DAILY
Qty: 60 TAB | Refills: 11 | Status: SHIPPED | OUTPATIENT
Start: 2018-07-17 | End: 2018-07-20

## 2018-07-17 ASSESSMENT — ENCOUNTER SYMPTOMS
WEAKNESS: 1
ABDOMINAL PAIN: 0
ORTHOPNEA: 0
DIZZINESS: 0
COUGH: 0
PALPITATIONS: 0
SHORTNESS OF BREATH: 1
MYALGIAS: 0
PND: 0
CLAUDICATION: 0
FEVER: 0

## 2018-07-17 NOTE — PATIENT INSTRUCTIONS
Decrease salt to twice a day  Increase Furosemide to 40 mg Twice a day  Increase Potassium to 20 mEq twice a day

## 2018-07-17 NOTE — ASSESSMENT & PLAN NOTE
Chronic issue with recent exacerbation, increased fluid retention since hospital discharge. She is up 7 pounds on her home scale. Seen by cardiology this morning, her Lasix dose was increased  She denies shortness of breath

## 2018-07-17 NOTE — ASSESSMENT & PLAN NOTE
Review of labs shows a baseline GFR of 33. During recent hospitalization as did dip to 14, it was increasing by the time of discharge with last value of 26  She has appointment coming up with nephrology  Denies decrease in urine output, dark urine

## 2018-07-17 NOTE — ASSESSMENT & PLAN NOTE
Was discharged from the hosp in May with home 02 following thoracentesis  No longer using. Checking 02 at home and in the 90's  Would like to discontinue home oxygen, needing order sent to [facility]

## 2018-07-17 NOTE — PROGRESS NOTES
Subjective:     Chief Complaint   Patient presents with   • Leg Swelling     FEET AND LEGS     Andree Egan is a 86 y.o. female established patient of Yudelka BARTON here today to follow up on:    S/P coronary angioplasty  2 stents placed in the mid and proximal LAD during recent hospital stay    ARELY (generalized anxiety disorder)  Currently on zoloft 25 mg daily, thinks that her dose was accidentally reduced from 50 mg. Overall feels that she is doing well, however. Does not feel that she needs a change in medication    Hypoxia  Was discharged from the hosp in May with home 02 following thoracentesis  No longer using. Checking 02 at home and in the 90's  Would like to discontinue home oxygen, needing order sent to [facility]    ASCVD (arteriosclerotic cardiovascular disease)  Started on plavix and crestor during hosp    Iron deficiency anemia  Now recently started on iron supplement. Denies any difficulty with this including constipation    Acute on chronic renal failure (HCC)  Review of labs shows a baseline GFR of 33. During recent hospitalization as did dip to 14, it was increasing by the time of discharge with last value of 26  She has appointment coming up with nephrology  Denies decrease in urine output, dark urine    Chronic diastolic CHF (congestive heart failure) (HCC)  Chronic issue with recent exacerbation, increased fluid retention since hospital discharge. She is up 7 pounds on her home scale. Seen by cardiology this morning, her Lasix dose was increased  She denies shortness of breath    Chronic atrial fibrillation (HCC)  Stable on metoprolol and Coumadin  Denies palpitation, dizziness    Hyponatremia  On sodium supplements, has order to recheck BMP later this week       Current medicines (including changes today)  Current Outpatient Prescriptions   Medication Sig Dispense Refill   • furosemide (LASIX) 40 MG Tab Take 1 Tab by mouth 2 times a day. 60 Tab 11   • potassium chloride SA (KDUR) 20 MEQ  Tab CR Take 1 Tab by mouth 2 times a day. 60 Tab 11   • rosuvastatin (CRESTOR) 10 MG Tab Take 1 Tab by mouth every evening. 30 Tab 11   • aspirin EC 81 MG EC tablet Take 1 Tab by mouth every day for 28 days. 28 Tab 0   • ferrous sulfate 325 (65 Fe) MG tablet Take 1 Tab by mouth every day. 30 Tab 3   • ascorbic acid (VITAMIN C) 500 MG tablet Take 1 Tab by mouth every day. 30 Tab 3   • sodium chloride (SALT) 1 GM Tab Take 1 Tab by mouth 3 times a day, with meals. 90 Tab 0   • clopidogrel (PLAVIX) 75 MG Tab Take 1 Tab by mouth every day. 90 Tab 3   • topiramate (TOPAMAX) 25 MG Tab Take 1 Tab by mouth 2 times a day. 60 Tab 3   • allopurinol (ZYLOPRIM) 300 MG Tab Take 1 Tab by mouth every evening. 90 Tab 0   • levothyroxine (SYNTHROID) 50 MCG Tab Take 1 Tab by mouth Every morning on an empty stomach. 90 Tab 3   • warfarin (COUMADIN) 2.5 MG Tab Take 2.5-3.75 mg by mouth every evening. 3.75mg on Tuesday/Thursday, 2.5mg on all other days      • gabapentin (NEURONTIN) 300 MG Cap Take 600 mg by mouth every bedtime.     • acetaminophen (TYLENOL) 500 MG Tab Take 1,000 mg by mouth 1 time daily as needed for Mild Pain.     • Calcium-Vitamin D 500-125 MG-UNIT Tab Take 1 tablet by mouth every evening. 600mg - 400 iu     • albuterol 108 (90 Base) MCG/ACT Aero Soln inhalation aerosol Inhale 2 Puffs by mouth every 6 hours as needed for Shortness of Breath.     • sertraline (ZOLOFT) 50 MG Tab Take 25 mg by mouth every bedtime.     • metoprolol SR (TOPROL XL) 25 MG TABLET SR 24 HR Take 25 mg by mouth every morning.     • omeprazole (PRILOSEC) 20 MG delayed-release capsule Take 20 mg by mouth every morning.       No current facility-administered medications for this visit.      She  has a past medical history of Arthritis; Atrial fibrillation (CMS-HCC) [I48.91] (3/29/2018); Blood clotting disorder ([facility]); Breath shortness; Cancer ([facility]); Cataract; Congestive heart failure (CHF) ([facility]) (4/3/2018); BERTRAND (dyspnea on exertion) (5/4/2018); Heart  "burn; Heart valve disease; Hemorrhagic disorder (HCC); Hiatus hernia syndrome; Hyperlipidemia; Hypertension; Kidney disease; Long term current use of anticoagulant (3/29/2018); Pacemaker (4/3/2018); Pulmonary emphysema (HCC); Secondary hypertension (4/3/2018); Thyroid disease; Tremors of nervous system (4/3/2018); Urinary incontinence; and Urinary tract infection.    ROS included above     Objective:     Blood pressure 118/60, pulse 87, temperature 36.7 °C (98 °F), resp. rate 16, height 1.6 m (5' 3\"), weight 65.3 kg (144 lb), SpO2 94 %. Body mass index is 25.51 kg/m².     Physical Exam:  General: Alert, oriented in no acute distress.  Eye contact is good, speech is normal, affect calm  Lungs: clear to auscultation bilaterally, normal effort, no wheeze/ rhonchi/ rales.  CV: regular rate and rhythm, S1, S2, prominent murmur. 2+ pedal edema bilaterally  Abdomen: soft, nontender, no hepatosplenomegaly  Ext: color normal, vascularity normal, temperature normal    Assessment and Plan:   The following treatment plan was discussed   1. S/P coronary angioplasty  2 stents placed during recent hospital stay, on Plavix and Crestor    2. ARELY (generalized anxiety disorder)  Stable at this point on Zoloft 25 mg daily    3. Hypoxia  Discharge from the hospital in May with home oxygen. She has not been using this has been checking her oxygen saturation at home with readings consistently in the 90s. Order sent to discontinue home oxygen    4. ASCVD (arteriosclerotic cardiovascular disease)  Continue Crestor    5. Iron deficiency anemia, unspecified iron deficiency anemia type  Now on iron supplements. Plan for recheck of labs in 6-8 weeks    6. Acute renal failure superimposed on chronic kidney disease, unspecified CKD stage, unspecified acute renal failure type (HCC)  Improving as of hospital discharge. She will be having labs rechecked in the next few days    7. Chronic diastolic CHF (congestive heart failure) (HCC)  Increase in " fluid retention since hospital discharge. Her Lasix was increased today by cardiology. Encouraged to continue with home weight monitoring    8. Chronic atrial fibrillation (HCC)  Stable    9. Hyponatremia  On supplement, follow-up pending labs        Followup: One month for follow-up with PCP         Please note that this dictation was created using voice recognition software. I have worked with consultants from the vendor as well as technical experts from [employer] to optimize the interface. I have made every reasonable attempt to correct obvious errors, but I expect that there are errors of grammar and possibly content that I did not discover before finalizing the note.

## 2018-07-17 NOTE — PROGRESS NOTES
Chief Complaint   Patient presents with   • Congestive Heart Failure     F/V: SOB       Subjective:   Andree Egan is a 86 y.o. female who presents today for follow up on her heart failure and recent hospitalization.    Patient of the heart failure clinic.  Patient was last seen in clinic on 6/13/2018 with Dr. Simms.  During that visit, her medications were adjusted.  And she was sent for invasive study to evaluate her aortic valve.    Patient had an angiogram on 7/3/2018.  There was no evidence of aortic stenosis but patient was found to have coronary artery disease with high-grade lesion to the proximal and mid LAD.  Patient received a drug-eluting stent to the mid LAD and the proximal LAD.    A few days later, pt developed worsening SOB and increasing weight gain.  Patient was found to have acute kidney injury related to contrast-induced nephropathy from the procedure.     Her kidney function did improve by discharge.  Patient had low potassium level at discharge and was given 40 mEq ×1.  Patient continues to take potassium 10 mEq daily.    For her symptoms, patient reports shortness of breath with her ADLs and exertion.  Patient reports she is not exerting herself much since her discharge.  Patient has no shortness of breath at rest.  Patient continues to have significant lower extremity edema and fatigue.  Patient states her weight has gone up since 7 pounds since she has been home from hospital.  She was weighing 135 now she is weighing 142 pounds at home.    She denies chest pain, palpitations, orthopnea, PND, dizziness/lightheadedness.    Additonally, patient has the following medical problems:     -Recent hospitalization for heart failure exacerbation. Pt had a thoracentesis (failed 1st attempt and 2nd attempt successful) for a right Pleural effusion.      -Chronic atrial fibrillation: Rate controlled, taking warfarin, followed by the anticoagulation clinic     -Has permanent pacemaker     -Aortic  stenosis     -Pulmonary HTN     -Hypertension     -Hyperlipidemia     -Kidney disease: has an upcoming appointment with Neph.     -Hypothyroidism: Taking levothyroxine.     -COPD    Past Medical History:   Diagnosis Date   • Arthritis     back/neck   • Atrial fibrillation (CMS-HCC) [I48.91] 3/29/2018   • Blood clotting disorder (HCC)    • Breath shortness     water retention   • Cancer (HCC)     breast   • Cataract    • Congestive heart failure (CHF) (HCC) 4/3/2018   • BERTRAND (dyspnea on exertion) 5/4/2018   • Heart burn    • Heart valve disease    • Hemorrhagic disorder (HCC)    • Hiatus hernia syndrome    • Hyperlipidemia    • Hypertension    • Kidney disease    • Long term current use of anticoagulant 3/29/2018   • Pacemaker 4/3/2018   • Pulmonary emphysema (HCC)    • Secondary hypertension 4/3/2018   • Thyroid disease    • Tremors of nervous system 4/3/2018   • Urinary incontinence    • Urinary tract infection      History reviewed. No pertinent surgical history.  Family History   Problem Relation Age of Onset   • Fainting Neg Hx      Social History     Social History   • Marital status: Single     Spouse name: N/A   • Number of children: N/A   • Years of education: N/A     Occupational History   • Not on file.     Social History Main Topics   • Smoking status: Former Smoker     Packs/day: 0.50     Types: Cigarettes     Quit date: 1/1/1970   • Smokeless tobacco: Never Used   • Alcohol use 1.8 oz/week     3 Glasses of wine per week      Comment: 1-2 glasses wine before dinner   • Drug use: No   • Sexual activity: Not on file     Other Topics Concern   • Not on file     Social History Narrative   • No narrative on file     Allergies   Allergen Reactions   • Codeine Vomiting     Outpatient Encounter Prescriptions as of 7/17/2018   Medication Sig Dispense Refill   • furosemide (LASIX) 40 MG Tab Take 1 Tab by mouth 2 times a day. 60 Tab 11   • rosuvastatin (CRESTOR) 10 MG Tab Take 1 Tab by mouth every evening. 30 Tab 11    • aspirin EC 81 MG EC tablet Take 1 Tab by mouth every day for 28 days. 28 Tab 0   • ferrous sulfate 325 (65 Fe) MG tablet Take 1 Tab by mouth every day. 30 Tab 3   • ascorbic acid (VITAMIN C) 500 MG tablet Take 1 Tab by mouth every day. 30 Tab 3   • sodium chloride (SALT) 1 GM Tab Take 1 Tab by mouth 3 times a day, with meals. 90 Tab 0   • clopidogrel (PLAVIX) 75 MG Tab Take 1 Tab by mouth every day. 90 Tab 3   • topiramate (TOPAMAX) 25 MG Tab Take 1 Tab by mouth 2 times a day. 60 Tab 3   • allopurinol (ZYLOPRIM) 300 MG Tab Take 1 Tab by mouth every evening. 90 Tab 0   • potassium chloride SA (K-DUR) 10 MEQ Tab CR Take 1 Tab by mouth every evening. 90 Tab 1   • levothyroxine (SYNTHROID) 50 MCG Tab Take 1 Tab by mouth Every morning on an empty stomach. 90 Tab 3   • warfarin (COUMADIN) 2.5 MG Tab Take 2.5-3.75 mg by mouth every evening. 3.75mg on Tuesday/Thursday, 2.5mg on all other days      • gabapentin (NEURONTIN) 300 MG Cap Take 600 mg by mouth every bedtime.     • acetaminophen (TYLENOL) 500 MG Tab Take 1,000 mg by mouth 1 time daily as needed for Mild Pain.     • Calcium-Vitamin D 500-125 MG-UNIT Tab Take 1 tablet by mouth every evening. 600mg - 400 iu     • albuterol 108 (90 Base) MCG/ACT Aero Soln inhalation aerosol Inhale 2 Puffs by mouth every 6 hours as needed for Shortness of Breath.     • sertraline (ZOLOFT) 50 MG Tab Take 25 mg by mouth every bedtime.     • metoprolol SR (TOPROL XL) 25 MG TABLET SR 24 HR Take 25 mg by mouth every morning.     • omeprazole (PRILOSEC) 20 MG delayed-release capsule Take 20 mg by mouth every morning.     • [DISCONTINUED] furosemide (LASIX) 20 MG Tab Take 1 Tab by mouth every day. 30 Tab 0     No facility-administered encounter medications on file as of 7/17/2018.      Review of Systems   Constitutional: Positive for malaise/fatigue. Negative for fever.   Respiratory: Positive for shortness of breath. Negative for cough.    Cardiovascular: Positive for leg swelling.  "Negative for chest pain, palpitations, orthopnea, claudication and PND.   Gastrointestinal: Negative for abdominal pain.   Musculoskeletal: Negative for myalgias.   Neurological: Positive for weakness. Negative for dizziness.   All other systems reviewed and are negative.       Objective:   /64   Pulse 80   Ht 1.6 m (5' 3\")   Wt 65.8 kg (145 lb)   LMP  (LMP Unknown)   SpO2 97%   BMI 25.69 kg/m²     Physical Exam   Constitutional: She is oriented to person, place, and time. She appears well-developed and well-nourished.   In wheelchair today.    HENT:   Head: Normocephalic and atraumatic.   Eyes: EOM are normal. Pupils are equal, round, and reactive to light.   Neck: Normal range of motion. Neck supple. JVD present.   Cardiovascular: Normal rate and regular rhythm.    Murmur heard.  Pulmonary/Chest: Effort normal. No respiratory distress. She has no wheezes. She has rales.   Right chest pacemaker-no erosion, drainage or erythema   Abdominal: Soft. Bowel sounds are normal.   Musculoskeletal: She exhibits edema (Bilateral 3+ lower extremity edema).   Neurological: She is alert and oriented to person, place, and time.   Skin: Skin is warm and dry.   Psychiatric: She has a normal mood and affect. Her behavior is normal.   Vitals reviewed.    Lab Results   Component Value Date/Time    CHOLSTRLTOT 67 (L) 07/09/2018 02:22 AM    LDL 17 07/09/2018 02:22 AM    HDL 40 07/09/2018 02:22 AM    TRIGLYCERIDE 50 07/09/2018 02:22 AM       Lab Results   Component Value Date/Time    SODIUM 133 (L) 07/11/2018 09:09 AM    POTASSIUM 2.9 (L) 07/11/2018 09:09 AM    CHLORIDE 98 07/11/2018 09:09 AM    CO2 27 07/11/2018 09:09 AM    GLUCOSE 131 (H) 07/11/2018 09:09 AM    BUN 43 (H) 07/11/2018 09:09 AM    CREATININE 1.87 (H) 07/11/2018 09:09 AM     Lab Results   Component Value Date/Time    ALKPHOSPHAT 97 07/09/2018 02:22 AM    ASTSGOT 11 (L) 07/09/2018 02:22 AM    ALTSGPT 5 07/09/2018 02:22 AM    TBILIRUBIN 0.7 07/09/2018 02:22 AM    "   Transthoracic Echo Report 5/19/18  Severe bi-atrial enlargement. Normal LV and RV systolic function. LVEF is 55% by visual estimation. Diastolic function difficult to assess.   There is at least mild aortic stenosis but valve is opening. Mean   gradient is 10 mmHg, with V max of 2.01 m/s. RVSP is elevated at 70   mmHg suggesting pulmonary hypertension. No prior study is available for   comparison.      Heart Cath 7/2/2018   POSTPROCEDURE DIAGNOSES:  1.  No evidence of aortic stenosis with 0 mmHg peak to peak gradient, mean gradient   of 8 mmHg, calculated CHRISTIANE of 2.09 cm2.  2.  Coronary artery disease with high grade proximal and high grade mid left   anterior descending artery.  3.  Successful percutaneous transluminal coronary angioplasty/stent placement   of the mid left anterior descending artery with 2.25x12 mm Synergy   drug-eluting stent.  4.  Successful percutaneous transluminal coronary angioplasty/stent placement   of the proximal left anterior descending artery with 2.5x12 mm Synergy   drug-eluting stent.  5.  Normal left ventricular systolic function with ejection fraction of 55%.  6.  Normal left ventricular end-diastolic pressure.  7.  Elevated right heart pressure with PA systolic pressure of 60 mmHg.  8.  Successful Angio-Seal closure.    Transthoracic Echo Report 7/8/2018  Prior done 05-.  Compared to prior study, the right-sided chambers appear dilated.    Left ventricular systolic function is low normal.  Left ventricular ejection fraction is visually estimated to be 50-55%.  Abnormal septal motion consistent with right ventricular (RV) volume overload and/or elevated RV end-diastolic pressure.  Dilated right ventricle with reduced systolic function.  Severe tricuspid regurgitation. Estimated right ventricular systolic pressure  is 55 mmHg.  Dilated inferior vena cava without inspiratory collapse.  Assessment:     1. ACC/AHA stage C heart failure with preserved ejection fraction (HCC)   potassium chloride SA (KDUR) 20 MEQ Tab CR   2. Heart failure, NYHA class 3 (HCC)     3. CKD (chronic kidney disease) stage 3, GFR 30-59 ml/min  furosemide (LASIX) 40 MG Tab    BASIC METABOLIC PANEL   4. Chronic atrial fibrillation (HCC)     5. Cardiac pacemaker in situ     6. Long term current use of anticoagulant     7. Coronary artery disease involving native coronary artery of native heart without angina pectoris         Medical Decision Making:  Today's Assessment / Status / Plan:   1. HFpEF, Stage C, Class 3, LVEF 55% and Pulmonary HTN: Patient is volume overloaded today  -Increase furosemide to 40 mg twice a day  -Increase potassium to 20 mEq twice a day  -Decrease salt tabs to twice a day  -BMP in 2 days  -Reinforced s/sx of worsening heart failure with patient and weight monitoring. Pt verbalizes understanding. Pt to call office or RTC if present.     2. CKD: Acute kidney injury likely related to contrast-induced nephropathy.  -Unable to move up nephrology appointment.  Patient was placed on a wait list at nephrologist office  -We will monitor    3.  A. fib: Rate controlled  -Continue Toprol-XL 25 mg daily  -Continue Coumadin, followed by the anticoagulation clinic  -Has PPM    4. Anemia:  -pt to follow up with PCP.    5. CAD, s/p BRITTON x2:  -Continue aspirin 81 mg daily for 1 month  -Continue Plavix 75 mg daily  -Continue rosuvastatin 10 mg daily    FU in clinic in 3 days with Dr. Simms. Sooner if needed.    Patient verbalizes understanding and agrees with the plan of care.     Collaborating MD: DEBORAH Cano MD

## 2018-07-17 NOTE — ASSESSMENT & PLAN NOTE
Currently on zoloft 25 mg daily, thinks that her dose was accidentally reduced from 50 mg. Overall feels that she is doing well, however. Does not feel that she needs a change in medication

## 2018-07-18 ENCOUNTER — HOME CARE VISIT (OUTPATIENT)
Dept: HOME HEALTH SERVICES | Facility: HOME HEALTHCARE | Age: 83
End: 2018-07-18
Payer: MEDICARE

## 2018-07-18 LAB
MYCOBACTERIUM SPEC CULT: NORMAL
RHODAMINE-AURAMINE STN SPEC: NORMAL
SIGNIFICANT IND 70042: NORMAL
SITE SITE: NORMAL
SOURCE SOURCE: NORMAL

## 2018-07-19 ENCOUNTER — HOSPITAL ENCOUNTER (OUTPATIENT)
Dept: LAB | Facility: MEDICAL CENTER | Age: 83
End: 2018-07-19
Attending: NURSE PRACTITIONER
Payer: MEDICARE

## 2018-07-19 ENCOUNTER — HOME CARE VISIT (OUTPATIENT)
Dept: HOME HEALTH SERVICES | Facility: HOME HEALTHCARE | Age: 83
End: 2018-07-19
Payer: MEDICARE

## 2018-07-19 VITALS
OXYGEN SATURATION: 97 % | BODY MASS INDEX: 26.22 KG/M2 | DIASTOLIC BLOOD PRESSURE: 62 MMHG | WEIGHT: 148 LBS | TEMPERATURE: 98.5 F | HEART RATE: 66 BPM | RESPIRATION RATE: 16 BRPM | SYSTOLIC BLOOD PRESSURE: 110 MMHG

## 2018-07-19 DIAGNOSIS — N18.30 CKD (CHRONIC KIDNEY DISEASE) STAGE 3, GFR 30-59 ML/MIN (HCC): ICD-10-CM

## 2018-07-19 LAB
ANION GAP SERPL CALC-SCNC: 9 MMOL/L (ref 0–11.9)
BUN SERPL-MCNC: 36 MG/DL (ref 8–22)
CALCIUM SERPL-MCNC: 8.9 MG/DL (ref 8.5–10.5)
CHLORIDE SERPL-SCNC: 102 MMOL/L (ref 96–112)
CO2 SERPL-SCNC: 23 MMOL/L (ref 20–33)
CREAT SERPL-MCNC: 1.85 MG/DL (ref 0.5–1.4)
GLUCOSE SERPL-MCNC: 94 MG/DL (ref 65–99)
POTASSIUM SERPL-SCNC: 5.4 MMOL/L (ref 3.6–5.5)
SODIUM SERPL-SCNC: 134 MMOL/L (ref 135–145)

## 2018-07-19 PROCEDURE — G0155 HHCP-SVS OF CSW,EA 15 MIN: HCPCS

## 2018-07-19 PROCEDURE — 36415 COLL VENOUS BLD VENIPUNCTURE: CPT

## 2018-07-19 PROCEDURE — 80048 BASIC METABOLIC PNL TOTAL CA: CPT

## 2018-07-19 PROCEDURE — G0299 HHS/HOSPICE OF RN EA 15 MIN: HCPCS

## 2018-07-19 ASSESSMENT — ENCOUNTER SYMPTOMS
VOMITING: DENIES
NAUSEA: DENIES

## 2018-07-20 ENCOUNTER — OFFICE VISIT (OUTPATIENT)
Dept: CARDIOLOGY | Facility: MEDICAL CENTER | Age: 83
End: 2018-07-20
Payer: MEDICARE

## 2018-07-20 VITALS
WEIGHT: 153.8 LBS | OXYGEN SATURATION: 99 % | HEART RATE: 82 BPM | HEIGHT: 63 IN | DIASTOLIC BLOOD PRESSURE: 70 MMHG | SYSTOLIC BLOOD PRESSURE: 110 MMHG | BODY MASS INDEX: 27.25 KG/M2

## 2018-07-20 DIAGNOSIS — I50.33 ACUTE ON CHRONIC DIASTOLIC (CONGESTIVE) HEART FAILURE (HCC): ICD-10-CM

## 2018-07-20 DIAGNOSIS — N17.9 ACUTE RENAL FAILURE SUPERIMPOSED ON CHRONIC KIDNEY DISEASE, UNSPECIFIED CKD STAGE, UNSPECIFIED ACUTE RENAL FAILURE TYPE: ICD-10-CM

## 2018-07-20 DIAGNOSIS — N18.9 ACUTE RENAL FAILURE SUPERIMPOSED ON CHRONIC KIDNEY DISEASE, UNSPECIFIED CKD STAGE, UNSPECIFIED ACUTE RENAL FAILURE TYPE: ICD-10-CM

## 2018-07-20 PROCEDURE — 99214 OFFICE O/P EST MOD 30 MIN: CPT | Performed by: INTERNAL MEDICINE

## 2018-07-20 RX ORDER — TORSEMIDE 20 MG/1
40 TABLET ORAL 2 TIMES DAILY
Qty: 120 TAB | Refills: 11 | Status: SHIPPED | OUTPATIENT
Start: 2018-07-20 | End: 2018-07-24 | Stop reason: DRUGHIGH

## 2018-07-20 ASSESSMENT — ENCOUNTER SYMPTOMS
WEAKNESS: 0
SPUTUM PRODUCTION: 0
EYES NEGATIVE: 1
MUSCULOSKELETAL NEGATIVE: 1
BRUISES/BLEEDS EASILY: 0
GASTROINTESTINAL NEGATIVE: 1
SORE THROAT: 0
COUGH: 0
PND: 0
NEUROLOGICAL NEGATIVE: 1
SHORTNESS OF BREATH: 1
WHEEZING: 0
DIZZINESS: 0
HEMOPTYSIS: 0
CHILLS: 0
FEVER: 0
ORTHOPNEA: 0
CLAUDICATION: 0
LOSS OF CONSCIOUSNESS: 0
CONSTITUTIONAL NEGATIVE: 1
PALPITATIONS: 0
STRIDOR: 0

## 2018-07-20 NOTE — PROGRESS NOTES
Chief Complaint   Patient presents with   • Congestive Heart Failure     HF est.       Subjective:   Andree Egan is a 86 y.o. female who presents today as a follow-up for her heart failure and presumed concern for aortic stenosis.  She underwent her aortic valve study which showed no gradient.  She did receive 2 stents.  During the procedure she received 214 mL of contrast and developed a contrast induced nephropathy.  Her most recent creatinine is been 1.85.  Her estimated GFR is at 26.  She is having profound lower extremity edema now.  She has been refractory to Lasix.  The family reports an 8 pound weight gain in 3 days.  She was previously on Lasix which is now been moved up to 40 mg twice daily.  Her most recent potassium was also elevated at 5.4.    Past Medical History:   Diagnosis Date   • Arthritis     back/neck   • Atrial fibrillation (CMS-HCC) [I48.91] 3/29/2018   • Blood clotting disorder ([facility])    • Breath shortness     water retention   • Cancer ([facility])     breast   • Cataract    • Congestive heart failure (CHF) ([facility]) 4/3/2018   • BERTRAND (dyspnea on exertion) 5/4/2018   • Heart burn    • Heart valve disease    • Hemorrhagic disorder ([facility])    • Hiatus hernia syndrome    • Hyperlipidemia    • Hypertension    • Kidney disease    • Long term current use of anticoagulant 3/29/2018   • Pacemaker 4/3/2018   • Pulmonary emphysema ([facility])    • Secondary hypertension 4/3/2018   • Thyroid disease    • Tremors of nervous system 4/3/2018   • Urinary incontinence    • Urinary tract infection      No past surgical history on file.  Family History   Problem Relation Age of Onset   • Fainting Neg Hx      Social History     Social History   • Marital status: Single     Spouse name: N/A   • Number of children: N/A   • Years of education: N/A     Occupational History   • Not on file.     Social History Main Topics   • Smoking status: Former Smoker     Packs/day: 0.50     Types: Cigarettes     Quit date: 1/1/1970   •  Smokeless tobacco: Never Used   • Alcohol use 1.8 oz/week     3 Glasses of wine per week      Comment: 1-2 glasses wine before dinner   • Drug use: No   • Sexual activity: Not on file     Other Topics Concern   • Not on file     Social History Narrative   • No narrative on file     Allergies   Allergen Reactions   • Codeine Vomiting     Outpatient Encounter Prescriptions as of 7/20/2018   Medication Sig Dispense Refill   • torsemide (DEMADEX) 20 MG Tab Take 2 Tabs by mouth 2 times a day. 120 Tab 11   • rosuvastatin (CRESTOR) 10 MG Tab Take 1 Tab by mouth every evening. 30 Tab 11   • aspirin EC 81 MG EC tablet Take 1 Tab by mouth every day for 28 days. 28 Tab 0   • ferrous sulfate 325 (65 Fe) MG tablet Take 1 Tab by mouth every day. 30 Tab 3   • ascorbic acid (VITAMIN C) 500 MG tablet Take 1 Tab by mouth every day. 30 Tab 3   • clopidogrel (PLAVIX) 75 MG Tab Take 1 Tab by mouth every day. 90 Tab 3   • topiramate (TOPAMAX) 25 MG Tab Take 1 Tab by mouth 2 times a day. 60 Tab 3   • allopurinol (ZYLOPRIM) 300 MG Tab Take 1 Tab by mouth every evening. 90 Tab 0   • levothyroxine (SYNTHROID) 50 MCG Tab Take 1 Tab by mouth Every morning on an empty stomach. 90 Tab 3   • warfarin (COUMADIN) 2.5 MG Tab Take 2.5-3.75 mg by mouth every evening. 3.75mg on Tuesday/Thursday, 2.5mg on all other days      • gabapentin (NEURONTIN) 300 MG Cap Take 1,200 mg by mouth every bedtime.     • acetaminophen (TYLENOL) 500 MG Tab Take 1,000 mg by mouth 1 time daily as needed for Mild Pain.     • Calcium-Vitamin D 500-125 MG-UNIT Tab Take 1 tablet by mouth every evening. 600mg - 400 iu     • albuterol 108 (90 Base) MCG/ACT Aero Soln inhalation aerosol Inhale 2 Puffs by mouth every 6 hours as needed for Shortness of Breath.     • sertraline (ZOLOFT) 50 MG Tab Take 25 mg by mouth every bedtime.     • metoprolol SR (TOPROL XL) 25 MG TABLET SR 24 HR Take 25 mg by mouth every morning.     • omeprazole (PRILOSEC) 20 MG delayed-release capsule Take 20  "mg by mouth every morning.     • [DISCONTINUED] furosemide (LASIX) 40 MG Tab Take 1 Tab by mouth 2 times a day. 60 Tab 11   • [DISCONTINUED] potassium chloride SA (KDUR) 20 MEQ Tab CR Take 1 Tab by mouth 2 times a day. (Patient taking differently: Take 40 mEq by mouth 2 times a day.) 60 Tab 11   • [DISCONTINUED] sodium chloride (SALT) 1 GM Tab Take 1 Tab by mouth 3 times a day, with meals. 90 Tab 0     No facility-administered encounter medications on file as of 7/20/2018.      Review of Systems   Constitutional: Negative.  Negative for chills, fever and malaise/fatigue.   HENT: Negative.  Negative for sore throat.    Eyes: Negative.    Respiratory: Positive for shortness of breath. Negative for cough, hemoptysis, sputum production, wheezing and stridor.    Cardiovascular: Positive for leg swelling. Negative for chest pain, palpitations, orthopnea, claudication and PND.   Gastrointestinal: Negative.    Genitourinary: Negative.    Musculoskeletal: Negative.    Skin: Negative.    Neurological: Negative.  Negative for dizziness, loss of consciousness and weakness.   Endo/Heme/Allergies: Negative.  Does not bruise/bleed easily.   All other systems reviewed and are negative.       Objective:   /70   Pulse 82   Ht 1.6 m (5' 3\")   Wt 69.8 kg (153 lb 12.8 oz)   LMP  (LMP Unknown)   SpO2 99%   BMI 27.24 kg/m²     Physical Exam   Constitutional: She appears well-developed and well-nourished. No distress.   HENT:   Head: Normocephalic and atraumatic.   Right Ear: External ear normal.   Left Ear: External ear normal.   Nose: Nose normal.   Mouth/Throat: No oropharyngeal exudate.   Eyes: Conjunctivae and EOM are normal. Pupils are equal, round, and reactive to light. Right eye exhibits no discharge. Left eye exhibits no discharge. No scleral icterus.   Neck: Neck supple. No JVD present.   Cardiovascular: Normal rate, regular rhythm and intact distal pulses.  Exam reveals no gallop and no friction rub.    No murmur " heard.  2+ LE edema to mid knees   Pulmonary/Chest: Effort normal. No stridor. No respiratory distress. She has no wheezes. She has no rales. She exhibits no tenderness.   Abdominal: Soft. She exhibits no distension. There is no guarding.   Musculoskeletal: Normal range of motion. She exhibits no edema, tenderness or deformity.   Neurological: She is alert. She has normal reflexes. She displays normal reflexes. No cranial nerve deficit. She exhibits normal muscle tone. Coordination normal.   Skin: Skin is warm and dry. No rash noted. She is not diaphoretic. No erythema. No pallor.   Psychiatric: She has a normal mood and affect. Her behavior is normal. Judgment and thought content normal.   Nursing note and vitals reviewed.    Echo:7/8/18  CONCLUSIONS  Prior done 05-.  Compared to prior study, the right-sided   chambers appear dilated.    Left ventricular systolic function is low normal.  Left ventricular ejection fraction is visually estimated to be 50-55%.  Abnormal septal motion consistent with right ventricular (RV) volume   overload and/or elevated RV end-diastolic pressure.  Dilated right ventricle with reduced systolic function.  Severe tricuspid regurgitation. Estimated right ventricular systolic   pressure  is 55 mmHg.  Dilated inferior vena cava without inspiratory collapse.    Echo:5/19/18  Severe bi-atrial enlargement. Normal LV and RV systolic function. LVEF   is 55% by visual estimation. Diastolic function difficult to assess.   There is at least mild aortic stenosis but valve is opening. Mean   gradient is 10 mmHg, with V max of 2.01 m/s. RVSP is elevated at 70   mmHg suggesting pulmonary hypertension. No prior study is available for   comparison.     Renal U/S:  No evidence of hydronephrosis.    Trace right perinephric fluid.    LE AET Duplex:  Vascular Laboratory   Conclusions   There is no evidence of pseudoaneurysm or hematoma at the catheter    insertion site.   The adjacent veins  demonstrate pulsatile flow; however, pulsatile flow is    also demonstrated in the left common femoral vein, indicating a possible    cardiac origin.    Cath:     IMPRESSION:  1.  No evidence of aortic stenosis with 0 mmHg peak to peak gradient, mean gradient   of 8 mmHg, calculated CHRISTIANE of 2.09 cm2.  2.  Coronary artery disease with high grade proximal and high grade mid left   anterior descending artery.  3.  Successful percutaneous transluminal coronary angioplasty/stent placement   of the mid left anterior descending artery with 2.25x12 mm Synergy   drug-eluting stent.  4.  Successful percutaneous transluminal coronary angioplasty/stent placement   of the proximal left anterior descending artery with 2.5x12 mm Synergy   drug-eluting stent.  5.  Normal left ventricular systolic function with ejection fraction of 55%.  6.  Normal left ventricular end-diastolic pressure.  7.  Elevated right heart pressure with PA systolic pressure of 60 mmHg.  8.  Successful Angio-Seal closure.    Assessment:     1. Acute on chronic diastolic (congestive) heart failure (HCC)     2. Acute renal failure superimposed on chronic kidney disease, unspecified CKD stage, unspecified acute renal failure type (HCC)  torsemide (DEMADEX) 20 MG Tab    BASIC METABOLIC PANEL       Medical Decision Making:  Today's Assessment / Status / Plan:     86-year-old female with CAD status post stent to the mid LAD as well as the proximal LAD.  She is currently in acute renal failure.  We will increase her diuretics to torsemide 40 mg twice per day and I told him to increase that to 60 twice per day if she is not losing weight by Sunday.  We will recheck her labs on Monday.  She will have a follow-up appointment with us on Tuesday.  In the meantime for her CAD we will continue her on dual antiplatelet therapy.  If her weight is not better by Tuesday we will consider admission to the hospital for IV diuresis.    Thank for you allowing me to take part in your  patient's care, please call should you have any questions or would like to discuss this patient.

## 2018-07-20 NOTE — PATIENT INSTRUCTIONS
Stop lasix  Start  Torsemide 2 pills twice daily  If no weight loss by Sunday morning go to 3 pills twice daily

## 2018-07-20 NOTE — LETTER
[facility], formerly [facility]   [address], [facility] [address]  [place] [address]  Phone: [phone]  Fax: [phone]              Andree Egan  4/21/1932    Encounter Date: 7/20/2018    Jose Simms M.D.          PROGRESS NOTE:  Chief Complaint   Patient presents with   • Congestive Heart Failure     HF est.       Subjective:   Andree Egan is a 86 y.o. female who presents today as a follow-up for her heart failure and presumed concern for aortic stenosis.  She underwent her aortic valve study which showed no gradient.  She did receive 2 stents.  During the procedure she received 214 mL of contrast and developed a contrast induced nephropathy.  Her most recent creatinine is been 1.85.  Her estimated GFR is at 26.  She is having profound lower extremity edema now.  She has been refractory to Lasix.  The family reports an 8 pound weight gain in 3 days.  She was previously on Lasix which is now been moved up to 40 mg twice daily.  Her most recent potassium was also elevated at 5.4.    Past Medical History:   Diagnosis Date   • Arthritis     back/neck   • Atrial fibrillation (CMS-HCC) [I48.91] 3/29/2018   • Blood clotting disorder ([facility])    • Breath shortness     water retention   • Cancer ([facility])     breast   • Cataract    • Congestive heart failure (CHF) ([facility]) 4/3/2018   • BERTRAND (dyspnea on exertion) 5/4/2018   • Heart burn    • Heart valve disease    • Hemorrhagic disorder ([facility])    • Hiatus hernia syndrome    • Hyperlipidemia    • Hypertension    • Kidney disease    • Long term current use of anticoagulant 3/29/2018   • Pacemaker 4/3/2018   • Pulmonary emphysema ([facility])    • Secondary hypertension 4/3/2018   • Thyroid disease    • Tremors of nervous system 4/3/2018   • Urinary incontinence    • Urinary tract infection      No past surgical history on file.  Family History   Problem Relation Age of Onset   • Fainting Neg Hx      Social History     Social History   • Marital status:  Single     Spouse name: N/A   • Number of children: N/A   • Years of education: N/A     Occupational History   • Not on file.     Social History Main Topics   • Smoking status: Former Smoker     Packs/day: 0.50     Types: Cigarettes     Quit date: 1/1/1970   • Smokeless tobacco: Never Used   • Alcohol use 1.8 oz/week     3 Glasses of wine per week      Comment: 1-2 glasses wine before dinner   • Drug use: No   • Sexual activity: Not on file     Other Topics Concern   • Not on file     Social History Narrative   • No narrative on file     Allergies   Allergen Reactions   • Codeine Vomiting     Outpatient Encounter Prescriptions as of 7/20/2018   Medication Sig Dispense Refill   • torsemide (DEMADEX) 20 MG Tab Take 2 Tabs by mouth 2 times a day. 120 Tab 11   • rosuvastatin (CRESTOR) 10 MG Tab Take 1 Tab by mouth every evening. 30 Tab 11   • aspirin EC 81 MG EC tablet Take 1 Tab by mouth every day for 28 days. 28 Tab 0   • ferrous sulfate 325 (65 Fe) MG tablet Take 1 Tab by mouth every day. 30 Tab 3   • ascorbic acid (VITAMIN C) 500 MG tablet Take 1 Tab by mouth every day. 30 Tab 3   • clopidogrel (PLAVIX) 75 MG Tab Take 1 Tab by mouth every day. 90 Tab 3   • topiramate (TOPAMAX) 25 MG Tab Take 1 Tab by mouth 2 times a day. 60 Tab 3   • allopurinol (ZYLOPRIM) 300 MG Tab Take 1 Tab by mouth every evening. 90 Tab 0   • levothyroxine (SYNTHROID) 50 MCG Tab Take 1 Tab by mouth Every morning on an empty stomach. 90 Tab 3   • warfarin (COUMADIN) 2.5 MG Tab Take 2.5-3.75 mg by mouth every evening. 3.75mg on Tuesday/Thursday, 2.5mg on all other days      • gabapentin (NEURONTIN) 300 MG Cap Take 1,200 mg by mouth every bedtime.     • acetaminophen (TYLENOL) 500 MG Tab Take 1,000 mg by mouth 1 time daily as needed for Mild Pain.     • Calcium-Vitamin D 500-125 MG-UNIT Tab Take 1 tablet by mouth every evening. 600mg - 400 iu     • albuterol 108 (90 Base) MCG/ACT Aero Soln inhalation aerosol Inhale 2 Puffs by mouth every 6 hours  "as needed for Shortness of Breath.     • sertraline (ZOLOFT) 50 MG Tab Take 25 mg by mouth every bedtime.     • metoprolol SR (TOPROL XL) 25 MG TABLET SR 24 HR Take 25 mg by mouth every morning.     • omeprazole (PRILOSEC) 20 MG delayed-release capsule Take 20 mg by mouth every morning.     • [DISCONTINUED] furosemide (LASIX) 40 MG Tab Take 1 Tab by mouth 2 times a day. 60 Tab 11   • [DISCONTINUED] potassium chloride SA (KDUR) 20 MEQ Tab CR Take 1 Tab by mouth 2 times a day. (Patient taking differently: Take 40 mEq by mouth 2 times a day.) 60 Tab 11   • [DISCONTINUED] sodium chloride (SALT) 1 GM Tab Take 1 Tab by mouth 3 times a day, with meals. 90 Tab 0     No facility-administered encounter medications on file as of 7/20/2018.      Review of Systems   Constitutional: Negative.  Negative for chills, fever and malaise/fatigue.   HENT: Negative.  Negative for sore throat.    Eyes: Negative.    Respiratory: Positive for shortness of breath. Negative for cough, hemoptysis, sputum production, wheezing and stridor.    Cardiovascular: Positive for leg swelling. Negative for chest pain, palpitations, orthopnea, claudication and PND.   Gastrointestinal: Negative.    Genitourinary: Negative.    Musculoskeletal: Negative.    Skin: Negative.    Neurological: Negative.  Negative for dizziness, loss of consciousness and weakness.   Endo/Heme/Allergies: Negative.  Does not bruise/bleed easily.   All other systems reviewed and are negative.       Objective:   /70   Pulse 82   Ht 1.6 m (5' 3\")   Wt 69.8 kg (153 lb 12.8 oz)   LMP  (LMP Unknown)   SpO2 99%   BMI 27.24 kg/m²      Physical Exam   Constitutional: She appears well-developed and well-nourished. No distress.   HENT:   Head: Normocephalic and atraumatic.   Right Ear: External ear normal.   Left Ear: External ear normal.   Nose: Nose normal.   Mouth/Throat: No oropharyngeal exudate.   Eyes: Conjunctivae and EOM are normal. Pupils are equal, round, and reactive to " light. Right eye exhibits no discharge. Left eye exhibits no discharge. No scleral icterus.   Neck: Neck supple. No JVD present.   Cardiovascular: Normal rate, regular rhythm and intact distal pulses.  Exam reveals no gallop and no friction rub.    No murmur heard.  2+ LE edema to mid knees   Pulmonary/Chest: Effort normal. No stridor. No respiratory distress. She has no wheezes. She has no rales. She exhibits no tenderness.   Abdominal: Soft. She exhibits no distension. There is no guarding.   Musculoskeletal: Normal range of motion. She exhibits no edema, tenderness or deformity.   Neurological: She is alert. She has normal reflexes. She displays normal reflexes. No cranial nerve deficit. She exhibits normal muscle tone. Coordination normal.   Skin: Skin is warm and dry. No rash noted. She is not diaphoretic. No erythema. No pallor.   Psychiatric: She has a normal mood and affect. Her behavior is normal. Judgment and thought content normal.   Nursing note and vitals reviewed.    Echo:7/8/18  CONCLUSIONS  Prior done 05-.  Compared to prior study, the right-sided   chambers appear dilated.    Left ventricular systolic function is low normal.  Left ventricular ejection fraction is visually estimated to be 50-55%.  Abnormal septal motion consistent with right ventricular (RV) volume   overload and/or elevated RV end-diastolic pressure.  Dilated right ventricle with reduced systolic function.  Severe tricuspid regurgitation. Estimated right ventricular systolic   pressure  is 55 mmHg.  Dilated inferior vena cava without inspiratory collapse.    Echo:5/19/18  Severe bi-atrial enlargement. Normal LV and RV systolic function. LVEF   is 55% by visual estimation. Diastolic function difficult to assess.   There is at least mild aortic stenosis but valve is opening. Mean   gradient is 10 mmHg, with V max of 2.01 m/s. RVSP is elevated at 70   mmHg suggesting pulmonary hypertension. No prior study is available for      comparison.     Renal U/S:  No evidence of hydronephrosis.    Trace right perinephric fluid.    LE AET Duplex:  Vascular Laboratory   Conclusions   There is no evidence of pseudoaneurysm or hematoma at the catheter    insertion site.   The adjacent veins demonstrate pulsatile flow; however, pulsatile flow is    also demonstrated in the left common femoral vein, indicating a possible    cardiac origin.    Cath:     IMPRESSION:  1.  No evidence of aortic stenosis with 0 mmHg peak to peak gradient, mean gradient   of 8 mmHg, calculated CHRISTIANE of 2.09 cm2.  2.  Coronary artery disease with high grade proximal and high grade mid left   anterior descending artery.  3.  Successful percutaneous transluminal coronary angioplasty/stent placement   of the mid left anterior descending artery with 2.25x12 mm Synergy   drug-eluting stent.  4.  Successful percutaneous transluminal coronary angioplasty/stent placement   of the proximal left anterior descending artery with 2.5x12 mm Synergy   drug-eluting stent.  5.  Normal left ventricular systolic function with ejection fraction of 55%.  6.  Normal left ventricular end-diastolic pressure.  7.  Elevated right heart pressure with PA systolic pressure of 60 mmHg.  8.  Successful Angio-Seal closure.    Assessment:     1. Acute on chronic diastolic (congestive) heart failure (HCC)     2. Acute renal failure superimposed on chronic kidney disease, unspecified CKD stage, unspecified acute renal failure type (HCC)  torsemide (DEMADEX) 20 MG Tab    BASIC METABOLIC PANEL       Medical Decision Making:  Today's Assessment / Status / Plan:     86-year-old female with CAD status post stent to the mid LAD as well as the proximal LAD.  She is currently in acute renal failure.  We will increase her diuretics to torsemide 40 mg twice per day and I told him to increase that to 60 twice per day if she is not losing weight by Sunday.  We will recheck her labs on Monday.  She will have a follow-up  appointment with us on Tuesday.  In the meantime for her CAD we will continue her on dual antiplatelet therapy.  If her weight is not better by Tuesday we will consider admission to the hospital for IV diuresis.    Thank for you allowing me to take part in your patient's care, please call should you have any questions or would like to discuss this patient.        Yudelka Sanchez, CARA.RAHAT.R.N.  [address]  [facility] [address]  VIA In Basket

## 2018-07-21 ENCOUNTER — HOME CARE VISIT (OUTPATIENT)
Dept: HOME HEALTH SERVICES | Facility: HOME HEALTHCARE | Age: 83
End: 2018-07-21
Payer: MEDICARE

## 2018-07-21 PROCEDURE — G0495 RN CARE TRAIN/EDU IN HH: HCPCS

## 2018-07-22 VITALS
TEMPERATURE: 98.3 F | HEART RATE: 64 BPM | BODY MASS INDEX: 26.93 KG/M2 | RESPIRATION RATE: 18 BRPM | OXYGEN SATURATION: 96 % | WEIGHT: 152 LBS | DIASTOLIC BLOOD PRESSURE: 60 MMHG | SYSTOLIC BLOOD PRESSURE: 110 MMHG

## 2018-07-22 SDOH — ECONOMIC STABILITY: HOUSING INSECURITY: UNSAFE COOKING RANGE AREA: 0

## 2018-07-22 SDOH — ECONOMIC STABILITY: HOUSING INSECURITY: UNSAFE APPLIANCES: 0

## 2018-07-22 ASSESSMENT — ENCOUNTER SYMPTOMS: RESPIRATORY SYMPTOMS COMMENTS: NO S/S OF RESP DSITRESS

## 2018-07-23 ENCOUNTER — HOSPITAL ENCOUNTER (OUTPATIENT)
Dept: LAB | Facility: MEDICAL CENTER | Age: 83
DRG: 291 | End: 2018-07-23
Attending: INTERNAL MEDICINE
Payer: MEDICARE

## 2018-07-23 DIAGNOSIS — N18.9 ACUTE RENAL FAILURE SUPERIMPOSED ON CHRONIC KIDNEY DISEASE, UNSPECIFIED CKD STAGE, UNSPECIFIED ACUTE RENAL FAILURE TYPE: ICD-10-CM

## 2018-07-23 DIAGNOSIS — N17.9 ACUTE RENAL FAILURE SUPERIMPOSED ON CHRONIC KIDNEY DISEASE, UNSPECIFIED CKD STAGE, UNSPECIFIED ACUTE RENAL FAILURE TYPE: ICD-10-CM

## 2018-07-23 LAB
ANION GAP SERPL CALC-SCNC: 10 MMOL/L (ref 0–11.9)
BUN SERPL-MCNC: 30 MG/DL (ref 8–22)
CALCIUM SERPL-MCNC: 9 MG/DL (ref 8.5–10.5)
CHLORIDE SERPL-SCNC: 101 MMOL/L (ref 96–112)
CO2 SERPL-SCNC: 27 MMOL/L (ref 20–33)
CREAT SERPL-MCNC: 1.55 MG/DL (ref 0.5–1.4)
GLUCOSE SERPL-MCNC: 72 MG/DL (ref 65–99)
POTASSIUM SERPL-SCNC: 3.6 MMOL/L (ref 3.6–5.5)
SODIUM SERPL-SCNC: 138 MMOL/L (ref 135–145)

## 2018-07-23 PROCEDURE — 80048 BASIC METABOLIC PNL TOTAL CA: CPT

## 2018-07-23 PROCEDURE — 36415 COLL VENOUS BLD VENIPUNCTURE: CPT

## 2018-07-23 ASSESSMENT — ACTIVITIES OF DAILY LIVING (ADL): OASIS_M1830: 03

## 2018-07-24 ENCOUNTER — TELEPHONE (OUTPATIENT)
Dept: CARDIOLOGY | Facility: MEDICAL CENTER | Age: 83
End: 2018-07-24

## 2018-07-24 ENCOUNTER — HOSPITAL ENCOUNTER (INPATIENT)
Facility: MEDICAL CENTER | Age: 83
LOS: 3 days | DRG: 291 | End: 2018-07-28
Attending: EMERGENCY MEDICINE | Admitting: HOSPITALIST
Payer: MEDICARE

## 2018-07-24 ENCOUNTER — APPOINTMENT (OUTPATIENT)
Dept: RADIOLOGY | Facility: MEDICAL CENTER | Age: 83
DRG: 291 | End: 2018-07-24
Attending: EMERGENCY MEDICINE
Payer: MEDICARE

## 2018-07-24 ENCOUNTER — OFFICE VISIT (OUTPATIENT)
Dept: CARDIOLOGY | Facility: MEDICAL CENTER | Age: 83
End: 2018-07-24
Payer: MEDICARE

## 2018-07-24 VITALS
HEIGHT: 64 IN | BODY MASS INDEX: 25.61 KG/M2 | HEART RATE: 66 BPM | SYSTOLIC BLOOD PRESSURE: 114 MMHG | WEIGHT: 150 LBS | OXYGEN SATURATION: 98 % | DIASTOLIC BLOOD PRESSURE: 60 MMHG

## 2018-07-24 DIAGNOSIS — I48.20 CHRONIC ATRIAL FIBRILLATION (HCC): ICD-10-CM

## 2018-07-24 DIAGNOSIS — J44.9 CHRONIC OBSTRUCTIVE PULMONARY DISEASE, UNSPECIFIED COPD TYPE (HCC): ICD-10-CM

## 2018-07-24 DIAGNOSIS — Z95.0 CARDIAC PACEMAKER IN SITU: ICD-10-CM

## 2018-07-24 DIAGNOSIS — Z79.01 LONG TERM CURRENT USE OF ANTICOAGULANT: ICD-10-CM

## 2018-07-24 DIAGNOSIS — I50.30 ACC/AHA STAGE C HEART FAILURE WITH PRESERVED EJECTION FRACTION (HCC): ICD-10-CM

## 2018-07-24 DIAGNOSIS — N18.30 CKD (CHRONIC KIDNEY DISEASE) STAGE 3, GFR 30-59 ML/MIN (HCC): ICD-10-CM

## 2018-07-24 DIAGNOSIS — I50.9 HEART FAILURE, NYHA CLASS 3 (HCC): ICD-10-CM

## 2018-07-24 DIAGNOSIS — R60.0 LOWER EXTREMITY EDEMA: ICD-10-CM

## 2018-07-24 DIAGNOSIS — I25.10 CORONARY ARTERY DISEASE INVOLVING NATIVE CORONARY ARTERY OF NATIVE HEART WITHOUT ANGINA PECTORIS: ICD-10-CM

## 2018-07-24 DIAGNOSIS — I50.33 ACUTE ON CHRONIC DIASTOLIC (CONGESTIVE) HEART FAILURE (HCC): ICD-10-CM

## 2018-07-24 DIAGNOSIS — I25.10 ASCVD (ARTERIOSCLEROTIC CARDIOVASCULAR DISEASE): ICD-10-CM

## 2018-07-24 LAB
ALBUMIN SERPL BCP-MCNC: 3.7 G/DL (ref 3.2–4.9)
ALBUMIN/GLOB SERPL: 1.7 G/DL
ALP SERPL-CCNC: 108 U/L (ref 30–99)
ALT SERPL-CCNC: 6 U/L (ref 2–50)
ANION GAP SERPL CALC-SCNC: 10 MMOL/L (ref 0–11.9)
APTT PPP: 46.1 SEC (ref 24.7–36)
AST SERPL-CCNC: 13 U/L (ref 12–45)
BASOPHILS # BLD AUTO: 1.3 % (ref 0–1.8)
BASOPHILS # BLD: 0.03 K/UL (ref 0–0.12)
BILIRUB SERPL-MCNC: 0.8 MG/DL (ref 0.1–1.5)
BUN SERPL-MCNC: 31 MG/DL (ref 8–22)
CALCIUM SERPL-MCNC: 8.7 MG/DL (ref 8.5–10.5)
CHLORIDE SERPL-SCNC: 99 MMOL/L (ref 96–112)
CO2 SERPL-SCNC: 27 MMOL/L (ref 20–33)
CREAT SERPL-MCNC: 1.66 MG/DL (ref 0.5–1.4)
EKG IMPRESSION: NORMAL
EOSINOPHIL # BLD AUTO: 0 K/UL (ref 0–0.51)
EOSINOPHIL NFR BLD: 0 % (ref 0–6.9)
ERYTHROCYTE [DISTWIDTH] IN BLOOD BY AUTOMATED COUNT: 58.8 FL (ref 35.9–50)
GLOBULIN SER CALC-MCNC: 2.2 G/DL (ref 1.9–3.5)
GLUCOSE SERPL-MCNC: 103 MG/DL (ref 65–99)
HCT VFR BLD AUTO: 27.4 % (ref 37–47)
HGB BLD-MCNC: 8.6 G/DL (ref 12–16)
IMM GRANULOCYTES # BLD AUTO: 0.01 K/UL (ref 0–0.11)
IMM GRANULOCYTES NFR BLD AUTO: 0.4 % (ref 0–0.9)
INR PPP: 2.32 (ref 0.87–1.13)
LYMPHOCYTES # BLD AUTO: 0.64 K/UL (ref 1–4.8)
LYMPHOCYTES NFR BLD: 27.1 % (ref 22–41)
MCH RBC QN AUTO: 28.9 PG (ref 27–33)
MCHC RBC AUTO-ENTMCNC: 31.4 G/DL (ref 33.6–35)
MCV RBC AUTO: 91.9 FL (ref 81.4–97.8)
MONOCYTES # BLD AUTO: 0.21 K/UL (ref 0–0.85)
MONOCYTES NFR BLD AUTO: 8.9 % (ref 0–13.4)
NEUTROPHILS # BLD AUTO: 1.47 K/UL (ref 2–7.15)
NEUTROPHILS NFR BLD: 62.3 % (ref 44–72)
NRBC # BLD AUTO: 0 K/UL
NRBC BLD-RTO: 0 /100 WBC
PLATELET # BLD AUTO: 132 K/UL (ref 164–446)
PMV BLD AUTO: 11.2 FL (ref 9–12.9)
POTASSIUM SERPL-SCNC: 2.7 MMOL/L (ref 3.6–5.5)
PROT SERPL-MCNC: 5.9 G/DL (ref 6–8.2)
PROTHROMBIN TIME: 25.2 SEC (ref 12–14.6)
RBC # BLD AUTO: 2.98 M/UL (ref 4.2–5.4)
SODIUM SERPL-SCNC: 136 MMOL/L (ref 135–145)
TROPONIN I SERPL-MCNC: 0.03 NG/ML (ref 0–0.04)
WBC # BLD AUTO: 2.4 K/UL (ref 4.8–10.8)

## 2018-07-24 PROCEDURE — 83880 ASSAY OF NATRIURETIC PEPTIDE: CPT

## 2018-07-24 PROCEDURE — 99285 EMERGENCY DEPT VISIT HI MDM: CPT

## 2018-07-24 PROCEDURE — 80053 COMPREHEN METABOLIC PANEL: CPT

## 2018-07-24 PROCEDURE — 93005 ELECTROCARDIOGRAM TRACING: CPT | Performed by: EMERGENCY MEDICINE

## 2018-07-24 PROCEDURE — 85730 THROMBOPLASTIN TIME PARTIAL: CPT

## 2018-07-24 PROCEDURE — 84484 ASSAY OF TROPONIN QUANT: CPT

## 2018-07-24 PROCEDURE — 85025 COMPLETE CBC W/AUTO DIFF WBC: CPT

## 2018-07-24 PROCEDURE — 71045 X-RAY EXAM CHEST 1 VIEW: CPT

## 2018-07-24 PROCEDURE — 85610 PROTHROMBIN TIME: CPT

## 2018-07-24 PROCEDURE — 99214 OFFICE O/P EST MOD 30 MIN: CPT | Performed by: NURSE PRACTITIONER

## 2018-07-24 PROCEDURE — 93005 ELECTROCARDIOGRAM TRACING: CPT

## 2018-07-24 PROCEDURE — 36415 COLL VENOUS BLD VENIPUNCTURE: CPT

## 2018-07-24 RX ORDER — TORSEMIDE 20 MG/1
60 TABLET ORAL 2 TIMES DAILY
Qty: 180 TAB | Refills: 11 | Status: ON HOLD | OUTPATIENT
Start: 2018-07-24 | End: 2018-07-28

## 2018-07-24 RX ORDER — POTASSIUM CHLORIDE 20 MEQ/1
40 TABLET, EXTENDED RELEASE ORAL ONCE
Status: COMPLETED | OUTPATIENT
Start: 2018-07-25 | End: 2018-07-25

## 2018-07-24 ASSESSMENT — ENCOUNTER SYMPTOMS
PALPITATIONS: 0
ORTHOPNEA: 0
SHORTNESS OF BREATH: 1
WEAKNESS: 1
COUGH: 0
FEVER: 0
ABDOMINAL PAIN: 0
DIZZINESS: 0
MYALGIAS: 0
PND: 0
CLAUDICATION: 0

## 2018-07-24 ASSESSMENT — PAIN SCALES - GENERAL: PAINLEVEL_OUTOF10: 0

## 2018-07-24 ASSESSMENT — LIFESTYLE VARIABLES: DO YOU DRINK ALCOHOL: NO

## 2018-07-24 NOTE — PROGRESS NOTES
Chief Complaint   Patient presents with   • Edema     bilateral legs/ feet       Subjective:   Andree Egan is a 86 y.o. female who presents today for follow-up on her heart failure with her daughter-in-law, Iveth.    Patient of the heart failure clinic.  Patient was last seen in clinic on 7/20/2018 with Dr. Simms.  Her diuretics were adjusted, torsemide was started at 40 mg twice a day and patient was told increased to 60 mg twice a day if she was not losing weight.  Patient also stopped her salt tablets.    Patient reports over the weekend, patient has been losing about 1 pound per day.  She is down 3 pounds.  Patient continues to have lower extremity edema, but states it is slightly better.  Patient continues to have shortness of breath with her ADLs and exertion.    She denies chest pain, palpitations, orthopnea, PND, shortness of breath at rest or dizziness/lightheadedness.    Her home weights are now 147 pounds, down 3 pounds from her last visit.    Additonally, patient has the following medical problems:     -Recent hospitalization for heart failure exacerbation. Pt had a thoracentesis (failed 1st attempt and 2nd attempt successful) for a right Pleural effusion.      -Chronic atrial fibrillation: Rate controlled, taking warfarin, followed by the anticoagulation clinic     -Has permanent pacemaker     -Aortic stenosis     -Pulmonary HTN     -Hypertension     -Hyperlipidemia     -Kidney disease: has an upcoming appointment with Neph.     -Hypothyroidism: Taking levothyroxine.     -COPD    Past Medical History:   Diagnosis Date   • Arthritis     back/neck   • Atrial fibrillation (CMS-HCC) [I48.91] 3/29/2018   • Blood clotting disorder ([facility])    • Breath shortness     water retention   • Cancer (HCC)     breast   • Cataract    • Congestive heart failure (CHF) (HCC) 4/3/2018   • BERTRAND (dyspnea on exertion) 5/4/2018   • Heart burn    • Heart valve disease    • Hemorrhagic disorder ([facility])    • Hiatus hernia  syndrome    • Hyperlipidemia    • Hypertension    • Kidney disease    • Long term current use of anticoagulant 3/29/2018   • Pacemaker 4/3/2018   • Pulmonary emphysema (HCC)    • Secondary hypertension 4/3/2018   • Thyroid disease    • Tremors of nervous system 4/3/2018   • Urinary incontinence    • Urinary tract infection      History reviewed. No pertinent surgical history.  Family History   Problem Relation Age of Onset   • Fainting Neg Hx      Social History     Social History   • Marital status: Single     Spouse name: N/A   • Number of children: N/A   • Years of education: N/A     Occupational History   • Not on file.     Social History Main Topics   • Smoking status: Former Smoker     Packs/day: 0.50     Types: Cigarettes     Quit date: 1/1/1970   • Smokeless tobacco: Never Used   • Alcohol use 1.8 oz/week     3 Glasses of wine per week      Comment: 1-2 glasses wine before dinner   • Drug use: No   • Sexual activity: Not on file     Other Topics Concern   • Not on file     Social History Narrative   • No narrative on file     Allergies   Allergen Reactions   • Codeine Vomiting     Outpatient Encounter Prescriptions as of 7/24/2018   Medication Sig Dispense Refill   • torsemide (DEMADEX) 20 MG Tab Take 2 Tabs by mouth 2 times a day. 120 Tab 11   • rosuvastatin (CRESTOR) 10 MG Tab Take 1 Tab by mouth every evening. 30 Tab 11   • aspirin EC 81 MG EC tablet Take 1 Tab by mouth every day for 28 days. 28 Tab 0   • ferrous sulfate 325 (65 Fe) MG tablet Take 1 Tab by mouth every day. 30 Tab 3   • ascorbic acid (VITAMIN C) 500 MG tablet Take 1 Tab by mouth every day. 30 Tab 3   • clopidogrel (PLAVIX) 75 MG Tab Take 1 Tab by mouth every day. 90 Tab 3   • topiramate (TOPAMAX) 25 MG Tab Take 1 Tab by mouth 2 times a day. 60 Tab 3   • allopurinol (ZYLOPRIM) 300 MG Tab Take 1 Tab by mouth every evening. 90 Tab 0   • levothyroxine (SYNTHROID) 50 MCG Tab Take 1 Tab by mouth Every morning on an empty stomach. 90 Tab 3   •  "warfarin (COUMADIN) 2.5 MG Tab Take 2.5-3.75 mg by mouth every evening. 3.75mg on Tuesday/Thursday, 2.5mg on all other days      • gabapentin (NEURONTIN) 300 MG Cap Take 1,200 mg by mouth every bedtime.     • Calcium-Vitamin D 500-125 MG-UNIT Tab Take 1 tablet by mouth every evening. 600mg - 400 iu     • sertraline (ZOLOFT) 50 MG Tab Take 25 mg by mouth every bedtime.     • metoprolol SR (TOPROL XL) 25 MG TABLET SR 24 HR Take 25 mg by mouth every morning.     • omeprazole (PRILOSEC) 20 MG delayed-release capsule Take 20 mg by mouth every morning.     • acetaminophen (TYLENOL) 500 MG Tab Take 1,000 mg by mouth 1 time daily as needed for Mild Pain.     • albuterol 108 (90 Base) MCG/ACT Aero Soln inhalation aerosol Inhale 2 Puffs by mouth every 6 hours as needed for Shortness of Breath.       No facility-administered encounter medications on file as of 7/24/2018.      Review of Systems   Constitutional: Positive for malaise/fatigue. Negative for fever.   Respiratory: Positive for shortness of breath. Negative for cough.    Cardiovascular: Positive for leg swelling. Negative for chest pain, palpitations, orthopnea, claudication and PND.   Gastrointestinal: Negative for abdominal pain.   Musculoskeletal: Negative for myalgias.   Neurological: Positive for weakness. Negative for dizziness.   All other systems reviewed and are negative.       Objective:   /60   Pulse 66   Ht 1.613 m (5' 3.5\")   Wt 68 kg (150 lb)   LMP  (LMP Unknown)   SpO2 98%   BMI 26.15 kg/m²     Physical Exam   Constitutional: She is oriented to person, place, and time. She appears well-developed and well-nourished.   In wheelchair today   HENT:   Head: Normocephalic and atraumatic.   Eyes: EOM are normal. Pupils are equal, round, and reactive to light.   Neck: Normal range of motion. Neck supple. JVD present.   Cardiovascular: Normal rate and regular rhythm.    Murmur heard.  Pulmonary/Chest: Effort normal and breath sounds normal. No " respiratory distress. She has no wheezes. She has no rales.   Right chest pacemaker-no erosion, drainage or erythema   Abdominal: Soft. Bowel sounds are normal.   Musculoskeletal: She exhibits edema (Bilateral 3+ lower extremity edema).   Neurological: She is alert and oriented to person, place, and time.   Skin: Skin is warm and dry.   Psychiatric: She has a normal mood and affect. Her behavior is normal.   Vitals reviewed.    Lab Results   Component Value Date/Time    CHOLSTRLTOT 67 (L) 07/09/2018 02:22 AM    LDL 17 07/09/2018 02:22 AM    HDL 40 07/09/2018 02:22 AM    TRIGLYCERIDE 50 07/09/2018 02:22 AM       Lab Results   Component Value Date/Time    SODIUM 138 07/23/2018 11:02 AM    POTASSIUM 3.6 07/23/2018 11:02 AM    CHLORIDE 101 07/23/2018 11:02 AM    CO2 27 07/23/2018 11:02 AM    GLUCOSE 72 07/23/2018 11:02 AM    BUN 30 (H) 07/23/2018 11:02 AM    CREATININE 1.55 (H) 07/23/2018 11:02 AM     Lab Results   Component Value Date/Time    ALKPHOSPHAT 97 07/09/2018 02:22 AM    ASTSGOT 11 (L) 07/09/2018 02:22 AM    ALTSGPT 5 07/09/2018 02:22 AM    TBILIRUBIN 0.7 07/09/2018 02:22 AM      Transthoracic Echo Report 5/19/18  Severe bi-atrial enlargement. Normal LV and RV systolic function. LVEF is 55% by visual estimation. Diastolic function difficult to assess.   There is at least mild aortic stenosis but valve is opening. Mean   gradient is 10 mmHg, with V max of 2.01 m/s. RVSP is elevated at 70   mmHg suggesting pulmonary hypertension. No prior study is available for   comparison.      Heart Cath 7/2/2018   POSTPROCEDURE DIAGNOSES:  1.  No evidence of aortic stenosis with 0 mmHg peak to peak gradient, mean gradient   of 8 mmHg, calculated CHRISTIANE of 2.09 cm2.  2.  Coronary artery disease with high grade proximal and high grade mid left   anterior descending artery.  3.  Successful percutaneous transluminal coronary angioplasty/stent placement   of the mid left anterior descending artery with 2.25x12 mm Synergy    drug-eluting stent.  4.  Successful percutaneous transluminal coronary angioplasty/stent placement   of the proximal left anterior descending artery with 2.5x12 mm Synergy   drug-eluting stent.  5.  Normal left ventricular systolic function with ejection fraction of 55%.  6.  Normal left ventricular end-diastolic pressure.  7.  Elevated right heart pressure with PA systolic pressure of 60 mmHg.  8.  Successful Angio-Seal closure.     Transthoracic Echo Report 7/8/2018  Prior done 05-.  Compared to prior study, the right-sided chambers appear dilated.    Left ventricular systolic function is low normal.  Left ventricular ejection fraction is visually estimated to be 50-55%.  Abnormal septal motion consistent with right ventricular (RV) volume overload and/or elevated RV end-diastolic pressure.  Dilated right ventricle with reduced systolic function.  Severe tricuspid regurgitation. Estimated right ventricular systolic pressure  is 55 mmHg.  Dilated inferior vena cava without inspiratory collapse.  Assessment:     1. ACC/AHA stage C heart failure with preserved ejection fraction (HCC)  BASIC METABOLIC PANEL    torsemide (DEMADEX) 20 MG Tab   2. Heart failure, NYHA class 3 (HCC)  BASIC METABOLIC PANEL    torsemide (DEMADEX) 20 MG Tab   3. CKD (chronic kidney disease) stage 3, GFR 30-59 ml/min  BASIC METABOLIC PANEL    torsemide (DEMADEX) 20 MG Tab   4. Chronic atrial fibrillation (HCC)     5. Cardiac pacemaker in situ     6. Long term current use of anticoagulant     7. Coronary artery disease involving native coronary artery of native heart without angina pectoris         Medical Decision Making:  Today's Assessment / Status / Plan:   1. HFpEF, stage C, class 3, LVEF 55%: Patient continues to be volume overloaded  -Increase torsemide to 60 mg twice a day  -no potassium at this time  -Repeat BMP in 1 week  -Discussed with patient and family the importance of continued weight monitoring and if patient does not  lose weight or has weight gain, patient to contact clinic  -Reinforced s/sx of worsening heart failure with patient and weight monitoring. Pt verbalizes understanding. Pt to call office or RTC if present.     2. CKD: Had contrast-induced nephropathy  -Continue follow-up with nephrology on 8/10/2018  -BUN and creatinine improved, BUN 30, creatinine 1.55    3.  A. fib: Rate controlled  -Continue Toprol-XL 25 mg daily  -Continue warfarin, followed by the anticoagulation clinic  -Has pacemaker    4.  CAD, s/p BRITTON x2:   -Continue aspirin 81 mg daily for 1 month, stopped 8/2/18  -Continue Plavix 75 mg daily  -Continue rosuvastatin 10 mg daily    FU in clinic in 1 weeks with labs in 1 week. Sooner if needed.    Patient verbalizes understanding and agrees with the plan of care.     Collaborating MD: Jose Simms MD

## 2018-07-24 NOTE — TELEPHONE ENCOUNTER
"weight concern   Received: Today      Call patient   Message Contents   Madiha Rockwell, Yung Ass't  Cecilia Hodge R.N.   Phone Number: [phone]             Cecilia/MAGDY     [facility] called concerned about pt today.  Stated pt has had a 2lbs increase since appt this am.  Please call Palma [phone]      Returned call to Palma. Conversation is difficult to manage. After many questions she states her grandmother's weight is normally 138 and is 153 now. Informed Palma that pt was 150 in office today and that is a documented 3# loss. Her math seems to be different and she is overall concerned about the excess fluid. She asks, \"So are you not concerned about her fluid?\" Reminded that patient was seen this AM by provider. Advised daily AM weights not multiple weights per day. Reminded about calling for 3# gain per day or 5# gain per week. Advised to take Torsemide 60 mg and would clarify dose with provider. Palma asks to speak with MAGDY directly, reassured that I would let her know that she would like to speak to her.   "

## 2018-07-25 ENCOUNTER — HOME CARE VISIT (OUTPATIENT)
Dept: HOME HEALTH SERVICES | Facility: HOME HEALTHCARE | Age: 83
End: 2018-07-25
Payer: MEDICARE

## 2018-07-25 LAB
ANION GAP SERPL CALC-SCNC: 6 MMOL/L (ref 0–11.9)
BNP SERPL-MCNC: 574 PG/ML (ref 0–100)
BUN SERPL-MCNC: 29 MG/DL (ref 8–22)
CALCIUM SERPL-MCNC: 8.3 MG/DL (ref 8.5–10.5)
CHLORIDE SERPL-SCNC: 102 MMOL/L (ref 96–112)
CO2 SERPL-SCNC: 27 MMOL/L (ref 20–33)
CREAT SERPL-MCNC: 1.57 MG/DL (ref 0.5–1.4)
GLUCOSE SERPL-MCNC: 90 MG/DL (ref 65–99)
POTASSIUM SERPL-SCNC: 2.9 MMOL/L (ref 3.6–5.5)
SODIUM SERPL-SCNC: 135 MMOL/L (ref 135–145)

## 2018-07-25 PROCEDURE — 770020 HCHG ROOM/CARE - TELE (206)

## 2018-07-25 PROCEDURE — 700102 HCHG RX REV CODE 250 W/ 637 OVERRIDE(OP): Performed by: EMERGENCY MEDICINE

## 2018-07-25 PROCEDURE — A9270 NON-COVERED ITEM OR SERVICE: HCPCS | Performed by: HOSPITALIST

## 2018-07-25 PROCEDURE — 700102 HCHG RX REV CODE 250 W/ 637 OVERRIDE(OP): Performed by: HOSPITALIST

## 2018-07-25 PROCEDURE — 700111 HCHG RX REV CODE 636 W/ 250 OVERRIDE (IP): Performed by: INTERNAL MEDICINE

## 2018-07-25 PROCEDURE — 99223 1ST HOSP IP/OBS HIGH 75: CPT | Mod: AI | Performed by: HOSPITALIST

## 2018-07-25 PROCEDURE — A9270 NON-COVERED ITEM OR SERVICE: HCPCS | Performed by: INTERNAL MEDICINE

## 2018-07-25 PROCEDURE — 700105 HCHG RX REV CODE 258

## 2018-07-25 PROCEDURE — 80048 BASIC METABOLIC PNL TOTAL CA: CPT

## 2018-07-25 PROCEDURE — 700102 HCHG RX REV CODE 250 W/ 637 OVERRIDE(OP): Performed by: INTERNAL MEDICINE

## 2018-07-25 PROCEDURE — A9270 NON-COVERED ITEM OR SERVICE: HCPCS | Performed by: EMERGENCY MEDICINE

## 2018-07-25 PROCEDURE — 700111 HCHG RX REV CODE 636 W/ 250 OVERRIDE (IP): Performed by: HOSPITALIST

## 2018-07-25 PROCEDURE — 36415 COLL VENOUS BLD VENIPUNCTURE: CPT

## 2018-07-25 RX ORDER — FUROSEMIDE 10 MG/ML
40 INJECTION INTRAMUSCULAR; INTRAVENOUS
Status: DISCONTINUED | OUTPATIENT
Start: 2018-07-25 | End: 2018-07-26

## 2018-07-25 RX ORDER — ROSUVASTATIN CALCIUM 20 MG/1
10 TABLET, COATED ORAL EVERY EVENING
Status: DISCONTINUED | OUTPATIENT
Start: 2018-07-25 | End: 2018-07-28 | Stop reason: HOSPADM

## 2018-07-25 RX ORDER — ALBUTEROL SULFATE 90 UG/1
2 AEROSOL, METERED RESPIRATORY (INHALATION) EVERY 6 HOURS PRN
Status: DISCONTINUED | OUTPATIENT
Start: 2018-07-25 | End: 2018-07-28 | Stop reason: HOSPADM

## 2018-07-25 RX ORDER — BISACODYL 10 MG
10 SUPPOSITORY, RECTAL RECTAL
Status: DISCONTINUED | OUTPATIENT
Start: 2018-07-25 | End: 2018-07-28 | Stop reason: HOSPADM

## 2018-07-25 RX ORDER — OMEPRAZOLE 20 MG/1
20 CAPSULE, DELAYED RELEASE ORAL EVERY MORNING
Status: DISCONTINUED | OUTPATIENT
Start: 2018-07-25 | End: 2018-07-28 | Stop reason: HOSPADM

## 2018-07-25 RX ORDER — TOPIRAMATE 25 MG/1
25 TABLET ORAL 2 TIMES DAILY
Status: DISCONTINUED | OUTPATIENT
Start: 2018-07-25 | End: 2018-07-28 | Stop reason: HOSPADM

## 2018-07-25 RX ORDER — LEVOTHYROXINE SODIUM 0.05 MG/1
50 TABLET ORAL
Status: DISCONTINUED | OUTPATIENT
Start: 2018-07-25 | End: 2018-07-28 | Stop reason: HOSPADM

## 2018-07-25 RX ORDER — AMOXICILLIN 250 MG
2 CAPSULE ORAL 2 TIMES DAILY
Status: DISCONTINUED | OUTPATIENT
Start: 2018-07-25 | End: 2018-07-28 | Stop reason: HOSPADM

## 2018-07-25 RX ORDER — ALLOPURINOL 300 MG/1
300 TABLET ORAL EVERY EVENING
Status: DISCONTINUED | OUTPATIENT
Start: 2018-07-25 | End: 2018-07-28 | Stop reason: HOSPADM

## 2018-07-25 RX ORDER — POTASSIUM CHLORIDE 7.45 MG/ML
10 INJECTION INTRAVENOUS ONCE
Status: COMPLETED | OUTPATIENT
Start: 2018-07-25 | End: 2018-07-25

## 2018-07-25 RX ORDER — SODIUM CHLORIDE 9 MG/ML
INJECTION, SOLUTION INTRAVENOUS
Status: COMPLETED
Start: 2018-07-25 | End: 2018-07-25

## 2018-07-25 RX ORDER — ONDANSETRON 4 MG/1
4 TABLET, ORALLY DISINTEGRATING ORAL EVERY 4 HOURS PRN
Status: DISCONTINUED | OUTPATIENT
Start: 2018-07-25 | End: 2018-07-28 | Stop reason: HOSPADM

## 2018-07-25 RX ORDER — GABAPENTIN 400 MG/1
800 CAPSULE ORAL
Status: DISCONTINUED | OUTPATIENT
Start: 2018-07-25 | End: 2018-07-28 | Stop reason: HOSPADM

## 2018-07-25 RX ORDER — ACETAMINOPHEN 325 MG/1
650 TABLET ORAL EVERY 6 HOURS PRN
Status: DISCONTINUED | OUTPATIENT
Start: 2018-07-25 | End: 2018-07-28 | Stop reason: HOSPADM

## 2018-07-25 RX ORDER — METOPROLOL SUCCINATE 25 MG/1
25 TABLET, EXTENDED RELEASE ORAL EVERY MORNING
Status: DISCONTINUED | OUTPATIENT
Start: 2018-07-25 | End: 2018-07-28 | Stop reason: HOSPADM

## 2018-07-25 RX ORDER — CLOPIDOGREL BISULFATE 75 MG/1
75 TABLET ORAL DAILY
Status: DISCONTINUED | OUTPATIENT
Start: 2018-07-25 | End: 2018-07-28 | Stop reason: HOSPADM

## 2018-07-25 RX ORDER — FERROUS SULFATE 325(65) MG
325 TABLET ORAL DAILY
Status: DISCONTINUED | OUTPATIENT
Start: 2018-07-25 | End: 2018-07-28 | Stop reason: HOSPADM

## 2018-07-25 RX ORDER — ONDANSETRON 2 MG/ML
4 INJECTION INTRAMUSCULAR; INTRAVENOUS EVERY 4 HOURS PRN
Status: DISCONTINUED | OUTPATIENT
Start: 2018-07-25 | End: 2018-07-28 | Stop reason: HOSPADM

## 2018-07-25 RX ORDER — POTASSIUM CHLORIDE 20 MEQ/1
40 TABLET, EXTENDED RELEASE ORAL 2 TIMES DAILY
Status: DISCONTINUED | OUTPATIENT
Start: 2018-07-25 | End: 2018-07-28 | Stop reason: HOSPADM

## 2018-07-25 RX ORDER — POLYETHYLENE GLYCOL 3350 17 G/17G
1 POWDER, FOR SOLUTION ORAL
Status: DISCONTINUED | OUTPATIENT
Start: 2018-07-25 | End: 2018-07-28 | Stop reason: HOSPADM

## 2018-07-25 RX ADMIN — OMEPRAZOLE 20 MG: 20 CAPSULE, DELAYED RELEASE ORAL at 08:33

## 2018-07-25 RX ADMIN — POTASSIUM CHLORIDE 40 MEQ: 1500 TABLET, EXTENDED RELEASE ORAL at 11:07

## 2018-07-25 RX ADMIN — TOPIRAMATE 25 MG: 25 TABLET, FILM COATED ORAL at 08:43

## 2018-07-25 RX ADMIN — POTASSIUM CHLORIDE 40 MEQ: 1500 TABLET, EXTENDED RELEASE ORAL at 18:00

## 2018-07-25 RX ADMIN — SERTRALINE 25 MG: 50 TABLET, FILM COATED ORAL at 20:11

## 2018-07-25 RX ADMIN — FUROSEMIDE 40 MG: 10 INJECTION, SOLUTION INTRAMUSCULAR; INTRAVENOUS at 16:00

## 2018-07-25 RX ADMIN — CLOPIDOGREL 75 MG: 75 TABLET, FILM COATED ORAL at 08:56

## 2018-07-25 RX ADMIN — FERROUS SULFATE TAB 325 MG (65 MG ELEMENTAL FE) 325 MG: 325 (65 FE) TAB at 08:34

## 2018-07-25 RX ADMIN — ALLOPURINOL 300 MG: 300 TABLET ORAL at 18:00

## 2018-07-25 RX ADMIN — LEVOTHYROXINE SODIUM 50 MCG: 50 TABLET ORAL at 08:38

## 2018-07-25 RX ADMIN — ASPIRIN 81 MG: 81 TABLET, COATED ORAL at 08:33

## 2018-07-25 RX ADMIN — POTASSIUM CHLORIDE 40 MEQ: 1500 TABLET, EXTENDED RELEASE ORAL at 00:02

## 2018-07-25 RX ADMIN — SODIUM CHLORIDE 250 ML: 9 INJECTION, SOLUTION INTRAVENOUS at 08:58

## 2018-07-25 RX ADMIN — METOPROLOL SUCCINATE 25 MG: 25 TABLET, EXTENDED RELEASE ORAL at 08:33

## 2018-07-25 RX ADMIN — POTASSIUM CHLORIDE 10 MEQ: 10 INJECTION, SOLUTION INTRAVENOUS at 08:44

## 2018-07-25 RX ADMIN — FUROSEMIDE 40 MG: 10 INJECTION, SOLUTION INTRAMUSCULAR; INTRAVENOUS at 08:43

## 2018-07-25 RX ADMIN — ROSUVASTATIN CALCIUM 10 MG: 20 TABLET, FILM COATED ORAL at 18:00

## 2018-07-25 RX ADMIN — TOPIRAMATE 25 MG: 25 TABLET, FILM COATED ORAL at 18:00

## 2018-07-25 RX ADMIN — ACETAMINOPHEN 650 MG: 325 TABLET, FILM COATED ORAL at 21:38

## 2018-07-25 RX ADMIN — GABAPENTIN 800 MG: 400 CAPSULE ORAL at 20:11

## 2018-07-25 ASSESSMENT — COGNITIVE AND FUNCTIONAL STATUS - GENERAL
WALKING IN HOSPITAL ROOM: A LITTLE
DAILY ACTIVITIY SCORE: 23
SUGGESTED CMS G CODE MODIFIER MOBILITY: CI
SUGGESTED CMS G CODE MODIFIER DAILY ACTIVITY: CI
EATING MEALS: A LITTLE
MOBILITY SCORE: 23

## 2018-07-25 ASSESSMENT — PAIN SCALES - GENERAL
PAINLEVEL_OUTOF10: 0
PAINLEVEL_OUTOF10: 2
PAINLEVEL_OUTOF10: 0

## 2018-07-25 ASSESSMENT — LIFESTYLE VARIABLES
EVER_SMOKED: NEVER
EVER_SMOKED: YES

## 2018-07-25 ASSESSMENT — PATIENT HEALTH QUESTIONNAIRE - PHQ9
1. LITTLE INTEREST OR PLEASURE IN DOING THINGS: NOT AT ALL
SUM OF ALL RESPONSES TO PHQ9 QUESTIONS 1 AND 2: 0
SUM OF ALL RESPONSES TO PHQ9 QUESTIONS 1 AND 2: 0
2. FEELING DOWN, DEPRESSED, IRRITABLE, OR HOPELESS: NOT AT ALL
1. LITTLE INTEREST OR PLEASURE IN DOING THINGS: NOT AT ALL
2. FEELING DOWN, DEPRESSED, IRRITABLE, OR HOPELESS: NOT AT ALL

## 2018-07-25 ASSESSMENT — ENCOUNTER SYMPTOMS
MUSCULOSKELETAL NEGATIVE: 1
PSYCHIATRIC NEGATIVE: 1
RESPIRATORY NEGATIVE: 1
EYES NEGATIVE: 1
CONSTITUTIONAL NEGATIVE: 1
NEUROLOGICAL NEGATIVE: 1
GASTROINTESTINAL NEGATIVE: 1

## 2018-07-25 NOTE — ED NOTES
Miguel from Lab called with critical result of Potassium of 2.7 at 2345. Critical lab result read back to Miguel.   Dr. Wharton notified of critical lab result at 2348.  Critical lab result read back by Dr. Wharton.

## 2018-07-25 NOTE — H&P
Hospital Medicine History & Physical Note    Date of Service  7/25/2018    Primary Care Physician  ELIZABETH Patel    Consultants  none    Code Status  Full code     Chief Complaint  Leg swelling and pain     History of Presenting Illness  86 y.o. female with history of congestive heart failure on medication regimen, essential hypertension controlled and atrial fibrillation on rate control and anticoagulation was in her usual state of health until several days prior to admission, she began to notice increased swelling in her legs bilaterally, with accompanying erythema in the day prior to admission without fever or chills.  She recently was changed from lasix to torsemide orally, and did not notice any improvement in her swelling.  She denies shortness of breath or other complaint.    Review of Systems  Review of Systems   Constitutional: Negative.    HENT: Negative.    Eyes: Negative.    Respiratory: Negative.    Cardiovascular: Positive for leg swelling.   Gastrointestinal: Negative.    Genitourinary: Negative.    Musculoskeletal: Negative.    Skin: Negative.    Neurological: Negative.    Endo/Heme/Allergies: Negative.    Psychiatric/Behavioral: Negative.        Past Medical History   has a past medical history of Arthritis; Atrial fibrillation (CMS-HCC) [I48.91] (3/29/2018); Blood clotting disorder ([facility]); Breath shortness; Cancer ([facility]); Cataract; Congestive heart failure (CHF) ([facility]) (4/3/2018); BERTRAND (dyspnea on exertion) (5/4/2018); Heart burn; Heart valve disease; Hemorrhagic disorder ([facility]); Hiatus hernia syndrome; Hyperlipidemia; Hypertension; Kidney disease; Long term current use of anticoagulant (3/29/2018); Pacemaker (4/3/2018); Pulmonary emphysema ([facility]); Secondary hypertension (4/3/2018); Thyroid disease; Tremors of nervous system (4/3/2018); Urinary incontinence; and Urinary tract infection.    Surgical History  No prior reported surgical history     Family History  family history includes Cancer  in her mother; Lung Disease in her mother; No Known Problems in her father.     Social History   reports that she quit smoking about 48 years ago. Her smoking use included Cigarettes. She smoked 0.50 packs per day. She has never used smokeless tobacco. She reports that she drinks about 1.8 oz of alcohol per week . She reports that she does not use drugs.    Allergies  Allergies   Allergen Reactions   • Codeine Vomiting       Medications  Prior to Admission Medications   Prescriptions Last Dose Informant Patient Reported? Taking?   Calcium-Vitamin D 500-125 MG-UNIT Tab 7/24/2018 at Unknown time Patient Yes No   Sig: Take 1 tablet by mouth every evening. 600mg - 400 iu   acetaminophen (TYLENOL) 500 MG Tab 7/20/2018 at Unknown time Patient Yes No   Sig: Take 1,000 mg by mouth 1 time daily as needed for Mild Pain.   albuterol 108 (90 Base) MCG/ACT Aero Soln inhalation aerosol 7/20/2018 at Unknown time Patient Yes No   Sig: Inhale 2 Puffs by mouth every 6 hours as needed for Shortness of Breath.   allopurinol (ZYLOPRIM) 300 MG Tab 7/24/2018 Patient No No   Sig: Take 1 Tab by mouth every evening.   ascorbic acid (VITAMIN C) 500 MG tablet 7/24/2018  No No   Sig: Take 1 Tab by mouth every day.   aspirin EC 81 MG EC tablet 7/24/2018  No No   Sig: Take 1 Tab by mouth every day for 28 days.   clopidogrel (PLAVIX) 75 MG Tab 7/24/2018  No No   Sig: Take 1 Tab by mouth every day.   ferrous sulfate 325 (65 Fe) MG tablet 7/24/2018  No No   Sig: Take 1 Tab by mouth every day.   gabapentin (NEURONTIN) 300 MG Cap 7/24/2018 at Unknown time Patient Yes No   Sig: Take 1,200 mg by mouth every bedtime.   levothyroxine (SYNTHROID) 50 MCG Tab 7/24/2018 at Unknown time Patient No No   Sig: Take 1 Tab by mouth Every morning on an empty stomach.   metoprolol SR (TOPROL XL) 25 MG TABLET SR 24 HR 7/24/2018 at Unknown time Patient Yes No   Sig: Take 25 mg by mouth every morning.   omeprazole (PRILOSEC) 20 MG delayed-release capsule 7/24/2018 at  Unknown time Patient Yes No   Sig: Take 20 mg by mouth every morning.   rosuvastatin (CRESTOR) 10 MG Tab 7/24/2018  No No   Sig: Take 1 Tab by mouth every evening.   sertraline (ZOLOFT) 50 MG Tab 7/24/2018 at Unknown time Patient Yes No   Sig: Take 25 mg by mouth every bedtime.   topiramate (TOPAMAX) 25 MG Tab 7/24/2018 at Unknown time Patient No No   Sig: Take 1 Tab by mouth 2 times a day.   torsemide (DEMADEX) 20 MG Tab   No No   Sig: Take 3 Tabs by mouth 2 times a day.   warfarin (COUMADIN) 2.5 MG Tab 7/24/2018 at 2000 Patient Yes No   Sig: Take 2.5-3.75 mg by mouth every evening. 3.75mg on Tuesday/Thursday, 2.5mg on all other days       Facility-Administered Medications: None       Physical Exam  Blood Pressure : 118/60   Temperature: 36.4 °C (97.6 °F)   Pulse: 64   Respiration: 15   Pulse Oximetry: 94 %     Physical Exam   Constitutional: She is oriented to person, place, and time. She appears well-developed and well-nourished. No distress.   HENT:   Head: Normocephalic and atraumatic.   Eyes: Pupils are equal, round, and reactive to light. Conjunctivae are normal.   Neck: Normal range of motion. Neck supple. No tracheal deviation present. No thyromegaly present.   Cardiovascular: Normal rate, regular rhythm and normal heart sounds.  Exam reveals no gallop and no friction rub.    No murmur heard.  Pulmonary/Chest: Effort normal and breath sounds normal. No respiratory distress. She has no wheezes. She has no rales.   Abdominal: Soft. Bowel sounds are normal. She exhibits no distension. There is no tenderness. There is no rebound.   Musculoskeletal: Normal range of motion. She exhibits edema.   Lymphadenopathy:     She has no cervical adenopathy.   Neurological: She is alert and oriented to person, place, and time. No cranial nerve deficit.   Skin: Skin is warm and dry. She is not diaphoretic. There is erythema.   Bilateral lower extremity erythema noted overlying edematous changes    Psychiatric: She has a  normal mood and affect.   Nursing note and vitals reviewed.      Laboratory:  Recent Labs      07/24/18   2219   WBC  2.4*   RBC  2.98*   HEMOGLOBIN  8.6*   HEMATOCRIT  27.4*   MCV  91.9   MCH  28.9   MCHC  31.4*   RDW  58.8*   PLATELETCT  132*   MPV  11.2     Recent Labs      07/23/18   1102  07/24/18   2219   SODIUM  138  136   POTASSIUM  3.6  2.7*   CHLORIDE  101  99   CO2  27  27   GLUCOSE  72  103*   BUN  30*  31*   CREATININE  1.55*  1.66*   CALCIUM  9.0  8.7     Recent Labs      07/23/18   1102  07/24/18   2219   ALTSGPT   --   6   ASTSGOT   --   13   ALKPHOSPHAT   --   108*   TBILIRUBIN   --   0.8   GLUCOSE  72  103*     Recent Labs      07/24/18   2219   APTT  46.1*   INR  2.32*     Recent Labs      07/24/18 2219   BNPBTYPENAT  574*         Lab Results   Component Value Date    TROPONINI 0.03 07/24/2018       Urinalysis:    No results found for: SPECGRAVITY, GLUCOSEUR, KETONES, NITRITE, WBCURINE, RBCURINE, BACTERIA, EPITHELCELL     Imaging:  DX-CHEST-PORTABLE (1 VIEW)   Final Result         1.  Pulmonary edema and/or infiltrates with small right pleural effusion, overall stable since prior.   2.  Cardiomegaly   3.  Atherosclerosis            Assessment/Plan:  I anticipate this patient will require at least two midnights for appropriate medical management, necessitating inpatient admission.    * Acute on chronic diastolic (congestive) heart failure (HCC)- (present on admission)   Assessment & Plan    Start IV diuresis, hold oral regimen.  2 gram sodium diet, monitor.         S/P coronary angioplasty- (present on admission)   Assessment & Plan    On ASA and Plavix for the same.        CKD (chronic kidney disease) stage 3, GFR 30-59 ml/min- (present on admission)   Assessment & Plan    With component of contrast nephropathy, probably hindering efficacy of oral diuretic therapy despite recent increases.  Does have significant hypokalemia likely from diuretics.  Will replace potassium, monitor.         COPD  (chronic obstructive pulmonary disease) (HCC)- (present on admission)   Assessment & Plan    No current exacerbation. Monitor.         Chronic atrial fibrillation (HCC)- (present on admission)   Assessment & Plan    Rate controlled.  Anticoagulated with coumadin therapy             VTE prophylaxis: SCD. Coumadin

## 2018-07-25 NOTE — ED NOTES
Report given to Fabiola BARNHART bedside. Pt taken to Tele on [facility]. Chart and belongings with patient. No acute signs distress present.

## 2018-07-25 NOTE — ED TRIAGE NOTES
Chief Complaint   Patient presents with   • Shortness of Breath     Reports SOB with exertion.   • Leg Swelling     Bilateral swelling and redness.      /60   Pulse 61   Temp 36.4 °C (97.6 °F) (Temporal)   Resp 16   Wt 68.4 kg (150 lb 12.7 oz)   LMP  (LMP Unknown)   SpO2 94%   BMI 26.29 kg/m²     Pt brought into triage by WC, complaints as above. Pt reports edema s/p heart stents x2 and MARICRUZ from contrast media, has not been able to get symptoms under control with medications. 14 lb weight gain in a week per pt. Denies CP, dizziness, N/V. EKG completed.VS as above, NAD, encouraged to return to the triage nurse or tech with any new complaints or symptoms. Urine collection cup in a bag, given to patient as well as instructions on their use, pt verbalized understanding. [facility].

## 2018-07-25 NOTE — ED NOTES
Arleen from Lab called with critical result of WBC at 2.4. Critical lab result read back to Arleen.   Dr. Wharton notified of critical lab result at [id number].  Critical lab result read back by Dr. Wharton.

## 2018-07-25 NOTE — ED NOTES
Family updated on status of room assignment. RN apologized for the delay. No other needs at this time.

## 2018-07-25 NOTE — ED PROVIDER NOTES
ED Provider Note    CHIEF COMPLAINT  Chief Complaint   Patient presents with   • Shortness of Breath     Reports SOB with exertion.   • Leg Swelling     Bilateral swelling and redness.        HPI  Andree Egan is a 86 y.o. female who presents with lower extremity edema. The patient's had increased edema to her lower extremities over the last couple of days. The patient had a heart catheterization the beginning of July and had 2 stents placed. Subsequently she developed a contrast-induced nephropathy and had significant edema to her lower extremities and also developed some dyspnea due to fluid buildup in her lungs. The patient was again admitted to the hospital and was treated and then discharged about a week ago. Since that time family states she's had continued increased swelling and redness to her lower extremities. She has exertional dyspnea but does not have any orthopnea. She does not have any associated fevers. She has not had a cough. She does take anticoagulation for age or fibrillation.    REVIEW OF SYSTEMS  See HPI for further details. All other systems are negative.     PAST MEDICAL HISTORY  Past Medical History:   Diagnosis Date   • Arthritis     back/neck   • Atrial fibrillation (CMS-HCC) [I48.91] 3/29/2018   • Blood clotting disorder (HCC)    • Breath shortness     water retention   • Cancer (HCC)     breast   • Cataract    • Congestive heart failure (CHF) (HCC) 4/3/2018   • BERTRAND (dyspnea on exertion) 5/4/2018   • Heart burn    • Heart valve disease    • Hemorrhagic disorder (HCC)    • Hiatus hernia syndrome    • Hyperlipidemia    • Hypertension    • Kidney disease    • Long term current use of anticoagulant 3/29/2018   • Pacemaker 4/3/2018   • Pulmonary emphysema (HCC)    • Secondary hypertension 4/3/2018   • Thyroid disease    • Tremors of nervous system 4/3/2018   • Urinary incontinence    • Urinary tract infection        SOCIAL HISTORY  Social History     Social History   • Marital status:  Single     Spouse name: N/A   • Number of children: N/A   • Years of education: N/A     Social History Main Topics   • Smoking status: Former Smoker     Packs/day: 0.50     Types: Cigarettes     Quit date: 1/1/1970   • Smokeless tobacco: Never Used   • Alcohol use 1.8 oz/week     3 Glasses of wine per week      Comment: 1-2 glasses wine before dinner   • Drug use: No   • Sexual activity: Not on file     Other Topics Concern   • Not on file     Social History Narrative   • No narrative on file           PHYSICAL EXAM  VITAL SIGNS: /60   Pulse 67   Temp 36.4 °C (97.6 °F) (Temporal)   Resp 16   Wt 68.4 kg (150 lb 12.7 oz)   LMP  (LMP Unknown)   SpO2 99%   BMI 26.29 kg/m²   Constitutional: Chronically ill in appearance  HENT: Normocephalic, Atraumatic, tympanic membranes are intact and nonerythematous bilaterally, Oropharynx moist without exudates or erythema, Nose normal.   Eyes: PERRLA, EOMI, Conjunctiva normal.  Neck: Supple without meningismus  Lymphatic: No lymphadenopathy noted.   Cardiovascular: Normal heart rate, Normal rhythm, No murmurs, No rubs, No gallops.   Thorax & Lungs: Symmetrically diminished throughout, diffuse wheezing, rales at the bases, no focal asymmetry of breath sounds  Abdomen: Bowel sounds normal, Soft, No tenderness, no rebound, no guarding, no distention, No masses, No pulsatile masses.   Skin: Erythema to the bilateral lower extremities.   Back: No tenderness, No CVA tenderness.   Extremities: Atraumatic with symmetric distal pulses, bilateral lower extremity edema, No tenderness, No cyanosis, No clubbing.   Neurologic: Alert & oriented x 3, cranial nerves II through XII are intact, Normal motor function, Normal sensory function, No focal deficits noted.   Psychiatric: Affect normal, Judgment normal, Mood normal.     EKG  EKG shows a ventricular paced rhythm with a rate of 60, no further interpretation performed due to the paced  rhythm.    RADIOLOGY/PROCEDURES  DX-CHEST-PORTABLE (1 VIEW)   Final Result         1.  Pulmonary edema and/or infiltrates with small right pleural effusion, overall stable since prior.   2.  Cardiomegaly   3.  Atherosclerosis            COURSE & MEDICAL DECISION MAKING  Pertinent Labs & Imaging studies reviewed. (See chart for details)  This an 86-year-old female who presents to the emergency department with lower extremity edema. I suspect this is multifocal. She does have heart failure but she most likely also some dependent edema as well as edema from renal insufficiency. Her chest x-ray also shows evidence of pulmonary edema. I did not give the patient Lasix as her blood pressure was only about 100 systolic and she recently had acute on chronic renal insufficiency due to contrast-induced nephropathy from her angiography. The patient's renal function at this time is stable from her last blood draw. She does have hypokalemia and she received 40 milliequivalents orally for potassium supplementation. The patient will require admission for further treatment for the edema and she'll also continue to receive potassium supplementation. I discussed with the patient her pancytopenia and she is unaware of what is causing the low counts. This could be from chronic disease as it does appear stable.    FINAL IMPRESSION  1. Lower extremity edema  2. Pancytopenia  3. Hypokalemia     Disposition  The patient will be admitted in stable condition    Electronically signed by: Tarun Wharton, 7/24/2018 10:04 PM

## 2018-07-25 NOTE — PROGRESS NOTES
Med rec complete per pt's granddaughter-via phone. Permission given by pt to contact grand-daughter Palma

## 2018-07-25 NOTE — DISCHARGE PLANNING
[facility] Please write home care orders upon discharge to home for continuum of care.Please contact [facility] at  [phone] if there are any questions.

## 2018-07-25 NOTE — CARE PLAN
Problem: Safety  Goal: Will remain free from falls  Outcome: PROGRESSING AS EXPECTED      Problem: Knowledge Deficit  Goal: Knowledge of disease process/condition, treatment plan, diagnostic tests, and medications will improve  Outcome: PROGRESSING AS EXPECTED

## 2018-07-25 NOTE — ASSESSMENT & PLAN NOTE
Switched to oral diuretics.  Monitor I's and O's.  Daily weight.  Recent echocardiogram showed ejection fraction 50-55% with possible right sided heart failure and severe tricuspid regurgitation.  Venous Doppler of the lower extremities ordered for redness on both legs with tenderness.  Elevate legs above heart level while laying in bed.

## 2018-07-25 NOTE — ED NOTES
Pt ambulated to room with assistance from Tech. Agree with triage note.  Assessment complete. Vitals obtained. ERP notified of patient's arrival.

## 2018-07-26 ENCOUNTER — HOME CARE VISIT (OUTPATIENT)
Dept: HOME HEALTH SERVICES | Facility: HOME HEALTHCARE | Age: 83
End: 2018-07-26
Payer: MEDICARE

## 2018-07-26 PROBLEM — D61.818 PANCYTOPENIA (HCC): Status: ACTIVE | Noted: 2018-07-26

## 2018-07-26 LAB
ANION GAP SERPL CALC-SCNC: 10 MMOL/L (ref 0–11.9)
BASOPHILS # BLD AUTO: 0.9 % (ref 0–1.8)
BASOPHILS # BLD: 0.02 K/UL (ref 0–0.12)
BUN SERPL-MCNC: 28 MG/DL (ref 8–22)
CALCIUM SERPL-MCNC: 8.5 MG/DL (ref 8.5–10.5)
CHLORIDE SERPL-SCNC: 102 MMOL/L (ref 96–112)
CO2 SERPL-SCNC: 26 MMOL/L (ref 20–33)
CREAT SERPL-MCNC: 1.44 MG/DL (ref 0.5–1.4)
EOSINOPHIL # BLD AUTO: 0 K/UL (ref 0–0.51)
EOSINOPHIL NFR BLD: 0 % (ref 0–6.9)
ERYTHROCYTE [DISTWIDTH] IN BLOOD BY AUTOMATED COUNT: 60.2 FL (ref 35.9–50)
EST. AVERAGE GLUCOSE BLD GHB EST-MCNC: 117 MG/DL
GLUCOSE SERPL-MCNC: 110 MG/DL (ref 65–99)
HBA1C MFR BLD: 5.7 % (ref 0–5.6)
HCT VFR BLD AUTO: 28.4 % (ref 37–47)
HGB BLD-MCNC: 8.9 G/DL (ref 12–16)
IMM GRANULOCYTES # BLD AUTO: 0 K/UL (ref 0–0.11)
IMM GRANULOCYTES NFR BLD AUTO: 0 % (ref 0–0.9)
LYMPHOCYTES # BLD AUTO: 0.76 K/UL (ref 1–4.8)
LYMPHOCYTES NFR BLD: 33.6 % (ref 22–41)
MCH RBC QN AUTO: 28.9 PG (ref 27–33)
MCHC RBC AUTO-ENTMCNC: 31.3 G/DL (ref 33.6–35)
MCV RBC AUTO: 92.2 FL (ref 81.4–97.8)
MONOCYTES # BLD AUTO: 0.21 K/UL (ref 0–0.85)
MONOCYTES NFR BLD AUTO: 9.3 % (ref 0–13.4)
NEUTROPHILS # BLD AUTO: 1.27 K/UL (ref 2–7.15)
NEUTROPHILS NFR BLD: 56.2 % (ref 44–72)
NRBC # BLD AUTO: 0 K/UL
NRBC BLD-RTO: 0 /100 WBC
PLATELET # BLD AUTO: 117 K/UL (ref 164–446)
PMV BLD AUTO: 10.5 FL (ref 9–12.9)
POTASSIUM SERPL-SCNC: 3.7 MMOL/L (ref 3.6–5.5)
RBC # BLD AUTO: 3.08 M/UL (ref 4.2–5.4)
SODIUM SERPL-SCNC: 138 MMOL/L (ref 135–145)
WBC # BLD AUTO: 2.3 K/UL (ref 4.8–10.8)

## 2018-07-26 PROCEDURE — A9270 NON-COVERED ITEM OR SERVICE: HCPCS | Performed by: INTERNAL MEDICINE

## 2018-07-26 PROCEDURE — 85025 COMPLETE CBC W/AUTO DIFF WBC: CPT

## 2018-07-26 PROCEDURE — 80048 BASIC METABOLIC PNL TOTAL CA: CPT

## 2018-07-26 PROCEDURE — 700111 HCHG RX REV CODE 636 W/ 250 OVERRIDE (IP): Performed by: HOSPITALIST

## 2018-07-26 PROCEDURE — 83036 HEMOGLOBIN GLYCOSYLATED A1C: CPT

## 2018-07-26 PROCEDURE — 700102 HCHG RX REV CODE 250 W/ 637 OVERRIDE(OP): Performed by: FAMILY MEDICINE

## 2018-07-26 PROCEDURE — 770020 HCHG ROOM/CARE - TELE (206)

## 2018-07-26 PROCEDURE — 700102 HCHG RX REV CODE 250 W/ 637 OVERRIDE(OP): Performed by: INTERNAL MEDICINE

## 2018-07-26 PROCEDURE — A9270 NON-COVERED ITEM OR SERVICE: HCPCS | Performed by: HOSPITALIST

## 2018-07-26 PROCEDURE — 36415 COLL VENOUS BLD VENIPUNCTURE: CPT

## 2018-07-26 PROCEDURE — A9270 NON-COVERED ITEM OR SERVICE: HCPCS | Performed by: FAMILY MEDICINE

## 2018-07-26 PROCEDURE — 99233 SBSQ HOSP IP/OBS HIGH 50: CPT | Performed by: FAMILY MEDICINE

## 2018-07-26 PROCEDURE — 700102 HCHG RX REV CODE 250 W/ 637 OVERRIDE(OP): Performed by: HOSPITALIST

## 2018-07-26 RX ORDER — WARFARIN SODIUM 2.5 MG/1
2.5 TABLET ORAL
Status: DISCONTINUED | OUTPATIENT
Start: 2018-07-27 | End: 2018-07-27

## 2018-07-26 RX ORDER — WARFARIN SODIUM 7.5 MG/1
3.75 TABLET ORAL
Status: DISCONTINUED | OUTPATIENT
Start: 2018-07-26 | End: 2018-07-27

## 2018-07-26 RX ORDER — BUMETANIDE 1 MG/1
2 TABLET ORAL
Status: DISCONTINUED | OUTPATIENT
Start: 2018-07-26 | End: 2018-07-28 | Stop reason: HOSPADM

## 2018-07-26 RX ADMIN — SERTRALINE 25 MG: 50 TABLET, FILM COATED ORAL at 20:56

## 2018-07-26 RX ADMIN — CLOPIDOGREL 75 MG: 75 TABLET, FILM COATED ORAL at 05:14

## 2018-07-26 RX ADMIN — ROSUVASTATIN CALCIUM 10 MG: 20 TABLET, FILM COATED ORAL at 17:41

## 2018-07-26 RX ADMIN — GABAPENTIN 800 MG: 400 CAPSULE ORAL at 20:56

## 2018-07-26 RX ADMIN — POTASSIUM CHLORIDE 40 MEQ: 1500 TABLET, EXTENDED RELEASE ORAL at 17:42

## 2018-07-26 RX ADMIN — ALLOPURINOL 300 MG: 300 TABLET ORAL at 17:42

## 2018-07-26 RX ADMIN — BUMETANIDE 2 MG: 1 TABLET ORAL at 17:41

## 2018-07-26 RX ADMIN — OMEPRAZOLE 20 MG: 20 CAPSULE, DELAYED RELEASE ORAL at 05:13

## 2018-07-26 RX ADMIN — TOPIRAMATE 25 MG: 25 TABLET, FILM COATED ORAL at 05:14

## 2018-07-26 RX ADMIN — ACETAMINOPHEN 650 MG: 325 TABLET, FILM COATED ORAL at 20:58

## 2018-07-26 RX ADMIN — WARFARIN SODIUM 3.75 MG: 7.5 TABLET ORAL at 18:00

## 2018-07-26 RX ADMIN — METOPROLOL SUCCINATE 25 MG: 25 TABLET, EXTENDED RELEASE ORAL at 05:14

## 2018-07-26 RX ADMIN — FERROUS SULFATE TAB 325 MG (65 MG ELEMENTAL FE) 325 MG: 325 (65 FE) TAB at 05:14

## 2018-07-26 RX ADMIN — FUROSEMIDE 40 MG: 10 INJECTION, SOLUTION INTRAMUSCULAR; INTRAVENOUS at 05:14

## 2018-07-26 RX ADMIN — POTASSIUM CHLORIDE 40 MEQ: 1500 TABLET, EXTENDED RELEASE ORAL at 05:13

## 2018-07-26 RX ADMIN — TOPIRAMATE 25 MG: 25 TABLET, FILM COATED ORAL at 17:42

## 2018-07-26 RX ADMIN — LEVOTHYROXINE SODIUM 50 MCG: 50 TABLET ORAL at 05:14

## 2018-07-26 RX ADMIN — ASPIRIN 81 MG: 81 TABLET, COATED ORAL at 05:14

## 2018-07-26 ASSESSMENT — CHA2DS2 SCORE
HYPERTENSION: YES
CHF OR LEFT VENTRICULAR DYSFUNCTION: YES
VASCULAR DISEASE: YES
CHA2DS2 VASC SCORE: 6
AGE 65 TO 74: NO
SEX: FEMALE
AGE 75 OR GREATER: YES
PRIOR STROKE OR TIA OR THROMBOEMBOLISM: NO
DIABETES: NO

## 2018-07-26 ASSESSMENT — ENCOUNTER SYMPTOMS
DOUBLE VISION: 0
HEADACHES: 0
PND: 0
DEPRESSION: 0
MYALGIAS: 0
NAUSEA: 0
HEARTBURN: 0
BLURRED VISION: 0
FEVER: 0
ORTHOPNEA: 1
SPUTUM PRODUCTION: 1
CHILLS: 0
BRUISES/BLEEDS EASILY: 0
NECK PAIN: 0
DIZZINESS: 0

## 2018-07-26 ASSESSMENT — PAIN SCALES - GENERAL
PAINLEVEL_OUTOF10: 0
PAINLEVEL_OUTOF10: 3
PAINLEVEL_OUTOF10: 0

## 2018-07-26 NOTE — HEART FAILURE PROGRAM
Cardiovascular Nurse Navigator () Advanced Heart Failure Program Inpatient Progress Note:    Discussed with Dr. Hernández. Patient only has DPOA on file. No Statements of Desire and no record of encounters with palliative care on file.    Speaking with patients frankly about their end-of-life wishes is one of the most important things a palliative care team can do. This is especially important in the context of heart failure, since it’s such an unpredictable disease.    Dr. Hernández agrees that palliative consult to discuss advance care planning is appropriate at this time. Order placed.    Thank you and please call with questions, Veronique

## 2018-07-26 NOTE — DISCHARGE PLANNING
Anticipated Discharge Disposition: Home with HH    Action: Pt dicussed during IDT rounds, LSW requested HH order be entered.     Barriers to Discharge: HH order, referral     Plan: HH order to be entered

## 2018-07-26 NOTE — PROGRESS NOTES
Critical lab WBC 2.3 at this time, call out to MD has been made, awaiting feedback at this time.    Call back received from Dr Price, no interventions needed at this time. Will continue to monitor for trends and patient condition.

## 2018-07-26 NOTE — PROGRESS NOTES
Skin assessment complete with Guillermo BARNHART, skin is warm, clean dry and intact. Blanchable bony prominences with loose skin turgor. Bilateral lower extremity swelling observed with some redness and warm to touch.No open areas.  Will continue to observe

## 2018-07-26 NOTE — PROGRESS NOTES
Patient in stable condition, aaox4. Plan of care updated and verbalizes understanding. No pain and no distress noted. Needs met at this time. Will continue to monitor closely. Safety measures in place. Call light within reach

## 2018-07-26 NOTE — CARE PLAN
Problem: Safety  Goal: Will remain free from falls  Outcome: PROGRESSING AS EXPECTED      Problem: Discharge Barriers/Planning  Goal: Patient's continuum of care needs will be met  Outcome: PROGRESSING AS EXPECTED

## 2018-07-26 NOTE — PROGRESS NOTES
"Pt no acute events, VSS.  Reports \"feeling better\" and that her \"legs look better\" from this AM.  Fall precautions in place, call light w/in reach. Calls appropriately. Will continue to monitor.  "

## 2018-07-26 NOTE — FACE TO FACE
Face to Face Supporting Documentation - Home Health    The encounter with this patient was in whole or in part the primary reason for home health admission.    Date of encounter:   Patient:                    MRN:                       YOB: 2018  Andree Egan  [id number]  4/21/1932     Home health to see patient for:  Skilled Nursing care for assessment, interventions & education, Physical Therapy evaluation and treatment and Occupational therapy evaluation and treatment    Skilled need for:  Comment: PT/OT/Skilled    Skilled nursing interventions to include:  Comment: PT/OT/Skilled     Homebound status evidenced by:  Need the aid of supportive devices such as crutches, canes, wheelchairs or walkers. Leaving home requires a considerable and taxing effort. There is a normal inability to leave the home.    Community Physician to provide follow up care: ELIZABETH Patel     Optional Interventions? No      I certify the face to face encounter for this home health care referral meets the CMS requirements and the encounter/clinical assessment with the patient was, in whole, or in part, for the medical condition(s) listed above, which is the primary reason for home health care. Based on my clinical findings: the service(s) are medically necessary, support the need for home health care, and the homebound criteria are met.  I certify that this patient has had a face to face encounter by myself.  Telma Hernández M.D. - TINGI: [phone]

## 2018-07-26 NOTE — RESPIRATORY CARE
COPD EDUCATION by COPD CLINICAL EDUCATOR  7/26/2018 at 7:20 AM by Carlene Rodas     Patient reviewed by COPD education team. Patient does not qualify for COPD program.

## 2018-07-26 NOTE — PROGRESS NOTES
[facility] Hospitalist Progress Note    Date of Service: 2018    Chief Complaint  86 y.o. female admitted 2018 with swelling on the legs and shortness of breath.    Interval Problem Update  Feels better today.  Stated that her swelling on her legs has decreased.  Denies any chest pain.    Consultants/Specialty  Palliative    Disposition  To be determined         Review of Systems   Constitutional: Positive for malaise/fatigue. Negative for chills and fever.   HENT: Negative for hearing loss and tinnitus.    Eyes: Negative for blurred vision and double vision.   Respiratory: Positive for sputum production.    Cardiovascular: Positive for orthopnea and leg swelling. Negative for chest pain and PND.   Gastrointestinal: Negative for heartburn and nausea.   Genitourinary: Negative for dysuria and urgency.   Musculoskeletal: Negative for myalgias and neck pain.   Skin: Negative for rash.   Neurological: Negative for dizziness and headaches.   Endo/Heme/Allergies: Negative for environmental allergies. Does not bruise/bleed easily.   Psychiatric/Behavioral: Negative for depression and suicidal ideas.      Physical Exam  Laboratory/Imaging   Hemodynamics  Temp (24hrs), Av.7 °C (98.1 °F), Min:36.4 °C (97.5 °F), Max:37.2 °C (98.9 °F)   Temperature: 36.6 °C (97.9 °F)  Pulse  Av.6  Min: 60  Max: 78    Blood Pressure : (!) 93/66      Respiratory      Respiration: 15, Pulse Oximetry: 92 %     Work Of Breathing / Effort: Mild       Fluids    Intake/Output Summary (Last 24 hours) at 18 1651  Last data filed at 18 1500   Gross per 24 hour   Intake              720 ml   Output              901 ml   Net             -181 ml       Nutrition  Orders Placed This Encounter   Procedures   • Diet Order 2 Gram Sodium, Renal     Standing Status:   Standing     Number of Occurrences:   1     Order Specific Question:   Diet:     Answer:   2 Gram Sodium [7]     Order Specific Question:   Diet:     Answer:   Renal [8]      Physical Exam   Constitutional: She is oriented to person, place, and time. She appears well-developed. No distress.   HENT:   Head: Normocephalic and atraumatic.   Eyes: Left eye exhibits no discharge. No scleral icterus.   Neck: Neck supple. No tracheal deviation present. No thyromegaly present.   Cardiovascular: Normal rate and regular rhythm.  Exam reveals no gallop and no friction rub.    Pulmonary/Chest: No accessory muscle usage. No respiratory distress. She has decreased breath sounds.   Abdominal: Bowel sounds are normal. She exhibits no distension. There is no tenderness.   Musculoskeletal: She exhibits edema. She exhibits no deformity.   Neurological: She is alert and oriented to person, place, and time. No cranial nerve deficit.   Skin: Skin is dry. She is not diaphoretic. No erythema.   Psychiatric: She has a normal mood and affect.       Recent Labs      07/24/18 2219 07/26/18   0355   WBC  2.4*  2.3*   RBC  2.98*  3.08*   HEMOGLOBIN  8.6*  8.9*   HEMATOCRIT  27.4*  28.4*   MCV  91.9  92.2   MCH  28.9  28.9   MCHC  31.4*  31.3*   RDW  58.8*  60.2*   PLATELETCT  132*  117*   MPV  11.2  10.5     Recent Labs      07/24/18 2219 07/25/18   0543  07/26/18   0355   SODIUM  136  135  138   POTASSIUM  2.7*  2.9*  3.7   CHLORIDE  99  102  102   CO2  27  27  26   GLUCOSE  103*  90  110*   BUN  31*  29*  28*   CREATININE  1.66*  1.57*  1.44*   CALCIUM  8.7  8.3*  8.5     Recent Labs      07/24/18 2219   APTT  46.1*   INR  2.32*     Recent Labs      07/24/18 2219   BNPBTYPENAT  574*              Assessment/Plan     * Acute on chronic diastolic (congestive) heart failure (HCC)- (present on admission)   Assessment & Plan    Switch to oral diuretics.  Monitor I's and O's.  Daily weight.  Recent echocardiogram showed ejection fraction 50-55% with possible right sided heart failure and severe tricuspid regurgitation.        Pancytopenia (HCC)- (present on admission)   Assessment & Plan    Continue to  monitor.        S/P coronary angioplasty- (present on admission)   Assessment & Plan    On ASA and Plavix for the same.        CKD (chronic kidney disease) stage 3, GFR 30-59 ml/min- (present on admission)   Assessment & Plan    Creatinine baseline around 1.2.  Avoid nephrotoxins.  Renal dose or medications.        COPD (chronic obstructive pulmonary disease) ([facility])- (present on admission)   Assessment & Plan    No current exacerbation. Monitor.         Chronic atrial fibrillation (HCC)- (present on admission)   Assessment & Plan    Status post pacemaker placement.  Rate controlled.  Anticoagulated with coumadin therapy         Plan of care discussed with multidisciplinary team during rounds.  Quality-Core Measures   Reviewed items::  Labs reviewed, Medications reviewed and Radiology images reviewed  Hunt catheter::  No Hunt  DVT prophylaxis - mechanical:  SCDs

## 2018-07-26 NOTE — CARE PLAN
Problem: Communication  Goal: The ability to communicate needs accurately and effectively will improve  Outcome: PROGRESSING AS EXPECTED  Plan of care updated and verbalizes understanding.  Medication use and side effects discussed.  Encouraged to verbalize needs and call for assistance    Problem: Safety  Goal: Will remain free from falls  Outcome: PROGRESSING AS EXPECTED  Educated on fall risks  Standby assist and steady on feet with front wheel walker at bedside  Patient able to call for assistance when needed

## 2018-07-26 NOTE — PROGRESS NOTES
Inpatient Anticoagulation Service Note    Date: 7/26/2018  Reason for Anticoagulation: Atrial Fibrillation   [id number] VASc Score: 6    Hemoglobin Value: (!) 8.9  Hematocrit Value: (!) 28.4  Lab Platelet Value: (!) 117  Target INR: 2.0 to 3.0    INR from last 7 days     Date/Time INR Value    07/24/18 2219 (!)  2.32        Dose from last 7 days     Date/Time Dose (mg)    07/26/18 1600  3.75        Average Dose (mg):  (Home dose: 3.75mg Tu/Th & 2.5mg AOD per medrec)  Significant Interactions: Aspirin, Clopidogrel, Proton Pump Inhibitor, Thyroid Medications (Sertraline, Allopurinol, Fe tabs)  Bridge Therapy: No     Reversal Agent Administered: Not Applicable  Comments: Admit for CHF exacerbation. On Warfarin for A.fib. INR therapeutic last night. Continue home dose as identified above. H/h appears stable with no indication of bleeding. DDI identified above. Pharmacy CTM    Plan:  Warfarin 3.75mg with INR check tomorrow  Education Material Provided?: No (Chronic Tx)  Pharmacist suggested discharge dosing: Warfarin 3.75mg PO every Tu/Thur and 2.5mg on all other days with close f/u within 3 days         Vera GutierrezD., BCPS

## 2018-07-27 LAB
ALBUMIN SERPL BCP-MCNC: 3.7 G/DL (ref 3.2–4.9)
ALBUMIN/GLOB SERPL: 1.7 G/DL
ALP SERPL-CCNC: 109 U/L (ref 30–99)
ALT SERPL-CCNC: <5 U/L (ref 2–50)
ANION GAP SERPL CALC-SCNC: 8 MMOL/L (ref 0–11.9)
AST SERPL-CCNC: 11 U/L (ref 12–45)
BASOPHILS # BLD AUTO: 1.8 % (ref 0–1.8)
BASOPHILS # BLD: 0.04 K/UL (ref 0–0.12)
BILIRUB SERPL-MCNC: 0.8 MG/DL (ref 0.1–1.5)
BUN SERPL-MCNC: 25 MG/DL (ref 8–22)
CALCIUM SERPL-MCNC: 8.7 MG/DL (ref 8.5–10.5)
CHLORIDE SERPL-SCNC: 101 MMOL/L (ref 96–112)
CO2 SERPL-SCNC: 28 MMOL/L (ref 20–33)
CREAT SERPL-MCNC: 1.29 MG/DL (ref 0.5–1.4)
EOSINOPHIL # BLD AUTO: 0 K/UL (ref 0–0.51)
EOSINOPHIL NFR BLD: 0 % (ref 0–6.9)
ERYTHROCYTE [DISTWIDTH] IN BLOOD BY AUTOMATED COUNT: 63.5 FL (ref 35.9–50)
GLOBULIN SER CALC-MCNC: 2.2 G/DL (ref 1.9–3.5)
GLUCOSE SERPL-MCNC: 88 MG/DL (ref 65–99)
HCT VFR BLD AUTO: 31.5 % (ref 37–47)
HGB BLD-MCNC: 9.5 G/DL (ref 12–16)
IMM GRANULOCYTES # BLD AUTO: 0.01 K/UL (ref 0–0.11)
IMM GRANULOCYTES NFR BLD AUTO: 0.4 % (ref 0–0.9)
INR PPP: 1.78 (ref 0.87–1.13)
IRON SATN MFR SERPL: 10 % (ref 15–55)
IRON SERPL-MCNC: 32 UG/DL (ref 40–170)
LDH SERPL L TO P-CCNC: 143 U/L (ref 107–266)
LYMPHOCYTES # BLD AUTO: 0.75 K/UL (ref 1–4.8)
LYMPHOCYTES NFR BLD: 32.9 % (ref 22–41)
MCH RBC QN AUTO: 28.4 PG (ref 27–33)
MCHC RBC AUTO-ENTMCNC: 30.2 G/DL (ref 33.6–35)
MCV RBC AUTO: 94.3 FL (ref 81.4–97.8)
MONOCYTES # BLD AUTO: 0.19 K/UL (ref 0–0.85)
MONOCYTES NFR BLD AUTO: 8.3 % (ref 0–13.4)
NEUTROPHILS # BLD AUTO: 1.29 K/UL (ref 2–7.15)
NEUTROPHILS NFR BLD: 56.6 % (ref 44–72)
NRBC # BLD AUTO: 0 K/UL
NRBC BLD-RTO: 0 /100 WBC
PLATELET # BLD AUTO: 119 K/UL (ref 164–446)
PMV BLD AUTO: 10.6 FL (ref 9–12.9)
POTASSIUM SERPL-SCNC: 3.9 MMOL/L (ref 3.6–5.5)
PROT SERPL-MCNC: 5.9 G/DL (ref 6–8.2)
PROTHROMBIN TIME: 20.4 SEC (ref 12–14.6)
RBC # BLD AUTO: 3.34 M/UL (ref 4.2–5.4)
SODIUM SERPL-SCNC: 137 MMOL/L (ref 135–145)
TIBC SERPL-MCNC: 321 UG/DL (ref 250–450)
WBC # BLD AUTO: 2.3 K/UL (ref 4.8–10.8)

## 2018-07-27 PROCEDURE — 83516 IMMUNOASSAY NONANTIBODY: CPT

## 2018-07-27 PROCEDURE — A9270 NON-COVERED ITEM OR SERVICE: HCPCS | Performed by: HOSPITALIST

## 2018-07-27 PROCEDURE — 700102 HCHG RX REV CODE 250 W/ 637 OVERRIDE(OP): Performed by: FAMILY MEDICINE

## 2018-07-27 PROCEDURE — A9270 NON-COVERED ITEM OR SERVICE: HCPCS | Performed by: INTERNAL MEDICINE

## 2018-07-27 PROCEDURE — 99232 SBSQ HOSP IP/OBS MODERATE 35: CPT | Performed by: FAMILY MEDICINE

## 2018-07-27 PROCEDURE — 93970 EXTREMITY STUDY: CPT | Mod: 26 | Performed by: SURGERY

## 2018-07-27 PROCEDURE — 82784 ASSAY IGA/IGD/IGG/IGM EACH: CPT

## 2018-07-27 PROCEDURE — A9270 NON-COVERED ITEM OR SERVICE: HCPCS | Performed by: FAMILY MEDICINE

## 2018-07-27 PROCEDURE — 86225 DNA ANTIBODY NATIVE: CPT

## 2018-07-27 PROCEDURE — 85610 PROTHROMBIN TIME: CPT

## 2018-07-27 PROCEDURE — 83540 ASSAY OF IRON: CPT

## 2018-07-27 PROCEDURE — 86340 INTRINSIC FACTOR ANTIBODY: CPT

## 2018-07-27 PROCEDURE — 86038 ANTINUCLEAR ANTIBODIES: CPT

## 2018-07-27 PROCEDURE — 93970 EXTREMITY STUDY: CPT

## 2018-07-27 PROCEDURE — 86235 NUCLEAR ANTIGEN ANTIBODY: CPT | Mod: 91

## 2018-07-27 PROCEDURE — 700111 HCHG RX REV CODE 636 W/ 250 OVERRIDE (IP): Performed by: FAMILY MEDICINE

## 2018-07-27 PROCEDURE — 770020 HCHG ROOM/CARE - TELE (206)

## 2018-07-27 PROCEDURE — 83550 IRON BINDING TEST: CPT

## 2018-07-27 PROCEDURE — 85025 COMPLETE CBC W/AUTO DIFF WBC: CPT

## 2018-07-27 PROCEDURE — 36415 COLL VENOUS BLD VENIPUNCTURE: CPT

## 2018-07-27 PROCEDURE — 700102 HCHG RX REV CODE 250 W/ 637 OVERRIDE(OP): Performed by: INTERNAL MEDICINE

## 2018-07-27 PROCEDURE — 700102 HCHG RX REV CODE 250 W/ 637 OVERRIDE(OP): Performed by: HOSPITALIST

## 2018-07-27 PROCEDURE — 80053 COMPREHEN METABOLIC PANEL: CPT

## 2018-07-27 PROCEDURE — 83615 LACTATE (LD) (LDH) ENZYME: CPT

## 2018-07-27 RX ORDER — WARFARIN SODIUM 7.5 MG/1
3.75 TABLET ORAL
Status: COMPLETED | OUTPATIENT
Start: 2018-07-27 | End: 2018-07-27

## 2018-07-27 RX ORDER — CYANOCOBALAMIN 1000 UG/ML
1000 INJECTION, SOLUTION INTRAMUSCULAR; SUBCUTANEOUS
Status: DISCONTINUED | OUTPATIENT
Start: 2018-07-27 | End: 2018-07-28 | Stop reason: HOSPADM

## 2018-07-27 RX ORDER — CYANOCOBALAMIN 1000 UG/ML
1000 INJECTION, SOLUTION INTRAMUSCULAR; SUBCUTANEOUS
Status: DISCONTINUED | OUTPATIENT
Start: 2018-07-27 | End: 2018-07-27

## 2018-07-27 RX ADMIN — POTASSIUM CHLORIDE 40 MEQ: 1500 TABLET, EXTENDED RELEASE ORAL at 17:51

## 2018-07-27 RX ADMIN — ROSUVASTATIN CALCIUM 10 MG: 20 TABLET, FILM COATED ORAL at 17:54

## 2018-07-27 RX ADMIN — METOPROLOL SUCCINATE 25 MG: 25 TABLET, EXTENDED RELEASE ORAL at 05:13

## 2018-07-27 RX ADMIN — OMEPRAZOLE 20 MG: 20 CAPSULE, DELAYED RELEASE ORAL at 05:12

## 2018-07-27 RX ADMIN — TOPIRAMATE 25 MG: 25 TABLET, FILM COATED ORAL at 05:13

## 2018-07-27 RX ADMIN — ASPIRIN 81 MG: 81 TABLET, COATED ORAL at 05:13

## 2018-07-27 RX ADMIN — POTASSIUM CHLORIDE 40 MEQ: 1500 TABLET, EXTENDED RELEASE ORAL at 05:11

## 2018-07-27 RX ADMIN — BUMETANIDE 2 MG: 1 TABLET ORAL at 05:12

## 2018-07-27 RX ADMIN — CYANOCOBALAMIN 1000 MCG: 1000 INJECTION, SOLUTION INTRAMUSCULAR; SUBCUTANEOUS at 17:52

## 2018-07-27 RX ADMIN — FERROUS SULFATE TAB 325 MG (65 MG ELEMENTAL FE) 325 MG: 325 (65 FE) TAB at 05:12

## 2018-07-27 RX ADMIN — GABAPENTIN 800 MG: 400 CAPSULE ORAL at 20:34

## 2018-07-27 RX ADMIN — SERTRALINE 25 MG: 50 TABLET, FILM COATED ORAL at 20:34

## 2018-07-27 RX ADMIN — BUMETANIDE 2 MG: 1 TABLET ORAL at 17:51

## 2018-07-27 RX ADMIN — LEVOTHYROXINE SODIUM 50 MCG: 50 TABLET ORAL at 05:12

## 2018-07-27 RX ADMIN — CLOPIDOGREL 75 MG: 75 TABLET, FILM COATED ORAL at 05:13

## 2018-07-27 RX ADMIN — TOPIRAMATE 25 MG: 25 TABLET, FILM COATED ORAL at 17:51

## 2018-07-27 RX ADMIN — ACETAMINOPHEN 650 MG: 325 TABLET, FILM COATED ORAL at 20:33

## 2018-07-27 RX ADMIN — ALLOPURINOL 300 MG: 300 TABLET ORAL at 17:52

## 2018-07-27 RX ADMIN — WARFARIN SODIUM 3.75 MG: 7.5 TABLET ORAL at 17:53

## 2018-07-27 ASSESSMENT — ENCOUNTER SYMPTOMS
BLURRED VISION: 0
DIZZINESS: 0
NAUSEA: 0
VOMITING: 0
MYALGIAS: 0
TINGLING: 0
BRUISES/BLEEDS EASILY: 0
DOUBLE VISION: 0
ORTHOPNEA: 1
NECK PAIN: 0
HEADACHES: 0
HEARTBURN: 0
PND: 0
PHOTOPHOBIA: 0
BACK PAIN: 0
WEIGHT LOSS: 0
SPUTUM PRODUCTION: 1
DEPRESSION: 0
CHILLS: 0

## 2018-07-27 ASSESSMENT — PAIN SCALES - GENERAL
PAINLEVEL_OUTOF10: 0
PAINLEVEL_OUTOF10: 3
PAINLEVEL_OUTOF10: 0

## 2018-07-27 NOTE — PROGRESS NOTES
Pt had bp 93/66.  Encouraged hydration, on recheck bp was 104/61.  Denies any pain, n/v dizziness. Fall precautions in place and call light w/in reach.  Will continue to monitor.

## 2018-07-27 NOTE — CARE PLAN
Problem: Safety  Goal: Will remain free from injury    Intervention: Provide assistance with mobility  Patient educated to use call light for assistance. Fall precautions in place. Staff will assist with mobilization. Hourly rounding in place.      Problem: Infection  Goal: Will remain free from infection    Intervention: Assess signs and symptoms of infection  Appropriate protocols and standards utilized to minimize likelihood of infection and the spread of infection. Proper hand hygiene utilized and pt and family members educated on hand hygiene.

## 2018-07-27 NOTE — PROGRESS NOTES
Received Bedside report. Assumed care at 1900. This pt is AOx4, ambulatory with FWW as a SBA, voiding appropriately in the bathroom, denies pain. Patient and RN discussed plan of care: questions answered. Labs noted, assessment complete, patient tolerating 2G sodium, renal diet. Tele box in place. Pt is on RA. Call light in place, fall precautions in place, patient educated on importance of calling for assistance. No additional needs at this time.

## 2018-07-27 NOTE — HEART FAILURE PROGRAM
Cardiovascular Nurse Navigator () Advanced Heart Failure Program Inpatient Progress Note:      As of the time of this note's filing, pt has the following discharge appointments which are appropriate should she discharge to home over the weekend:    Jul 31, 2018 10:40 AM PDT  Established Patient with ELIZABETH Patel  [facility] [address]  [place] [address]  [phone]      Aug 01, 2018 10:20 AM PDT  Heart Failure New Patient with ELIZABETH Davila  [facility] (--) [address]  [place] [address]  [phone]       As a reminder, the HF Care Bundle/Core Measures consist of:      1. A seven calendar day HF f/u appointment on the AVS, unless patient goes to SNF, inpatient rehab, or d/c with hospice.  2. HF discharge instructions discussed with patient and on the AVS and HF patient education packet provided and discussed  3. Appropriate medications for EF <40%: ACE/ARB or reason from MD why not prescribing in a note.  4. Documentation of left ventricular systolic assessment (echo or angio) or reason from MD why this is not done or will be done outpatient in a note.    Thank you and please call with questions, Veronique

## 2018-07-27 NOTE — PROGRESS NOTES
Bryn from Lab called with critical result of WBC at 2.3. Critical lab result read back to Bryn.   Dr. Hernández notified of critical lab result at 1011.  Critical lab result read back by Dr. Hernández.

## 2018-07-27 NOTE — DISCHARGE PLANNING
Received Choice form at 1237  Agency/Facility Name: [facility]   Referral sent per Choice form @ 1232

## 2018-07-27 NOTE — CARE PLAN
Problem: Safety  Goal: Will remain free from injury  Outcome: PROGRESSING AS EXPECTED  Patient's risk for injury and falls assessed. Appropriate safety precautions in place. Patient educated to utilize call light for needs. Patient verbalizes understanding and calls appropriately.    Problem: Knowledge Deficit  Goal: Knowledge of disease process/condition, treatment plan, diagnostic tests, and medications will improve  Outcome: PROGRESSING AS EXPECTED  Patient is educated of disease process and condition. Treatment team has included patient in plan of care. All medications indications and side effects are explained. Patient is encouraged to ask questions. Patient indicates understanding.

## 2018-07-27 NOTE — PROGRESS NOTES
Handoff report received. Assumed patient care. Patient laying in bed.  Denied pain. Patient not in distress, AAOX 4. Safety precautions in place. Call light and personal belongings within reach. Educated to call for assistance if needed.

## 2018-07-27 NOTE — PROGRESS NOTES
Inpatient Anticoagulation Service Note    Date: 7/27/2018  Reason for Anticoagulation: Atrial Fibrillation   [id number] VASc Score: 6    Hemoglobin Value: (!) 9.5  Hematocrit Value: (!) 31.5  Lab Platelet Value: (!) 119  Target INR: 2.0 to 3.0    INR from last 7 days     Date/Time INR Value    07/27/18 0320 (!)  1.78    07/24/18 2219 (!)  2.32        Dose from last 7 days     Date/Time Dose (mg)    07/27/18 1400  3.75    07/26/18 1600  3.75        Average Dose (mg):  (Home dose: 3.75mg Tu/Th & 2.5mg AOD per medrec)  Significant Interactions: Aspirin, Clopidogrel, Proton Pump Inhibitor, Thyroid Medications (Sertraline, Allopurinol, Fe tabs)  Bridge Therapy: No     Reversal Agent Administered: Not Applicable    Comments:   Admit for CHF exacerbation. On Warfarin for A.fib. INR therapeutic 07/24/18. Continue home dose as identified above. H/H low with no indication of bleeding. DDI identified above. Pharmacy CTM    Plan: Warfarin 3.75mg with INR check tomorrow   Education Material Provided?: No (Chronic Tx)   Pharmacist suggested discharge dosing: Warfarin 3.75mg PO every Tu/Thur and 2.5mg on all other days with close f/u within 3 days    Jerald GamezD BCPS

## 2018-07-28 VITALS
OXYGEN SATURATION: 98 % | HEIGHT: 64 IN | DIASTOLIC BLOOD PRESSURE: 80 MMHG | BODY MASS INDEX: 26.31 KG/M2 | TEMPERATURE: 98.1 F | RESPIRATION RATE: 18 BRPM | WEIGHT: 154.1 LBS | SYSTOLIC BLOOD PRESSURE: 141 MMHG | HEART RATE: 70 BPM

## 2018-07-28 LAB
ALBUMIN SERPL BCP-MCNC: 3.4 G/DL (ref 3.2–4.9)
ALBUMIN/GLOB SERPL: 1.6 G/DL
ALP SERPL-CCNC: 99 U/L (ref 30–99)
ALT SERPL-CCNC: <5 U/L (ref 2–50)
ANION GAP SERPL CALC-SCNC: 6 MMOL/L (ref 0–11.9)
AST SERPL-CCNC: 12 U/L (ref 12–45)
BASOPHILS # BLD AUTO: 1.3 % (ref 0–1.8)
BASOPHILS # BLD: 0.03 K/UL (ref 0–0.12)
BILIRUB SERPL-MCNC: 0.7 MG/DL (ref 0.1–1.5)
BUN SERPL-MCNC: 24 MG/DL (ref 8–22)
CALCIUM SERPL-MCNC: 8.8 MG/DL (ref 8.5–10.5)
CHLORIDE SERPL-SCNC: 103 MMOL/L (ref 96–112)
CO2 SERPL-SCNC: 29 MMOL/L (ref 20–33)
CREAT SERPL-MCNC: 1.23 MG/DL (ref 0.5–1.4)
EOSINOPHIL # BLD AUTO: 0 K/UL (ref 0–0.51)
EOSINOPHIL NFR BLD: 0 % (ref 0–6.9)
ERYTHROCYTE [DISTWIDTH] IN BLOOD BY AUTOMATED COUNT: 62.9 FL (ref 35.9–50)
GLOBULIN SER CALC-MCNC: 2.1 G/DL (ref 1.9–3.5)
GLUCOSE SERPL-MCNC: 115 MG/DL (ref 65–99)
HCT VFR BLD AUTO: 30.1 % (ref 37–47)
HGB BLD-MCNC: 9.4 G/DL (ref 12–16)
IMM GRANULOCYTES # BLD AUTO: 0.01 K/UL (ref 0–0.11)
IMM GRANULOCYTES NFR BLD AUTO: 0.4 % (ref 0–0.9)
INR PPP: 2.17 (ref 0.87–1.13)
LYMPHOCYTES # BLD AUTO: 0.71 K/UL (ref 1–4.8)
LYMPHOCYTES NFR BLD: 31.3 % (ref 22–41)
MCH RBC QN AUTO: 29.1 PG (ref 27–33)
MCHC RBC AUTO-ENTMCNC: 31.2 G/DL (ref 33.6–35)
MCV RBC AUTO: 93.2 FL (ref 81.4–97.8)
MONOCYTES # BLD AUTO: 0.18 K/UL (ref 0–0.85)
MONOCYTES NFR BLD AUTO: 7.9 % (ref 0–13.4)
NEUTROPHILS # BLD AUTO: 1.34 K/UL (ref 2–7.15)
NEUTROPHILS NFR BLD: 59.1 % (ref 44–72)
NRBC # BLD AUTO: 0 K/UL
NRBC BLD-RTO: 0 /100 WBC
PLATELET # BLD AUTO: 107 K/UL (ref 164–446)
PMV BLD AUTO: 10.9 FL (ref 9–12.9)
POTASSIUM SERPL-SCNC: 3.9 MMOL/L (ref 3.6–5.5)
PROT SERPL-MCNC: 5.5 G/DL (ref 6–8.2)
PROTHROMBIN TIME: 23.9 SEC (ref 12–14.6)
RBC # BLD AUTO: 3.23 M/UL (ref 4.2–5.4)
SODIUM SERPL-SCNC: 138 MMOL/L (ref 135–145)
WBC # BLD AUTO: 2.3 K/UL (ref 4.8–10.8)

## 2018-07-28 PROCEDURE — 36415 COLL VENOUS BLD VENIPUNCTURE: CPT

## 2018-07-28 PROCEDURE — 700102 HCHG RX REV CODE 250 W/ 637 OVERRIDE(OP): Performed by: HOSPITALIST

## 2018-07-28 PROCEDURE — 85025 COMPLETE CBC W/AUTO DIFF WBC: CPT

## 2018-07-28 PROCEDURE — 700111 HCHG RX REV CODE 636 W/ 250 OVERRIDE (IP): Performed by: INTERNAL MEDICINE

## 2018-07-28 PROCEDURE — 80053 COMPREHEN METABOLIC PANEL: CPT

## 2018-07-28 PROCEDURE — 85610 PROTHROMBIN TIME: CPT

## 2018-07-28 PROCEDURE — A9270 NON-COVERED ITEM OR SERVICE: HCPCS | Performed by: FAMILY MEDICINE

## 2018-07-28 PROCEDURE — 99239 HOSP IP/OBS DSCHRG MGMT >30: CPT | Performed by: FAMILY MEDICINE

## 2018-07-28 PROCEDURE — A9270 NON-COVERED ITEM OR SERVICE: HCPCS | Performed by: INTERNAL MEDICINE

## 2018-07-28 PROCEDURE — A9270 NON-COVERED ITEM OR SERVICE: HCPCS | Performed by: HOSPITALIST

## 2018-07-28 PROCEDURE — 700102 HCHG RX REV CODE 250 W/ 637 OVERRIDE(OP): Performed by: INTERNAL MEDICINE

## 2018-07-28 PROCEDURE — 700102 HCHG RX REV CODE 250 W/ 637 OVERRIDE(OP): Performed by: FAMILY MEDICINE

## 2018-07-28 RX ORDER — CYANOCOBALAMIN 1000 UG/ML
1000 INJECTION, SOLUTION INTRAMUSCULAR; SUBCUTANEOUS
Qty: 1 VIAL | Refills: 0 | Status: ON HOLD | OUTPATIENT
Start: 2018-08-26 | End: 2018-08-20

## 2018-07-28 RX ORDER — POTASSIUM CHLORIDE 20 MEQ/1
40 TABLET, EXTENDED RELEASE ORAL DAILY
Qty: 60 TAB | Refills: 0 | Status: SHIPPED | OUTPATIENT
Start: 2018-07-28 | End: 2018-08-09 | Stop reason: SDUPTHER

## 2018-07-28 RX ORDER — CHOLECALCIFEROL (VITAMIN D3) 125 MCG
1000 CAPSULE ORAL DAILY
Status: DISCONTINUED | OUTPATIENT
Start: 2018-07-28 | End: 2018-07-28 | Stop reason: HOSPADM

## 2018-07-28 RX ORDER — FERROUS GLUCONATE 324(38)MG
324 TABLET ORAL
Status: DISCONTINUED | OUTPATIENT
Start: 2018-07-28 | End: 2018-07-28 | Stop reason: HOSPADM

## 2018-07-28 RX ORDER — WARFARIN SODIUM 2.5 MG/1
2.5 TABLET ORAL
Status: DISCONTINUED | OUTPATIENT
Start: 2018-07-28 | End: 2018-07-28 | Stop reason: HOSPADM

## 2018-07-28 RX ORDER — BUMETANIDE 2 MG/1
2 TABLET ORAL 2 TIMES DAILY
Qty: 60 TAB | Refills: 0 | Status: ON HOLD | OUTPATIENT
Start: 2018-07-28 | End: 2018-08-20

## 2018-07-28 RX ORDER — WARFARIN SODIUM 7.5 MG/1
3.75 TABLET ORAL
Status: DISCONTINUED | OUTPATIENT
Start: 2018-07-31 | End: 2018-07-28 | Stop reason: HOSPADM

## 2018-07-28 RX ORDER — FERROUS GLUCONATE 324(38)MG
324 TABLET ORAL
Qty: 90 TAB | Refills: 0 | Status: ON HOLD | OUTPATIENT
Start: 2018-07-28 | End: 2018-08-20

## 2018-07-28 RX ADMIN — TOPIRAMATE 25 MG: 25 TABLET, FILM COATED ORAL at 05:05

## 2018-07-28 RX ADMIN — FERROUS SULFATE TAB 325 MG (65 MG ELEMENTAL FE) 325 MG: 325 (65 FE) TAB at 05:05

## 2018-07-28 RX ADMIN — POTASSIUM CHLORIDE 40 MEQ: 1500 TABLET, EXTENDED RELEASE ORAL at 05:04

## 2018-07-28 RX ADMIN — OMEPRAZOLE 20 MG: 20 CAPSULE, DELAYED RELEASE ORAL at 05:05

## 2018-07-28 RX ADMIN — CLOPIDOGREL 75 MG: 75 TABLET, FILM COATED ORAL at 05:05

## 2018-07-28 RX ADMIN — LEVOTHYROXINE SODIUM 50 MCG: 50 TABLET ORAL at 05:05

## 2018-07-28 RX ADMIN — CYANOCOBALAMIN TAB 500 MCG 1000 MCG: 500 TAB at 13:32

## 2018-07-28 RX ADMIN — METOPROLOL SUCCINATE 25 MG: 25 TABLET, EXTENDED RELEASE ORAL at 05:34

## 2018-07-28 RX ADMIN — ASPIRIN 81 MG: 81 TABLET, COATED ORAL at 05:05

## 2018-07-28 RX ADMIN — IRON SUCROSE 200 MG: 20 INJECTION, SOLUTION INTRAVENOUS at 05:05

## 2018-07-28 RX ADMIN — BUMETANIDE 2 MG: 1 TABLET ORAL at 05:05

## 2018-07-28 RX ADMIN — FERROUS GLUCONATE 324 MG: 324 TABLET ORAL at 13:32

## 2018-07-28 ASSESSMENT — PAIN SCALES - GENERAL
PAINLEVEL_OUTOF10: 0

## 2018-07-28 NOTE — PROGRESS NOTES
Pt discharged to home with shannon. Discharge instructions reviewed, and signed. Pt escorted out with daughter and this RN in wheelchair.

## 2018-07-28 NOTE — CONSULTS
Consult Note: Hematology    Date of consultation: 7/27/2018 7:41 PM    Referring provider: Dr Hernández    Reason for consultation: Mild chronic leukopenia and anemia      History of presenting illness:   -Multiple comorbidities including CHF CKD admitted with worsening bipedal edema. Labs shows stable leukopenia with WBC of 2.3. She also has some component of normocytic anemia with hemoglobin ranging from 8-9.5. Platelets mildly low at 119. Differential has been grossly unremarkable with relative lymphopenia. I did review her outside records and it appears that since her WBC count was in the 3.5 range back in 2017. Rest of the workup shows low B12 level . She has remote history of breast carcinoma around 15 years ago which was treated with mastectomy and adjuvant chemotherapy.. She reports that the regimen was mild chemotherapy.  -  Past Medical History:    Past Medical History:   Diagnosis Date   • Arthritis     back/neck   • Atrial fibrillation (CMS-HCC) [I48.91] 3/29/2018   • Blood clotting disorder (HCC)    • Breath shortness     water retention   • Cancer (HCC)     breast   • Cataract    • Congestive heart failure (CHF) (HCC) 4/3/2018   • BERTRAND (dyspnea on exertion) 5/4/2018   • Heart burn    • Heart valve disease    • Hemorrhagic disorder (HCC)    • Hiatus hernia syndrome    • Hyperlipidemia    • Hypertension    • Kidney disease    • Long term current use of anticoagulant 3/29/2018   • Pacemaker 4/3/2018   • Pulmonary emphysema (HCC)    • Secondary hypertension 4/3/2018   • Thyroid disease    • Tremors of nervous system 4/3/2018   • Urinary incontinence    • Urinary tract infection        Past surgical history:    Past Surgical History:   Procedure Laterality Date   • OTHER      left breast mastectomy   • OTHER CARDIAC SURGERY      pacemaker placement       Allergies:  Codeine    Medications:    Current Facility-Administered Medications   Medication Dose Route Frequency Provider Last Rate Last Dose   •  cyanocobalamin (VITAMIN B-12) injection 1,000 mcg  1,000 mcg Intramuscular Q30 DAYS Telma Hernández M.D.   1,000 mcg at 07/27/18 1752   • MD ALERT... warfarin (COUMADIN) per pharmacy protocol   Other pharmacy to dose Telma Hernández M.D.       • bumetanide (BUMEX) tablet 2 mg  2 mg Oral BID DIURETIC Telma Hernández M.D.   2 mg at 07/27/18 1751   • albuterol inhaler 2 Puff  2 Puff Inhalation Q6HRS PRN Tanner Zurita M.D.       • allopurinol (ZYLOPRIM) tablet 300 mg  300 mg Oral Q EVENING Tanner Zurita M.D.   300 mg at 07/27/18 1752   • aspirin EC (ECOTRIN) tablet 81 mg  81 mg Oral DAILY Tanner Zurita M.D.   81 mg at 07/27/18 0513   • clopidogrel (PLAVIX) tablet 75 mg  75 mg Oral DAILY Tanner Zurita M.D.   75 mg at 07/27/18 0513   • ferrous sulfate tablet 325 mg  325 mg Oral DAILY Tanner Zurita M.D.   325 mg at 07/27/18 0512   • gabapentin (NEURONTIN) capsule 800 mg  800 mg Oral QHS Tanner Zurita M.D.   800 mg at 07/26/18 2056   • levothyroxine (SYNTHROID) tablet 50 mcg  50 mcg Oral AM ES Tanner Zurita M.D.   50 mcg at 07/27/18 0512   • metoprolol SR (TOPROL XL) tablet 25 mg  25 mg Oral KYAW Zurita M.D.   25 mg at 07/27/18 0513   • omeprazole (PRILOSEC) capsule 20 mg  20 mg Oral KYAW Zurita M.D.   20 mg at 07/27/18 0512   • rosuvastatin (CRESTOR) tablet 10 mg  10 mg Oral Q EVENING Tanner Zurita M.D.   10 mg at 07/27/18 1754   • sertraline (ZOLOFT) tablet 25 mg  25 mg Oral QHS Tanner Zurita M.D.   25 mg at 07/26/18 2056   • topiramate (TOPAMAX) tablet 25 mg  25 mg Oral BID Tanner Zurita M.D.   25 mg at 07/27/18 1751   • acetaminophen (TYLENOL) tablet 650 mg  650 mg Oral Q6HRS PRN Tanner Zurita M.D.   650 mg at 07/26/18 2058   • ondansetron (ZOFRAN) syringe/vial injection 4 mg  4 mg Intravenous Q4HRS PRN Tanner Zurita M.D.       • ondansetron (ZOFRAN ODT) dispertab 4 mg  4 mg Oral Q4HRS PRN Tanner Zurita M.D.       • senna-docusate (PERICOLACE or SENOKOT S) 8.6-50 MG per  tablet 2 Tab  2 Tab Oral BID Tanner Zurita M.D.        And   • polyethylene glycol/lytes (MIRALAX) PACKET 1 Packet  1 Packet Oral QDAY PRN Tanner Zurita M.D.        And   • magnesium hydroxide (MILK OF MAGNESIA) suspension 30 mL  30 mL Oral QDAY PRN Tanner Zurita M.D.        And   • bisacodyl (DULCOLAX) suppository 10 mg  10 mg Rectal QDAY PRN Tanner Zurita M.D.       • potassium chloride SA (Kdur) tablet 40 mEq  40 mEq Oral BID Arthur Gallegos M.D.   40 mEq at 07/27/18 1751       Social History:     Social History     Social History   • Marital status: Single     Spouse name: N/A   • Number of children: N/A   • Years of education: N/A     Occupational History   • Not on file.     Social History Main Topics   • Smoking status: Former Smoker     Packs/day: 0.50     Types: Cigarettes     Quit date: 1/1/1970   • Smokeless tobacco: Never Used   • Alcohol use 1.8 oz/week     3 Glasses of wine per week      Comment: 1-2 glasses wine before dinner   • Drug use: No   • Sexual activity: Not on file     Other Topics Concern   • Not on file     Social History Narrative   • No narrative on file       Family History:     Family History   Problem Relation Age of Onset   • Lung Disease Mother    • Cancer Mother    • No Known Problems Father    • Fainting Neg Hx        Review of Systems:  All other review of systems are negative except what was mentioned above in the HPI.    Constitutional: No fever, chills, weight loss , chronic malaise/fatigue.    HEENT: No new auditory or visual complaints. No sore throat and neck pain.     Respiratory:No new cough, sputum production, chronic shortness of breath and wheezing.    Cardiovascular: No new chest pain, palpitations, orthopnea and leg swelling.    Gastrointestinal: No heartburn, nausea, vomiting ,abdominal pain, hematochezia or melena     Genitourinary: Negative for dysuria, hematuria    Musculoskeletal: No new arthralgias or myalgias   Skin: Negative for rash and itching.   "  Neurological: Negative for focal weakness or headaches.    Endo/Heme/Allergies: No abnormal bleed/bruise.    Psychiatric/Behavioral: No new depression/anxiety.    Physical Exam:  Vitals:   /73   Pulse 78   Temp 36.9 °C (98.5 °F)   Resp 16   Ht 1.613 m (5' 3.5\")   Wt 68.2 kg (150 lb 5.7 oz)   LMP  (LMP Unknown)   SpO2 95%   Breastfeeding? No   BMI 26.22 kg/m²     General: Not in acute distress, alert and oriented x 3  HEENT: No pallor, icterus. Oropharynx clear.   Neck: Supple, no palpable masses.  Lymph nodes: No palpable cervical, supraclavicular, axillary or inguinal lymphadenopathy.    CVS: regular rate and rhythm, no rubs or gallops  RESP: Clear to auscultate bilaterally, no wheezing or crackles.   ABD: Soft, non tender, non distended, positive bowel sounds, no palpable organomegaly  EXT: Bipedal edema and erythema  CNS: Alert and oriented x3, No focal deficits.  Skin- No rash      Labs:   Recent Labs      07/24/18   2219  07/26/18   0355  07/27/18   0320  07/27/18   0915  07/27/18   1620   RBC  2.98*  3.08*   --   3.34*   --    HEMOGLOBIN  8.6*  8.9*   --   9.5*   --    HEMATOCRIT  27.4*  28.4*   --   31.5*   --    PLATELETCT  132*  117*   --   119*   --    PROTHROMBTM  25.2*   --   20.4*   --    --    APTT  46.1*   --    --    --    --    INR  2.32*   --   1.78*   --    --    IRON   --    --    --    --   32*   TOTIRONBC   --    --    --    --   321     Lab Results   Component Value Date/Time    SODIUM 137 07/27/2018 09:15 AM    POTASSIUM 3.9 07/27/2018 09:15 AM    CHLORIDE 101 07/27/2018 09:15 AM    CO2 28 07/27/2018 09:15 AM    GLUCOSE 88 07/27/2018 09:15 AM    BUN 25 (H) 07/27/2018 09:15 AM    CREATININE 1.29 07/27/2018 09:15 AM        Assessment and Plan:  Chronic leukopenia and anemia-she appeared to have chronic leukopenia for quite some time. Given her age, early myelodysplastic syndrome is a possibility. However, given the normal differential and platelet count and overall stability " and do not think she needs a bone marrow biopsy. The patient is also adamantly against it, which is very reasonable. She does have low vitamin B12. I have ordered a dose of vitamin B12 injection. She can be started on sublingual vitamin B12. She has hiatal hernia and it is unclear whether she has some absorption problem. Also rule out celiac disease and pernicious anemia. Clobetasol can also result in leukopenia and anemia and hopefully this will improve some.  Anemia-multifactorial related to renal insufficiency, low iron and low B12. We will arrange couple doses of iron infusion. She will continue iron tablets. Replace B12 as above.    Remote history of breast carcinoma-unlikely that her current picture is secondary to chemotherapy-induced MDS. I do not have any details of her prior regimen.    I have given her my card and she will see me in around 5-6 weeks with repeat CBC    She agreed and verbalized her agreement and understanding with the current plan.  I answered all questions and concerns she has at this time.              Thank you for allowing me to participate in her care.    Please note that this dictation was created using voice recognition software. I have made every reasonable attempt to correct obvious errors, but I expect that there are errors of grammar and possibly content that I did not discover before finalizing the note.      SIGNATURES:  Krunal Atkinson    CC:  IMELDA PatelRRikiN.  No ref. provider found

## 2018-07-28 NOTE — CARE PLAN
Problem: Communication  Goal: The ability to communicate needs accurately and effectively will improve    Intervention: [place] patient and significant other/support system to call light to alert staff of needs  Pt and visitors educated on the use of call light, communicating with the staff on pt needs and concerns. Provided time to answer pt questions and address concerns.       Problem: Knowledge Deficit  Goal: Knowledge of disease process/condition, treatment plan, diagnostic tests, and medications will improve    Intervention: Assess knowledge level of disease process/condition, treatment plan, diagnostic tests, and medications  Pt educated on each medication with medication passes. Pt verbalize understanding.

## 2018-07-28 NOTE — CONSULTS
Reason for PC Consult: Advance Care Planning    Consulted by: Dr. Telma Hernández    Assessment:  General: Pt is an 86 YOF who presented to the ER with increased bilateral lower leg edema x 3 days. Pt had a heart cath in July 2018 (2 stents). Pt reports increased lower leg edema and fluid lung build up since the heart cath procedure. PMH: A fib, blood clotting disorder, shortness of breath, breast cancer, CHF, heart valve disease, hemorrhagic disorder, hiatus hernia, htn, kidney disease, pacemaker, pulmonary emphysema, thyroid disease, tremors, and UTI.     Dyspnea: No-  (RA. Resp 18. 93%. )  Last BM: 07/27/18-    Pain: No-  (Per RN Assessment)  Depression: Mood appropriate for situation   Dementia: No     Spiritual:  Is [employer] or spirituality important for coping with this illness? No-    Has a  or spiritual provider visit been requested? No    Palliative Performance Scale: 50%    Advance Directive: None- During this encounter, pt's Palma ortega left a copy of AD with the BS RN.  PC RN scanned copy of AD into pt's EPIC chart.    DPOA: No-  During this encounter, Palma brought a copy of AD. DPOA, oscarcarolyne Palmaalicia Matias - [phone].   POLST: No-      Code Status: DNAR/DNI    Outcome:  PC RN placed TC to pt's son, Oli. PC RN introduced self and the role of Palliative Care. Per Oli, pt lives alone in an apt in [place], [place]. Oli, his spouse, and 3 daughters are very involved in supporting pt. All live locally and visit pt frequently. Per Oli, pt uses a walker at home. Hx of fall 1 x the past month. PC RN discussed Oli's understanding of pt's clinical picture. Oli aware of pt's medical picture and the reason for pt's most recent hospitalization. PC RN discussed GOC, code status, and ACP. Pt is a DNAR/DNI. Oli suggested that this PC RN follow up with pt's shannon Palma. Oli defers to Palma's judgement. PC RN provided emotional support and active listening during TC. PC RN  provided Oli with PC contact number for any follow up questions.      PC RN left a vm message for Palma. Palma left a copy of pt's AD with BS RN Lauren. PC RN scanned AD into pt's EPIC chart.     Updated: BS RN, Palliative    Plan: PC RN will continue to follow pt during hospital course.     Recommendations: I do not recommend an ethics or hospice consult at this time because pt is seeking active treatment..    Thank you for allowing Palliative Care to participate in this patient's care. Please feel free to call [phone] with any questions or concerns.

## 2018-07-28 NOTE — PROGRESS NOTES
[facility] Hospitalist Progress Note    Date of Service: 2018    Chief Complaint  86 y.o. female admitted 2018 with swelling on the legs and shortness of breath.    Interval Problem Update  Stated that swelling on her legs is better today.  The redness has worsened.  Denies any chest pain or shortness of breath.    Consultants/Specialty  Palliative  Hematology  Disposition  To be determined         Review of Systems   Constitutional: Positive for malaise/fatigue. Negative for chills and weight loss.   HENT: Negative for ear pain, hearing loss and tinnitus.    Eyes: Negative for blurred vision, double vision and photophobia.   Respiratory: Positive for sputum production.    Cardiovascular: Positive for orthopnea and leg swelling. Negative for chest pain and PND.   Gastrointestinal: Negative for heartburn, nausea and vomiting.   Genitourinary: Negative for dysuria and urgency.   Musculoskeletal: Negative for back pain, myalgias and neck pain.   Skin: Negative for itching.   Neurological: Negative for dizziness, tingling and headaches.   Endo/Heme/Allergies: Negative for environmental allergies. Does not bruise/bleed easily.   Psychiatric/Behavioral: Negative for depression and suicidal ideas.      Physical Exam  Laboratory/Imaging   Hemodynamics  Temp (24hrs), Av.7 °C (98 °F), Min:36.2 °C (97.2 °F), Max:36.9 °C (98.5 °F)   Temperature: 36.9 °C (98.5 °F)  Pulse  Av.2  Min: 60  Max: 94    Blood Pressure : 142/73      Respiratory      Respiration: 16, Pulse Oximetry: 95 %     Work Of Breathing / Effort: Mild       Fluids    Intake/Output Summary (Last 24 hours) at 18 1821  Last data filed at 18 1805   Gross per 24 hour   Intake              220 ml   Output                0 ml   Net              220 ml       Nutrition  Orders Placed This Encounter   Procedures   • Diet Order 2 Gram Sodium, Renal     Standing Status:   Standing     Number of Occurrences:   1     Order Specific Question:   Diet:      Answer:   2 Gram Sodium [7]     Order Specific Question:   Diet:     Answer:   Renal [8]     Physical Exam   Constitutional: She is oriented to person, place, and time. She appears well-developed. No distress.   HENT:   Head: Normocephalic and atraumatic.   Eyes: Right eye exhibits no discharge. Left eye exhibits no discharge. No scleral icterus.   Neck: Neck supple. No JVD present. No tracheal deviation present.   Cardiovascular: Normal rate and regular rhythm.  Exam reveals no gallop and no friction rub.    Pulmonary/Chest: No accessory muscle usage. No respiratory distress. She has decreased breath sounds.   Abdominal: Bowel sounds are normal. She exhibits no distension. There is no tenderness. There is no rebound.   Musculoskeletal: She exhibits edema. She exhibits no deformity.   Stasis dermatitis with discoloration bilaterally on legs.   Neurological: She is alert and oriented to person, place, and time. No cranial nerve deficit.   Skin: Skin is warm and dry. She is not diaphoretic.   Psychiatric: She has a normal mood and affect. Her behavior is normal.       Recent Labs      07/24/18 2219 07/26/18 0355 07/27/18   0915   WBC  2.4*  2.3*  2.3*   RBC  2.98*  3.08*  3.34*   HEMOGLOBIN  8.6*  8.9*  9.5*   HEMATOCRIT  27.4*  28.4*  31.5*   MCV  91.9  92.2  94.3   MCH  28.9  28.9  28.4   MCHC  31.4*  31.3*  30.2*   RDW  58.8*  60.2*  63.5*   PLATELETCT  132*  117*  119*   MPV  11.2  10.5  10.6     Recent Labs      07/25/18   0543  07/26/18 0355  07/27/18   0915   SODIUM  135  138  137   POTASSIUM  2.9*  3.7  3.9   CHLORIDE  102  102  101   CO2  27  26  28   GLUCOSE  90  110*  88   BUN  29*  28*  25*   CREATININE  1.57*  1.44*  1.29   CALCIUM  8.3*  8.5  8.7     Recent Labs      07/24/18 2219 07/27/18   0320   APTT  46.1*   --    INR  2.32*  1.78*     Recent Labs      07/24/18 2219   BNPBTYPENAT  574*              Assessment/Plan     * Acute on chronic diastolic (congestive) heart failure (HCC)-  (present on admission)   Assessment & Plan    Switched to oral diuretics.  Monitor I's and O's.  Daily weight.  Recent echocardiogram showed ejection fraction 50-55% with possible right sided heart failure and severe tricuspid regurgitation.  Venous Doppler of the lower extremities ordered for redness on both legs with tenderness.  Elevate legs above heart level while laying in bed.        Pancytopenia (HCC)- (present on admission)   Assessment & Plan    Hematology consulted.  Workup ongoing.  ANC 1.29.        S/P coronary angioplasty- (present on admission)   Assessment & Plan    On ASA and Plavix for the same.        CKD (chronic kidney disease) stage 3, GFR 30-59 ml/min- (present on admission)   Assessment & Plan    Creatinine baseline around 1.2.  Avoid nephrotoxins.  Renal dose or medications.        COPD (chronic obstructive pulmonary disease) ([facility])- (present on admission)   Assessment & Plan    No current exacerbation. Monitor.         Chronic atrial fibrillation ([facility])- (present on admission)   Assessment & Plan    Status post pacemaker placement.  Rate controlled.  Anticoagulated with coumadin therapy         Plan of care discussed with multidisciplinary team during rounds.  Quality-Core Measures   Reviewed items::  Labs reviewed, Medications reviewed and Radiology images reviewed  Hunt catheter::  No Hunt  DVT prophylaxis - mechanical:  SCDs

## 2018-07-28 NOTE — DISCHARGE INSTRUCTIONS
Discharge Instructions    Discharged to home by car with relative. Discharged via wheelchair, hospital escort: Yes.  Special equipment needed: Walker    Be sure to schedule a follow-up appointment with your primary care doctor or any specialists as instructed.     Discharge Plan:   Diet Plan: Discussed  Activity Level: Discussed  Confirmed Follow up Appointment: Appointment Scheduled  Confirmed Symptoms Management: Discussed  Medication Reconciliation Updated: Yes  Influenza Vaccine Indication: Patient Refuses (states received in october 2017)    I understand that a diet low in cholesterol, fat, and sodium is recommended for good health. Unless I have been given specific instructions below for another diet, I accept this instruction as my diet prescription.   Other diet: Caridac     Special Instructions:   HF Patient Discharge Instructions  · Monitor your weight daily, and maintain a weight chart, to track your weight changes.   · Activity as tolerated, unless your Doctor has ordered otherwise.  · Follow a low fat, low cholesterol, low salt diet unless instructed otherwise by your Doctor. Read the labels on the back of food products and track your intake of fat, cholesterol and salt.   · Fluid Restriction No. If a Fluid Restriction has been ordered by your Doctor, measure fluids with a measuring cup to ensure that you are not exceeding the restriction.   · No smoking.  · Oxygen No. If your Doctor has ordered that you wear Oxygen at home, it is important to wear it as ordered.  · Did you receive an explanation from staff on the importance of taking each of your medications and why it is necessary to keep taking them unless your doctor says to stop? Yes  · Were all of your questions answered about how to manage your heart failure and what to do if you have increased signs and symptoms after you go home? Yes  · Do you feel like your heart failure care team involved you in the care treatment plan and allowed you to make  decisions regarding your care while in the hospital and addressed any discharge needs you might have? Yes    See the educational handout provided at discharge for more information on monitoring your daily weight, activity and diet. This also explains more about Heart Failure, symptoms of a flare-up and some of the tests that you have undergone.     Warning Signs of a Flare-Up include:  · Swelling in the ankles or lower legs.  · Shortness of breath, while at rest, or while doing normal activities.   · Shortness of breath at night when in bed, or coughing in bed.   · Requiring more pillows to sleep at night, or needing to sit up at night to sleep.  · Feeling weak, dizzy or fatigued.     When to call your Doctor:  · Call WhatsApp seven days a week from 8:00 a.m. to 8:00 p.m. for medical questions [phone].  · Call your Primary Care Physician or Cardiologist if:   1. You experience any pain radiating to your jaw or neck.  2. You have any difficulty breathing.  3. You experience weight gain of 3 lbs in a day or 5 lbs in a week.   4. You feel any palpitations or irregular heartbeats.  5. You become dizzy or lose consciousness.   If you have had an angiogram or had a pacemaker or AICD placed, and experience:  1. Bleeding, drainage or swelling at the surgical / puncture site.  2. Fever greater than 100.0 F  3. Shock from internal defibrillator.  4. Cool and / or numb extremities.      · Is patient discharged on Warfarin / Coumadin?   Yes    You are receiving the drug warfarin. Please understand the importance of monitoring warfarin with scheduled PT/INR blood draws.  Follow-up with a call to your personal Doctor's office in 3 days to schedule a PT/INR. .    IMPORTANT: HOW TO USE THIS INFORMATION:  This is a summary and does NOT have all possible information about this product. This information does not assure that this product is safe, effective, or appropriate for you. This information is not individual  "medical advice and does not substitute for the advice of your health care professional. Always ask your health care professional for complete information about this product and your specific health needs.      WARFARIN - ORAL (WARF-uh-rin)      COMMON BRAND NAME(S): Coumadin      WARNING:  Warfarin can cause very serious (possibly fatal) bleeding. This is more likely to occur when you first start taking this medication or if you take too much warfarin. To decrease your risk for bleeding, your doctor or other health care provider will monitor you closely and check your lab results (INR test) to make sure you are not taking too much warfarin. Keep all medical and laboratory appointments. Tell your doctor right away if you notice any signs of serious bleeding. See also Side Effects section.      USES:  This medication is used to treat blood clots (such as in deep vein thrombosis-DVT or pulmonary embolus-PE) and/or to prevent new clots from forming in your body. Preventing harmful blood clots helps to reduce the risk of a stroke or heart attack. Conditions that increase your risk of developing blood clots include a certain type of irregular heart rhythm (atrial fibrillation), heart valve replacement, recent heart attack, and certain surgeries (such as hip/knee replacement). Warfarin is commonly called a \"blood thinner,\" but the more correct term is \"anticoagulant.\" It helps to keep blood flowing smoothly in your body by decreasing the amount of certain substances (clotting proteins) in your blood.      HOW TO USE:  Read the Medication Guide provided by your pharmacist before you start taking warfarin and each time you get a refill. If you have any questions, ask your doctor or pharmacist. Take this medication by mouth with or without food as directed by your doctor or other health care professional, usually once a day. It is very important to take it exactly as directed. Do not increase the dose, take it more " frequently, or stop using it unless directed by your doctor. Dosage is based on your medical condition, laboratory tests (such as INR), and response to treatment. Your doctor or other health care provider will monitor you closely while you are taking this medication to determine the right dose for you. Use this medication regularly to get the most benefit from it. To help you remember, take it at the same time each day. It is important to eat a balanced, consistent diet while taking warfarin. Some foods can affect how warfarin works in your body and may affect your treatment and dose. Avoid sudden large increases or decreases in your intake of foods high in vitamin K (such as broccoli, cauliflower, cabbage, brussels sprouts, kale, spinach, and other green leafy vegetables, liver, green tea, certain vitamin supplements). If you are trying to lose weight, check with your doctor before you try to go on a diet. Cranberry products may also affect how your warfarin works. Limit the amount of cranberry juice (16 ounces/480 milliliters a day) or other cranberry products you may drink or eat.      SIDE EFFECTS:  Nausea, loss of appetite, or stomach/abdominal pain may occur. If any of these effects persist or worsen, tell your doctor or pharmacist promptly. Remember that your doctor has prescribed this medication because he or she has judged that the benefit to you is greater than the risk of side effects. Many people using this medication do not have serious side effects. This medication can cause serious bleeding if it affects your blood clotting proteins too much (shown by unusually high INR lab results). Even if your doctor stops your medication, this risk of bleeding can continue for up to a week. Tell your doctor right away if you have any signs of serious bleeding, including: unusual pain/swelling/discomfort, unusual/easy bruising, prolonged bleeding from cuts or gums, persistent/frequent nosebleeds, unusually  heavy/prolonged menstrual flow, pink/dark urine, coughing up blood, vomit that is bloody or looks like coffee grounds, severe headache, dizziness/fainting, unusual or persistent tiredness/weakness, bloody/black/tarry stools, chest pain, shortness of breath, difficulty swallowing. Tell your doctor right away if any of these unlikely but serious side effects occur: persistent nausea/vomiting, severe stomach/abdominal pain, yellowing eyes/skin. This drug rarely has caused very serious (possibly fatal) problems if its effects lead to small blood clots (usually at the beginning of treatment). This can lead to severe skin/tissue damage that may require surgery or amputation if left untreated. Patients with certain blood conditions (protein C or S deficiency) may be at greater risk. Get medical help right away if any of these rare but serious side effects occur: painful/red/purplish patches on the skin (such as on the toe, breast, abdomen), change in the amount of urine, vision changes, confusion, slurred speech, weakness on one side of the body. A very serious allergic reaction to this drug is rare. However, get medical help right away if you notice any symptoms of a serious allergic reaction, including: rash, itching/swelling (especially of the face/tongue/throat), severe dizziness, trouble breathing. This is not a complete list of possible side effects. If you notice other effects not listed above, contact your doctor or pharmacist. In the [place] - Call your doctor for medical advice about side effects. You may report side effects to FDA at 9-273-BBJ-0967. In [place] - Call your doctor for medical advice about side effects. You may report side effects to [facility] [place] at 1-567.551.4299.      PRECAUTIONS:  Before taking warfarin, tell your doctor or pharmacist if you are allergic to it; or if you have any other allergies. This product may contain inactive ingredients, which can cause allergic reactions or other problems. Talk  to your pharmacist for more details. Before using this medication, tell your doctor or pharmacist your medical history, especially of: blood disorders (such as anemia, hemophilia), bleeding problems (such as bleeding of the stomach/intestines, bleeding in the brain), blood vessel disorders (such as aneurysms), recent major injury/surgery, liver disease, alcohol use, mental/mood disorders (including memory problems), frequent falls/injuries. It is important that all your doctors and dentists know that you take warfarin. Before having surgery or any medical/dental procedures, tell your doctor or dentist that you are taking this medication and about all the products you use (including prescription drugs, nonprescription drugs, and herbal products). Avoid getting injections into the muscles. If you must have an injection into a muscle (for example, a flu shot), it should be given in the arm. This way, it will be easier to check for bleeding and/or apply pressure bandages. This medication may cause stomach bleeding. Daily use of alcohol while using this medicine will increase your risk for stomach bleeding and may also affect how this medication works. Limit or avoid alcoholic beverages. If you have not been eating well, if you have an illness or infection that causes fever, vomiting, or diarrhea for more than 2 days, or if you start using any antibiotic medications, contact your doctor or pharmacist immediately because these conditions can affect how warfarin works. This medication can cause heavy bleeding. To lower the chance of getting cut, bruised, or injured, use great caution with sharp objects like safety razors and nail cutters. Use an electric razor when shaving and a soft toothbrush when brushing your teeth. Avoid activities such as contact sports. If you fall or injure yourself, especially if you hit your head, call your doctor immediately. Your doctor may need to check you. The Food & Drug Administration has  "stated that generic warfarin products are interchangeable. However, consult your doctor or pharmacist before switching warfarin products. Be careful not to take more than one medication that contains warfarin unless specifically directed by the doctor or health care provider who is monitoring your warfarin treatment. Older adults may be at greater risk for bleeding while using this drug. This medication is not recommended for use during pregnancy because of serious (possibly fatal) harm to an unborn baby. Discuss the use of reliable forms of birth control with your doctor. If you become pregnant or think you may be pregnant, tell your doctor immediately. If you are planning pregnancy, discuss a plan for managing your condition with your doctor before you become pregnant. Your doctor may switch the type of medication you use during pregnancy. Very small amounts of this medication may pass into breast milk but is unlikely to harm a nursing infant. Consult your doctor before breast-feeding.      DRUG INTERACTIONS:  Drug interactions may change how your medications work or increase your risk for serious side effects. This document does not contain all possible drug interactions. Keep a list of all the products you use (including prescription/nonprescription drugs and herbal products) and share it with your doctor and pharmacist. Do not start, stop, or change the dosage of any medicines without your doctor's approval. Warfarin interacts with many prescription, nonprescription, vitamin, and herbal products. This includes medications that are applied to the skin or inside the vagina or rectum. The interactions with warfarin usually result in an increase or decrease in the \"blood-thinning\" (anticoagulant) effect. Your doctor or other health care professional should closely monitor you to prevent serious bleeding or clotting problems. While taking warfarin, it is very important to tell your doctor or pharmacist of any " changes in medications, vitamins, or herbal products that you are taking. Some products that may interact with this drug include: capecitabine, imatinib, mifepristone. Aspirin, aspirin-like drugs (salicylates), and nonsteroidal anti-inflammatory drugs (NSAIDs such as ibuprofen, naproxen, celecoxib) may have effects similar to warfarin. These drugs may increase the risk of bleeding problems if taken during treatment with warfarin. Carefully check all prescription/nonprescription product labels (including drugs applied to the skin such as pain-relieving creams) since the products may contain NSAIDs or salicylates. Talk to your doctor about using a different medication (such as acetaminophen) to treat pain/fever. Low-dose aspirin and related drugs (such as clopidogrel, ticlopidine) should be continued if prescribed by your doctor for specific medical reasons such as heart attack or stroke prevention. Consult your doctor or pharmacist for more details. Many herbal products interact with warfarin. Tell your doctor before taking any herbal products, especially bromelains, coenzyme Q10, cranberry, danshen, dong quai, fenugreek, garlic, ginkgo biloba, ginseng, and Diego's wort, among others. This medication may interfere with a certain laboratory test to measure theophylline levels, possibly causing false test results. Make sure laboratory personnel and all your doctors know you use this drug.      OVERDOSE:  If overdose is suspected, contact a poison control center or emergency room immediately. [place] residents can call the [place] National Poison Hotline at 1-292.296.6167. [place] residents can call a provincial poison control center. Symptoms of overdose may include: bloody/black/tarry stools, pink/dark urine, unusual/prolonged bleeding.      NOTES:  Do not share this medication with others. Laboratory and/or medical tests (such as INR, complete blood count) must be performed periodically to monitor your progress or check for  side effects. Consult your doctor for more details.      MISSED DOSE:  For the best possible benefit, do not miss any doses. If you do miss a dose and remember on the same day, take it as soon as you remember. If you remember on the next day, skip the missed dose and resume your usual dosing schedule. Do not double the dose to catch up because this could increase your risk for bleeding. Keep a record of missed doses to give to your doctor or pharmacist. Contact your doctor or pharmacist if you miss 2 or more doses in a row.      STORAGE:  Store at room temperature away from light and moisture. Do not store in the bathroom. Keep all medications away from children and pets. Do not flush medications down the toilet or pour them into a drain unless instructed to do so. Properly discard this product when it is  or no longer needed. Consult your pharmacist or local waste disposal company for more details about how to safely discard your product.      MEDICAL ALERT:  Your condition and medication can cause complications in a medical emergency. For information about enrolling in MedicAlert, call 1-292.571.2008 ([place]) or 1-318.765.8345 ([place]).      Information last revised 2010 Copyright(c) 2010 [employer]             Depression / Suicide Risk    As you are discharged from this [facility] facility, it is important to learn how to keep safe from harming yourself.    Recognize the warning signs:  · Abrupt changes in personality, positive or negative- including increase in energy   · Giving away possessions  · Change in eating patterns- significant weight changes-  positive or negative  · Change in sleeping patterns- unable to sleep or sleeping all the time   · Unwillingness or inability to communicate  · Depression  · Unusual sadness, discouragement and loneliness  · Talk of wanting to die  · Neglect of personal appearance   · Rebelliousness- reckless behavior  · Withdrawal from people/activities  they love  · Confusion- inability to concentrate     If you or a loved one observes any of these behaviors or has concerns about self-harm, here's what you can do:  · Talk about it- your feelings and reasons for harming yourself  · Remove any means that you might use to hurt yourself (examples: pills, rope, extension cords, firearm)  · Get professional help from the community (Mental Health, Substance Abuse, psychological counseling)  · Do not be alone:Call your Safe Contact- someone whom you trust who will be there for you.  · Call your local CRISIS HOTLINE 473-8560 or 339-975-9628  · Call your local Children's Mobile Crisis Response Team [place] (387) 759-4742 or www.TimeBridge  · Call the toll free National Suicide Prevention Hotlines   · National Suicide Prevention Lifeline 521-322-EVZC (9646)  · [employer] Line Network 800-SUICIDE (084-6222)        Heart Failure  Heart failure is a condition in which the heart has trouble pumping blood because it has become weak or stiff. This means that the heart does not pump blood efficiently for the body to work well. For some people with heart failure, fluid may back up into the lungs and there may be swelling (edema) in the lower legs. Heart failure is usually a long-term (chronic) condition. It is important for you to take good care of yourself and follow the treatment plan from your health care provider.  What are the causes?  This condition is caused by some health problems, including:  · High blood pressure (hypertension). Hypertension causes the heart muscle to work harder than normal. High blood pressure eventually causes the heart to become stiff and weak.  · Coronary artery disease (CAD). CAD is the buildup of cholesterol and fat (plaques) in the arteries of the heart.  · Heart attack (myocardial infarction). Injured tissue, which is caused by the heart attack, does not contract as well and the heart's ability to pump blood is weakened.  · Abnormal  heart valves. When the heart valves do not open and close properly, the heart muscle must pump harder to keep the blood flowing.  · Heart muscle disease (cardiomyopathy or myocarditis). Heart muscle disease is damage to the heart muscle from a variety of causes, such as drug or alcohol abuse, infections, or unknown causes. These can increase the risk of heart failure.  · Lung disease. When the lungs do not work properly, the heart must work harder.  What increases the risk?  Risk of heart failure increases as a person ages. This condition is also more likely to develop in people who:  · Are overweight.  · Are male.  · Smoke or chew tobacco.  · Abuse alcohol or illegal drugs.  · Have taken medicines that can damage the heart, such as chemotherapy drugs.  · Have diabetes.  ¨ High blood sugar (glucose) is associated with high fat (lipid) levels in the blood.  ¨ Diabetes can also damage tiny blood vessels that carry nutrients to the heart muscle.  · Have abnormal heart rhythms.  · Have thyroid problems.  · Have low blood counts (anemia).  What are the signs or symptoms?  Symptoms of this condition include:  · Shortness of breath with activity, such as when climbing stairs.  · Persistent cough.  · Swelling of the feet, ankles, legs, or abdomen.  · Unexplained weight gain.  · Difficulty breathing when lying flat (orthopnea).  · Waking from sleep because of the need to sit up and get more air.  · Rapid heartbeat.  · Fatigue and loss of energy.  · Feeling light-headed, dizzy, or close to fainting.  · Loss of appetite.  · Nausea.  · Increased urination during the night (nocturia).  · Confusion.  How is this diagnosed?  This condition is diagnosed based on:  · Medical history, symptoms, and a physical exam.  · Diagnostic tests, which may include:  ¨ Echocardiogram.  ¨ Electrocardiogram (ECG).  ¨ Chest X-ray.  ¨ Blood tests.  ¨ Exercise stress test.  ¨ Radionuclide scans.  ¨ Cardiac catheterization and angiogram.  How is this  treated?  Treatment for this condition is aimed at managing the symptoms of heart failure. Medicines, behavioral changes, or other treatments may be necessary to treat heart failure.  Medicines   These may include:  · Angiotensin-converting enzyme (ACE) inhibitors. This type of medicine blocks the effects of a blood protein called angiotensin-converting enzyme. ACE inhibitors relax (dilate) the blood vessels and help to lower blood pressure.  · Angiotensin receptor blockers (ARBs). This type of medicine blocks the actions of a blood protein called angiotensin. ARBs dilate the blood vessels and help to lower blood pressure.  · Water pills (diuretics). Diuretics cause the kidneys to remove salt and water from the blood. The extra fluid is removed through urination, leaving a lower volume of blood that the heart has to pump.  · Beta blockers. These improve heart muscle strength and they prevent the heart from beating too quickly.  · Digoxin. This increases the force of the heartbeat.  Healthy behavior changes   These may include:  · Reaching and maintaining a healthy weight.  · Stopping smoking or chewing tobacco.  · Eating heart-healthy foods.  · Limiting or avoiding alcohol.  · Stopping use of street drugs (illegal drugs).  · Physical activity.  Other treatments   These may include:  · Surgery to open blocked coronary arteries or repair damaged heart valves.  · Placement of a biventricular pacemaker to improve heart muscle function (cardiac resynchronization therapy). This device paces both the right ventricle and left ventricle.  · Placement of a device to treat serious abnormal heart rhythms (implantable cardioverter defibrillator, or ICD).  · Placement of a device to improve the pumping ability of the heart (left ventricular assist device, or LVAD).  · Heart transplant. This can cure heart failure, and it is considered for certain patients who do not improve with other therapies.  Follow these instructions at  home:  Medicines  · Take over-the-counter and prescription medicines only as told by your health care provider. Medicines are important in reducing the workload of your heart, slowing the progression of heart failure, and improving your symptoms.  ¨ Do not stop taking your medicine unless your health care provider told you to do that.  ¨ Do not skip any dose of medicine.  ¨ Refill your prescriptions before you run out of medicine. You need your medicines every day.  Eating and drinking  · Eat heart-healthy foods. Talk with a dietitian to make an eating plan that is right for you.  ¨ Choose foods that contain no trans fat and are low in saturated fat and cholesterol. Healthy choices include fresh or frozen fruits and vegetables, fish, lean meats, legumes, fat-free or low-fat dairy products, and whole-grain or high-fiber foods.  ¨ Limit salt (sodium) if directed by your health care provider. Sodium restriction may reduce symptoms of heart failure. Ask a dietitian to recommend heart-healthy seasonings.  ¨ Use healthy cooking methods instead of frying. Healthy methods include roasting, grilling, broiling, baking, poaching, steaming, and stir-frying.  · Limit your fluid intake if directed by your health care provider. Fluid restriction may reduce symptoms of heart failure.  Lifestyle  · Stop smoking or using chewing tobacco. Nicotine and tobacco can damage your heart and your blood vessels. Do not use nicotine gum or patches before talking to your health care provider.  · Limit alcohol intake to no more than 1 drink per day for non-pregnant women and 2 drinks per day for men. One drink equals 12 oz of beer, 5 oz of wine, or 1½ oz of hard liquor.  ¨ Drinking more than that is harmful to your heart. Tell your health care provider if you drink alcohol several times a week.  ¨ Talk with your health care provider about whether any level of alcohol use is safe for you.  ¨ If your heart has already been damaged by alcohol or  you have severe heart failure, drinking alcohol should be stopped completely.  · Stop use of illegal drugs.  · Lose weight if directed by your health care provider. Weight loss may reduce symptoms of heart failure.  · Do moderate physical activity if directed by your health care provider. People who are elderly and people with severe heart failure should consult with a health care provider for physical activity recommendations.  Monitor important information  · Weigh yourself every day. Keeping track of your weight daily helps you to notice excess fluid sooner.  ¨ Weigh yourself every morning after you urinate and before you eat breakfast.  ¨ Wear the same amount of clothing each time you weigh yourself.  ¨ Record your daily weight. Provide your health care provider with your weight record.  · Monitor and record your blood pressure as told by your health care provider.  · Check your pulse as told by your health care provider.  Dealing with extreme temperatures  · If the weather is extremely hot:  ¨ Avoid vigorous physical activity.  ¨ Use air conditioning or fans or seek a cooler location.  ¨ Avoid caffeine and alcohol.  ¨ Wear loose-fitting, lightweight, and light-colored clothing.  · If the weather is extremely cold:  ¨ Avoid vigorous physical activity.  ¨ Layer your clothes.  ¨ Wear mittens or gloves, a hat, and a scarf when you go outside.  ¨ Avoid alcohol.  General instructions  · Manage other health conditions such as hypertension, diabetes, thyroid disease, or abnormal heart rhythms as told by your health care provider.  · Learn to manage stress. If you need help to do this, ask your health care provider.  · Plan rest periods when fatigued.  · Get ongoing education and support as needed.  · Participate in or seek rehabilitation as needed to maintain or improve independence and quality of life.  · Stay up to date with immunizations. Keeping current on pneumococcal and influenza immunizations is especially  important to prevent respiratory infections.  · Keep all follow-up visits as told by your health care provider. This is important.  Contact a health care provider if:  · You have a rapid weight gain.  · You have increasing shortness of breath that is unusual for you.  · You are unable to participate in your usual physical activities.  · You tire easily.  · You cough more than normal, especially with physical activity.  · You have any swelling or more swelling in areas such as your hands, feet, ankles, or abdomen.  · You are unable to sleep because it is hard to breathe.  · You feel like your heart is beating quickly (palpitations).  · You become dizzy or light-headed when you stand up.  Get help right away if:  · You have difficulty breathing.  · You notice or your family notices a change in your awareness, such as having trouble staying awake or having difficulty with concentration.  · You have pain or discomfort in your chest.  · You have an episode of fainting (syncope).  This information is not intended to replace advice given to you by your health care provider. Make sure you discuss any questions you have with your health care provider.  Document Released: 12/18/2006 Document Revised: 08/22/2017 Document Reviewed: 07/12/2017  Elsevier Interactive Patient Education © 2017 [employer]

## 2018-07-28 NOTE — PROGRESS NOTES
Handoff report received. Assumed patient care. Patient laying in bed.  Denined pain. Patient not in distress, AAOX 4.Safety precautions in place. Call light and personal belongings within reach. Educated to call for assistance if needed.

## 2018-07-28 NOTE — PROGRESS NOTES
Inpatient Anticoagulation Service Note    Date: 7/28/2018  Reason for Anticoagulation: Atrial Fibrillation   [id number] VASc Score: 6    Hemoglobin Value: (!) 9.5  Hematocrit Value: (!) 31.5  Lab Platelet Value: (!) 119  Target INR: 2.0 to 3.0    INR from last 7 days     Date/Time INR Value    07/28/18 0322 (!)  2.17    07/27/18 0320 (!)  1.78    07/24/18 2219 (!)  2.32        Dose from last 7 days     Date/Time Dose (mg)    07/27/18 1400  3.75    07/26/18 1600  3.75        Average Dose (mg):  (Home dose: 3.75mg Tu/Th & 2.5mg AOD per medrec)  Significant Interactions: Aspirin, Clopidogrel, Proton Pump Inhibitor, Thyroid Medications (Sertraline, Allopurinol, Fe tabs)  Bridge Therapy: No    Reversal Agent Administered: Not Applicable     Comments:   Admit for CHF exacerbation. On Warfarin for A.fib. INR therapeutic. Continue home dose as identified above. H/H low with no indication of bleeding. DDI identified above. Pharmacy CTM     Plan:   Warfarin 2.5mg with INR check tomorrow.    Education Material Provided?: No (Chronic Tx)   Pharmacist suggested discharge dosing: Warfarin 3.75mg PO every Tu/Thur and 2.5mg on all other days with close f/u within 3 days     Jerald Schmidt PharmD BCPS

## 2018-07-28 NOTE — CARE PLAN
Problem: Safety  Goal: Will remain free from injury  Outcome: PROGRESSING AS EXPECTED  Patient's risk for injury and falls assessed. Appropriate safety precautions in place. Patient educated to utilize call light for needs. Patient verbalizes understanding.    Problem: Knowledge Deficit  Goal: Knowledge of the prescribed therapeutic regimen will improve  Outcome: PROGRESSING AS EXPECTED  Patient is educated of disease process and condition. Treatment team has included patient in plan of care. All medications indications and side effects are explained. Patient is encouraged to ask questions. Patient indicates understanding.

## 2018-07-28 NOTE — DISCHARGE PLANNING
Spoke To: Veronique  Agency/Facility Name: [facility]  Plan or Request: Per Veronique they will resume care of patient at discharge.

## 2018-07-28 NOTE — PROGRESS NOTES
Bryn from Lab called with critical result of WBC at 2.3. Critical lab result read back to Bryn.   Dr. Hernández notified of critical lab result at 1150.  Critical lab result read back by Dr. Hernández.

## 2018-07-29 NOTE — DISCHARGE SUMMARY
Discharge Summary    CHIEF COMPLAINT ON ADMISSION  Chief Complaint   Patient presents with   • Shortness of Breath     Reports SOB with exertion.   • Leg Swelling     Bilateral swelling and redness.        Reason for Admission  Chest pains, leg swelling     Admission Date  7/24/2018    CODE STATUS  DNR/DNI    HPI & HOSPITAL COURSE  This is a 86 y.o. female with past medical history of congestive heart failure, hypertension, paroxysmal A. fib on Coumadin, COPD, chronic kidney disease who came in with increased swelling on legs and worsening shortness of breath.  Was started on IV diuretics.  She had a recent echocardiogram that showed ejection fraction of 50-55% with possible right-sided heart failure and severe tricuspid regurgitation.  Doppler ultrasound on both legs was negative for any DVT.  Also was noted to have pancytopenia and hematology consulted who felt that the patient had chronic leukopenia and possible early myelodysplastic syndrome.  No bone marrow biopsy recommended.  Vitamin B12 levels and iron studies ordered.  Vitamin B12 replacement along with iron replacement has been started.  Patient received 2 doses of IV Venofer and switch to oral iron supplements.  Patient responded to treatment with diuretics and switch to oral Bumex.  The swelling on her legs has significantly improved.  There was stasis dermatitis noted on her lower extremities but that also significantly improved along with the swelling with elevation of the legs.  Kidney functions improved and were close to baseline.  Patient was hemodynamically and clinically stable.  She was tolerating p.o. intake well.  She was ambulatory in the room down the maria.  Home health recommended by PT and that was ordered for the patient.  It was felt that patient has reached her maximum benefit of the hospital stay.  Hematology clear patient for discharge and recommended outpatient follow-up.       Therefore, she is discharged in fair and stable condition  to home with close outpatient follow-up.    The patient met 2-midnight criteria for an inpatient stay at the time of discharge.    Discharge Date  7/28/2018    FOLLOW UP ITEMS POST DISCHARGE  Follow-up with PCP in 1 week.  Follow-up with hematology as per recommendations.  Follow-up with cardiology as per recommendations.    DISCHARGE DIAGNOSES  Principal Problem:    Acute on chronic diastolic (congestive) heart failure (HCC) POA: Yes  Active Problems:    Chronic atrial fibrillation (HCC) POA: Yes      Overview: Overview:       History of AF, Coumadin initiated 03/2015.              Last Assessment & Plan:       Chronic atrial fibrillation for which patient is asymptomatic.  Rate is       well controlled and she remained therapeutically anticoagulated with       warfarin as monitored by the [facility] anticoagulation clinic.    COPD (chronic obstructive pulmonary disease) (HCC) POA: Yes      Overview: Overview:       PFT 2017            Last Assessment & Plan:       Mixed obstructive and restrictive defect per spirometry with severe       reduction in diffusion capacity per PFTs 2/2017.  Continue routine       follow-up with PCP in pulmonology as indicated.    CKD (chronic kidney disease) stage 3, GFR 30-59 ml/min POA: Yes    S/P coronary angioplasty POA: Yes    Pancytopenia (HCC) POA: Yes  Resolved Problems:    * No resolved hospital problems. *      FOLLOW UP  Future Appointments  Date Time Provider Department Center   7/31/2018 10:40 AM ELIZABETH Patel [facility]   8/1/2018 10:20 AM ELIZABETH Davila [facility] None   8/10/2018 10:45 AM Neela Awad M.D. [facility] [address]   10/31/2018 9:45 AM PULMONARY DX 1 [facility] [address]   10/31/2018 10:00 AM PFT-RM1 [facility] None   10/31/2018 11:00 AM A Rotation [facility] None   11/27/2018 10:00 AM PACER CHECK-CAM B [facility] None     [address]  [facility] [id number]  [phone]          MEDICATIONS ON DISCHARGE     Medication List      START taking these  medications      Instructions   bumetanide 2 MG tablet  Commonly known as:  BUMEX   Take 1 Tab by mouth 2 Times a Day for 30 days.  Dose:  2 mg     * cyanocobalamin 1000 MCG Tabs  Commonly known as:  VITAMIN B12   Take 1 Tab by mouth every day.  Dose:  1000 mcg     * cyanocobalamin 1000 MCG/ML Soln  Commonly known as:  VITAMIN B-12   1 mL by Intramuscular route every 30 days for 30 days.  Dose:  1000 mcg     ferrous gluconate 324 (38 Fe) MG Tabs  Commonly known as:  FERGON   Take 1 Tab by mouth 3 times a day, with meals for 30 days.  Dose:  324 mg     potassium chloride SA 20 MEQ Tbcr  Commonly known as:  Kdur   Take 2 Tabs by mouth every day for 30 days.  Dose:  40 mEq        * This list has 2 medication(s) that are the same as other medications prescribed for you. Read the directions carefully, and ask your doctor or other care provider to review them with you.            CONTINUE taking these medications      Instructions   acetaminophen 500 MG Tabs  Commonly known as:  TYLENOL   Take 1,000 mg by mouth 1 time daily as needed for Mild Pain.  Dose:  1000 mg     albuterol 108 (90 Base) MCG/ACT Aers inhalation aerosol   Inhale 2 Puffs by mouth every 6 hours as needed for Shortness of Breath.  Dose:  2 Puff     allopurinol 300 MG Tabs  Commonly known as:  ZYLOPRIM   Take 1 Tab by mouth every evening.  Dose:  300 mg     ascorbic acid 500 MG tablet  Commonly known as:  VITAMIN C   Take 1 Tab by mouth every day.  Dose:  500 mg     aspirin 81 MG EC tablet   Take 1 Tab by mouth every day for 28 days.  Dose:  81 mg     Calcium-Vitamin D 500-125 MG-UNIT Tabs   Take 1 tablet by mouth every evening. 600mg - 400 iu  Dose:  1 tablet     clopidogrel 75 MG Tabs  Commonly known as:  PLAVIX   Take 1 Tab by mouth every day.  Dose:  75 mg     gabapentin 300 MG Caps  Commonly known as:  NEURONTIN   Take 1,200 mg by mouth every bedtime.  Dose:  1200 mg     levothyroxine 50 MCG Tabs  Commonly known as:  SYNTHROID   Take 1 Tab by mouth  Every morning on an empty stomach.  Dose:  50 mcg     metoprolol SR 25 MG Tb24  Commonly known as:  TOPROL XL   Take 25 mg by mouth every morning.  Dose:  25 mg     omeprazole 20 MG delayed-release capsule  Commonly known as:  PRILOSEC   Take 20 mg by mouth every morning.  Dose:  20 mg     rosuvastatin 10 MG Tabs  Commonly known as:  CRESTOR   Take 1 Tab by mouth every evening.  Dose:  10 mg     sertraline 50 MG Tabs  Commonly known as:  ZOLOFT   Take 25 mg by mouth every bedtime.  Dose:  25 mg     topiramate 25 MG Tabs  Commonly known as:  TOPAMAX   Take 1 Tab by mouth 2 times a day.  Dose:  25 mg     warfarin 2.5 MG Tabs  Commonly known as:  COUMADIN   Take 2.5-3.75 mg by mouth every evening. 3.75mg on Tuesday/Thursday, 2.5mg on all other days  Dose:  2.5-3.75 mg        STOP taking these medications    ferrous sulfate 325 (65 Fe) MG tablet     torsemide 20 MG Tabs  Commonly known as:  DEMADEX            Allergies  Allergies   Allergen Reactions   • Codeine Vomiting       DIET  No orders of the defined types were placed in this encounter.      ACTIVITY  As tolerated.  Weight bearing as tolerated    CONSULTATIONS  Hematology    PROCEDURES  None    LABORATORY  Lab Results   Component Value Date    SODIUM 138 07/28/2018    POTASSIUM 3.9 07/28/2018    CHLORIDE 103 07/28/2018    CO2 29 07/28/2018    GLUCOSE 115 (H) 07/28/2018    BUN 24 (H) 07/28/2018    CREATININE 1.23 07/28/2018        Lab Results   Component Value Date    WBC 2.3 (LL) 07/28/2018    HEMOGLOBIN 9.4 (L) 07/28/2018    HEMATOCRIT 30.1 (L) 07/28/2018    PLATELETCT 107 (L) 07/28/2018        Total time of the discharge process exceeds 40 minutes.

## 2018-07-30 ENCOUNTER — PATIENT OUTREACH (OUTPATIENT)
Dept: HEALTH INFORMATION MANAGEMENT | Facility: OTHER | Age: 83
End: 2018-07-30

## 2018-07-30 ENCOUNTER — HOME CARE VISIT (OUTPATIENT)
Dept: HOME HEALTH SERVICES | Facility: HOME HEALTHCARE | Age: 83
End: 2018-07-30
Payer: MEDICARE

## 2018-07-30 ENCOUNTER — TELEPHONE (OUTPATIENT)
Dept: HEALTH INFORMATION MANAGEMENT | Facility: OTHER | Age: 83
End: 2018-07-30

## 2018-07-30 VITALS
WEIGHT: 142.8 LBS | RESPIRATION RATE: 16 BRPM | OXYGEN SATURATION: 96 % | SYSTOLIC BLOOD PRESSURE: 121 MMHG | HEART RATE: 67 BPM | TEMPERATURE: 99 F | DIASTOLIC BLOOD PRESSURE: 62 MMHG | BODY MASS INDEX: 24.9 KG/M2

## 2018-07-30 LAB
IGA SERPL-MCNC: 108 MG/DL (ref 68–408)
NUCLEAR IGG SER QL IA: NORMAL
TTG IGA SER IA-ACNC: 0 U/ML (ref 0–3)

## 2018-07-30 PROCEDURE — G0493 RN CARE EA 15 MIN HH/HOSPICE: HCPCS

## 2018-07-30 ASSESSMENT — PATIENT HEALTH QUESTIONNAIRE - PHQ9
2. FEELING DOWN, DEPRESSED, IRRITABLE, OR HOPELESS: 01
1. LITTLE INTEREST OR PLEASURE IN DOING THINGS: 01

## 2018-07-30 ASSESSMENT — ENCOUNTER SYMPTOMS
SHORTNESS OF BREATH: T
VOMITING: DENIES
NAUSEA: DENIES

## 2018-07-30 ASSESSMENT — ACTIVITIES OF DAILY LIVING (ADL)
HOME_HEALTH_OASIS: 01
OASIS_M1830: 03

## 2018-07-30 NOTE — TELEPHONE ENCOUNTER
Med review completed for [facility]. Patient followed by [facility] for warfarin interactions. Last INR was 2.17 on 7/28. [facility] RN to obtain future INR. No further issues noted.

## 2018-07-31 ENCOUNTER — PATIENT OUTREACH (OUTPATIENT)
Dept: HEALTH INFORMATION MANAGEMENT | Facility: OTHER | Age: 83
End: 2018-07-31

## 2018-07-31 ENCOUNTER — TELEPHONE (OUTPATIENT)
Dept: CARDIOLOGY | Facility: MEDICAL CENTER | Age: 83
End: 2018-07-31

## 2018-07-31 ENCOUNTER — OFFICE VISIT (OUTPATIENT)
Dept: MEDICAL GROUP | Facility: MEDICAL CENTER | Age: 83
End: 2018-07-31
Payer: MEDICARE

## 2018-07-31 VITALS
RESPIRATION RATE: 16 BRPM | HEART RATE: 80 BPM | BODY MASS INDEX: 25.06 KG/M2 | WEIGHT: 146.8 LBS | HEIGHT: 64 IN | DIASTOLIC BLOOD PRESSURE: 60 MMHG | OXYGEN SATURATION: 92 % | SYSTOLIC BLOOD PRESSURE: 100 MMHG | TEMPERATURE: 98 F

## 2018-07-31 DIAGNOSIS — D72.819 LEUKOPENIA, UNSPECIFIED TYPE: ICD-10-CM

## 2018-07-31 DIAGNOSIS — I50.32 CHRONIC DIASTOLIC CHF (CONGESTIVE HEART FAILURE) (HCC): ICD-10-CM

## 2018-07-31 DIAGNOSIS — J44.9 CHRONIC OBSTRUCTIVE PULMONARY DISEASE, UNSPECIFIED COPD TYPE (HCC): ICD-10-CM

## 2018-07-31 DIAGNOSIS — D69.6 THROMBOCYTOPENIA (HCC): ICD-10-CM

## 2018-07-31 DIAGNOSIS — Z79.01 CHRONIC ANTICOAGULATION: ICD-10-CM

## 2018-07-31 DIAGNOSIS — N17.9 ACUTE RENAL FAILURE SUPERIMPOSED ON CHRONIC KIDNEY DISEASE, UNSPECIFIED CKD STAGE, UNSPECIFIED ACUTE RENAL FAILURE TYPE: ICD-10-CM

## 2018-07-31 DIAGNOSIS — I27.20 MODERATE TO SEVERE PULMONARY HYPERTENSION (HCC): ICD-10-CM

## 2018-07-31 DIAGNOSIS — Z79.01 WARFARIN ANTICOAGULATION: ICD-10-CM

## 2018-07-31 DIAGNOSIS — N18.9 ACUTE RENAL FAILURE SUPERIMPOSED ON CHRONIC KIDNEY DISEASE, UNSPECIFIED CKD STAGE, UNSPECIFIED ACUTE RENAL FAILURE TYPE: ICD-10-CM

## 2018-07-31 DIAGNOSIS — I48.20 CHRONIC ATRIAL FIBRILLATION (HCC): ICD-10-CM

## 2018-07-31 DIAGNOSIS — I07.1 SEVERE TRICUSPID REGURGITATION: ICD-10-CM

## 2018-07-31 DIAGNOSIS — M1A.0790 CHRONIC GOUT OF FOOT, UNSPECIFIED CAUSE, UNSPECIFIED LATERALITY: ICD-10-CM

## 2018-07-31 DIAGNOSIS — I50.33 ACUTE ON CHRONIC DIASTOLIC (CONGESTIVE) HEART FAILURE (HCC): ICD-10-CM

## 2018-07-31 PROBLEM — D51.0 PERNICIOUS ANEMIA: Status: ACTIVE | Noted: 2018-07-31

## 2018-07-31 LAB — IF BLOCK AB SER QL RIA: NEGATIVE

## 2018-07-31 PROCEDURE — 99214 OFFICE O/P EST MOD 30 MIN: CPT | Performed by: NURSE PRACTITIONER

## 2018-07-31 RX ORDER — ALBUTEROL SULFATE 90 UG/1
2 AEROSOL, METERED RESPIRATORY (INHALATION) EVERY 6 HOURS PRN
Qty: 8.5 G | Refills: 6 | Status: SHIPPED | OUTPATIENT
Start: 2018-07-31 | End: 2018-08-10

## 2018-07-31 RX ORDER — ALLOPURINOL 100 MG/1
200 TABLET ORAL EVERY EVENING
Qty: 120 TAB | Refills: 3 | Status: SHIPPED | OUTPATIENT
Start: 2018-07-31 | End: 2018-08-14

## 2018-07-31 NOTE — PROGRESS NOTES
Initial Outreach from Care Coordination . Contacted pt via phone to discuss role of social service care coordination and patient’s identified social needs. Referral received from PCP Daniel regarding resources for physical therapy (water aerobics) outside [place].    Pt chart review pt established with [facility] with prior contacts with  LYNNE Geiger.  E-mail sent to LYNNE Geiger regarding referral and requesting follow up be completed with pt/kin.     Return E-mailed received from LYNNE Geiger who states she is able to follow up on social need with pt/kin and anticipates future contacts.     Plan:  SW will not follow pt at this time as social needs are being met through [facility] LYNNE Geiger.

## 2018-07-31 NOTE — ASSESSMENT & PLAN NOTE
Notified by pharmacist that patient needs renal dosing for her allopurinol.  Currently on 300 mg per day.  Has not had a gout flare for about 1 year.  No current symptoms.

## 2018-07-31 NOTE — TELEPHONE ENCOUNTER
Yes, took second Bumex about 3 pm. Yes wt. was on same home scale in a.m. Per Karina - no changes for now since Bumex just taken 1 hr ago. have her weigh in a.m. & come to appt. Granddaughter was notified.

## 2018-07-31 NOTE — TELEPHONE ENCOUNTER
"Hospitalized at [facility] for LE edema & SOB after last OV on 7/24. Given IV Bumex. Oral med changed from torsemide 60 mg BID to Bumex 2 mg BID. Family now reports 4.5 lb wt. gain since 7/28 discharge, > LE edema (barely able to put shoes on). SOB is same or better. Saw PCP today & was advised to call cardiology \"to see if any med changes need to be made before tomorrow's FV with SARITHA Davila.   To Karina to advise.  "

## 2018-07-31 NOTE — PROGRESS NOTES
Subjective:   Andree Egan is a 86 y.o. female here today for hospital follow-up:    Acute on chronic diastolic (congestive) heart failure (HCC)  Patient was hospitalized 5/18-5/25 and 7/24 for CHF exacerbation.  She was admitted most recently for failure to diurese properly as an outpatient and continued weight gain, she required IV diuresis.  She was discharged about 3 days ago has gained 4 pounds in 3 days since discharge from the hospital.  She is taking Bumex 2 mg twice daily.  She also has a history of chronic kidney disease and severe aortic stenosis.      Gout  Notified by pharmacist that patient needs renal dosing for her allopurinol.  Currently on 300 mg per day.  Has not had a gout flare for about 1 year.  No current symptoms.       Current medicines (including changes today)  Current Outpatient Prescriptions   Medication Sig Dispense Refill   • allopurinol (ZYLOPRIM) 100 MG Tab Take 2 Tabs by mouth every evening. 120 Tab 3   • albuterol 108 (90 Base) MCG/ACT Aero Soln inhalation aerosol Inhale 2 Puffs by mouth every 6 hours as needed for Shortness of Breath. 8.5 g 6   • bumetanide (BUMEX) 2 MG tablet Take 1 Tab by mouth 2 Times a Day for 30 days. 60 Tab 0   • [START ON 8/26/2018] cyanocobalamin (VITAMIN B-12) 1000 MCG/ML Solution 1 mL by Intramuscular route every 30 days for 30 days. 1 Vial 0   • cyanocobalamin (VITAMIN B12) 1000 MCG Tab Take 1 Tab by mouth every day. 30 Tab 3   • ferrous gluconate (FERGON) 324 (38 Fe) MG Tab Take 1 Tab by mouth 3 times a day, with meals for 30 days. 90 Tab 0   • potassium chloride SA (KDUR) 20 MEQ Tab CR Take 2 Tabs by mouth every day for 30 days. 60 Tab 0   • rosuvastatin (CRESTOR) 10 MG Tab Take 1 Tab by mouth every evening. 30 Tab 11   • aspirin EC 81 MG EC tablet Take 1 Tab by mouth every day for 28 days. 28 Tab 0   • ascorbic acid (VITAMIN C) 500 MG tablet Take 1 Tab by mouth every day. 30 Tab 3   • clopidogrel (PLAVIX) 75 MG Tab Take 1 Tab by mouth every day.  "90 Tab 3   • topiramate (TOPAMAX) 25 MG Tab Take 1 Tab by mouth 2 times a day. 60 Tab 3   • levothyroxine (SYNTHROID) 50 MCG Tab Take 1 Tab by mouth Every morning on an empty stomach. 90 Tab 3   • warfarin (COUMADIN) 2.5 MG Tab Take 2.5-3.75 mg by mouth every evening. 3.75mg on Tuesday/Thursday, 2.5mg on all other days      • gabapentin (NEURONTIN) 300 MG Cap Take 1,200 mg by mouth every bedtime.     • acetaminophen (TYLENOL) 500 MG Tab Take 1,000 mg by mouth 1 time daily as needed for Mild Pain.     • Calcium-Vitamin D 500-125 MG-UNIT Tab Take 1 tablet by mouth every evening. 600mg - 400 iu     • sertraline (ZOLOFT) 50 MG Tab Take 25 mg by mouth every bedtime.     • metoprolol SR (TOPROL XL) 25 MG TABLET SR 24 HR Take 25 mg by mouth every morning.     • omeprazole (PRILOSEC) 20 MG delayed-release capsule Take 20 mg by mouth every morning.       No current facility-administered medications for this visit.      She  has a past medical history of Arthritis; Atrial fibrillation (CMS-HCC) [I48.91] (3/29/2018); Blood clotting disorder ([facility]); Breath shortness; Cancer ([facility]); Cataract; Congestive heart failure (CHF) ([facility]) (4/3/2018); BERTRAND (dyspnea on exertion) (5/4/2018); Heart burn; Heart valve disease; Hemorrhagic disorder ([facility]); Hiatus hernia syndrome; Hyperlipidemia; Hypertension; Kidney disease; Long term current use of anticoagulant (3/29/2018); Pacemaker (4/3/2018); Pulmonary emphysema ([facility]); Secondary hypertension (4/3/2018); Thyroid disease; Tremors of nervous system (4/3/2018); Urinary incontinence; and Urinary tract infection.    ROS  No chest pain, no shortness of breath, no abdominal pain  Positive ROS as per HPI.  All other systems reviewed and are negative.     Objective:     Blood pressure 100/60, pulse 80, temperature 36.7 °C (98 °F), resp. rate 16, height 1.613 m (5' 3.5\"), weight 66.6 kg (146 lb 12.8 oz), SpO2 92 %, not currently breastfeeding. Body mass index is 25.59 kg/m².   Physical " Exam:  Constitutional: Alert, no distress.  Skin: Warm, dry, good turgor, no rashes in visible areas.  Eye: Equal, round and reactive, conjunctiva clear, lids normal.  ENMT: Lips without lesions, good dentition, oropharynx clear.  Neck: Trachea midline  Respiratory: Unlabored respiratory effort, lungs clear to auscultation, no wheezes, no ronchi.  Cardiovascular: Normal S1, S2, no murmur, no edema.  Psych: Alert and oriented x3, normal affect and mood.      Assessment and Plan:   The following treatment plan was discussed    1. Acute on chronic diastolic (congestive) heart failure (HCC)  Unstable.  Beginning to gain about 2 pounds per day again.  Advised patient and her daughter to call cardiology today to see if they have any recommendations for her before her appointment with them tomorrow.  - REFERRAL TO COMPLEX CARE MANAGEMENT Services Requested: Care Manager to Evaluate and Recommend,   - REFERRAL TO INTERNAL MEDICINE    2. Acute renal failure superimposed on chronic kidney disease, unspecified CKD stage, unspecified acute renal failure type (HCC)  Stable.  Continue bumetanide 2 mg twice daily.  - REFERRAL TO COMPLEX CARE MANAGEMENT Services Requested: Care Manager to Evaluate and Recommend,   - REFERRAL TO INTERNAL MEDICINE    3. Chronic atrial fibrillation (HCC)  Stable.   Continue medications per cardiology.   REFERRAL TO COMPLEX CARE MANAGEMENT Services Requested: Care Manager to Evaluate and Recommend,   - REFERRAL TO INTERNAL MEDICINE    4. Chronic diastolic CHF (congestive heart failure) (HCC)  C #1  - REFERRAL TO COMPLEX CARE MANAGEMENT Services Requested: Care Manager to Evaluate and Recommend,   - REFERRAL TO INTERNAL MEDICINE    5. Chronic obstructive pulmonary disease, unspecified COPD type (HCC)  Stable.    Continue medications as prescribed  Refill for albuterol given.  - REFERRAL TO COMPLEX CARE MANAGEMENT Services Requested: Care Manager to  Evaluate and Recommend,   - albuterol 108 (90 Base) MCG/ACT Aero Soln inhalation aerosol; Inhale 2 Puffs by mouth every 6 hours as needed for Shortness of Breath.  Dispense: 8.5 g; Refill: 6  - REFERRAL TO INTERNAL MEDICINE    6. Moderate to severe pulmonary hypertension (HCC)  Stable.  Continue medications as prescribed.  - REFERRAL TO COMPLEX CARE MANAGEMENT Services Requested: Care Manager to Evaluate and Recommend,   - REFERRAL TO INTERNAL MEDICINE    7. Severe tricuspid regurgitation  Unstable.  Continue medications as prescribed by cardiology.  Not a surgical candidate.  - REFERRAL TO COMPLEX CARE MANAGEMENT Services Requested: Care Manager to Evaluate and Recommend,   - REFERRAL TO INTERNAL MEDICINE    8. Warfarin anticoagulation  *Stable.  No abnormal bleeding or bruising.  Continue warfarin per anticoagulation clinic.  - REFERRAL TO COMPLEX CARE MANAGEMENT Services Requested: Care Manager to Evaluate and Recommend,   - REFERRAL TO INTERNAL MEDICINE    9. Chronic gout of foot, unspecified cause, unspecified laterality  Stable.  Decrease allopurinol from 300 mg to 200 mg daily due to renal impairment.  May try to taper off this medication.  - allopurinol (ZYLOPRIM) 100 MG Tab; Take 2 Tabs by mouth every evening.  Dispense: 120 Tab; Refill: 3  - REFERRAL TO INTERNAL MEDICINE    10. Leukopenia, unspecified type  Unstable.  Persistent decline in white blood cells over the past 3 years.  Patient's daughter requesting a referral to hematology.  Denies fevers or recurrent infections.  - REFERRAL TO HEMATOLOGY ONCOLOGY Referral to? [facility]  - REFERRAL TO INTERNAL MEDICINE    11. Thrombocytopenia (HCC)  Unstable.  Persistent decline over the past year.  On warfarin daily.  No abnormal bleeding or bruising.  Referred to hematology per daughter's request.  - REFERRAL TO HEMATOLOGY ONCOLOGY Referral to? [facility]  - REFERRAL TO INTERNAL MEDICINE    I have  referred patient to an internal medicine provider due to her multiple chronic and unstable medical conditions, medications, and specialists.  Patient agreeable to plan.    Followup: Return in about 4 weeks (around 8/28/2018) for Multiple medical problems.    I have placed the below orders and discussed them with an approved delegating provider. The MA is performing the below orders under the direction of Dr. Wang

## 2018-07-31 NOTE — TELEPHONE ENCOUNTER
Please call granddaughter back and asked her if patient has taken her second dose of Bumex and if the patient's  weight is on the same scale in the morning.

## 2018-07-31 NOTE — PATIENT INSTRUCTIONS
Call cardiologist today to discuss weight gain.   Will receive a call to schedule with hematologist.

## 2018-07-31 NOTE — TELEPHONE ENCOUNTER
----- Message from Belgica Don sent at 7/31/2018 12:02 PM PDT -----  Regarding: Problem with excessive water retention  MAGDY/Meliza    Patient's granddaughter, Palma Matias, wants a call back at [phone]. Her grandmother is having problems with excessive water retention.

## 2018-08-01 ENCOUNTER — TELEPHONE (OUTPATIENT)
Dept: HEMATOLOGY ONCOLOGY | Facility: MEDICAL CENTER | Age: 83
End: 2018-08-01

## 2018-08-01 ENCOUNTER — OFFICE VISIT (OUTPATIENT)
Dept: CARDIOLOGY | Facility: MEDICAL CENTER | Age: 83
End: 2018-08-01
Payer: MEDICARE

## 2018-08-01 VITALS
BODY MASS INDEX: 25.27 KG/M2 | HEART RATE: 68 BPM | HEIGHT: 64 IN | WEIGHT: 148 LBS | DIASTOLIC BLOOD PRESSURE: 60 MMHG | SYSTOLIC BLOOD PRESSURE: 122 MMHG | OXYGEN SATURATION: 96 %

## 2018-08-01 DIAGNOSIS — N18.30 CKD (CHRONIC KIDNEY DISEASE) STAGE 3, GFR 30-59 ML/MIN (HCC): ICD-10-CM

## 2018-08-01 DIAGNOSIS — Z95.5 S/P DRUG ELUTING CORONARY STENT PLACEMENT: ICD-10-CM

## 2018-08-01 DIAGNOSIS — I50.9 HEART FAILURE, NYHA CLASS 3 (HCC): ICD-10-CM

## 2018-08-01 DIAGNOSIS — I50.30 ACC/AHA STAGE C HEART FAILURE WITH PRESERVED EJECTION FRACTION (HCC): ICD-10-CM

## 2018-08-01 DIAGNOSIS — Z79.01 LONG TERM CURRENT USE OF ANTICOAGULANT: ICD-10-CM

## 2018-08-01 DIAGNOSIS — Z95.0 CARDIAC PACEMAKER IN SITU: ICD-10-CM

## 2018-08-01 DIAGNOSIS — I25.10 CORONARY ARTERY DISEASE INVOLVING NATIVE CORONARY ARTERY OF NATIVE HEART WITHOUT ANGINA PECTORIS: ICD-10-CM

## 2018-08-01 DIAGNOSIS — I48.20 CHRONIC ATRIAL FIBRILLATION (HCC): ICD-10-CM

## 2018-08-01 PROCEDURE — 99214 OFFICE O/P EST MOD 30 MIN: CPT | Performed by: NURSE PRACTITIONER

## 2018-08-01 RX ORDER — METOLAZONE 2.5 MG/1
2.5 TABLET ORAL
Qty: 45 TAB | Refills: 3 | Status: SHIPPED | OUTPATIENT
Start: 2018-08-01 | End: 2018-08-09 | Stop reason: SDUPTHER

## 2018-08-01 ASSESSMENT — MINNESOTA LIVING WITH HEART FAILURE QUESTIONNAIRE (MLHF)
DIFFICULTY WITH SEXUAL ACTIVITIES: 0
MAKING YOU WORRY: 5
TOTAL_SCORE: 88
LOSS OF SELF CONTROL IN YOUR LIFE: 5
MAKING YOU FEEL DEPRESSED: 3
DIFFICULTY WORKING TO EARN A LIVING: 0
FEELING LIKE A BURDEN TO FAMILY AND FRIENDS: 5
WALKING ABOUT OR CLIMBING STAIRS DIFFICULT: 5
GIVING YOU SIDE EFFECTS FROM TREATMENTS: 5
COSTING YOU MONEY FOR MEDICAL CARE: 5
DIFFICULTY WITH RECREATIONAL PASTIMES, SPORTS, HOBBIES: 4
DIFFICULTY GOING AWAY FROM HOME: 5
DIFFICULTY SLEEPING WELL AT NIGHT: 4
WORKING AROUND THE HOUSE OR YARD DIFFICULT: 5
SWELLING IN ANKLES OR LEGS: 5
HAVING TO SIT OR LIE DOWN DURING THE DAY: 5
MAKING YOU STAY IN A HOSPITAL: 5
TIRED, FATIGUED OR LOW ON ENERGY: 5
DIFFICULTY SOCIALIZING WITH FAMILY OR FRIENDS: 4
MAKING YOU SHORT OF BREATH: 5
EATING LESS FOODS YOU LIKE: 5
DIFFICULTY TO CONCENTRATE OR REMEMBERING THINGS: 3

## 2018-08-01 ASSESSMENT — ENCOUNTER SYMPTOMS
CLAUDICATION: 0
PALPITATIONS: 1
ABDOMINAL PAIN: 0
MYALGIAS: 0
FEVER: 0
ORTHOPNEA: 0
COUGH: 0
PND: 0
SHORTNESS OF BREATH: 1
DIZZINESS: 0

## 2018-08-01 ASSESSMENT — 6 MINUTE WALK TEST (6MWT): TOTAL DISTANCE WALKED (METERS): 0

## 2018-08-01 NOTE — PROGRESS NOTES
Chief Complaint   Patient presents with   • HTN (Controlled)     weight gain since she got out of the hospital 07/28/2018-gained 4.5lbs       Subjective:   Andree Egan is a 86 y.o. female who presents today for follow-up on her heart failure with her granddaughter, Palma.    Patient was last seen in clinic on 7/24/2018.  During that visit, her torsemide was increased to 60 mg twice a day.  Shortly after her office visit, patient's granddaughter called the office reporting that the patient was having more more lower extremity edema.  Patient was taken to the emergency room for further evaluation.    Patient was admitted 7/24/2018 to 7/28/2018.  Patient was diuresed and her diuretic was changed to Bumex 2 mg twice a day.  Since her discharge, patient reports she has had a 5 pound weight gain in 3-4 days.    Patient also reports an increase in her shortness of breath with her ADLs and exertion.  Patient continues to have fatigue, palpitations lower extremity edema.  She denies chest pain, orthopnea, PND or dizziness/lightheadedness.    Today, she is weighing 145 pounds from 140 pounds at discharge.    Patient will also be following up with hematology/oncology Dr. Atkinson for leukopenia and anemia as an outpatient from her recent hospitalization.    Additonally, patient has the following medical problems:    -Hospitalization from 7/24/2018-7/28/2018 is for heart failure.      -Hospitalization from 7/8/2018-7/11/2018 for heart failure exacerbation. Pt had a thoracentesis (failed 1st attempt and 2nd attempt successful) for a right Pleural effusion.      -Chronic atrial fibrillation: Rate controlled, taking warfarin, followed by the anticoagulation clinic     -Has permanent pacemaker     -Aortic stenosis     -Pulmonary HTN     -Hypertension     -Hyperlipidemia     -Kidney disease: has an upcoming appointment with Neph.     -Hypothyroidism: Taking levothyroxine.     -COPD    -History of breast cancer    Past Medical  History:   Diagnosis Date   • Arthritis     back/neck   • Atrial fibrillation (CMS-HCC) [I48.91] 3/29/2018   • Blood clotting disorder (HCC)    • Breath shortness     water retention   • Cancer (HCC)     breast   • Cataract    • Congestive heart failure (CHF) (HCC) 4/3/2018   • BERTRAND (dyspnea on exertion) 5/4/2018   • Heart burn    • Heart valve disease    • Hemorrhagic disorder (HCC)    • Hiatus hernia syndrome    • Hyperlipidemia    • Hypertension    • Kidney disease    • Long term current use of anticoagulant 3/29/2018   • Pacemaker 4/3/2018   • Pulmonary emphysema (HCC)    • Secondary hypertension 4/3/2018   • Thyroid disease    • Tremors of nervous system 4/3/2018   • Urinary incontinence    • Urinary tract infection      Past Surgical History:   Procedure Laterality Date   • OTHER      left breast mastectomy   • OTHER CARDIAC SURGERY      pacemaker placement     Family History   Problem Relation Age of Onset   • Lung Disease Mother    • Cancer Mother    • No Known Problems Father    • Fainting Neg Hx      Social History     Social History   • Marital status: Single     Spouse name: N/A   • Number of children: N/A   • Years of education: N/A     Occupational History   • Not on file.     Social History Main Topics   • Smoking status: Former Smoker     Packs/day: 0.50     Types: Cigarettes     Quit date: 1/1/1970   • Smokeless tobacco: Never Used   • Alcohol use 1.8 oz/week     3 Glasses of wine per week      Comment: 1-2 glasses wine before dinner   • Drug use: No   • Sexual activity: Not on file     Other Topics Concern   • Not on file     Social History Narrative   • No narrative on file     Allergies   Allergen Reactions   • Codeine Vomiting     Outpatient Encounter Prescriptions as of 8/1/2018   Medication Sig Dispense Refill   • allopurinol (ZYLOPRIM) 100 MG Tab Take 2 Tabs by mouth every evening. 120 Tab 3   • bumetanide (BUMEX) 2 MG tablet Take 1 Tab by mouth 2 Times a Day for 30 days. 60 Tab 0   •  cyanocobalamin (VITAMIN B12) 1000 MCG Tab Take 1 Tab by mouth every day. 30 Tab 3   • ferrous gluconate (FERGON) 324 (38 Fe) MG Tab Take 1 Tab by mouth 3 times a day, with meals for 30 days. 90 Tab 0   • potassium chloride SA (KDUR) 20 MEQ Tab CR Take 2 Tabs by mouth every day for 30 days. 60 Tab 0   • rosuvastatin (CRESTOR) 10 MG Tab Take 1 Tab by mouth every evening. 30 Tab 11   • aspirin EC 81 MG EC tablet Take 1 Tab by mouth every day for 28 days. 28 Tab 0   • ascorbic acid (VITAMIN C) 500 MG tablet Take 1 Tab by mouth every day. 30 Tab 3   • clopidogrel (PLAVIX) 75 MG Tab Take 1 Tab by mouth every day. 90 Tab 3   • topiramate (TOPAMAX) 25 MG Tab Take 1 Tab by mouth 2 times a day. 60 Tab 3   • levothyroxine (SYNTHROID) 50 MCG Tab Take 1 Tab by mouth Every morning on an empty stomach. 90 Tab 3   • warfarin (COUMADIN) 2.5 MG Tab Take 2.5-3.75 mg by mouth every evening. 3.75mg on Tuesday/Thursday, 2.5mg on all other days      • gabapentin (NEURONTIN) 300 MG Cap Take 1,200 mg by mouth every bedtime.     • Calcium-Vitamin D 500-125 MG-UNIT Tab Take 1 tablet by mouth every evening. 600mg - 400 iu     • sertraline (ZOLOFT) 50 MG Tab Take 25 mg by mouth every bedtime.     • metoprolol SR (TOPROL XL) 25 MG TABLET SR 24 HR Take 25 mg by mouth every morning.     • omeprazole (PRILOSEC) 20 MG delayed-release capsule Take 20 mg by mouth every morning.     • albuterol 108 (90 Base) MCG/ACT Aero Soln inhalation aerosol Inhale 2 Puffs by mouth every 6 hours as needed for Shortness of Breath. 8.5 g 6   • [START ON 8/26/2018] cyanocobalamin (VITAMIN B-12) 1000 MCG/ML Solution 1 mL by Intramuscular route every 30 days for 30 days. 1 Vial 0   • acetaminophen (TYLENOL) 500 MG Tab Take 1,000 mg by mouth 1 time daily as needed for Mild Pain.       No facility-administered encounter medications on file as of 8/1/2018.      Review of Systems   Constitutional: Positive for malaise/fatigue. Negative for fever.   Respiratory: Positive for  "shortness of breath. Negative for cough.    Cardiovascular: Positive for palpitations and leg swelling. Negative for chest pain, orthopnea, claudication and PND.   Gastrointestinal: Negative for abdominal pain.   Musculoskeletal: Negative for myalgias.   Neurological: Negative for dizziness.   All other systems reviewed and are negative.       Objective:   /60   Pulse 68   Ht 1.613 m (5' 3.5\")   Wt 67.1 kg (148 lb)   LMP  (LMP Unknown)   SpO2 96%   BMI 25.81 kg/m²     Physical Exam   Constitutional: She is oriented to person, place, and time. She appears well-developed and well-nourished.   HENT:   Head: Normocephalic and atraumatic.   Eyes: Pupils are equal, round, and reactive to light. EOM are normal.   Neck: Normal range of motion. Neck supple. JVD present.   Cardiovascular: Normal rate and regular rhythm.    Murmur heard.   Systolic murmur is present with a grade of 3/6   Pulmonary/Chest: Effort normal and breath sounds normal. No respiratory distress. She has no wheezes. She has no rales.   Abdominal: Soft. Bowel sounds are normal.   Musculoskeletal: She exhibits edema (Bilateral 3+ lower extremity edema patient is).   Neurological: She is alert and oriented to person, place, and time.   Skin: Skin is warm and dry.   Psychiatric: She has a normal mood and affect. Her behavior is normal.   Vitals reviewed.    Lab Results   Component Value Date/Time    CHOLSTRLTOT 67 (L) 07/09/2018 02:22 AM    LDL 17 07/09/2018 02:22 AM    HDL 40 07/09/2018 02:22 AM    TRIGLYCERIDE 50 07/09/2018 02:22 AM       Lab Results   Component Value Date/Time    SODIUM 138 07/28/2018 10:21 AM    POTASSIUM 3.9 07/28/2018 10:21 AM    CHLORIDE 103 07/28/2018 10:21 AM    CO2 29 07/28/2018 10:21 AM    GLUCOSE 115 (H) 07/28/2018 10:21 AM    BUN 24 (H) 07/28/2018 10:21 AM    CREATININE 1.23 07/28/2018 10:21 AM     Lab Results   Component Value Date/Time    ALKPHOSPHAT 99 07/28/2018 10:21 AM    ASTSGOT 12 07/28/2018 10:21 AM    " ALTSGPT <5 07/28/2018 10:21 AM    TBILIRUBIN 0.7 07/28/2018 10:21 AM      Transthoracic Echo Report 5/19/18  Severe bi-atrial enlargement. Normal LV and RV systolic function. LVEF is 55% by visual estimation. Diastolic function difficult to assess.   There is at least mild aortic stenosis but valve is opening. Mean   gradient is 10 mmHg, with V max of 2.01 m/s. RVSP is elevated at 70   mmHg suggesting pulmonary hypertension. No prior study is available for   comparison.      Heart Cath 7/2/2018   POSTPROCEDURE DIAGNOSES:  1.  No evidence of aortic stenosis with 0 mmHg peak to peak gradient, mean gradient   of 8 mmHg, calculated CHRISTIANE of 2.09 cm2.  2.  Coronary artery disease with high grade proximal and high grade mid left   anterior descending artery.  3.  Successful percutaneous transluminal coronary angioplasty/stent placement   of the mid left anterior descending artery with 2.25x12 mm Synergy   drug-eluting stent.  4.  Successful percutaneous transluminal coronary angioplasty/stent placement   of the proximal left anterior descending artery with 2.5x12 mm Synergy   drug-eluting stent.  5.  Normal left ventricular systolic function with ejection fraction of 55%.  6.  Normal left ventricular end-diastolic pressure.  7.  Elevated right heart pressure with PA systolic pressure of 60 mmHg.  8.  Successful Angio-Seal closure.     Transthoracic Echo Report 7/8/2018  Prior done 05-.  Compared to prior study, the right-sided chambers appear dilated.    Left ventricular systolic function is low normal.  Left ventricular ejection fraction is visually estimated to be 50-55%.  Abnormal septal motion consistent with right ventricular (RV) volume overload and/or elevated RV end-diastolic pressure.  Dilated right ventricle with reduced systolic function.  Severe tricuspid regurgitation. Estimated right ventricular systolic pressure  is 55 mmHg.  Dilated inferior vena cava without inspiratory collapse.  Assessment:     1.  ACC/AHA stage C heart failure with preserved ejection fraction (HCC)  metOLazone (ZAROXOLYN) 2.5 MG Tab    BASIC METABOLIC PANEL   2. Heart failure, NYHA class 3 (HCC)  metOLazone (ZAROXOLYN) 2.5 MG Tab    BASIC METABOLIC PANEL   3. Chronic atrial fibrillation (HCC)     4. Cardiac pacemaker in situ     5. Long term current use of anticoagulant     6. Coronary artery disease involving native coronary artery of native heart without angina pectoris     7. S/P drug eluting coronary stent placement     8. CKD (chronic kidney disease) stage 3, GFR 30-59 ml/min         Medical Decision Making:  Today's Assessment / Status / Plan:   1. HFpEF, Stage C, Class 3, LVEF 50-55%: Patient continues to be volume overloaded.  -Start Metolazone 2.5 mg Every other day (Will monitor kidney function closely)  -Continue Bumex 2 mg twice a day ( will consider increase if needed)  -Continue potassium 40 mEq daily  -Repeat a BMP in 1 week  -Patient will be set up with the community paramedic program through [facility] for her heart failure.  -Reinforced s/sx of worsening heart failure with patient and weight monitoring. Pt verbalizes understanding. Pt to call office or RTC if present.   -pt and granddaughter were unable to meet with the heart failure educator today due other appointments.    2.  A. fib: Rate controlled  -Continue Toprol-XL 25 mg daily  -Continue warfarin, followed by the anticoagulation clinic  -Has pacemaker    3.  CAD, s/p BRITTON x2:   -Continue aspirin 81 mg through tomorrow (8/2/18), then stop. (d/t triple therapy)  -Continue clopidogrel 75 mg daily  -Continue rosuvastatin 10 mg daily    4.  CKD: Had contrast-induced nephropathy  -Discussed with patient and granddaughter, the careful balance between her kidney function and diuresing her for her heart failure.  Patient verbalizes understanding. Will continue to monitor  -Continue follow-up with nephrology  -Creatinine now 1.23 and eGFR 41  -BMP in 1 week    FU in clinic in 1 week  with labs. Sooner if needed.    Patient verbalizes understanding and agrees with the plan of care.     Collaborating MD: Garrett Sharp MD

## 2018-08-02 ENCOUNTER — HOME CARE VISIT (OUTPATIENT)
Dept: HOME HEALTH SERVICES | Facility: HOME HEALTHCARE | Age: 83
End: 2018-08-02
Payer: MEDICARE

## 2018-08-02 NOTE — TELEPHONE ENCOUNTER
1st attempt to contact the patient.  LM on voicemail for patient requesting a call back to schedule a new patient hematology appointment.  NP/ SCP/ Yue/ Yudelka Cole

## 2018-08-03 ENCOUNTER — ANTICOAGULATION MONITORING (OUTPATIENT)
Dept: VASCULAR LAB | Facility: MEDICAL CENTER | Age: 83
End: 2018-08-03

## 2018-08-03 ENCOUNTER — HOME CARE VISIT (OUTPATIENT)
Dept: HOME HEALTH SERVICES | Facility: HOME HEALTHCARE | Age: 83
End: 2018-08-03
Payer: MEDICARE

## 2018-08-03 VITALS
DIASTOLIC BLOOD PRESSURE: 60 MMHG | OXYGEN SATURATION: 98 % | RESPIRATION RATE: 18 BRPM | SYSTOLIC BLOOD PRESSURE: 110 MMHG | HEART RATE: 63 BPM | TEMPERATURE: 98 F

## 2018-08-03 DIAGNOSIS — I48.20 CHRONIC ATRIAL FIBRILLATION (HCC): ICD-10-CM

## 2018-08-03 LAB — INR PPP: 1.8 (ref 2–3.5)

## 2018-08-03 PROCEDURE — 665002 ROC-HOME HEALTH

## 2018-08-03 PROCEDURE — G0299 HHS/HOSPICE OF RN EA 15 MIN: HCPCS

## 2018-08-03 PROCEDURE — G0152 HHCP-SERV OF OT,EA 15 MIN: HCPCS

## 2018-08-03 ASSESSMENT — ACTIVITIES OF DAILY LIVING (ADL)
SHOPPING_ASSISTANCE: 6
LAUNDRY_ASSISTANCE: 4
HOUSEKEEPING_ASSISTANCE: 4
TRANSPORTATION_ASSISTANCE: 6
HOME_HEALTH_OASIS: 03
TOILETING_ASSISTANCE: 0
SHOPPING_ASSISTANCE: 5
TELEPHONE_ASSISTANCE: 0
DRESSING_UB_ASSISTANCE: 0
DRESSING_LB_ASSISTANCE: 0
GROOMING_ASSISTANCE: 0
MEAL_PREP_ASSISTANCE: 0
ORAL_CARE_ASSISTANCE: 0
BATHING_ASSISTANCE: 1
EATING_ASSISTANCE: 0

## 2018-08-03 NOTE — PROGRESS NOTES
Anticoagulation Summary  As of 8/3/2018    INR goal:   2.0-3.0   TTR:   52.0 % (3 mo)   Today's INR:   1.8!   Warfarin maintenance plan:   2.5 mg (2.5 mg x 1) on Mon, Wed, Fri; 3.75 mg (2.5 mg x 1.5) all other days   Weekly warfarin total:   22.5 mg   Plan last modified:   Lori Chaudhry, PharmD (6/18/2018)   Next INR check:   8/7/2018   Target end date:   Indefinite    Indications    Chronic atrial fibrillation (HCC) [I48.2]  Long term current use of anticoagulant (Resolved) [Z79.01]             Anticoagulation Episode Summary     INR check location:       Preferred lab:       Send INR reminders to:       Comments:   [facility]        Anticoagulation Care Providers     Provider Role Specialty Phone number    [facility] Referring  [phone]    Razia Sims D.O. Responsible Family Medicine [phone]    Sebastian Thomas M.D. Responsible Interventional Cardiology [phone]        Anticoagulation Patient Findings    Spoke with Meliza to report a sub therapeutic INR of 1.8.  Will increase weekly dose by 6%. Follow up in 4 days, to reduce risk of adverse events related to this high risk medication,  Warfarin.    Sheyla Benavidez, PharmD

## 2018-08-05 VITALS
BODY MASS INDEX: 24.59 KG/M2 | SYSTOLIC BLOOD PRESSURE: 110 MMHG | OXYGEN SATURATION: 98 % | HEART RATE: 63 BPM | RESPIRATION RATE: 18 BRPM | WEIGHT: 141 LBS | TEMPERATURE: 97.6 F | DIASTOLIC BLOOD PRESSURE: 60 MMHG

## 2018-08-05 SDOH — ECONOMIC STABILITY: HOUSING INSECURITY: UNSAFE COOKING RANGE AREA: 0

## 2018-08-05 SDOH — ECONOMIC STABILITY: HOUSING INSECURITY: UNSAFE APPLIANCES: 0

## 2018-08-05 ASSESSMENT — ENCOUNTER SYMPTOMS
RESPIRATORY SYMPTOMS COMMENTS: NO S/S OF RESP DISTRESS
DEPRESSED MOOD: 1

## 2018-08-06 ENCOUNTER — TELEPHONE (OUTPATIENT)
Dept: CARDIOLOGY | Facility: MEDICAL CENTER | Age: 83
End: 2018-08-06

## 2018-08-06 ENCOUNTER — HOME CARE VISIT (OUTPATIENT)
Dept: HOME HEALTH SERVICES | Facility: HOME HEALTHCARE | Age: 83
End: 2018-08-06
Payer: MEDICARE

## 2018-08-06 NOTE — TELEPHONE ENCOUNTER
I tried calling patient to remind them to complete labs before upcoming appt with Karina on 08/09/2018 but there was no VM set up. Unable to LM for patient.

## 2018-08-07 ENCOUNTER — HOSPITAL ENCOUNTER (OUTPATIENT)
Dept: LAB | Facility: MEDICAL CENTER | Age: 83
DRG: 640 | End: 2018-08-07
Attending: NURSE PRACTITIONER
Payer: MEDICARE

## 2018-08-07 ENCOUNTER — HOME CARE VISIT (OUTPATIENT)
Dept: HOME HEALTH SERVICES | Facility: HOME HEALTHCARE | Age: 83
End: 2018-08-07
Payer: MEDICARE

## 2018-08-07 VITALS
HEART RATE: 61 BPM | TEMPERATURE: 98.3 F | RESPIRATION RATE: 16 BRPM | DIASTOLIC BLOOD PRESSURE: 50 MMHG | OXYGEN SATURATION: 96 % | SYSTOLIC BLOOD PRESSURE: 102 MMHG

## 2018-08-07 DIAGNOSIS — I50.9 HEART FAILURE, NYHA CLASS 3 (HCC): ICD-10-CM

## 2018-08-07 DIAGNOSIS — I50.30 ACC/AHA STAGE C HEART FAILURE WITH PRESERVED EJECTION FRACTION (HCC): ICD-10-CM

## 2018-08-07 LAB
ANION GAP SERPL CALC-SCNC: 10 MMOL/L (ref 0–11.9)
BUN SERPL-MCNC: 42 MG/DL (ref 8–22)
CALCIUM SERPL-MCNC: 9.1 MG/DL (ref 8.5–10.5)
CHLORIDE SERPL-SCNC: 83 MMOL/L (ref 96–112)
CO2 SERPL-SCNC: 38 MMOL/L (ref 20–33)
CREAT SERPL-MCNC: 1.82 MG/DL (ref 0.5–1.4)
GLUCOSE SERPL-MCNC: 92 MG/DL (ref 65–99)
INR PPP: 1.8 (ref 2–3.5)
POTASSIUM SERPL-SCNC: 3.1 MMOL/L (ref 3.6–5.5)
SODIUM SERPL-SCNC: 131 MMOL/L (ref 135–145)

## 2018-08-07 PROCEDURE — G0299 HHS/HOSPICE OF RN EA 15 MIN: HCPCS

## 2018-08-07 PROCEDURE — 80048 BASIC METABOLIC PNL TOTAL CA: CPT

## 2018-08-07 PROCEDURE — G0151 HHCP-SERV OF PT,EA 15 MIN: HCPCS

## 2018-08-07 PROCEDURE — 36415 COLL VENOUS BLD VENIPUNCTURE: CPT

## 2018-08-07 ASSESSMENT — ACTIVITIES OF DAILY LIVING (ADL)
HOUSEKEEPING_ASSISTANCE: 6
ORAL_CARE_ASSISTANCE: 0
SHOPPING_ASSISTANCE: 6
BATHING_ASSISTANCE: 0
MEAL_PREP_ASSISTANCE: 6
EATING_ASSISTANCE: 0
DRESSING_UB_ASSISTANCE: 0
TOILETING_ASSISTANCE: 0
DRESSING_LB_ASSISTANCE: 0
LAUNDRY_ASSISTANCE: 6
TRANSPORTATION_ASSISTANCE: 6
TELEPHONE_ASSISTANCE: 0
GROOMING_ASSISTANCE: 0

## 2018-08-08 VITALS
RESPIRATION RATE: 16 BRPM | WEIGHT: 141 LBS | OXYGEN SATURATION: 96 % | HEART RATE: 61 BPM | TEMPERATURE: 60.8 F | SYSTOLIC BLOOD PRESSURE: 102 MMHG | BODY MASS INDEX: 24.59 KG/M2 | DIASTOLIC BLOOD PRESSURE: 50 MMHG

## 2018-08-08 SDOH — ECONOMIC STABILITY: HOUSING INSECURITY: UNSAFE COOKING RANGE AREA: 0

## 2018-08-08 SDOH — ECONOMIC STABILITY: HOUSING INSECURITY: UNSAFE APPLIANCES: 0

## 2018-08-08 ASSESSMENT — ENCOUNTER SYMPTOMS: NAUSEA: DENIES ANY

## 2018-08-09 ENCOUNTER — OFFICE VISIT (OUTPATIENT)
Dept: CARDIOLOGY | Facility: MEDICAL CENTER | Age: 83
End: 2018-08-09
Payer: MEDICARE

## 2018-08-09 ENCOUNTER — ANTICOAGULATION MONITORING (OUTPATIENT)
Dept: VASCULAR LAB | Facility: MEDICAL CENTER | Age: 83
End: 2018-08-09

## 2018-08-09 VITALS
OXYGEN SATURATION: 90 % | SYSTOLIC BLOOD PRESSURE: 100 MMHG | BODY MASS INDEX: 24.63 KG/M2 | HEIGHT: 63 IN | WEIGHT: 139 LBS | HEART RATE: 82 BPM | DIASTOLIC BLOOD PRESSURE: 60 MMHG

## 2018-08-09 DIAGNOSIS — I50.30 ACC/AHA STAGE C HEART FAILURE WITH PRESERVED EJECTION FRACTION (HCC): ICD-10-CM

## 2018-08-09 DIAGNOSIS — Z95.0 CARDIAC PACEMAKER IN SITU: ICD-10-CM

## 2018-08-09 DIAGNOSIS — N18.30 CKD (CHRONIC KIDNEY DISEASE) STAGE 3, GFR 30-59 ML/MIN (HCC): ICD-10-CM

## 2018-08-09 DIAGNOSIS — I48.20 CHRONIC ATRIAL FIBRILLATION (HCC): ICD-10-CM

## 2018-08-09 DIAGNOSIS — I50.9 HEART FAILURE, NYHA CLASS 3 (HCC): ICD-10-CM

## 2018-08-09 DIAGNOSIS — Z79.01 LONG TERM CURRENT USE OF ANTICOAGULANT: ICD-10-CM

## 2018-08-09 DIAGNOSIS — Z95.5 S/P DRUG ELUTING CORONARY STENT PLACEMENT: ICD-10-CM

## 2018-08-09 DIAGNOSIS — E87.6 HYPOKALEMIA: ICD-10-CM

## 2018-08-09 DIAGNOSIS — I25.10 CORONARY ARTERY DISEASE INVOLVING NATIVE CORONARY ARTERY OF NATIVE HEART WITHOUT ANGINA PECTORIS: ICD-10-CM

## 2018-08-09 PROCEDURE — 99214 OFFICE O/P EST MOD 30 MIN: CPT | Performed by: NURSE PRACTITIONER

## 2018-08-09 RX ORDER — POTASSIUM CHLORIDE 20 MEQ/1
20 TABLET, EXTENDED RELEASE ORAL 3 TIMES DAILY
Qty: 90 TAB | Refills: 11 | Status: ON HOLD | OUTPATIENT
Start: 2018-08-09 | End: 2018-08-11

## 2018-08-09 RX ORDER — METOLAZONE 2.5 MG/1
2.5 TABLET ORAL DAILY
Qty: 30 TAB | Refills: 11 | Status: ON HOLD | OUTPATIENT
Start: 2018-08-09 | End: 2018-08-20

## 2018-08-09 ASSESSMENT — ENCOUNTER SYMPTOMS
MYALGIAS: 0
ABDOMINAL PAIN: 0
WEAKNESS: 1
ORTHOPNEA: 0
PND: 0
COUGH: 0
DIZZINESS: 1
CLAUDICATION: 0
FEVER: 0
SHORTNESS OF BREATH: 0
PALPITATIONS: 0

## 2018-08-09 NOTE — PROGRESS NOTES
Chief Complaint   Patient presents with   • Congestive Heart Failure     F/V: 1 WEEK        Subjective:   Andree Egan is a 86 y.o. female who presents today for follow-up on her heart failure with her granddaughters, Palma and Shena.    Patient was last seen in clinic on 8/1/2018.  During that visit, patient was started on metolazone.  Patient reports she has been taking metolazone 2.5 mg daily.  Patient has seen about a 10 pound weight loss over the past week.  She is now weighing 135 pounds at home.    For her symptoms, patient states she has been doing exercises in the pool and has not noticed any shortness of breath with her pool exercises.  However, patient is not exerting herself much these days, but also states no improvement of her baseline SOB.  Patient continues to feel weak and fatigued and does report some dizziness and dropping things. Pt states her LE edema is improving some.    She denies chest pain, palpitations, orthopnea, PND.    Patient sees nephrology tomorrow.    Patient states she is trying to stay hydrated but is difficult for her to drink water throughout the day.    Pt is being followed by [facility] paramedics. They are following her weekly at this time,    Additonally, patient has the following medical problems:     -Hospitalization from 7/24/2018-7/28/2018 is for heart failure.      -Hospitalization from 7/8/2018-7/11/2018 for heart failure exacerbation. Pt had a thoracentesis (failed 1st attempt and 2nd attempt successful) for a right Pleural effusion.      -Chronic atrial fibrillation: Rate controlled, taking warfarin, followed by the anticoagulation clinic     -Has permanent pacemaker     -Aortic stenosis     -Pulmonary HTN     -Hypertension     -Hyperlipidemia     -Kidney disease: has an upcoming appointment with Neph.     -Hypothyroidism: Taking levothyroxine.     -COPD     -History of breast cancer  Past Medical History:   Diagnosis Date   • Arthritis     back/neck   •  Atrial fibrillation (CMS-[facility]) [I48.91] 3/29/2018   • Blood clotting disorder ([facility])    • Breath shortness     water retention   • Cancer (HCC)     breast   • Cataract    • Congestive heart failure (CHF) ([facility]) 4/3/2018   • BERTRAND (dyspnea on exertion) 5/4/2018   • Heart burn    • Heart valve disease    • Hemorrhagic disorder ([facility])    • Hiatus hernia syndrome    • Hyperlipidemia    • Hypertension    • Kidney disease    • Long term current use of anticoagulant 3/29/2018   • Pacemaker 4/3/2018   • Pulmonary emphysema ([facility])    • Secondary hypertension 4/3/2018   • Thyroid disease    • Tremors of nervous system 4/3/2018   • Urinary incontinence    • Urinary tract infection      Past Surgical History:   Procedure Laterality Date   • OTHER      left breast mastectomy   • OTHER CARDIAC SURGERY      pacemaker placement     Family History   Problem Relation Age of Onset   • Lung Disease Mother    • Cancer Mother    • No Known Problems Father    • Fainting Neg Hx      Social History     Social History   • Marital status: Single     Spouse name: N/A   • Number of children: N/A   • Years of education: N/A     Occupational History   • Not on file.     Social History Main Topics   • Smoking status: Former Smoker     Packs/day: 0.50     Types: Cigarettes     Quit date: 1/1/1970   • Smokeless tobacco: Never Used   • Alcohol use 1.8 oz/week     3 Glasses of wine per week      Comment: 1-2 glasses wine before dinner   • Drug use: No   • Sexual activity: Not on file     Other Topics Concern   • Not on file     Social History Narrative   • No narrative on file     Allergies   Allergen Reactions   • Codeine Vomiting     Outpatient Encounter Prescriptions as of 8/9/2018   Medication Sig Dispense Refill   • metOLazone (ZAROXOLYN) 2.5 MG Tab Take 1 Tab by mouth every 48 hours. (Patient taking differently: Take 2.5 mg by mouth every day.) 45 Tab 3   • allopurinol (ZYLOPRIM) 100 MG Tab Take 2 Tabs by mouth every evening. 120 Tab 3   • albuterol  108 (90 Base) MCG/ACT Aero Soln inhalation aerosol Inhale 2 Puffs by mouth every 6 hours as needed for Shortness of Breath. 8.5 g 6   • bumetanide (BUMEX) 2 MG tablet Take 1 Tab by mouth 2 Times a Day for 30 days. 60 Tab 0   • [START ON 8/26/2018] cyanocobalamin (VITAMIN B-12) 1000 MCG/ML Solution 1 mL by Intramuscular route every 30 days for 30 days. 1 Vial 0   • cyanocobalamin (VITAMIN B12) 1000 MCG Tab Take 1 Tab by mouth every day. 30 Tab 3   • ferrous gluconate (FERGON) 324 (38 Fe) MG Tab Take 1 Tab by mouth 3 times a day, with meals for 30 days. 90 Tab 0   • potassium chloride SA (KDUR) 20 MEQ Tab CR Take 2 Tabs by mouth every day for 30 days. 60 Tab 0   • rosuvastatin (CRESTOR) 10 MG Tab Take 1 Tab by mouth every evening. 30 Tab 11   • aspirin EC 81 MG EC tablet Take 1 Tab by mouth every day for 28 days. 28 Tab 0   • ascorbic acid (VITAMIN C) 500 MG tablet Take 1 Tab by mouth every day. 30 Tab 3   • clopidogrel (PLAVIX) 75 MG Tab Take 1 Tab by mouth every day. 90 Tab 3   • topiramate (TOPAMAX) 25 MG Tab Take 1 Tab by mouth 2 times a day. 60 Tab 3   • levothyroxine (SYNTHROID) 50 MCG Tab Take 1 Tab by mouth Every morning on an empty stomach. 90 Tab 3   • warfarin (COUMADIN) 2.5 MG Tab Take 2.5-3.75 mg by mouth every evening. 3.75mg on Tuesday/Thursday, 2.5mg on all other days      • gabapentin (NEURONTIN) 300 MG Cap Take 1,200 mg by mouth every bedtime.     • acetaminophen (TYLENOL) 500 MG Tab Take 1,000 mg by mouth 1 time daily as needed for Mild Pain.     • Calcium-Vitamin D 500-125 MG-UNIT Tab Take 1 tablet by mouth every evening. 600mg - 400 iu     • sertraline (ZOLOFT) 50 MG Tab Take 25 mg by mouth every bedtime.     • metoprolol SR (TOPROL XL) 25 MG TABLET SR 24 HR Take 25 mg by mouth every morning.     • omeprazole (PRILOSEC) 20 MG delayed-release capsule Take 20 mg by mouth every morning.       No facility-administered encounter medications on file as of 8/9/2018.      Review of Systems  "  Constitutional: Positive for malaise/fatigue. Negative for fever.   Respiratory: Negative for cough and shortness of breath.    Cardiovascular: Positive for leg swelling. Negative for chest pain, palpitations, orthopnea, claudication and PND.   Gastrointestinal: Negative for abdominal pain.   Musculoskeletal: Negative for myalgias.   Neurological: Positive for dizziness and weakness.   All other systems reviewed and are negative.       Objective:   /60   Pulse 82   Ht 1.6 m (5' 3\")   Wt 63 kg (139 lb)   LMP  (LMP Unknown)   SpO2 90%   BMI 24.62 kg/m²     Physical Exam   Constitutional: She is oriented to person, place, and time. She appears well-developed and well-nourished.   Using wheelchair today   HENT:   Head: Normocephalic and atraumatic.   Eyes: Pupils are equal, round, and reactive to light. EOM are normal.   Neck: Normal range of motion. Neck supple. JVD present.   Cardiovascular: Normal rate, regular rhythm and normal heart sounds.    Pulmonary/Chest: Effort normal. No respiratory distress. She has no wheezes. She has rales.   Abdominal: Soft. Bowel sounds are normal.   Musculoskeletal: She exhibits edema (Bilateral 3+ lower extremity edema).   Neurological: She is alert and oriented to person, place, and time.   Skin: Skin is warm and dry.   Psychiatric: She has a normal mood and affect. Her behavior is normal.   Vitals reviewed.    Lab Results   Component Value Date/Time    CHOLSTRLTOT 67 (L) 07/09/2018 02:22 AM    LDL 17 07/09/2018 02:22 AM    HDL 40 07/09/2018 02:22 AM    TRIGLYCERIDE 50 07/09/2018 02:22 AM       Lab Results   Component Value Date/Time    SODIUM 131 (L) 08/07/2018 11:12 AM    POTASSIUM 3.1 (L) 08/07/2018 11:12 AM    CHLORIDE 83 (L) 08/07/2018 11:12 AM    CO2 38 (H) 08/07/2018 11:12 AM    GLUCOSE 92 08/07/2018 11:12 AM    BUN 42 (H) 08/07/2018 11:12 AM    CREATININE 1.82 (H) 08/07/2018 11:12 AM     Lab Results   Component Value Date/Time    ALKPHOSPHAT 99 07/28/2018 10:21 " AM    ASTSGOT 12 07/28/2018 10:21 AM    ALTSGPT <5 07/28/2018 10:21 AM    TBILIRUBIN 0.7 07/28/2018 10:21 AM      Transthoracic Echo Report 5/19/18  Severe bi-atrial enlargement. Normal LV and RV systolic function. LVEF is 55% by visual estimation. Diastolic function difficult to assess.   There is at least mild aortic stenosis but valve is opening. Mean   gradient is 10 mmHg, with V max of 2.01 m/s. RVSP is elevated at 70   mmHg suggesting pulmonary hypertension. No prior study is available for   comparison.      Heart Cath 7/2/2018   POSTPROCEDURE DIAGNOSES:  1.  No evidence of aortic stenosis with 0 mmHg peak to peak gradient, mean gradient   of 8 mmHg, calculated CHRISTIANE of 2.09 cm2.  2.  Coronary artery disease with high grade proximal and high grade mid left   anterior descending artery.  3.  Successful percutaneous transluminal coronary angioplasty/stent placement   of the mid left anterior descending artery with 2.25x12 mm Synergy   drug-eluting stent.  4.  Successful percutaneous transluminal coronary angioplasty/stent placement   of the proximal left anterior descending artery with 2.5x12 mm Synergy   drug-eluting stent.  5.  Normal left ventricular systolic function with ejection fraction of 55%.  6.  Normal left ventricular end-diastolic pressure.  7.  Elevated right heart pressure with PA systolic pressure of 60 mmHg.  8.  Successful Angio-Seal closure.     Transthoracic Echo Report 7/8/2018  Prior done 05-.  Compared to prior study, the right-sided chambers appear dilated.    Left ventricular systolic function is low normal.  Left ventricular ejection fraction is visually estimated to be 50-55%.  Abnormal septal motion consistent with right ventricular (RV) volume overload and/or elevated RV end-diastolic pressure.  Dilated right ventricle with reduced systolic function.  Severe tricuspid regurgitation. Estimated right ventricular systolic pressure  is 55 mmHg.  Dilated inferior vena cava without  inspiratory collapse  Assessment:     1. ACC/AHA stage C heart failure with preserved ejection fraction (HCC)  potassium chloride SA (KDUR) 20 MEQ Tab CR    BASIC METABOLIC PANEL    MAGNESIUM    metOLazone (ZAROXOLYN) 2.5 MG Tab   2. Heart failure, NYHA class 3 (HCC)  potassium chloride SA (KDUR) 20 MEQ Tab CR    BASIC METABOLIC PANEL    MAGNESIUM    metOLazone (ZAROXOLYN) 2.5 MG Tab   3. Chronic atrial fibrillation (HCC)     4. CKD (chronic kidney disease) stage 3, GFR 30-59 ml/min  potassium chloride SA (KDUR) 20 MEQ Tab CR    BASIC METABOLIC PANEL    MAGNESIUM   5. Hypokalemia  potassium chloride SA (KDUR) 20 MEQ Tab CR    BASIC METABOLIC PANEL    MAGNESIUM   6. Cardiac pacemaker in situ     7. Long term current use of anticoagulant     8. Coronary artery disease involving native coronary artery of native heart without angina pectoris     9. S/P drug eluting coronary stent placement         Medical Decision Making:  Today's Assessment / Status / Plan:   1. HFrEF, Stage C, Class 3, LVEF 50-55%: pt continues to be volume overloaded  -Continue Bumex 2 mg twice a day  -Continue metolazone 2.5 mg daily  -Increase potassium to 20 mEq 3 times a day  -BMP and magnesium levels in 1.5 weeks  -Reinforced education to Maintain adequate hydration and 2 gram sodium diet.  -Reinforced s/sx of worsening heart failure with patient and weight monitoring. Pt verbalizes understanding. Pt to call office or RTC if present.     Had a discussion with granddaughter about patient's prognosis.  Discussed with granddaughter if patient wants to remain comfortable we can consider hospice for patient.  Hospice information given to granddaughter to review and call and ask questions.    2.  Atrial fibrillation: Rate controlled  -Continue Toprol-XL 25 mg daily  -Continue warfarin, followed by the anticoagulation clinic  -Has pacemaker    3.  CAD, s/p BRITTON x2: Patient denies chest pain  -Continue clopidogrel 75 mg daily  -Continue rosuvastatin 10  mg daily    4.  CKD: Had contrast-induced nephropathy  -Patient is following up with nephrology tomorrow  -Patient is having some worsening of her kidney function  -Will not adjust her diuretics at time pt is diuresing.  - BMP and magnesium levels in 1.5 weeks    FU in clinic in 2 weeks with labs. Sooner if needed.    Patient verbalizes understanding and agrees with the plan of care.     Collaborating MD: SUN Garcia MD

## 2018-08-10 ENCOUNTER — HOSPITAL ENCOUNTER (INPATIENT)
Facility: MEDICAL CENTER | Age: 83
LOS: 1 days | DRG: 640 | End: 2018-08-11
Attending: EMERGENCY MEDICINE | Admitting: HOSPITALIST
Payer: MEDICARE

## 2018-08-10 ENCOUNTER — HOME CARE VISIT (OUTPATIENT)
Dept: HOME HEALTH SERVICES | Facility: HOME HEALTHCARE | Age: 83
End: 2018-08-10
Payer: MEDICARE

## 2018-08-10 ENCOUNTER — APPOINTMENT (OUTPATIENT)
Dept: RADIOLOGY | Facility: MEDICAL CENTER | Age: 83
DRG: 640 | End: 2018-08-10
Attending: EMERGENCY MEDICINE
Payer: MEDICARE

## 2018-08-10 DIAGNOSIS — I50.30 ACC/AHA STAGE C HEART FAILURE WITH PRESERVED EJECTION FRACTION (HCC): ICD-10-CM

## 2018-08-10 DIAGNOSIS — E87.6 HYPOKALEMIA: ICD-10-CM

## 2018-08-10 DIAGNOSIS — M25.551 PAIN OF RIGHT HIP JOINT: ICD-10-CM
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
## 2018-08-10 DIAGNOSIS — I50.9 HEART FAILURE, NYHA CLASS 3 (HCC): ICD-10-CM

## 2018-08-10 DIAGNOSIS — S09.90XA CLOSED HEAD INJURY, INITIAL ENCOUNTER: ICD-10-CM

## 2018-08-10 DIAGNOSIS — E87.1 HYPONATREMIA: ICD-10-CM

## 2018-08-10 DIAGNOSIS — N18.30 CKD (CHRONIC KIDNEY DISEASE) STAGE 3, GFR 30-59 ML/MIN (HCC): ICD-10-CM

## 2018-08-10 PROBLEM — Z66 DNR (DO NOT RESUSCITATE): Status: ACTIVE | Noted: 2018-08-10

## 2018-08-10 LAB
ALBUMIN SERPL BCP-MCNC: 3.7 G/DL (ref 3.2–4.9)
ALBUMIN/GLOB SERPL: 1.5 G/DL
ALP SERPL-CCNC: 112 U/L (ref 30–99)
ALT SERPL-CCNC: 7 U/L (ref 2–50)
ANION GAP SERPL CALC-SCNC: 7 MMOL/L (ref 0–11.9)
APTT PPP: 41.9 SEC (ref 24.7–36)
AST SERPL-CCNC: 20 U/L (ref 12–45)
BASOPHILS # BLD AUTO: 0.7 % (ref 0–1.8)
BASOPHILS # BLD: 0.02 K/UL (ref 0–0.12)
BILIRUB SERPL-MCNC: 1.4 MG/DL (ref 0.1–1.5)
BUN SERPL-MCNC: 39 MG/DL (ref 8–22)
CALCIUM SERPL-MCNC: 8.9 MG/DL (ref 8.5–10.5)
CHLORIDE SERPL-SCNC: 77 MMOL/L (ref 96–112)
CO2 SERPL-SCNC: 39 MMOL/L (ref 20–33)
CREAT SERPL-MCNC: 1.58 MG/DL (ref 0.5–1.4)
EOSINOPHIL # BLD AUTO: 0 K/UL (ref 0–0.51)
EOSINOPHIL NFR BLD: 0 % (ref 0–6.9)
ERYTHROCYTE [DISTWIDTH] IN BLOOD BY AUTOMATED COUNT: 63.1 FL (ref 35.9–50)
GLOBULIN SER CALC-MCNC: 2.4 G/DL (ref 1.9–3.5)
GLUCOSE SERPL-MCNC: 97 MG/DL (ref 65–99)
HCT VFR BLD AUTO: 26.7 % (ref 37–47)
HGB BLD-MCNC: 8.6 G/DL (ref 12–16)
IMM GRANULOCYTES # BLD AUTO: 0 K/UL (ref 0–0.11)
IMM GRANULOCYTES NFR BLD AUTO: 0 % (ref 0–0.9)
INR PPP: 1.79 (ref 0.87–1.13)
LYMPHOCYTES # BLD AUTO: 0.71 K/UL (ref 1–4.8)
LYMPHOCYTES NFR BLD: 26.1 % (ref 22–41)
MAGNESIUM SERPL-MCNC: 2.7 MG/DL (ref 1.5–2.5)
MCH RBC QN AUTO: 29.4 PG (ref 27–33)
MCHC RBC AUTO-ENTMCNC: 32.2 G/DL (ref 33.6–35)
MCV RBC AUTO: 91.1 FL (ref 81.4–97.8)
MONOCYTES # BLD AUTO: 0.29 K/UL (ref 0–0.85)
MONOCYTES NFR BLD AUTO: 10.7 % (ref 0–13.4)
NEUTROPHILS # BLD AUTO: 1.7 K/UL (ref 2–7.15)
NEUTROPHILS NFR BLD: 62.5 % (ref 44–72)
NRBC # BLD AUTO: 0 K/UL
NRBC BLD-RTO: 0 /100 WBC
PLATELET # BLD AUTO: 136 K/UL (ref 164–446)
PMV BLD AUTO: 11.7 FL (ref 9–12.9)
POTASSIUM SERPL-SCNC: 2.5 MMOL/L (ref 3.6–5.5)
PROT SERPL-MCNC: 6.1 G/DL (ref 6–8.2)
PROTHROMBIN TIME: 20.5 SEC (ref 12–14.6)
RBC # BLD AUTO: 2.93 M/UL (ref 4.2–5.4)
SODIUM SERPL-SCNC: 123 MMOL/L (ref 135–145)
TSH SERPL DL<=0.005 MIU/L-ACNC: 8.58 UIU/ML (ref 0.38–5.33)
WBC # BLD AUTO: 2.7 K/UL (ref 4.8–10.8)

## 2018-08-10 PROCEDURE — 96365 THER/PROPH/DIAG IV INF INIT: CPT

## 2018-08-10 PROCEDURE — A9270 NON-COVERED ITEM OR SERVICE: HCPCS | Performed by: EMERGENCY MEDICINE

## 2018-08-10 PROCEDURE — 700102 HCHG RX REV CODE 250 W/ 637 OVERRIDE(OP): Performed by: EMERGENCY MEDICINE

## 2018-08-10 PROCEDURE — 73501 X-RAY EXAM HIP UNI 1 VIEW: CPT | Mod: RT

## 2018-08-10 PROCEDURE — 93010 ELECTROCARDIOGRAM REPORT: CPT | Performed by: INTERNAL MEDICINE

## 2018-08-10 PROCEDURE — 99223 1ST HOSP IP/OBS HIGH 75: CPT | Performed by: HOSPITALIST

## 2018-08-10 PROCEDURE — 84443 ASSAY THYROID STIM HORMONE: CPT

## 2018-08-10 PROCEDURE — 700111 HCHG RX REV CODE 636 W/ 250 OVERRIDE (IP): Performed by: EMERGENCY MEDICINE

## 2018-08-10 PROCEDURE — 85610 PROTHROMBIN TIME: CPT | Mod: 91

## 2018-08-10 PROCEDURE — 71045 X-RAY EXAM CHEST 1 VIEW: CPT

## 2018-08-10 PROCEDURE — 85027 COMPLETE CBC AUTOMATED: CPT

## 2018-08-10 PROCEDURE — 99285 EMERGENCY DEPT VISIT HI MDM: CPT

## 2018-08-10 PROCEDURE — 72125 CT NECK SPINE W/O DYE: CPT

## 2018-08-10 PROCEDURE — 96366 THER/PROPH/DIAG IV INF ADDON: CPT

## 2018-08-10 PROCEDURE — 700102 HCHG RX REV CODE 250 W/ 637 OVERRIDE(OP): Performed by: HOSPITALIST

## 2018-08-10 PROCEDURE — 80053 COMPREHEN METABOLIC PANEL: CPT

## 2018-08-10 PROCEDURE — 83735 ASSAY OF MAGNESIUM: CPT

## 2018-08-10 PROCEDURE — 770020 HCHG ROOM/CARE - TELE (206)

## 2018-08-10 PROCEDURE — 36415 COLL VENOUS BLD VENIPUNCTURE: CPT

## 2018-08-10 PROCEDURE — 85007 BL SMEAR W/DIFF WBC COUNT: CPT

## 2018-08-10 PROCEDURE — A9270 NON-COVERED ITEM OR SERVICE: HCPCS | Performed by: HOSPITALIST

## 2018-08-10 PROCEDURE — 93005 ELECTROCARDIOGRAM TRACING: CPT | Performed by: HOSPITALIST

## 2018-08-10 PROCEDURE — 85730 THROMBOPLASTIN TIME PARTIAL: CPT

## 2018-08-10 PROCEDURE — 700111 HCHG RX REV CODE 636 W/ 250 OVERRIDE (IP): Performed by: HOSPITALIST

## 2018-08-10 PROCEDURE — 85025 COMPLETE CBC W/AUTO DIFF WBC: CPT

## 2018-08-10 PROCEDURE — 84484 ASSAY OF TROPONIN QUANT: CPT

## 2018-08-10 PROCEDURE — 70450 CT HEAD/BRAIN W/O DYE: CPT

## 2018-08-10 PROCEDURE — 83880 ASSAY OF NATRIURETIC PEPTIDE: CPT

## 2018-08-10 PROCEDURE — 72192 CT PELVIS W/O DYE: CPT

## 2018-08-10 RX ORDER — POTASSIUM CHLORIDE 20 MEQ/1
40 TABLET, EXTENDED RELEASE ORAL ONCE
Status: COMPLETED | OUTPATIENT
Start: 2018-08-10 | End: 2018-08-10

## 2018-08-10 RX ORDER — METOPROLOL SUCCINATE 25 MG/1
25 TABLET, EXTENDED RELEASE ORAL EVERY MORNING
Status: DISCONTINUED | OUTPATIENT
Start: 2018-08-10 | End: 2018-08-11 | Stop reason: HOSPADM

## 2018-08-10 RX ORDER — LEVOTHYROXINE SODIUM 0.05 MG/1
50 TABLET ORAL
Status: DISCONTINUED | OUTPATIENT
Start: 2018-08-10 | End: 2018-08-11 | Stop reason: HOSPADM

## 2018-08-10 RX ORDER — WARFARIN SODIUM 7.5 MG/1
3.75 TABLET ORAL
Status: COMPLETED | OUTPATIENT
Start: 2018-08-10 | End: 2018-08-10

## 2018-08-10 RX ORDER — ACETAMINOPHEN 500 MG
1000 TABLET ORAL
Status: DISCONTINUED | OUTPATIENT
Start: 2018-08-10 | End: 2018-08-10

## 2018-08-10 RX ORDER — FUROSEMIDE 10 MG/ML
20 INJECTION INTRAMUSCULAR; INTRAVENOUS EVERY 8 HOURS
Status: DISCONTINUED | OUTPATIENT
Start: 2018-08-10 | End: 2018-08-11 | Stop reason: HOSPADM

## 2018-08-10 RX ORDER — SODIUM CHLORIDE 1 G/1
1 TABLET ORAL
Status: DISCONTINUED | OUTPATIENT
Start: 2018-08-10 | End: 2018-08-11

## 2018-08-10 RX ORDER — CYANOCOBALAMIN 1000 UG/ML
1000 INJECTION, SOLUTION INTRAMUSCULAR; SUBCUTANEOUS
Status: DISCONTINUED | OUTPATIENT
Start: 2018-08-10 | End: 2018-08-10

## 2018-08-10 RX ORDER — POTASSIUM CHLORIDE 7.45 MG/ML
10 INJECTION INTRAVENOUS
Status: COMPLETED | OUTPATIENT
Start: 2018-08-10 | End: 2018-08-10

## 2018-08-10 RX ORDER — AMOXICILLIN 250 MG
2 CAPSULE ORAL 2 TIMES DAILY
Status: DISCONTINUED | OUTPATIENT
Start: 2018-08-10 | End: 2018-08-11 | Stop reason: HOSPADM

## 2018-08-10 RX ORDER — GABAPENTIN 400 MG/1
1200 CAPSULE ORAL
Status: DISCONTINUED | OUTPATIENT
Start: 2018-08-10 | End: 2018-08-11 | Stop reason: HOSPADM

## 2018-08-10 RX ORDER — ALLOPURINOL 100 MG/1
200 TABLET ORAL EVERY EVENING
Status: DISCONTINUED | OUTPATIENT
Start: 2018-08-10 | End: 2018-08-11 | Stop reason: HOSPADM

## 2018-08-10 RX ORDER — TOPIRAMATE 25 MG/1
25 TABLET ORAL 2 TIMES DAILY
Status: DISCONTINUED | OUTPATIENT
Start: 2018-08-10 | End: 2018-08-11 | Stop reason: HOSPADM

## 2018-08-10 RX ORDER — ONDANSETRON 2 MG/ML
4 INJECTION INTRAMUSCULAR; INTRAVENOUS EVERY 4 HOURS PRN
Status: DISCONTINUED | OUTPATIENT
Start: 2018-08-10 | End: 2018-08-11 | Stop reason: HOSPADM

## 2018-08-10 RX ORDER — BISACODYL 10 MG
10 SUPPOSITORY, RECTAL RECTAL
Status: DISCONTINUED | OUTPATIENT
Start: 2018-08-10 | End: 2018-08-11 | Stop reason: HOSPADM

## 2018-08-10 RX ORDER — ROSUVASTATIN CALCIUM 10 MG/1
10 TABLET, COATED ORAL EVERY EVENING
Status: DISCONTINUED | OUTPATIENT
Start: 2018-08-10 | End: 2018-08-11 | Stop reason: HOSPADM

## 2018-08-10 RX ORDER — POLYETHYLENE GLYCOL 3350 17 G/17G
1 POWDER, FOR SOLUTION ORAL
Status: DISCONTINUED | OUTPATIENT
Start: 2018-08-10 | End: 2018-08-11 | Stop reason: HOSPADM

## 2018-08-10 RX ORDER — ACETAMINOPHEN 325 MG/1
650 TABLET ORAL EVERY 6 HOURS PRN
Status: DISCONTINUED | OUTPATIENT
Start: 2018-08-10 | End: 2018-08-11 | Stop reason: HOSPADM

## 2018-08-10 RX ORDER — ONDANSETRON 4 MG/1
4 TABLET, ORALLY DISINTEGRATING ORAL EVERY 4 HOURS PRN
Status: DISCONTINUED | OUTPATIENT
Start: 2018-08-10 | End: 2018-08-11 | Stop reason: HOSPADM

## 2018-08-10 RX ORDER — POTASSIUM CHLORIDE 20 MEQ/1
40 TABLET, EXTENDED RELEASE ORAL 2 TIMES DAILY
Status: DISCONTINUED | OUTPATIENT
Start: 2018-08-11 | End: 2018-08-11 | Stop reason: HOSPADM

## 2018-08-10 RX ORDER — FERROUS GLUCONATE 324(38)MG
324 TABLET ORAL
Status: DISCONTINUED | OUTPATIENT
Start: 2018-08-10 | End: 2018-08-11 | Stop reason: HOSPADM

## 2018-08-10 RX ORDER — CLOPIDOGREL BISULFATE 75 MG/1
75 TABLET ORAL DAILY
Status: DISCONTINUED | OUTPATIENT
Start: 2018-08-10 | End: 2018-08-11 | Stop reason: HOSPADM

## 2018-08-10 RX ORDER — OMEPRAZOLE 20 MG/1
20 CAPSULE, DELAYED RELEASE ORAL EVERY MORNING
Status: DISCONTINUED | OUTPATIENT
Start: 2018-08-10 | End: 2018-08-11 | Stop reason: HOSPADM

## 2018-08-10 RX ADMIN — OMEPRAZOLE 20 MG: 20 CAPSULE, DELAYED RELEASE ORAL at 21:56

## 2018-08-10 RX ADMIN — POTASSIUM CHLORIDE 10 MEQ: 10 INJECTION, SOLUTION INTRAVENOUS at 13:40

## 2018-08-10 RX ADMIN — POTASSIUM CHLORIDE 40 MEQ: 1500 TABLET, EXTENDED RELEASE ORAL at 13:41

## 2018-08-10 RX ADMIN — SERTRALINE 25 MG: 50 TABLET, FILM COATED ORAL at 21:57

## 2018-08-10 RX ADMIN — WARFARIN SODIUM 3.75 MG: 7.5 TABLET ORAL at 21:58

## 2018-08-10 RX ADMIN — FUROSEMIDE 20 MG: 10 INJECTION, SOLUTION INTRAMUSCULAR; INTRAVENOUS at 21:54

## 2018-08-10 RX ADMIN — POTASSIUM CHLORIDE 40 MEQ: 1500 TABLET, EXTENDED RELEASE ORAL at 21:57

## 2018-08-10 RX ADMIN — SODIUM CHLORIDE TAB 1 GM 1 G: 1 TAB at 21:58

## 2018-08-10 RX ADMIN — METOPROLOL SUCCINATE 25 MG: 25 TABLET, EXTENDED RELEASE ORAL at 21:56

## 2018-08-10 RX ADMIN — FERROUS GLUCONATE 324 MG: 324 TABLET ORAL at 21:54

## 2018-08-10 RX ADMIN — POTASSIUM CHLORIDE 10 MEQ: 10 INJECTION, SOLUTION INTRAVENOUS at 15:10

## 2018-08-10 RX ADMIN — ROSUVASTATIN CALCIUM 10 MG: 10 TABLET, FILM COATED ORAL at 21:57

## 2018-08-10 RX ADMIN — TOPIRAMATE 25 MG: 25 TABLET, FILM COATED ORAL at 21:58

## 2018-08-10 RX ADMIN — GABAPENTIN 1200 MG: 400 CAPSULE ORAL at 21:54

## 2018-08-10 RX ADMIN — CLOPIDOGREL 75 MG: 75 TABLET, FILM COATED ORAL at 21:57

## 2018-08-10 RX ADMIN — CALCIUM CARBONATE-CHOLECALCIFEROL TAB 250 MG-125 UNIT 2 TABLET: 250-125 TAB at 21:56

## 2018-08-10 RX ADMIN — ALLOPURINOL 200 MG: 100 TABLET ORAL at 21:54

## 2018-08-10 ASSESSMENT — LIFESTYLE VARIABLES
ALCOHOL_USE: NO
EVER_SMOKED: YES

## 2018-08-10 ASSESSMENT — PAIN SCALES - GENERAL: PAINLEVEL_OUTOF10: 0

## 2018-08-10 NOTE — ED NOTES
Pt provided with discharge instructions, instructions for follow up appointment, s/s of when to seek emergency care.  Pt verbalizes understanding.  Pt discharged in good condition.

## 2018-08-10 NOTE — ED NOTES
The Medication Reconciliation process has been completed by interviewing the patient's family    Allergies have been reviewed  Antibiotic use in 30 days - none    Home Pharmacy:  [facility]

## 2018-08-10 NOTE — PROGRESS NOTES
Anticoagulation Summary  As of 8/9/2018    INR goal:   2.0-3.0   TTR:   49.8 % (3.1 mo)   Today's INR:   No new INR was available at the time of this encounter.   Warfarin maintenance plan:   2.5 mg (2.5 mg x 1) on Mon, Wed; 3.75 mg (2.5 mg x 1.5) all other days   Weekly warfarin total:   23.75 mg   Plan last modified:   Vera SanchezD (8/3/2018)   Next INR check:   8/14/2018   Target end date:   Indefinite    Indications    Chronic atrial fibrillation (HCC) [I48.2]  Long term current use of anticoagulant (Resolved) [Z79.01]             Anticoagulation Episode Summary     INR check location:       Preferred lab:       Send INR reminders to:       Comments:   [facility]        Anticoagulation Care Providers     Provider Role Specialty Phone number    [facility] Referring  [phone]    Razia Sims D.O. Responsible Family Medicine [phone]    Sebastian Thomas M.D. Responsible Interventional Cardiology [phone]        Anticoagulation Patient Findings    Spoke with the patient on the phone today, regarding her last INR recorded which was SUB-therapeutic at 1.89 on Friday 8/10/18 just before she was discharged from the hospital.  She was admitted after a fall and found to have LE edema along with severe hyponatremia and hypokalemia. She has taken her normal dosing of 3.75mg over the weekend. Patient denies any interval changes to diet and/or medications. Patient denies any signs/symptoms of bleeding or clotting.  Patient will take 3.75mg TONIGHT, and we will have [facility] retest her INR again tomorrow and we will call patient with further instructions.    Libia GamezD

## 2018-08-10 NOTE — ED TRIAGE NOTES
Chief Complaint   Patient presents with   • T-5000 GLF     Pt tripped and fell around 0400 this morning.  Pt's [facility] found pt down on ground around 1030, [facility] came and assisted pt off the ground.  Pt denies LOC.     • Head Injury     Ecchymosis to left forehead.  No swelling or deformity.  Denies headache.  Pt takes Coumadin.    • Hip Pain     Right hip.    Pt bib [facility] for above chief complaint.  Pt AA&Ox4.

## 2018-08-10 NOTE — ED PROVIDER NOTES
ED Provider Note    Scribed for Denis Johansen M.D. by Shae Carvalho. 8/10/2018  11:27 AM    Primary care provider: ELIZABETH Patel  Means of arrival: ambulance   History obtained from: patient   History limited by: none     CHIEF COMPLAINT  Chief Complaint   Patient presents with   • T-5000 GLF     Pt tripped and fell around 0400 this morning.  [facility], [facility] came and assisted pt off the ground.  Pt denies LOC.     • Head Injury     Ecchymosis to left forehead.  No swelling or deformity.  Denies headache.  Pt takes Coumadin.    • Hip Pain     Right hip.        HPI  Andree Egan is a 86 y.o. female who presents to the Emergency Department via ambulance for evaluation of injuries secondary to a mechanical ground level fall at 4 AM this morning. Patient reports she tripped on her own feet when she fell today. Per granddaughter, patient was found down on the ground at 10:30 this morning. Patient sustained associated ecchymosis to her left forehead and bilateral shoulders. Additionally, she is complaining of right hip pain, pain to her right buttocks, neck pain (however, she reports she experienced neck pain prior to the event). Her hip pain is exacerbated with palpation. Patient does experience chronic lower extremity edema. Patient is taking Warfarin for treatment of atrial fibrillation and DVT. Patient lives by herself. No complaints of headache, loss of consciousness, nausea, vomiting, back pain, chest pain, and abdominal pain.     REVIEW OF SYSTEMS  Pertinent positives include fall, ecchymosis to her left forehead and bilateral shoulders, right hip pain, right buttock pain, neck pain, chronic lower extremity edema. Pertinent negatives include no headache, loss of consciousness, nausea, vomiting, back pain, chest pain, and abdominal pain.  All other systems reviewed and negative. C.     PAST MEDICAL HISTORY   has a past medical history of Arthritis; Atrial  fibrillation (CMS-HCC) [I48.91] (3/29/2018); Blood clotting disorder ([facility]); Breath shortness; Cancer (Formerly [facility]); Cataract; Congestive heart failure (CHF) (Formerly [facility]) (4/3/2018); BERTRAND (dyspnea on exertion) (5/4/2018); Heart burn; Heart valve disease; Hemorrhagic disorder (Formerly [facility]); Hiatus hernia syndrome; Hyperlipidemia; Hypertension; Kidney disease; Long term current use of anticoagulant (3/29/2018); Pacemaker (4/3/2018); Pulmonary emphysema ([facility]); Secondary hypertension (4/3/2018); Thyroid disease; Tremors of nervous system (4/3/2018); Urinary incontinence; and Urinary tract infection.    SURGICAL HISTORY   has a past surgical history that includes other and other cardiac surgery.    SOCIAL HISTORY  Social History   Substance Use Topics   • Smoking status: Former Smoker     Packs/day: 0.50     Types: Cigarettes     Quit date: 1/1/1970   • Smokeless tobacco: Never Used   • Alcohol use 1.8 oz/week     3 Glasses of wine per week      Comment: 1-2 glasses wine before dinner      History   Drug Use No       FAMILY HISTORY  Family History   Problem Relation Age of Onset   • Lung Disease Mother    • Cancer Mother    • No Known Problems Father    • Fainting Neg Hx        CURRENT MEDICATIONS  Home Medications     Reviewed by Odessa Crews (Pharmacy Tech) on 08/10/18 at 1422  Med List Status: Complete   Medication Last Dose Status   acetaminophen (TYLENOL) 500 MG Tab 8/9/2018 Active   allopurinol (ZYLOPRIM) 100 MG Tab  Active   ascorbic acid (VITAMIN C) 500 MG tablet 8/9/2018 Active   bumetanide (BUMEX) 2 MG tablet 8/9/2018 Active   Calcium-Vitamin D 500-125 MG-UNIT Tab 8/9/2018 Active   clopidogrel (PLAVIX) 75 MG Tab 8/9/2018 Active   cyanocobalamin (VITAMIN B-12) 1000 MCG/ML Solution 2 weeks Active   cyanocobalamin (VITAMIN B12) 1000 MCG Tab 8/9/2018 Active   ferrous gluconate (FERGON) 324 (38 Fe) MG Tab 8/9/2018 Active   gabapentin (NEURONTIN) 300 MG Cap 8/9/2018 Active   levothyroxine (SYNTHROID) 50 MCG Tab 8/9/2018 Active  "  metOLazone (ZAROXOLYN) 2.5 MG Tab 8/9/2018 Active   metoprolol SR (TOPROL XL) 25 MG TABLET SR 24 HR 8/9/2018 Active   omeprazole (PRILOSEC) 20 MG delayed-release capsule 8/9/2018 Active   potassium chloride SA (KDUR) 20 MEQ Tab CR 8/9/2018 Active   rosuvastatin (CRESTOR) 10 MG Tab 8/9/2018 Active   sertraline (ZOLOFT) 50 MG Tab 8/9/2018 Active   topiramate (TOPAMAX) 25 MG Tab 8/9/2018 Active   warfarin (COUMADIN) 2.5 MG Tab 8/9/2018 Active                ALLERGIES  Allergies   Allergen Reactions   • Codeine Vomiting       PHYSICAL EXAM  VITAL SIGNS: /60   Pulse 61   Resp 16   Ht 1.6 m (5' 3\")   Wt 61.2 kg (135 lb)   LMP  (LMP Unknown)   SpO2 96%   BMI 23.91 kg/m²     Constitutional: Well developed, Well nourished, mild distress, Non-toxic appearance.   HENT: Normocephalic, ecchymosis to the right temporal area, Bilateral external ears normal, Oropharynx moist, No oral exudates. No malocclusion.  Eyes: PERRLA, EOMI, Conjunctiva normal, No discharge.   Neck: Slight midline C spine tenderness, Supple, No stridor.   Lymphatic: No lymphadenopathy noted.   Cardiovascular: Normal heart rate, Normal rhythm.   Thorax & Lungs: Clear to auscultation bilaterally, No respiratory distress, No wheezing, No crackles.   Abdomen: Soft, No tenderness, No masses, No pulsatile masses.   Back: Slight midline C spine tenderness. No midline T or L spine tenderness.   Skin: Warm, Dry, No rash. Ecchymosis to the right temporal area. Bruising to left anterior shoulder, large ecchymosis to right anterior shoulder. Ecchymosis to the right hip.   Extremities:, Extensive bilateral lower extremity edema. No cyanosis.   Musculoskeletal: Bruising to left anterior shoulder, large ecchymosis to right anterior shoulder. Ecchymosis to the right hip. Intact distal pulses.   Neurologic: Awake, alert. Moves all extremities spontaneously.  Psychiatric: Affect normal, Judgment normal, Mood normal.     LABS  Labs Reviewed   CBC WITH " DIFFERENTIAL - Abnormal; Notable for the following:        Result Value    WBC 2.7 (*)     RBC 2.93 (*)     Hemoglobin 8.6 (*)     Hematocrit 26.7 (*)     MCHC 32.2 (*)     RDW 63.1 (*)     Platelet Count 136 (*)     Neutrophils (Absolute) 1.70 (*)     Lymphs (Absolute) 0.71 (*)     All other components within normal limits    Narrative:     Indicate which anticoagulants the patient is on:->UNKNOWN   COMP METABOLIC PANEL - Abnormal; Notable for the following:     Sodium 123 (*)     Potassium 2.5 (*)     Chloride 77 (*)     Co2 39 (*)     Bun 39 (*)     Creatinine 1.58 (*)     Alkaline Phosphatase 112 (*)     All other components within normal limits    Narrative:     Indicate which anticoagulants the patient is on:->UNKNOWN   PROTHROMBIN TIME - Abnormal; Notable for the following:     PT 20.5 (*)     INR 1.79 (*)     All other components within normal limits    Narrative:     Indicate which anticoagulants the patient is on:->UNKNOWN   APTT - Abnormal; Notable for the following:     APTT 41.9 (*)     All other components within normal limits    Narrative:     Indicate which anticoagulants the patient is on:->UNKNOWN   ESTIMATED GFR - Abnormal; Notable for the following:     GFR If  37 (*)     GFR If Non  31 (*)     All other components within normal limits    Narrative:     Indicate which anticoagulants the patient is on:->UNKNOWN     All labs reviewed by me.    RADIOLOGY  CT-PELVIS W/O PLUS RECONS   Final Result      1.  No hip or pelvic fracture.   2.  Mild degenerative change of both hips.   3.  Diffuse body wall edema.      CT-CSPINE WITHOUT PLUS RECONS   Final Result      1.  No cervical spine fracture.   2.  Mild degenerative change with associated mild anterolisthesis at C3-4, as seen previously.      CT-HEAD W/O   Final Result      1.  Diffuse atrophy and white matter changes.   2.  No acute intracranial hemorrhage or territorial infarct.   3.  Chronic RIGHT maxillary  sinusitis.      DX-HIP-UNILATERAL-WITH PELVIS-1 VIEW RIGHT   Final Result      1.  No RIGHT hip or pelvic fracture.   2.  Diffuse osteopenia.      DX-CHEST-LIMITED (1 VIEW)   Final Result      1.  Stable cardiomegaly.   2.  No pneumonia or overt pulmonary edema.        The radiologist's interpretation of all radiological studies have been reviewed by me.      COURSE & MEDICAL DECISION MAKING  Pertinent Labs & Imaging studies reviewed. (See chart for details)    11:27 AM - Patient seen and examined at bedside. Patient denies pain medication at this time. Ordered CT C spine, CT head, DX hip right, DX right femur, DX chest, estimated GFR, CBC, CMP, PT/INR, APTT to evaluate her symptoms. The differential diagnoses include but are not limited to: hip fracture, metabolic, head injury.     1:26 PM- Reviewed the patient's lab and imaging results. Patient's potassium is elevated at 2.5. She will be treated with KCL 10 mEq and Kdur 40 mEq.     1:40 PM- Patient was reevaluated at bedside. Discussed lab and radiology results with the patient and family and informed them that she will be admitted for further care. They understand and agree to the plan of care.      2:18 PM- Paged the hospitalist to admit.     2:26 PM- I discussed the patient's case and the above findings with Dr. Perales (hospitalist) who kindly accepts the patient for admission.     Decision Making:  Status post fall head injury, the patient also has hip pain, laboratory tests show hypokalemia, hyponatremia, replace the patient's potassium, CT scan of the pelvis does not show any fractures, discussed the case with Dr. Sae Stuart for admission the hospital.      DISPOSITION:  Patient will be admitted to Dr. Perales (hospitalist) in guarded condition.    FINAL IMPRESSION  1. Closed head injury, initial encounter    2. Fall, initial encounter    3. Pain of right hip joint    4. Hypokalemia    5. Hyponatremia        Shae FARIAS (Scrcatherine), pascual scribing  for, and in the presence of, Denis Johansen M.D..    Electronically signed by: Shae Carvalho (Scribe), 8/10/2018    I, Denis Johansen M.D. personally performed the services described in this documentation, as scribed by Shae Carvalho in my presence, and it is both accurate and complete.    The note accurately reflects work and decisions made by me.  Denis Johansen  8/10/2018  4:29 PM

## 2018-08-11 ENCOUNTER — PATIENT OUTREACH (OUTPATIENT)
Dept: HEALTH INFORMATION MANAGEMENT | Facility: OTHER | Age: 83
End: 2018-08-11

## 2018-08-11 VITALS
RESPIRATION RATE: 18 BRPM | OXYGEN SATURATION: 95 % | HEIGHT: 63 IN | HEART RATE: 65 BPM | SYSTOLIC BLOOD PRESSURE: 106 MMHG | WEIGHT: 140.87 LBS | DIASTOLIC BLOOD PRESSURE: 60 MMHG | BODY MASS INDEX: 24.96 KG/M2 | TEMPERATURE: 98 F

## 2018-08-11 PROBLEM — I50.33 ACUTE ON CHRONIC DIASTOLIC (CONGESTIVE) HEART FAILURE (HCC): Status: RESOLVED | Noted: 2018-05-18 | Resolved: 2018-08-11

## 2018-08-11 PROBLEM — D61.818 PANCYTOPENIA (HCC): Status: RESOLVED | Noted: 2018-07-26 | Resolved: 2018-08-11

## 2018-08-11 LAB
ALBUMIN SERPL BCP-MCNC: 3.2 G/DL (ref 3.2–4.9)
ALBUMIN/GLOB SERPL: 1.6 G/DL
ALP SERPL-CCNC: 103 U/L (ref 30–99)
ALT SERPL-CCNC: 7 U/L (ref 2–50)
ANION GAP SERPL CALC-SCNC: 9 MMOL/L (ref 0–11.9)
ANION GAP SERPL CALC-SCNC: 9 MMOL/L (ref 0–11.9)
ANISOCYTOSIS BLD QL SMEAR: ABNORMAL
AST SERPL-CCNC: 18 U/L (ref 12–45)
BASOPHILS # BLD AUTO: 0.9 % (ref 0–1.8)
BASOPHILS # BLD: 0.03 K/UL (ref 0–0.12)
BILIRUB SERPL-MCNC: 1 MG/DL (ref 0.1–1.5)
BNP SERPL-MCNC: 410 PG/ML (ref 0–100)
BUN SERPL-MCNC: 35 MG/DL (ref 8–22)
BUN SERPL-MCNC: 38 MG/DL (ref 8–22)
CALCIUM SERPL-MCNC: 8.5 MG/DL (ref 8.5–10.5)
CALCIUM SERPL-MCNC: 8.8 MG/DL (ref 8.5–10.5)
CHLORIDE SERPL-SCNC: 83 MMOL/L (ref 96–112)
CHLORIDE SERPL-SCNC: 85 MMOL/L (ref 96–112)
CO2 SERPL-SCNC: 36 MMOL/L (ref 20–33)
CO2 SERPL-SCNC: 36 MMOL/L (ref 20–33)
CREAT SERPL-MCNC: 1.46 MG/DL (ref 0.5–1.4)
CREAT SERPL-MCNC: 1.51 MG/DL (ref 0.5–1.4)
EKG IMPRESSION: NORMAL
EOSINOPHIL # BLD AUTO: 0.03 K/UL (ref 0–0.51)
EOSINOPHIL NFR BLD: 0.9 % (ref 0–6.9)
ERYTHROCYTE [DISTWIDTH] IN BLOOD BY AUTOMATED COUNT: 65.1 FL (ref 35.9–50)
GLOBULIN SER CALC-MCNC: 2 G/DL (ref 1.9–3.5)
GLUCOSE SERPL-MCNC: 108 MG/DL (ref 65–99)
GLUCOSE SERPL-MCNC: 120 MG/DL (ref 65–99)
HCT VFR BLD AUTO: 27 % (ref 37–47)
HGB BLD-MCNC: 8.5 G/DL (ref 12–16)
INR PPP: 1.89 (ref 0.87–1.13)
LYMPHOCYTES # BLD AUTO: 0.39 K/UL (ref 1–4.8)
LYMPHOCYTES NFR BLD: 14 % (ref 22–41)
MACROCYTES BLD QL SMEAR: ABNORMAL
MAGNESIUM SERPL-MCNC: 2.5 MG/DL (ref 1.5–2.5)
MANUAL DIFF BLD: NORMAL
MCH RBC QN AUTO: 29.1 PG (ref 27–33)
MCHC RBC AUTO-ENTMCNC: 31.5 G/DL (ref 33.6–35)
MCV RBC AUTO: 92.5 FL (ref 81.4–97.8)
MONOCYTES # BLD AUTO: 0.1 K/UL (ref 0–0.85)
MONOCYTES NFR BLD AUTO: 3.5 % (ref 0–13.4)
MORPHOLOGY BLD-IMP: NORMAL
NEUTROPHILS # BLD AUTO: 2.26 K/UL (ref 2–7.15)
NEUTROPHILS NFR BLD: 80.7 % (ref 44–72)
NRBC # BLD AUTO: 0 K/UL
NRBC BLD-RTO: 0 /100 WBC
OVALOCYTES BLD QL SMEAR: NORMAL
PLATELET # BLD AUTO: 114 K/UL (ref 164–446)
PLATELET BLD QL SMEAR: NORMAL
PMV BLD AUTO: 10.8 FL (ref 9–12.9)
POIKILOCYTOSIS BLD QL SMEAR: NORMAL
POTASSIUM SERPL-SCNC: 2.7 MMOL/L (ref 3.6–5.5)
POTASSIUM SERPL-SCNC: 3.3 MMOL/L (ref 3.6–5.5)
PROT SERPL-MCNC: 5.2 G/DL (ref 6–8.2)
PROTHROMBIN TIME: 21.4 SEC (ref 12–14.6)
RBC # BLD AUTO: 2.92 M/UL (ref 4.2–5.4)
RBC BLD AUTO: PRESENT
SODIUM SERPL-SCNC: 128 MMOL/L (ref 135–145)
SODIUM SERPL-SCNC: 130 MMOL/L (ref 135–145)
TROPONIN I SERPL-MCNC: 0.03 NG/ML (ref 0–0.04)
WBC # BLD AUTO: 2.8 K/UL (ref 4.8–10.8)

## 2018-08-11 PROCEDURE — A9270 NON-COVERED ITEM OR SERVICE: HCPCS | Performed by: HOSPITALIST

## 2018-08-11 PROCEDURE — 700111 HCHG RX REV CODE 636 W/ 250 OVERRIDE (IP): Performed by: HOSPITALIST

## 2018-08-11 PROCEDURE — 700102 HCHG RX REV CODE 250 W/ 637 OVERRIDE(OP): Performed by: HOSPITALIST

## 2018-08-11 PROCEDURE — 36415 COLL VENOUS BLD VENIPUNCTURE: CPT

## 2018-08-11 PROCEDURE — 80048 BASIC METABOLIC PNL TOTAL CA: CPT

## 2018-08-11 PROCEDURE — 99239 HOSP IP/OBS DSCHRG MGMT >30: CPT | Performed by: INTERNAL MEDICINE

## 2018-08-11 RX ORDER — POTASSIUM CHLORIDE 20 MEQ/1
20 TABLET, EXTENDED RELEASE ORAL 3 TIMES DAILY
Qty: 90 TAB | Refills: 0 | Status: ON HOLD
Start: 2018-08-11 | End: 2018-08-20

## 2018-08-11 RX ORDER — WARFARIN SODIUM 7.5 MG/1
3.75 TABLET ORAL
Status: DISCONTINUED | OUTPATIENT
Start: 2018-08-11 | End: 2018-08-11 | Stop reason: HOSPADM

## 2018-08-11 RX ORDER — WARFARIN SODIUM 2.5 MG/1
2.5 TABLET ORAL
Status: DISCONTINUED | OUTPATIENT
Start: 2018-08-13 | End: 2018-08-11 | Stop reason: HOSPADM

## 2018-08-11 RX ORDER — POTASSIUM CHLORIDE 20 MEQ/1
40 TABLET, EXTENDED RELEASE ORAL ONCE
Status: COMPLETED | OUTPATIENT
Start: 2018-08-11 | End: 2018-08-11

## 2018-08-11 RX ORDER — POTASSIUM CHLORIDE 20 MEQ/1
40 TABLET, EXTENDED RELEASE ORAL DAILY
Status: DISCONTINUED | OUTPATIENT
Start: 2018-08-11 | End: 2018-08-11

## 2018-08-11 RX ADMIN — OMEPRAZOLE 20 MG: 20 CAPSULE, DELAYED RELEASE ORAL at 06:14

## 2018-08-11 RX ADMIN — TOPIRAMATE 25 MG: 25 TABLET, FILM COATED ORAL at 06:14

## 2018-08-11 RX ADMIN — SODIUM CHLORIDE TAB 1 GM 1 G: 1 TAB at 06:14

## 2018-08-11 RX ADMIN — METOPROLOL SUCCINATE 25 MG: 25 TABLET, EXTENDED RELEASE ORAL at 06:14

## 2018-08-11 RX ADMIN — POTASSIUM CHLORIDE 40 MEQ: 1500 TABLET, EXTENDED RELEASE ORAL at 06:12

## 2018-08-11 RX ADMIN — POTASSIUM CHLORIDE 40 MEQ: 1500 TABLET, EXTENDED RELEASE ORAL at 06:13

## 2018-08-11 RX ADMIN — FERROUS GLUCONATE 324 MG: 324 TABLET ORAL at 11:30

## 2018-08-11 RX ADMIN — FUROSEMIDE 20 MG: 10 INJECTION, SOLUTION INTRAMUSCULAR; INTRAVENOUS at 06:12

## 2018-08-11 RX ADMIN — FERROUS GLUCONATE 324 MG: 324 TABLET ORAL at 06:14

## 2018-08-11 RX ADMIN — LEVOTHYROXINE SODIUM 50 MCG: 50 TABLET ORAL at 06:14

## 2018-08-11 RX ADMIN — CLOPIDOGREL 75 MG: 75 TABLET, FILM COATED ORAL at 06:14

## 2018-08-11 ASSESSMENT — PATIENT HEALTH QUESTIONNAIRE - PHQ9
SUM OF ALL RESPONSES TO PHQ9 QUESTIONS 1 AND 2: 0
1. LITTLE INTEREST OR PLEASURE IN DOING THINGS: NOT AT ALL
2. FEELING DOWN, DEPRESSED, IRRITABLE, OR HOPELESS: NOT AT ALL

## 2018-08-11 ASSESSMENT — COGNITIVE AND FUNCTIONAL STATUS - GENERAL
SUGGESTED CMS G CODE MODIFIER MOBILITY: CJ
TOILETING: A LITTLE
WALKING IN HOSPITAL ROOM: A LITTLE
HELP NEEDED FOR BATHING: A LITTLE
DRESSING REGULAR LOWER BODY CLOTHING: A LITTLE
PERSONAL GROOMING: A LITTLE
MOBILITY SCORE: 20
CLIMB 3 TO 5 STEPS WITH RAILING: A LITTLE
MOVING TO AND FROM BED TO CHAIR: A LITTLE
STANDING UP FROM CHAIR USING ARMS: A LITTLE
DAILY ACTIVITIY SCORE: 20
SUGGESTED CMS G CODE MODIFIER DAILY ACTIVITY: CJ

## 2018-08-11 ASSESSMENT — PAIN SCALES - GENERAL
PAINLEVEL_OUTOF10: 0
PAINLEVEL_OUTOF10: 0

## 2018-08-11 ASSESSMENT — CHA2DS2 SCORE
AGE 65 TO 74: NO
PRIOR STROKE OR TIA OR THROMBOEMBOLISM: NO
DIABETES: NO
CHA2DS2 VASC SCORE: 6
VASCULAR DISEASE: YES
CHF OR LEFT VENTRICULAR DYSFUNCTION: YES
SEX: FEMALE
AGE 75 OR GREATER: YES
HYPERTENSION: YES

## 2018-08-11 ASSESSMENT — COPD QUESTIONNAIRES
HAVE YOU SMOKED AT LEAST 100 CIGARETTES IN YOUR ENTIRE LIFE: NO/DON'T KNOW
DO YOU EVER COUGH UP ANY MUCUS OR PHLEGM?: NO/ONLY WITH OCCASIONAL COLDS OR INFECTIONS
DURING THE PAST 4 WEEKS HOW MUCH DID YOU FEEL SHORT OF BREATH: NONE/LITTLE OF THE TIME
COPD SCREENING SCORE: 2
IN THE PAST 12 MONTHS DO YOU DO LESS THAN YOU USED TO BECAUSE OF YOUR BREATHING PROBLEMS: DISAGREE/UNSURE

## 2018-08-11 NOTE — DISCHARGE SUMMARY
Discharge Summary    CHIEF COMPLAINT ON ADMISSION  Chief Complaint   Patient presents with   • T-5000 GLF     Pt tripped and fell around 0400 this morning.  Pt's [facility] found pt down on ground around 1030, JENNY came and assisted pt off the ground.  Pt denies LOC.     • Head Injury     Ecchymosis to left forehead.  No swelling or deformity.  Denies headache.  Pt takes Coumadin.    • Hip Pain     Right hip.        Reason for Admission  T-5000     Admission Date  8/10/2018    CODE STATUS  DNAR/DNI    HPI & HOSPITAL COURSE  This is a 86 y.o. female here with mechanical fall and injury.  Patient was noticed to have worsening signs of lower extremity edema.  Of note patient does have chronic diastolic CHF.  Patient does have chronic lower extremity edema.  Patient said her fluid status has been off recently.  Patient is on outpatient diuretics.  Patient was noticed to have severe hyponatremia and hypokalemia.  Patient was then put on IV diuretics and potassium supplement.  Patient tolerated treatment and patient's lower extremity edema significantly improved.  Patient's potassium also replaced and currently significantly higher compared to admission level.  Patient wants to be discharged home.  Patient has no respiratory distress or chest pain.  Patient does have good follow-up.  Encourage patient to follow-up with PCP and post discharge clinic.  Patient currently condition stable and ready to be discharged home.  Regarding to patient a mechanical fall, patient's CT head did not show intracranial hemorrhage, CT hip and pelvis, CT C-spine are negative for injury.  Patient currently stable and ready to be discharged home.       Therefore, she is discharged in good and stable condition to home with close outpatient follow-up.    The patient recovered much more quickly than anticipated on admission.    Discharge Date  8/11/2018    FOLLOW UP ITEMS POST DISCHARGE  none    DISCHARGE DIAGNOSES      Acute on chronic  diastolic (congestive) heart failure (HCC) POA: Yes    Chronic atrial fibrillation (HCC) POA: Yes    Hypothyroidism POA: Yes    Warfarin anticoagulation POA: Yes    Moderate to severe pulmonary hypertension (HCC) POA: Yes    Hyponatremia POA: Yes    Severe tricuspid regurgitation POA: Yes    Hypokalemia POA: Unknown    Fall POA: Unknown    DNR (do not resuscitate) POA: Unknown      FOLLOW UP  Future Appointments  Date Time Provider Department Center   8/14/2018 To Be Determined LYNNE Morales [facility] None   8/14/2018 To Be Determined Elizabeth M. Rzasa, R.N. [facility] None   8/16/2018 To Be Determined Sada Meade C.N.A. [facility] None   8/17/2018 To Be Determined Elizabeth M. Rzasa, R.N. [facility] None   8/17/2018 1:20 PM Aldo Wang M.D. [facility]   8/20/2018 4:20 PM ELIZABETH [facility] None   8/21/2018 To Be Determined Elizabeth M. Rzasa, R.N. [facility] None   8/24/2018 To Be Determined Elizabeth M. Rzasa, R.N. [facility] None   8/27/2018 12:40 PM ELIZABETH Patel [facility]   8/28/2018 To Be Determined Elizabeth M. Rzasa, R.N. [facility] None   8/31/2018 To Be Determined Elizabeth M. Rzasa, R.N. [facility] None   9/4/2018 11:20 AM Krunal Atkinson M.D. [facility] None   9/4/2018 To Be Determined Elizabeth M. Rzasa, R.N. [facility] None   9/7/2018 To Be Determined Elizabeth M. Rzasa, R.N. [facility] None   9/11/2018 To Be Determined Elizabeth M. Rzasa, R.N. [facility] None   9/14/2018 To Be Determined Elizabeth M. Rzasa, R.N. [facility] None   9/18/2018 To Be Determined Elizabeth M. Rzasa, R.N. [facility] None   9/21/2018 To Be Determined Elizabeth M. Rzasa, R.N. [facility] None   9/25/2018 To Be Determined Elizabeth Moncada R.N. [facility] None   9/28/2018 To Be Determined Elizabeth Moncada R.N. [facility] None   10/17/2018 2:20 PM Michael J Bloch, M.D. [facility] None   10/31/2018 9:45 AM PULMONARY DX 1 [address]   10/31/2018 10:00 AM PFT-RM1 PULM None   10/31/2018 11:00 AM A Rotation PULM None   11/27/2018 10:00 AM PACER CHECK-CAM B [facility] None     No follow-up provider specified.    MEDICATIONS ON DISCHARGE     Medication  List      CHANGE how you take these medications      Instructions   potassium chloride SA 20 MEQ Tbcr  What changed:  additional instructions  Commonly known as:  Kdur   Take 1 Tab by mouth 3 times a day. Take 2 tabs twice a day for 2 days then back to 1 tab three times a day  Dose:  20 mEq        CONTINUE taking these medications      Instructions   acetaminophen 500 MG Tabs  Commonly known as:  TYLENOL   Take 1,000 mg by mouth 1 time daily as needed for Mild Pain.  Dose:  1000 mg     allopurinol 100 MG Tabs  Commonly known as:  ZYLOPRIM   Take 2 Tabs by mouth every evening.  Dose:  200 mg     ascorbic acid 500 MG tablet  Commonly known as:  VITAMIN C   Take 1 Tab by mouth every day.  Dose:  500 mg     bumetanide 2 MG tablet  Commonly known as:  BUMEX   Take 1 Tab by mouth 2 Times a Day for 30 days.  Dose:  2 mg     Calcium-Vitamin D 500-125 MG-UNIT Tabs   Take 1 tablet by mouth every evening. 600mg - 400 iu  Dose:  1 tablet     clopidogrel 75 MG Tabs  Commonly known as:  PLAVIX   Take 1 Tab by mouth every day.  Dose:  75 mg     * cyanocobalamin 1000 MCG Tabs  Commonly known as:  VITAMIN B12   Take 1 Tab by mouth every day.  Dose:  1000 mcg     * cyanocobalamin 1000 MCG/ML Soln  Commonly known as:  VITAMIN B-12   1 mL by Intramuscular route every 30 days for 30 days.  Dose:  1000 mcg     ferrous gluconate 324 (38 Fe) MG Tabs  Commonly known as:  FERGON   Take 1 Tab by mouth 3 times a day, with meals for 30 days.  Dose:  324 mg     gabapentin 300 MG Caps  Commonly known as:  NEURONTIN   Take 1,200 mg by mouth every bedtime.  Dose:  1200 mg     levothyroxine 50 MCG Tabs  Commonly known as:  SYNTHROID   Take 1 Tab by mouth Every morning on an empty stomach.  Dose:  50 mcg     metOLazone 2.5 MG Tabs  Commonly known as:  ZAROXOLYN   Take 1 Tab by mouth every day.  Dose:  2.5 mg     metoprolol SR 25 MG Tb24  Commonly known as:  TOPROL XL   Take 25 mg by mouth every morning.  Dose:  25 mg     omeprazole 20 MG  delayed-release capsule  Commonly known as:  PRILOSEC   Take 20 mg by mouth every morning.  Dose:  20 mg     rosuvastatin 10 MG Tabs  Commonly known as:  CRESTOR   Take 1 Tab by mouth every evening.  Dose:  10 mg     sertraline 50 MG Tabs  Commonly known as:  ZOLOFT   Take 25 mg by mouth every bedtime.  Dose:  25 mg     topiramate 25 MG Tabs  Commonly known as:  TOPAMAX   Take 1 Tab by mouth 2 times a day.  Dose:  25 mg     warfarin 2.5 MG Tabs  Commonly known as:  COUMADIN   Take 2.5-3.75 mg by mouth every evening. 2.5 mg on Mon/Wed 3.75 all other days  Dose:  2.5-3.75 mg        * This list has 2 medication(s) that are the same as other medications prescribed for you. Read the directions carefully, and ask your doctor or other care provider to review them with you.                Allergies  Allergies   Allergen Reactions   • Codeine Vomiting       DIET  Orders Placed This Encounter   Procedures   • Diet Order Regular     Standing Status:   Standing     Number of Occurrences:   1     Order Specific Question:   Diet:     Answer:   Regular [1]       ACTIVITY  As tolerated.  Weight bearing as tolerated    CONSULTATIONS  none    PROCEDURES  none    CT-PELVIS W/O PLUS RECONS   Final Result      1.  No hip or pelvic fracture.   2.  Mild degenerative change of both hips.   3.  Diffuse body wall edema.      CT-CSPINE WITHOUT PLUS RECONS   Final Result      1.  No cervical spine fracture.   2.  Mild degenerative change with associated mild anterolisthesis at C3-4, as seen previously.      CT-HEAD W/O   Final Result      1.  Diffuse atrophy and white matter changes.   2.  No acute intracranial hemorrhage or territorial infarct.   3.  Chronic RIGHT maxillary sinusitis.      DX-HIP-UNILATERAL-WITH PELVIS-1 VIEW RIGHT   Final Result      1.  No RIGHT hip or pelvic fracture.   2.  Diffuse osteopenia.      DX-CHEST-LIMITED (1 VIEW)   Final Result      1.  Stable cardiomegaly.   2.  No pneumonia or overt pulmonary edema.           PE:  Gen: AAOx3, NAD  Eyes: PELLA  Neck: no JVD, no lymphadenopathy  Cardia: paced  no mrg  Lungs: CTAB, no rales, rhonci or wheezing  Abd: NABS, soft, non extended, no mass  EXT: 1+ edema, peripheral pulse 2+ b/l  Neuro: CN II-XII intact, non focal, reflex 2+ symmetrical  Skin: Intact, no lesion, warm  Psych: Appropriate.    LABORATORY  Lab Results   Component Value Date    SODIUM 130 (L) 08/11/2018    POTASSIUM 3.3 (L) 08/11/2018    CHLORIDE 85 (L) 08/11/2018    CO2 36 (H) 08/11/2018    GLUCOSE 108 (H) 08/11/2018    BUN 35 (H) 08/11/2018    CREATININE 1.46 (H) 08/11/2018        Lab Results   Component Value Date    WBC 2.8 (L) 08/10/2018    HEMOGLOBIN 8.5 (L) 08/10/2018    HEMATOCRIT 27.0 (L) 08/10/2018    PLATELETCT 114 (L) 08/10/2018        Total time of the discharge process exceeds 35 minutes.

## 2018-08-11 NOTE — PROGRESS NOTES
Tomeka from Lab called with critical result of potassium at 2.7. Critical lab result read back to Fiordaliza.   Dr. Radford notified of critical lab result at 0100.  Critical lab result read back by Dr. Radford.

## 2018-08-11 NOTE — PROGRESS NOTES
2 RN skin check completed with Dmitri BARNHART:    Bruising on the left forehead.   Bruising around the left eye.   Bruising on the bilateral anterior shoulder blades.   Dry, flakiness on bilateral LE.   Generalized bruising on bilateral UE.   Bottom red, blanching.

## 2018-08-11 NOTE — H&P
DATE OF ADMISSION:  08/10/2018.    IDENTIFICATION:  An 86-year-old female.    DATE OF SERVICE:  08/10/2018.    PRIMARY CARE PROVIDER:  ORALIA Patel.    CARDIOLOGY:  Dr. Simms.    She also sees a nephrologist.    CHIEF COMPLAINT:  Fall and weakness at approximately 4:00 in the morning.  She   was unable to get up and complaining of right hip pain.    HISTORY OF PRESENT ILLNESS:  An 86-year-old female.  She was recently admitted   and treated for congestive heart failure and fluid overload.  The patient had   a recent angiography and stent placed.  She has a chronic pacemaker.  The   patient has been sent home recently with significant diuresis to volume   reduce.  She got up this morning and tripped and fell.  She was unable to get   up.  Finally, was found by her daughter about 10:30 in the morning and brought   to the hospital because she was unable to mobilize.  She was complaining of   right hip pain.  She fell and bruised her left forehead as well, bilateral   shoulders.  The patient is seen in the emergency room by the ER physician, Dr. Denis Johansen.  She was found with significant hyponatremia and   hypokalemia and generalized weak, still with volume overload to be admitted   for further evaluation and treatment.  The patient has a significant   complicated medical history.  She is chronically on Coumadin for paroxysmal   atrial fibrillation.  She might have developed myelodysplastic syndrome with   some pancytopenia.  The patient at this time is admitted for further   evaluation, treatment, electrolytes replenishment and further determination of   her status.    ALLERGIES:  TO CODEINE.    MEDICATIONS:  Regimen on admission as follows:  1.  Tylenol 500 mg 2 tablets p.r.n.  2.  Allopurinol 2 tablets 100 mg daily.  3.  Vitamin C 500 mg daily.  4.  Bumex 2 mg p.o. b.i.d.  5.  Calcium and vitamin D 1 tablet daily.  6.  Plavix 75 mg daily.  7.  Vitamin B12 1000 mcg daily.  8.  Fergon 324 mg p.o.  t.i.d.  9.  Neurontin 300 mg where she takes 4 tablets at bedtime.  10.  Synthroid 50 mcg daily.  11.  Zaroxolyn 2.5 mg p.o. daily.  12.  Toprol-XL 25 mg daily.  13.  Prilosec 20 mg daily.  14.  Potassium chloride 1 tablet p.o. t.i.d.  15.  Crestor 10 mg daily.  16.  Zoloft 50 mg half tablet at bedtime.  17.  Topamax 25 mg b.i.d.  18.  Coumadin where she takes 2.5 mg Monday, Wednesday, 3.75 mg all other   days.    PAST MEDICAL HISTORY:  1.  History of recent angioplasty to the LAD stent placement with coronary   artery disease.  2.  Paroxysmal atrial fibrillation.  3.  History of DVT.  4.  Emphysema.  5.  Hypertension.  6.  Chronic edema.  7.  Hypothyroidism.  8.  Congestive heart failure history.  9.  GERD.  10.  Dyslipidemia.  11.  Valvular heart disease.  12.  History of gout.  13.  Pancytopenia, question of developing MDS.  14.  Chronic anticoagulation.    PAST SURGICAL HISTORY:  1.  History of pacemaker placement.  2.  History of stent placement.  3.  History of left breast cancer surgery.  4.  History of hysterectomy.  5.  History of cholecystectomy.    SOCIAL HISTORY:  The patient is an ex-smoker, she quit in 1970.  Alcohol none.    FAMILY HISTORY:  Positive for lung disease and cancer.    REVIEW OF SYSTEMS:  CONSTITUTIONAL:  She denies any fevers or chills or night sweats.  HEENT:  No headache.  She has a head injury with left ecchymosis.  She denies   visual changes, neck pain.  CARDIOVASCULAR:  Without chest pain, palpitation.  She does have lower   extremity edema, which is significant.  She denies orthopnea.  LUNGS:  Without cough or sputum production.  GASTROINTESTINAL:  No nausea, vomiting, diarrhea.  GENITOURINARY:  No difficulty with urination, no dysuria, hematuria.  MUSCULOSKELETAL:  Right-sided hip pain status post fall.  NEUROLOGIC:  Without focal weakness, numbness, or tingling.    PHYSICAL EXAMINATION:  VITAL SIGNS:  The patient is found to have temperature 36.1, pulse 60,   respirations 16,  blood pressure 107/60.  The patient is saturating 92% on low   flow oxygen.  GENERAL:  This is an elderly female in no acute distress, no acute respiratory   distress.  Well developed, well nourished.  HEENT:  Normocephalic.  She also have left ecchymosis over her left forehead.    Otherwise, no visible trauma.  Mucous membranes slightly dry.  NECK:  Stiff range of motion.  No lymphadenopathy or thyromegaly.  CHEST:  Normal bony structures.  LUNGS:  Clear to auscultation anteriorly.  HEART:  Regular rate.  S1, S2 appear normal.  Bilateral shoulders also with   ecchymosis.  ABDOMEN:  Protuberant.  There is no tenderness, no distention.  LUNGS:  Without wheezes, rales, or rhonchi.  There is some decreased breath   sounds at the bases.  GENITOURINARY:  Normal external female genitalia.  RECTAL:  Deferred.  EXTREMITIES:  No clubbing, cyanosis.  There is 1-2+ lower extremity pitting   edema.  NEUROLOGIC:  The patient is alert and oriented x4.  Cranial nerves II-XII are   grossly intact.  No sensory loss is elicited.    LABORATORY DATA AND IMAGING:  White cell count is 2.7, hemoglobin 8.6,   hematocrit 26.7, and platelet count 136.  Sodium is 123, potassium 2.5,   chloride 77, bicarb is 39.  Glucose is 97, BUN is 39, creatinine 1.58.  Her   alkaline phosphatase is 112.  Her magnesium is 2.7.  INR is 1.79.  TSH is   8.58.  CT of the C-spine is negative.  CT of the head is negative.  CT of the   pelvis without contrast with reconstruction shows no fracture.  There is some   diffuse edema is seen.    ASSESSMENT AND PLAN:  1.  Generalized weakness with fall, likely secondary to electrolyte imbalance   and aggressive diuresis, the patient will be attempted to be corrected in   terms of her sodium and potassium levels.  We will replace potassium   aggressively.  The patient needs to be further diuresed, so changed her Lasix   and give some salt supplementation.  Hopefully, correcting her sodium slowly.  2.  Hyperkalemia.   Again, replace aggressively.  3.  Anasarca edema with significant history of volume retention likely in   conjunction of a cardiac compromise with dilated right ventricle, severe   tricuspid regurgitation.  4.  Severe tricuspid regurgitation.  Certainly, the patient at this time is   somewhat failing therapy.  Consideration would be to refer back to cardiology,   if the patient would be a candidate for valve repair.  5.  Coronary artery disease with recent stent placement.  6.  Paroxysmal atrial fibrillation, continue with good rate control and   chronic anticoagulation.  7.  Pancytopenia with possibly developing myelodysplastic syndrome.  8.  Gastroesophageal reflux disease.  9.  Dyslipidemia.  10.  History of hypertension.  11.  History of hypothyroidism.  12.  History of gout.  13.  Pacemaker in place.  14.  Ecchymosis and fall.  15.  Leukopenia.  16.  Chronic renal failure.    OVERALL PLAN:  This is a medically complex, elderly female presenting with a   fall with severe weakness, electrolyte imbalance and possible progressive   cardiac failure with severe tricuspid regurg and right-sided ventricular   compromise.  The patient will be continued on diuresis.  We will attempt to   replace her and correct her electrolytes and reevaluate.  The patient might   benefit from a cardiac reevaluation at this point after admission in a   nonurgent fashion.  For full further details, please refer to the computer   system and paper chart.  The patient is significantly ill.  She will require   2+ overnight stay.       ____________________________________     MD ABHILASH MOONEY / KAREN    DD:  08/10/2018 21:11:57  DT:  08/10/2018 22:53:15    D#:  [id number]  Job#:  [id number]

## 2018-08-11 NOTE — PROGRESS NOTES
Discharge Summary    Educated patient on the change of potassium, up coming appointments, and s/s to look for when returning to the emergency room. Patient verbalized understanding. PIV removed with no s/s of bleeding or infection at site. Vitals WNL. Escorted patient downstairs via wheelchair for discharge home.

## 2018-08-11 NOTE — PROGRESS NOTES
Inpatient Anticoagulation Service Note    Date: 8/11/2018  Reason for Anticoagulation: Atrial Fibrillation   [id number] VASc Score: 6    Hemoglobin Value: (!) 8.5  Hematocrit Value: (!) 27  Lab Platelet Value: (!) 114  Target INR: 2.0 to 3.0    INR from last 7 days     Date/Time INR Value    08/10/18 2343 (!)  1.89    08/10/18 1159 (!)  1.79        Dose from last 7 days     Date/Time Dose (mg)    08/11/18 1400  3.75    08/10/18 2300  3.75        Average Dose (mg):  (home dose: 2.5 mg M/W, 3.75 mg AOD)  Significant Interactions: Clopidogrel, Thyroid Medications, Proton Pump Inhibitor, Statin (allopurinol)  Bridge Therapy: No     Assessment: No interval changes in overall clinical status.  Patient is being diuresed due to her severe TV regurg.  Once her K is improved she is OK to transfer.  Her INR is moving toward the therapeutic range.  She is on a Regular Diet.  No new DDI from baseline.      Plan:  Continue home warfarin regimen, scheduled to receive 3.75 mg tonight and follow up INR in the AM.  Education Material Provided?: No (chronic warfarin patient)  Pharmacist suggested discharge dosing: home warfarin regimen of 2.5 mg every Monday and Wednesday; 3.75 mg all other days of the week with a recommended follow up INR within 3 days of discharge.       Ida Joshi, Pharm.D., BCPS

## 2018-08-11 NOTE — DISCHARGE INSTRUCTIONS
Discharge Instructions    Discharged to home by car with relative. Discharged via wheelchair, hospital escort: Refused.  Special equipment needed: Not Applicable    Be sure to schedule a follow-up appointment with your primary care doctor or any specialists as instructed.     Discharge Plan:   Influenza Vaccine Indication: Not indicated: Previously immunized this influenza season and > 8 years of age    I understand that a diet low in cholesterol, fat, and sodium is recommended for good health. Unless I have been given specific instructions below for another diet, I accept this instruction as my diet prescription.   Other diet: regular    Special Instructions: None      Hypokalemia  Hypokalemia means that the amount of potassium in the blood is lower than normal. Potassium is a chemical that helps regulate the amount of fluid in the body (electrolyte). It also stimulates muscle tightening (contraction) and helps nerves work properly. Normally, most of the body’s potassium is inside of cells, and only a very small amount is in the blood. Because the amount in the blood is so small, minor changes to potassium levels in the blood can be life-threatening.  What are the causes?  This condition may be caused by:  · Antibiotic medicine.  · Diarrhea or vomiting. Taking too much of a medicine that helps you have a bowel movement (laxative) can cause diarrhea and lead to hypokalemia.  · Chronic kidney disease (CKD).  · Medicines that help the body get rid of excess fluid (diuretics).  · Eating disorders, such as bulimia.  · Low magnesium levels in the body.  · Sweating a lot.  What are the signs or symptoms?  Symptoms of this condition include:  · Weakness.  · Constipation.  · Fatigue.  · Muscle cramps.  · Mental confusion.  · Skipped heartbeats or irregular heartbeat (palpitations).  · Tingling or numbness.  How is this diagnosed?  This condition is diagnosed with a blood test.  How is this treated?  Hypokalemia can be  "treated by taking potassium supplements by mouth or adjusting the medicines that you take. Treatment may also include eating more foods that contain a lot of potassium. If your potassium level is very low, you may need to get potassium through an IV tube in one of your veins and be monitored in the hospital.  Follow these instructions at home:  · Take over-the-counter and prescription medicines only as told by your health care provider. This includes vitamins and supplements.  · Eat a healthy diet. A healthy diet includes fresh fruits and vegetables, whole grains, healthy fats, and lean proteins.  · If instructed, eat more foods that contain a lot of potassium, such as:  ¨ Nuts, such as peanuts and pistachios.  ¨ Seeds, such as sunflower seeds and pumpkin seeds.  ¨ Peas, lentils, and lima beans.  ¨ Whole grain and bran cereals and breads.  ¨ Fresh fruits and vegetables, such as apricots, avocado, bananas, cantaloupe, kiwi, oranges, tomatoes, asparagus, and potatoes.  ¨ Orange juice.  ¨ Tomato juice.  ¨ Red meats.  ¨ Yogurt.  · Keep all follow-up visits as told by your health care provider. This is important.  Contact a health care provider if:  · You have weakness that gets worse.  · You feel your heart pounding or racing.  · You vomit.  · You have diarrhea.  · You have diabetes (diabetes mellitus) and you have trouble keeping your blood sugar (glucose) in your target range.  Get help right away if:  · You have chest pain.  · You have shortness of breath.  · You have vomiting or diarrhea that lasts for more than 2 days.  · You faint.  This information is not intended to replace advice given to you by your health care provider. Make sure you discuss any questions you have with your health care provider.  Document Released: 12/18/2006 Document Revised: 08/05/2017 Document Reviewed: 08/05/2017  [employer] Interactive Patient Education © 2017 [employer]  Prevent Falls in Your Home    \"Falling once doubles your chance " "of falling again\"        -Center for Disease Control and Prevention    Falls in the home can lead to serious injury (fractures, brain injuries), hospitalizations, increased medical costs, and could even be fatal.  The good news is, there are many precautions you can take to avoid falls in your home and help keep you safe:     · If prescribed an assistive device (walker, crutches), use as instructed by the healthcare provider\"   · Remove any tripping hazards from your home, including loose cords, throw rugs and clutter  · Keep a nightlight on in dark (hallways, bathrooms, etc)   · Get up slowly, to make sure you feel okay before getting up  · Be aware of any side effects of your medications: some medications may make you dizzy  · Place a non-skid rubber mat in your shower or tub-consider a shower bench or chair if unsteady on your feet  · Wear supportive shoes or non-skid socks when moving around  · Start an exercise program once approved by your provider.  If you are feeling weak following a hospital stay, talk to your doctor about home health or outpatient therapy programs designed to help rebuild your strength and endurance      · Is patient discharged on Warfarin / Coumadin?   Yes    You are receiving the drug warfarin. Please understand the importance of monitoring warfarin with scheduled PT/INR blood draws.  Follow-up with a call to your personal Doctor's office in 3 days to schedule a PT/INR. .    IMPORTANT: HOW TO USE THIS INFORMATION:  This is a summary and does NOT have all possible information about this product. This information does not assure that this product is safe, effective, or appropriate for you. This information is not individual medical advice and does not substitute for the advice of your health care professional. Always ask your health care professional for complete information about this product and your specific health needs.      WARFARIN - ORAL (WARF-uh-rin)      COMMON BRAND NAME(S): " "Coumadin      WARNING:  Warfarin can cause very serious (possibly fatal) bleeding. This is more likely to occur when you first start taking this medication or if you take too much warfarin. To decrease your risk for bleeding, your doctor or other health care provider will monitor you closely and check your lab results (INR test) to make sure you are not taking too much warfarin. Keep all medical and laboratory appointments. Tell your doctor right away if you notice any signs of serious bleeding. See also Side Effects section.      USES:  This medication is used to treat blood clots (such as in deep vein thrombosis-DVT or pulmonary embolus-PE) and/or to prevent new clots from forming in your body. Preventing harmful blood clots helps to reduce the risk of a stroke or heart attack. Conditions that increase your risk of developing blood clots include a certain type of irregular heart rhythm (atrial fibrillation), heart valve replacement, recent heart attack, and certain surgeries (such as hip/knee replacement). Warfarin is commonly called a \"blood thinner,\" but the more correct term is \"anticoagulant.\" It helps to keep blood flowing smoothly in your body by decreasing the amount of certain substances (clotting proteins) in your blood.      HOW TO USE:  Read the Medication Guide provided by your pharmacist before you start taking warfarin and each time you get a refill. If you have any questions, ask your doctor or pharmacist. Take this medication by mouth with or without food as directed by your doctor or other health care professional, usually once a day. It is very important to take it exactly as directed. Do not increase the dose, take it more frequently, or stop using it unless directed by your doctor. Dosage is based on your medical condition, laboratory tests (such as INR), and response to treatment. Your doctor or other health care provider will monitor you closely while you are taking this medication to " determine the right dose for you. Use this medication regularly to get the most benefit from it. To help you remember, take it at the same time each day. It is important to eat a balanced, consistent diet while taking warfarin. Some foods can affect how warfarin works in your body and may affect your treatment and dose. Avoid sudden large increases or decreases in your intake of foods high in vitamin K (such as broccoli, cauliflower, cabbage, brussels sprouts, kale, spinach, and other green leafy vegetables, liver, green tea, certain vitamin supplements). If you are trying to lose weight, check with your doctor before you try to go on a diet. Cranberry products may also affect how your warfarin works. Limit the amount of cranberry juice (16 ounces/480 milliliters a day) or other cranberry products you may drink or eat.      SIDE EFFECTS:  Nausea, loss of appetite, or stomach/abdominal pain may occur. If any of these effects persist or worsen, tell your doctor or pharmacist promptly. Remember that your doctor has prescribed this medication because he or she has judged that the benefit to you is greater than the risk of side effects. Many people using this medication do not have serious side effects. This medication can cause serious bleeding if it affects your blood clotting proteins too much (shown by unusually high INR lab results). Even if your doctor stops your medication, this risk of bleeding can continue for up to a week. Tell your doctor right away if you have any signs of serious bleeding, including: unusual pain/swelling/discomfort, unusual/easy bruising, prolonged bleeding from cuts or gums, persistent/frequent nosebleeds, unusually heavy/prolonged menstrual flow, pink/dark urine, coughing up blood, vomit that is bloody or looks like coffee grounds, severe headache, dizziness/fainting, unusual or persistent tiredness/weakness, bloody/black/tarry stools, chest pain, shortness of breath, difficulty  swallowing. Tell your doctor right away if any of these unlikely but serious side effects occur: persistent nausea/vomiting, severe stomach/abdominal pain, yellowing eyes/skin. This drug rarely has caused very serious (possibly fatal) problems if its effects lead to small blood clots (usually at the beginning of treatment). This can lead to severe skin/tissue damage that may require surgery or amputation if left untreated. Patients with certain blood conditions (protein C or S deficiency) may be at greater risk. Get medical help right away if any of these rare but serious side effects occur: painful/red/purplish patches on the skin (such as on the toe, breast, abdomen), change in the amount of urine, vision changes, confusion, slurred speech, weakness on one side of the body. A very serious allergic reaction to this drug is rare. However, get medical help right away if you notice any symptoms of a serious allergic reaction, including: rash, itching/swelling (especially of the face/tongue/throat), severe dizziness, trouble breathing. This is not a complete list of possible side effects. If you notice other effects not listed above, contact your doctor or pharmacist. In the [place] - Call your doctor for medical advice about side effects. You may report side effects to FDA at 7-775-PUE-3434. In [place] - Call your doctor for medical advice about side effects. You may report side effects to [facility] [place] at [phone].      PRECAUTIONS:  Before taking warfarin, tell your doctor or pharmacist if you are allergic to it; or if you have any other allergies. This product may contain inactive ingredients, which can cause allergic reactions or other problems. Talk to your pharmacist for more details. Before using this medication, tell your doctor or pharmacist your medical history, especially of: blood disorders (such as anemia, hemophilia), bleeding problems (such as bleeding of the stomach/intestines, bleeding in the brain),  blood vessel disorders (such as aneurysms), recent major injury/surgery, liver disease, alcohol use, mental/mood disorders (including memory problems), frequent falls/injuries. It is important that all your doctors and dentists know that you take warfarin. Before having surgery or any medical/dental procedures, tell your doctor or dentist that you are taking this medication and about all the products you use (including prescription drugs, nonprescription drugs, and herbal products). Avoid getting injections into the muscles. If you must have an injection into a muscle (for example, a flu shot), it should be given in the arm. This way, it will be easier to check for bleeding and/or apply pressure bandages. This medication may cause stomach bleeding. Daily use of alcohol while using this medicine will increase your risk for stomach bleeding and may also affect how this medication works. Limit or avoid alcoholic beverages. If you have not been eating well, if you have an illness or infection that causes fever, vomiting, or diarrhea for more than 2 days, or if you start using any antibiotic medications, contact your doctor or pharmacist immediately because these conditions can affect how warfarin works. This medication can cause heavy bleeding. To lower the chance of getting cut, bruised, or injured, use great caution with sharp objects like safety razors and nail cutters. Use an electric razor when shaving and a soft toothbrush when brushing your teeth. Avoid activities such as contact sports. If you fall or injure yourself, especially if you hit your head, call your doctor immediately. Your doctor may need to check you. The Food & Drug Administration has stated that generic warfarin products are interchangeable. However, consult your doctor or pharmacist before switching warfarin products. Be careful not to take more than one medication that contains warfarin unless specifically directed by the doctor or health care  "provider who is monitoring your warfarin treatment. Older adults may be at greater risk for bleeding while using this drug. This medication is not recommended for use during pregnancy because of serious (possibly fatal) harm to an unborn baby. Discuss the use of reliable forms of birth control with your doctor. If you become pregnant or think you may be pregnant, tell your doctor immediately. If you are planning pregnancy, discuss a plan for managing your condition with your doctor before you become pregnant. Your doctor may switch the type of medication you use during pregnancy. Very small amounts of this medication may pass into breast milk but is unlikely to harm a nursing infant. Consult your doctor before breast-feeding.      DRUG INTERACTIONS:  Drug interactions may change how your medications work or increase your risk for serious side effects. This document does not contain all possible drug interactions. Keep a list of all the products you use (including prescription/nonprescription drugs and herbal products) and share it with your doctor and pharmacist. Do not start, stop, or change the dosage of any medicines without your doctor's approval. Warfarin interacts with many prescription, nonprescription, vitamin, and herbal products. This includes medications that are applied to the skin or inside the vagina or rectum. The interactions with warfarin usually result in an increase or decrease in the \"blood-thinning\" (anticoagulant) effect. Your doctor or other health care professional should closely monitor you to prevent serious bleeding or clotting problems. While taking warfarin, it is very important to tell your doctor or pharmacist of any changes in medications, vitamins, or herbal products that you are taking. Some products that may interact with this drug include: capecitabine, imatinib, mifepristone. Aspirin, aspirin-like drugs (salicylates), and nonsteroidal anti-inflammatory drugs (NSAIDs such as " ibuprofen, naproxen, celecoxib) may have effects similar to warfarin. These drugs may increase the risk of bleeding problems if taken during treatment with warfarin. Carefully check all prescription/nonprescription product labels (including drugs applied to the skin such as pain-relieving creams) since the products may contain NSAIDs or salicylates. Talk to your doctor about using a different medication (such as acetaminophen) to treat pain/fever. Low-dose aspirin and related drugs (such as clopidogrel, ticlopidine) should be continued if prescribed by your doctor for specific medical reasons such as heart attack or stroke prevention. Consult your doctor or pharmacist for more details. Many herbal products interact with warfarin. Tell your doctor before taking any herbal products, especially bromelains, coenzyme Q10, cranberry, danshen, dong quai, fenugreek, garlic, ginkgo biloba, ginseng, and Diego's wort, among others. This medication may interfere with a certain laboratory test to measure theophylline levels, possibly causing false test results. Make sure laboratory personnel and all your doctors know you use this drug.      OVERDOSE:  If overdose is suspected, contact a poison control center or emergency room immediately. [place] residents can call the [place] National Poison Hotline at 1-518.371.7499. [place] residents can call a provincial poison control center. Symptoms of overdose may include: bloody/black/tarry stools, pink/dark urine, unusual/prolonged bleeding.      NOTES:  Do not share this medication with others. Laboratory and/or medical tests (such as INR, complete blood count) must be performed periodically to monitor your progress or check for side effects. Consult your doctor for more details.      MISSED DOSE:  For the best possible benefit, do not miss any doses. If you do miss a dose and remember on the same day, take it as soon as you remember. If you remember on the next day, skip the missed dose and  resume your usual dosing schedule. Do not double the dose to catch up because this could increase your risk for bleeding. Keep a record of missed doses to give to your doctor or pharmacist. Contact your doctor or pharmacist if you miss 2 or more doses in a row.      STORAGE:  Store at room temperature away from light and moisture. Do not store in the bathroom. Keep all medications away from children and pets. Do not flush medications down the toilet or pour them into a drain unless instructed to do so. Properly discard this product when it is  or no longer needed. Consult your pharmacist or local waste disposal company for more details about how to safely discard your product.      MEDICAL ALERT:  Your condition and medication can cause complications in a medical emergency. For information about enrolling in MedicAlert, call 1-808.620.7467 ([place]) or 1-301.388.7382 ([place]).      Information last revised 2010 Copyright(c) 2010 [employer]             Depression / Suicide Risk    As you are discharged from this [facility] facility, it is important to learn how to keep safe from harming yourself.    Recognize the warning signs:  · Abrupt changes in personality, positive or negative- including increase in energy   · Giving away possessions  · Change in eating patterns- significant weight changes-  positive or negative  · Change in sleeping patterns- unable to sleep or sleeping all the time   · Unwillingness or inability to communicate  · Depression  · Unusual sadness, discouragement and loneliness  · Talk of wanting to die  · Neglect of personal appearance   · Rebelliousness- reckless behavior  · Withdrawal from people/activities they love  · Confusion- inability to concentrate     If you or a loved one observes any of these behaviors or has concerns about self-harm, here's what you can do:  · Talk about it- your feelings and reasons for harming yourself  · Remove any means that you might use to  hurt yourself (examples: pills, rope, extension cords, firearm)  · Get professional help from the community (Mental Health, Substance Abuse, psychological counseling)  · Do not be alone:Call your Safe Contact- someone whom you trust who will be there for you.  · Call your local CRISIS HOTLINE 173-9040 or 215-033-1483  · Call your local Children's Mobile Crisis Response Team [place] (621) 062-4634 or www.OOYYO  · Call the toll free National Suicide Prevention Hotlines   · National Suicide Prevention Lifeline 949-388-CZVA (0335)  · National Hope Line Network 800-SUICIDE (146-8252)

## 2018-08-11 NOTE — RESPIRATORY CARE
COPD EDUCATION by COPD CLINICAL EDUCATOR  8/11/2018 at 6:23 AM by Tequila Lewis     Patient reviewed by COPD education team. Patient does not qualify for COPD program.

## 2018-08-11 NOTE — PROGRESS NOTES
Inpatient Anticoagulation Service Note    Date: 8/10/2018  Reason for Anticoagulation: Atrial Fibrillation        Hemoglobin Value: (!) 8.6  Hematocrit Value: (!) 26.7  Lab Platelet Value: (!) 136  Target INR: 2.0 to 3.0    INR from last 7 days     Date/Time INR Value    08/10/18 1159 (!)  1.79        Dose from last 7 days     Date/Time Dose (mg)    08/10/18 2300  3.75        Average Dose (mg):  (home dose: 2.5 mg M/W, 3.75 mg AOD)  Significant Interactions: Clopidogrel, Thyroid Medications, Proton Pump Inhibitor, Statin  Bridge Therapy: No     Comments: Patient admitted s/p fall, found to be significantly hypokalemic and hyponatremic. Chronically anticoagulated for history of a fib. CT negative for head bleed. INR is slightly subtherapeutic on admission - unclear if patient has missed a dose recently. Will give home dose of warfarin tonight and re-assess tomorrow AM. No overt S/S bleeding noted. Will follow.    Plan:  3.75 mg tonight, INR tomorrw  Education Material Provided?: No (chronic warfarin patient )  Pharmacist suggested discharge dosing: likely home dose of 2.5 mg Mondays and Wednesdays, 3.75 mg all other days     Maisha Renae, MANISHAD

## 2018-08-11 NOTE — PROGRESS NOTES
Bedside report received from tata Zaman RN. Pt A&Ox4. No complaints of pain or SOB. RA. VSS. Placed on tele monitor. Paced at 73. POC discussed with pt. Pt verbalizes understanding. Call light and belongings within reach. Bed locked and in lowest position. Alarm and fall precautions in place.

## 2018-08-13 ENCOUNTER — APPOINTMENT (OUTPATIENT)
Dept: HEMATOLOGY ONCOLOGY | Facility: MEDICAL CENTER | Age: 83
End: 2018-08-13
Payer: MEDICARE

## 2018-08-13 ENCOUNTER — PATIENT OUTREACH (OUTPATIENT)
Dept: HEALTH INFORMATION MANAGEMENT | Facility: OTHER | Age: 83
End: 2018-08-13

## 2018-08-14 ENCOUNTER — APPOINTMENT (OUTPATIENT)
Dept: RADIOLOGY | Facility: MEDICAL CENTER | Age: 83
DRG: 690 | End: 2018-08-14
Attending: EMERGENCY MEDICINE
Payer: MEDICARE

## 2018-08-14 ENCOUNTER — HOME CARE VISIT (OUTPATIENT)
Dept: HOME HEALTH SERVICES | Facility: HOME HEALTHCARE | Age: 83
End: 2018-08-14
Payer: MEDICARE

## 2018-08-14 ENCOUNTER — HOSPITAL ENCOUNTER (INPATIENT)
Facility: MEDICAL CENTER | Age: 83
LOS: 6 days | DRG: 690 | End: 2018-08-20
Attending: EMERGENCY MEDICINE | Admitting: HOSPITALIST
Payer: MEDICARE

## 2018-08-14 VITALS
HEART RATE: 64 BPM | DIASTOLIC BLOOD PRESSURE: 57 MMHG | SYSTOLIC BLOOD PRESSURE: 97 MMHG | OXYGEN SATURATION: 94 % | RESPIRATION RATE: 18 BRPM | TEMPERATURE: 98.8 F

## 2018-08-14 DIAGNOSIS — D69.6 THROMBOCYTOPENIA (HCC): ICD-10-CM
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
## 2018-08-14 DIAGNOSIS — I50.30 ACC/AHA STAGE C HEART FAILURE WITH PRESERVED EJECTION FRACTION (HCC): ICD-10-CM

## 2018-08-14 DIAGNOSIS — D64.9 ANEMIA, UNSPECIFIED TYPE: ICD-10-CM

## 2018-08-14 DIAGNOSIS — I48.20 CHRONIC ATRIAL FIBRILLATION (HCC): ICD-10-CM

## 2018-08-14 DIAGNOSIS — I50.9 HEART FAILURE, NYHA CLASS 3 (HCC): ICD-10-CM

## 2018-08-14 DIAGNOSIS — J43.1 PANLOBULAR EMPHYSEMA (HCC): ICD-10-CM

## 2018-08-14 DIAGNOSIS — R53.1 GENERALIZED WEAKNESS: ICD-10-CM

## 2018-08-14 DIAGNOSIS — E87.6 HYPOKALEMIA: ICD-10-CM

## 2018-08-14 DIAGNOSIS — N18.30 CKD (CHRONIC KIDNEY DISEASE) STAGE 3, GFR 30-59 ML/MIN (HCC): ICD-10-CM

## 2018-08-14 DIAGNOSIS — D72.819 LEUKOPENIA, UNSPECIFIED TYPE: ICD-10-CM

## 2018-08-14 DIAGNOSIS — N18.9 CHRONIC RENAL IMPAIRMENT, UNSPECIFIED CKD STAGE: ICD-10-CM

## 2018-08-14 PROBLEM — R06.09 DOE (DYSPNEA ON EXERTION): Status: RESOLVED | Noted: 2018-05-04 | Resolved: 2018-08-14

## 2018-08-14 PROBLEM — N30.00 ACUTE CYSTITIS: Status: ACTIVE | Noted: 2018-08-14

## 2018-08-14 PROBLEM — F43.21 GRIEF: Status: RESOLVED | Noted: 2018-02-28 | Resolved: 2018-08-14

## 2018-08-14 LAB
ALBUMIN SERPL BCP-MCNC: 3.5 G/DL (ref 3.2–4.9)
ALBUMIN/GLOB SERPL: 1.3 G/DL
ALP SERPL-CCNC: 111 U/L (ref 30–99)
ALT SERPL-CCNC: 6 U/L (ref 2–50)
ANION GAP SERPL CALC-SCNC: 10 MMOL/L (ref 0–11.9)
ANISOCYTOSIS BLD QL SMEAR: ABNORMAL
APPEARANCE UR: CLEAR
APTT PPP: 44.7 SEC (ref 24.7–36)
AST SERPL-CCNC: 22 U/L (ref 12–45)
BACTERIA #/AREA URNS HPF: ABNORMAL /HPF
BASOPHILS # BLD AUTO: 0.9 % (ref 0–1.8)
BASOPHILS # BLD: 0.02 K/UL (ref 0–0.12)
BILIRUB SERPL-MCNC: 1.2 MG/DL (ref 0.1–1.5)
BILIRUB UR QL STRIP.AUTO: NEGATIVE
BUN SERPL-MCNC: 38 MG/DL (ref 8–22)
CALCIUM SERPL-MCNC: 9.2 MG/DL (ref 8.5–10.5)
CHLORIDE SERPL-SCNC: 85 MMOL/L (ref 96–112)
CK SERPL-CCNC: 43 U/L (ref 0–154)
CO2 SERPL-SCNC: 38 MMOL/L (ref 20–33)
COLOR UR: YELLOW
CREAT SERPL-MCNC: 1.67 MG/DL (ref 0.5–1.4)
EOSINOPHIL # BLD AUTO: 0.06 K/UL (ref 0–0.51)
EOSINOPHIL NFR BLD: 2.6 % (ref 0–6.9)
EPI CELLS #/AREA URNS HPF: NEGATIVE /HPF
ERYTHROCYTE [DISTWIDTH] IN BLOOD BY AUTOMATED COUNT: 65.3 FL (ref 35.9–50)
GLOBULIN SER CALC-MCNC: 2.6 G/DL (ref 1.9–3.5)
GLUCOSE SERPL-MCNC: 97 MG/DL (ref 65–99)
GLUCOSE UR STRIP.AUTO-MCNC: NEGATIVE MG/DL
HCT VFR BLD AUTO: 28.9 % (ref 37–47)
HGB BLD-MCNC: 9.2 G/DL (ref 12–16)
HYALINE CASTS #/AREA URNS LPF: ABNORMAL /LPF
HYPOCHROMIA BLD QL SMEAR: ABNORMAL
INR PPP: 2.37 (ref 0.87–1.13)
KETONES UR STRIP.AUTO-MCNC: NEGATIVE MG/DL
LEUKOCYTE ESTERASE UR QL STRIP.AUTO: ABNORMAL
LYMPHOCYTES # BLD AUTO: 0.77 K/UL (ref 1–4.8)
LYMPHOCYTES NFR BLD: 33.6 % (ref 22–41)
MACROCYTES BLD QL SMEAR: ABNORMAL
MANUAL DIFF BLD: NORMAL
MCH RBC QN AUTO: 30 PG (ref 27–33)
MCHC RBC AUTO-ENTMCNC: 31.8 G/DL (ref 33.6–35)
MCV RBC AUTO: 94.1 FL (ref 81.4–97.8)
MICRO URNS: ABNORMAL
MONOCYTES # BLD AUTO: 0.22 K/UL (ref 0–0.85)
MONOCYTES NFR BLD AUTO: 9.5 % (ref 0–13.4)
MORPHOLOGY BLD-IMP: NORMAL
NEUTROPHILS # BLD AUTO: 1.23 K/UL (ref 2–7.15)
NEUTROPHILS NFR BLD: 53.4 % (ref 44–72)
NITRITE UR QL STRIP.AUTO: POSITIVE
NRBC # BLD AUTO: 0 K/UL
NRBC BLD-RTO: 0 /100 WBC
PH UR STRIP.AUTO: 8 [PH]
PLATELET # BLD AUTO: 122 K/UL (ref 164–446)
PLATELET BLD QL SMEAR: NORMAL
PMV BLD AUTO: 10.5 FL (ref 9–12.9)
POTASSIUM SERPL-SCNC: 2.4 MMOL/L (ref 3.6–5.5)
PROT SERPL-MCNC: 6.1 G/DL (ref 6–8.2)
PROT UR QL STRIP: NEGATIVE MG/DL
PROTHROMBIN TIME: 25.6 SEC (ref 12–14.6)
RBC # BLD AUTO: 3.07 M/UL (ref 4.2–5.4)
RBC # URNS HPF: ABNORMAL /HPF
RBC BLD AUTO: PRESENT
RBC UR QL AUTO: NEGATIVE
SODIUM SERPL-SCNC: 133 MMOL/L (ref 135–145)
SP GR UR STRIP.AUTO: 1.01
UROBILINOGEN UR STRIP.AUTO-MCNC: 1 MG/DL
WBC # BLD AUTO: 2.3 K/UL (ref 4.8–10.8)
WBC #/AREA URNS HPF: ABNORMAL /HPF

## 2018-08-14 PROCEDURE — 770020 HCHG ROOM/CARE - TELE (206)

## 2018-08-14 PROCEDURE — 73521 X-RAY EXAM HIPS BI 2 VIEWS: CPT

## 2018-08-14 PROCEDURE — 700105 HCHG RX REV CODE 258

## 2018-08-14 PROCEDURE — G0155 HHCP-SVS OF CSW,EA 15 MIN: HCPCS

## 2018-08-14 PROCEDURE — 81001 URINALYSIS AUTO W/SCOPE: CPT

## 2018-08-14 PROCEDURE — 36415 COLL VENOUS BLD VENIPUNCTURE: CPT

## 2018-08-14 PROCEDURE — 85027 COMPLETE CBC AUTOMATED: CPT

## 2018-08-14 PROCEDURE — 99221 1ST HOSP IP/OBS SF/LOW 40: CPT | Mod: AI | Performed by: HOSPITALIST

## 2018-08-14 PROCEDURE — 87077 CULTURE AEROBIC IDENTIFY: CPT

## 2018-08-14 PROCEDURE — 85610 PROTHROMBIN TIME: CPT

## 2018-08-14 PROCEDURE — 82550 ASSAY OF CK (CPK): CPT

## 2018-08-14 PROCEDURE — 304561 HCHG STAT O2

## 2018-08-14 PROCEDURE — 87186 SC STD MICRODIL/AGAR DIL: CPT

## 2018-08-14 PROCEDURE — 700105 HCHG RX REV CODE 258: Performed by: HOSPITALIST

## 2018-08-14 PROCEDURE — 99285 EMERGENCY DEPT VISIT HI MDM: CPT

## 2018-08-14 PROCEDURE — 700102 HCHG RX REV CODE 250 W/ 637 OVERRIDE(OP): Performed by: HOSPITALIST

## 2018-08-14 PROCEDURE — 80053 COMPREHEN METABOLIC PANEL: CPT

## 2018-08-14 PROCEDURE — G0299 HHS/HOSPICE OF RN EA 15 MIN: HCPCS

## 2018-08-14 PROCEDURE — G0179 MD RECERTIFICATION HHA PT: HCPCS | Performed by: FAMILY MEDICINE

## 2018-08-14 PROCEDURE — 85730 THROMBOPLASTIN TIME PARTIAL: CPT

## 2018-08-14 PROCEDURE — A9270 NON-COVERED ITEM OR SERVICE: HCPCS | Performed by: HOSPITALIST

## 2018-08-14 PROCEDURE — 85007 BL SMEAR W/DIFF WBC COUNT: CPT

## 2018-08-14 PROCEDURE — 700111 HCHG RX REV CODE 636 W/ 250 OVERRIDE (IP): Performed by: HOSPITALIST

## 2018-08-14 PROCEDURE — 87086 URINE CULTURE/COLONY COUNT: CPT

## 2018-08-14 RX ORDER — BISACODYL 10 MG
10 SUPPOSITORY, RECTAL RECTAL
Status: DISCONTINUED | OUTPATIENT
Start: 2018-08-14 | End: 2018-08-20 | Stop reason: HOSPADM

## 2018-08-14 RX ORDER — AMOXICILLIN 250 MG
2 CAPSULE ORAL 2 TIMES DAILY
Status: DISCONTINUED | OUTPATIENT
Start: 2018-08-14 | End: 2018-08-20 | Stop reason: HOSPADM

## 2018-08-14 RX ORDER — TOPIRAMATE 25 MG/1
25 TABLET ORAL 2 TIMES DAILY
Status: DISCONTINUED | OUTPATIENT
Start: 2018-08-14 | End: 2018-08-20 | Stop reason: HOSPADM

## 2018-08-14 RX ORDER — METOPROLOL SUCCINATE 25 MG/1
25 TABLET, EXTENDED RELEASE ORAL EVERY MORNING
Status: DISCONTINUED | OUTPATIENT
Start: 2018-08-14 | End: 2018-08-20 | Stop reason: HOSPADM

## 2018-08-14 RX ORDER — WARFARIN SODIUM 2.5 MG/1
2.5 TABLET ORAL
Status: DISCONTINUED | OUTPATIENT
Start: 2018-08-15 | End: 2018-08-16

## 2018-08-14 RX ORDER — POLYETHYLENE GLYCOL 3350 17 G/17G
1 POWDER, FOR SOLUTION ORAL
Status: DISCONTINUED | OUTPATIENT
Start: 2018-08-14 | End: 2018-08-20 | Stop reason: HOSPADM

## 2018-08-14 RX ORDER — ALLOPURINOL 100 MG/1
100 TABLET ORAL 2 TIMES DAILY
Status: DISCONTINUED | OUTPATIENT
Start: 2018-08-14 | End: 2018-08-20 | Stop reason: HOSPADM

## 2018-08-14 RX ORDER — CLOPIDOGREL BISULFATE 75 MG/1
75 TABLET ORAL DAILY
Status: DISCONTINUED | OUTPATIENT
Start: 2018-08-14 | End: 2018-08-20 | Stop reason: HOSPADM

## 2018-08-14 RX ORDER — WARFARIN SODIUM 7.5 MG/1
3.75 TABLET ORAL
Status: DISCONTINUED | OUTPATIENT
Start: 2018-08-14 | End: 2018-08-16

## 2018-08-14 RX ORDER — GABAPENTIN 400 MG/1
1200 CAPSULE ORAL EVERY EVENING
Status: DISCONTINUED | OUTPATIENT
Start: 2018-08-14 | End: 2018-08-15

## 2018-08-14 RX ORDER — SODIUM CHLORIDE 9 MG/ML
INJECTION, SOLUTION INTRAVENOUS
Status: COMPLETED
Start: 2018-08-14 | End: 2018-08-14

## 2018-08-14 RX ORDER — LEVOTHYROXINE SODIUM 0.05 MG/1
50 TABLET ORAL
Status: DISCONTINUED | OUTPATIENT
Start: 2018-08-14 | End: 2018-08-20 | Stop reason: HOSPADM

## 2018-08-14 RX ORDER — ACETAMINOPHEN 325 MG/1
650 TABLET ORAL EVERY 6 HOURS PRN
Status: DISCONTINUED | OUTPATIENT
Start: 2018-08-14 | End: 2018-08-20 | Stop reason: HOSPADM

## 2018-08-14 RX ORDER — ALLOPURINOL 100 MG/1
100 TABLET ORAL 2 TIMES DAILY
Status: ON HOLD | COMMUNITY
End: 2018-08-20

## 2018-08-14 RX ADMIN — CEFTRIAXONE SODIUM 2 G: 2 INJECTION, POWDER, FOR SOLUTION INTRAMUSCULAR; INTRAVENOUS at 17:46

## 2018-08-14 RX ADMIN — SERTRALINE 25 MG: 50 TABLET, FILM COATED ORAL at 21:06

## 2018-08-14 RX ADMIN — GABAPENTIN 1200 MG: 400 CAPSULE ORAL at 17:46

## 2018-08-14 RX ADMIN — CLOPIDOGREL 75 MG: 75 TABLET, FILM COATED ORAL at 17:46

## 2018-08-14 RX ADMIN — ALLOPURINOL 100 MG: 100 TABLET ORAL at 17:46

## 2018-08-14 RX ADMIN — TOPIRAMATE 25 MG: 25 TABLET, FILM COATED ORAL at 17:46

## 2018-08-14 RX ADMIN — SODIUM CHLORIDE 250 ML: 9 INJECTION, SOLUTION INTRAVENOUS at 17:42

## 2018-08-14 RX ADMIN — WARFARIN SODIUM 3.75 MG: 7.5 TABLET ORAL at 18:05

## 2018-08-14 RX ADMIN — POTASSIUM BICARBONATE 50 MEQ: 25 TABLET, EFFERVESCENT ORAL at 17:59

## 2018-08-14 ASSESSMENT — ENCOUNTER SYMPTOMS
CHILLS: 0
VOMITING: DENIES
FEVER: 0
SHORTNESS OF BREATH: 0
NAUSEA: DENIES
NAUSEA: 0
SHORTNESS OF BREATH: T

## 2018-08-14 ASSESSMENT — LIFESTYLE VARIABLES
HAVE YOU EVER FELT YOU SHOULD CUT DOWN ON YOUR DRINKING: NO
TOTAL SCORE: 0
EVER_SMOKED: YES
EVER FELT BAD OR GUILTY ABOUT YOUR DRINKING: NO
TOTAL SCORE: 0
ALCOHOL_USE: YES
HOW MANY TIMES IN THE PAST YEAR HAVE YOU HAD 5 OR MORE DRINKS IN A DAY: 0
AVERAGE NUMBER OF DAYS PER WEEK YOU HAVE A DRINK CONTAINING ALCOHOL: 1
ON A TYPICAL DAY WHEN YOU DRINK ALCOHOL HOW MANY DRINKS DO YOU HAVE: 1
CONSUMPTION TOTAL: NEGATIVE
HAVE PEOPLE ANNOYED YOU BY CRITICIZING YOUR DRINKING: NO
EVER HAD A DRINK FIRST THING IN THE MORNING TO STEADY YOUR NERVES TO GET RID OF A HANGOVER: NO
TOTAL SCORE: 0

## 2018-08-14 ASSESSMENT — PAIN SCALES - GENERAL
PAINLEVEL_OUTOF10: 0
PAINLEVEL_OUTOF10: 0

## 2018-08-14 ASSESSMENT — COPD QUESTIONNAIRES
HAVE YOU SMOKED AT LEAST 100 CIGARETTES IN YOUR ENTIRE LIFE: YES
DURING THE PAST 4 WEEKS HOW MUCH DID YOU FEEL SHORT OF BREATH: SOME OF THE TIME
IN THE PAST 12 MONTHS DO YOU DO LESS THAN YOU USED TO BECAUSE OF YOUR BREATHING PROBLEMS: DISAGREE/UNSURE
COPD SCREENING SCORE: 5
DO YOU EVER COUGH UP ANY MUCUS OR PHLEGM?: NO/ONLY WITH OCCASIONAL COLDS OR INFECTIONS

## 2018-08-14 NOTE — ED PROVIDER NOTES
ED Provider Note    CHIEF COMPLAINT  Chief Complaint   Patient presents with   • Fall     Patient was visited by home health and found on floor. Patient was on paige for couple hours. Pt states she had just lost her balance and fell and was to weak to get up. Pt has hx of CHF and kidney disease. Pt denies cp and sob at this time. No LOC. VSS.       HPI  Andree Egan is a 86 y.o. female who presents for evaluation after ground-level fall.  Patient states that she was home walking with her walker today when she lost her balance and fell backwards.  She states that she was too weak to get up.  Home health evidently found her on the floor.  She states that she been on the floor for a couple of hours.  She denies any chest pain or shortness of breath.  She states she has been having more falls recently and in fact has ecchymosis to the left side of her head and right shoulder region from recent falls.  She denies numbness or focal motor weakness.  She is having some bilateral hip discomfort.  She denies any loss of consciousness.    REVIEW OF SYSTEMS  See HPI for further details. All other systems negative.    PAST MEDICAL HISTORY  Past Medical History:   Diagnosis Date   • Arthritis     back/neck   • Atrial fibrillation (CMS-HCC) [I48.91] 3/29/2018   • Blood clotting disorder (HCC)    • Breath shortness     water retention   • Cancer (HCC)     breast   • Cataract    • Congestive heart failure (CHF) (HCC) 4/3/2018   • BERTRAND (dyspnea on exertion) 5/4/2018   • Heart burn    • Heart valve disease    • Hemorrhagic disorder (HCC)    • Hiatus hernia syndrome    • Hyperlipidemia    • Hypertension    • Kidney disease    • Long term current use of anticoagulant 3/29/2018   • Pacemaker 4/3/2018   • Pulmonary emphysema (HCC)    • Secondary hypertension 4/3/2018   • Thyroid disease    • Tremors of nervous system 4/3/2018   • Urinary incontinence    • Urinary tract infection        FAMILY HISTORY  Family History   Problem Relation  "Age of Onset   • Lung Disease Mother    • Cancer Mother    • No Known Problems Father    • Fainting Neg Hx        SOCIAL HISTORY  Social History     Social History   • Marital status: Single     Spouse name: N/A   • Number of children: N/A   • Years of education: N/A     Social History Main Topics   • Smoking status: Former Smoker     Packs/day: 0.50     Types: Cigarettes     Quit date: 1/1/1970   • Smokeless tobacco: Never Used   • Alcohol use 1.8 oz/week     3 Glasses of wine per week      Comment: 1-2 glasses wine before dinner   • Drug use: No   • Sexual activity: Not on file     Other Topics Concern   • Not on file     Social History Narrative   • No narrative on file       SURGICAL HISTORY  Past Surgical History:   Procedure Laterality Date   • OTHER      left breast mastectomy   • OTHER CARDIAC SURGERY      pacemaker placement       CURRENT MEDICATIONS  Home Medications    **Home medications have not yet been reviewed for this encounter**         ALLERGIES  Allergies   Allergen Reactions   • Codeine Vomiting       PHYSICAL EXAM  VITAL SIGNS: /56   Pulse 76   Temp 36.7 °C (98.1 °F)   Resp 18   Ht 1.6 m (5' 3\")   Wt 63 kg (138 lb 14.2 oz)   LMP  (LMP Unknown)   SpO2 88%   BMI 24.60 kg/m²   Constitutional: Well developed, Well nourished, No acute distress, Non-toxic appearance.   HENT: Normocephalic, subacute ecchymosis to left forehead and left temporal region.  Eyes:  EOMI, Conjunctiva normal, No discharge.   Neck: Normal range of motion.   Cardiovascular: Normal heart rate, Normal rhythm, No murmurs, No rubs, No gallops.   Thorax & Lungs: Clear to auscultation without wheezes, rales, or rhonchi. No chest tenderness.   Abdomen:  Soft, No tenderness, No masses, No pulsatile masses.   Skin: Warm, Dry.  Musculoskeletal: Upper extremities no obvious deformity and full range of motion.  She does have subacute ecchymosis to the right shoulder and upper arm region.  Bilateral lower extremities showed no " obvious deformity.  She has hip pain bilaterally with any range of motion.  No foreshortening.  Neurologic: Awake and alert.    RADIOLOGY/PROCEDURES  DX-HIP-BILATERAL-WITH PELVIS-2 VIEWS   Final Result      No radiographic evidence of acute traumatic injury.            COURSE & MEDICAL DECISION MAKING  Pertinent Labs & Imaging studies reviewed. (See chart for details)  This is an 86-year-old here for evaluation after a mechanical ground-level fall.  She was too weak to get herself off of the floor and laid there for 2 hours before her caregiver came by and found her and called an ambulance.  She had just been admitted to the hospital and discharged 4 days ago for weakness and a fall.  Today an IV is established and laboratories are obtained.  These include chemistries which are notable for a potassium of 2.4.  BUN of 38 and creatinine 1.67.  She is on diuretics for heart failure.  Total CPK is normal suggesting no muscle injury.  CBC shows a white count of 2.3 with a hemoglobin of 9.2 and a platelet count of 122 showing a pan cytopenia.  INR is 2.37.  Hip x-ray showed no evidence of acute traumatic injury.  I discussed the results of all the studies with the patient.  At this point multiple members of her family are here.  She will require admission for further evaluation and treatment.  I discussed case with Dr. Dean of the hospitalist service and he will be the primary admitting physician.    FINAL IMPRESSION  1.  Mechanical ground-level fall  2.  Hypokalemia  3.  Anemia  4.  Leukopenia  5.  Thrombocytopenia  6.  Patient required 35 minutes of critical care time         Electronically signed by: Tito Lowery, 8/14/2018 12:46 PM

## 2018-08-14 NOTE — ED NOTES
Med rec updated and complete.  Allergies reviewed. Met with family at bedside and  Dicussed current medications and last doses taken.  Pt goes to the [facility].  Recently in the hospital and discharged on Saturday.

## 2018-08-14 NOTE — ED NOTES
Break RN: pt assisted onto bedside commode w/ steady gait but visibly nervous about standing and requesting RN stay at the bedside while using commode. Urine sample collected and sent. Oxygen taken off w/ RA saturation 95%, Primary RN notified of this.

## 2018-08-14 NOTE — ED NOTES
Juliane from Lab called with critical result of 2.4 K+ at 1351. Critical lab result read back to Marina.    notified of critical lab result at 1351.  Critical lab result read back by Dr. Lowery.

## 2018-08-14 NOTE — ED NOTES
Gabriel from Lab called with critical result of WBC-2.3 at 1325. Critical lab result read back to BRONWYN Rick.   Dr. Lowery notified of critical lab result at 1330.  Critical lab result read back by  1330.

## 2018-08-14 NOTE — ED TRIAGE NOTES
s visited by home health and found on floor. Patient was on paige for couple hours. Pt states she had just lost her balance and fell and was to weak to get up. Pt has hx of CHF and kidney disease. Pt denies cp and sob at this time. No LOC. VSS.

## 2018-08-14 NOTE — ASSESSMENT & PLAN NOTE
+UA that is symptomatic  She is immunocompromised with chronically low WBC  Urine culture __> klebsiella sensitive to rocephin  Empiric IV rocephin x 3 days..completed    Repeat UA( 8/18) due to urinary frequency --> wnl

## 2018-08-15 ENCOUNTER — HOME CARE VISIT (OUTPATIENT)
Dept: HOME HEALTH SERVICES | Facility: HOME HEALTHCARE | Age: 83
End: 2018-08-15
Payer: MEDICARE

## 2018-08-15 ENCOUNTER — APPOINTMENT (OUTPATIENT)
Dept: RADIOLOGY | Facility: MEDICAL CENTER | Age: 83
DRG: 690 | End: 2018-08-15
Attending: HOSPITALIST
Payer: MEDICARE

## 2018-08-15 PROBLEM — R53.1 WEAKNESS: Status: ACTIVE | Noted: 2018-08-15

## 2018-08-15 LAB
ANION GAP SERPL CALC-SCNC: 10 MMOL/L (ref 0–11.9)
BASOPHILS # BLD AUTO: 0.9 % (ref 0–1.8)
BASOPHILS # BLD: 0.02 K/UL (ref 0–0.12)
BNP SERPL-MCNC: 415 PG/ML (ref 0–100)
BUN SERPL-MCNC: 36 MG/DL (ref 8–22)
CALCIUM SERPL-MCNC: 8.6 MG/DL (ref 8.5–10.5)
CHLORIDE SERPL-SCNC: 86 MMOL/L (ref 96–112)
CO2 SERPL-SCNC: 37 MMOL/L (ref 20–33)
CREAT SERPL-MCNC: 1.53 MG/DL (ref 0.5–1.4)
EOSINOPHIL # BLD AUTO: 0 K/UL (ref 0–0.51)
EOSINOPHIL NFR BLD: 0 % (ref 0–6.9)
ERYTHROCYTE [DISTWIDTH] IN BLOOD BY AUTOMATED COUNT: 65.9 FL (ref 35.9–50)
GLUCOSE SERPL-MCNC: 92 MG/DL (ref 65–99)
HCT VFR BLD AUTO: 28.8 % (ref 37–47)
HGB BLD-MCNC: 8.9 G/DL (ref 12–16)
IMM GRANULOCYTES # BLD AUTO: 0.01 K/UL (ref 0–0.11)
IMM GRANULOCYTES NFR BLD AUTO: 0.5 % (ref 0–0.9)
INR PPP: 2.62 (ref 0.87–1.13)
LYMPHOCYTES # BLD AUTO: 0.58 K/UL (ref 1–4.8)
LYMPHOCYTES NFR BLD: 26.5 % (ref 22–41)
MCH RBC QN AUTO: 29.2 PG (ref 27–33)
MCHC RBC AUTO-ENTMCNC: 30.9 G/DL (ref 33.6–35)
MCV RBC AUTO: 94.4 FL (ref 81.4–97.8)
MONOCYTES # BLD AUTO: 0.19 K/UL (ref 0–0.85)
MONOCYTES NFR BLD AUTO: 8.7 % (ref 0–13.4)
NEUTROPHILS # BLD AUTO: 1.39 K/UL (ref 2–7.15)
NEUTROPHILS NFR BLD: 63.4 % (ref 44–72)
NRBC # BLD AUTO: 0 K/UL
NRBC BLD-RTO: 0 /100 WBC
PLATELET # BLD AUTO: 118 K/UL (ref 164–446)
PMV BLD AUTO: 10.5 FL (ref 9–12.9)
POTASSIUM SERPL-SCNC: 2.7 MMOL/L (ref 3.6–5.5)
PROTHROMBIN TIME: 27.7 SEC (ref 12–14.6)
RBC # BLD AUTO: 3.05 M/UL (ref 4.2–5.4)
SODIUM SERPL-SCNC: 133 MMOL/L (ref 135–145)
WBC # BLD AUTO: 2.4 K/UL (ref 4.8–10.8)

## 2018-08-15 PROCEDURE — G8978 MOBILITY CURRENT STATUS: HCPCS | Mod: CJ

## 2018-08-15 PROCEDURE — 700111 HCHG RX REV CODE 636 W/ 250 OVERRIDE (IP): Performed by: HOSPITALIST

## 2018-08-15 PROCEDURE — 770020 HCHG ROOM/CARE - TELE (206)

## 2018-08-15 PROCEDURE — 700102 HCHG RX REV CODE 250 W/ 637 OVERRIDE(OP): Performed by: HOSPITALIST

## 2018-08-15 PROCEDURE — G8988 SELF CARE GOAL STATUS: HCPCS | Mod: CI

## 2018-08-15 PROCEDURE — 85025 COMPLETE CBC W/AUTO DIFF WBC: CPT

## 2018-08-15 PROCEDURE — 85610 PROTHROMBIN TIME: CPT

## 2018-08-15 PROCEDURE — 71045 X-RAY EXAM CHEST 1 VIEW: CPT

## 2018-08-15 PROCEDURE — 99232 SBSQ HOSP IP/OBS MODERATE 35: CPT | Performed by: HOSPITALIST

## 2018-08-15 PROCEDURE — 80048 BASIC METABOLIC PNL TOTAL CA: CPT

## 2018-08-15 PROCEDURE — A9270 NON-COVERED ITEM OR SERVICE: HCPCS | Performed by: HOSPITALIST

## 2018-08-15 PROCEDURE — G8979 MOBILITY GOAL STATUS: HCPCS | Mod: CI

## 2018-08-15 PROCEDURE — 97161 PT EVAL LOW COMPLEX 20 MIN: CPT

## 2018-08-15 PROCEDURE — 700105 HCHG RX REV CODE 258: Performed by: HOSPITALIST

## 2018-08-15 PROCEDURE — 83880 ASSAY OF NATRIURETIC PEPTIDE: CPT

## 2018-08-15 PROCEDURE — 36415 COLL VENOUS BLD VENIPUNCTURE: CPT

## 2018-08-15 PROCEDURE — G8987 SELF CARE CURRENT STATUS: HCPCS | Mod: CJ

## 2018-08-15 PROCEDURE — 97165 OT EVAL LOW COMPLEX 30 MIN: CPT

## 2018-08-15 RX ORDER — GABAPENTIN 300 MG/1
600 CAPSULE ORAL EVERY EVENING
Status: DISCONTINUED | OUTPATIENT
Start: 2018-08-15 | End: 2018-08-20 | Stop reason: HOSPADM

## 2018-08-15 RX ORDER — POTASSIUM CHLORIDE 7.45 MG/ML
10 INJECTION INTRAVENOUS
Status: COMPLETED | OUTPATIENT
Start: 2018-08-15 | End: 2018-08-15

## 2018-08-15 RX ORDER — POTASSIUM CHLORIDE 20 MEQ/1
40 TABLET, EXTENDED RELEASE ORAL ONCE
Status: COMPLETED | OUTPATIENT
Start: 2018-08-15 | End: 2018-08-15

## 2018-08-15 RX ORDER — POTASSIUM CHLORIDE 20 MEQ/1
20 TABLET, EXTENDED RELEASE ORAL 3 TIMES DAILY
Status: DISCONTINUED | OUTPATIENT
Start: 2018-08-15 | End: 2018-08-16

## 2018-08-15 RX ORDER — ROSUVASTATIN CALCIUM 20 MG/1
10 TABLET, COATED ORAL EVERY EVENING
Status: DISCONTINUED | OUTPATIENT
Start: 2018-08-15 | End: 2018-08-20 | Stop reason: HOSPADM

## 2018-08-15 RX ORDER — CALCIUM CARBONATE 500 MG/1
500 TABLET, CHEWABLE ORAL EVERY 4 HOURS PRN
Status: DISCONTINUED | OUTPATIENT
Start: 2018-08-15 | End: 2018-08-20 | Stop reason: HOSPADM

## 2018-08-15 RX ADMIN — CEFTRIAXONE SODIUM 2 G: 2 INJECTION, POWDER, FOR SOLUTION INTRAMUSCULAR; INTRAVENOUS at 05:32

## 2018-08-15 RX ADMIN — POTASSIUM CHLORIDE 20 MEQ: 1500 TABLET, EXTENDED RELEASE ORAL at 17:18

## 2018-08-15 RX ADMIN — TOPIRAMATE 25 MG: 25 TABLET, FILM COATED ORAL at 05:32

## 2018-08-15 RX ADMIN — WARFARIN SODIUM 2.5 MG: 2.5 TABLET ORAL at 17:18

## 2018-08-15 RX ADMIN — SERTRALINE 25 MG: 50 TABLET, FILM COATED ORAL at 20:19

## 2018-08-15 RX ADMIN — LEVOTHYROXINE SODIUM 50 MCG: 50 TABLET ORAL at 05:32

## 2018-08-15 RX ADMIN — POTASSIUM CHLORIDE 20 MEQ: 1500 TABLET, EXTENDED RELEASE ORAL at 09:07

## 2018-08-15 RX ADMIN — POTASSIUM CHLORIDE 10 MEQ: 7.46 INJECTION, SOLUTION INTRAVENOUS at 11:34

## 2018-08-15 RX ADMIN — ANTACID TABLETS 500 MG: 500 TABLET, CHEWABLE ORAL at 05:32

## 2018-08-15 RX ADMIN — POTASSIUM CHLORIDE 10 MEQ: 7.46 INJECTION, SOLUTION INTRAVENOUS at 09:09

## 2018-08-15 RX ADMIN — TOPIRAMATE 25 MG: 25 TABLET, FILM COATED ORAL at 17:16

## 2018-08-15 RX ADMIN — ANTACID TABLETS 500 MG: 500 TABLET, CHEWABLE ORAL at 20:22

## 2018-08-15 RX ADMIN — GABAPENTIN 600 MG: 300 CAPSULE ORAL at 17:16

## 2018-08-15 RX ADMIN — METOPROLOL SUCCINATE 25 MG: 25 TABLET, EXTENDED RELEASE ORAL at 05:32

## 2018-08-15 RX ADMIN — ALLOPURINOL 100 MG: 100 TABLET ORAL at 17:18

## 2018-08-15 RX ADMIN — ALLOPURINOL 100 MG: 100 TABLET ORAL at 05:32

## 2018-08-15 RX ADMIN — POTASSIUM CHLORIDE 20 MEQ: 1500 TABLET, EXTENDED RELEASE ORAL at 11:34

## 2018-08-15 RX ADMIN — POTASSIUM CHLORIDE 40 MEQ: 1500 TABLET, EXTENDED RELEASE ORAL at 05:32

## 2018-08-15 RX ADMIN — CLOPIDOGREL 75 MG: 75 TABLET, FILM COATED ORAL at 05:32

## 2018-08-15 RX ADMIN — POTASSIUM CHLORIDE 10 MEQ: 7.46 INJECTION, SOLUTION INTRAVENOUS at 10:12

## 2018-08-15 RX ADMIN — ROSUVASTATIN CALCIUM 10 MG: 20 TABLET, FILM COATED ORAL at 17:17

## 2018-08-15 ASSESSMENT — CHA2DS2 SCORE
PRIOR STROKE OR TIA OR THROMBOEMBOLISM: NO
CHF OR LEFT VENTRICULAR DYSFUNCTION: NO
VASCULAR DISEASE: YES
DIABETES: NO
CHA2DS2 VASC SCORE: 5
AGE 65 TO 74: NO
SEX: FEMALE
AGE 75 OR GREATER: YES
HYPERTENSION: YES

## 2018-08-15 ASSESSMENT — COGNITIVE AND FUNCTIONAL STATUS - GENERAL
WALKING IN HOSPITAL ROOM: A LITTLE
SUGGESTED CMS G CODE MODIFIER DAILY ACTIVITY: CJ
DAILY ACTIVITIY SCORE: 22
STANDING UP FROM CHAIR USING ARMS: A LITTLE
CLIMB 3 TO 5 STEPS WITH RAILING: A LOT
HELP NEEDED FOR BATHING: A LITTLE
SUGGESTED CMS G CODE MODIFIER MOBILITY: CJ
TOILETING: A LITTLE
MOBILITY SCORE: 20

## 2018-08-15 ASSESSMENT — ENCOUNTER SYMPTOMS
MUSCULOSKELETAL NEGATIVE: 1
RESPIRATORY NEGATIVE: 1
WEAKNESS: 1
CARDIOVASCULAR NEGATIVE: 1
EYES NEGATIVE: 1
GASTROINTESTINAL NEGATIVE: 1
PSYCHIATRIC NEGATIVE: 1

## 2018-08-15 ASSESSMENT — GAIT ASSESSMENTS
GAIT LEVEL OF ASSIST: CONTACT GUARD ASSIST
DEVIATION: STEP TO;DECREASED BASE OF SUPPORT;DECREASED HEEL STRIKE;DECREASED TOE OFF
ASSISTIVE DEVICE: FRONT WHEEL WALKER
DISTANCE (FEET): 30

## 2018-08-15 ASSESSMENT — ACTIVITIES OF DAILY LIVING (ADL): TOILETING: INDEPENDENT

## 2018-08-15 ASSESSMENT — PAIN SCALES - GENERAL
PAINLEVEL_OUTOF10: 0

## 2018-08-15 NOTE — PROGRESS NOTES
[facility]    Date of Service: 8/15/2018    Chief Complaint  86 y.o. female admitted 2018 with weakness    Interval Problem Update  She has low potassium 2.7    She has hx diastolic chf     We are holding her diurectics at this time    No sob    No chest pain    Afebrile    She has UTI      Consultants/Specialty  none    Disposition  home        Review of Systems   Constitutional: Positive for malaise/fatigue.   HENT: Negative.    Eyes: Negative.    Respiratory: Negative.    Cardiovascular: Negative.    Gastrointestinal: Negative.    Genitourinary: Negative.    Musculoskeletal: Negative.    Skin: Negative.    Neurological: Positive for weakness.   Endo/Heme/Allergies: Negative.    Psychiatric/Behavioral: Negative.    All other systems reviewed and are negative.     Physical Exam  Laboratory/Imaging   Hemodynamics  Temp (24hrs), Av.4 °C (97.5 °F), Min:36.2 °C (97.1 °F), Max:36.6 °C (97.9 °F)   Temperature: 36.6 °C (97.8 °F)  Pulse  Av.6  Min: 60  Max: 93 Heart Rate (Monitored): 60  Blood Pressure : 112/67, NIBP: 124/57      Respiratory      Respiration: 16, Pulse Oximetry: 94 %        RUL Breath Sounds: Diminished, RML Breath Sounds: Diminished, RLL Breath Sounds: Diminished, ASHTYN Breath Sounds: Diminished, LLL Breath Sounds: Diminished    Fluids    Intake/Output Summary (Last 24 hours) at 08/15/18 1333  Last data filed at 08/15/18 1300   Gross per 24 hour   Intake              360 ml   Output              350 ml   Net               10 ml       Nutrition  Orders Placed This Encounter   Procedures   • Diet Order Regular     Standing Status:   Standing     Number of Occurrences:   1     Order Specific Question:   Diet:     Answer:   Regular [1]     Physical Exam   Constitutional: She is oriented to person, place, and time. No distress.   HENT:   Head: Atraumatic.   Mouth/Throat: No oropharyngeal exudate.   Eyes: Left eye exhibits no discharge. No scleral icterus.   Neck: No JVD  present. No tracheal deviation present. No thyromegaly present.   Cardiovascular:   irregular   Pulmonary/Chest: No respiratory distress. She has no wheezes. She has no rales.   Abdominal: She exhibits no distension. There is no tenderness.   Musculoskeletal: She exhibits no edema or deformity.   Neurological: She is alert and oriented to person, place, and time. No cranial nerve deficit. Coordination normal.   Skin: No rash noted. She is not diaphoretic. No erythema. No pallor.       Recent Labs      08/14/18   1305  08/15/18   0314   WBC  2.3*  2.4*   RBC  3.07*  3.05*   HEMOGLOBIN  9.2*  8.9*   HEMATOCRIT  28.9*  28.8*   MCV  94.1  94.4   MCH  30.0  29.2   MCHC  31.8*  30.9*   RDW  65.3*  65.9*   PLATELETCT  122*  118*   MPV  10.5  10.5     Recent Labs      08/14/18   1305  08/15/18   0314   SODIUM  133*  133*   POTASSIUM  2.4*  2.7*   CHLORIDE  85*  86*   CO2  38*  37*   GLUCOSE  97  92   BUN  38*  36*   CREATININE  1.67*  1.53*   CALCIUM  9.2  8.6     Recent Labs      08/14/18   1305  08/15/18   0314   APTT  44.7*   --    INR  2.37*  2.62*     Recent Labs      08/15/18   0314   BNPBTYPENAT  415*              Assessment/Plan     * Weakness- (present on admission)   Assessment & Plan    Pt and ot eval     eval        Acute cystitis- (present on admission)   Assessment & Plan    +UA that is symptomatic  She is immunocompromised with chronically low WBC  Urine culture is pending  Empiric IV rocephin x 3 days          Hypokalemia- (present on admission)   Assessment & Plan    Potassium is 2.7  Hold diuretics       Replace potassium iv and po.         Pancytopenia (HCC)- (present on admission)   Assessment & Plan    She has been seen by Dr. Atkinson, oncology, and likely has myelodysplastic syndrome.  Ms. Egan has refused a bone marrow biopsy.         Pulmonary hypertension (HCC)- (present on admission)   Assessment & Plan    Chronic pulmonary hypertension.  She is at high risk of volume overload.         Warfarin anticoagulation- (present on admission)   Assessment & Plan    INR is therapeutic on admit at 2.37        Hypothyroidism- (present on admission)   Assessment & Plan    Continue synthroid 50 mcg        Chronic diastolic CHF (congestive heart failure) (HCC)- (present on admission)   Assessment & Plan    Hold diurectics 1-2 days    Check cxr today    Follow bnp        ASCVD (arteriosclerotic cardiovascular disease)- (present on admission)   Assessment & Plan    Stent placed 7/18        Chronic atrial fibrillation (HCC)- (present on admission)   Assessment & Plan    Continue to coumadin and metoprolol  Monitor on tele          Quality-Core Measures   Hunt catheter::  No Hunt  DVT prophylaxis pharmacological::  Warfarin (Coumadin)      Check am bmp, bnp

## 2018-08-15 NOTE — DISCHARGE PLANNING
Anticipated Discharge Disposition: Home with [facility]    Action: Spoke with pt.  Pt has had 2 falls recently which resulted in her coming to ER. She lives alone but has a grandaughter and son who help her.  Granddaughter checks on her 2x a day. Pt is on service with [facility] and wishes to continue with this service.  She also states she was enrolled with the [facility] HF program. Pt states if she goes home she will have 24/7 care until she is more stable.  She has 3 great grand daughters to help and her brother is coming up.  Pt was given info on care chest, home caregivers, and respite care leslye application by Chela ([facility])     Barriers to Discharge: medical clearance    Plan: Home

## 2018-08-15 NOTE — CARE PLAN
Problem: Safety  Goal: Will remain free from falls    Intervention: Implement fall precautions  Patient is wearing skid free socks, bed alams is on, call light is within reach, and safety precautions in place.        Problem: Knowledge Deficit  Goal: Knowledge of disease process/condition, treatment plan, diagnostic tests, and medications will improve    Intervention: Explain information regarding disease process/condition, treatment plan, diagnostic tests, and medications and document in education  Patient educated on plan of care, medications, pain management, and disease processes. Patient showed verbal understanding and acceptance. Will continue to answer questions regarding medication administration, care, procedures, and upcoming tests.

## 2018-08-15 NOTE — THERAPY
"Occupational Therapy Evaluation completed.   Functional Status:  SBA for supine>sit>stand and toilet transfer  Plan of Care: Will benefit from Occupational Therapy 3 times per week  Discharge Recommendations:  Equipment: Tub Transfer Bench. Post-acute therapy Discharge to a transitional care facility for continued skilled therapy services.    See \"Rehab Therapy-Acute\" Patient Summary Report for complete documentation.    OT eval completed on 87 YO F admitted after GLF. Pt limited due to decreased activity tolerance, weakness, safety awareness and poor insight into current deficits. Family present for eval and able to confirm PLOF. Pt would require 24/7 SPV in home for safety upon d/c. If 24/7 SPV is not available, stay at transitional care facility for continued strengthening with therapy would be required. Will continue to follow for acute OT services while in-house.  "

## 2018-08-15 NOTE — PROGRESS NOTES
Handoff report received. Assumed patient care. Patient arrived to floor with flex RN.  Patient not reporting pain. Patient not in distress, AAOX 4. Family with Pt at beside.  Bed alarm is on. Safety precautions in place. Call light and personal belongings within reach. Educated to call for assistance if needed.

## 2018-08-15 NOTE — H&P
Hospital Medicine History & Physical Note    Date of Service  8/14/2018    Primary Care Physician  MARIJA Patel.      Chief Complaint  Fall with weakness.     History of Presenting Illness  86 y.o. female who presented 8/14/2018 with a ground level fall and inability to get up. Ms. Egan has a history of CAD and diastolic dysfunction that was recently admitted from 8/10 through 8/11 due to weakness and a fall.  She is found to have volume overload and was placed on IV diuretics.  She had extensive workup and is discharged home in stable condition.  This morning, she was bending over to pick something off the floor, and then fell backwards landing on her buttocks.  She was too weak to get up.  A home health nurse came by to check on her and when she found that the door was locked and nobody is answering she called Ms. Egan's granddaughter who came over and open the door.  They found her on the floor.  She is brought to the emergency room where she is found to have a markedly low potassium at 2.4 as well as a urinary tract infection and she will be admitted for IV antibiotics potassium supplementation and further workup.  In the emergency room, her granddaughter, son, and daughter-in-law are at bedside and we discussed her condition.  They are interested in looking at assisted living prospects.    Review of Systems  Review of Systems   Constitutional: Negative for chills and fever.   Respiratory: Negative for shortness of breath.    Cardiovascular: Negative for chest pain.        Less leg swelling than usual   Gastrointestinal: Negative for nausea.   Neurological:        Generally weak   All other systems reviewed and are negative.      Past Medical History   has a past medical history of Arthritis; Atrial fibrillation (CMS-HCC) [I48.91] (3/29/2018); Blood clotting disorder ([facility]); Breath shortness; Cancer ([facility]); Cataract; Congestive heart failure (CHF) ([facility]) (4/3/2018); BERTRAND (dyspnea on exertion)  (2018); Heart burn; Heart valve disease; Hemorrhagic disorder (HCC); Hiatus hernia syndrome; Hyperlipidemia; Hypertension; Kidney disease; Long term current use of anticoagulant (3/29/2018); Pacemaker (4/3/2018); Pulmonary emphysema (HCC); Secondary hypertension (4/3/2018); Thyroid disease; Tremors of nervous system (4/3/2018); Urinary incontinence; and Urinary tract infection.    Surgical History   has a past surgical history that includes other and other cardiac surgery.     Family History  family history includes Cancer in her mother; Lung Disease in her mother; No Known Problems in her father.     Social History   reports that she quit smoking about 48 years ago. Her smoking use included Cigarettes. She smoked 0.50 packs per day. She has never used smokeless tobacco. She reports that she drinks about 1.8 oz of alcohol per week . She reports that she does not use drugs.    Allergies  Allergies   Allergen Reactions   • Codeine Vomiting       Medications  Prior to Admission Medications   Prescriptions Last Dose Informant Patient Reported? Taking?   Calcium-Vitamin D 500-125 MG-UNIT Tab 2018 at 0600 Family Member Yes No   Sig: Take 1 tablet by mouth every morning. 600mg - 400 iu   acetaminophen (TYLENOL) 500 MG Tab 18 at pm Family Member Yes No   Sig: Take 1,000 mg by mouth 1 time daily as needed for Mild Pain.   allopurinol (ZYLOPRIM) 100 MG Tab 2018 at pm Family Member Yes Yes   Sig: Take 100 mg by mouth 2 times a day.   ascorbic acid (VITAMIN C) 500 MG tablet 2018 at 0600 Family Member No No   Sig: Take 1 Tab by mouth every day.   bumetanide (BUMEX) 2 MG tablet 2018 at 1200 Family Member No No   Sig: Take 1 Tab by mouth 2 Times a Day for 30 days.   clopidogrel (PLAVIX) 75 MG Tab 2018 at 0600 Family Member No No   Sig: Take 1 Tab by mouth every day.   cyanocobalamin (VITAMIN B-12) 1000 MCG/ML Solution 2 weeks at unk Family Member No No   Si mL by Intramuscular route every 30  days for 30 days.   cyanocobalamin (VITAMIN B12) 1000 MCG Tab 8/13/2018 at 0600 Family Member No No   Sig: Take 1 Tab by mouth every day.   ferrous gluconate (FERGON) 324 (38 Fe) MG Tab 8/13/2018 at pm Family Member No No   Sig: Take 1 Tab by mouth 3 times a day, with meals for 30 days.   gabapentin (NEURONTIN) 300 MG Cap 8/13/2018 at 1800 Family Member Yes No   Sig: Take 1,200 mg by mouth every evening.   levothyroxine (SYNTHROID) 50 MCG Tab 8/13/2018 at 0600 Family Member No No   Sig: Take 1 Tab by mouth Every morning on an empty stomach.   metOLazone (ZAROXOLYN) 2.5 MG Tab 8/13/2018 at 1200 Family Member No No   Sig: Take 1 Tab by mouth every day.   metoprolol SR (TOPROL XL) 25 MG TABLET SR 24 HR 8/13/2018 at 0600 Family Member Yes No   Sig: Take 25 mg by mouth every morning.   omeprazole (PRILOSEC) 20 MG delayed-release capsule 8/13/2018 at 1800 Family Member Yes No   Sig: Take 20 mg by mouth 2 times a day.   potassium chloride SA (KDUR) 20 MEQ Tab CR 8/13/2018 at 1800 Family Member No No   Sig: Take 1 Tab by mouth 3 times a day. Take 2 tabs twice a day for 2 days then back to 1 tab three times a day   rosuvastatin (CRESTOR) 10 MG Tab 8/13/2018 at 1800 Family Member No No   Sig: Take 1 Tab by mouth every evening.   sertraline (ZOLOFT) 50 MG Tab 8/13/2018 at hs Family Member Yes No   Sig: Take 25 mg by mouth every bedtime.   topiramate (TOPAMAX) 25 MG Tab 8/13/2018 at hs Family Member No No   Sig: Take 1 Tab by mouth 2 times a day.   warfarin (COUMADIN) 2.5 MG Tab 8/13/2018 at hs Family Member Yes No   Sig: Take 2.5-3.75 mg by mouth every evening. 2.5 mg on Mon/Wed  3.75 all other days      Facility-Administered Medications: None       Physical Exam  Blood Pressure : (!) 97/48   Temperature: 36.2 °C (97.2 °F)   Pulse: 63   Respiration: 16   Pulse Oximetry: 99 %     Physical Exam   Constitutional: She is oriented to person, place, and time. No distress.   Neck: Neck supple.   Cardiovascular:   Irregular, III/VI  systolic murmur   Pulmonary/Chest: Effort normal. No respiratory distress.   Pacemaker   Abdominal: Soft. She exhibits no distension. There is no tenderness.   Musculoskeletal:   1+edema bilaterally and symmetrically    Neurological: She is alert and oriented to person, place, and time.   Skin: Skin is warm and dry. She is not diaphoretic.   Bruise left face   Nursing note and vitals reviewed.      Laboratory:  Recent Labs      08/14/18   1305   WBC  2.3*   RBC  3.07*   HEMOGLOBIN  9.2*   HEMATOCRIT  28.9*   MCV  94.1   MCH  30.0   MCHC  31.8*   RDW  65.3*   PLATELETCT  122*   MPV  10.5     Recent Labs      08/14/18   1305   SODIUM  133*   POTASSIUM  2.4*   CHLORIDE  85*   CO2  38*   GLUCOSE  97   BUN  38*   CREATININE  1.67*   CALCIUM  9.2     Recent Labs      08/14/18   1305   ALTSGPT  6   ASTSGOT  22   ALKPHOSPHAT  111*   TBILIRUBIN  1.2   GLUCOSE  97     Recent Labs      08/14/18   1305   APTT  44.7*   INR  2.37*             Lab Results   Component Value Date    TROPONINI 0.03 08/10/2018       Urinalysis:    Recent Labs      08/14/18   1451   SPECGRAVITY  1.009   GLUCOSEUR  Negative   KETONES  Negative   NITRITE  Positive*   LEUKESTERAS  Large*   WBCURINE  20-50*   RBCURINE  0-2   BACTERIA  Many*   EPITHELCELL  Negative        Imaging:  DX-HIP-BILATERAL-WITH PELVIS-2 VIEWS   Final Result      No radiographic evidence of acute traumatic injury.            Assessment/Plan:  I anticipate this patient will require at least two midnights for appropriate medical management, necessitating inpatient admission.    Acute cystitis- (present on admission)   Assessment & Plan    +UA that is symptomatic  She is immunocompromised with chronically low WBC  Urine culture is pending  Empiric IV rocephin          Hypokalemia- (present on admission)   Assessment & Plan    Potassium is 2.4 on admission  Hold diuretics and increase potassium replacement  Refrain from IV fluids given diastolic dysfunction.  BMP ordered for the  morning.         Pancytopenia (HCC)- (present on admission)   Assessment & Plan    She has been seen by Dr. Atkinson, oncology, and likely has myelodysplastic syndrome.  Ms. Egan has refused a bone marrow biopsy.         Pulmonary hypertension (HCC)- (present on admission)   Assessment & Plan    Chronic pulmonary hypertension.  She is at high risk of volume overload.        Warfarin anticoagulation- (present on admission)   Assessment & Plan    INR is therapeutic on admit at 2.37        Hypothyroidism- (present on admission)   Assessment & Plan    Continue synthroid 50 mcg        ASCVD (arteriosclerotic cardiovascular disease)- (present on admission)   Assessment & Plan    Stent placed 7/18        Chronic atrial fibrillation (HCC)- (present on admission)   Assessment & Plan    Continue to coumadin and metoprolol  Monitor on tele            VTE prophylaxis: coumadin

## 2018-08-15 NOTE — PROGRESS NOTES
Inpatient Anticoagulation Service Note    Date: 8/15/2018  Reason for Anticoagulation: Atrial Fibrillation        Hemoglobin Value: (!) 8.9  Hematocrit Value: (!) 28.8  Lab Platelet Value: (!) 118  Target INR: 2.0 to 3.0    INR from last 7 days     Date/Time INR Value    08/15/18 0314 (!)  2.62    08/14/18 1305 (!)  2.37        Dose from last 7 days     Date/Time Dose (mg)    08/15/18 0800  2.5    08/14/18 1700  3.75        Average Dose (mg):  (2.5 mg MWF and 3.75 mg all other days per outp Coag clinic )  Significant Interactions: Antibiotics, Clopidogrel, Thyroid Medications, Other (Comments) (SSRI, Allopurinol)  Bridge Therapy: No     HPI: 85 yo female admitted on 8/14/18 for GLF w/inability to get up. Diagnosed and being treated for cystitis. +pancytopenia. PMH significant for clinical ASCVD, Pulm HTN, Diastolic CHF and Afib. Patient is chronically anticoagulated with warfarin therapy for h/o Afib ([id number] VASc 5, moderate risk). Warfarin is managed outpatient by the [facility] and per records, patient was most recently prescribed warfarin 2.5mg po every Mon, Wed and warfarin 3.75mg po on all other days. This was a regimen adjustment from warfarin 2.5mg po every Mon, Wed, Fri and warfarin 3.75mg po on all other days for subtherapeutic INR outpatient.     Assessment: INR remains therapeutic with significant trend upward overnight following warfarin 3.75mg last night.     Potential drug-drug interactions identified with acute inpatient medications: Antibiotics    Potential drug-drug interactions identified with chronic home medications: Plavix, Levothyroxine, Sertraline and Allopurinol which should be established interactions with warfarin.   Inpatient Diet: Regular     Plan: Continue current regimen as ordered d/t large trend upward in INR - warfarin 2.5mg tonight. Continue daily INR monitoring for now.     Education Material Provided? No - chronic warfarin     Pharmacist suggested discharge dosing:  warfarin 2.5mg po every Mon, Wed and warfarin 3.75mg po on all other days    Shae K. Garrett, PharmD

## 2018-08-15 NOTE — PROGRESS NOTES
Handoff report received. Assumed patient care. Patient resting in bed.  Patient not in pain. Patient not in distress, AAOX 4. Bed alarm is on. Safety precautions in place. Call light and personal belongings within reach. Educated to call for assistance if needed.

## 2018-08-15 NOTE — PROGRESS NOTES
Inpatient Anticoagulation Service Note    Date: 8/14/2018  Reason for Anticoagulation: Atrial Fibrillation      Hemoglobin Value: (!) 9.2  Hematocrit Value: (!) 28.9  Lab Platelet Value: (!) 122  Target INR: 2.0 to 3.0    INR from last 7 days     Date/Time INR Value    08/14/18 1305 (!)  2.37        Dose from last 7 days     Date/Time Dose (mg)    08/14/18 1700  3.75        Average Dose (mg): (2.5 mg M,W,F and 3.75 mg all other days per outpatient coag clinic )  Significant Interactions: Antibiotics, Clopidogrel, Thyroid Medications, Other (allopurinol, setraline)    Bridge Therapy: No     Comments: Therapeutic INR on home dose, H/H/Plt low but stable, no documented bleeding or bruising, elevated SCr, monitor INR closely d/t  multiple DDIs.  Patient family stated she hasn't taken today's dose.       Plan:  Resume home dose with warfarin 2.5 mg PO MWF and 3.75 mg PO on all other days.      Pharmacist suggested discharge dosing: resume home dose and follow up with Coumadin Clinic within the next 2-3 days     Lisa Greenfield, PharmD, BCOP

## 2018-08-15 NOTE — DISCHARGE PLANNING
Anticipated Discharge Disposition: TBD    Action: Spoke to pt and granddtr about SNF's.  Granddaughter given medicare.gov ratings and choice form.  She will visit snf's.    Barriers to Discharge: medical clearance    Plan: Follow up with placement HHC vs SNF

## 2018-08-15 NOTE — ASSESSMENT & PLAN NOTE
She has been seen by Dr. Atkinson, oncology, and likely has myelodysplastic syndrome.  Ms. Egan has refused a bone marrow biopsy.

## 2018-08-15 NOTE — THERAPY
"Physical Therapy Evaluation completed.   Bed Mobility:  Supine to Sit: Stand by Assist  Transfers: Sit to Stand: Contact Guard Assist  Gait: Level Of Assist: Contact Guard Assist with Front-Wheel Walker       Plan of Care: Will benefit from Physical Therapy 2 times per week  Discharge Recommendations: Equipment: Will Continue to Assess for Equipment Needs.   See \"Rehab Therapy-Acute\" Patient Summary Report for complete documentation.     Pt was recently admitted for a ground level fall due to loss of balance and being unable to take care of her self. Pt presented to PT for primary risk reduction for LOB/falling. Pt presented with imparied balance, impaired gait, weakness, poor saftey awareness, and dec activity tolerance. Pt was able to demonstrate SBA to CGA for all functional mobility at this time w/FWW use. Relies on FWW for balance support. Needs cues for FWW use and saftey during transfers as the patient tends to leave the FWW behind for short distances. Demonstrates with poor foot clearence, dec valente, and dec stride length. Pt was primarily limited due to poor activity tolerance and saftey awareness. Pt will benefit from continued skilled PT while in house. If patient is to d/c home, will recommend HH therapy services with 24/7 supervision support at home from family. However, if patient is unable have 24/7 assist will recommend post acute theCenterpointy placement or group home placement.   "

## 2018-08-15 NOTE — CARE PLAN
Problem: Safety  Goal: Will remain free from injury    Intervention: Provide assistance with mobility  Pt requires assistance of one when transferring. Tolerated well

## 2018-08-15 NOTE — CARE PLAN
Problem: Mobility  Goal: Risk for activity intolerance will decrease    Intervention: Assess and monitor signs of activity intolerance  Generalized weakness present but ambulates to the restroom with assistance.

## 2018-08-15 NOTE — DISCHARGE PLANNING
Patient is currently on service with [facility]. Please write home care orders upon discharge to home for continuum of care.[facility] at  /[phone] if there are any questions.

## 2018-08-15 NOTE — RESPIRATORY CARE
COPD EDUCATION by COPD CLINICAL EDUCATOR  8/15/2018 at 6:28 AM by Tequila Lewis     Patient reviewed by COPD education team. Patient does not qualify for COPD program.

## 2018-08-16 LAB
ANION GAP SERPL CALC-SCNC: 8 MMOL/L (ref 0–11.9)
BACTERIA UR CULT: ABNORMAL
BACTERIA UR CULT: ABNORMAL
BNP SERPL-MCNC: 585 PG/ML (ref 0–100)
BUN SERPL-MCNC: 33 MG/DL (ref 8–22)
CALCIUM SERPL-MCNC: 8.6 MG/DL (ref 8.5–10.5)
CHLORIDE SERPL-SCNC: 93 MMOL/L (ref 96–112)
CO2 SERPL-SCNC: 34 MMOL/L (ref 20–33)
CREAT SERPL-MCNC: 1.19 MG/DL (ref 0.5–1.4)
GLUCOSE SERPL-MCNC: 99 MG/DL (ref 65–99)
INR PPP: 2.8 (ref 0.87–1.13)
POTASSIUM SERPL-SCNC: 3.2 MMOL/L (ref 3.6–5.5)
POTASSIUM SERPL-SCNC: 3.3 MMOL/L (ref 3.6–5.5)
PROTHROMBIN TIME: 29.2 SEC (ref 12–14.6)
SIGNIFICANT IND 70042: ABNORMAL
SITE SITE: ABNORMAL
SODIUM SERPL-SCNC: 135 MMOL/L (ref 135–145)
SOURCE SOURCE: ABNORMAL

## 2018-08-16 PROCEDURE — 83880 ASSAY OF NATRIURETIC PEPTIDE: CPT

## 2018-08-16 PROCEDURE — 700111 HCHG RX REV CODE 636 W/ 250 OVERRIDE (IP): Performed by: HOSPITALIST

## 2018-08-16 PROCEDURE — 700102 HCHG RX REV CODE 250 W/ 637 OVERRIDE(OP): Performed by: HOSPITALIST

## 2018-08-16 PROCEDURE — 770020 HCHG ROOM/CARE - TELE (206)

## 2018-08-16 PROCEDURE — 36415 COLL VENOUS BLD VENIPUNCTURE: CPT

## 2018-08-16 PROCEDURE — 84132 ASSAY OF SERUM POTASSIUM: CPT

## 2018-08-16 PROCEDURE — 80048 BASIC METABOLIC PNL TOTAL CA: CPT

## 2018-08-16 PROCEDURE — A9270 NON-COVERED ITEM OR SERVICE: HCPCS | Performed by: HOSPITALIST

## 2018-08-16 PROCEDURE — 700105 HCHG RX REV CODE 258: Performed by: HOSPITALIST

## 2018-08-16 PROCEDURE — 85610 PROTHROMBIN TIME: CPT

## 2018-08-16 PROCEDURE — 99232 SBSQ HOSP IP/OBS MODERATE 35: CPT | Performed by: HOSPITALIST

## 2018-08-16 RX ORDER — OMEPRAZOLE 20 MG/1
20 CAPSULE, DELAYED RELEASE ORAL DAILY
Status: DISCONTINUED | OUTPATIENT
Start: 2018-08-16 | End: 2018-08-20 | Stop reason: HOSPADM

## 2018-08-16 RX ORDER — POTASSIUM CHLORIDE 20 MEQ/1
20 TABLET, EXTENDED RELEASE ORAL ONCE
Status: COMPLETED | OUTPATIENT
Start: 2018-08-16 | End: 2018-08-16

## 2018-08-16 RX ORDER — POTASSIUM CHLORIDE 20 MEQ/1
40 TABLET, EXTENDED RELEASE ORAL 2 TIMES DAILY
Status: DISCONTINUED | OUTPATIENT
Start: 2018-08-16 | End: 2018-08-20 | Stop reason: HOSPADM

## 2018-08-16 RX ORDER — BUMETANIDE 1 MG/1
2 TABLET ORAL
Status: DISCONTINUED | OUTPATIENT
Start: 2018-08-16 | End: 2018-08-18

## 2018-08-16 RX ORDER — WARFARIN SODIUM 7.5 MG/1
3.75 TABLET ORAL
Status: DISCONTINUED | OUTPATIENT
Start: 2018-08-16 | End: 2018-08-20 | Stop reason: HOSPADM

## 2018-08-16 RX ORDER — WARFARIN SODIUM 2.5 MG/1
2.5 TABLET ORAL
Status: DISCONTINUED | OUTPATIENT
Start: 2018-08-20 | End: 2018-08-20 | Stop reason: HOSPADM

## 2018-08-16 RX ADMIN — SERTRALINE 25 MG: 50 TABLET, FILM COATED ORAL at 20:13

## 2018-08-16 RX ADMIN — ANTACID TABLETS 500 MG: 500 TABLET, CHEWABLE ORAL at 21:18

## 2018-08-16 RX ADMIN — CEFTRIAXONE SODIUM 1 G: 1 INJECTION, POWDER, FOR SOLUTION INTRAMUSCULAR; INTRAVENOUS at 06:14

## 2018-08-16 RX ADMIN — ALLOPURINOL 100 MG: 100 TABLET ORAL at 06:14

## 2018-08-16 RX ADMIN — TOPIRAMATE 25 MG: 25 TABLET, FILM COATED ORAL at 17:42

## 2018-08-16 RX ADMIN — TOPIRAMATE 25 MG: 25 TABLET, FILM COATED ORAL at 06:14

## 2018-08-16 RX ADMIN — GABAPENTIN 600 MG: 300 CAPSULE ORAL at 17:39

## 2018-08-16 RX ADMIN — CLOPIDOGREL 75 MG: 75 TABLET, FILM COATED ORAL at 06:14

## 2018-08-16 RX ADMIN — METOPROLOL SUCCINATE 25 MG: 25 TABLET, EXTENDED RELEASE ORAL at 06:14

## 2018-08-16 RX ADMIN — POTASSIUM CHLORIDE 20 MEQ: 1500 TABLET, EXTENDED RELEASE ORAL at 06:14

## 2018-08-16 RX ADMIN — LEVOTHYROXINE SODIUM 50 MCG: 50 TABLET ORAL at 06:14

## 2018-08-16 RX ADMIN — ROSUVASTATIN CALCIUM 10 MG: 20 TABLET, FILM COATED ORAL at 17:39

## 2018-08-16 RX ADMIN — OMEPRAZOLE 20 MG: 20 CAPSULE, DELAYED RELEASE ORAL at 08:49

## 2018-08-16 RX ADMIN — WARFARIN SODIUM 3.75 MG: 7.5 TABLET ORAL at 17:43

## 2018-08-16 RX ADMIN — MAGNESIUM GLUCONATE 500 MG ORAL TABLET 400 MG: 500 TABLET ORAL at 21:17

## 2018-08-16 RX ADMIN — BUMETANIDE 2 MG: 1 TABLET ORAL at 08:49

## 2018-08-16 RX ADMIN — POTASSIUM CHLORIDE 20 MEQ: 1500 TABLET, EXTENDED RELEASE ORAL at 08:15

## 2018-08-16 RX ADMIN — POTASSIUM CHLORIDE 40 MEQ: 1500 TABLET, EXTENDED RELEASE ORAL at 17:39

## 2018-08-16 RX ADMIN — ALLOPURINOL 100 MG: 100 TABLET ORAL at 17:39

## 2018-08-16 ASSESSMENT — ENCOUNTER SYMPTOMS
MUSCULOSKELETAL NEGATIVE: 1
RESPIRATORY NEGATIVE: 1
CARDIOVASCULAR NEGATIVE: 1
GASTROINTESTINAL NEGATIVE: 1
PSYCHIATRIC NEGATIVE: 1
EYES NEGATIVE: 1
WEAKNESS: 1

## 2018-08-16 ASSESSMENT — PAIN SCALES - GENERAL
PAINLEVEL_OUTOF10: 0

## 2018-08-16 NOTE — PROGRESS NOTES
Received bedside report from BRONWYN Rosenthal, pt care assumed, VSS, pt assessment complete. Pt AAOx4, no c/o pain at this time. No signs of acute distress noted at this time. POC discussed with pt and verbalizes no questions. Pt denies any additional needs at this time. Bed in lowest position, bed alarm off, low fall risk, pt calls appropriately, pt educated on fall risk and verbalized understanding, call light within reach, hourly rounding initiated.

## 2018-08-16 NOTE — DOCUMENTATION QUERY
"DOCUMENTATION QUERY    PROVIDERS: Please select “Cosign w/ note”to reply to query.    To better represent the severity of illness of your patient, please review the following information and exercise your independent professional judgment in responding to this query.     Creatinine 1.67 is noted in the Lab Results . Based upon the clinical findings, risk factors, and treatment, can a diagnosis be provided to support this finding?    • Acute renal failure.  • Acute kidney injury  • Acute renal failure with ATN  • Chronic kidney disease, (if so, please provide staging).  • Acute on chronic kidney disease, (if so, please provide staging)  • Other explanation of clinical findings  • Unable to determine (no explanation for clinical findings)    The medical record reflects the following:   Clinical Findings 8/14 Creatinine: 1.67; BUN: 38; GFR: 29  8/14 H&P: \"Acute cystitis, hypokalemia, hold diuretics and refrain IV fluids related to diastolic dysfunction, pancytopenia and likely has myelodysplastic syndrome, pulmonary hypertension, ASCVD and chronic atrial fibrillation\"  is documented.  8/15 Creatinine: 1.53; BUN: 36; GFR: 32  8/16 Creatinine: 1.19; BUN: 33; GFR: 43   Treatment IVNS x 1; hold diuretics and refrain IV fluids; potassium replacement; rocephin; lab testing   Risk Factors Advanced age, acute cystitis; hypokalemia; pancytopenia; pulmonary hypertension; chronic diastolic CHF; chronic atrial fibrillation   Location within medical record History and Physical, Progress Notes, Lab Results and MAR     Thank you,   Annette Pitts RN  Clinical   [phone]          "

## 2018-08-16 NOTE — CARE PLAN
Problem: Safety  Goal: Will remain free from falls  Outcome: PROGRESSING AS EXPECTED  Patient educated to use call light for assistance. Fall precautions in place. Staff will assist with mobilization. Hourly rounding in place.      Problem: Knowledge Deficit  Goal: Knowledge of disease process/condition, treatment plan, diagnostic tests, and medications will improve  Outcome: PROGRESSING AS EXPECTED  Provided patient with a verbal discussion related the importance of the mineral Potassium and the risks associated with Hypokalemia. Discussed with client means of replacing Potassium and current treatment plan.

## 2018-08-16 NOTE — PROGRESS NOTES
Inpatient Anticoagulation Service Note    Date: 8/16/2018  Reason for Anticoagulation: Atrial Fibrillation   CIC3XD6 VASc Score: 5    Hemoglobin Value: (!) 8.9  Hematocrit Value: (!) 28.8  Lab Platelet Value: (!) 118  Target INR: 2.0 to 3.0    INR from last 7 days     Date/Time INR Value    08/16/18 0400 (!)  2.8    08/15/18 0314 (!)  2.62    08/14/18 1305 (!)  2.37        Dose from last 7 days     Date/Time Dose (mg)    08/16/18 1300  3.75    08/15/18 0800  2.5    08/14/18 1700  3.75        Average Dose (mg):  (Home dose: 2.5mg M/W and 3.75mg AOD per [facility]/medrec)  Significant Interactions: Proton Pump Inhibitor, Thyroid Medications, Clopidogrel (SSRI, Allopurinol)  Bridge Therapy: No     Reversal Agent Administered: Not Applicable  Comments: INR therapeutic. Continue home dose as outlined above. NNL to assess h/h but no indication of bleeding. No new DDI; abx were discontinued.     Plan:  Warfarin 3.75mg with INR check tomorrow  Education Material Provided?: No (Chronic warfarin)  Pharmacist suggested discharge dosing: Warfarin 2.5mg PO every Mon/Wed and 3.75mg on all other days with close f/u within 3 days         Zehra Arora, PharmD., BCPS

## 2018-08-16 NOTE — PROGRESS NOTES
Bedside report received, pt care assumed, tele box on. VSS, pt assessment complete. Pt aaox4, no signs of distress noted at this time. POC discussed with pt and verbalizes no questions. Pt denies any additional needs at this time. Bed in lowest position, bed alarm on, pt educated on fall risk and verbalized understanding, call light within reach.

## 2018-08-16 NOTE — CARE PLAN
Problem: Communication  Goal: The ability to communicate needs accurately and effectively will improve  Outcome: PROGRESSING AS EXPECTED    Intervention: [place] patient and significant other/support system to call light to alert staff of needs   08/16/18 1614   OTHER   Oriented to: All of the Following : Location of Bathroom, Visiting Policy, Unit Routine, Call Light and Bedside Controls, Bedside Rail Policy, Smoking Policy, Rights and Responsibilities, Bedside Report, and Patient Education Notebook         Problem: Safety  Goal: Will remain free from falls  Outcome: PROGRESSING AS EXPECTED  Fall risk assessed, fall precautions in place, bed alarm on, pt verbalizes understanding of fall risk.

## 2018-08-17 LAB
ANION GAP SERPL CALC-SCNC: 8 MMOL/L (ref 0–11.9)
BNP SERPL-MCNC: 707 PG/ML (ref 0–100)
BUN SERPL-MCNC: 30 MG/DL (ref 8–22)
CALCIUM SERPL-MCNC: 8.6 MG/DL (ref 8.5–10.5)
CHLORIDE SERPL-SCNC: 96 MMOL/L (ref 96–112)
CO2 SERPL-SCNC: 33 MMOL/L (ref 20–33)
CREAT SERPL-MCNC: 1.3 MG/DL (ref 0.5–1.4)
GLUCOSE SERPL-MCNC: 89 MG/DL (ref 65–99)
INR PPP: 2.3 (ref 0.87–1.13)
POTASSIUM SERPL-SCNC: 3.2 MMOL/L (ref 3.6–5.5)
POTASSIUM SERPL-SCNC: 4 MMOL/L (ref 3.6–5.5)
PROTHROMBIN TIME: 25 SEC (ref 12–14.6)
SODIUM SERPL-SCNC: 137 MMOL/L (ref 135–145)

## 2018-08-17 PROCEDURE — 700102 HCHG RX REV CODE 250 W/ 637 OVERRIDE(OP): Performed by: HOSPITALIST

## 2018-08-17 PROCEDURE — 700111 HCHG RX REV CODE 636 W/ 250 OVERRIDE (IP): Performed by: HOSPITALIST

## 2018-08-17 PROCEDURE — 83880 ASSAY OF NATRIURETIC PEPTIDE: CPT

## 2018-08-17 PROCEDURE — 85610 PROTHROMBIN TIME: CPT

## 2018-08-17 PROCEDURE — 99232 SBSQ HOSP IP/OBS MODERATE 35: CPT | Performed by: HOSPITALIST

## 2018-08-17 PROCEDURE — 84132 ASSAY OF SERUM POTASSIUM: CPT

## 2018-08-17 PROCEDURE — 770020 HCHG ROOM/CARE - TELE (206)

## 2018-08-17 PROCEDURE — A9270 NON-COVERED ITEM OR SERVICE: HCPCS | Performed by: HOSPITALIST

## 2018-08-17 PROCEDURE — 36415 COLL VENOUS BLD VENIPUNCTURE: CPT

## 2018-08-17 PROCEDURE — 80048 BASIC METABOLIC PNL TOTAL CA: CPT

## 2018-08-17 RX ORDER — POTASSIUM CHLORIDE 7.45 MG/ML
10 INJECTION INTRAVENOUS
Status: COMPLETED | OUTPATIENT
Start: 2018-08-17 | End: 2018-08-17

## 2018-08-17 RX ADMIN — TOPIRAMATE 25 MG: 25 TABLET, FILM COATED ORAL at 06:07

## 2018-08-17 RX ADMIN — TOPIRAMATE 25 MG: 25 TABLET, FILM COATED ORAL at 18:08

## 2018-08-17 RX ADMIN — MAGNESIUM GLUCONATE 500 MG ORAL TABLET 400 MG: 500 TABLET ORAL at 06:06

## 2018-08-17 RX ADMIN — POTASSIUM CHLORIDE 40 MEQ: 1500 TABLET, EXTENDED RELEASE ORAL at 18:07

## 2018-08-17 RX ADMIN — ALLOPURINOL 100 MG: 100 TABLET ORAL at 06:07

## 2018-08-17 RX ADMIN — POTASSIUM CHLORIDE 10 MEQ: 7.46 INJECTION, SOLUTION INTRAVENOUS at 07:32

## 2018-08-17 RX ADMIN — WARFARIN SODIUM 3.75 MG: 7.5 TABLET ORAL at 18:05

## 2018-08-17 RX ADMIN — POTASSIUM CHLORIDE 10 MEQ: 7.46 INJECTION, SOLUTION INTRAVENOUS at 08:32

## 2018-08-17 RX ADMIN — ROSUVASTATIN CALCIUM 10 MG: 20 TABLET, FILM COATED ORAL at 18:06

## 2018-08-17 RX ADMIN — BUMETANIDE 2 MG: 1 TABLET ORAL at 06:06

## 2018-08-17 RX ADMIN — GABAPENTIN 600 MG: 300 CAPSULE ORAL at 18:10

## 2018-08-17 RX ADMIN — SERTRALINE 25 MG: 50 TABLET, FILM COATED ORAL at 19:50

## 2018-08-17 RX ADMIN — POTASSIUM CHLORIDE 40 MEQ: 1500 TABLET, EXTENDED RELEASE ORAL at 06:06

## 2018-08-17 RX ADMIN — CLOPIDOGREL 75 MG: 75 TABLET, FILM COATED ORAL at 06:06

## 2018-08-17 RX ADMIN — MAGNESIUM GLUCONATE 500 MG ORAL TABLET 400 MG: 500 TABLET ORAL at 18:07

## 2018-08-17 RX ADMIN — LEVOTHYROXINE SODIUM 50 MCG: 50 TABLET ORAL at 06:07

## 2018-08-17 RX ADMIN — OMEPRAZOLE 20 MG: 20 CAPSULE, DELAYED RELEASE ORAL at 06:07

## 2018-08-17 RX ADMIN — METOPROLOL SUCCINATE 25 MG: 25 TABLET, EXTENDED RELEASE ORAL at 06:07

## 2018-08-17 RX ADMIN — ALLOPURINOL 100 MG: 100 TABLET ORAL at 18:08

## 2018-08-17 ASSESSMENT — PAIN SCALES - GENERAL
PAINLEVEL_OUTOF10: 0

## 2018-08-17 ASSESSMENT — ENCOUNTER SYMPTOMS
WEAKNESS: 1
RESPIRATORY NEGATIVE: 1
MUSCULOSKELETAL NEGATIVE: 1
PSYCHIATRIC NEGATIVE: 1
CARDIOVASCULAR NEGATIVE: 1
GASTROINTESTINAL NEGATIVE: 1
EYES NEGATIVE: 1

## 2018-08-17 NOTE — PROGRESS NOTES
Received bedside report from BRONWYN Piper, pt care assumed, VSS, pt assessment complete. Pt AAOx4, no c/o pain at this time. No signs of acute distress noted at this time. POC discussed with pt and verbalizes no questions. Pt denies any additional needs at this time. Bed in lowest position, bed alarm refused, pt educated on fall risk and verbalized understanding, patient has consistently called appropriately, call light within reach, hourly rounding initiated.

## 2018-08-17 NOTE — DISCHARGE PLANNING
Medical Social Work    LSW submitted PASRR. It is currently in manual review. LSW to submit LOC once PASRR goes through.

## 2018-08-17 NOTE — DISCHARGE PLANNING
Received Choice form at 2805  Agency/Facility Name: [facility]  Referral sent per Choice form @ [phone]

## 2018-08-17 NOTE — CARE PLAN
Problem: Safety  Goal: Will remain free from falls  Outcome: PROGRESSING AS EXPECTED  Patient educated to use call light for assistance. Fall precautions in place. Staff will assist with mobilization. Hourly rounding in place.      Problem: Knowledge Deficit  Goal: Knowledge of disease process/condition, treatment plan, diagnostic tests, and medications will improve  Outcome: PROGRESSING AS EXPECTED  Provided patient with a verbal discussion and printed handout concerning Coumadin to include a discussion r/t mechanism of action, higher bleeding risk, need for INR testing, diet considerations, and remedies to avoid bleeding; soft-tooth brush, making home safe to prevent falls. Patient verbalized understanding and had no questions or concerns.

## 2018-08-17 NOTE — CARE PLAN
Problem: Venous Thromboembolism (VTW)/Deep Vein Thrombosis (DVT) Prevention:  Goal: Patient will participate in Venous Thrombosis (VTE)/Deep Vein Thrombosis (DVT)Prevention Measures  Outcome: PROGRESSING SLOWER THAN EXPECTED   08/17/18 0800   OTHER   Risk Assessment Score 1   VTE RISK Moderate   Pharmacologic Prophylaxis Used Warfarin (Coumadin)   Mechanical/VTE Prophylaxis   Mechanical Prophylaxis  SCDs, Sequential Compression Device   MILAN Hose (Graduated Compression Stockings) Off   SCDs, Sequential Compression Device Refused       Problem: Knowledge Deficit  Goal: Knowledge of disease process/condition, treatment plan, diagnostic tests, and medications will improve  Outcome: PROGRESSING AS EXPECTED  Pt educated on POC, all questions answered in regards to disease process, treatment and diet. Pt verbalizes understanding and voices no further questions at this time.

## 2018-08-17 NOTE — PROGRESS NOTES
Inpatient Anticoagulation Service Note    Date: 8/17/2018  Reason for Anticoagulation: Atrial Fibrillation   CTP2QB6 VASc Score: 5    Hemoglobin Value: (!) 8.9  Hematocrit Value: (!) 28.8  Lab Platelet Value: (!) 118  Target INR: 2.0 to 3.0    INR from last 7 days     Date/Time INR Value    08/17/18 0353 (!)  2.3    08/16/18 0400 (!)  2.8    08/15/18 0314 (!)  2.62    08/14/18 1305 (!)  2.37        Dose from last 7 days     Date/Time Dose (mg)    08/16/18 1300  3.75    08/15/18 0800  2.5    08/14/18 1700  3.75        Average Dose (mg):  (Home dose: 2.5mg M/W and 3.75mg AOD per [facility]/medrec)  Significant Interactions: Proton Pump Inhibitor, Thyroid Medications, Clopidogrel (SSRI, Allopurinol)  Bridge Therapy: No     Reversal Agent Administered: Not Applicable    Comments:   INR therapeutic. Continue home dose as outlined above. NNL to assess h/h but no indication of bleeding. No new DDI; abx were discontinued.      Plan:    Warfarin 3.75mg with INR check tomorrow    Education Material Provided?: No (Chronic warfarin)  Pharmacist suggested discharge dosing: Warfarin 2.5mg PO every Mon/Wed and 3.75mg on all other days with close f/u within 3 days     Jerald GamezD BCPS

## 2018-08-17 NOTE — PROGRESS NOTES
[facility]ist Progress Note    Date of Service: 2018    Chief Complaint  86 y.o. female admitted 2018 with weakness    Interval Problem Update  She has low potassium 3.3    She has hx diastolic chf     We need to restart some diuretics as bnp is rising    No sob at rest    No chest pain    Afebrile    She has UTI    She agrees to snf but want granddaughter involved    Consultants/Specialty  none    Disposition    snf likely        Review of Systems   Constitutional: Positive for malaise/fatigue.   HENT: Negative.    Eyes: Negative.    Respiratory: Negative.    Cardiovascular: Negative.    Gastrointestinal: Negative.    Genitourinary: Negative.    Musculoskeletal: Negative.    Skin: Negative.    Neurological: Positive for weakness.   Endo/Heme/Allergies: Negative.    Psychiatric/Behavioral: Negative.    All other systems reviewed and are negative.     Physical Exam  Laboratory/Imaging   Hemodynamics  Temp (24hrs), Av.6 °C (97.9 °F), Min:36.2 °C (97.2 °F), Max:37 °C (98.6 °F)   Temperature: 36.8 °C (98.2 °F)  Pulse  Av  Min: 60  Max: 93    Blood Pressure : 107/63      Respiratory      Respiration: 16, Pulse Oximetry: 94 %     Work Of Breathing / Effort: Shallow  RUL Breath Sounds: Diminished, RML Breath Sounds: Diminished, RLL Breath Sounds: Diminished, ASHTYN Breath Sounds: Diminished, LLL Breath Sounds: Diminished    Fluids    Intake/Output Summary (Last 24 hours) at 18  Last data filed at 18   Gross per 24 hour   Intake              240 ml   Output             1350 ml   Net            -1110 ml       Nutrition  Orders Placed This Encounter   Procedures   • Diet Order Regular     Standing Status:   Standing     Number of Occurrences:   1     Order Specific Question:   Diet:     Answer:   Regular [1]     Physical Exam   Constitutional: She is oriented to person, place, and time. No distress.   HENT:   Head: Atraumatic.   Mouth/Throat: No oropharyngeal exudate.   Eyes: Left  eye exhibits no discharge. No scleral icterus.   Neck: JVD present. No tracheal deviation present. No thyromegaly present.   Cardiovascular:   irregular   Pulmonary/Chest: No respiratory distress. She has no wheezes. She has no rales.   Abdominal: She exhibits no distension. There is no tenderness.   Musculoskeletal: She exhibits edema (trace edema both legs). She exhibits no deformity.   Neurological: She is alert and oriented to person, place, and time. No cranial nerve deficit. Coordination normal.   Skin: No rash noted. She is not diaphoretic. No erythema. No pallor.       Recent Labs      08/14/18   1305  08/15/18   0314   WBC  2.3*  2.4*   RBC  3.07*  3.05*   HEMOGLOBIN  9.2*  8.9*   HEMATOCRIT  28.9*  28.8*   MCV  94.1  94.4   MCH  30.0  29.2   MCHC  31.8*  30.9*   RDW  65.3*  65.9*   PLATELETCT  122*  118*   MPV  10.5  10.5     Recent Labs      08/14/18   1305  08/15/18   0314  08/16/18   0400  08/16/18   1547   SODIUM  133*  133*  135   --    POTASSIUM  2.4*  2.7*  3.3*  3.2*   CHLORIDE  85*  86*  93*   --    CO2  38*  37*  34*   --    GLUCOSE  97  92  99   --    BUN  38*  36*  33*   --    CREATININE  1.67*  1.53*  1.19   --    CALCIUM  9.2  8.6  8.6   --      Recent Labs      08/14/18   1305  08/15/18   0314  08/16/18   0400   APTT  44.7*   --    --    INR  2.37*  2.62*  2.80*     Recent Labs      08/15/18   0314  08/16/18   0400   BNPBTYPENAT  415*  585*              Assessment/Plan     * Weakness- (present on admission)   Assessment & Plan    Pt and ot notes seen     --> refer to snf        Acute cystitis- (present on admission)   Assessment & Plan    +UA that is symptomatic  She is immunocompromised with chronically low WBC  Urine culture is pending  Empiric IV rocephin x 3 days..completed          Hypokalemia- (present on admission)   Assessment & Plan    Potassium is 3.3      Replace po    Daily magnesium            Pancytopenia (HCC)- (present on admission)   Assessment & Plan    She has  been seen by Dr. Atkinson, oncology, and likely has myelodysplastic syndrome.  Ms. Egan has refused a bone marrow biopsy.         Pulmonary hypertension (HCC)- (present on admission)   Assessment & Plan    Chronic pulmonary hypertension.  She is at high risk of volume overload.        CKD (chronic kidney disease) stage 3, GFR 30-59 ml/min- (present on admission)   Assessment & Plan    Avoid nephrotoxins    Follow bmp        Warfarin anticoagulation- (present on admission)   Assessment & Plan    Keep inr 2-3        Hypothyroidism- (present on admission)   Assessment & Plan    Continue synthroid 50 mcg        Chronic diastolic CHF (congestive heart failure) (HCC)- (present on admission)   Assessment & Plan    jvd noted    Rising bnp     bumex restarted once per day        ASCVD (arteriosclerotic cardiovascular disease)- (present on admission)   Assessment & Plan    Stent placed 7/18        Chronic atrial fibrillation (HCC)- (present on admission)   Assessment & Plan    Continue to coumadin and metoprolol  Monitor on tele          Quality-Core Measures   Hunt catheter::  No Hunt  DVT prophylaxis pharmacological::  Warfarin (Coumadin)      Check am bmp, bnp    Refer to snf

## 2018-08-18 LAB
ANION GAP SERPL CALC-SCNC: 7 MMOL/L (ref 0–11.9)
APPEARANCE UR: CLEAR
BILIRUB UR QL STRIP.AUTO: NEGATIVE
BNP SERPL-MCNC: 660 PG/ML (ref 0–100)
BUN SERPL-MCNC: 32 MG/DL (ref 8–22)
CALCIUM SERPL-MCNC: 8.6 MG/DL (ref 8.5–10.5)
CHLORIDE SERPL-SCNC: 101 MMOL/L (ref 96–112)
CO2 SERPL-SCNC: 32 MMOL/L (ref 20–33)
COLOR UR: YELLOW
CREAT SERPL-MCNC: 1.42 MG/DL (ref 0.5–1.4)
GLUCOSE SERPL-MCNC: 84 MG/DL (ref 65–99)
GLUCOSE UR STRIP.AUTO-MCNC: NEGATIVE MG/DL
INR PPP: 2.13 (ref 0.87–1.13)
KETONES UR STRIP.AUTO-MCNC: NEGATIVE MG/DL
LEUKOCYTE ESTERASE UR QL STRIP.AUTO: NEGATIVE
MICRO URNS: NORMAL
NITRITE UR QL STRIP.AUTO: NEGATIVE
PH UR STRIP.AUTO: 8 [PH]
POTASSIUM SERPL-SCNC: 3.9 MMOL/L (ref 3.6–5.5)
PROT UR QL STRIP: NEGATIVE MG/DL
PROTHROMBIN TIME: 23.5 SEC (ref 12–14.6)
RBC UR QL AUTO: NEGATIVE
SODIUM SERPL-SCNC: 140 MMOL/L (ref 135–145)
SP GR UR STRIP.AUTO: 1.01
UROBILINOGEN UR STRIP.AUTO-MCNC: 0.2 MG/DL

## 2018-08-18 PROCEDURE — 83880 ASSAY OF NATRIURETIC PEPTIDE: CPT

## 2018-08-18 PROCEDURE — A9270 NON-COVERED ITEM OR SERVICE: HCPCS | Performed by: HOSPITALIST

## 2018-08-18 PROCEDURE — 700102 HCHG RX REV CODE 250 W/ 637 OVERRIDE(OP): Performed by: HOSPITALIST

## 2018-08-18 PROCEDURE — 81003 URINALYSIS AUTO W/O SCOPE: CPT

## 2018-08-18 PROCEDURE — 80048 BASIC METABOLIC PNL TOTAL CA: CPT

## 2018-08-18 PROCEDURE — 85610 PROTHROMBIN TIME: CPT

## 2018-08-18 PROCEDURE — 770020 HCHG ROOM/CARE - TELE (206)

## 2018-08-18 PROCEDURE — 36415 COLL VENOUS BLD VENIPUNCTURE: CPT

## 2018-08-18 PROCEDURE — 99232 SBSQ HOSP IP/OBS MODERATE 35: CPT | Performed by: HOSPITALIST

## 2018-08-18 RX ORDER — BUMETANIDE 1 MG/1
1 TABLET ORAL
Status: DISCONTINUED | OUTPATIENT
Start: 2018-08-19 | End: 2018-08-20 | Stop reason: HOSPADM

## 2018-08-18 RX ADMIN — OMEPRAZOLE 20 MG: 20 CAPSULE, DELAYED RELEASE ORAL at 05:21

## 2018-08-18 RX ADMIN — POTASSIUM CHLORIDE 40 MEQ: 1500 TABLET, EXTENDED RELEASE ORAL at 05:21

## 2018-08-18 RX ADMIN — MAGNESIUM GLUCONATE 500 MG ORAL TABLET 400 MG: 500 TABLET ORAL at 17:04

## 2018-08-18 RX ADMIN — METOPROLOL SUCCINATE 25 MG: 25 TABLET, EXTENDED RELEASE ORAL at 05:21

## 2018-08-18 RX ADMIN — WARFARIN SODIUM 3.75 MG: 7.5 TABLET ORAL at 17:05

## 2018-08-18 RX ADMIN — ROSUVASTATIN CALCIUM 10 MG: 20 TABLET, FILM COATED ORAL at 17:04

## 2018-08-18 RX ADMIN — POTASSIUM CHLORIDE 40 MEQ: 1500 TABLET, EXTENDED RELEASE ORAL at 17:05

## 2018-08-18 RX ADMIN — ALLOPURINOL 100 MG: 100 TABLET ORAL at 17:04

## 2018-08-18 RX ADMIN — TOPIRAMATE 25 MG: 25 TABLET, FILM COATED ORAL at 05:21

## 2018-08-18 RX ADMIN — CLOPIDOGREL 75 MG: 75 TABLET, FILM COATED ORAL at 05:21

## 2018-08-18 RX ADMIN — ALLOPURINOL 100 MG: 100 TABLET ORAL at 05:21

## 2018-08-18 RX ADMIN — MAGNESIUM GLUCONATE 500 MG ORAL TABLET 400 MG: 500 TABLET ORAL at 05:21

## 2018-08-18 RX ADMIN — GABAPENTIN 600 MG: 300 CAPSULE ORAL at 17:04

## 2018-08-18 RX ADMIN — LEVOTHYROXINE SODIUM 50 MCG: 50 TABLET ORAL at 05:21

## 2018-08-18 RX ADMIN — SERTRALINE 25 MG: 50 TABLET, FILM COATED ORAL at 21:18

## 2018-08-18 RX ADMIN — BUMETANIDE 2 MG: 1 TABLET ORAL at 05:21

## 2018-08-18 RX ADMIN — TOPIRAMATE 25 MG: 25 TABLET, FILM COATED ORAL at 17:05

## 2018-08-18 ASSESSMENT — ENCOUNTER SYMPTOMS
CARDIOVASCULAR NEGATIVE: 1
WEAKNESS: 1
GASTROINTESTINAL NEGATIVE: 1
RESPIRATORY NEGATIVE: 1
MUSCULOSKELETAL NEGATIVE: 1
EYES NEGATIVE: 1
PSYCHIATRIC NEGATIVE: 1

## 2018-08-18 ASSESSMENT — PAIN SCALES - GENERAL
PAINLEVEL_OUTOF10: 0

## 2018-08-18 NOTE — PROGRESS NOTES
[facility] Progress Note    Date of Service: 2018    Chief Complaint  86 y.o. female admitted 2018 with weakness    Interval Problem Update  She has low potassium 3.2 this morning    Repeat potassium level for this evening pending    She has hx diastolic chf     bnp 707      No sob at rest    No chest pain    Afebrile    Last day of uti treatment    snf referral in process    Consultants/Specialty  none    Disposition    snf likely        Review of Systems   Constitutional: Positive for malaise/fatigue.   HENT: Negative.    Eyes: Negative.    Respiratory: Negative.    Cardiovascular: Negative.    Gastrointestinal: Negative.    Genitourinary: Negative.    Musculoskeletal: Negative.    Skin: Negative.    Neurological: Positive for weakness.   Endo/Heme/Allergies: Negative.    Psychiatric/Behavioral: Negative.    All other systems reviewed and are negative.     Physical Exam  Laboratory/Imaging   Hemodynamics  Temp (24hrs), Av.3 °C (97.4 °F), Min:36.1 °C (96.9 °F), Max:36.6 °C (97.8 °F)   Temperature: 36.6 °C (97.8 °F)  Pulse  Av.8  Min: 60  Max: 96    Blood Pressure : 113/52      Respiratory      Respiration: 18, Pulse Oximetry: 93 %     Work Of Breathing / Effort: Shallow  RUL Breath Sounds: Clear, RML Breath Sounds: Diminished, RLL Breath Sounds: Fine Crackles, ASHTYN Breath Sounds: Clear, LLL Breath Sounds: Fine Crackles    Fluids    Intake/Output Summary (Last 24 hours) at 18 2102  Last data filed at 18 1600   Gross per 24 hour   Intake                0 ml   Output              570 ml   Net             -570 ml       Nutrition  Orders Placed This Encounter   Procedures   • Diet Order Regular     Standing Status:   Standing     Number of Occurrences:   1     Order Specific Question:   Diet:     Answer:   Regular [1]     Physical Exam   Constitutional: She is oriented to person, place, and time. No distress.   HENT:   Head: Atraumatic.   Mouth/Throat: No oropharyngeal exudate.    Eyes: Left eye exhibits no discharge. No scleral icterus.   Neck: JVD present. No tracheal deviation present. No thyromegaly present.   Cardiovascular:   irregular   Pulmonary/Chest: No respiratory distress. She has no wheezes. She has no rales.   Abdominal: She exhibits no distension. There is no tenderness.   Musculoskeletal: She exhibits edema (trace edema both legs). She exhibits no deformity.   Neurological: She is alert and oriented to person, place, and time. No cranial nerve deficit. Coordination normal.   Skin: No rash noted. She is not diaphoretic. No erythema. No pallor.       Recent Labs      08/15/18   0314   WBC  2.4*   RBC  3.05*   HEMOGLOBIN  8.9*   HEMATOCRIT  28.8*   MCV  94.4   MCH  29.2   MCHC  30.9*   RDW  65.9*   PLATELETCT  118*   MPV  10.5     Recent Labs      08/15/18   0314  08/16/18   0400  08/16/18   1547  08/17/18   0353   SODIUM  133*  135   --   137   POTASSIUM  2.7*  3.3*  3.2*  3.2*   CHLORIDE  86*  93*   --   96   CO2  37*  34*   --   33   GLUCOSE  92  99   --   89   BUN  36*  33*   --   30*   CREATININE  1.53*  1.19   --   1.30   CALCIUM  8.6  8.6   --   8.6     Recent Labs      08/15/18   0314  08/16/18   0400  08/17/18   0353   INR  2.62*  2.80*  2.30*     Recent Labs      08/15/18   0314  08/16/18   0400  08/17/18   0353   BNPBTYPENAT  415*  585*  707*              Assessment/Plan     * Weakness- (present on admission)   Assessment & Plan    Pt and ot notes seen     --> refer to snf        Acute cystitis- (present on admission)   Assessment & Plan    +UA that is symptomatic  She is immunocompromised with chronically low WBC  Urine culture is pending  Empiric IV rocephin x 3 days..completed          Hypokalemia- (present on admission)   Assessment & Plan          Replace po    Daily magnesium    Follow bmp            Pancytopenia (HCC)- (present on admission)   Assessment & Plan    She has been seen by Dr. Atkinson, oncology, and likely has myelodysplastic  syndrome.  Ms. Egan has refused a bone marrow biopsy.         Pulmonary hypertension (HCC)- (present on admission)   Assessment & Plan    Chronic pulmonary hypertension.  She is at high risk of volume overload.        CKD (chronic kidney disease) stage 3, GFR 30-59 ml/min- (present on admission)   Assessment & Plan    Avoid nephrotoxins    Follow bmp        Warfarin anticoagulation- (present on admission)   Assessment & Plan    Keep inr 2-3        Hypothyroidism- (present on admission)   Assessment & Plan    Continue synthroid 50 mcg        Chronic diastolic CHF (congestive heart failure) (HCC)- (present on admission)   Assessment & Plan    bumex 2mg po daily     Will titrate as needed        ASCVD (arteriosclerotic cardiovascular disease)- (present on admission)   Assessment & Plan    Stent placed 7/18        Chronic atrial fibrillation (HCC)- (present on admission)   Assessment & Plan    Continue to coumadin and metoprolol  Monitor on tele          Quality-Core Measures   Hunt catheter::  No Hunt  DVT prophylaxis pharmacological::  Warfarin (Coumadin)      Check am bmp, bnp    Refer to snf

## 2018-08-18 NOTE — PROGRESS NOTES
[facility]ist Progress Note    Date of Service: 2018    Chief Complaint  86 y.o. female admitted 2018 with weakness    Interval Problem Update  Potassium level is 3.9    She has hx diastolic chf     bnp 660    Patient she is urinating a lot    On bumex 2mg in am    No sob at rest    No chest pain    Afebrile    uti was treated during this hospital stay- klebsiella sentive to rocephin    snf referral in process    Consultants/Specialty  none    Disposition    snf likely        Review of Systems   Constitutional: Positive for malaise/fatigue.   HENT: Negative.    Eyes: Negative.    Respiratory: Negative.    Cardiovascular: Negative.    Gastrointestinal: Negative.    Genitourinary: Negative.    Musculoskeletal: Negative.    Skin: Negative.    Neurological: Positive for weakness.   Endo/Heme/Allergies: Negative.    Psychiatric/Behavioral: Negative.    All other systems reviewed and are negative.     Physical Exam  Laboratory/Imaging   Hemodynamics  Temp (24hrs), Av.5 °C (97.7 °F), Min:36.2 °C (97.1 °F), Max:36.8 °C (98.2 °F)   Temperature: 36.3 °C (97.3 °F)  Pulse  Av.2  Min: 60  Max: 96    Blood Pressure : 111/71      Respiratory      Respiration: 17, Pulse Oximetry: 92 %     Work Of Breathing / Effort: Shallow  RUL Breath Sounds: Clear, RML Breath Sounds: Diminished, RLL Breath Sounds: Fine Crackles;Diminished, ASHTYN Breath Sounds: Clear, LLL Breath Sounds: Fine Crackles;Diminished    Fluids    Intake/Output Summary (Last 24 hours) at 18 1222  Last data filed at 18 0845   Gross per 24 hour   Intake              240 ml   Output             1195 ml   Net             -955 ml       Nutrition  Orders Placed This Encounter   Procedures   • Diet Order Regular     Standing Status:   Standing     Number of Occurrences:   1     Order Specific Question:   Diet:     Answer:   Regular [1]     Physical Exam   Constitutional: She is oriented to person, place, and time. No distress.   HENT:   Head:  Normocephalic and atraumatic.   Mouth/Throat: No oropharyngeal exudate.   Eyes: Left eye exhibits no discharge. No scleral icterus.   Neck: No JVD present. No tracheal deviation present. No thyromegaly present.   Cardiovascular: Intact distal pulses.  Exam reveals no gallop and no friction rub.    No murmur heard.  irregular   Pulmonary/Chest: No respiratory distress. She has no wheezes. She has no rales.   Abdominal: She exhibits no distension. There is no tenderness.   Musculoskeletal: She exhibits edema (trace edema both legs). She exhibits no deformity.   Neurological: She is alert and oriented to person, place, and time. No cranial nerve deficit. Coordination normal.   Skin: No rash noted. She is not diaphoretic. No erythema. No pallor.           Recent Labs      08/16/18   0400   08/17/18   0353  08/17/18   2100  08/18/18   0336   SODIUM  135   --   137   --   140   POTASSIUM  3.3*   < >  3.2*  4.0  3.9   CHLORIDE  93*   --   96   --   101   CO2  34*   --   33   --   32   GLUCOSE  99   --   89   --   84   BUN  33*   --   30*   --   32*   CREATININE  1.19   --   1.30   --   1.42*   CALCIUM  8.6   --   8.6   --   8.6    < > = values in this interval not displayed.     Recent Labs      08/16/18   0400  08/17/18   0353  08/18/18   0336   INR  2.80*  2.30*  2.13*     Recent Labs      08/16/18   0400  08/17/18   0353  08/18/18   0336   BNPBTYPENAT  585*  707*  660*              Assessment/Plan     * Weakness- (present on admission)   Assessment & Plan    Pt and ot notes seen     --> refer to snf        Acute cystitis- (present on admission)   Assessment & Plan    +UA that is symptomatic  She is immunocompromised with chronically low WBC  Urine culture __> klebsiella sensitive to rocephin  Empiric IV rocephin x 3 days..completed    Repeat UA( 8/18) due to urinary frequency           Hypokalemia- (present on admission)   Assessment & Plan          Replace po    Daily magnesium    Follow bmp             Pancytopenia (HCC)- (present on admission)   Assessment & Plan    She has been seen by Dr. Atkinson, oncology, and likely has myelodysplastic syndrome.  Ms. Egan has refused a bone marrow biopsy.         Pulmonary hypertension (HCC)- (present on admission)   Assessment & Plan    Chronic pulmonary hypertension.  She is at high risk of volume overload.        CKD (chronic kidney disease) stage 3, GFR 30-59 ml/min- (present on admission)   Assessment & Plan    Avoid nephrotoxins    Follow bmp        Warfarin anticoagulation- (present on admission)   Assessment & Plan    Keep inr 2-3        Hypothyroidism- (present on admission)   Assessment & Plan    Continue synthroid 50 mcg        Chronic diastolic CHF (congestive heart failure) (HCC)- (present on admission)   Assessment & Plan    bumex 2mg po daily - decreased to 1mg po qam due to urinary frequency            ASCVD (arteriosclerotic cardiovascular disease)- (present on admission)   Assessment & Plan    Stent placed 7/18        Chronic atrial fibrillation (HCC)- (present on admission)   Assessment & Plan    Continue to coumadin and metoprolol  Monitor on tele          Quality-Core Measures   Hunt catheter::  No Hunt  DVT prophylaxis pharmacological::  Warfarin (Coumadin)      Check am bmp, bnp     snf probably on Monday after titration of medications

## 2018-08-18 NOTE — PROGRESS NOTES
Received bedside report from BRONWYN Piper, pt care assumed, VSS, pt assessment complete. Pt AAOx4, no c/o pain at this time. No signs of acute distress noted at this time. POC discussed with pt and verbalizes no questions. Pt denies any additional needs at this time. Bed in lowest position, bed alarm refused, pt educated on fall risk and verbalized understanding, call light within reach and client has consistently called appropriately over the past 3 days, hourly rounding initiated.

## 2018-08-18 NOTE — PROGRESS NOTES
Assumed care of patient . No chest pain or shortness of breath informed of safety and call system.

## 2018-08-18 NOTE — PROGRESS NOTES
Inpatient Anticoagulation Service Note    Date: 8/18/2018  Reason for Anticoagulation: Atrial Fibrillation   FIY1CQ1 VASc Score: 5    Hemoglobin Value: (!) 8.9  Hematocrit Value: (!) 28.8  Lab Platelet Value: (!) 118  Target INR: 2.0 to 3.0    INR from last 7 days     Date/Time INR Value    08/18/18 0336 (!)  2.13    08/17/18 0353 (!)  2.3    08/16/18 0400 (!)  2.8    08/15/18 0314 (!)  2.62    08/14/18 1305 (!)  2.37        Dose from last 7 days     Date/Time Dose (mg)    08/18/18 1400  3.75    08/17/18 1200  3.75    08/16/18 1300  3.75    08/15/18 0800  2.5    08/14/18 1700  3.75        Average Dose (mg):  (Home dose: 2.5mg M/W and 3.75mg AOD per [facility]/medrec)  Significant Interactions: Proton Pump Inhibitor, Thyroid Medications, Clopidogrel (SSRI, Allopurinol)  Bridge Therapy: No     Reversal Agent Administered: Not Applicable  Comments:   INR therapeutic. Continue home dose as outlined above. NNL (08/15/18) to assess h/h but no documented notes of bleeding.   No new DDI; abx were discontinued.     Plan:  Continue home warfarin regimen, INR in AM.  Education Material Provided?: No (Chronic warfarin)  Pharmacist suggested discharge dosing: Warfarin 2.5mg PO every Mon/Wed and 3.75mg on all other days with close f/u within 3 days     Jerald Schmidt PharmD BCPS

## 2018-08-18 NOTE — CARE PLAN
Problem: Communication  Goal: The ability to communicate needs accurately and effectively will improve  Outcome: PROGRESSING AS EXPECTED  Discussed POC with client and utilized communication board. Call light within reach for client to signal staff whenever the need arises.     Problem: Safety  Goal: Will remain free from injury  Outcome: PROGRESSING AS EXPECTED  Patient educated to use call light for assistance. Fall precautions in place. Staff will assist with mobilization. Hourly rounding in place.

## 2018-08-19 LAB
ANION GAP SERPL CALC-SCNC: 8 MMOL/L (ref 0–11.9)
BASOPHILS # BLD AUTO: 1.9 % (ref 0–1.8)
BASOPHILS # BLD: 0.05 K/UL (ref 0–0.12)
BNP SERPL-MCNC: 657 PG/ML (ref 0–100)
BUN SERPL-MCNC: 32 MG/DL (ref 8–22)
CALCIUM SERPL-MCNC: 8.9 MG/DL (ref 8.5–10.5)
CHLORIDE SERPL-SCNC: 106 MMOL/L (ref 96–112)
CO2 SERPL-SCNC: 30 MMOL/L (ref 20–33)
CREAT SERPL-MCNC: 1.23 MG/DL (ref 0.5–1.4)
EOSINOPHIL # BLD AUTO: 0 K/UL (ref 0–0.51)
EOSINOPHIL NFR BLD: 0 % (ref 0–6.9)
ERYTHROCYTE [DISTWIDTH] IN BLOOD BY AUTOMATED COUNT: 72.2 FL (ref 35.9–50)
GLUCOSE SERPL-MCNC: 91 MG/DL (ref 65–99)
HCT VFR BLD AUTO: 29.3 % (ref 37–47)
HGB BLD-MCNC: 8.9 G/DL (ref 12–16)
IMM GRANULOCYTES # BLD AUTO: 0.03 K/UL (ref 0–0.11)
IMM GRANULOCYTES NFR BLD AUTO: 1.2 % (ref 0–0.9)
INR PPP: 2.09 (ref 0.87–1.13)
INR PPP: 2.09 (ref 2–3.5)
LYMPHOCYTES # BLD AUTO: 1.02 K/UL (ref 1–4.8)
LYMPHOCYTES NFR BLD: 39.5 % (ref 22–41)
MCH RBC QN AUTO: 29.5 PG (ref 27–33)
MCHC RBC AUTO-ENTMCNC: 30.4 G/DL (ref 33.6–35)
MCV RBC AUTO: 97 FL (ref 81.4–97.8)
MONOCYTES # BLD AUTO: 0.17 K/UL (ref 0–0.85)
MONOCYTES NFR BLD AUTO: 6.6 % (ref 0–13.4)
NEUTROPHILS # BLD AUTO: 1.31 K/UL (ref 2–7.15)
NEUTROPHILS NFR BLD: 50.8 % (ref 44–72)
NRBC # BLD AUTO: 0 K/UL
NRBC BLD-RTO: 0 /100 WBC
PLATELET # BLD AUTO: 125 K/UL (ref 164–446)
PMV BLD AUTO: 10.4 FL (ref 9–12.9)
POTASSIUM SERPL-SCNC: 4.2 MMOL/L (ref 3.6–5.5)
PROTHROMBIN TIME: 23.2 SEC (ref 12–14.6)
RBC # BLD AUTO: 3.02 M/UL (ref 4.2–5.4)
SODIUM SERPL-SCNC: 144 MMOL/L (ref 135–145)
WBC # BLD AUTO: 2.6 K/UL (ref 4.8–10.8)

## 2018-08-19 PROCEDURE — 770020 HCHG ROOM/CARE - TELE (206)

## 2018-08-19 PROCEDURE — 80048 BASIC METABOLIC PNL TOTAL CA: CPT

## 2018-08-19 PROCEDURE — 83880 ASSAY OF NATRIURETIC PEPTIDE: CPT

## 2018-08-19 PROCEDURE — 85610 PROTHROMBIN TIME: CPT

## 2018-08-19 PROCEDURE — 36415 COLL VENOUS BLD VENIPUNCTURE: CPT

## 2018-08-19 PROCEDURE — A9270 NON-COVERED ITEM OR SERVICE: HCPCS | Performed by: HOSPITALIST

## 2018-08-19 PROCEDURE — 99232 SBSQ HOSP IP/OBS MODERATE 35: CPT | Performed by: HOSPITALIST

## 2018-08-19 PROCEDURE — 700102 HCHG RX REV CODE 250 W/ 637 OVERRIDE(OP): Performed by: HOSPITALIST

## 2018-08-19 PROCEDURE — 85025 COMPLETE CBC W/AUTO DIFF WBC: CPT

## 2018-08-19 RX ADMIN — GABAPENTIN 600 MG: 300 CAPSULE ORAL at 18:36

## 2018-08-19 RX ADMIN — ROSUVASTATIN CALCIUM 10 MG: 20 TABLET, FILM COATED ORAL at 18:37

## 2018-08-19 RX ADMIN — CLOPIDOGREL 75 MG: 75 TABLET, FILM COATED ORAL at 05:53

## 2018-08-19 RX ADMIN — MAGNESIUM GLUCONATE 500 MG ORAL TABLET 400 MG: 500 TABLET ORAL at 05:54

## 2018-08-19 RX ADMIN — POTASSIUM CHLORIDE 40 MEQ: 1500 TABLET, EXTENDED RELEASE ORAL at 06:00

## 2018-08-19 RX ADMIN — ALLOPURINOL 100 MG: 100 TABLET ORAL at 18:36

## 2018-08-19 RX ADMIN — LEVOTHYROXINE SODIUM 50 MCG: 50 TABLET ORAL at 05:53

## 2018-08-19 RX ADMIN — TOPIRAMATE 25 MG: 25 TABLET, FILM COATED ORAL at 05:54

## 2018-08-19 RX ADMIN — OMEPRAZOLE 20 MG: 20 CAPSULE, DELAYED RELEASE ORAL at 05:54

## 2018-08-19 RX ADMIN — TOPIRAMATE 25 MG: 25 TABLET, FILM COATED ORAL at 18:36

## 2018-08-19 RX ADMIN — SERTRALINE 25 MG: 50 TABLET, FILM COATED ORAL at 20:47

## 2018-08-19 RX ADMIN — WARFARIN SODIUM 3.75 MG: 7.5 TABLET ORAL at 18:39

## 2018-08-19 RX ADMIN — POTASSIUM CHLORIDE 40 MEQ: 1500 TABLET, EXTENDED RELEASE ORAL at 18:35

## 2018-08-19 RX ADMIN — BUMETANIDE 1 MG: 1 TABLET ORAL at 05:53

## 2018-08-19 ASSESSMENT — ENCOUNTER SYMPTOMS
WEAKNESS: 1
GASTROINTESTINAL NEGATIVE: 1
EYES NEGATIVE: 1
PSYCHIATRIC NEGATIVE: 1
CARDIOVASCULAR NEGATIVE: 1
RESPIRATORY NEGATIVE: 1
MUSCULOSKELETAL NEGATIVE: 1

## 2018-08-19 ASSESSMENT — PAIN SCALES - GENERAL
PAINLEVEL_OUTOF10: 0

## 2018-08-19 NOTE — PROGRESS NOTES
Pt refusing lab blood draws this AM for PT/INR despite education. States she won't have her blood drawn until she speaks to the doctor in the morning.    Naheed La RN

## 2018-08-19 NOTE — CARE PLAN
Data: Telemetry: paced 90 bpm. Assessments outside DL: Trace LE Edema, PARVEZ edema. Face, arm, and back ecchymosis.  trending down. Hgb unchanged at 8.9. Pt having trouble using hats when urinating, so accurate I/O difficult to attain.   Interventions: Elevation of limbs. Discussed importance of lab draws with patient, she agreed to once per day. Ambulated with patient x 2.  PT consult ordered to evaluate patient's eligibility for SNF or Rehab.   Plan: Monitor electrolytes, continue diuresis.  Discharge planning.

## 2018-08-19 NOTE — PROGRESS NOTES
Pt with overall good night, sleeping on each reassessment/round. Called appropriately overnight whenever she was in need of the restroom. Somewhat unsteady but does well with handheld assist.     Blood pressures somewhat lower than usual (98/48; HR 60). Pt asymptomatic.     Naheed La RN

## 2018-08-19 NOTE — DISCHARGE PLANNING
Anticipated Discharge Disposition: SNF     Action: Discussed pt's case in MDT rounds, pt is not medically cleared today but possibly may be on Monday 8/20/18. Per notes, pt has been accepted to [facility].      Barriers to Discharge: N/A.     Plan: As above, awaiting medical clearance for transfer to SNF.

## 2018-08-19 NOTE — PROGRESS NOTES
This writer assumed care of pt for 1900-0700 - pt without complaints on bedside rounding. Has been observed to be sleeping comfortably.    Pt verbalized understanding of fall prevention measures.    Will continue to monitor and assess.  Naheed La RN

## 2018-08-20 VITALS
WEIGHT: 129.85 LBS | TEMPERATURE: 98.2 F | HEART RATE: 67 BPM | BODY MASS INDEX: 23.01 KG/M2 | HEIGHT: 63 IN | OXYGEN SATURATION: 96 % | DIASTOLIC BLOOD PRESSURE: 57 MMHG | RESPIRATION RATE: 18 BRPM | SYSTOLIC BLOOD PRESSURE: 114 MMHG

## 2018-08-20 PROBLEM — N30.00 ACUTE CYSTITIS: Status: RESOLVED | Noted: 2018-08-14 | Resolved: 2018-08-20

## 2018-08-20 PROBLEM — I50.32 CHRONIC DIASTOLIC CONGESTIVE HEART FAILURE (HCC): Status: RESOLVED | Noted: 2018-04-03 | Resolved: 2018-08-20

## 2018-08-20 PROBLEM — E87.6 HYPOKALEMIA: Status: RESOLVED | Noted: 2018-08-10 | Resolved: 2018-08-20

## 2018-08-20 LAB
ANION GAP SERPL CALC-SCNC: 6 MMOL/L (ref 0–11.9)
BNP SERPL-MCNC: 681 PG/ML (ref 0–100)
BUN SERPL-MCNC: 30 MG/DL (ref 8–22)
CALCIUM SERPL-MCNC: 8.5 MG/DL (ref 8.5–10.5)
CHLORIDE SERPL-SCNC: 110 MMOL/L (ref 96–112)
CO2 SERPL-SCNC: 28 MMOL/L (ref 20–33)
CREAT SERPL-MCNC: 1.31 MG/DL (ref 0.5–1.4)
GLUCOSE SERPL-MCNC: 92 MG/DL (ref 65–99)
POTASSIUM SERPL-SCNC: 4.2 MMOL/L (ref 3.6–5.5)
SODIUM SERPL-SCNC: 144 MMOL/L (ref 135–145)

## 2018-08-20 PROCEDURE — 36415 COLL VENOUS BLD VENIPUNCTURE: CPT

## 2018-08-20 PROCEDURE — 83880 ASSAY OF NATRIURETIC PEPTIDE: CPT

## 2018-08-20 PROCEDURE — 99239 HOSP IP/OBS DSCHRG MGMT >30: CPT | Performed by: HOSPITALIST

## 2018-08-20 PROCEDURE — A9270 NON-COVERED ITEM OR SERVICE: HCPCS | Performed by: HOSPITALIST

## 2018-08-20 PROCEDURE — 80048 BASIC METABOLIC PNL TOTAL CA: CPT

## 2018-08-20 PROCEDURE — 700102 HCHG RX REV CODE 250 W/ 637 OVERRIDE(OP): Performed by: HOSPITALIST

## 2018-08-20 RX ORDER — POTASSIUM CHLORIDE 20 MEQ/1
40 TABLET, EXTENDED RELEASE ORAL 2 TIMES DAILY
Start: 2018-08-20 | End: 2018-08-20

## 2018-08-20 RX ORDER — CALCIUM CARBONATE 500 MG/1
500 TABLET, CHEWABLE ORAL EVERY 4 HOURS PRN
Qty: 30 TAB | Refills: 0
Start: 2018-08-20 | End: 2018-08-20

## 2018-08-20 RX ORDER — ALLOPURINOL 100 MG/1
100 TABLET ORAL 2 TIMES DAILY
Qty: 30 TAB | Refills: 1 | Status: SHIPPED | OUTPATIENT
Start: 2018-08-20 | End: 2019-03-05 | Stop reason: SDUPTHER

## 2018-08-20 RX ORDER — ASCORBIC ACID 500 MG
500 TABLET ORAL DAILY
Qty: 30 TAB | Refills: 3 | Status: SHIPPED | OUTPATIENT
Start: 2018-08-20 | End: 2019-03-05 | Stop reason: SDUPTHER

## 2018-08-20 RX ORDER — BUMETANIDE 1 MG/1
1 TABLET ORAL DAILY
Qty: 30 TAB
Start: 2018-08-21 | End: 2018-08-20

## 2018-08-20 RX ORDER — OMEPRAZOLE 20 MG/1
20 CAPSULE, DELAYED RELEASE ORAL 2 TIMES DAILY
Qty: 30 CAP | Refills: 1 | Status: SHIPPED | OUTPATIENT
Start: 2018-08-20 | End: 2018-11-13 | Stop reason: SDUPTHER

## 2018-08-20 RX ORDER — POTASSIUM CHLORIDE 20 MEQ/1
40 TABLET, EXTENDED RELEASE ORAL 2 TIMES DAILY
Qty: 60 TAB | Refills: 1 | Status: SHIPPED | OUTPATIENT
Start: 2018-08-20 | End: 2018-09-19 | Stop reason: SDUPTHER

## 2018-08-20 RX ORDER — METOPROLOL SUCCINATE 25 MG/1
25 TABLET, EXTENDED RELEASE ORAL EVERY MORNING
Qty: 30 TAB | Refills: 1 | Status: SHIPPED | OUTPATIENT
Start: 2018-08-20 | End: 2018-10-31 | Stop reason: SDUPTHER

## 2018-08-20 RX ORDER — GABAPENTIN 300 MG/1
600 CAPSULE ORAL EVERY EVENING
Qty: 90 CAP | Refills: 1 | Status: SHIPPED | OUTPATIENT
Start: 2018-08-20 | End: 2019-01-17

## 2018-08-20 RX ORDER — CLOPIDOGREL BISULFATE 75 MG/1
75 TABLET ORAL DAILY
Qty: 90 TAB | Refills: 3 | Status: ON HOLD | OUTPATIENT
Start: 2018-08-20 | End: 2019-01-07

## 2018-08-20 RX ORDER — TOPIRAMATE 25 MG/1
25 TABLET ORAL 2 TIMES DAILY
Qty: 60 TAB | Refills: 3 | Status: ON HOLD | OUTPATIENT
Start: 2018-08-20 | End: 2018-12-31

## 2018-08-20 RX ORDER — ROSUVASTATIN CALCIUM 10 MG/1
10 TABLET, COATED ORAL EVERY EVENING
Qty: 30 TAB | Refills: 11 | Status: SHIPPED | OUTPATIENT
Start: 2018-08-20

## 2018-08-20 RX ORDER — WARFARIN SODIUM 2.5 MG/1
2.5-3.75 TABLET ORAL EVERY EVENING
Qty: 30 TAB | Refills: 3 | Status: SHIPPED | OUTPATIENT
Start: 2018-08-20 | End: 2018-09-17 | Stop reason: SDUPTHER

## 2018-08-20 RX ORDER — LEVOTHYROXINE SODIUM 0.05 MG/1
50 TABLET ORAL
Qty: 90 TAB | Refills: 3 | Status: SHIPPED | OUTPATIENT
Start: 2018-08-20

## 2018-08-20 RX ORDER — GABAPENTIN 300 MG/1
600 CAPSULE ORAL EVERY EVENING
Qty: 90 CAP
Start: 2018-08-20 | End: 2018-08-20

## 2018-08-20 RX ORDER — FERROUS GLUCONATE 324(38)MG
324 TABLET ORAL
Qty: 90 TAB | Refills: 0 | Status: SHIPPED | OUTPATIENT
Start: 2018-08-20 | End: 2018-09-19

## 2018-08-20 RX ORDER — CYANOCOBALAMIN 1000 UG/ML
1000 INJECTION, SOLUTION INTRAMUSCULAR; SUBCUTANEOUS
Qty: 1 VIAL | Refills: 0 | Status: SHIPPED | OUTPATIENT
Start: 2018-08-26 | End: 2018-09-25

## 2018-08-20 RX ORDER — ACETAMINOPHEN 500 MG
1000 TABLET ORAL
Qty: 30 TAB | Refills: 0 | Status: ON HOLD | OUTPATIENT
Start: 2018-08-20 | End: 2019-01-07

## 2018-08-20 RX ORDER — CALCIUM CARBONATE 500 MG/1
500 TABLET, CHEWABLE ORAL EVERY 4 HOURS PRN
Qty: 30 TAB | Refills: 0 | Status: SHIPPED | OUTPATIENT
Start: 2018-08-20

## 2018-08-20 RX ORDER — BUMETANIDE 1 MG/1
1 TABLET ORAL DAILY
Qty: 30 TAB | Refills: 1 | Status: SHIPPED | OUTPATIENT
Start: 2018-08-21 | End: 2018-09-19 | Stop reason: SDUPTHER

## 2018-08-20 RX ADMIN — ALLOPURINOL 100 MG: 100 TABLET ORAL at 06:05

## 2018-08-20 RX ADMIN — LEVOTHYROXINE SODIUM 50 MCG: 50 TABLET ORAL at 06:04

## 2018-08-20 RX ADMIN — OMEPRAZOLE 20 MG: 20 CAPSULE, DELAYED RELEASE ORAL at 06:05

## 2018-08-20 RX ADMIN — BUMETANIDE 1 MG: 1 TABLET ORAL at 06:05

## 2018-08-20 RX ADMIN — TOPIRAMATE 25 MG: 25 TABLET, FILM COATED ORAL at 06:06

## 2018-08-20 RX ADMIN — POTASSIUM CHLORIDE 40 MEQ: 1500 TABLET, EXTENDED RELEASE ORAL at 06:00

## 2018-08-20 RX ADMIN — ACETAMINOPHEN 650 MG: 325 TABLET, FILM COATED ORAL at 14:00

## 2018-08-20 RX ADMIN — CLOPIDOGREL 75 MG: 75 TABLET, FILM COATED ORAL at 06:05

## 2018-08-20 RX ADMIN — MAGNESIUM GLUCONATE 500 MG ORAL TABLET 400 MG: 500 TABLET ORAL at 06:05

## 2018-08-20 RX ADMIN — METOPROLOL SUCCINATE 25 MG: 25 TABLET, EXTENDED RELEASE ORAL at 06:05

## 2018-08-20 ASSESSMENT — PAIN SCALES - GENERAL: PAINLEVEL_OUTOF10: 7

## 2018-08-20 NOTE — PROGRESS NOTES
Spoke with case management patient will be discharged to [facility] facility at 4 p.m.

## 2018-08-20 NOTE — DISCHARGE PLANNING
Received Transport Form @ 114  Spoke to Mariann @ [facility]    Transport is scheduled for [facility] @1400 going to [facility].

## 2018-08-20 NOTE — PROGRESS NOTES
Patient meidcated for back pain, educated on discharge instructions, awaiting transport to go to facility

## 2018-08-20 NOTE — PROGRESS NOTES
Pt without complaints overnight, calling appropriately when in need of ambulating to the restroom (+BM).   MILAN stockings replaced as well.     Will continue to monitor and assess.  Naheed La RN

## 2018-08-20 NOTE — CARE PLAN
Problem: Communication  Goal: The ability to communicate needs accurately and effectively will improve  Outcome: PROGRESSING AS EXPECTED  Patient calls for help appropriately, voices needs

## 2018-08-20 NOTE — PROGRESS NOTES
Patient V paced at 60 on monitor, alert and oriented x4, denies pain, BSx4, lungs clear, no edema, bruising to left face, right arm and left side of abdomen from previous fall, left mastectomy, patient assited to bathroom with SBA x1

## 2018-08-20 NOTE — DISCHARGE PLANNING
Spoke with Ariadna with [facility], transportation pushed back to 1600 per RN NICOLE Stock. Lisa at [facility] notified. Job # [id number]

## 2018-08-20 NOTE — DISCHARGE PLANNING
Agency[facility]Facility Name: [facility]  Spoke To: Yue  Outcome: Declined, Patient has secondary DAVID, billing reasons.

## 2018-08-20 NOTE — PROGRESS NOTES
[facility] Hospitalist Progress Note    Date of Service: 2018    Chief Complaint  86 y.o. female admitted 2018 with weakness    Interval Problem Update  Potassium level is 4.2    She has hx diastolic chf     bnp 657    Patient told today that UTI was successfully treated    Now on bumex 1mg qam    No sob at rest    No chest pain    Afebrile    Potassium slowly rising but still wnl    Consultants/Specialty  none    Disposition    snf likely        Review of Systems   Constitutional: Positive for malaise/fatigue.   HENT: Negative.    Eyes: Negative.    Respiratory: Negative.    Cardiovascular: Negative.    Gastrointestinal: Negative.    Genitourinary: Negative.    Musculoskeletal: Negative.    Skin: Negative.    Neurological: Positive for weakness.   Endo/Heme/Allergies: Negative.    Psychiatric/Behavioral: Negative.    All other systems reviewed and are negative.     Physical Exam  Laboratory/Imaging   Hemodynamics  Temp (24hrs), Av.6 °C (97.9 °F), Min:36.1 °C (96.9 °F), Max:36.9 °C (98.5 °F)   Temperature: 36.9 °C (98.4 °F)  Pulse  Av.1  Min: 60  Max: 96    Blood Pressure : 118/69      Respiratory      Respiration: 17, Pulse Oximetry: 94 %             Fluids    Intake/Output Summary (Last 24 hours) at 18 1836  Last data filed at 18 1400   Gross per 24 hour   Intake              440 ml   Output              100 ml   Net              340 ml       Nutrition  Orders Placed This Encounter   Procedures   • Diet Order Regular     Standing Status:   Standing     Number of Occurrences:   1     Order Specific Question:   Diet:     Answer:   Regular [1]     Physical Exam   Constitutional: She is oriented to person, place, and time. No distress.   HENT:   Head: Normocephalic and atraumatic.   Mouth/Throat: No oropharyngeal exudate.   Eyes: Left eye exhibits no discharge. No scleral icterus.   Neck: No JVD present. No tracheal deviation present. No thyromegaly present.   Cardiovascular: Intact distal  pulses.  Exam reveals no gallop and no friction rub.    No murmur heard.  irregular   Pulmonary/Chest: No respiratory distress. She has no wheezes. She has no rales.   Abdominal: She exhibits no distension. There is no tenderness.   Musculoskeletal: Normal range of motion. She exhibits no edema or deformity.   Neurological: She is alert and oriented to person, place, and time. No cranial nerve deficit. Coordination normal.   Skin: No rash noted. She is not diaphoretic. No erythema. No pallor.       Recent Labs      08/19/18 0918   WBC  2.6*   RBC  3.02*   HEMOGLOBIN  8.9*   HEMATOCRIT  29.3*   MCV  97.0   MCH  29.5   MCHC  30.4*   RDW  72.2*   PLATELETCT  125*   MPV  10.4     Recent Labs      08/17/18   0353  08/17/18   2100  08/18/18   0336  08/19/18 0918   SODIUM  137   --   140  144   POTASSIUM  3.2*  4.0  3.9  4.2   CHLORIDE  96   --   101  106   CO2  33   --   32  30   GLUCOSE  89   --   84  91   BUN  30*   --   32*  32*   CREATININE  1.30   --   1.42*  1.23   CALCIUM  8.6   --   8.6  8.9     Recent Labs      08/17/18   0353  08/18/18   0336  08/19/18   0918   INR  2.30*  2.13*  2.09*     Recent Labs      08/17/18   0353  08/18/18   0336  08/19/18   0918   BNPBTYPENAT  707*  660*  657*              Assessment/Plan     * Weakness- (present on admission)   Assessment & Plan    Pt and ot notes seen     --> refer to snf        Acute cystitis- (present on admission)   Assessment & Plan    +UA that is symptomatic  She is immunocompromised with chronically low WBC  Urine culture __> klebsiella sensitive to rocephin  Empiric IV rocephin x 3 days..completed    Repeat UA( 8/18) due to urinary frequency --> wnl          Hypokalemia- (present on admission)   Assessment & Plan          Replace po    Daily magnesium    Follow bmp            Pancytopenia (HCC)- (present on admission)   Assessment & Plan    She has been seen by Dr. Atkinson, oncology, and likely has myelodysplastic syndrome.  Ms. Egan has  refused a bone marrow biopsy.         Pulmonary hypertension (HCC)- (present on admission)   Assessment & Plan    Chronic pulmonary hypertension.  She is at high risk of volume overload.        CKD (chronic kidney disease) stage 3, GFR 30-59 ml/min- (present on admission)   Assessment & Plan    Avoid nephrotoxins    Follow bmp        Warfarin anticoagulation- (present on admission)   Assessment & Plan    Keep inr 2-3        Hypothyroidism- (present on admission)   Assessment & Plan    Continue synthroid 50 mcg        Chronic diastolic CHF (congestive heart failure) (HCC)- (present on admission)   Assessment & Plan    bumex 1mg po qam             ASCVD (arteriosclerotic cardiovascular disease)- (present on admission)   Assessment & Plan    Stent placed 7/18        Chronic atrial fibrillation (HCC)- (present on admission)   Assessment & Plan    Continue to coumadin and metoprolol  Monitor on tele          Quality-Core Measures   Hunt catheter::  No Hunt  DVT prophylaxis pharmacological::  Warfarin (Coumadin)      Check am bmp, bnp     snf probably on Monday

## 2018-08-20 NOTE — DISCHARGE INSTRUCTIONS
Discharge Instructions    Discharged to other by medical transportation with escort. Discharged via wheelchair, hospital escort: Yes.  Special equipment needed: Not Applicable    Be sure to schedule a follow-up appointment with your primary care doctor or any specialists as instructed.     Discharge Plan:   Influenza Vaccine Indication: Not indicated: Previously immunized this influenza season and > 8 years of age    I understand that a diet low in cholesterol, fat, and sodium is recommended for good health. Unless I have been given specific instructions below for another diet, I accept this instruction as my diet prescription.   Other diet: Regular    Special Instructions: None    · Is patient discharged on Warfarin / Coumadin?       Depression / Suicide Risk    As you are discharged Yes    You are receiving the drug warfarin. Please understand the importance of monitoring warfarin with scheduled PT/INR blood draws.  Follow-up with a call to your personal Doctor's office in 3 days to schedule a PT/INR. .    IMPORTANT: HOW TO USE THIS INFORMATION:  This is a summary and does NOT have all possible information about this product. This information does not assure that this product is safe, effective, or appropriate for you. This information is not individual medical advice and does not substitute for the advice of your health care professional. Always ask your health care professional for complete information about this product and your specific health needs.      WARFARIN - ORAL (WARF-uh-rin)      COMMON BRAND NAME(S): Coumadin      WARNING:  Warfarin can cause very serious (possibly fatal) bleeding. This is more likely to occur when you first start taking this medication or if you take too much warfarin. To decrease your risk for bleeding, your doctor or other health care provider will monitor you closely and check your lab results (INR test) to make sure you are not taking too much warfarin. Keep all medical and  "laboratory appointments. Tell your doctor right away if you notice any signs of serious bleeding. See also Side Effects section.      USES:  This medication is used to treat blood clots (such as in deep vein thrombosis-DVT or pulmonary embolus-PE) and/or to prevent new clots from forming in your body. Preventing harmful blood clots helps to reduce the risk of a stroke or heart attack. Conditions that increase your risk of developing blood clots include a certain type of irregular heart rhythm (atrial fibrillation), heart valve replacement, recent heart attack, and certain surgeries (such as hip/knee replacement). Warfarin is commonly called a \"blood thinner,\" but the more correct term is \"anticoagulant.\" It helps to keep blood flowing smoothly in your body by decreasing the amount of certain substances (clotting proteins) in your blood.      HOW TO USE:  Read the Medication Guide provided by your pharmacist before you start taking warfarin and each time you get a refill. If you have any questions, ask your doctor or pharmacist. Take this medication by mouth with or without food as directed by your doctor or other health care professional, usually once a day. It is very important to take it exactly as directed. Do not increase the dose, take it more frequently, or stop using it unless directed by your doctor. Dosage is based on your medical condition, laboratory tests (such as INR), and response to treatment. Your doctor or other health care provider will monitor you closely while you are taking this medication to determine the right dose for you. Use this medication regularly to get the most benefit from it. To help you remember, take it at the same time each day. It is important to eat a balanced, consistent diet while taking warfarin. Some foods can affect how warfarin works in your body and may affect your treatment and dose. Avoid sudden large increases or decreases in your intake of foods high in vitamin K " (such as broccoli, cauliflower, cabbage, brussels sprouts, kale, spinach, and other green leafy vegetables, liver, green tea, certain vitamin supplements). If you are trying to lose weight, check with your doctor before you try to go on a diet. Cranberry products may also affect how your warfarin works. Limit the amount of cranberry juice (16 ounces/480 milliliters a day) or other cranberry products you may drink or eat.      SIDE EFFECTS:  Nausea, loss of appetite, or stomach/abdominal pain may occur. If any of these effects persist or worsen, tell your doctor or pharmacist promptly. Remember that your doctor has prescribed this medication because he or she has judged that the benefit to you is greater than the risk of side effects. Many people using this medication do not have serious side effects. This medication can cause serious bleeding if it affects your blood clotting proteins too much (shown by unusually high INR lab results). Even if your doctor stops your medication, this risk of bleeding can continue for up to a week. Tell your doctor right away if you have any signs of serious bleeding, including: unusual pain/swelling/discomfort, unusual/easy bruising, prolonged bleeding from cuts or gums, persistent/frequent nosebleeds, unusually heavy/prolonged menstrual flow, pink/dark urine, coughing up blood, vomit that is bloody or looks like coffee grounds, severe headache, dizziness/fainting, unusual or persistent tiredness/weakness, bloody/black/tarry stools, chest pain, shortness of breath, difficulty swallowing. Tell your doctor right away if any of these unlikely but serious side effects occur: persistent nausea/vomiting, severe stomach/abdominal pain, yellowing eyes/skin. This drug rarely has caused very serious (possibly fatal) problems if its effects lead to small blood clots (usually at the beginning of treatment). This can lead to severe skin/tissue damage that may require surgery or amputation if left  untreated. Patients with certain blood conditions (protein C or S deficiency) may be at greater risk. Get medical help right away if any of these rare but serious side effects occur: painful/red/purplish patches on the skin (such as on the toe, breast, abdomen), change in the amount of urine, vision changes, confusion, slurred speech, weakness on one side of the body. A very serious allergic reaction to this drug is rare. However, get medical help right away if you notice any symptoms of a serious allergic reaction, including: rash, itching/swelling (especially of the face/tongue/throat), severe dizziness, trouble breathing. This is not a complete list of possible side effects. If you notice other effects not listed above, contact your doctor or pharmacist. In the [place] - Call your doctor for medical advice about side effects. You may report side effects to FDA at 6-177-TDQ-5509. In [place] - Call your doctor for medical advice about side effects. You may report side effects to [facility] [place] at [phone].      PRECAUTIONS:  Before taking warfarin, tell your doctor or pharmacist if you are allergic to it; or if you have any other allergies. This product may contain inactive ingredients, which can cause allergic reactions or other problems. Talk to your pharmacist for more details. Before using this medication, tell your doctor or pharmacist your medical history, especially of: blood disorders (such as anemia, hemophilia), bleeding problems (such as bleeding of the stomach/intestines, bleeding in the brain), blood vessel disorders (such as aneurysms), recent major injury/surgery, liver disease, alcohol use, mental/mood disorders (including memory problems), frequent falls/injuries. It is important that all your doctors and dentists know that you take warfarin. Before having surgery or any medical/dental procedures, tell your doctor or dentist that you are taking this medication and about all the products you use  (including prescription drugs, nonprescription drugs, and herbal products). Avoid getting injections into the muscles. If you must have an injection into a muscle (for example, a flu shot), it should be given in the arm. This way, it will be easier to check for bleeding and/or apply pressure bandages. This medication may cause stomach bleeding. Daily use of alcohol while using this medicine will increase your risk for stomach bleeding and may also affect how this medication works. Limit or avoid alcoholic beverages. If you have not been eating well, if you have an illness or infection that causes fever, vomiting, or diarrhea for more than 2 days, or if you start using any antibiotic medications, contact your doctor or pharmacist immediately because these conditions can affect how warfarin works. This medication can cause heavy bleeding. To lower the chance of getting cut, bruised, or injured, use great caution with sharp objects like safety razors and nail cutters. Use an electric razor when shaving and a soft toothbrush when brushing your teeth. Avoid activities such as contact sports. If you fall or injure yourself, especially if you hit your head, call your doctor immediately. Your doctor may need to check you. The Food & Drug Administration has stated that generic warfarin products are interchangeable. However, consult your doctor or pharmacist before switching warfarin products. Be careful not to take more than one medication that contains warfarin unless specifically directed by the doctor or health care provider who is monitoring your warfarin treatment. Older adults may be at greater risk for bleeding while using this drug. This medication is not recommended for use during pregnancy because of serious (possibly fatal) harm to an unborn baby. Discuss the use of reliable forms of birth control with your doctor. If you become pregnant or think you may be pregnant, tell your doctor immediately. If you are  "planning pregnancy, discuss a plan for managing your condition with your doctor before you become pregnant. Your doctor may switch the type of medication you use during pregnancy. Very small amounts of this medication may pass into breast milk but is unlikely to harm a nursing infant. Consult your doctor before breast-feeding.      DRUG INTERACTIONS:  Drug interactions may change how your medications work or increase your risk for serious side effects. This document does not contain all possible drug interactions. Keep a list of all the products you use (including prescription/nonprescription drugs and herbal products) and share it with your doctor and pharmacist. Do not start, stop, or change the dosage of any medicines without your doctor's approval. Warfarin interacts with many prescription, nonprescription, vitamin, and herbal products. This includes medications that are applied to the skin or inside the vagina or rectum. The interactions with warfarin usually result in an increase or decrease in the \"blood-thinning\" (anticoagulant) effect. Your doctor or other health care professional should closely monitor you to prevent serious bleeding or clotting problems. While taking warfarin, it is very important to tell your doctor or pharmacist of any changes in medications, vitamins, or herbal products that you are taking. Some products that may interact with this drug include: capecitabine, imatinib, mifepristone. Aspirin, aspirin-like drugs (salicylates), and nonsteroidal anti-inflammatory drugs (NSAIDs such as ibuprofen, naproxen, celecoxib) may have effects similar to warfarin. These drugs may increase the risk of bleeding problems if taken during treatment with warfarin. Carefully check all prescription/nonprescription product labels (including drugs applied to the skin such as pain-relieving creams) since the products may contain NSAIDs or salicylates. Talk to your doctor about using a different medication (such " as acetaminophen) to treat pain/fever. Low-dose aspirin and related drugs (such as clopidogrel, ticlopidine) should be continued if prescribed by your doctor for specific medical reasons such as heart attack or stroke prevention. Consult your doctor or pharmacist for more details. Many herbal products interact with warfarin. Tell your doctor before taking any herbal products, especially bromelains, coenzyme Q10, cranberry, danshen, dong quai, fenugreek, garlic, ginkgo biloba, ginseng, and Diego's wort, among others. This medication may interfere with a certain laboratory test to measure theophylline levels, possibly causing false test results. Make sure laboratory personnel and all your doctors know you use this drug.      OVERDOSE:  If overdose is suspected, contact a poison control center or emergency room immediately. [place] residents can call the [place] National Poison Hotline at 1-253.268.7068. [place] residents can call a provincial poison control center. Symptoms of overdose may include: bloody/black/tarry stools, pink/dark urine, unusual/prolonged bleeding.      NOTES:  Do not share this medication with others. Laboratory and/or medical tests (such as INR, complete blood count) must be performed periodically to monitor your progress or check for side effects. Consult your doctor for more details.      MISSED DOSE:  For the best possible benefit, do not miss any doses. If you do miss a dose and remember on the same day, take it as soon as you remember. If you remember on the next day, skip the missed dose and resume your usual dosing schedule. Do not double the dose to catch up because this could increase your risk for bleeding. Keep a record of missed doses to give to your doctor or pharmacist. Contact your doctor or pharmacist if you miss 2 or more doses in a row.      STORAGE:  Store at room temperature away from light and moisture. Do not store in the bathroom. Keep all medications away from children and pets.  Do not flush medications down the toilet or pour them into a drain unless instructed to do so. Properly discard this product when it is  or no longer needed. Consult your pharmacist or local waste disposal company for more details about how to safely discard your product.      MEDICAL ALERT:  Your condition and medication can cause complications in a medical emergency. For information about enrolling in MedicAlert, call 1-602.437.9568 ([place]) or 1-455.632.8832 ([place]).      Information last revised 2010 Copyright(c)  [employer]         from this [facility] facility, it is important to learn how to keep safe from harming yourself.    Recognize the warning signs:  · Abrupt changes in personality, positive or negative- including increase in energy   · Giving away possessions  · Change in eating patterns- significant weight changes-  positive or negative  · Change in sleeping patterns- unable to sleep or sleeping all the time   · Unwillingness or inability to communicate  · Depression  · Unusual sadness, discouragement and loneliness  · Talk of wanting to die  · Neglect of personal appearance   · Rebelliousness- reckless behavior  · Withdrawal from people/activities they love  · Confusion- inability to concentrate     If you or a loved one observes any of these behaviors or has concerns about self-harm, here's what you can do:  · Talk about it- your feelings and reasons for harming yourself  · Remove any means that you might use to hurt yourself (examples: pills, rope, extension cords, firearm)  · Get professional help from the community (Mental Health, Substance Abuse, psychological counseling)  · Do not be alone:Call your Safe Contact- someone whom you trust who will be there for you.  · Call your local CRISIS HOTLINE 459-5294 or 223-711-8348  · Call your local Children's Mobile Crisis Response Team [place] (287) 049-2582 or www.[place]  · Call the toll free National  Suicide Prevention Hotlines   · National Suicide Prevention Lifeline 890-281-FMTA (1912)  · [facility] Line Network 800-SUICIDE (818-1702)    Asymptomatic Bacteriuria, Female  Asymptomatic bacteriuria is the presence of a large number of bacteria in your urine without the usual symptoms of burning or frequent urination. The following conditions increase the risk of asymptomatic bacteriuria:  · Diabetes mellitus.  · Advanced age.  · Pregnancy in the first trimester.  · Kidney stones.  · Kidney transplants.  · Leaky kidney tube valve in young children (reflux).    Treatment for this condition is not needed in most people and can lead to other problems such as too much yeast and growth of resistant bacteria. However, some people, such as pregnant women, do need treatment to prevent kidney infection. Asymptomatic bacteriuria in pregnancy is also associated with fetal growth restriction, premature labor, and  death.  HOME CARE INSTRUCTIONS  Monitor your condition for any changes. The following actions may help to relieve any discomfort you are feeling:  · Drink enough water and fluids to keep your urine clear or pale yellow. Go to the bathroom more often to keep your bladder empty.  · Keep the area around your vagina and rectum clean. Wipe yourself from front to back after urinating.  SEEK IMMEDIATE MEDICAL CARE IF:  · You develop signs of an infection such as:  ¨ Burning with urination.  ¨ Frequency of voiding.  ¨ Back pain.  ¨ Fever.  · You have blood in the urine.  · You develop a fever.  MAKE SURE YOU:  · Understand these instructions.  · Will watch your condition.  · Will get help right away if you are not doing well or get worse.  This information is not intended to replace advice given to you by your health care provider. Make sure you discuss any questions you have with your health care provider.  Document Released: 2006 Document Revised: 2016 Document Reviewed: 2014  [employer]  Interactive Patient Education © 2017 [employer]  Weakness  Weakness is a lack of strength. It may be felt all over the body (generalized) or in one specific part of the body (focal). Some causes of weakness can be serious. You may need further medical evaluation, especially if you are elderly or you have a history of immunosuppression (such as chemotherapy or HIV), kidney disease, heart disease, or diabetes.  CAUSES   Weakness can be caused by many different things, including:  · Infection.  · Physical exhaustion.  · Internal bleeding or other blood loss that results in a lack of red blood cells (anemia).  · Dehydration. This cause is more common in elderly people.  · Side effects or electrolyte abnormalities from medicines, such as pain medicines or sedatives.  · Emotional distress, anxiety, or depression.  · Circulation problems, especially severe peripheral arterial disease.  · Heart disease, such as rapid atrial fibrillation, bradycardia, or heart failure.  · Nervous system disorders, such as Guillain-Barré syndrome, multiple sclerosis, or stroke.  DIAGNOSIS   To find the cause of your weakness, your caregiver will take your history and perform a physical exam. Lab tests or X-rays may also be ordered, if needed.  TREATMENT   Treatment of weakness depends on the cause of your symptoms and can vary greatly.  HOME CARE INSTRUCTIONS   · Rest as needed.  · Eat a well-balanced diet.  · Try to get some exercise every day.  · Only take over-the-counter or prescription medicines as directed by your caregiver.  SEEK MEDICAL CARE IF:   · Your weakness seems to be getting worse or spreads to other parts of your body.  · You develop new aches or pains.  SEEK IMMEDIATE MEDICAL CARE IF:   · You cannot perform your normal daily activities, such as getting dressed and feeding yourself.  · You cannot walk up and down stairs, or you feel exhausted when you do so.  · You have shortness of breath or chest pain.  · You have  difficulty moving parts of your body.  · You have weakness in only one area of the body or on only one side of the body.  · You have a fever.  · You have trouble speaking or swallowing.  · You cannot control your bladder or bowel movements.  · You have black or bloody vomit or stools.  MAKE SURE YOU:  · Understand these instructions.  · Will watch your condition.  · Will get help right away if you are not doing well or get worse.  This information is not intended to replace advice given to you by your health care provider. Make sure you discuss any questions you have with your health care provider.  Document Released: 12/18/2006 Document Revised: 06/18/2013 Document Reviewed: 10/07/2016  [employer] Interactive Patient Education © 2017 [employer]  Hypokalemia  Hypokalemia means that the amount of potassium in the blood is lower than normal. Potassium is a chemical that helps regulate the amount of fluid in the body (electrolyte). It also stimulates muscle tightening (contraction) and helps nerves work properly. Normally, most of the body’s potassium is inside of cells, and only a very small amount is in the blood. Because the amount in the blood is so small, minor changes to potassium levels in the blood can be life-threatening.  What are the causes?  This condition may be caused by:  · Antibiotic medicine.  · Diarrhea or vomiting. Taking too much of a medicine that helps you have a bowel movement (laxative) can cause diarrhea and lead to hypokalemia.  · Chronic kidney disease (CKD).  · Medicines that help the body get rid of excess fluid (diuretics).  · Eating disorders, such as bulimia.  · Low magnesium levels in the body.  · Sweating a lot.  What are the signs or symptoms?  Symptoms of this condition include:  · Weakness.  · Constipation.  · Fatigue.  · Muscle cramps.  · Mental confusion.  · Skipped heartbeats or irregular heartbeat (palpitations).  · Tingling or numbness.  How is this diagnosed?  This condition  is diagnosed with a blood test.  How is this treated?  Hypokalemia can be treated by taking potassium supplements by mouth or adjusting the medicines that you take. Treatment may also include eating more foods that contain a lot of potassium. If your potassium level is very low, you may need to get potassium through an IV tube in one of your veins and be monitored in the hospital.  Follow these instructions at home:  · Take over-the-counter and prescription medicines only as told by your health care provider. This includes vitamins and supplements.  · Eat a healthy diet. A healthy diet includes fresh fruits and vegetables, whole grains, healthy fats, and lean proteins.  · If instructed, eat more foods that contain a lot of potassium, such as:  ¨ Nuts, such as peanuts and pistachios.  ¨ Seeds, such as sunflower seeds and pumpkin seeds.  ¨ Peas, lentils, and lima beans.  ¨ Whole grain and bran cereals and breads.  ¨ Fresh fruits and vegetables, such as apricots, avocado, bananas, cantaloupe, kiwi, oranges, tomatoes, asparagus, and potatoes.  ¨ Orange juice.  ¨ Tomato juice.  ¨ Red meats.  ¨ Yogurt.  · Keep all follow-up visits as told by your health care provider. This is important.  Contact a health care provider if:  · You have weakness that gets worse.  · You feel your heart pounding or racing.  · You vomit.  · You have diarrhea.  · You have diabetes (diabetes mellitus) and you have trouble keeping your blood sugar (glucose) in your target range.  Get help right away if:  · You have chest pain.  · You have shortness of breath.  · You have vomiting or diarrhea that lasts for more than 2 days.  · You faint.  This information is not intended to replace advice given to you by your health care provider. Make sure you discuss any questions you have with your health care provider.  Document Released: 12/18/2006 Document Revised: 08/05/2017 Document Reviewed: 08/05/2017  [employer] Interactive Patient Education © 2017  [employer]

## 2018-08-20 NOTE — DISCHARGE SUMMARY
Discharge Summary    CHIEF COMPLAINT ON ADMISSION  Chief Complaint   Patient presents with   • Fall     Patient was visited by home health and found on floor. Patient was on paige for couple hours. Pt states she had just lost her balance and fell and was to weak to get up. Pt has hx of CHF and kidney disease. Pt denies cp and sob at this time. No LOC. VSS.       Reason for Admission  Hypokalemia     CODE STATUS  DNAR/DNI    HPI & HOSPITAL COURSE  86 y.o. female who presented 8/14/2018 with a ground level fall and inability to get up. Ms. Egan has a history of CAD and diastolic dysfunction that was recently admitted from 8/10 through 8/11 due to weakness and a fall.  She is found to have volume overload and was placed on IV diuretics.  She had extensive workup and is discharged home in stable condition.  This morning, she was bending over to pick something off the floor, and then fell backwards landing on her buttocks.  She was too weak to get up.  A home health nurse came by to check on her and when she found that the door was locked and nobody is answering she called Ms. Egan's granddaughter who came over and open the door.  They found her on the floor.  She is brought to the emergency room where she is found to have a markedly low potassium at 2.4 as well as a urinary tract infection and she will be admitted for IV antibiotics potassium supplementation and further workup.  In the emergency room, her granddaughter, son, and daughter-in-law are at bedside and we discussed her condition.  They are interested in looking at assisted living prospects.    We held diuretics for several days. We replaced potassium iv and po. Once potassium was normal, we assessed her volume status and re initiated and titrated Bumex, currently 1 mg po q AM. She was given po magnesium to facilitate potassium replacement. We treated an klebsiella UTI during hospital stay.      If for any reason bumex is stopped, or decreased/ increased-  "po potassium replacement should be adjusted accordingly    She should have f/u potassium level in 3-4 days       Therefore, she is discharged in good and stable condition to home with close outpatient follow-up.    The patient met 2-midnight criteria for an inpatient stay at the time of discharge.      FOLLOW UP ITEMS POST DISCHARGE      DISCHARGE DIAGNOSES  Principal Problem:    Weakness POA: Yes  Active Problems:    Chronic atrial fibrillation (HCC) POA: Yes      Overview: Overview:       History of AF, Coumadin initiated 03/2015.              Last Assessment & Plan:       Chronic atrial fibrillation for which patient is asymptomatic.  Rate is       well controlled and she remained therapeutically anticoagulated with       warfarin as monitored by the [facility] anticoagulation clinic.    ASCVD (arteriosclerotic cardiovascular disease) POA: Yes      Overview: Overview:       Hx lists \"heart attack\" in 2001            Last Assessment & Plan:       On Coumadin, beta blocker.  No CVD benefit of statin.    Chronic diastolic CHF (congestive heart failure) (HCC) POA: Yes      Overview: Overview:       Echo (06/14) shows normal LV size and fxn, but mod LV hypertrophy.  Has EF       of 63%.  Severe diastolic dysfunction            Last Assessment & Plan:       Heart failure with preserved ejection fraction, severe diastolic       dysfunction and moderate to severe TR with severe pulmonary hypertension       noted per echocardiogram 2/2017.            Currently stable and euvolemic on exam today.  NYHA class II-III, stage C.        Functionally most limited by exertional shortness of breath in the       setting of COPD.  Blood pressures are well controlled.  She continues to       be well compensated with current diuretic therapy, torsemide 40 mg twice       daily.  Routine surveillance laboratories are ordered today.            Heart failure self-management is reviewed and reinforced.  At this time,       she will " follow-up with [facility] cardiology on an as-needed basis as she       is moving to [place] next month to be closer to family but is welcome to       call in the meantime for any concerns.    Hypothyroidism POA: Yes      Overview: Last Assessment & Plan:       On thyroid replacement, normal labs 11/2017.    Warfarin anticoagulation POA: Yes      Overview: Last Assessment & Plan:       Noted    CKD (chronic kidney disease) stage 3, GFR 30-59 ml/min POA: Yes    Pulmonary hypertension (HCC) POA: Yes    Pancytopenia (HCC) POA: Yes  Resolved Problems:    Hypokalemia POA: Yes    Acute cystitis POA: Yes    Chronic diastolic congestive heart failure (HCC) POA: Yes      FOLLOW UP  Future Appointments  Date Time Provider Department Center   8/21/2018 11:35 AM LAB SKILLED NURSING [facility] None   8/22/2018 2:00 PM Neela Awad M.D. [facility] [address]   8/27/2018 12:40 PM ELIZABETH Patel [facility]   8/28/2018 3:30 PM Jose Simms M.D. [facility] None   9/4/2018 11:20 AM Krunal Atkinson M.D. OMG None   10/17/2018 2:20 PM Michael J Bloch, M.D. VMED None   10/31/2018 9:45 AM PULMONARY DX 1 [address]   10/31/2018 10:00 AM PFT-RM1 PULM None   10/31/2018 11:00 AM A Rotation PULM None   11/27/2018 10:00 AM PACER CHECK-CAM B [facility] None     [facility] SKILLED NURSING  [facility] [facility] [address]          MEDICATIONS ON DISCHARGE     Medication List      START taking these medications      Instructions   calcium carbonate 500 MG Chew  Commonly known as:  TUMS   Take 1 Tab by mouth every four hours as needed (heartburn).  Dose:  500 mg     magnesium oxide 400 (241.3 Mg) MG Tabs tablet  Commonly known as:  MAG-OX   Take 1 Tab by mouth every day.  Dose:  400 mg        CHANGE how you take these medications      Instructions   bumetanide 1 MG Tabs  What changed:  · medication strength  · how much to take  · when to take this  Commonly known as:  BUMEX   Take 1 Tab by mouth every day.  Dose:  1 mg     gabapentin 300 MG  Caps  What changed:  how much to take  Commonly known as:  NEURONTIN   Take 2 Caps by mouth every evening.  Dose:  600 mg     potassium chloride SA 20 MEQ Tbcr  What changed:  · how much to take  · when to take this  · additional instructions  Commonly known as:  Kdur   Take 2 Tabs by mouth 2 times a day.  Dose:  40 mEq        CONTINUE taking these medications      Instructions   acetaminophen 500 MG Tabs  Commonly known as:  TYLENOL   Take 1,000 mg by mouth 1 time daily as needed for Mild Pain.  Dose:  1000 mg     allopurinol 100 MG Tabs  Commonly known as:  ZYLOPRIM   Take 100 mg by mouth 2 times a day.  Dose:  100 mg     ascorbic acid 500 MG tablet  Commonly known as:  VITAMIN C   Take 1 Tab by mouth every day.  Dose:  500 mg     Calcium-Vitamin D 500-125 MG-UNIT Tabs   Take 1 tablet by mouth every morning. 600mg - 400 iu  Dose:  1 tablet     clopidogrel 75 MG Tabs  Commonly known as:  PLAVIX   Take 1 Tab by mouth every day.  Dose:  75 mg     * cyanocobalamin 1000 MCG Tabs  Commonly known as:  VITAMIN B12   Take 1 Tab by mouth every day.  Dose:  1000 mcg     * cyanocobalamin 1000 MCG/ML Soln  Commonly known as:  VITAMIN B-12   1 mL by Intramuscular route every 30 days for 30 days.  Dose:  1000 mcg     ferrous gluconate 324 (38 Fe) MG Tabs  Commonly known as:  FERGON   Take 1 Tab by mouth 3 times a day, with meals for 30 days.  Dose:  324 mg     levothyroxine 50 MCG Tabs  Commonly known as:  SYNTHROID   Take 1 Tab by mouth Every morning on an empty stomach.  Dose:  50 mcg     metoprolol SR 25 MG Tb24  Commonly known as:  TOPROL XL   Take 25 mg by mouth every morning.  Dose:  25 mg     omeprazole 20 MG delayed-release capsule  Commonly known as:  PRILOSEC   Take 20 mg by mouth 2 times a day.  Dose:  20 mg     rosuvastatin 10 MG Tabs  Commonly known as:  CRESTOR   Take 1 Tab by mouth every evening.  Dose:  10 mg     sertraline 50 MG Tabs  Commonly known as:  ZOLOFT   Take 25 mg by mouth every bedtime.  Dose:  25  mg     topiramate 25 MG Tabs  Commonly known as:  TOPAMAX   Take 1 Tab by mouth 2 times a day.  Dose:  25 mg     warfarin 2.5 MG Tabs  Commonly known as:  COUMADIN   Take 2.5-3.75 mg by mouth every evening. 2.5 mg on Mon/Wed 3.75 all other days  Dose:  2.5-3.75 mg        * This list has 2 medication(s) that are the same as other medications prescribed for you. Read the directions carefully, and ask your doctor or other care provider to review them with you.            STOP taking these medications    metOLazone 2.5 MG Tabs  Commonly known as:  ZAROXOLYN            Allergies  Allergies   Allergen Reactions   • Codeine Vomiting   • Bloodless        DIET  Orders Placed This Encounter   Procedures   • Diet Order Regular     Standing Status:   Standing     Number of Occurrences:   1     Order Specific Question:   Diet:     Answer:   Regular [1]       ACTIVITY  As tolerated.  Weight bearing as tolerated    LINES, DRAINS, AND WOUNDS  This is an automated list. Peripheral IVs will be removed prior to discharge.  PIV Group Right Antecubital 20g Flexible Catheter;Saline Lock (Active)   Line Secured Taped;Transparent 8/20/2018  8:00 AM   Site Condition / Description Assessed;Patent 8/20/2018  8:00 AM   Dressing Type / Description Transparent;Dry;Intact;Scant;Old / Dried Drainage 8/20/2018  8:00 AM   Dressing Status Observed 8/20/2018  8:00 AM   Saline Locked Yes 8/20/2018  8:00 AM   Infiltration Grading Used by [facility] 0 8/20/2018  8:00 AM   Phlebitis Scale (Used by [facility]) 0 8/20/2018  8:00 AM                     MENTAL STATUS ON TRANSFER  Level of Consciousness: Alert  Orientation : Oriented x 4  Speech: Speech Clear    CONSULTATIONS  none    PROCEDURES  Results for JENIFFER BENEDICT (MRN [id number]) as of 8/20/2018 12:52   Ref. Range 8/14/2018 14:51 8/18/2018 16:11   Color Unknown Yellow Yellow   Character Unknown Clear Clear   Specific Gravity Latest Ref Range: <1.035  1.009 1.012   Ph Latest Ref Range: 5.0 - 8.0  8.0  8.0   Glucose Latest Ref Range: Negative mg/dL Negative Negative   Ketones Latest Ref Range: Negative mg/dL Negative Negative   Bilirubin Latest Ref Range: Negative  Negative Negative   Occult Blood Latest Ref Range: Negative  Negative Negative   Protein Latest Ref Range: Negative mg/dL Negative Negative   Nitrite Latest Ref Range: Negative  Positive (A) Negative   Leukocyte Esterase Latest Ref Range: Negative  Large (A) Negative   Urobilinogen, Urine Latest Ref Range: Negative  1.0 0.2   Micro Urine Req Unknown Microscopic see below   WBC Latest Units: /hpf 20-50 (A)    RBC Latest Units: /hpf 0-2    Epithelial Cells Latest Units: /hpf Negative    Bacteria Latest Ref Range: None /hpf Many (A)    Hyaline Cast Latest Units: /lpf 0-2    ------------------------------------------------------------------------------------------    Order   DX-CHEST-PORTABLE (1 VIEW) [[id number]] (Order [id number])   Patient Information     Patient Name  Andree Egan ([id number]) Sex  Female   1932   Room Bed Code Status Current Location   T715 02 DNAR/DNI 02   Reprint Order Requisition     DX-CHEST-PORTABLE (1 VIEW) (Order #[id number]) on 8/15/18   Last Resulted Time   Wed Aug 15, 2018  4:07 PM   Images     Show images for DX-CHEST-PORTABLE (1 VIEW)   Imaging Result Status     Status: Final result (Exam End: 8/15/2018  3:52 PM)   Imaging Previous Results     Open Hard Copy Result Report (Order #[id number] - DX-CHEST-PORTABLE (1 VIEW))   Narrative       8/15/2018 3:45 PM    HISTORY/REASON FOR EXAM:  Heart problems, weakness, hx of CHF and kidney disease      TECHNIQUE/EXAM DESCRIPTION AND NUMBER OF VIEWS:  Single portable view of the chest.    COMPARISON: 8/10/2018    FINDINGS:      Right-sided pacemaker.  No pulmonary infiltrates or consolidations are noted.  No pleural effusion. No pneumothorax.  Large hiatal hernia.  Stable enlarged cardiopericardial silhouette.     Impression         1. No significant interval change.      -----------------------------------------------------------------------------------------------------------------------------------------------------    Order   ECHOCARDIOGRAM LTD W/O CONT [[id number]] (Order [id number])   Patient Information     Patient Name  Jeniffer Egan ([id number]) Sex  Female   1932   Room Bed Code Status Current Location   T827 02 DNAR/DNI 02   Reprint Order Requisition     ECHOCARDIOGRAM LTD W/O CONT (Order #[id number]) on 18   Linked Documents     View [facility] Report   Last Resulted Time   Sun 2018  2:55 PM   Imaging Result Status     Status: Final result (Collected: 2018  2:04 PM)   Imaging Previous Results     Open Hard Copy Result Report (Order #[id number] - ECHOCARDIOGRAM LTD W/O CONT)   Narrative     Transthoracic  Echo Report      Echocardiography Laboratory    CONCLUSIONS  Prior done 2018.  Compared to prior study, the right-sided   chambers appear dilated.    Left ventricular systolic function is low normal.  Left ventricular ejection fraction is visually estimated to be 50-55%.  Abnormal septal motion consistent with right ventricular (RV) volume   overload and/or elevated RV end-diastolic pressure.  Dilated right ventricle with reduced systolic function.  Severe tricuspid regurgitation. Estimated right ventricular systolic   pressure  is 55 mmHg.  Dilated inferior vena cava without inspiratory collapse.    JENIFFER EGAN  Exam Date:         2018                      14:04  Exam Location:     Inpatient  Priority:          Routine    Ordering Physician:        YANNA RAMON  Referring Physician:  Sonographer:               CHANTEL Briscoe RDCS    Age:    86     Gender:    F  MRN:    [id number]  :    1932  BSA:           Ht (in):           Wt (lb):  Exam Type:     Limited    Indications:     Primary pulmonary hypertension  ICD Codes:       I270    CPT Codes:       38761, 70335, 85497    BP:   133    /   70      HR:  Technical Quality:       Good    MEASUREMENTS  (Male / Female) Normal Values  2D ECHO  LV Diastolic Diameter PLAX        3.6 cm                4.2 - 5.9 / 3.9 - 5.3   cm  LV Systolic Diameter PLAX         2.9 cm                2.1 - 4.0 cm  IVS Diastolic Thickness           1.4 cm                  LVPW Diastolic Thickness          1.3 cm                  Estimated LV Ejection Fraction    50 %                    LV Ejection Fraction MOD BP       55.7 %                >= 55  %  LV Ejection Fraction MOD 4C       40.5 %                  LV Ejection Fraction MOD 2C       68 %                    IVC Diameter                      2.9 cm                    DOPPLER  TR Peak Velocity                  291 cm/s                  * Indicates values subject to auto-interpretation  LV EF:  50    %    FINDINGS  Left Ventricle  Left ventricular systolic function is low normal.  There is apical   hypokinesis.  Left ventricular ejection fraction is visually estimated   to be 50-55%. Abnormal septal motion consistent with right ventricular   (RV) volume overload and/or elevated RV end-diastolic pressure.    Right Ventricle  Dilated right ventricle with reduced systolic function.    Right Atrium  Dilated inferior vena cava without inspiratory collapse.    Left Atrium      Mitral Valve      Aortic Valve      Tricuspid Valve  Severe tricuspid regurgitation. Estimated right ventricular systolic   pressure  is 55 mmHg.    Pulmonic Valve      Pericardium  No pericardial effusion seen.    Aorta                        Freya Avina MD  (Electronically Signed)  Final Date:     08 July 2018                   14:54         LABORATORY  Lab Results   Component Value Date    SODIUM 144 08/20/2018    POTASSIUM 4.2 08/20/2018    CHLORIDE 110 08/20/2018    CO2 28 08/20/2018    GLUCOSE 92 08/20/2018    BUN 30 (H) 08/20/2018    CREATININE 1.31 08/20/2018        Lab Results   Component Value Date    WBC 2.6 (L) 08/19/2018    HEMOGLOBIN 8.9 (L)  08/19/2018    HEMATOCRIT 29.3 (L) 08/19/2018    PLATELETCT 125 (L) 08/19/2018        Total time of the discharge process exceeds 37 minutes.

## 2018-08-20 NOTE — CARE PLAN
Problem: Safety  Goal: Will remain free from falls  Outcome: PROGRESSING AS EXPECTED  Call light in reach, bed in lowest position

## 2018-08-20 NOTE — PROGRESS NOTES
Inpatient Anticoagulation Service Note    Date: 8/20/2018  Reason for Anticoagulation: Atrial Fibrillation   [id number] VASc Score: 5    Hemoglobin Value: (!) 8.9  Hematocrit Value: (!) 29.3  Lab Platelet Value: (!) 125  Target INR: 2.0 to 3.0    INR from last 7 days     Date/Time INR Value    08/19/18 0918 (!)  2.09    08/18/18 0336 (!)  2.13    08/17/18 0353 (!)  2.3    08/16/18 0400 (!)  2.8    08/15/18 0314 (!)  2.62    08/14/18 1305 (!)  2.37        Dose from last 7 days     Date/Time Dose (mg)    08/20/18 1100  2.5    08/19/18 0900  3.75    08/18/18 1400  3.75    08/17/18 1200  3.75    08/16/18 1300  3.75    08/15/18 0800  2.5    08/14/18 1700  3.75        Average Dose (mg):  (Home dose: 2.5mg M/W and 3.75mg AOD per [facility]/medrec)  Significant Interactions: Clopidogrel, Proton Pump Inhibitor, Thyroid Medications (SSRI, Allopurinol)  Bridge Therapy: No     Reversal Agent Administered: Not Applicable  Comments: No new INR this AM. Last 6 INR's have been therapeutic. Reduce the number of INR draws to Tues/Fri. Continue home dose. NNL to assess H/H but no indication of bleeding. No new DDI.     Plan:  Warfarin 2.5 with INR check tomorrow  Education Material Provided?: No (Chronic warfarin)  Pharmacist suggested discharge dosing: Warfarin 2.5mg PO every Mon/Wed and 3.75mg on all other days with close f/u within 3 days         Zehra Arora, PharmD., BCPS

## 2018-08-21 ENCOUNTER — ANTICOAGULATION MONITORING (OUTPATIENT)
Dept: VASCULAR LAB | Facility: MEDICAL CENTER | Age: 83
End: 2018-08-21

## 2018-08-21 ENCOUNTER — HOSPITAL ENCOUNTER (OUTPATIENT)
Dept: LAB | Facility: MEDICAL CENTER | Age: 83
End: 2018-08-21
Attending: INTERNAL MEDICINE
Payer: COMMERCIAL

## 2018-08-21 DIAGNOSIS — I48.20 CHRONIC ATRIAL FIBRILLATION (HCC): ICD-10-CM

## 2018-08-21 LAB
ANION GAP SERPL CALC-SCNC: 7 MMOL/L (ref 0–11.9)
ANISOCYTOSIS BLD QL SMEAR: ABNORMAL
BASOPHILS # BLD AUTO: 1.4 % (ref 0–1.8)
BASOPHILS # BLD: 0.03 K/UL (ref 0–0.12)
BUN SERPL-MCNC: 36 MG/DL (ref 8–22)
CALCIUM SERPL-MCNC: 8.7 MG/DL (ref 8.5–10.5)
CHLORIDE SERPL-SCNC: 113 MMOL/L (ref 96–112)
CO2 SERPL-SCNC: 24 MMOL/L (ref 20–33)
COMMENT 1642: NORMAL
CREAT SERPL-MCNC: 1.21 MG/DL (ref 0.5–1.4)
EOSINOPHIL # BLD AUTO: 0 K/UL (ref 0–0.51)
EOSINOPHIL NFR BLD: 0 % (ref 0–6.9)
ERYTHROCYTE [DISTWIDTH] IN BLOOD BY AUTOMATED COUNT: 71 FL (ref 35.9–50)
GLUCOSE SERPL-MCNC: 90 MG/DL (ref 65–99)
HCT VFR BLD AUTO: 27.7 % (ref 37–47)
HGB BLD-MCNC: 8.5 G/DL (ref 12–16)
IMM GRANULOCYTES # BLD AUTO: 0.01 K/UL (ref 0–0.11)
IMM GRANULOCYTES NFR BLD AUTO: 0.5 % (ref 0–0.9)
INR PPP: 1.9 (ref 2–3.5)
LYMPHOCYTES # BLD AUTO: 0.77 K/UL (ref 1–4.8)
LYMPHOCYTES NFR BLD: 36.3 % (ref 22–41)
MACROCYTES BLD QL SMEAR: ABNORMAL
MCH RBC QN AUTO: 30.2 PG (ref 27–33)
MCHC RBC AUTO-ENTMCNC: 30.7 G/DL (ref 33.6–35)
MCV RBC AUTO: 98.6 FL (ref 81.4–97.8)
MONOCYTES # BLD AUTO: 0.16 K/UL (ref 0–0.85)
MONOCYTES NFR BLD AUTO: 7.5 % (ref 0–13.4)
MORPHOLOGY BLD-IMP: NORMAL
NEUTROPHILS # BLD AUTO: 1.15 K/UL (ref 2–7.15)
NEUTROPHILS NFR BLD: 54.3 % (ref 44–72)
NRBC # BLD AUTO: 0 K/UL
NRBC BLD-RTO: 0 /100 WBC
PLATELET # BLD AUTO: 109 K/UL (ref 164–446)
PLATELET BLD QL SMEAR: NORMAL
PMV BLD AUTO: 10.5 FL (ref 9–12.9)
POIKILOCYTOSIS BLD QL SMEAR: NORMAL
POTASSIUM SERPL-SCNC: 4.4 MMOL/L (ref 3.6–5.5)
RBC # BLD AUTO: 2.81 M/UL (ref 4.2–5.4)
RBC BLD AUTO: PRESENT
SODIUM SERPL-SCNC: 144 MMOL/L (ref 135–145)
WBC # BLD AUTO: 2.1 K/UL (ref 4.8–10.8)

## 2018-08-21 NOTE — PROGRESS NOTES
Anticoagulation Summary  As of 8/21/2018    INR goal:   2.0-3.0   TTR:   48.7 % (3.4 mo)   Today's INR:   No new INR was available at the time of this encounter.   Warfarin maintenance plan:   2.5 mg (2.5 mg x 1) on Mon, Wed; 3.75 mg (2.5 mg x 1.5) all other days   Weekly warfarin total:   23.75 mg   Plan last modified:   Sheyla Benavidez, PharmD (8/3/2018)   Next INR check:   8/27/2018   Target end date:   Indefinite    Indications    Chronic atrial fibrillation (HCC) [I48.2]  Long term current use of anticoagulant (Resolved) [Z79.01]             Anticoagulation Episode Summary     INR check location:       Preferred lab:       Send INR reminders to:       Comments:   [facility]        Anticoagulation Care Providers     Provider Role Specialty Phone number    [facility] Referring  [phone]    Razia Sims D.O. Responsible Family Medicine [phone]    Sebastian Thomas M.D. Responsible Interventional Cardiology [phone]        Anticoagulation Patient Findings      LM on VM but pt is in the hospital  Stay on current dose set by hospital. Please test when out       INR  is-therapeutic.    Changes to current medical/health status since last appt: none.   Denies signs/symptoms of bleeding and/or thrombosis since the last appt.   Denies any interval changes to diet  Denies any interval changes to medications since last appt.   Denies any complications or cost restrictions with current therapy  Follow up appointment in 1 week(s).      Continue current medication regimen.        Patient is on a high risk medication and therefore requires close monitoring and follow up.     CHEST guidelines recommend frequent INR monitoring at regular intervals (a few days up to a max of 12 weeks) to ensure they are on the proper dose of warfarin and not having any complications from therapy.  INRs can dramatically change over a short time period due to diet, medications, and medical conditions.     The  patient instructed to go to the ER for falls with a head injury,  blood in urine or stool or any bleeding that last longer than 20 min.      CHUCKIE Chacon.

## 2018-08-21 NOTE — PROGRESS NOTES
Anticoagulation Summary  As of 8/21/2018    INR goal:   2.0-3.0   TTR:   48.7 % (3.4 mo)   Today's INR:   1.9!   Warfarin maintenance plan:   5 mg (2.5 mg x 2) on Tue, Thu, Sat; 2.5 mg (2.5 mg x 1) all other days   Weekly warfarin total:   25 mg   Plan last modified:   Sheyla Benavidez PharmD (8/21/2018)   Next INR check:   8/24/2018   Target end date:   Indefinite    Indications    Chronic atrial fibrillation (HCC) [I48.2]  Long term current use of anticoagulant (Resolved) [Z79.01]             Anticoagulation Episode Summary     INR check location:       Preferred lab:       Send INR reminders to:       Comments:   [facility]         Anticoagulation Care Providers     Provider Role Specialty Phone number    [facility] Referring  [phone]    Razia Sims D.O. Responsible Family Medicine [phone]    Sebastian Thomas M.D. Responsible Interventional Cardiology [phone]        Anticoagulation Patient Findings  Resident seen at the bedside in RSN.  Resident is sub therapeutic today.  RN reports her dose was missed last night.  Will increase weekly dose by 5%. Follow up in 3 days, to reduce risk of adverse events related to this high risk medication,  Warfarin.    Sheyla Benavidez, PharmD    Resident was recently hospitalized for GLF/weakness.  She apparently fell at home, and was too weak to get up.  She was found by Home Health.

## 2018-08-22 ENCOUNTER — APPOINTMENT (OUTPATIENT)
Dept: NEPHROLOGY | Facility: MEDICAL CENTER | Age: 83
End: 2018-08-22
Payer: MEDICARE

## 2018-08-22 ENCOUNTER — OFFICE VISIT (OUTPATIENT)
Dept: NEPHROLOGY | Facility: MEDICAL CENTER | Age: 83
End: 2018-08-22
Payer: MEDICARE

## 2018-08-22 VITALS
RESPIRATION RATE: 16 BRPM | BODY MASS INDEX: 21.26 KG/M2 | OXYGEN SATURATION: 98 % | WEIGHT: 120 LBS | TEMPERATURE: 97.6 F | HEIGHT: 63 IN | DIASTOLIC BLOOD PRESSURE: 60 MMHG | SYSTOLIC BLOOD PRESSURE: 108 MMHG | HEART RATE: 80 BPM

## 2018-08-22 DIAGNOSIS — I50.32 CHRONIC DIASTOLIC CHF (CONGESTIVE HEART FAILURE) (HCC): ICD-10-CM

## 2018-08-22 DIAGNOSIS — I10 HYPERTENSION, UNSPECIFIED TYPE: ICD-10-CM

## 2018-08-22 DIAGNOSIS — D64.9 ANEMIA, UNSPECIFIED TYPE: ICD-10-CM

## 2018-08-22 DIAGNOSIS — E55.9 VITAMIN D DEFICIENCY: ICD-10-CM

## 2018-08-22 DIAGNOSIS — N39.0 RECURRENT UTI: ICD-10-CM

## 2018-08-22 DIAGNOSIS — M10.9 GOUT, UNSPECIFIED CAUSE, UNSPECIFIED CHRONICITY, UNSPECIFIED SITE: ICD-10-CM

## 2018-08-22 DIAGNOSIS — N18.30 CKD (CHRONIC KIDNEY DISEASE), STAGE III (HCC): ICD-10-CM

## 2018-08-22 DIAGNOSIS — R60.0 BILATERAL LOWER EXTREMITY EDEMA: ICD-10-CM

## 2018-08-22 LAB
BASOPHILS # BLD AUTO: 0.8 % (ref 0–1.8)
BASOPHILS # BLD: 0.02 K/UL (ref 0–0.12)
EOSINOPHIL # BLD AUTO: 0 K/UL (ref 0–0.51)
EOSINOPHIL NFR BLD: 0 % (ref 0–6.9)
ERYTHROCYTE [DISTWIDTH] IN BLOOD BY AUTOMATED COUNT: 70.2 FL (ref 35.9–50)
HCT VFR BLD AUTO: 29 % (ref 37–47)
HGB BLD-MCNC: 8.9 G/DL (ref 12–16)
IMM GRANULOCYTES # BLD AUTO: 0.01 K/UL (ref 0–0.11)
IMM GRANULOCYTES NFR BLD AUTO: 0.4 % (ref 0–0.9)
LYMPHOCYTES # BLD AUTO: 0.87 K/UL (ref 1–4.8)
LYMPHOCYTES NFR BLD: 35.5 % (ref 22–41)
MCH RBC QN AUTO: 30.2 PG (ref 27–33)
MCHC RBC AUTO-ENTMCNC: 30.7 G/DL (ref 33.6–35)
MCV RBC AUTO: 98.3 FL (ref 81.4–97.8)
MONOCYTES # BLD AUTO: 0.18 K/UL (ref 0–0.85)
MONOCYTES NFR BLD AUTO: 7.3 % (ref 0–13.4)
NEUTROPHILS # BLD AUTO: 1.37 K/UL (ref 2–7.15)
NEUTROPHILS NFR BLD: 56 % (ref 44–72)
NRBC # BLD AUTO: 0 K/UL
NRBC BLD-RTO: 0 /100 WBC
PLATELET # BLD AUTO: 119 K/UL (ref 164–446)
PMV BLD AUTO: 10.7 FL (ref 9–12.9)
RBC # BLD AUTO: 2.95 M/UL (ref 4.2–5.4)
WBC # BLD AUTO: 2.5 K/UL (ref 4.8–10.8)

## 2018-08-22 PROCEDURE — 99213 OFFICE O/P EST LOW 20 MIN: CPT | Performed by: INTERNAL MEDICINE

## 2018-08-22 ASSESSMENT — ENCOUNTER SYMPTOMS
HEMOPTYSIS: 0
FLANK PAIN: 0
SHORTNESS OF BREATH: 0
CHILLS: 0
PALPITATIONS: 0
COUGH: 0
FEVER: 0
MYALGIAS: 0
NAUSEA: 0
ABDOMINAL PAIN: 0
ORTHOPNEA: 0
VOMITING: 0
BACK PAIN: 1

## 2018-08-23 NOTE — PROGRESS NOTES
Nephrology Progress Note, Adult, Complex               Author: Neela Delmi Date & Time created: 8/22/2018  5:43 PM     Interval History:  Pt is 87 y/o female with CHF, CKD III, S/P coronary angiogram, developed MARICRUZ / contrast induced nephropathy  Coming today for post hospitalization f/u  Doing better, no complaints except restless legs  Creat level improved to 1.2 -baseline  No dysuria/hematuria/flank pain    Review of Systems:  Review of Systems   Constitutional: Positive for malaise/fatigue. Negative for chills and fever.   HENT: Negative.    Respiratory: Negative for cough, hemoptysis and shortness of breath.    Cardiovascular: Positive for leg swelling. Negative for chest pain, palpitations and orthopnea.   Gastrointestinal: Negative for abdominal pain, nausea and vomiting.   Genitourinary: Negative for dysuria, flank pain, frequency, hematuria and urgency.   Musculoskeletal: Positive for back pain. Negative for myalgias.        Restless legs   Skin: Negative.        Physical Exam:  Physical Exam   Constitutional: She is oriented to person, place, and time. No distress.   HENT:   Head: Normocephalic and atraumatic.   Nose: Nose normal.   Eyes: Conjunctivae are normal. Right eye exhibits no discharge. Left eye exhibits no discharge. No scleral icterus.   Cardiovascular: Normal rate and regular rhythm.    Murmur heard.  Pulmonary/Chest: Effort normal and breath sounds normal. No respiratory distress. She has no wheezes. She has no rales.   Abdominal: Soft. Bowel sounds are normal. She exhibits no distension. There is no tenderness. There is no rebound.   Musculoskeletal: She exhibits edema.   Neurological: She is alert and oriented to person, place, and time.   Skin: She is not diaphoretic.   Psychiatric: She has a normal mood and affect.   Nursing note and vitals reviewed.      Labs:        Invalid input(s): [id number]  Recent Labs      08/20/18   0322   BNPBTYPENAT  681*     Recent Labs      08/20/18 0322   18   0310   SODIUM  144  144   POTASSIUM  4.2  4.4   CHLORIDE  110  113*   CO2  28  24   BUN  30*  36*   CREATININE  1.31  1.21   CALCIUM  8.5  8.7     Recent Labs      18   0322  18   0310   GLUCOSE  92  90     Recent Labs      18   0310  18   1305  18   0240   RBC  2.81*   --   2.95*   HEMOGLOBIN  8.5*   --   8.9*   HEMATOCRIT  27.7*   --   29.0*   PLATELETCT  109*   --   119*   INR   --   1.9   --      Recent Labs      18   0310  18   0240   WBC  2.1*  2.5*   NEUTSPOLYS  54.30  56.00   LYMPHOCYTES  36.30  35.50   MONOCYTES  7.50  7.30   EOSINOPHILS  0.00  0.00   BASOPHILS  1.40  0.80           Hemodynamics:  Temp (24hrs), Av.7 °C (98.1 °F), Min:36.4 °C (97.6 °F), Max:37 °C (98.6 °F)  Temperature: 36.4 °C (97.6 °F)  Pulse  Av.6  Min: 56  Max: 91   Blood Pressure : 108/60     Respiratory:    Respiration: 16, Pulse Oximetry: 98 %           Fluids:    Intake/Output Summary (Last 24 hours) at 07/10/18 1841  Last data filed at 07/10/18 1501   Gross per 24 hour   Intake              240 ml   Output              725 ml   Net             -485 ml     Weight: 54.4 kg (120 lb)      Quality-Core Measures   Reviewed items::  Labs reviewed, Medications reviewed and Radiology images reviewed    Assessment and Plan  1 MARICRUZ on CKD III: sec to BETSY, creat improved to baseline  2 volume overload -controlled with Bumex  3 Anemia: low Hb level -stable  4 Electrolytes well controlled  5.HTN: BP well controlled  Recs:  Low Na diet  Check uric acid  Reduce Neurontin to 300 mg /24 H  F/u in 3 months with CBC, BMP, uric acid , vit D,

## 2018-08-24 ENCOUNTER — ANTICOAGULATION MONITORING (OUTPATIENT)
Dept: VASCULAR LAB | Facility: MEDICAL CENTER | Age: 83
End: 2018-08-24

## 2018-08-24 DIAGNOSIS — I48.20 CHRONIC ATRIAL FIBRILLATION (HCC): ICD-10-CM

## 2018-08-24 LAB — INR PPP: 1.6 (ref 2–3.5)

## 2018-08-24 NOTE — PROGRESS NOTES
Anticoagulation Summary  As of 8/24/2018    INR goal:   2.0-3.0   TTR:   47.2 % (3.5 mo)   Today's INR:   1.6!   Warfarin maintenance plan:   2.5 mg (2.5 mg x 1) on Tue, Thu, Sat; 5 mg (2.5 mg x 2) all other days   Weekly warfarin total:   27.5 mg   Plan last modified:   Vera SanchezD (8/24/2018)   Next INR check:   8/28/2018   Target end date:   Indefinite    Indications    Chronic atrial fibrillation (HCC) [I48.2]  Long term current use of anticoagulant (Resolved) [Z79.01]             Anticoagulation Episode Summary     INR check location:       Preferred lab:       Send INR reminders to:       Comments:   [facility]         Anticoagulation Care Providers     Provider Role Specialty Phone number    [facility] Referring  [phone]    Razia Sims D.O. Responsible Family Medicine [phone]    Sebastian Thomas M.D. Responsible Interventional Cardiology [phone]        Anticoagulation Patient Findings    Resident seen at the bedside.  Resident is sub therapeutic today.  Will increase weekly dose by 10%.  Resident reports increased appetite.  Follow up in 4 days, to reduce risk of adverse events related to this high risk medication,  Warfarin.    Sheyla Benavidez, PharmD

## 2018-08-27 LAB
ANION GAP SERPL CALC-SCNC: 6 MMOL/L (ref 0–11.9)
ANISOCYTOSIS BLD QL SMEAR: ABNORMAL
BASOPHILS # BLD AUTO: 0.9 % (ref 0–1.8)
BASOPHILS # BLD: 0.02 K/UL (ref 0–0.12)
BUN SERPL-MCNC: 44 MG/DL (ref 8–22)
CALCIUM SERPL-MCNC: 8.8 MG/DL (ref 8.5–10.5)
CHLORIDE SERPL-SCNC: 115 MMOL/L (ref 96–112)
CO2 SERPL-SCNC: 22 MMOL/L (ref 20–33)
CREAT SERPL-MCNC: 1.12 MG/DL (ref 0.5–1.4)
EOSINOPHIL # BLD AUTO: 0.07 K/UL (ref 0–0.51)
EOSINOPHIL NFR BLD: 2.7 % (ref 0–6.9)
ERYTHROCYTE [DISTWIDTH] IN BLOOD BY AUTOMATED COUNT: 72.2 FL (ref 35.9–50)
GLUCOSE SERPL-MCNC: 89 MG/DL (ref 65–99)
HCT VFR BLD AUTO: 29 % (ref 37–47)
HGB BLD-MCNC: 8.8 G/DL (ref 12–16)
LYMPHOCYTES # BLD AUTO: 0.54 K/UL (ref 1–4.8)
LYMPHOCYTES NFR BLD: 20.7 % (ref 22–41)
MANUAL DIFF BLD: NORMAL
MCH RBC QN AUTO: 30.4 PG (ref 27–33)
MCHC RBC AUTO-ENTMCNC: 30.3 G/DL (ref 33.6–35)
MCV RBC AUTO: 100.3 FL (ref 81.4–97.8)
MONOCYTES # BLD AUTO: 0.12 K/UL (ref 0–0.85)
MONOCYTES NFR BLD AUTO: 4.5 % (ref 0–13.4)
MORPHOLOGY BLD-IMP: NORMAL
NEUTROPHILS # BLD AUTO: 1.85 K/UL (ref 2–7.15)
NEUTROPHILS NFR BLD: 71.2 % (ref 44–72)
NRBC # BLD AUTO: 0 K/UL
NRBC BLD-RTO: 0 /100 WBC
OVALOCYTES BLD QL SMEAR: NORMAL
PLATELET # BLD AUTO: 117 K/UL (ref 164–446)
PLATELET BLD QL SMEAR: NORMAL
PMV BLD AUTO: 11.4 FL (ref 9–12.9)
POIKILOCYTOSIS BLD QL SMEAR: NORMAL
POTASSIUM SERPL-SCNC: 5.3 MMOL/L (ref 3.6–5.5)
RBC # BLD AUTO: 2.89 M/UL (ref 4.2–5.4)
RBC BLD AUTO: PRESENT
SODIUM SERPL-SCNC: 143 MMOL/L (ref 135–145)
WBC # BLD AUTO: 2.6 K/UL (ref 4.8–10.8)

## 2018-08-28 ENCOUNTER — OFFICE VISIT (OUTPATIENT)
Dept: CARDIOLOGY | Facility: MEDICAL CENTER | Age: 83
End: 2018-08-28
Payer: MEDICARE

## 2018-08-28 ENCOUNTER — ANTICOAGULATION MONITORING (OUTPATIENT)
Dept: VASCULAR LAB | Facility: MEDICAL CENTER | Age: 83
End: 2018-08-28

## 2018-08-28 VITALS
SYSTOLIC BLOOD PRESSURE: 134 MMHG | HEIGHT: 63 IN | BODY MASS INDEX: 23.92 KG/M2 | HEART RATE: 72 BPM | DIASTOLIC BLOOD PRESSURE: 70 MMHG | WEIGHT: 135 LBS | OXYGEN SATURATION: 99 %

## 2018-08-28 DIAGNOSIS — N18.30 CKD (CHRONIC KIDNEY DISEASE) STAGE 3, GFR 30-59 ML/MIN (HCC): ICD-10-CM

## 2018-08-28 DIAGNOSIS — J43.1 PANLOBULAR EMPHYSEMA (HCC): ICD-10-CM

## 2018-08-28 DIAGNOSIS — I48.20 CHRONIC ATRIAL FIBRILLATION (HCC): ICD-10-CM

## 2018-08-28 DIAGNOSIS — Z95.0 CARDIAC PACEMAKER IN SITU: ICD-10-CM

## 2018-08-28 DIAGNOSIS — I50.32 CHRONIC DIASTOLIC CHF (CONGESTIVE HEART FAILURE) (HCC): ICD-10-CM

## 2018-08-28 DIAGNOSIS — R60.0 BILATERAL LOWER EXTREMITY EDEMA: ICD-10-CM

## 2018-08-28 DIAGNOSIS — N18.9 ACUTE RENAL FAILURE SUPERIMPOSED ON CHRONIC KIDNEY DISEASE, UNSPECIFIED CKD STAGE, UNSPECIFIED ACUTE RENAL FAILURE TYPE: ICD-10-CM

## 2018-08-28 DIAGNOSIS — I25.10 ASCVD (ARTERIOSCLEROTIC CARDIOVASCULAR DISEASE): ICD-10-CM

## 2018-08-28 DIAGNOSIS — N17.9 ACUTE RENAL FAILURE SUPERIMPOSED ON CHRONIC KIDNEY DISEASE, UNSPECIFIED CKD STAGE, UNSPECIFIED ACUTE RENAL FAILURE TYPE: ICD-10-CM

## 2018-08-28 LAB — INR PPP: 1.5 (ref 2–3.5)

## 2018-08-28 PROCEDURE — 99214 OFFICE O/P EST MOD 30 MIN: CPT | Performed by: INTERNAL MEDICINE

## 2018-08-28 RX ORDER — CHOLECALCIFEROL (VITAMIN D3) 125 MCG
1 CAPSULE ORAL
COMMUNITY
End: 2018-10-31

## 2018-08-28 RX ORDER — BISACODYL 10 MG
10 SUPPOSITORY, RECTAL RECTAL DAILY
COMMUNITY
End: 2018-09-10

## 2018-08-28 ASSESSMENT — ENCOUNTER SYMPTOMS
SHORTNESS OF BREATH: 0
EYES NEGATIVE: 1
WHEEZING: 0
ORTHOPNEA: 0
STRIDOR: 0
LOSS OF CONSCIOUSNESS: 0
CARDIOVASCULAR NEGATIVE: 1
GASTROINTESTINAL NEGATIVE: 1
COUGH: 0
NEUROLOGICAL NEGATIVE: 1
PALPITATIONS: 0
FEVER: 0
SPUTUM PRODUCTION: 0
CLAUDICATION: 0
BRUISES/BLEEDS EASILY: 0
MUSCULOSKELETAL NEGATIVE: 1
SORE THROAT: 0
HEMOPTYSIS: 0
CONSTITUTIONAL NEGATIVE: 1
WEAKNESS: 0
CHILLS: 0
DIZZINESS: 0
RESPIRATORY NEGATIVE: 1
PND: 0

## 2018-08-28 NOTE — PROGRESS NOTES
Chief Complaint   Patient presents with   • CHF (Chronic)       Subjective:   Andree Egan is a 86 y.o. female who presents today as a follow-up for her contrast induced nephropathy as well as her CAD.  Her creatinine is now down to 1.12.  She continues to have problems with lower extremity edema but appears significant improved from before.  She is only on Bumex once per day.  She is no longer taking the metolazone.  She is no longer having PND or shortness of breath.  She has no chest pain palpitations or PND.    Past Medical History:   Diagnosis Date   • Arthritis     back/neck   • Atrial fibrillation (CMS-HCC) [I48.91] 3/29/2018   • Blood clotting disorder (HCC)    • Breath shortness     water retention   • Cancer (HCC)     breast   • Cataract    • Congestive heart failure (CHF) (HCC) 4/3/2018   • BERTRAND (dyspnea on exertion) 5/4/2018   • Heart burn    • Heart valve disease    • Hemorrhagic disorder (HCC)    • Hiatus hernia syndrome    • Hyperlipidemia    • Hypertension    • Kidney disease    • Long term current use of anticoagulant 3/29/2018   • Pacemaker 4/3/2018   • Pulmonary emphysema (HCC)    • Secondary hypertension 4/3/2018   • Thyroid disease    • Tremors of nervous system 4/3/2018   • Urinary incontinence    • Urinary tract infection      Past Surgical History:   Procedure Laterality Date   • OTHER      left breast mastectomy   • OTHER CARDIAC SURGERY      pacemaker placement     Family History   Problem Relation Age of Onset   • Lung Disease Mother    • Cancer Mother    • No Known Problems Father    • Fainting Neg Hx      Social History     Social History   • Marital status: Single     Spouse name: N/A   • Number of children: N/A   • Years of education: N/A     Occupational History   • Not on file.     Social History Main Topics   • Smoking status: Former Smoker     Packs/day: 0.50     Types: Cigarettes     Quit date: 1/1/1970   • Smokeless tobacco: Never Used   • Alcohol use 1.8 oz/week     3 Glasses  of wine per week      Comment: 1-2 glasses wine before dinner   • Drug use: No   • Sexual activity: Not on file     Other Topics Concern   • Not on file     Social History Narrative   • No narrative on file     Allergies   Allergen Reactions   • Codeine Vomiting   • Bloodless      Outpatient Encounter Prescriptions as of 8/28/2018   Medication Sig Dispense Refill   • Melatonin 5 MG Tab Take  by mouth.     • bisacodyl (DULCOLAX) 10 MG Suppos Insert 10 mg in rectum every day.     • acetaminophen (TYLENOL) 500 MG Tab Take 2 Tabs by mouth 1 time daily as needed for Mild Pain. 30 Tab 0   • calcium carbonate (TUMS) 500 MG Chew Tab Take 1 Tab by mouth every four hours as needed (heartburn). 30 Tab 0   • gabapentin (NEURONTIN) 300 MG Cap Take 2 Caps by mouth every evening. 90 Cap 1   • topiramate (TOPAMAX) 25 MG Tab Take 1 Tab by mouth 2 times a day. 60 Tab 3   • sertraline (ZOLOFT) 50 MG Tab Take 0.5 Tabs by mouth every bedtime. 30 Tab 11   • rosuvastatin (CRESTOR) 10 MG Tab Take 1 Tab by mouth every evening. 30 Tab 11   • metoprolol SR (TOPROL XL) 25 MG TABLET SR 24 HR Take 1 Tab by mouth every morning. 30 Tab 1   • bumetanide (BUMEX) 1 MG Tab Take 1 Tab by mouth every day. 30 Tab 1   • allopurinol (ZYLOPRIM) 100 MG Tab Take 1 Tab by mouth 2 times a day. 30 Tab 1   • cyanocobalamin (VITAMIN B-12) 1000 MCG/ML Solution 1 mL by Intramuscular route every 30 days for 30 days. 1 Vial 0   • cyanocobalamin (VITAMIN B12) 1000 MCG Tab Take 1 Tab by mouth every day. 30 Tab 3   • ferrous gluconate (FERGON) 324 (38 Fe) MG Tab Take 1 Tab by mouth 3 times a day, with meals for 30 days. 90 Tab 0   • Calcium-Vitamin D 500-125 MG-UNIT Tab Take 1 tablet by mouth every morning. 600mg - 400 iu 30 Tab 1   • magnesium oxide (MAG-OX) 400 (241.3 Mg) MG Tab tablet Take 1 Tab by mouth every day. 30 Tab 1   • potassium chloride SA (KDUR) 20 MEQ Tab CR Take 2 Tabs by mouth 2 times a day. 60 Tab 1   • clopidogrel (PLAVIX) 75 MG Tab Take 1 Tab by mouth  "every day. 90 Tab 3   • levothyroxine (SYNTHROID) 50 MCG Tab Take 1 Tab by mouth Every morning on an empty stomach. 90 Tab 3   • omeprazole (PRILOSEC) 20 MG delayed-release capsule Take 1 Cap by mouth 2 times a day. 30 Cap 1   • ascorbic acid (VITAMIN C) 500 MG tablet Take 1 Tab by mouth every day. 30 Tab 3   • warfarin (COUMADIN) 2.5 MG Tab Take 1-1.5 Tabs by mouth every evening. 2.5 mg on Mon/Wed 3.75 all other days (Patient not taking: Reported on 8/28/2018) 30 Tab 3     No facility-administered encounter medications on file as of 8/28/2018.      Review of Systems   Constitutional: Negative.  Negative for chills, fever and malaise/fatigue.   HENT: Negative.  Negative for sore throat.    Eyes: Negative.    Respiratory: Negative.  Negative for cough, hemoptysis, sputum production, shortness of breath, wheezing and stridor.    Cardiovascular: Negative.  Negative for chest pain, palpitations, orthopnea, claudication, leg swelling and PND.   Gastrointestinal: Negative.    Genitourinary: Negative.    Musculoskeletal: Negative.    Skin: Negative.    Neurological: Negative.  Negative for dizziness, loss of consciousness and weakness.   Endo/Heme/Allergies: Negative.  Does not bruise/bleed easily.   All other systems reviewed and are negative.       Objective:   /70   Pulse 72   Ht 1.6 m (5' 3\")   Wt 61.2 kg (135 lb)   LMP  (LMP Unknown)   SpO2 99%   BMI 23.91 kg/m²     Physical Exam   Constitutional: She appears well-developed and well-nourished. No distress.   HENT:   Head: Normocephalic and atraumatic.   Right Ear: External ear normal.   Left Ear: External ear normal.   Nose: Nose normal.   Mouth/Throat: No oropharyngeal exudate.   Eyes: Pupils are equal, round, and reactive to light. Conjunctivae and EOM are normal. Right eye exhibits no discharge. Left eye exhibits no discharge. No scleral icterus.   Neck: Neck supple. No JVD present.   Cardiovascular: Normal rate, regular rhythm and intact distal pulses. "  Exam reveals no gallop and no friction rub.    No murmur heard.  Pulmonary/Chest: Effort normal. No stridor. No respiratory distress. She has no wheezes. She has no rales. She exhibits no tenderness.   Abdominal: Soft. She exhibits no distension. There is no guarding.   Musculoskeletal: Normal range of motion. She exhibits no edema, tenderness or deformity.   Neurological: She is alert. She has normal reflexes. She displays normal reflexes. No cranial nerve deficit. She exhibits normal muscle tone. Coordination normal.   Skin: Skin is warm and dry. No rash noted. She is not diaphoretic. No erythema. No pallor.   Psychiatric: She has a normal mood and affect. Her behavior is normal. Judgment and thought content normal.   Nursing note and vitals reviewed.    Transthoracic Echo Report 5/19/18  Severe bi-atrial enlargement. Normal LV and RV systolic function. LVEF is 55% by visual estimation. Diastolic function difficult to assess.   There is at least mild aortic stenosis but valve is opening. Mean   gradient is 10 mmHg, with V max of 2.01 m/s. RVSP is elevated at 70   mmHg suggesting pulmonary hypertension. No prior study is available for   comparison.      Heart Cath 7/2/2018   POSTPROCEDURE DIAGNOSES:  1.  No evidence of aortic stenosis with 0 mmHg peak to peak gradient, mean gradient   of 8 mmHg, calculated CHRISTIANE of 2.09 cm2.  2.  Coronary artery disease with high grade proximal and high grade mid left   anterior descending artery.  3.  Successful percutaneous transluminal coronary angioplasty/stent placement   of the mid left anterior descending artery with 2.25x12 mm Synergy   drug-eluting stent.  4.  Successful percutaneous transluminal coronary angioplasty/stent placement   of the proximal left anterior descending artery with 2.5x12 mm Synergy   drug-eluting stent.  5.  Normal left ventricular systolic function with ejection fraction of 55%.  6.  Normal left ventricular end-diastolic pressure.  7.  Elevated right  heart pressure with PA systolic pressure of 60 mmHg.  8.  Successful Angio-Seal closure.     Transthoracic Echo Report 7/8/2018  Prior done 05-.  Compared to prior study, the right-sided chambers appear dilated.    Left ventricular systolic function is low normal.  Left ventricular ejection fraction is visually estimated to be 50-55%.  Abnormal septal motion consistent with right ventricular (RV) volume overload and/or elevated RV end-diastolic pressure.  Dilated right ventricle with reduced systolic function.  Severe tricuspid regurgitation. Estimated right ventricular systolic pressure  is 55 mmHg.  Dilated inferior vena cava without inspiratory collapse    Lab Results   Component Value Date/Time    CHOLSTRLTOT 67 (L) 07/09/2018 02:22 AM    LDL 17 07/09/2018 02:22 AM    HDL 40 07/09/2018 02:22 AM    TRIGLYCERIDE 50 07/09/2018 02:22 AM       Lab Results   Component Value Date/Time    SODIUM 143 08/27/2018 03:59 AM    POTASSIUM 5.3 08/27/2018 03:59 AM    CHLORIDE 115 (H) 08/27/2018 03:59 AM    CO2 22 08/27/2018 03:59 AM    GLUCOSE 89 08/27/2018 03:59 AM    BUN 44 (H) 08/27/2018 03:59 AM    CREATININE 1.12 08/27/2018 03:59 AM     Lab Results   Component Value Date/Time    ALKPHOSPHAT 111 (H) 08/14/2018 01:05 PM    ASTSGOT 22 08/14/2018 01:05 PM    ALTSGPT 6 08/14/2018 01:05 PM    TBILIRUBIN 1.2 08/14/2018 01:05 PM        Assessment:     1. Acute renal failure superimposed on chronic kidney disease, unspecified CKD stage, unspecified acute renal failure type (HCC)     2. ASCVD (arteriosclerotic cardiovascular disease)     3. Bilateral lower extremity edema     4. Cardiac pacemaker in situ     5. Chronic atrial fibrillation (HCC)     6. Chronic diastolic CHF (congestive heart failure) (HCC)     7. CKD (chronic kidney disease) stage 3, GFR 30-59 ml/min     8. Panlobular emphysema ([facility])         Medical Decision Making:  Today's Assessment / Status / Plan:     86-year-old female with contrast induced nephropathy  with residual lower extremity edema.  I will increase her Bumex to 1 mg twice per day with a recommendation to repeat her labs in 1 week.  We will see her back in 4 weeks.  Otherwise no changes were medical therapy today    Thank for you allowing me to take part in your patient's care, please call should you have any questions or would like to discuss this patient.

## 2018-08-28 NOTE — LETTER
[facility]   [address] [place] [address]  [place]pin [address]  Phone: [phone]  Fax: [phone]              Andree Egan  4/21/1932    Encounter Date: 8/28/2018    Jose Simms M.D.          PROGRESS NOTE:  Chief Complaint   Patient presents with   • CHF (Chronic)       Subjective:   Andree Egan is a 86 y.o. female who presents today as a follow-up for her contrast induced nephropathy as well as her CAD.  Her creatinine is now down to 1.12.  She continues to have problems with lower extremity edema but appears significant improved from before.  She is only on Bumex once per day.  She is no longer taking the metolazone.  She is no longer having PND or shortness of breath.  She has no chest pain palpitations or PND.    Past Medical History:   Diagnosis Date   • Arthritis     back/neck   • Atrial fibrillation (CMS-HCC) [I48.91] 3/29/2018   • Blood clotting disorder (HCC)    • Breath shortness     water retention   • Cancer (HCC)     breast   • Cataract    • Congestive heart failure (CHF) (HCC) 4/3/2018   • BERTRAND (dyspnea on exertion) 5/4/2018   • Heart burn    • Heart valve disease    • Hemorrhagic disorder (HCC)    • Hiatus hernia syndrome    • Hyperlipidemia    • Hypertension    • Kidney disease    • Long term current use of anticoagulant 3/29/2018   • Pacemaker 4/3/2018   • Pulmonary emphysema (HCC)    • Secondary hypertension 4/3/2018   • Thyroid disease    • Tremors of nervous system 4/3/2018   • Urinary incontinence    • Urinary tract infection      Past Surgical History:   Procedure Laterality Date   • OTHER      left breast mastectomy   • OTHER CARDIAC SURGERY      pacemaker placement     Family History   Problem Relation Age of Onset   • Lung Disease Mother    • Cancer Mother    • No Known Problems Father    • Fainting Neg Hx      Social History     Social History   • Marital status: Single     Spouse name: N/A   • Number of children: N/A   • Years  of education: N/A     Occupational History   • Not on file.     Social History Main Topics   • Smoking status: Former Smoker     Packs/day: 0.50     Types: Cigarettes     Quit date: 1/1/1970   • Smokeless tobacco: Never Used   • Alcohol use 1.8 oz/week     3 Glasses of wine per week      Comment: 1-2 glasses wine before dinner   • Drug use: No   • Sexual activity: Not on file     Other Topics Concern   • Not on file     Social History Narrative   • No narrative on file     Allergies   Allergen Reactions   • Codeine Vomiting   • Bloodless      Outpatient Encounter Prescriptions as of 8/28/2018   Medication Sig Dispense Refill   • Melatonin 5 MG Tab Take  by mouth.     • bisacodyl (DULCOLAX) 10 MG Suppos Insert 10 mg in rectum every day.     • acetaminophen (TYLENOL) 500 MG Tab Take 2 Tabs by mouth 1 time daily as needed for Mild Pain. 30 Tab 0   • calcium carbonate (TUMS) 500 MG Chew Tab Take 1 Tab by mouth every four hours as needed (heartburn). 30 Tab 0   • gabapentin (NEURONTIN) 300 MG Cap Take 2 Caps by mouth every evening. 90 Cap 1   • topiramate (TOPAMAX) 25 MG Tab Take 1 Tab by mouth 2 times a day. 60 Tab 3   • sertraline (ZOLOFT) 50 MG Tab Take 0.5 Tabs by mouth every bedtime. 30 Tab 11   • rosuvastatin (CRESTOR) 10 MG Tab Take 1 Tab by mouth every evening. 30 Tab 11   • metoprolol SR (TOPROL XL) 25 MG TABLET SR 24 HR Take 1 Tab by mouth every morning. 30 Tab 1   • bumetanide (BUMEX) 1 MG Tab Take 1 Tab by mouth every day. 30 Tab 1   • allopurinol (ZYLOPRIM) 100 MG Tab Take 1 Tab by mouth 2 times a day. 30 Tab 1   • cyanocobalamin (VITAMIN B-12) 1000 MCG/ML Solution 1 mL by Intramuscular route every 30 days for 30 days. 1 Vial 0   • cyanocobalamin (VITAMIN B12) 1000 MCG Tab Take 1 Tab by mouth every day. 30 Tab 3   • ferrous gluconate (FERGON) 324 (38 Fe) MG Tab Take 1 Tab by mouth 3 times a day, with meals for 30 days. 90 Tab 0   • Calcium-Vitamin D 500-125 MG-UNIT Tab Take 1 tablet by mouth every morning.  "600mg - 400 iu 30 Tab 1   • magnesium oxide (MAG-OX) 400 (241.3 Mg) MG Tab tablet Take 1 Tab by mouth every day. 30 Tab 1   • potassium chloride SA (KDUR) 20 MEQ Tab CR Take 2 Tabs by mouth 2 times a day. 60 Tab 1   • clopidogrel (PLAVIX) 75 MG Tab Take 1 Tab by mouth every day. 90 Tab 3   • levothyroxine (SYNTHROID) 50 MCG Tab Take 1 Tab by mouth Every morning on an empty stomach. 90 Tab 3   • omeprazole (PRILOSEC) 20 MG delayed-release capsule Take 1 Cap by mouth 2 times a day. 30 Cap 1   • ascorbic acid (VITAMIN C) 500 MG tablet Take 1 Tab by mouth every day. 30 Tab 3   • warfarin (COUMADIN) 2.5 MG Tab Take 1-1.5 Tabs by mouth every evening. 2.5 mg on Mon/Wed 3.75 all other days (Patient not taking: Reported on 8/28/2018) 30 Tab 3     No facility-administered encounter medications on file as of 8/28/2018.      Review of Systems   Constitutional: Negative.  Negative for chills, fever and malaise/fatigue.   HENT: Negative.  Negative for sore throat.    Eyes: Negative.    Respiratory: Negative.  Negative for cough, hemoptysis, sputum production, shortness of breath, wheezing and stridor.    Cardiovascular: Negative.  Negative for chest pain, palpitations, orthopnea, claudication, leg swelling and PND.   Gastrointestinal: Negative.    Genitourinary: Negative.    Musculoskeletal: Negative.    Skin: Negative.    Neurological: Negative.  Negative for dizziness, loss of consciousness and weakness.   Endo/Heme/Allergies: Negative.  Does not bruise/bleed easily.   All other systems reviewed and are negative.       Objective:   /70   Pulse 72   Ht 1.6 m (5' 3\")   Wt 61.2 kg (135 lb)   LMP  (LMP Unknown)   SpO2 99%   BMI 23.91 kg/m²      Physical Exam   Constitutional: She appears well-developed and well-nourished. No distress.   HENT:   Head: Normocephalic and atraumatic.   Right Ear: External ear normal.   Left Ear: External ear normal.   Nose: Nose normal.   Mouth/Throat: No oropharyngeal exudate.   Eyes: " Pupils are equal, round, and reactive to light. Conjunctivae and EOM are normal. Right eye exhibits no discharge. Left eye exhibits no discharge. No scleral icterus.   Neck: Neck supple. No JVD present.   Cardiovascular: Normal rate, regular rhythm and intact distal pulses.  Exam reveals no gallop and no friction rub.    No murmur heard.  Pulmonary/Chest: Effort normal. No stridor. No respiratory distress. She has no wheezes. She has no rales. She exhibits no tenderness.   Abdominal: Soft. She exhibits no distension. There is no guarding.   Musculoskeletal: Normal range of motion. She exhibits no edema, tenderness or deformity.   Neurological: She is alert. She has normal reflexes. She displays normal reflexes. No cranial nerve deficit. She exhibits normal muscle tone. Coordination normal.   Skin: Skin is warm and dry. No rash noted. She is not diaphoretic. No erythema. No pallor.   Psychiatric: She has a normal mood and affect. Her behavior is normal. Judgment and thought content normal.   Nursing note and vitals reviewed.    Transthoracic Echo Report 5/19/18  Severe bi-atrial enlargement. Normal LV and RV systolic function. LVEF is 55% by visual estimation. Diastolic function difficult to assess.   There is at least mild aortic stenosis but valve is opening. Mean   gradient is 10 mmHg, with V max of 2.01 m/s. RVSP is elevated at 70   mmHg suggesting pulmonary hypertension. No prior study is available for   comparison.      Heart Cath 7/2/2018   POSTPROCEDURE DIAGNOSES:  1.  No evidence of aortic stenosis with 0 mmHg peak to peak gradient, mean gradient   of 8 mmHg, calculated CHRISTIANE of 2.09 cm2.  2.  Coronary artery disease with high grade proximal and high grade mid left   anterior descending artery.  3.  Successful percutaneous transluminal coronary angioplasty/stent placement   of the mid left anterior descending artery with 2.25x12 mm Synergy   drug-eluting stent.  4.  Successful percutaneous transluminal  coronary angioplasty/stent placement   of the proximal left anterior descending artery with 2.5x12 mm Synergy   drug-eluting stent.  5.  Normal left ventricular systolic function with ejection fraction of 55%.  6.  Normal left ventricular end-diastolic pressure.  7.  Elevated right heart pressure with PA systolic pressure of 60 mmHg.  8.  Successful Angio-Seal closure.     Transthoracic Echo Report 7/8/2018  Prior done 05-.  Compared to prior study, the right-sided chambers appear dilated.    Left ventricular systolic function is low normal.  Left ventricular ejection fraction is visually estimated to be 50-55%.  Abnormal septal motion consistent with right ventricular (RV) volume overload and/or elevated RV end-diastolic pressure.  Dilated right ventricle with reduced systolic function.  Severe tricuspid regurgitation. Estimated right ventricular systolic pressure  is 55 mmHg.  Dilated inferior vena cava without inspiratory collapse    Lab Results   Component Value Date/Time    CHOLSTRLTOT 67 (L) 07/09/2018 02:22 AM    LDL 17 07/09/2018 02:22 AM    HDL 40 07/09/2018 02:22 AM    TRIGLYCERIDE 50 07/09/2018 02:22 AM       Lab Results   Component Value Date/Time    SODIUM 143 08/27/2018 03:59 AM    POTASSIUM 5.3 08/27/2018 03:59 AM    CHLORIDE 115 (H) 08/27/2018 03:59 AM    CO2 22 08/27/2018 03:59 AM    GLUCOSE 89 08/27/2018 03:59 AM    BUN 44 (H) 08/27/2018 03:59 AM    CREATININE 1.12 08/27/2018 03:59 AM     Lab Results   Component Value Date/Time    ALKPHOSPHAT 111 (H) 08/14/2018 01:05 PM    ASTSGOT 22 08/14/2018 01:05 PM    ALTSGPT 6 08/14/2018 01:05 PM    TBILIRUBIN 1.2 08/14/2018 01:05 PM        Assessment:     1. Acute renal failure superimposed on chronic kidney disease, unspecified CKD stage, unspecified acute renal failure type (HCC)     2. ASCVD (arteriosclerotic cardiovascular disease)     3. Bilateral lower extremity edema     4. Cardiac pacemaker in situ     5. Chronic atrial fibrillation (HCC)        6. Chronic diastolic CHF (congestive heart failure) (HCC)     7. CKD (chronic kidney disease) stage 3, GFR 30-59 ml/min     8. Panlobular emphysema (HCC)         Medical Decision Making:  Today's Assessment / Status / Plan:     86-year-old female with contrast induced nephropathy with residual lower extremity edema.  I will increase her Bumex to 1 mg twice per day with a recommendation to repeat her labs in 1 week.  We will see her back in 4 weeks.  Otherwise no changes were medical therapy today    Thank for you allowing me to take part in your patient's care, please call should you have any questions or would like to discuss this patient.      Yudelka Sanchez, CARA.P.R.N.  [address]  [address]  [facility] [address]  VIA In Basket

## 2018-08-28 NOTE — PROGRESS NOTES
"Anticoagulation Summary  As of 8/28/2018    INR goal:   2.0-3.0   TTR:   45.6 % (3.6 mo)   Today's INR:   1.5!   Warfarin maintenance plan:   2.5 mg (2.5 mg x 1) on Tue, Thu, Sat; 5 mg (2.5 mg x 2) all other days   Weekly warfarin total:   27.5 mg   Plan last modified:   Sheyla Benavidez PharmD (8/24/2018)   Next INR check:   8/31/2018   Target end date:   Indefinite    Indications    Chronic atrial fibrillation (HCC) [I48.2]  Long term current use of anticoagulant (Resolved) [Z79.01]             Anticoagulation Episode Summary     INR check location:       Preferred lab:       Send INR reminders to:       Comments:   [facility]        Anticoagulation Care Providers     Provider Role Specialty Phone number    [facility] Referring  [phone]    Razia Sims D.O. Responsible Family Medicine [phone]    Sebastian Thomas M.D. Responsible Interventional Cardiology [phone]        Anticoagulation Patient Findings    Resident seen at the bedside.  INRs trending downward.  Resident reports \"eating like a little pig\".  Will double warfarin dose and recheck in 3 days.  Sheyla Benavidez, PharmD    "

## 2018-08-31 ENCOUNTER — ANTICOAGULATION MONITORING (OUTPATIENT)
Dept: VASCULAR LAB | Facility: MEDICAL CENTER | Age: 83
End: 2018-08-31

## 2018-08-31 DIAGNOSIS — I48.20 CHRONIC ATRIAL FIBRILLATION (HCC): ICD-10-CM

## 2018-08-31 LAB — INR PPP: 1.7 (ref 2–3.5)

## 2018-08-31 NOTE — PROGRESS NOTES
Anticoagulation Summary  As of 8/31/2018    INR goal:   2.0-3.0   TTR:   44.4 % (3.7 mo)   Today's INR:   1.7!   Warfarin maintenance plan:   5 mg (2.5 mg x 2) every day   Weekly warfarin total:   35 mg   Plan last modified:   Sheyla Benavidez, PharmD (8/31/2018)   Next INR check:   9/4/2018   Target end date:   Indefinite    Indications    Chronic atrial fibrillation (HCC) [I48.2]  Long term current use of anticoagulant (Resolved) [Z79.01]             Anticoagulation Episode Summary     INR check location:       Preferred lab:       Send INR reminders to:       Comments:         Anticoagulation Care Providers     Provider Role Specialty Phone number    [facility] Referring  [phone]    Razia Sims D.O. Responsible Family Medicine [phone]    Sebastian Thomas M.D. Responsible Interventional Cardiology [phone]        Anticoagulation Patient Findings    Resident seen at the bedside.  INR remains sub therapeutic today, although improving.  Resident reports eating much more, and increased appetite.  Will bolus dose with 7.5mg tonight, then resume 35mg/week. Follow up in 4 days, to reduce risk of adverse events related to this high risk medication,  Warfarin.    Sheyla Benavidez, PharmD

## 2018-09-01 ENCOUNTER — HOSPITAL ENCOUNTER (OUTPATIENT)
Dept: LAB | Facility: MEDICAL CENTER | Age: 83
End: 2018-09-01
Attending: INTERNAL MEDICINE
Payer: MEDICARE

## 2018-09-04 ENCOUNTER — ANTICOAGULATION MONITORING (OUTPATIENT)
Dept: VASCULAR LAB | Facility: MEDICAL CENTER | Age: 83
End: 2018-09-04

## 2018-09-04 DIAGNOSIS — I48.20 CHRONIC ATRIAL FIBRILLATION (HCC): ICD-10-CM

## 2018-09-04 LAB
ANION GAP SERPL CALC-SCNC: 9 MMOL/L (ref 0–11.9)
BUN SERPL-MCNC: 40 MG/DL (ref 8–22)
CALCIUM SERPL-MCNC: 9.2 MG/DL (ref 8.5–10.5)
CHLORIDE SERPL-SCNC: 105 MMOL/L (ref 96–112)
CO2 SERPL-SCNC: 29 MMOL/L (ref 20–33)
CREAT SERPL-MCNC: 1.32 MG/DL (ref 0.5–1.4)
GLUCOSE SERPL-MCNC: 91 MG/DL (ref 65–99)
INR PPP: 2.6 (ref 2–3.5)
POTASSIUM SERPL-SCNC: 4.6 MMOL/L (ref 3.6–5.5)
SODIUM SERPL-SCNC: 143 MMOL/L (ref 135–145)

## 2018-09-04 PROCEDURE — 80048 BASIC METABOLIC PNL TOTAL CA: CPT

## 2018-09-04 NOTE — PROGRESS NOTES
Anticoagulation Summary  As of 9/4/2018    INR goal:   2.0-3.0   TTR:   45.1 % (3.8 mo)   Today's INR:   2.6   Warfarin maintenance plan:   5 mg (2.5 mg x 2) every day   Weekly warfarin total:   35 mg   Plan last modified:   Sheyla Benavidez, PharmD (8/31/2018)   Next INR check:   9/7/2018   Target end date:   Indefinite    Indications    Chronic atrial fibrillation (HCC) [I48.2]  Long term current use of anticoagulant (Resolved) [Z79.01]             Anticoagulation Episode Summary     INR check location:       Preferred lab:       Send INR reminders to:       Comments:         Anticoagulation Care Providers     Provider Role Specialty Phone number    [facility] Referring  [phone]    Razia Sims D.O. Responsible Family Medicine [phone]    Sebastian Thomas M.D. Responsible Interventional Cardiology [phone]        Anticoagulation Patient Findings    Resident seen at the bedside.  INR is at goal. Pt is to continue with current warfarin dosing regimen.  Follow up in 3 days, to reduce risk of adverse events related to this high risk medication,  Warfarin.    Sheyla Benavidez, PharmD

## 2018-09-06 ENCOUNTER — OFFICE VISIT (OUTPATIENT)
Dept: HEMATOLOGY ONCOLOGY | Facility: MEDICAL CENTER | Age: 83
End: 2018-09-06
Payer: MEDICARE

## 2018-09-06 VITALS
DIASTOLIC BLOOD PRESSURE: 78 MMHG | OXYGEN SATURATION: 97 % | HEIGHT: 63 IN | WEIGHT: 131.28 LBS | RESPIRATION RATE: 16 BRPM | SYSTOLIC BLOOD PRESSURE: 134 MMHG | TEMPERATURE: 99.2 F | BODY MASS INDEX: 23.26 KG/M2 | HEART RATE: 76 BPM

## 2018-09-06 DIAGNOSIS — D61.818 PANCYTOPENIA (HCC): ICD-10-CM

## 2018-09-06 PROCEDURE — 99214 OFFICE O/P EST MOD 30 MIN: CPT | Performed by: INTERNAL MEDICINE

## 2018-09-06 ASSESSMENT — PAIN SCALES - GENERAL: PAINLEVEL: NO PAIN

## 2018-09-07 ENCOUNTER — ANTICOAGULATION MONITORING (OUTPATIENT)
Dept: VASCULAR LAB | Facility: MEDICAL CENTER | Age: 83
End: 2018-09-07

## 2018-09-07 DIAGNOSIS — I48.20 CHRONIC ATRIAL FIBRILLATION (HCC): ICD-10-CM

## 2018-09-07 LAB
ANION GAP SERPL CALC-SCNC: 6 MMOL/L (ref 0–11.9)
BASOPHILS # BLD AUTO: 1.3 % (ref 0–1.8)
BASOPHILS # BLD: 0.03 K/UL (ref 0–0.12)
BUN SERPL-MCNC: 38 MG/DL (ref 8–22)
CALCIUM SERPL-MCNC: 9.5 MG/DL (ref 8.5–10.5)
CHLORIDE SERPL-SCNC: 108 MMOL/L (ref 96–112)
CO2 SERPL-SCNC: 28 MMOL/L (ref 20–33)
CREAT SERPL-MCNC: 1.28 MG/DL (ref 0.5–1.4)
EOSINOPHIL # BLD AUTO: 0.01 K/UL (ref 0–0.51)
EOSINOPHIL NFR BLD: 0.4 % (ref 0–6.9)
ERYTHROCYTE [DISTWIDTH] IN BLOOD BY AUTOMATED COUNT: 67.3 FL (ref 35.9–50)
GLUCOSE SERPL-MCNC: 93 MG/DL (ref 65–99)
HCT VFR BLD AUTO: 29.5 % (ref 37–47)
HGB BLD-MCNC: 9.4 G/DL (ref 12–16)
IMM GRANULOCYTES # BLD AUTO: 0.01 K/UL (ref 0–0.11)
IMM GRANULOCYTES NFR BLD AUTO: 0.4 % (ref 0–0.9)
INR PPP: 3 (ref 2–3.5)
LYMPHOCYTES # BLD AUTO: 0.76 K/UL (ref 1–4.8)
LYMPHOCYTES NFR BLD: 31.8 % (ref 22–41)
MCH RBC QN AUTO: 30.8 PG (ref 27–33)
MCHC RBC AUTO-ENTMCNC: 31.9 G/DL (ref 33.6–35)
MCV RBC AUTO: 96.7 FL (ref 81.4–97.8)
MONOCYTES # BLD AUTO: 0.21 K/UL (ref 0–0.85)
MONOCYTES NFR BLD AUTO: 8.8 % (ref 0–13.4)
NEUTROPHILS # BLD AUTO: 1.37 K/UL (ref 2–7.15)
NEUTROPHILS NFR BLD: 57.3 % (ref 44–72)
NRBC # BLD AUTO: 0 K/UL
NRBC BLD-RTO: 0 /100 WBC
PLATELET # BLD AUTO: 117 K/UL (ref 164–446)
PMV BLD AUTO: 11.7 FL (ref 9–12.9)
POTASSIUM SERPL-SCNC: 4.7 MMOL/L (ref 3.6–5.5)
RBC # BLD AUTO: 3.05 M/UL (ref 4.2–5.4)
SODIUM SERPL-SCNC: 142 MMOL/L (ref 135–145)
WBC # BLD AUTO: 2.4 K/UL (ref 4.8–10.8)

## 2018-09-07 PROCEDURE — 85025 COMPLETE CBC W/AUTO DIFF WBC: CPT

## 2018-09-07 PROCEDURE — 36415 COLL VENOUS BLD VENIPUNCTURE: CPT

## 2018-09-07 PROCEDURE — 80048 BASIC METABOLIC PNL TOTAL CA: CPT

## 2018-09-07 NOTE — PROGRESS NOTES
Anticoagulation Summary  As of 9/7/2018    INR goal:   2.0-3.0   TTR:   46.6 % (3.9 mo)   Today's INR:   3.0   Warfarin maintenance plan:   5 mg (2.5 mg x 2) every day   Weekly warfarin total:   35 mg   Plan last modified:   Sheyla Benavidez, PharmD (8/31/2018)   Next INR check:   9/10/2018   Target end date:   Indefinite    Indications    Chronic atrial fibrillation (HCC) [I48.2]  Long term current use of anticoagulant (Resolved) [Z79.01]             Anticoagulation Episode Summary     INR check location:       Preferred lab:       Send INR reminders to:       Comments:         Anticoagulation Care Providers     Provider Role Specialty Phone number    [facility] Referring  [phone]    Razia Sims D.O. Responsible Family Medicine [phone]    Sebastian Thomas M.D. Responsible Interventional Cardiology [phone]        Anticoagulation Patient Findings  pt seen at the bedside.  INR remains therapeutic today.  Resident scheduled for discharge with her grand daughter in am with [facility].    Pt is to continue with current warfarin dosing regimen.  Follow up in 3 days, to reduce risk of adverse events related to this high risk medication,  Warfarin.    Sheyla Benavidez, PharmD

## 2018-09-09 ENCOUNTER — PATIENT OUTREACH (OUTPATIENT)
Dept: HEALTH INFORMATION MANAGEMENT | Facility: OTHER | Age: 83
End: 2018-09-09

## 2018-09-09 NOTE — PROGRESS NOTES
Placed discharge outreach phone call to pt s/p [facility] discharge 9/8/18.  Left voicemail providing my contact information and instructions to call with any questions or concerns.

## 2018-09-10 ENCOUNTER — HOME CARE VISIT (OUTPATIENT)
Dept: HOME HEALTH SERVICES | Facility: HOME HEALTHCARE | Age: 83
End: 2018-09-10
Payer: MEDICARE

## 2018-09-10 ENCOUNTER — TELEPHONE (OUTPATIENT)
Dept: HEALTH INFORMATION MANAGEMENT | Facility: OTHER | Age: 83
End: 2018-09-10

## 2018-09-10 ENCOUNTER — ANTICOAGULATION MONITORING (OUTPATIENT)
Dept: VASCULAR LAB | Facility: MEDICAL CENTER | Age: 83
End: 2018-09-10

## 2018-09-10 VITALS
WEIGHT: 126.6 LBS | TEMPERATURE: 98.2 F | BODY MASS INDEX: 22.43 KG/M2 | RESPIRATION RATE: 16 BRPM | OXYGEN SATURATION: 96 % | DIASTOLIC BLOOD PRESSURE: 64 MMHG | SYSTOLIC BLOOD PRESSURE: 114 MMHG | HEART RATE: 70 BPM | HEIGHT: 63 IN

## 2018-09-10 DIAGNOSIS — I48.20 CHRONIC ATRIAL FIBRILLATION (HCC): ICD-10-CM

## 2018-09-10 LAB — INR PPP: 3.7 (ref 2–3.5)

## 2018-09-10 PROCEDURE — G0493 RN CARE EA 15 MIN HH/HOSPICE: HCPCS

## 2018-09-10 SDOH — ECONOMIC STABILITY: HOUSING INSECURITY: UNSAFE APPLIANCES: 0

## 2018-09-10 SDOH — ECONOMIC STABILITY: HOUSING INSECURITY: UNSAFE COOKING RANGE AREA: 0

## 2018-09-10 ASSESSMENT — ACTIVITIES OF DAILY LIVING (ADL)
HOME_HEALTH_OASIS: 01
HOME_HEALTH_OASIS: 02
OASIS_M1830: 03

## 2018-09-10 ASSESSMENT — ENCOUNTER SYMPTOMS
VOMITING: PATIENT VERBALIZES NO EMESIS.
NAUSEA: PATIENT VERBALIZES NO NAUSEA.

## 2018-09-10 NOTE — PROGRESS NOTES
Date of visit: 9/6/2018  6:11 PM      Chief Complaint- Mild chronic leukopenia and anemia      Identification/Prior relevant history:   7/2018-Multiple comorbidities including CHF CKD admitted with worsening bipedal edema. Labs shows stable leukopenia with WBC of 2.3. She also has some component of normocytic anemia with hemoglobin ranging from 8-9.5. Platelets mildly low at 119. Differential has been grossly unremarkable with relative lymphopenia. I did review her outside records and it appears that since her WBC count was in the 3.5 range back in 2017. Rest of the workup shows low B12 level . She has remote history of breast carcinoma around 15 years ago which was treated with mastectomy and adjuvant chemotherapy.. She reports that the regimen was mild chemotherapy.    Interim history  -Resistant workup was unremarkable except for mild B12 deficiency. She was started on B12 shots.  . She is here for a posthospital discharge follow-up.  Recent CBC shows stable leukopenia and from cytopenia. Her hemoglobin has improved to 9.4 g/dL.  Past Medical History:      Past Medical History:   Diagnosis Date   • Arthritis     back/neck   • Atrial fibrillation (CMS-HCC) [I48.91] 3/29/2018   • Blood clotting disorder (HCC)    • Breath shortness     water retention   • Cancer (HCC)     breast   • Cataract    • Congestive heart failure (CHF) (HCC) 4/3/2018   • BERTRAND (dyspnea on exertion) 5/4/2018   • Heart burn    • Heart valve disease    • Hemorrhagic disorder (HCC)    • Hiatus hernia syndrome    • Hyperlipidemia    • Hypertension    • Kidney disease    • Long term current use of anticoagulant 3/29/2018   • Pacemaker 4/3/2018   • Pulmonary emphysema (HCC)    • Secondary hypertension 4/3/2018   • Thyroid disease    • Tremors of nervous system 4/3/2018   • Urinary incontinence    • Urinary tract infection        Past surgical history:       Past Surgical History:   Procedure Laterality Date   • OTHER      left breast mastectomy   •  OTHER CARDIAC SURGERY      pacemaker placement       Allergies:       Codeine and Bloodless    Medications:         Current Outpatient Prescriptions   Medication Sig Dispense Refill   • sertraline (ZOLOFT) 50 MG Tab Take 0.5 Tabs by mouth every bedtime. 30 Tab 11   • rosuvastatin (CRESTOR) 10 MG Tab Take 1 Tab by mouth every evening. 30 Tab 11   • metoprolol SR (TOPROL XL) 25 MG TABLET SR 24 HR Take 1 Tab by mouth every morning. 30 Tab 1   • cyanocobalamin (VITAMIN B12) 1000 MCG Tab Take 1 Tab by mouth every day. 30 Tab 3   • ferrous gluconate (FERGON) 324 (38 Fe) MG Tab Take 1 Tab by mouth 3 times a day, with meals for 30 days. 90 Tab 0   • magnesium oxide (MAG-OX) 400 (241.3 Mg) MG Tab tablet Take 1 Tab by mouth every day. 30 Tab 1   • potassium chloride SA (KDUR) 20 MEQ Tab CR Take 2 Tabs by mouth 2 times a day. 60 Tab 1   • clopidogrel (PLAVIX) 75 MG Tab Take 1 Tab by mouth every day. 90 Tab 3   • levothyroxine (SYNTHROID) 50 MCG Tab Take 1 Tab by mouth Every morning on an empty stomach. 90 Tab 3   • omeprazole (PRILOSEC) 20 MG delayed-release capsule Take 1 Cap by mouth 2 times a day. 30 Cap 1   • Melatonin 5 MG Tab Take  by mouth.     • bisacodyl (DULCOLAX) 10 MG Suppos Insert 10 mg in rectum every day.     • acetaminophen (TYLENOL) 500 MG Tab Take 2 Tabs by mouth 1 time daily as needed for Mild Pain. 30 Tab 0   • calcium carbonate (TUMS) 500 MG Chew Tab Take 1 Tab by mouth every four hours as needed (heartburn). 30 Tab 0   • warfarin (COUMADIN) 2.5 MG Tab Take 1-1.5 Tabs by mouth every evening. 2.5 mg on Mon/Wed 3.75 all other days (Patient not taking: Reported on 8/28/2018) 30 Tab 3   • gabapentin (NEURONTIN) 300 MG Cap Take 2 Caps by mouth every evening. 90 Cap 1   • topiramate (TOPAMAX) 25 MG Tab Take 1 Tab by mouth 2 times a day. 60 Tab 3   • bumetanide (BUMEX) 1 MG Tab Take 1 Tab by mouth every day. 30 Tab 1   • allopurinol (ZYLOPRIM) 100 MG Tab Take 1 Tab by mouth 2 times a day. 30 Tab 1   •  cyanocobalamin (VITAMIN B-12) 1000 MCG/ML Solution 1 mL by Intramuscular route every 30 days for 30 days. 1 Vial 0   • Calcium-Vitamin D 500-125 MG-UNIT Tab Take 1 tablet by mouth every morning. 600mg - 400 iu 30 Tab 1   • ascorbic acid (VITAMIN C) 500 MG tablet Take 1 Tab by mouth every day. 30 Tab 3     No current facility-administered medications for this visit.          Social History:     Social History     Social History   • Marital status: Single     Spouse name: N/A   • Number of children: N/A   • Years of education: N/A     Occupational History   • Not on file.     Social History Main Topics   • Smoking status: Former Smoker     Packs/day: 0.50     Types: Cigarettes     Quit date: 1/1/1970   • Smokeless tobacco: Never Used   • Alcohol use 1.8 oz/week     3 Glasses of wine per week      Comment: 1-2 glasses wine before dinner   • Drug use: No   • Sexual activity: Not on file     Other Topics Concern   • Not on file     Social History Narrative   • No narrative on file       Family History:      Family History   Problem Relation Age of Onset   • Lung Disease Mother    • Cancer Mother    • No Known Problems Father    • Fainting Neg Hx        Review of Systems:  All other review of systems are negative except what was mentioned above in the HPI.    Constitutional: Negative for fever, chills, weight loss and malaise/fatigue.    HEENT: No new auditory or visual complaints. No sore throat and neck pain.     Respiratory: Negative for cough, sputum production, shortness of breath and wheezing.    Cardiovascular: Negative for chest pain, palpitations, orthopnea and leg swelling.    Gastrointestinal: Negative for heartburn, nausea, vomiting and abdominal pain.    Genitourinary: Negative for dysuria, hematuria    Musculoskeletal: No new arthralgias or myalgias   Skin: Negative for rash and itching.    Neurological: Negative for focal weakness and headaches.    Endo/Heme/Allergies: No abnormal bleed/bruise.   "  Psychiatric/Behavioral: No new depression/anxiety.    Physical Exam:  Vitals: /78   Pulse 76   Temp 37.3 °C (99.2 °F)   Resp 16   Ht 1.6 m (5' 3\")   Wt 59.5 kg (131 lb 4.5 oz)   LMP  (LMP Unknown)   SpO2 97%   BMI 23.26 kg/m²     General: Not in acute distress, alert and oriented x 3  HEENT: No pallor, icterus. Oropharynx clear.   Neck: Supple, no palpable masses.  Lymph nodes: No palpable cervical, supraclavicular, axillary or inguinal lymphadenopathy.    CVS: regular rate and rhythm, no rubs or gallops  RESP: Clear to auscultate bilaterally, no wheezing or crackles.   ABD: Soft, non tender, non distended, positive bowel sounds, no palpable organomegaly  EXT: No edema or cyanosis  CNS: Alert and oriented x3, No focal deficits.  Skin- No rash      Labs:   Anticoagulation Monitoring on 09/04/2018   Component Date Value Ref Range Status   • INR 09/04/2018 2.6   Final    [id number] exp 05/2019 ic valid   Hospital Outpatient Visit on 09/01/2018   Component Date Value Ref Range Status   • Sodium 09/04/2018 143  135 - 145 mmol/L Final   • Potassium 09/04/2018 4.6  3.6 - 5.5 mmol/L Final   • Chloride 09/04/2018 105  96 - 112 mmol/L Final   • Co2 09/04/2018 29  20 - 33 mmol/L Final   • Glucose 09/04/2018 91  65 - 99 mg/dL Final   • Bun 09/04/2018 40* 8 - 22 mg/dL Final   • Creatinine 09/04/2018 1.32  0.50 - 1.40 mg/dL Final   • Calcium 09/04/2018 9.2  8.5 - 10.5 mg/dL Final   • Anion Gap 09/04/2018 9.0  0.0 - 11.9 Final   • GFR If  09/04/2018 46* >60 mL/min/1.73 m 2 Final   • GFR If Non  09/04/2018 38* >60 mL/min/1.73 m 2 Final   • Sodium 09/07/2018 142  135 - 145 mmol/L Final   • Potassium 09/07/2018 4.7  3.6 - 5.5 mmol/L Final   • Chloride 09/07/2018 108  96 - 112 mmol/L Final   • Co2 09/07/2018 28  20 - 33 mmol/L Final   • Glucose 09/07/2018 93  65 - 99 mg/dL Final   • Bun 09/07/2018 38* 8 - 22 mg/dL Final   • Creatinine 09/07/2018 1.28  0.50 - 1.40 mg/dL Final   • Calcium " 09/07/2018 9.5  8.5 - 10.5 mg/dL Final   • Anion Gap 09/07/2018 6.0  0.0 - 11.9 Final   • WBC 09/07/2018 2.4* 4.8 - 10.8 K/uL Final   • RBC 09/07/2018 3.05* 4.20 - 5.40 M/uL Final   • Hemoglobin 09/07/2018 9.4* 12.0 - 16.0 g/dL Final   • Hematocrit 09/07/2018 29.5* 37.0 - 47.0 % Final   • MCV 09/07/2018 96.7  81.4 - 97.8 fL Final   • MCH 09/07/2018 30.8  27.0 - 33.0 pg Final   • MCHC 09/07/2018 31.9* 33.6 - 35.0 g/dL Final   • RDW 09/07/2018 67.3* 35.9 - 50.0 fL Final   • Platelet Count 09/07/2018 117* 164 - 446 K/uL Final   • MPV 09/07/2018 11.7  9.0 - 12.9 fL Final   • Neutrophils-Polys 09/07/2018 57.30  44.00 - 72.00 % Final   • Lymphocytes 09/07/2018 31.80  22.00 - 41.00 % Final   • Monocytes 09/07/2018 8.80  0.00 - 13.40 % Final   • Eosinophils 09/07/2018 0.40  0.00 - 6.90 % Final   • Basophils 09/07/2018 1.30  0.00 - 1.80 % Final   • Immature Granulocytes 09/07/2018 0.40  0.00 - 0.90 % Final   • Nucleated RBC 09/07/2018 0.00  /100 WBC Final   • Neutrophils (Absolute) 09/07/2018 1.37* 2.00 - 7.15 K/uL Final    Includes immature neutrophils, if present.   • Lymphs (Absolute) 09/07/2018 0.76* 1.00 - 4.80 K/uL Final   • Monos (Absolute) 09/07/2018 0.21  0.00 - 0.85 K/uL Final   • Eos (Absolute) 09/07/2018 0.01  0.00 - 0.51 K/uL Final   • Baso (Absolute) 09/07/2018 0.03  0.00 - 0.12 K/uL Final   • Immature Granulocytes (abs) 09/07/2018 0.01  0.00 - 0.11 K/uL Final   • NRBC (Absolute) 09/07/2018 0.00  K/uL Final   • GFR If  09/07/2018 48* >60 mL/min/1.73 m 2 Final   • GFR If Non  09/07/2018 40* >60 mL/min/1.73 m 2 Final   Anticoagulation Monitoring on 08/31/2018   Component Date Value Ref Range Status   • INR 08/31/2018 1.7   Final    [id number] exp 05/2019 ic valid             Assessment and Plan:  Chronic mild pancytopenia appeared to have chronic leukopenia for quite some time. Given her age, early myelodysplastic syndrome is a possibility. However, given the normal differential and  platelet count and overall stability and do not think she needs a bone marrow biopsy. The patient is also adamantly against it. Discussed the option of getting a peripheral blood Degeneration sequencing to see whether she has any MDS related mutations.. Continue checking CBC every 2 months. Return to clinic in 6 months.    .Multifactoral anemia in the setting of CKD, iron and B12 deficiency-Her hemoglobin has improved with iron infusion and vitamin B12 replacement and she will continue B12 shots. Workup for celiac disease and pernicious anemia was negative.    She agreed and verbalized  agreement and understanding with the current plan.  I answered all questions and concerns at this time         Please note that this dictation was created using voice recognition software. I have made every reasonable attempt to correct obvious errors, but I expect that there are errors of grammar and possibly content that I did not discover before finalizing the note.      SIGNATURES:  Krunal Atkinson    CC:  ELIZABETH Patel Ashley J, A.P.R*

## 2018-09-10 NOTE — TELEPHONE ENCOUNTER
Referral from [facility]. Med review completed. Interaction noted between levothyroxine and calcium with potential to decrease absorption of levothyroxine. Patient's last TSH level was elevated. Outbound call to patient to discuss proper administration of levothyroxine. Spoke to granddaughter who manages medications. States she will make sure calcium and vitamin supplements are  from levothyroxine dose and that she takes it on an empty stomach. INR currently monitored by [facility]. Next INR check is on 9/14.

## 2018-09-10 NOTE — PROGRESS NOTES
Anticoagulation Summary  As of 9/10/2018    INR goal:   2.0-3.0   TTR:   45.6 % (4 mo)   Today's INR:   3.7!   Warfarin maintenance plan:   5 mg (2.5 mg x 2) every day   Weekly warfarin total:   35 mg   Plan last modified:   Sheyla Benavidez PharmD (8/31/2018)   Next INR check:   9/14/2018   Target end date:   Indefinite    Indications    Chronic atrial fibrillation (HCC) [I48.2]  Long term current use of anticoagulant (Resolved) [Z79.01]             Anticoagulation Episode Summary     INR check location:       Preferred lab:       Send INR reminders to:       Comments:   Home Health Patient Daughter Palma takes care of her dosing       Anticoagulation Care Providers     Provider Role Specialty Phone number    [facility] Referring  [phone]    Razia Sims D.O. Responsible Family Medicine [phone]    Sebastian Thomas M.D. Responsible Interventional Cardiology [phone]        Anticoagulation Patient Findings  Patient Findings     Positives:   Change in alcohol use, Change in diet/appetite    Negatives:   Signs/symptoms of thrombosis, Signs/symptoms of bleeding, Laboratory test error suspected, Change in health, Change in activity, Upcoming invasive procedure, Emergency department visit, Upcoming dental procedure, Missed doses, Extra doses, Change in medications, Hospital admission, Bruising, Other complaints        Spoke with the patient on the phone today,(as well as left message on Palma's VM)  reporting a SUPRA-therapeutic INR of 3.7.  Confirmed the current warfarin dosing regimen and patient compliance. Patient reports that she had a celebration over the weekend and enjoyed a few glasses of wine. Patient denies any other interval changes. Patient denies any signs/sx of bleeding or clotting.  Patient instructed to HOLD dose TONIGHT ONLY, then to resume her current regimen. Patient will follow up again on Friday. Orders sent to [facility].    Libia Reynolds PharmD

## 2018-09-12 ENCOUNTER — HOME CARE VISIT (OUTPATIENT)
Dept: HOME HEALTH SERVICES | Facility: HOME HEALTHCARE | Age: 83
End: 2018-09-12
Payer: MEDICARE

## 2018-09-12 PROCEDURE — G0152 HHCP-SERV OF OT,EA 15 MIN: HCPCS

## 2018-09-13 ENCOUNTER — NON-PROVIDER VISIT (OUTPATIENT)
Dept: HEMATOLOGY ONCOLOGY | Facility: MEDICAL CENTER | Age: 83
End: 2018-09-13
Payer: MEDICARE

## 2018-09-13 ENCOUNTER — HOME CARE VISIT (OUTPATIENT)
Dept: HOME HEALTH SERVICES | Facility: HOME HEALTHCARE | Age: 83
End: 2018-09-13
Payer: MEDICARE

## 2018-09-13 VITALS
OXYGEN SATURATION: 93 % | RESPIRATION RATE: 16 BRPM | BODY MASS INDEX: 22.03 KG/M2 | SYSTOLIC BLOOD PRESSURE: 123 MMHG | WEIGHT: 124.34 LBS | TEMPERATURE: 97.3 F | HEART RATE: 76 BPM | DIASTOLIC BLOOD PRESSURE: 60 MMHG | HEIGHT: 63 IN

## 2018-09-13 VITALS
BODY MASS INDEX: 21.79 KG/M2 | OXYGEN SATURATION: 98 % | RESPIRATION RATE: 18 BRPM | SYSTOLIC BLOOD PRESSURE: 128 MMHG | DIASTOLIC BLOOD PRESSURE: 63 MMHG | HEART RATE: 62 BPM | TEMPERATURE: 96 F | WEIGHT: 123 LBS

## 2018-09-13 DIAGNOSIS — D61.818 PANCYTOPENIA (HCC): ICD-10-CM

## 2018-09-13 PROCEDURE — 36415 COLL VENOUS BLD VENIPUNCTURE: CPT | Performed by: INTERNAL MEDICINE

## 2018-09-13 ASSESSMENT — ACTIVITIES OF DAILY LIVING (ADL)
TOILETING_ASSISTANCE: 0
LAUNDRY_ASSISTANCE: 6
EATING_ASSISTANCE: 0
TELEPHONE_ASSISTANCE: 0
HOUSEKEEPING_ASSISTANCE: 6
ORAL_CARE_ASSISTANCE: 0
DRESSING_UB_ASSISTANCE: 0
GROOMING_ASSISTANCE: 0
BATHING_ASSISTANCE: 2
DRESSING_LB_ASSISTANCE: 0
MEAL_PREP_ASSISTANCE: 0
SHOPPING_ASSISTANCE: 6
TRANSPORTATION_ASSISTANCE: 6

## 2018-09-13 ASSESSMENT — PAIN SCALES - GENERAL: PAINLEVEL: NO PAIN

## 2018-09-13 NOTE — PROGRESS NOTES
Andree Egan is a 86 y.o. female here for a non-provider visit for: Lab Draws  on 9/13/2018 at 4:46 PM    Procedure Performed: Venipuncture     Anatomical site: Right Antecubital Area (AC)    Equipment used: 23g Butterfly    Labs drawn: [facility]    Ordering Provider: Dr. Krunal Oden By: Brianna Brewer, Yung Ass't  No complications and  was present in the office the entire time I was doing the patients blood draw.

## 2018-09-14 ENCOUNTER — HOME CARE VISIT (OUTPATIENT)
Dept: HOME HEALTH SERVICES | Facility: HOME HEALTHCARE | Age: 83
End: 2018-09-14
Payer: MEDICARE

## 2018-09-14 ENCOUNTER — ANTICOAGULATION MONITORING (OUTPATIENT)
Dept: VASCULAR LAB | Facility: MEDICAL CENTER | Age: 83
End: 2018-09-14

## 2018-09-14 VITALS
DIASTOLIC BLOOD PRESSURE: 60 MMHG | TEMPERATURE: 98.4 F | OXYGEN SATURATION: 98 % | WEIGHT: 120 LBS | SYSTOLIC BLOOD PRESSURE: 120 MMHG | RESPIRATION RATE: 16 BRPM | HEART RATE: 71 BPM | BODY MASS INDEX: 21.26 KG/M2

## 2018-09-14 VITALS
SYSTOLIC BLOOD PRESSURE: 110 MMHG | TEMPERATURE: 97.5 F | OXYGEN SATURATION: 98 % | DIASTOLIC BLOOD PRESSURE: 60 MMHG | RESPIRATION RATE: 18 BRPM | HEART RATE: 60 BPM | BODY MASS INDEX: 21.26 KG/M2 | WEIGHT: 120 LBS

## 2018-09-14 DIAGNOSIS — I48.20 CHRONIC ATRIAL FIBRILLATION (HCC): ICD-10-CM

## 2018-09-14 LAB — INR PPP: 2.8 (ref 2–3.5)

## 2018-09-14 PROCEDURE — G0151 HHCP-SERV OF PT,EA 15 MIN: HCPCS

## 2018-09-14 PROCEDURE — G0299 HHS/HOSPICE OF RN EA 15 MIN: HCPCS

## 2018-09-14 SDOH — ECONOMIC STABILITY: HOUSING INSECURITY: UNSAFE APPLIANCES: 0

## 2018-09-14 SDOH — ECONOMIC STABILITY: HOUSING INSECURITY: UNSAFE COOKING RANGE AREA: 0

## 2018-09-14 ASSESSMENT — ACTIVITIES OF DAILY LIVING (ADL): ADLS_COMMENTS: <!--EPICS-->SEE OT EVAL<!--EPICE-->

## 2018-09-14 ASSESSMENT — ENCOUNTER SYMPTOMS
RESPIRATORY SYMPTOMS COMMENTS: NO APPARENT RESPIRATORY DISTRESS
VOMITING: DENIES
NAUSEA: DENIES

## 2018-09-14 NOTE — PROGRESS NOTES
Anticoagulation Summary  As of 9/14/2018    INR goal:   2.0-3.0   TTR:   44.9 % (4.2 mo)   Today's INR:   2.8   Warfarin maintenance plan:   5 mg (2.5 mg x 2) every day   Weekly warfarin total:   35 mg   Plan last modified:   Sheyla Benavidez, PharmD (8/31/2018)   Next INR check:   9/17/2018   Target end date:   Indefinite    Indications    Chronic atrial fibrillation (HCC) [I48.2]  Long term current use of anticoagulant (Resolved) [Z79.01]             Anticoagulation Episode Summary     INR check location:       Preferred lab:       Send INR reminders to:       Comments:   Home Health Patient Daughter Palma takes care of her dosing       Anticoagulation Care Providers     Provider Role Specialty Phone number    [facility] Referring  [phone]    Razia Sims D.O. Responsible Family Medicine [phone]    Sebastian Thomas M.D. Responsible Interventional Cardiology [phone]        Anticoagulation Patient Findings  Patient Findings     Negatives:   Signs/symptoms of thrombosis, Signs/symptoms of bleeding, Laboratory test error suspected, Change in health, Change in alcohol use, Change in activity, Upcoming invasive procedure, Emergency department visit, Upcoming dental procedure, Missed doses, Extra doses, Change in medications, Change in diet/appetite, Hospital admission, Bruising, Other complaints        Spoke with patient and patient's daughter Palma today regarding therapeutic INR of 2.8.  Patient denies any signs/symptoms of bruising or bleeding or any changes in diet and medications.  Instructed patient to call clinic with any questions or concerns.  Pt is to continue with current warfarin dosing regimen.  Follow up in 3 days, to reduce risk of adverse events related to this high risk medication,  Warfarin.    Taiwo Carcamo, PharmD

## 2018-09-17 ENCOUNTER — ANTICOAGULATION MONITORING (OUTPATIENT)
Dept: VASCULAR LAB | Facility: MEDICAL CENTER | Age: 83
End: 2018-09-17

## 2018-09-17 ENCOUNTER — HOME CARE VISIT (OUTPATIENT)
Dept: HOME HEALTH SERVICES | Facility: HOME HEALTHCARE | Age: 83
End: 2018-09-17
Payer: MEDICARE

## 2018-09-17 VITALS
DIASTOLIC BLOOD PRESSURE: 60 MMHG | SYSTOLIC BLOOD PRESSURE: 120 MMHG | OXYGEN SATURATION: 98 % | HEART RATE: 69 BPM | BODY MASS INDEX: 20.9 KG/M2 | RESPIRATION RATE: 18 BRPM | TEMPERATURE: 98.2 F | WEIGHT: 118 LBS

## 2018-09-17 DIAGNOSIS — I48.20 CHRONIC ATRIAL FIBRILLATION (HCC): ICD-10-CM

## 2018-09-17 LAB — INR PPP: 3.4 (ref 2–3.5)

## 2018-09-17 PROCEDURE — G0157 HHC PT ASSISTANT EA 15: HCPCS

## 2018-09-17 PROCEDURE — G0299 HHS/HOSPICE OF RN EA 15 MIN: HCPCS

## 2018-09-17 RX ORDER — WARFARIN SODIUM 2.5 MG/1
2.5-5 TABLET ORAL DAILY
Qty: 60 TAB | Refills: 3 | OUTPATIENT
Start: 2018-09-17 | End: 2018-10-30

## 2018-09-17 NOTE — PROGRESS NOTES
Anticoagulation Summary  As of 9/17/2018    INR goal:   2.0-3.0   TTR:   44.6 % (4.3 mo)   Today's INR:   3.4!   Warfarin maintenance plan:   2.5 mg (2.5 mg x 1) on Mon; 5 mg (2.5 mg x 2) all other days   Weekly warfarin total:   32.5 mg   Plan last modified:   Scottie Younger, PharmD (9/17/2018)   Next INR check:   9/24/2018   Target end date:   Indefinite    Indications    Chronic atrial fibrillation (HCC) [I48.2]  Long term current use of anticoagulant (Resolved) [Z79.01]             Anticoagulation Episode Summary     INR check location:       Preferred lab:       Send INR reminders to:       Comments:   Home Health Patient Daughter Palma takes care of her dosing       Anticoagulation Care Providers     Provider Role Specialty Phone number    [facility] Referring  [phone]    Razia Sims D.O. Responsible Family Medicine [phone]    Sebastian Thomas M.D. Responsible Interventional Cardiology [phone]        Anticoagulation Patient Findings      INR  supra-therapeutic.   Left a voice message for the patient, asked patient to please call the anticoagulation clinic if they have any signs/symptoms of bleeding and/or thrombosis or any changes to diet or medications.    Follow up appointment in 1 week(s)    Decrease weekly warfarin dose as noted    Order for HH sent    Note faxed as noted above    Medication changed to reflex dose change     Scottie Younger, PharmD

## 2018-09-19 ENCOUNTER — OFFICE VISIT (OUTPATIENT)
Dept: CARDIOLOGY | Facility: MEDICAL CENTER | Age: 83
End: 2018-09-19
Payer: MEDICARE

## 2018-09-19 ENCOUNTER — HOME CARE VISIT (OUTPATIENT)
Dept: HOME HEALTH SERVICES | Facility: HOME HEALTHCARE | Age: 83
End: 2018-09-19
Payer: MEDICARE

## 2018-09-19 VITALS
BODY MASS INDEX: 21.97 KG/M2 | HEIGHT: 63 IN | DIASTOLIC BLOOD PRESSURE: 72 MMHG | OXYGEN SATURATION: 96 % | SYSTOLIC BLOOD PRESSURE: 120 MMHG | HEART RATE: 70 BPM | WEIGHT: 124 LBS

## 2018-09-19 VITALS
OXYGEN SATURATION: 95 % | TEMPERATURE: 97.5 F | HEART RATE: 73 BPM | RESPIRATION RATE: 18 BRPM | DIASTOLIC BLOOD PRESSURE: 60 MMHG | WEIGHT: 116 LBS | BODY MASS INDEX: 20.55 KG/M2 | SYSTOLIC BLOOD PRESSURE: 120 MMHG

## 2018-09-19 DIAGNOSIS — I48.20 CHRONIC ATRIAL FIBRILLATION (HCC): ICD-10-CM

## 2018-09-19 DIAGNOSIS — I25.10 CORONARY ARTERY DISEASE INVOLVING NATIVE CORONARY ARTERY OF NATIVE HEART WITHOUT ANGINA PECTORIS: ICD-10-CM

## 2018-09-19 DIAGNOSIS — Z95.5 S/P DRUG ELUTING CORONARY STENT PLACEMENT: ICD-10-CM

## 2018-09-19 DIAGNOSIS — I50.9 HEART FAILURE, NYHA CLASS 1 (HCC): ICD-10-CM

## 2018-09-19 DIAGNOSIS — E87.6 HYPOKALEMIA: ICD-10-CM

## 2018-09-19 DIAGNOSIS — I50.30 ACC/AHA STAGE C HEART FAILURE WITH PRESERVED EJECTION FRACTION (HCC): ICD-10-CM

## 2018-09-19 DIAGNOSIS — Z95.0 CARDIAC PACEMAKER IN SITU: ICD-10-CM

## 2018-09-19 DIAGNOSIS — N18.30 CKD (CHRONIC KIDNEY DISEASE) STAGE 3, GFR 30-59 ML/MIN (HCC): ICD-10-CM

## 2018-09-19 PROCEDURE — G0157 HHC PT ASSISTANT EA 15: HCPCS

## 2018-09-19 PROCEDURE — 99214 OFFICE O/P EST MOD 30 MIN: CPT | Performed by: NURSE PRACTITIONER

## 2018-09-19 RX ORDER — POTASSIUM CHLORIDE 20 MEQ/1
20 TABLET, EXTENDED RELEASE ORAL 2 TIMES DAILY
Qty: 60 TAB | Refills: 11 | Status: SHIPPED | OUTPATIENT
Start: 2018-09-19 | End: 2018-10-31 | Stop reason: SDUPTHER

## 2018-09-19 RX ORDER — BUMETANIDE 1 MG/1
1-2 TABLET ORAL DAILY
Qty: 30 TAB | Refills: 1 | Status: SHIPPED | OUTPATIENT
Start: 2018-09-19 | End: 2018-10-24 | Stop reason: DRUGHIGH

## 2018-09-19 ASSESSMENT — ENCOUNTER SYMPTOMS
COUGH: 0
FEVER: 0
CLAUDICATION: 0
PALPITATIONS: 0
SHORTNESS OF BREATH: 0
ORTHOPNEA: 0
PND: 0
ABDOMINAL PAIN: 0
MYALGIAS: 0
DIZZINESS: 0

## 2018-09-19 NOTE — PROGRESS NOTES
Chief Complaint   Patient presents with   • Congestive Heart Failure     HF est.       Subjective:   Andree Egan is a 86 y.o. female who presents today for follow-up on her heart failure with her granddaughter, Palma.    Patient was last seen in the clinic on 8/28/2018 with Dr. Simms.  During her last visit, her Bumex was increased to 1 mg twice a day and patient was sent for repeat lab testing.    Over the past month, patient reports that she is feeling much better.  Patient reports her weight has been decreasing and she has seen improvement in her edema along with shortness of breath.    Patient denies any chest pain, palpitations, orthopnea, PND, shortness of breath at rest, with ADLs and exertion and dizziness/lightheadedness.    Her home weights are down to 116 pounds.  Within the last couple weeks, her weights have been decreasing 1-2 pounds per day.  Patient and family are concerned and are going to follow-up with PCP regarding this as well.    Patient is now li is ving with her other granddaughter.  Patient has been getting around without difficulty.    Additonally, patient has the following medical problems:     -Hospitalization from 7/24/2018-7/28/2018 is for heart failure.      -Hospitalization from 7/8/2018-7/11/2018 for heart failure exacerbation. Pt had a thoracentesis (failed 1st attempt and 2nd attempt successful) for a right Pleural effusion.      -Chronic atrial fibrillation: Rate controlled, taking warfarin, followed by the anticoagulation clinic     -Has permanent pacemaker     -Aortic stenosis     -Pulmonary HTN     -Hypertension     -Hyperlipidemia     -Kidney disease: has an upcoming appointment with Neph.     -Hypothyroidism: Taking levothyroxine.     -COPD     -History of breast cancer    Past Medical History:   Diagnosis Date   • Arthritis     back/neck   • Atrial fibrillation (CMS-HCC) [I48.91] 3/29/2018   • Blood clotting disorder (HCC)    • Breath shortness     water retention   •  Cancer (HCC)     breast   • Cataract    • Congestive heart failure (CHF) (HCC) 4/3/2018   • BERTRAND (dyspnea on exertion) 5/4/2018   • Heart burn    • Heart valve disease    • Hemorrhagic disorder (HCC)    • Hiatus hernia syndrome    • Hyperlipidemia    • Hypertension    • Kidney disease    • Long term current use of anticoagulant 3/29/2018   • Pacemaker 4/3/2018   • Pulmonary emphysema (HCC)    • Secondary hypertension 4/3/2018   • Thyroid disease    • Tremors of nervous system 4/3/2018   • Urinary incontinence    • Urinary tract infection      Past Surgical History:   Procedure Laterality Date   • OTHER      left breast mastectomy   • OTHER CARDIAC SURGERY      pacemaker placement     Family History   Problem Relation Age of Onset   • Lung Disease Mother    • Cancer Mother    • No Known Problems Father    • Fainting Neg Hx      Social History     Social History   • Marital status: Single     Spouse name: N/A   • Number of children: N/A   • Years of education: N/A     Occupational History   • Not on file.     Social History Main Topics   • Smoking status: Former Smoker     Packs/day: 0.50     Types: Cigarettes     Quit date: 1/1/1970   • Smokeless tobacco: Never Used   • Alcohol use 1.8 oz/week     3 Glasses of wine per week      Comment: 1-2 glasses wine before dinner   • Drug use: No   • Sexual activity: Not on file     Other Topics Concern   • Not on file     Social History Narrative   • No narrative on file     Allergies   Allergen Reactions   • Codeine Vomiting   • Bloodless      Outpatient Encounter Prescriptions as of 9/19/2018   Medication Sig Dispense Refill   • warfarin (COUMADIN) 2.5 MG Tab Take 1-2 Tabs by mouth every day. Or as directed (Patient taking differently: Take 5 mg by mouth every day. Or as directed) 60 Tab 3   • Melatonin 5 MG Tab Take 1 Tab by mouth at bedtime as needed (insomnia).     • acetaminophen (TYLENOL) 500 MG Tab Take 2 Tabs by mouth 1 time daily as needed for Mild Pain. 30 Tab 0   •  calcium carbonate (TUMS) 500 MG Chew Tab Take 1 Tab by mouth every four hours as needed (heartburn). 30 Tab 0   • gabapentin (NEURONTIN) 300 MG Cap Take 2 Caps by mouth every evening. 90 Cap 1   • topiramate (TOPAMAX) 25 MG Tab Take 1 Tab by mouth 2 times a day. 60 Tab 3   • sertraline (ZOLOFT) 50 MG Tab Take 0.5 Tabs by mouth every bedtime. 30 Tab 11   • rosuvastatin (CRESTOR) 10 MG Tab Take 1 Tab by mouth every evening. 30 Tab 11   • metoprolol SR (TOPROL XL) 25 MG TABLET SR 24 HR Take 1 Tab by mouth every morning. 30 Tab 1   • bumetanide (BUMEX) 1 MG Tab Take 1 Tab by mouth every day. (Patient taking differently: Take 1 Tab by mouth 2 times a day.) 30 Tab 1   • allopurinol (ZYLOPRIM) 100 MG Tab Take 1 Tab by mouth 2 times a day. 30 Tab 1   • cyanocobalamin (VITAMIN B-12) 1000 MCG/ML Solution 1 mL by Intramuscular route every 30 days for 30 days. 1 Vial 0   • cyanocobalamin (VITAMIN B12) 1000 MCG Tab Take 1 Tab by mouth every day. 30 Tab 3   • ferrous gluconate (FERGON) 324 (38 Fe) MG Tab Take 1 Tab by mouth 3 times a day, with meals for 30 days. 90 Tab 0   • Calcium-Vitamin D 500-125 MG-UNIT Tab Take 1 tablet by mouth every morning. 600mg - 400 iu 30 Tab 1   • magnesium oxide (MAG-OX) 400 (241.3 Mg) MG Tab tablet Take 1 Tab by mouth every day. 30 Tab 1   • potassium chloride SA (KDUR) 20 MEQ Tab CR Take 2 Tabs by mouth 2 times a day. 60 Tab 1   • clopidogrel (PLAVIX) 75 MG Tab Take 1 Tab by mouth every day. 90 Tab 3   • levothyroxine (SYNTHROID) 50 MCG Tab Take 1 Tab by mouth Every morning on an empty stomach. 90 Tab 3   • omeprazole (PRILOSEC) 20 MG delayed-release capsule Take 1 Cap by mouth 2 times a day. 30 Cap 1   • ascorbic acid (VITAMIN C) 500 MG tablet Take 1 Tab by mouth every day. 30 Tab 3   • sertraline (ZOLOFT) 25 MG tablet Take 25 mg by mouth every day.     • Magnesium Oxide 250 MG Tab Take 1 Tab by mouth every day.       No facility-administered encounter medications on file as of 9/19/2018.   "    Review of Systems   Constitutional: Positive for malaise/fatigue (mild). Negative for fever.   Respiratory: Negative for cough and shortness of breath.    Cardiovascular: Positive for leg swelling (Mild). Negative for chest pain, palpitations, orthopnea, claudication and PND.   Gastrointestinal: Negative for abdominal pain.   Musculoskeletal: Negative for myalgias.   Neurological: Negative for dizziness.   All other systems reviewed and are negative.       Objective:   /72 (BP Location: Right arm, Patient Position: Sitting, BP Cuff Size: Adult)   Pulse 70   Ht 1.6 m (5' 3\")   Wt 56.2 kg (124 lb)   LMP  (LMP Unknown)   SpO2 96%   BMI 21.97 kg/m²     Physical Exam   Constitutional: She is oriented to person, place, and time. She appears well-developed and well-nourished.   HENT:   Head: Normocephalic and atraumatic.   Eyes: Pupils are equal, round, and reactive to light. EOM are normal.   Neck: Normal range of motion. Neck supple. No JVD present.   Cardiovascular: Normal rate.  An irregularly irregular rhythm present.   Murmur heard.  Pulmonary/Chest: Effort normal. No respiratory distress. She has no wheezes. She has rales (Right lower lobe).   Abdominal: Soft. Bowel sounds are normal.   Musculoskeletal: She exhibits edema (Trace bilateral lower extremity edema).   Neurological: She is alert and oriented to person, place, and time.   Skin: Skin is warm and dry.   Psychiatric: She has a normal mood and affect. Her behavior is normal.   Vitals reviewed.    Transthoracic Echo Report 5/19/18  Severe bi-atrial enlargement. Normal LV and RV systolic function. LVEF is 55% by visual estimation. Diastolic function difficult to assess.   There is at least mild aortic stenosis but valve is opening. Mean   gradient is 10 mmHg, with V max of 2.01 m/s. RVSP is elevated at 70   mmHg suggesting pulmonary hypertension. No prior study is available for   comparison.      Heart Cath 7/2/2018   POSTPROCEDURE " DIAGNOSES:  1.  No evidence of aortic stenosis with 0 mmHg peak to peak gradient, mean gradient   of 8 mmHg, calculated CHRISTIANE of 2.09 cm2.  2.  Coronary artery disease with high grade proximal and high grade mid left   anterior descending artery.  3.  Successful percutaneous transluminal coronary angioplasty/stent placement   of the mid left anterior descending artery with 2.25x12 mm Synergy   drug-eluting stent.  4.  Successful percutaneous transluminal coronary angioplasty/stent placement   of the proximal left anterior descending artery with 2.5x12 mm Synergy   drug-eluting stent.  5.  Normal left ventricular systolic function with ejection fraction of 55%.  6.  Normal left ventricular end-diastolic pressure.  7.  Elevated right heart pressure with PA systolic pressure of 60 mmHg.  8.  Successful Angio-Seal closure.     Transthoracic Echo Report 7/8/2018  Prior done 05-.  Compared to prior study, the right-sided chambers appear dilated.    Left ventricular systolic function is low normal.  Left ventricular ejection fraction is visually estimated to be 50-55%.  Abnormal septal motion consistent with right ventricular (RV) volume overload and/or elevated RV end-diastolic pressure.  Dilated right ventricle with reduced systolic function.  Severe tricuspid regurgitation. Estimated right ventricular systolic pressure  is 55 mmHg.  Dilated inferior vena cava without inspiratory collapse  Assessment:     1. ACC/AHA stage C heart failure with preserved ejection fraction (HCC)  potassium chloride SA (KDUR) 20 MEQ Tab CR    bumetanide (BUMEX) 1 MG Tab    BASIC METABOLIC PANEL    BTYPE NATRIURETIC PEPTIDE    MAGNESIUM   2. Heart failure, NYHA class 1 (HCC)  BASIC METABOLIC PANEL    BTYPE NATRIURETIC PEPTIDE    MAGNESIUM   3. Hypokalemia  potassium chloride SA (KDUR) 20 MEQ Tab CR    bumetanide (BUMEX) 1 MG Tab    BASIC METABOLIC PANEL    MAGNESIUM   4. Chronic atrial fibrillation (HCC)     5. Cardiac pacemaker in situ      6. Coronary artery disease involving native coronary artery of native heart without angina pectoris     7. S/P drug eluting coronary stent placement     8. CKD (chronic kidney disease) stage 3, GFR 30-59 ml/min  potassium chloride SA (KDUR) 20 MEQ Tab CR    bumetanide (BUMEX) 1 MG Tab    BASIC METABOLIC PANEL    BTYPE NATRIURETIC PEPTIDE    MAGNESIUM       Medical Decision Making:  Today's Assessment / Status / Plan:   1. HFpEF, Stage C, Class 1-2, LVEF 50-55%: Patient continues to have trace lower extremity edema.  Rapid weight loss could be due to patient approaching euvolemia.  -Try decreasing Bumex to 1 mg daily and if needed can take additional 1 mg in the afternoon  -Continue potassium 20 mEq twice a day  -Repeat BMP, BNP and magnesium  -Reinforced s/sx of worsening heart failure with patient and weight monitoring. Pt verbalizes understanding. Pt to call office or RTC if present.     2.  Atrial fibrillation: Rate controlled  -Continue Toprol-XL 25 mg daily  -Continue warfarin, followed by the anticoagulation clinic  -Has pacemaker-next check 11/27/18    3.  CAD, s/p BRITTON x 2:   -Continue clopidogrel 75 mg daily  -Continue rosuvastatin 10 mg daily    4.  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    FU in clinic in 4-6 weeks. Sooner if needed.    Patient verbalizes understanding and agrees with the plan of care.     Collaborating MD: Jose Simms MD

## 2018-09-20 ENCOUNTER — HOME CARE VISIT (OUTPATIENT)
Dept: HOME HEALTH SERVICES | Facility: HOME HEALTHCARE | Age: 83
End: 2018-09-20
Payer: MEDICARE

## 2018-09-20 VITALS
DIASTOLIC BLOOD PRESSURE: 60 MMHG | SYSTOLIC BLOOD PRESSURE: 120 MMHG | HEART RATE: 60 BPM | OXYGEN SATURATION: 98 % | BODY MASS INDEX: 20.9 KG/M2 | RESPIRATION RATE: 18 BRPM | WEIGHT: 118 LBS | TEMPERATURE: 98.2 F

## 2018-09-20 PROCEDURE — G0493 RN CARE EA 15 MIN HH/HOSPICE: HCPCS

## 2018-09-20 SDOH — ECONOMIC STABILITY: HOUSING INSECURITY: UNSAFE COOKING RANGE AREA: 0

## 2018-09-20 SDOH — ECONOMIC STABILITY: HOUSING INSECURITY: UNSAFE APPLIANCES: 0

## 2018-09-20 ASSESSMENT — ENCOUNTER SYMPTOMS: DEPRESSED MOOD: 1

## 2018-09-21 ENCOUNTER — OFFICE VISIT (OUTPATIENT)
Dept: MEDICAL GROUP | Facility: MEDICAL CENTER | Age: 83
End: 2018-09-21
Payer: MEDICARE

## 2018-09-21 ENCOUNTER — HOSPITAL ENCOUNTER (OUTPATIENT)
Dept: LAB | Facility: MEDICAL CENTER | Age: 83
End: 2018-09-21
Attending: NURSE PRACTITIONER
Payer: MEDICARE

## 2018-09-21 ENCOUNTER — HOSPITAL ENCOUNTER (OUTPATIENT)
Facility: MEDICAL CENTER | Age: 83
End: 2018-09-21
Attending: NURSE PRACTITIONER
Payer: MEDICARE

## 2018-09-21 ENCOUNTER — HOSPITAL ENCOUNTER (OUTPATIENT)
Facility: MEDICAL CENTER | Age: 83
End: 2018-09-21
Attending: INTERNAL MEDICINE
Payer: MEDICARE

## 2018-09-21 VITALS
RESPIRATION RATE: 18 BRPM | OXYGEN SATURATION: 95 % | DIASTOLIC BLOOD PRESSURE: 60 MMHG | TEMPERATURE: 97.2 F | HEART RATE: 77 BPM | WEIGHT: 119 LBS | SYSTOLIC BLOOD PRESSURE: 120 MMHG | BODY MASS INDEX: 21.08 KG/M2

## 2018-09-21 VITALS
WEIGHT: 126 LBS | TEMPERATURE: 97.6 F | BODY MASS INDEX: 22.32 KG/M2 | DIASTOLIC BLOOD PRESSURE: 62 MMHG | RESPIRATION RATE: 16 BRPM | SYSTOLIC BLOOD PRESSURE: 120 MMHG | HEART RATE: 92 BPM | OXYGEN SATURATION: 98 %

## 2018-09-21 DIAGNOSIS — D69.6 THROMBOCYTOPENIA (HCC): ICD-10-CM

## 2018-09-21 DIAGNOSIS — Z23 NEED FOR INFLUENZA VACCINATION: ICD-10-CM

## 2018-09-21 DIAGNOSIS — F43.22 ADJUSTMENT DISORDER WITH ANXIETY: ICD-10-CM

## 2018-09-21 DIAGNOSIS — N18.30 CKD (CHRONIC KIDNEY DISEASE) STAGE 3, GFR 30-59 ML/MIN (HCC): ICD-10-CM

## 2018-09-21 DIAGNOSIS — E03.9 HYPOTHYROIDISM, UNSPECIFIED TYPE: ICD-10-CM

## 2018-09-21 DIAGNOSIS — I50.9 HEART FAILURE, NYHA CLASS 3 (HCC): ICD-10-CM

## 2018-09-21 DIAGNOSIS — I50.32 CHRONIC DIASTOLIC CHF (CONGESTIVE HEART FAILURE) (HCC): ICD-10-CM

## 2018-09-21 DIAGNOSIS — I50.30 ACC/AHA STAGE C HEART FAILURE WITH PRESERVED EJECTION FRACTION (HCC): ICD-10-CM

## 2018-09-21 DIAGNOSIS — J43.1 PANLOBULAR EMPHYSEMA (HCC): ICD-10-CM

## 2018-09-21 DIAGNOSIS — I48.20 CHRONIC ATRIAL FIBRILLATION (HCC): ICD-10-CM

## 2018-09-21 DIAGNOSIS — I50.9 HEART FAILURE, NYHA CLASS 1 (HCC): ICD-10-CM

## 2018-09-21 DIAGNOSIS — D61.818 PANCYTOPENIA (HCC): ICD-10-CM

## 2018-09-21 DIAGNOSIS — E87.6 HYPOKALEMIA: ICD-10-CM

## 2018-09-21 LAB
ANION GAP SERPL CALC-SCNC: 5 MMOL/L (ref 0–11.9)
BASOPHILS # BLD AUTO: 0.3 % (ref 0–1.8)
BASOPHILS # BLD: 0.01 K/UL (ref 0–0.12)
BNP SERPL-MCNC: 336 PG/ML (ref 0–100)
BUN SERPL-MCNC: 72 MG/DL (ref 8–22)
CALCIUM SERPL-MCNC: 9 MG/DL (ref 8.5–10.5)
CHLORIDE SERPL-SCNC: 92 MMOL/L (ref 96–112)
CO2 SERPL-SCNC: 38 MMOL/L (ref 20–33)
CREAT SERPL-MCNC: 1.74 MG/DL (ref 0.5–1.4)
EOSINOPHIL # BLD AUTO: 0 K/UL (ref 0–0.51)
EOSINOPHIL NFR BLD: 0 % (ref 0–6.9)
ERYTHROCYTE [DISTWIDTH] IN BLOOD BY AUTOMATED COUNT: 64.9 FL (ref 35.9–50)
GLUCOSE SERPL-MCNC: 102 MG/DL (ref 65–99)
HCT VFR BLD AUTO: 32 % (ref 37–47)
HGB BLD-MCNC: 9.7 G/DL (ref 12–16)
IMM GRANULOCYTES # BLD AUTO: 0.01 K/UL (ref 0–0.11)
IMM GRANULOCYTES NFR BLD AUTO: 0.3 % (ref 0–0.9)
INR PPP: 2.62 (ref 0.87–1.13)
LYMPHOCYTES # BLD AUTO: 0.71 K/UL (ref 1–4.8)
LYMPHOCYTES NFR BLD: 22.9 % (ref 22–41)
MAGNESIUM SERPL-MCNC: 2.9 MG/DL (ref 1.5–2.5)
MCH RBC QN AUTO: 29.7 PG (ref 27–33)
MCHC RBC AUTO-ENTMCNC: 30.3 G/DL (ref 33.6–35)
MCV RBC AUTO: 97.9 FL (ref 81.4–97.8)
MONOCYTES # BLD AUTO: 0.25 K/UL (ref 0–0.85)
MONOCYTES NFR BLD AUTO: 8.1 % (ref 0–13.4)
NEUTROPHILS # BLD AUTO: 2.12 K/UL (ref 2–7.15)
NEUTROPHILS NFR BLD: 68.4 % (ref 44–72)
NRBC # BLD AUTO: 0 K/UL
NRBC BLD-RTO: 0 /100 WBC
PLATELET # BLD AUTO: 106 K/UL (ref 164–446)
PMV BLD AUTO: 11.5 FL (ref 9–12.9)
POTASSIUM SERPL-SCNC: 3.6 MMOL/L (ref 3.6–5.5)
PROTHROMBIN TIME: 28.1 SEC (ref 12–14.6)
RBC # BLD AUTO: 3.27 M/UL (ref 4.2–5.4)
SODIUM SERPL-SCNC: 135 MMOL/L (ref 135–145)
T4 FREE SERPL-MCNC: 1.01 NG/DL (ref 0.53–1.43)
TSH SERPL DL<=0.005 MIU/L-ACNC: 0.4 UIU/ML (ref 0.38–5.33)
WBC # BLD AUTO: 3.1 K/UL (ref 4.8–10.8)

## 2018-09-21 PROCEDURE — 83880 ASSAY OF NATRIURETIC PEPTIDE: CPT

## 2018-09-21 PROCEDURE — 83735 ASSAY OF MAGNESIUM: CPT

## 2018-09-21 PROCEDURE — 85025 COMPLETE CBC W/AUTO DIFF WBC: CPT

## 2018-09-21 PROCEDURE — 90662 IIV NO PRSV INCREASED AG IM: CPT | Performed by: NURSE PRACTITIONER

## 2018-09-21 PROCEDURE — G0008 ADMIN INFLUENZA VIRUS VAC: HCPCS | Performed by: NURSE PRACTITIONER

## 2018-09-21 PROCEDURE — 99214 OFFICE O/P EST MOD 30 MIN: CPT | Mod: 25 | Performed by: NURSE PRACTITIONER

## 2018-09-21 PROCEDURE — 36415 COLL VENOUS BLD VENIPUNCTURE: CPT

## 2018-09-21 PROCEDURE — 84443 ASSAY THYROID STIM HORMONE: CPT

## 2018-09-21 PROCEDURE — 85610 PROTHROMBIN TIME: CPT

## 2018-09-21 PROCEDURE — 80048 BASIC METABOLIC PNL TOTAL CA: CPT

## 2018-09-21 PROCEDURE — 84439 ASSAY OF FREE THYROXINE: CPT

## 2018-09-21 SDOH — ECONOMIC STABILITY: HOUSING INSECURITY: UNSAFE APPLIANCES: 0

## 2018-09-21 SDOH — ECONOMIC STABILITY: HOUSING INSECURITY: UNSAFE COOKING RANGE AREA: 0

## 2018-09-21 ASSESSMENT — ENCOUNTER SYMPTOMS
NAUSEA: DENIES
VOMITING: DENIES

## 2018-09-21 NOTE — ASSESSMENT & PLAN NOTE
Stable at this time, followed by Dr. Atkinson.  Possible myelodysplastic syndrome, biopsy was declined

## 2018-09-21 NOTE — ASSESSMENT & PLAN NOTE
Stable at this time, on Bumex 1 mg daily  She is weighing herself daily  Denies activity intolerance, lower extremity edema.  She is followed by cardiology

## 2018-09-21 NOTE — PROGRESS NOTES
Right 9/21/1998 where is -98 subjective:     Chief Complaint   Patient presents with   • Follow-Up     Andree Egan is a 86 y.o. female here today to follow up on:    Chronic atrial fibrillation (HCC)  Chronic issue stable on current medications.  No dizziness, palpitation, fatigue    COPD (chronic obstructive pulmonary disease) ([facility])  Stable, no daily medication.  Last PFT in 2017.  Denies any shortness of breath    Chronic diastolic CHF (congestive heart failure) ([facility])  Stable at this time, on Bumex 1 mg daily  She is weighing herself daily  Denies activity intolerance, lower extremity edema.  She is followed by cardiology    Pancytopenia ([facility])  Stable at this time, followed by Dr. Atkinson.  Possible myelodysplastic syndrome, biopsy was declined       Current medicines (including changes today)  Current Outpatient Prescriptions   Medication Sig Dispense Refill   • potassium chloride SA (KDUR) 20 MEQ Tab CR Take 1 Tab by mouth 2 times a day. 60 Tab 11   • bumetanide (BUMEX) 1 MG Tab Take 1-2 Tabs by mouth every day. Take 1 mg daily and if needed take additional 1 mg in PM for increased weight, SOB, edema 30 Tab 1   • warfarin (COUMADIN) 2.5 MG Tab Take 1-2 Tabs by mouth every day. Or as directed (Patient taking differently: Take 5 mg by mouth every day. Or as directed) 60 Tab 3   • Magnesium Oxide 250 MG Tab Take 1 Tab by mouth every day.     • Melatonin 5 MG Tab Take 1 Tab by mouth at bedtime as needed (insomnia).     • acetaminophen (TYLENOL) 500 MG Tab Take 2 Tabs by mouth 1 time daily as needed for Mild Pain. 30 Tab 0   • calcium carbonate (TUMS) 500 MG Chew Tab Take 1 Tab by mouth every four hours as needed (heartburn). 30 Tab 0   • gabapentin (NEURONTIN) 300 MG Cap Take 2 Caps by mouth every evening. 90 Cap 1   • topiramate (TOPAMAX) 25 MG Tab Take 1 Tab by mouth 2 times a day. 60 Tab 3   • sertraline (ZOLOFT) 50 MG Tab Take 0.5 Tabs by mouth every bedtime. 30 Tab 11   • rosuvastatin (CRESTOR) 10 MG Tab  Take 1 Tab by mouth every evening. 30 Tab 11   • metoprolol SR (TOPROL XL) 25 MG TABLET SR 24 HR Take 1 Tab by mouth every morning. 30 Tab 1   • allopurinol (ZYLOPRIM) 100 MG Tab Take 1 Tab by mouth 2 times a day. 30 Tab 1   • cyanocobalamin (VITAMIN B-12) 1000 MCG/ML Solution 1 mL by Intramuscular route every 30 days for 30 days. 1 Vial 0   • cyanocobalamin (VITAMIN B12) 1000 MCG Tab Take 1 Tab by mouth every day. 30 Tab 3   • Calcium-Vitamin D 500-125 MG-UNIT Tab Take 1 tablet by mouth every morning. 600mg - 400 iu 30 Tab 1   • clopidogrel (PLAVIX) 75 MG Tab Take 1 Tab by mouth every day. 90 Tab 3   • levothyroxine (SYNTHROID) 50 MCG Tab Take 1 Tab by mouth Every morning on an empty stomach. 90 Tab 3   • omeprazole (PRILOSEC) 20 MG delayed-release capsule Take 1 Cap by mouth 2 times a day. 30 Cap 1   • ascorbic acid (VITAMIN C) 500 MG tablet Take 1 Tab by mouth every day. 30 Tab 3     No current facility-administered medications for this visit.      She  has a past medical history of Arthritis; Atrial fibrillation (CMS-HCC) [I48.91] (3/29/2018); Blood clotting disorder ([facility]); Breath shortness; Cancer ([facility]); Cataract; Congestive heart failure (CHF) ([facility]) (4/3/2018); BERTRAND (dyspnea on exertion) (5/4/2018); Heart burn; Heart valve disease; Hemorrhagic disorder ([facility]); Hiatus hernia syndrome; Hyperlipidemia; Hypertension; Kidney disease; Long term current use of anticoagulant (3/29/2018); Pacemaker (4/3/2018); Pulmonary emphysema ([facility]); Secondary hypertension (4/3/2018); Thyroid disease; Tremors of nervous system (4/3/2018); Urinary incontinence; and Urinary tract infection.    ROS included above     Objective:     Blood pressure 120/62, pulse 92, temperature 36.4 °C (97.6 °F), temperature source Temporal, resp. rate 16, weight 57.2 kg (126 lb), SpO2 98 %, not currently breastfeeding. Body mass index is 22.32 kg/m².     Physical Exam:  General: Alert, oriented in no acute distress.  Eye contact is good, speech is normal,  affect calm  Lungs: clear to auscultation bilaterally, normal effort, no wheeze/ rhonchi/ rales.  CV: regular rate and rhythm, S1, S2, murmur present.  1+ pedal edema bilaterally   Abdomen: soft, nontender  Ext: color normal, vascularity normal, temperature normal    Assessment and Plan:   The following treatment plan was discussed  1. Adjustment disorder with anxiety   stable with Zoloft   2. Chronic diastolic CHF (congestive heart failure) (HCC)   stable, followed by cardiology.  Continue monitoring of weight, continue medications.  Labs as previously ordered   3. Hypothyroidism, unspecified type   on levothyroxine, reevaluate labs  TSH+FREE T4   4. Need for influenza vaccination  Influenza Vaccine, High Dose (65+ Only)   5. Thrombocytopenia (HCC)  Last CBC reviewed, has been seen by Dr. Atkinson.  Continue to monitor   CBC WITH DIFFERENTIAL   6. Pancytopenia (HCC)  CBC WITH DIFFERENTIAL   7. Chronic atrial fibrillation (HCC)  Stable, on anticoagulation   8. Panlobular emphysema (HCC)  stable       Followup: 3 months and pending labs         Please note that this dictation was created using voice recognition software. I have worked with consultants from the vendor as well as technical experts from [facility] [employer] to optimize the interface. I have made every reasonable attempt to correct obvious errors, but I expect that there are errors of grammar and possibly content that I did not discover before finalizing the note.

## 2018-09-24 ENCOUNTER — ANTICOAGULATION MONITORING (OUTPATIENT)
Dept: MEDICAL GROUP | Facility: PHYSICIAN GROUP | Age: 83
End: 2018-09-24

## 2018-09-24 ENCOUNTER — HOME CARE VISIT (OUTPATIENT)
Dept: HOME HEALTH SERVICES | Facility: HOME HEALTHCARE | Age: 83
End: 2018-09-24
Payer: MEDICARE

## 2018-09-24 ENCOUNTER — TELEPHONE (OUTPATIENT)
Dept: CARDIOLOGY | Facility: MEDICAL CENTER | Age: 83
End: 2018-09-24

## 2018-09-24 ENCOUNTER — TELEPHONE (OUTPATIENT)
Dept: MEDICAL GROUP | Facility: MEDICAL CENTER | Age: 83
End: 2018-09-24

## 2018-09-24 VITALS
RESPIRATION RATE: 18 BRPM | DIASTOLIC BLOOD PRESSURE: 60 MMHG | HEART RATE: 67 BPM | OXYGEN SATURATION: 98 % | BODY MASS INDEX: 22.21 KG/M2 | WEIGHT: 125.38 LBS | TEMPERATURE: 97 F | SYSTOLIC BLOOD PRESSURE: 120 MMHG

## 2018-09-24 DIAGNOSIS — I48.20 CHRONIC ATRIAL FIBRILLATION (HCC): ICD-10-CM

## 2018-09-24 DIAGNOSIS — N18.30 CKD (CHRONIC KIDNEY DISEASE) STAGE 3, GFR 30-59 ML/MIN (HCC): ICD-10-CM

## 2018-09-24 DIAGNOSIS — I50.30 ACC/AHA STAGE C HEART FAILURE WITH PRESERVED EJECTION FRACTION (HCC): ICD-10-CM

## 2018-09-24 LAB — INR PPP: 1.6 (ref 2–3.5)

## 2018-09-24 PROCEDURE — 6650331 HCR  COAGCHECK STRIPS

## 2018-09-24 PROCEDURE — G0299 HHS/HOSPICE OF RN EA 15 MIN: HCPCS

## 2018-09-24 PROCEDURE — G0157 HHC PT ASSISTANT EA 15: HCPCS

## 2018-09-24 NOTE — TELEPHONE ENCOUNTER
" Message   Received: Today   Message Contents   ELIZABETH Davila R.N.             Patient has some Worsening kidney function. Stop Magnesium if taking. Please make sure pt is adequately hydrating and feeling ok, continue with the changes at our last visit and repeat labs/BMP/Magnesium this week.      Pt called. Spoke with Palma. She states understanding of all of the above. She states patient has been taking the Magnesium and will have her stop. She states that patient was \"feeling good\" this weekend. She states \"we are working on that right now\" when adequate hydration is discussed. She informs me that Friday may be a good day for the labs. I inform her that any day would be fine and no fasting is required. She is appreciative of call and states understanding.   "

## 2018-09-24 NOTE — TELEPHONE ENCOUNTER
----- Message from ELIZABETH Russ sent at 9/23/2018  7:39 PM PDT -----  Please inform pt thyroid levels in normal range, continue medication. Blood counts are stable  Margie BARTON

## 2018-09-24 NOTE — Clinical Note
There has been a noted weight gain with Mrs Egan on 9/17/18 she was #118.8 on 9/20/18 she was #119 and as on 9/24/18 she is 125.6. Her vitals are good with BP at 120/60 resp 18 pulse 67 and O2 @ 98%.

## 2018-09-24 NOTE — PROGRESS NOTES
Anticoagulation Summary  As of 9/24/2018    INR goal:   2.0-3.0   TTR:   44.8 % (4.4 mo)   Today's INR:   2.62 (9/21/2018)   Warfarin maintenance plan:   2.5 mg (2.5 mg x 1) on Mon; 5 mg (2.5 mg x 2) all other days   Weekly warfarin total:   32.5 mg   Plan last modified:   Scottie Younger PharmD (9/17/2018)   Next INR check:   9/26/2018   Target end date:   Indefinite    Indications    Chronic atrial fibrillation (HCC) [I48.2]  Long term current use of anticoagulant (Resolved) [Z79.01]             Anticoagulation Episode Summary     INR check location:       Preferred lab:       Send INR reminders to:       Comments:   Home Health Patient Daughter Palma takes care of her dosing       Anticoagulation Care Providers     Provider Role Specialty Phone number    [facility] Referring  [phone]    Razia Sims D.O. Responsible Family Medicine [phone]    Sebastian Thomas M.D. Responsible Interventional Cardiology [phone]        Anticoagulation Patient Findings    Left a message on the patient's daughter's voicemail today, informing the patient of a therapeutic INR of 2.62. Instructed patient to call the clinic with any changes to diet or medications, with any signs/sx of bleeding or clotting or with any questions or concerns.     Patient instructed to continue with the current dosing regimen and to follow up again on Wed with . Orders sent to [facility].    Libia Reynolds PharmD

## 2018-09-24 NOTE — TELEPHONE ENCOUNTER
Lab called with a critical BUN of 72. This is a jump up from her last BMP on 9/07 when BUN was 38.     Cr- 1.74  GFR- 28    To Karina BARTON

## 2018-09-25 ENCOUNTER — ANTICOAGULATION MONITORING (OUTPATIENT)
Dept: VASCULAR LAB | Facility: MEDICAL CENTER | Age: 83
End: 2018-09-25

## 2018-09-25 VITALS
HEART RATE: 67 BPM | DIASTOLIC BLOOD PRESSURE: 60 MMHG | RESPIRATION RATE: 18 BRPM | SYSTOLIC BLOOD PRESSURE: 120 MMHG | TEMPERATURE: 97 F | BODY MASS INDEX: 22.25 KG/M2 | WEIGHT: 125.6 LBS | OXYGEN SATURATION: 98 %

## 2018-09-25 DIAGNOSIS — I48.20 CHRONIC ATRIAL FIBRILLATION (HCC): ICD-10-CM

## 2018-09-25 DIAGNOSIS — I48.91 ATRIAL FIBRILLATION, UNSPECIFIED TYPE (HCC): ICD-10-CM

## 2018-09-25 ASSESSMENT — ENCOUNTER SYMPTOMS
NAUSEA: DENIES
VOMITING: DENIES

## 2018-09-25 NOTE — PROGRESS NOTES
Anticoagulation Summary  As of 9/25/2018    INR goal:   2.0-3.0   TTR:   45.0 % (4.5 mo)   Today's INR:   1.6! (9/24/2018)   Warfarin maintenance plan:   5 mg (2.5 mg x 2) every day   Weekly warfarin total:   35 mg   Plan last modified:   Scottie Younger, PharmD (9/25/2018)   Next INR check:   9/28/2018   Target end date:   Indefinite    Indications    Chronic atrial fibrillation (HCC) [I48.2]  Long term current use of anticoagulant (Resolved) [Z79.01]             Anticoagulation Episode Summary     INR check location:       Preferred lab:       Send INR reminders to:       Comments:   Home Health Patient Daughter Palma takes care of her dosing       Anticoagulation Care Providers     Provider Role Specialty Phone number    [facility] Referring  [phone]    Razia Sims D.O. Responsible Family Medicine [phone]    Sebastian Thomas M.D. Responsible Interventional Cardiology [phone]        Anticoagulation Patient Findings        Spoke to patient on the phone.   INR  sub-therapeutic.   Denies signs/symptoms of bleeding and/or thrombosis.   Denies changes to diet or medications.   Follow up appointment in 4 days     7.5mg today then continue weekly warfarin dose as noted      Scottie Younger, PharmD

## 2018-09-26 ENCOUNTER — HOME CARE VISIT (OUTPATIENT)
Dept: HOME HEALTH SERVICES | Facility: HOME HEALTHCARE | Age: 83
End: 2018-09-26
Payer: MEDICARE

## 2018-09-26 PROCEDURE — G0151 HHCP-SERV OF PT,EA 15 MIN: HCPCS

## 2018-09-26 NOTE — PROGRESS NOTES
Please have patient call her cardiologists office (or if you can help her call) to report this weight gain ASAP and see if she needs to take additional doses of her diuretic medication.     Thank you,    MARIJA Patel.

## 2018-09-26 NOTE — PROGRESS NOTES
Patient has had a 7 pound weight gain in 1 week per home health, please advise.     MARIJA Patel.

## 2018-09-27 VITALS
DIASTOLIC BLOOD PRESSURE: 62 MMHG | OXYGEN SATURATION: 96 % | RESPIRATION RATE: 18 BRPM | HEART RATE: 71 BPM | BODY MASS INDEX: 21.61 KG/M2 | WEIGHT: 122 LBS | SYSTOLIC BLOOD PRESSURE: 128 MMHG | TEMPERATURE: 97.3 F

## 2018-09-28 ENCOUNTER — HOME CARE VISIT (OUTPATIENT)
Dept: HOME HEALTH SERVICES | Facility: HOME HEALTHCARE | Age: 83
End: 2018-09-28
Payer: MEDICARE

## 2018-09-28 ENCOUNTER — ANTICOAGULATION MONITORING (OUTPATIENT)
Dept: VASCULAR LAB | Facility: MEDICAL CENTER | Age: 83
End: 2018-09-28

## 2018-09-28 ENCOUNTER — TELEPHONE (OUTPATIENT)
Dept: CARDIOLOGY | Facility: MEDICAL CENTER | Age: 83
End: 2018-09-28

## 2018-09-28 VITALS
DIASTOLIC BLOOD PRESSURE: 70 MMHG | BODY MASS INDEX: 21.61 KG/M2 | WEIGHT: 122 LBS | RESPIRATION RATE: 18 BRPM | OXYGEN SATURATION: 98 % | SYSTOLIC BLOOD PRESSURE: 130 MMHG | TEMPERATURE: 97.6 F | HEART RATE: 67 BPM

## 2018-09-28 DIAGNOSIS — I48.20 CHRONIC ATRIAL FIBRILLATION (HCC): ICD-10-CM

## 2018-09-28 DIAGNOSIS — Z79.01 CHRONIC ANTICOAGULATION: Primary | ICD-10-CM

## 2018-09-28 LAB — INR PPP: 2.9 (ref 2–3.5)

## 2018-09-28 PROCEDURE — G0493 RN CARE EA 15 MIN HH/HOSPICE: HCPCS

## 2018-09-28 SDOH — ECONOMIC STABILITY: HOUSING INSECURITY: UNSAFE COOKING RANGE AREA: 0

## 2018-09-28 SDOH — ECONOMIC STABILITY: HOUSING INSECURITY: UNSAFE APPLIANCES: 0

## 2018-09-28 ASSESSMENT — ENCOUNTER SYMPTOMS
VOMITING: DENIES
NAUSEA: DENIES

## 2018-09-28 ASSESSMENT — ACTIVITIES OF DAILY LIVING (ADL): OASIS_M1830: 02

## 2018-09-28 NOTE — PROGRESS NOTES
OP Anticoagulation Telephone Note    Date: 9/28/2018  Anticoagulation Summary  As of 9/28/2018    INR goal:   2.0-3.0   TTR:   45.7 % (4.6 mo)   Today's INR:   2.9   Warfarin maintenance plan:   5 mg (2.5 mg x 2) every day   Weekly warfarin total:   35 mg   No change documented:   Joanne Velazquez, Med Ass't   Plan last modified:   Scottie Younger, PharmD (9/25/2018)   Next INR check:   10/2/2018   Target end date:   Indefinite    Indications    Chronic atrial fibrillation (HCC) [I48.2]  Long term current use of anticoagulant (Resolved) [Z79.01]             Anticoagulation Episode Summary     INR check location:       Preferred lab:       Send INR reminders to:       Comments:   Home Health Patient Daughter Palma takes care of her dosing       Anticoagulation Care Providers     Provider Role Specialty Phone number    [facility] Referring  [phone]    Razia Sims D.O. Responsible Family Medicine [phone]    Sebastian Thomas M.D. Responsible Interventional Cardiology [phone]        Anticoagulation Patient Findings  Patient Findings     Negatives:   Signs/symptoms of thrombosis, Signs/symptoms of bleeding, Laboratory test error suspected, Change in health, Change in alcohol use, Change in activity, Upcoming invasive procedure, Emergency department visit, Upcoming dental procedure, Missed doses, Extra doses, Change in medications, Change in diet/appetite, Hospital admission, Bruising, Other complaints      Plan:  Spoke with patient's grand daughter Palma on the phone. Patient is therapeutic today. Patient denies any changes in medications or diet. Patient denies any signs or symptoms of bleeding or clotting. Instructed patient to call clinic if any unusual bleeding or bruising occurs. Will continue dosing as outlined above. Will follow-up with patient in 4 days.    Joanne Velazquez, Medical Assistant    I have reviewed and agree with the plan above on 09/28/18    Chadd JIMENEZ  Dent, PharmD

## 2018-09-28 NOTE — TELEPHONE ENCOUNTER
"Question about when patient should have lab draw   Received: Today   Message Contents   MATHEW Bal/Cecilia     Please call Lalitha, nurse with [facility], at [phone]. She needs to get clarification on when the patient should have her lab draw for Magnesium and BMP.      Returned call. Spoke with Lalitha asked for labs approx 1 week prior to alexa 10/22 follow up. Weight clarified. Pt weighs 122 this AM per report. Pt has been apparently have battery issues with her own home scale and so she used her granddaughters. Last weight here in our office was recorded at 124 lb on 9/19. Eric states pts edema is \"much better\", she reports pt did take an additional Bumex dose as instructed.   "

## 2018-10-02 ENCOUNTER — ANTICOAGULATION MONITORING (OUTPATIENT)
Dept: VASCULAR LAB | Facility: MEDICAL CENTER | Age: 83
End: 2018-10-02

## 2018-10-02 ENCOUNTER — HOME CARE VISIT (OUTPATIENT)
Dept: HOME HEALTH SERVICES | Facility: HOME HEALTHCARE | Age: 83
End: 2018-10-02
Payer: MEDICARE

## 2018-10-02 DIAGNOSIS — I48.91 ATRIAL FIBRILLATION, UNSPECIFIED TYPE (HCC): ICD-10-CM

## 2018-10-02 DIAGNOSIS — I48.20 CHRONIC ATRIAL FIBRILLATION (HCC): ICD-10-CM

## 2018-10-02 LAB — INR PPP: 3.9 (ref 2–3.5)

## 2018-10-02 PROCEDURE — G0299 HHS/HOSPICE OF RN EA 15 MIN: HCPCS

## 2018-10-02 PROCEDURE — 665003 FOLLOW UP-HOME HEALTH

## 2018-10-02 NOTE — PROGRESS NOTES
Anticoagulation Summary  As of 10/2/2018    INR goal:   2.0-3.0   TTR:   44.7 % (4.8 mo)   Today's INR:   3.9!   Warfarin maintenance plan:   5 mg (2.5 mg x 2) every day   Weekly warfarin total:   35 mg   Plan last modified:   Scottie Younger, PharmD (9/25/2018)   Next INR check:   10/5/2018   Target end date:   Indefinite    Indications    Chronic atrial fibrillation (HCC) [I48.2]  Long term current use of anticoagulant (Resolved) [Z79.01]             Anticoagulation Episode Summary     INR check location:       Preferred lab:       Send INR reminders to:       Comments:   Home Health Patient Daughter Palma takes care of her dosing       Anticoagulation Care Providers     Provider Role Specialty Phone number    [facility] Referring  [phone]    Razia Sims D.O. Responsible Family Medicine [phone]    Sebastian Thomas M.D. Responsible Interventional Cardiology [phone]        Anticoagulation Patient Findings  Patient Findings     Negatives:   Signs/symptoms of thrombosis, Signs/symptoms of bleeding, Laboratory test error suspected, Change in health, Change in alcohol use, Change in activity, Upcoming invasive procedure, Emergency department visit, Upcoming dental procedure, Missed doses, Extra doses, Change in medications, Change in diet/appetite, Hospital admission, Bruising, Other complaints        Spoke with patient today regarding supratherapeutic INR of 3.9.  Patient denies any signs/symptoms of bruising or bleeding or any changes in diet and medications.  Instructed patient to call clinic with any questions or concerns.  Instructed patient to HOLD X 1, then resume current warfarin regimen.  Follow up in 2 days, to reduce risk of adverse events related to this high risk medication,  Warfarin.    Taiwo Carcamo, PharmD

## 2018-10-03 VITALS — WEIGHT: 131 LBS | BODY MASS INDEX: 23.21 KG/M2

## 2018-10-03 ASSESSMENT — ENCOUNTER SYMPTOMS
SHORTNESS OF BREATH: T
NAUSEA: DENIES
VOMITING: DENIES

## 2018-10-05 ENCOUNTER — ANTICOAGULATION MONITORING (OUTPATIENT)
Dept: VASCULAR LAB | Facility: MEDICAL CENTER | Age: 83
End: 2018-10-05

## 2018-10-05 ENCOUNTER — HOME CARE VISIT (OUTPATIENT)
Dept: HOME HEALTH SERVICES | Facility: HOME HEALTHCARE | Age: 83
End: 2018-10-05
Payer: MEDICARE

## 2018-10-05 DIAGNOSIS — Z79.01 CHRONIC ANTICOAGULATION: ICD-10-CM

## 2018-10-05 DIAGNOSIS — I48.20 CHRONIC ATRIAL FIBRILLATION (HCC): ICD-10-CM

## 2018-10-05 LAB — INR PPP: 2.5 (ref 2–3.5)

## 2018-10-05 PROCEDURE — 6650331 HCR  COAGCHECK STRIPS

## 2018-10-05 PROCEDURE — G0299 HHS/HOSPICE OF RN EA 15 MIN: HCPCS

## 2018-10-06 NOTE — PROGRESS NOTES
Anticoagulation Summary  As of 10/5/2018    INR goal:   2.0-3.0   TTR:   44.6 % (4.9 mo)   Today's INR:   2.5   Warfarin maintenance plan:   2.5 mg (2.5 mg x 1) on Mon; 5 mg (2.5 mg x 2) all other days   Weekly warfarin total:   32.5 mg   Plan last modified:   Elva Eddy, PharmD (10/5/2018)   Next INR check:   10/10/2018   Target end date:   Indefinite    Indications    Chronic atrial fibrillation (HCC) [I48.2]  Long term current use of anticoagulant (Resolved) [Z79.01]             Anticoagulation Episode Summary     INR check location:       Preferred lab:       Send INR reminders to:       Comments:   Home Health Patient Daughter Palma takes care of her dosing; Palma Redd @ [phone] and fax dosing instructions      Anticoagulation Care Providers     Provider Role Specialty Phone number    [facility] Referring  [phone]    Razia Sims D.O. Responsible Family Medicine [phone]    Sebastian Thomas M.D. Responsible Interventional Cardiology [phone]        Anticoagulation Patient Findings      Left voicemail message to report a therapeutic INR of 2.5.    Will have pt take a new weekly dose.   Requested pt contact the clinic for any s/s of unusual bleeding, bruising, clotting or any changes to diet or medication.    FU INR in 5 days with [facility] .    Elva Eddy, PharmD

## 2018-10-07 ENCOUNTER — OFFICE VISIT (OUTPATIENT)
Dept: URGENT CARE | Facility: PHYSICIAN GROUP | Age: 83
End: 2018-10-07
Payer: MEDICARE

## 2018-10-07 ENCOUNTER — HOSPITAL ENCOUNTER (OUTPATIENT)
Facility: MEDICAL CENTER | Age: 83
End: 2018-10-07
Attending: FAMILY MEDICINE
Payer: MEDICARE

## 2018-10-07 VITALS
TEMPERATURE: 97.8 F | SYSTOLIC BLOOD PRESSURE: 120 MMHG | OXYGEN SATURATION: 98 % | HEART RATE: 82 BPM | WEIGHT: 131.8 LBS | HEIGHT: 64 IN | BODY MASS INDEX: 22.5 KG/M2 | DIASTOLIC BLOOD PRESSURE: 76 MMHG | RESPIRATION RATE: 16 BRPM

## 2018-10-07 VITALS
RESPIRATION RATE: 16 BRPM | WEIGHT: 130.2 LBS | TEMPERATURE: 98 F | DIASTOLIC BLOOD PRESSURE: 70 MMHG | OXYGEN SATURATION: 98 % | BODY MASS INDEX: 23.06 KG/M2 | SYSTOLIC BLOOD PRESSURE: 130 MMHG | HEART RATE: 83 BPM

## 2018-10-07 DIAGNOSIS — N30.00 ACUTE CYSTITIS WITHOUT HEMATURIA: Primary | ICD-10-CM

## 2018-10-07 DIAGNOSIS — N30.00 ACUTE CYSTITIS WITHOUT HEMATURIA: ICD-10-CM

## 2018-10-07 LAB
APPEARANCE UR: NORMAL
BILIRUB UR STRIP-MCNC: NEGATIVE MG/DL
COLOR UR AUTO: YELLOW
GLUCOSE UR STRIP.AUTO-MCNC: NEGATIVE MG/DL
KETONES UR STRIP.AUTO-MCNC: NEGATIVE MG/DL
LEUKOCYTE ESTERASE UR QL STRIP.AUTO: NORMAL
NITRITE UR QL STRIP.AUTO: POSITIVE
PH UR STRIP.AUTO: 6.5 [PH] (ref 5–8)
PROT UR QL STRIP: NEGATIVE MG/DL
RBC UR QL AUTO: NORMAL
SP GR UR STRIP.AUTO: 1.01
UROBILINOGEN UR STRIP-MCNC: 0.2 MG/DL

## 2018-10-07 PROCEDURE — 87086 URINE CULTURE/COLONY COUNT: CPT

## 2018-10-07 PROCEDURE — 81002 URINALYSIS NONAUTO W/O SCOPE: CPT | Performed by: FAMILY MEDICINE

## 2018-10-07 PROCEDURE — 99214 OFFICE O/P EST MOD 30 MIN: CPT | Performed by: FAMILY MEDICINE

## 2018-10-07 RX ORDER — PHENAZOPYRIDINE HYDROCHLORIDE 100 MG/1
100 TABLET, FILM COATED ORAL 3 TIMES DAILY PRN
Qty: 6 TAB | Refills: 0 | Status: SHIPPED | OUTPATIENT
Start: 2018-10-07 | End: 2018-10-31

## 2018-10-07 RX ORDER — CEFDINIR 300 MG/1
300 CAPSULE ORAL EVERY 12 HOURS
Qty: 10 CAP | Refills: 0 | Status: SHIPPED | OUTPATIENT
Start: 2018-10-07 | End: 2018-10-12

## 2018-10-07 SDOH — ECONOMIC STABILITY: HOUSING INSECURITY: UNSAFE APPLIANCES: 0

## 2018-10-07 SDOH — ECONOMIC STABILITY: HOUSING INSECURITY: UNSAFE COOKING RANGE AREA: 0

## 2018-10-07 ASSESSMENT — ENCOUNTER SYMPTOMS
COUGH: 0
FEVER: 0
ABDOMINAL PAIN: 0
NAUSEA: 0
NAUSEA: DENIES
EYE REDNESS: 0
SORE THROAT: 0
HEADACHES: 0
VOMITING: DENIES
PALPITATIONS: 0
FLANK PAIN: 0
WEIGHT LOSS: 0
CHILLS: 0
VOMITING: 0
EYE DISCHARGE: 0
SPUTUM PRODUCTION: 0
DIARRHEA: 0

## 2018-10-07 NOTE — PROGRESS NOTES
Subjective:      Andree Egan is a 86 y.o. female who presents with Dysuria (urinary [id number] )            Subjective:    Andree Egan is a 86 y.o. female who complains of dysuria, frequency, urgency She has had symptoms for 2 days.  Patient denies stomachache, back pain, vaginal discharge. Patient does have a history of recurrent UTI.  Patient does not have a history of pyelonephritis.     Past Medical History:  No date: Arthritis      Comment:  back/neck  3/29/2018: Atrial fibrillation (CMS-HCC) (I48.91)  No date: Blood clotting disorder ([facility])  No date: Breath shortness      Comment:  water retention  No date: Cancer ([facility])      Comment:  breast  No date: Cataract  4/3/2018: Congestive heart failure (CHF) ([facility])  5/4/2018: BERTRAND (dyspnea on exertion)  No date: Heart burn  No date: Heart valve disease  No date: Hemorrhagic disorder ([facility])  No date: Hiatus hernia syndrome  No date: Hyperlipidemia  No date: Hypertension  No date: Kidney disease  3/29/2018: Long term current use of anticoagulant  4/3/2018: Pacemaker  No date: Pulmonary emphysema ([facility])  4/3/2018: Secondary hypertension  No date: Thyroid disease  4/3/2018: Tremors of nervous system  No date: Urinary incontinence  No date: Urinary tract infection  Patient Active Problem List    Weakness         Date Noted: 08/15/2018      Fall         Date Noted: 08/10/2018      DNR (do not resuscitate)         Date Noted: 08/10/2018      Pernicious anemia         Date Noted: 07/31/2018      Pancytopenia ([facility])         Date Noted: 07/26/2018      Severe tricuspid regurgitation         Date Noted: 07/11/2018      Acute on chronic renal failure ([facility])         Date Noted: 07/08/2018      Hyponatremia         Date Noted: 07/04/2018      S/P coronary angioplasty         Date Noted: 07/04/2018      Pulmonary hypertension ([facility])         Date Noted: 05/22/2018      Iron deficiency anemia         Date Noted: 05/22/2018      Chronic diastolic CHF (congestive heart failure)  ([facility])         Date Noted: 05/14/2018      COPD (chronic obstructive pulmonary disease) (HCC)         Date Noted: 05/14/2018      GERD (gastroesophageal reflux disease)         Date Noted: 05/14/2018      Osteoporosis         Date Noted: 05/14/2018      Psoriasis         Date Noted: 05/14/2018      Bilateral lower extremity edema         Date Noted: 05/14/2018      Polypharmacy         Date Noted: 05/14/2018      Hypoxia         Date Noted: 05/14/2018      CKD (chronic kidney disease) stage 3, GFR 30-59 ml/min ([facility])         Date Noted: 05/14/2018      Secondary hypertension         Date Noted: 04/03/2018      Cardiac pacemaker in situ         Date Noted: 04/03/2018      Tremors of nervous system         Date Noted: 04/03/2018      Chronic atrial fibrillation (HCC)         Date Noted: 03/29/2018      Adjustment disorder with anxiety         Date Noted: 02/28/2018      ARELY (generalized anxiety disorder)         Date Noted: 11/29/2017      Recurrent UTI         Date Noted: 10/23/2017      Nonrheumatic aortic valve stenosis         Date Noted: 03/01/2017      Vaginal atrophy         Date Noted: 05/27/2016      Warfarin anticoagulation         Date Noted: 02/02/2016      ASCVD (arteriosclerotic cardiovascular disease)         Date Noted: 03/10/2015      Persistent insomnia         Date Noted: 05/26/2011      Hypothyroidism         Date Noted: 12/09/2009      Sinoatrial node dysfunction (HCC)         Date Noted: 12/09/2009      Essential tremor         Date Noted: 08/25/2008      Past Surgical History:  No date: OTHER      Comment:  left breast mastectomy  No date: OTHER CARDIAC SURGERY      Comment:  pacemaker placement  Review of patient's family history indicates:  Problem: Lung Disease      Relation: Mother       Age of Onset: (Not Specified)   Problem: Cancer      Relation: Mother       Age of Onset: (Not Specified)   Problem: No Known Problems      Relation: Father       Age of Onset: (Not Specified)   Problem:  Fainting      Relation: Neg Hx       Age of Onset: (Not Specified)     Social History    Marital status: Single              Spouse name:                       Years of education:                 Number of children:               Social History Main Topics    Smoking status: Former Smoker                                                                Packs/day: 0.50      Years: 0.00           Types: Cigarettes       Quit date: 1/1/1970    Smokeless tobacco: Never Used                        Alcohol use: Yes           1.8 oz/week       Glasses of wine: 3 per week       Comment: 1-2 glasses wine before dinner    Drug use: No              Current Outpatient Prescriptions:  potassium chloride SA (KDUR) 20 MEQ Tab CR, Take 1 Tab by mouth 2 times a day., Disp: 60 Tab, Rfl: 11  bumetanide (BUMEX) 1 MG Tab, Take 1-2 Tabs by mouth every day. Take 1 mg daily and if needed take additional 1 mg in PM for increased weight, SOB, edema, Disp: 30 Tab, Rfl: 1  warfarin (COUMADIN) 2.5 MG Tab, Take 1-2 Tabs by mouth every day. Or as directed (Patient taking differently: Take 5 mg by mouth every day. Or as directed), Disp: 60 Tab, Rfl: 3  Melatonin 5 MG Tab, Take 1 Tab by mouth at bedtime as needed (insomnia)., Disp: , Rfl:   acetaminophen (TYLENOL) 500 MG Tab, Take 2 Tabs by mouth 1 time daily as needed for Mild Pain., Disp: 30 Tab, Rfl: 0  calcium carbonate (TUMS) 500 MG Chew Tab, Take 1 Tab by mouth every four hours as needed (heartburn)., Disp: 30 Tab, Rfl: 0  gabapentin (NEURONTIN) 300 MG Cap, Take 2 Caps by mouth every evening., Disp: 90 Cap, Rfl: 1  topiramate (TOPAMAX) 25 MG Tab, Take 1 Tab by mouth 2 times a day., Disp: 60 Tab, Rfl: 3  sertraline (ZOLOFT) 50 MG Tab, Take 0.5 Tabs by mouth every bedtime., Disp: 30 Tab, Rfl: 11  rosuvastatin (CRESTOR) 10 MG Tab, Take 1 Tab by mouth every evening., Disp: 30 Tab, Rfl: 11  metoprolol SR (TOPROL XL) 25 MG TABLET SR 24 HR, Take 1 Tab by mouth every morning., Disp: 30 Tab, Rfl:  "1  allopurinol (ZYLOPRIM) 100 MG Tab, Take 1 Tab by mouth 2 times a day., Disp: 30 Tab, Rfl: 1  cyanocobalamin (VITAMIN B12) 1000 MCG Tab, Take 1 Tab by mouth every day., Disp: 30 Tab, Rfl: 3  Calcium-Vitamin D 500-125 MG-UNIT Tab, Take 1 tablet by mouth every morning. 600mg - 400 iu, Disp: 30 Tab, Rfl: 1  clopidogrel (PLAVIX) 75 MG Tab, Take 1 Tab by mouth every day., Disp: 90 Tab, Rfl: 3  levothyroxine (SYNTHROID) 50 MCG Tab, Take 1 Tab by mouth Every morning on an empty stomach., Disp: 90 Tab, Rfl: 3  omeprazole (PRILOSEC) 20 MG delayed-release capsule, Take 1 Cap by mouth 2 times a day., Disp: 30 Cap, Rfl: 1  ascorbic acid (VITAMIN C) 500 MG tablet, Take 1 Tab by mouth every day., Disp: 30 Tab, Rfl: 3    No current facility-administered medications for this visit.      -- Codeine -- Vomiting   -- Bloodless                       Review of Systems   Constitutional: Negative for chills, fever and weight loss.   HENT: Negative for congestion, hearing loss and sore throat.    Eyes: Negative for discharge and redness.   Respiratory: Negative for cough and sputum production.    Cardiovascular: Negative for chest pain and palpitations.   Gastrointestinal: Negative for abdominal pain, diarrhea, nausea and vomiting.   Genitourinary: Positive for dysuria, frequency and urgency. Negative for flank pain.   Skin: Negative for itching and rash.   Neurological: Negative for headaches.          Objective:     /76 (BP Location: Left arm, Patient Position: Sitting, BP Cuff Size: Adult)   Pulse 82   Temp 36.6 °C (97.8 °F) (Temporal)   Resp 16   Ht 1.613 m (5' 3.5\")   Wt 59.8 kg (131 lb 12.8 oz)   LMP  (LMP Unknown)   SpO2 98%   BMI 22.98 kg/m²      Physical Exam   Constitutional: She appears well-developed and well-nourished.   HENT:   Head: Normocephalic and atraumatic.   Eyes: EOM are normal. Right eye exhibits no discharge. Left eye exhibits no discharge.   Cardiovascular: Normal rate and regular rhythm.    Murmur " heard.  Pulmonary/Chest: Effort normal and breath sounds normal. She has no wheezes.   Abdominal: Soft. Bowel sounds are normal. She exhibits no distension. There is no tenderness.   Skin: Skin is warm and dry.            Laboratory:   Urine dipstick: negative for glucose, negative for hemoglobin, negative for leukocyte esterase, 2+ for nitrites.         Assessment/Plan:     Assessment:    Acute cystitis      Plan:    Medications: omnicef and pyridium .  Maintain adequate hydration.  Follow up if symptoms not improving, and prn.

## 2018-10-09 LAB
BACTERIA UR CULT: NORMAL
SIGNIFICANT IND 70042: NORMAL
SITE SITE: NORMAL
SOURCE SOURCE: NORMAL

## 2018-10-10 ENCOUNTER — ANTICOAGULATION MONITORING (OUTPATIENT)
Dept: VASCULAR LAB | Facility: MEDICAL CENTER | Age: 83
End: 2018-10-10

## 2018-10-10 ENCOUNTER — PATIENT OUTREACH (OUTPATIENT)
Dept: HEALTH INFORMATION MANAGEMENT | Facility: OTHER | Age: 83
End: 2018-10-10

## 2018-10-10 ENCOUNTER — HOME CARE VISIT (OUTPATIENT)
Dept: HOME HEALTH SERVICES | Facility: HOME HEALTHCARE | Age: 83
End: 2018-10-10
Payer: MEDICARE

## 2018-10-10 VITALS
DIASTOLIC BLOOD PRESSURE: 70 MMHG | WEIGHT: 132.2 LBS | SYSTOLIC BLOOD PRESSURE: 128 MMHG | TEMPERATURE: 97.5 F | RESPIRATION RATE: 18 BRPM | OXYGEN SATURATION: 97 % | HEART RATE: 85 BPM | BODY MASS INDEX: 23.05 KG/M2

## 2018-10-10 DIAGNOSIS — Z79.01 CHRONIC ANTICOAGULATION: ICD-10-CM

## 2018-10-10 DIAGNOSIS — I48.20 CHRONIC ATRIAL FIBRILLATION (HCC): ICD-10-CM

## 2018-10-10 LAB — INR PPP: 2.9 (ref 2–3.5)

## 2018-10-10 PROCEDURE — G0493 RN CARE EA 15 MIN HH/HOSPICE: HCPCS

## 2018-10-10 ASSESSMENT — ENCOUNTER SYMPTOMS
NAUSEA: DENIES
SHORTNESS OF BREATH: T
VOMITING: DENIES

## 2018-10-10 NOTE — PROGRESS NOTES
1. Attempt #:1    2. HealthConnect Verified: yes    3. Verify PCP: yes    4. Review Care Team: yes    5. WebIZ Checked & Epic Updated: Yes    6. Reviewed/Updated the following with patient:       •   Communication Preference Obtained? YES       •   Preferred Pharmacy? YES       •   Preferred Lab? YES       •   Family History (document living status of immediate family members and if + hx of cancer, diabetes, hypertension, hyperlipidemia, heart attack, stroke) YES. Was Abstract Encounter opened and chart updated? YES    7. Annual Wellness Visit Scheduling  · Scheduling Status:Scheduled        9. Care Gap Scheduling (Attempt to Schedule EACH Overdue Care Gap!)     Health Maintenance Due   Topic Date Due   • Annual Wellness Visit  04/21/1932   • PFT SCREENING-FEV1 AND FEV/FVC RATIO / SPIROMETRY SHOULD BE PERFORMED ANNUALLY  04/21/1950   • PAP SMEAR  04/21/1953   • MAMMOGRAM  04/21/1972   • COLONOSCOPY  04/21/1982   • IMM ZOSTER VACCINES (1 of 2) 04/21/1982   • BONE DENSITY  04/21/1997        Scheduled patient for Annual Wellness Visit/ DISCUSS CARE GAPS WITH PCP     10. Gipis Activation: already active    11. Gipis Jory: no    12. Virtual Visits: no    13. Opt In to Text Messages: no    14. Patient was advised: “This is a free wellness visit. The provider will screen for medical conditions to help you stay healthy. If you have other concerns to address you may be asked to discuss these at a separate visit or there may be an additional fee.”     15. Patient was informed to arrive 15 min prior to their scheduled appointment and bring in their medication bottles.

## 2018-10-10 NOTE — PROGRESS NOTES
Anticoagulation Summary  As of 10/10/2018    INR goal:   2.0-3.0   TTR:   46.4 % (5 mo)   Today's INR:   2.9   Warfarin maintenance plan:   2.5 mg (2.5 mg x 1) on Mon; 5 mg (2.5 mg x 2) all other days   Weekly warfarin total:   32.5 mg   Plan last modified:   Elva Eddy, PharmD (10/5/2018)   Next INR check:   10/16/2018   Target end date:   Indefinite    Indications    Chronic atrial fibrillation (HCC) [I48.2]  Long term current use of anticoagulant (Resolved) [Z79.01]             Anticoagulation Episode Summary     INR check location:       Preferred lab:       Send INR reminders to:       Comments:   [facility]; Patient Daughter Palma takes care of her dosing; Palma Redd @ [phone] and fax dosing instructions      Anticoagulation Care Providers     Provider Role Specialty Phone number    [facility] Referring  [phone]    Razia Sims D.O. Responsible Family Medicine [phone]    Sebastian Thomas M.D. Responsible Interventional Cardiology [phone]        Anticoagulation Patient Findings      Left voicemail message to report a therapeutic INR of 2.9.    Pt to continue with current warfarin dosing regimen. Requested pt contact the clinic for any s/s of unusual bleeding, bruising, clotting or any changes to diet or medication.    FU INR in 1 week(s) with [facility].    Elva Eddy, PharmD

## 2018-10-11 PROCEDURE — G0179 MD RECERTIFICATION HHA PT: HCPCS | Performed by: FAMILY MEDICINE

## 2018-10-16 ENCOUNTER — HOSPITAL ENCOUNTER (OUTPATIENT)
Facility: MEDICAL CENTER | Age: 83
End: 2018-10-16
Attending: NURSE PRACTITIONER
Payer: MEDICARE

## 2018-10-16 ENCOUNTER — HOME CARE VISIT (OUTPATIENT)
Dept: HOME HEALTH SERVICES | Facility: HOME HEALTHCARE | Age: 83
End: 2018-10-16
Payer: MEDICARE

## 2018-10-16 ENCOUNTER — ANTICOAGULATION MONITORING (OUTPATIENT)
Dept: VASCULAR LAB | Facility: MEDICAL CENTER | Age: 83
End: 2018-10-16

## 2018-10-16 DIAGNOSIS — I50.30 ACC/AHA STAGE C HEART FAILURE WITH PRESERVED EJECTION FRACTION (HCC): ICD-10-CM

## 2018-10-16 DIAGNOSIS — N18.30 CKD (CHRONIC KIDNEY DISEASE) STAGE 3, GFR 30-59 ML/MIN (HCC): ICD-10-CM

## 2018-10-16 DIAGNOSIS — I48.20 CHRONIC ATRIAL FIBRILLATION (HCC): ICD-10-CM

## 2018-10-16 LAB
ANION GAP SERPL CALC-SCNC: 3 MMOL/L (ref 0–11.9)
BUN SERPL-MCNC: 25 MG/DL (ref 8–22)
CALCIUM SERPL-MCNC: 8.5 MG/DL (ref 8.5–10.5)
CHLORIDE SERPL-SCNC: 102 MMOL/L (ref 96–112)
CO2 SERPL-SCNC: 28 MMOL/L (ref 20–33)
CREAT SERPL-MCNC: 1.48 MG/DL (ref 0.5–1.4)
GLUCOSE SERPL-MCNC: 57 MG/DL (ref 65–99)
INR PPP: 3.4 (ref 2–3.5)
MAGNESIUM SERPL-MCNC: 2.4 MG/DL (ref 1.5–2.5)
POTASSIUM SERPL-SCNC: 4 MMOL/L (ref 3.6–5.5)
SODIUM SERPL-SCNC: 133 MMOL/L (ref 135–145)

## 2018-10-16 PROCEDURE — 80048 BASIC METABOLIC PNL TOTAL CA: CPT

## 2018-10-16 PROCEDURE — 83735 ASSAY OF MAGNESIUM: CPT

## 2018-10-16 PROCEDURE — G0299 HHS/HOSPICE OF RN EA 15 MIN: HCPCS

## 2018-10-16 NOTE — PROGRESS NOTES
Anticoagulation Summary  As of 10/16/2018    INR goal:   2.0-3.0   TTR:   45.4 % (5.2 mo)   Today's INR:   3.4!   Warfarin maintenance plan:   2.5 mg (2.5 mg x 1) on Mon, Fri; 5 mg (2.5 mg x 2) all other days   Weekly warfarin total:   30 mg   Plan last modified:   Yolanda Reynolds (10/16/2018)   Next INR check:   10/19/2018   Target end date:   Indefinite    Indications    Chronic atrial fibrillation (HCC) [I48.2]  Long term current use of anticoagulant (Resolved) [Z79.01]             Anticoagulation Episode Summary     INR check location:       Preferred lab:       Send INR reminders to:       Comments:   [facility]; Patient Daughter Palma takes care of her dosing; Palma Redd @ [phone] and fax dosing instructions      Anticoagulation Care Providers     Provider Role Specialty Phone number    [facility] Referring  [phone]    Razia Sims D.O. Responsible Family Medicine [phone]    Sebastian Thomas M.D. Responsible Interventional Cardiology [phone]        Anticoagulation Patient Findings    Left a message on the patient's daughter's voicemail today, informing the patient of a SUPRA-therapeutic INR of 3.4.  Instructed patient to call the clinic with any changes to diet or medications, with any signs/sx of bleeding or clotting or with any questions or concerns.     Patient instructed to decrease dose to 2.5mg TONIGHT, then begin decreased weekly regimen of 2.5mg on Mon & Fri and 5mg ROW. Patient will follow up again before the weekend on Friday with [facility]. Orders sent to [facility].    Libia GamezD

## 2018-10-17 ENCOUNTER — TELEPHONE (OUTPATIENT)
Dept: VASCULAR LAB | Facility: MEDICAL CENTER | Age: 83
End: 2018-10-17

## 2018-10-17 VITALS
WEIGHT: 134 LBS | TEMPERATURE: 97.1 F | BODY MASS INDEX: 23.36 KG/M2 | OXYGEN SATURATION: 98 % | DIASTOLIC BLOOD PRESSURE: 70 MMHG | RESPIRATION RATE: 16 BRPM | HEART RATE: 80 BPM | SYSTOLIC BLOOD PRESSURE: 128 MMHG

## 2018-10-17 SDOH — ECONOMIC STABILITY: HOUSING INSECURITY: UNSAFE COOKING RANGE AREA: 0

## 2018-10-17 SDOH — ECONOMIC STABILITY: HOUSING INSECURITY: UNSAFE APPLIANCES: 0

## 2018-10-17 ASSESSMENT — ENCOUNTER SYMPTOMS
VOMITING: DENIES
NAUSEA: DENIES

## 2018-10-17 NOTE — TELEPHONE ENCOUNTER
Patient referred for evaluation and management in vascular care  Unfortunately patient missed appointment for initial visit in our office today.  We will be unable to take part in care until or unless patient makes an appointment for a face-to-face visit in our center  We will ask medical assistant to call and reschedule     Pending further patient contact, we will defer all vascular care, including management of cardiovascular risk factors, to PCP and other members of the care team     Michael J. Bloch, MD  Vascular Care

## 2018-10-19 ENCOUNTER — HOME CARE VISIT (OUTPATIENT)
Dept: HOME HEALTH SERVICES | Facility: HOME HEALTHCARE | Age: 83
End: 2018-10-19
Payer: MEDICARE

## 2018-10-19 ENCOUNTER — ANTICOAGULATION MONITORING (OUTPATIENT)
Dept: VASCULAR LAB | Facility: MEDICAL CENTER | Age: 83
End: 2018-10-19

## 2018-10-19 DIAGNOSIS — I48.20 CHRONIC ATRIAL FIBRILLATION (HCC): ICD-10-CM

## 2018-10-19 DIAGNOSIS — Z79.01 CHRONIC ANTICOAGULATION: ICD-10-CM

## 2018-10-19 LAB — INR PPP: 3.5 (ref 2–3.5)

## 2018-10-19 PROCEDURE — 6650331 HCR  COAGCHECK STRIPS

## 2018-10-19 PROCEDURE — G0299 HHS/HOSPICE OF RN EA 15 MIN: HCPCS

## 2018-10-19 NOTE — PROGRESS NOTES
Anticoagulation Summary  As of 10/19/2018    INR goal:   2.0-3.0   TTR:   44.5 % (5.3 mo)   Today's INR:   3.5!   Warfarin maintenance plan:   2.5 mg (2.5 mg x 1) on Mon, Thu; 5 mg (2.5 mg x 2) all other days   Weekly warfarin total:   30 mg   Plan last modified:   Chadd Celeste, PharmD (10/19/2018)   Next INR check:   10/23/2018   Target end date:   Indefinite    Indications    Chronic atrial fibrillation (HCC) [I48.2]  Long term current use of anticoagulant (Resolved) [Z79.01]             Anticoagulation Episode Summary     INR check location:       Preferred lab:       Send INR reminders to:       Comments:   [facility]; Patient Daughter Palma takes care of her dosing; Palma Redd @ [phone] and fax dosing instructions      Anticoagulation Care Providers     Provider Role Specialty Phone number    [facility] Referring  [phone]    Razia Sims D.O. Responsible Family Medicine [phone]    Sebastian Thomas M.D. Responsible Interventional Cardiology [phone]        Anticoagulation Patient Findings    HPI:  Andree Egan, on anticoagulation therapy with warfarin for AF.   Changes to current medical/health status since last appt: none  Denies signs/symptoms of bleeding and/or thrombosis since the last appt.    Denies any interval changes to diet  Denies any interval changes to medications since last appt.   Denies any complications or cost restrictions with current therapy.     A/P   INR  SUPRA-therapeutic.   Pt to continue with current regimen as she is scheduled for reduced dose today.    Discussed DOAC.   Daughter and Pt will discuss with Cardiology on 10/24/18.    Next INR in 4 days.     Chadd Celeste, PharmD

## 2018-10-21 VITALS
SYSTOLIC BLOOD PRESSURE: 124 MMHG | TEMPERATURE: 97.8 F | WEIGHT: 137 LBS | OXYGEN SATURATION: 98 % | HEART RATE: 77 BPM | RESPIRATION RATE: 16 BRPM | DIASTOLIC BLOOD PRESSURE: 58 MMHG | BODY MASS INDEX: 23.89 KG/M2

## 2018-10-21 SDOH — ECONOMIC STABILITY: HOUSING INSECURITY: UNSAFE COOKING RANGE AREA: 0

## 2018-10-21 SDOH — ECONOMIC STABILITY: HOUSING INSECURITY: UNSAFE APPLIANCES: 0

## 2018-10-21 ASSESSMENT — ENCOUNTER SYMPTOMS
VOMITING: DENIES
NAUSEA: DENIES

## 2018-10-23 ENCOUNTER — TELEPHONE (OUTPATIENT)
Dept: CARDIOLOGY | Facility: MEDICAL CENTER | Age: 83
End: 2018-10-23

## 2018-10-23 ENCOUNTER — HOME CARE VISIT (OUTPATIENT)
Dept: HOME HEALTH SERVICES | Facility: HOME HEALTHCARE | Age: 83
End: 2018-10-23
Payer: MEDICARE

## 2018-10-23 VITALS
SYSTOLIC BLOOD PRESSURE: 118 MMHG | TEMPERATURE: 98 F | RESPIRATION RATE: 16 BRPM | DIASTOLIC BLOOD PRESSURE: 58 MMHG | HEART RATE: 64 BPM | BODY MASS INDEX: 24.59 KG/M2 | WEIGHT: 141 LBS | OXYGEN SATURATION: 97 %

## 2018-10-23 DIAGNOSIS — Z79.01 CHRONIC ANTICOAGULATION: ICD-10-CM

## 2018-10-23 DIAGNOSIS — N18.30 CKD (CHRONIC KIDNEY DISEASE) STAGE 3, GFR 30-59 ML/MIN (HCC): ICD-10-CM

## 2018-10-23 DIAGNOSIS — I50.32 CHRONIC DIASTOLIC CHF (CONGESTIVE HEART FAILURE) (HCC): ICD-10-CM

## 2018-10-23 DIAGNOSIS — J43.1 PANLOBULAR EMPHYSEMA (HCC): ICD-10-CM

## 2018-10-23 PROCEDURE — G0493 RN CARE EA 15 MIN HH/HOSPICE: HCPCS

## 2018-10-23 SDOH — ECONOMIC STABILITY: HOUSING INSECURITY: UNSAFE APPLIANCES: 0

## 2018-10-23 SDOH — ECONOMIC STABILITY: HOUSING INSECURITY: UNSAFE COOKING RANGE AREA: 0

## 2018-10-23 ASSESSMENT — ENCOUNTER SYMPTOMS
VOMITING: DENIES
NAUSEA: DENIES

## 2018-10-23 NOTE — TELEPHONE ENCOUNTER
Call received from Lalitha RN with home health. Notes that pt has gained 7 lbs in one week and is to have IV lasix done through [facility] today. Per home health protocol they need a recent BMP done within 24 hours of getting lasix. Requesting order to be placed for her to get done at lab today. Reassured her that order will be placed at this time. Pt to see Karina tomorrow at [facility] at 1040.     BMP order placed. FYI Karina    1110: Call received back from Lalitha, notes that they need to have verbal orders for okay to IV lasix since the pt is taking Bumex. Discussed information with Dr. Sharp due to Karina not in the office. Per Dr. Sharp, okay to give pt 20mg IV lasix. [facility] notified and denies further needs.

## 2018-10-24 ENCOUNTER — HOME CARE VISIT (OUTPATIENT)
Dept: HOME HEALTH SERVICES | Facility: HOME HEALTHCARE | Age: 83
End: 2018-10-24
Payer: MEDICARE

## 2018-10-24 ENCOUNTER — OFFICE VISIT (OUTPATIENT)
Dept: CARDIOLOGY | Facility: MEDICAL CENTER | Age: 83
End: 2018-10-24
Payer: MEDICARE

## 2018-10-24 VITALS
WEIGHT: 145 LBS | OXYGEN SATURATION: 99 % | HEIGHT: 64 IN | DIASTOLIC BLOOD PRESSURE: 78 MMHG | SYSTOLIC BLOOD PRESSURE: 136 MMHG | HEART RATE: 82 BPM | BODY MASS INDEX: 24.75 KG/M2

## 2018-10-24 DIAGNOSIS — I48.20 CHRONIC ATRIAL FIBRILLATION (HCC): ICD-10-CM

## 2018-10-24 DIAGNOSIS — I50.30 ACC/AHA STAGE C HEART FAILURE WITH PRESERVED EJECTION FRACTION (HCC): ICD-10-CM

## 2018-10-24 DIAGNOSIS — I25.10 CORONARY ARTERY DISEASE INVOLVING NATIVE CORONARY ARTERY OF NATIVE HEART WITHOUT ANGINA PECTORIS: ICD-10-CM

## 2018-10-24 DIAGNOSIS — I50.9 HEART FAILURE, NYHA CLASS 2 (HCC): ICD-10-CM

## 2018-10-24 DIAGNOSIS — R22.1 NECK NODULE: ICD-10-CM

## 2018-10-24 DIAGNOSIS — Z79.01 LONG TERM CURRENT USE OF ANTICOAGULANT: ICD-10-CM

## 2018-10-24 DIAGNOSIS — E87.6 HYPOKALEMIA: ICD-10-CM

## 2018-10-24 DIAGNOSIS — Z95.5 S/P DRUG ELUTING CORONARY STENT PLACEMENT: ICD-10-CM

## 2018-10-24 DIAGNOSIS — N18.30 CKD (CHRONIC KIDNEY DISEASE) STAGE 3, GFR 30-59 ML/MIN (HCC): ICD-10-CM

## 2018-10-24 DIAGNOSIS — Z95.0 CARDIAC PACEMAKER IN SITU: ICD-10-CM

## 2018-10-24 PROCEDURE — 99214 OFFICE O/P EST MOD 30 MIN: CPT | Performed by: NURSE PRACTITIONER

## 2018-10-24 RX ORDER — BUMETANIDE 2 MG/1
2 TABLET ORAL 2 TIMES DAILY
Qty: 60 TAB | Refills: 11 | Status: SHIPPED | OUTPATIENT
Start: 2018-10-24 | End: 2018-11-27

## 2018-10-24 ASSESSMENT — ENCOUNTER SYMPTOMS
COUGH: 0
MYALGIAS: 0
ORTHOPNEA: 0
DIZZINESS: 0
SHORTNESS OF BREATH: 1
FEVER: 0
PALPITATIONS: 0
ABDOMINAL PAIN: 0
CLAUDICATION: 0
PND: 0

## 2018-10-24 ASSESSMENT — MINNESOTA LIVING WITH HEART FAILURE QUESTIONNAIRE (MLHF)
MAKING YOU SHORT OF BREATH: 3
TOTAL_SCORE: 66
DIFFICULTY SOCIALIZING WITH FAMILY OR FRIENDS: 3
WALKING ABOUT OR CLIMBING STAIRS DIFFICULT: 4
MAKING YOU FEEL DEPRESSED: 4
COSTING YOU MONEY FOR MEDICAL CARE: 2
DIFFICULTY SLEEPING WELL AT NIGHT: 2
GIVING YOU SIDE EFFECTS FROM TREATMENTS: 4
DIFFICULTY WITH RECREATIONAL PASTIMES, SPORTS, HOBBIES: 3
DIFFICULTY TO CONCENTRATE OR REMEMBERING THINGS: 4
TIRED, FATIGUED OR LOW ON ENERGY: 4
MAKING YOU WORRY: 5
HAVING TO SIT OR LIE DOWN DURING THE DAY: 4
DIFFICULTY GOING AWAY FROM HOME: 3
DIFFICULTY WITH SEXUAL ACTIVITIES: 0
MAKING YOU STAY IN A HOSPITAL: 2
FEELING LIKE A BURDEN TO FAMILY AND FRIENDS: 3
DIFFICULTY WORKING TO EARN A LIVING: 2
LOSS OF SELF CONTROL IN YOUR LIFE: 4
EATING LESS FOODS YOU LIKE: 3
SWELLING IN ANKLES OR LEGS: 3
WORKING AROUND THE HOUSE OR YARD DIFFICULT: 4

## 2018-10-24 ASSESSMENT — 6 MINUTE WALK TEST (6MWT): TOTAL DISTANCE WALKED (METERS): 0

## 2018-10-24 NOTE — PROGRESS NOTES
Chief Complaint   Patient presents with   • Atrial Fibrillation       Subjective:   Andree Egan is a 86 y.o. female who presents today for follow up on her Heart Failure with her granddaughter, Palma.    Patient of Dr. Simms. Pt was last seen in clinic on 9/19/2018. During her last visit, Bumex was decreased to 1 mg daily and if needed to take an additional 1 mg in the afternoon.  Patient's granddaughter reports that her weight had increased after decreasing to daily and she restarted the afternoon dose at 0.5 mg.  Then her weight continued to increase again and then she resumed 1 mg twice a day within a week of her last visit.    Then over the past week, patient has gained about 9 pounds.  Patient was evaluated by home health and by the community Paramedic program and pt was given 20 mg furosemide IV yesterday.     Today, she reports another pound weight gain.  Patient is now weighing 142 pounds.    Patient does report an increase in shortness of breath with exertion and chest tightness associated.  She also has increased lower extremity edema and some abdominal bloating.    She denies any other chest pain, palpitations, orthopnea, PND or dizziness/lightheadedness.    Patient is asking if she can be switched over to Eliquis.  Patient was recommended to ask if she can switch over from warfarin to Eliquis.  Patient continues follow-up with anticoagulation clinic for warfarin.    Pt also is reporting a right posterior neck nodule. Pt feels it showed up about 1-2 weeks ago. Pt denies pain or tenderness at the site and feel it has decreased in size over the week. Pt does have a hx of breast cancer and is about up-to-date on her Mammogram, and is due for a repeat this or next year.    Additonally, patient has the following medical problems:     -Hospitalization from 7/24/2018-7/28/2018 is for heart failure.      -Hospitalization from 7/8/2018-7/11/2018 for heart failure exacerbation. Pt had a thoracentesis (failed  1st attempt and 2nd attempt successful) for a right Pleural effusion.      -Chronic atrial fibrillation: Rate controlled, taking warfarin, followed by the anticoagulation clinic     -Has permanent pacemaker     -Aortic stenosis     -Pulmonary HTN     -Hypertension     -Hyperlipidemia     -Kidney disease: has an upcoming appointment with Neph.     -Hypothyroidism: Taking levothyroxine.     -COPD     -History of breast cancer    Past Medical History:   Diagnosis Date   • Arthritis     back/neck   • Atrial fibrillation (CMS-HCC) [I48.91] 3/29/2018   • Blood clotting disorder ([facility])    • Breath shortness     water retention   • Cancer (HCC)     breast   • Cataract    • Congestive heart failure (CHF) ([facility]) 4/3/2018   • BERTRAND (dyspnea on exertion) 5/4/2018   • Heart burn    • Heart valve disease    • Hemorrhagic disorder ([facility])    • Hiatus hernia syndrome    • Hyperlipidemia    • Hypertension    • Kidney disease    • Long term current use of anticoagulant 3/29/2018   • Pacemaker 4/3/2018   • Pulmonary emphysema ([facility])    • Secondary hypertension 4/3/2018   • Thyroid disease    • Tremors of nervous system 4/3/2018   • Urinary incontinence    • Urinary tract infection      Past Surgical History:   Procedure Laterality Date   • OTHER      left breast mastectomy   • OTHER CARDIAC SURGERY      pacemaker placement     Family History   Problem Relation Age of Onset   • Lung Disease Mother    • Cancer Mother    • Heart Disease Mother    • No Known Problems Father    • Diabetes Brother    • Fainting Neg Hx      Social History     Social History   • Marital status: Single     Spouse name: N/A   • Number of children: N/A   • Years of education: N/A     Occupational History   • Not on file.     Social History Main Topics   • Smoking status: Former Smoker     Packs/day: 0.50     Types: Cigarettes     Quit date: 1/1/1970   • Smokeless tobacco: Never Used   • Alcohol use 1.8 oz/week     3 Glasses of wine per week      Comment: 1-2 glasses  wine before dinner   • Drug use: No   • Sexual activity: Not on file     Other Topics Concern   • Not on file     Social History Narrative   • No narrative on file     Allergies   Allergen Reactions   • Codeine Vomiting   • Bloodless      Outpatient Encounter Prescriptions as of 10/24/2018   Medication Sig Dispense Refill   • potassium chloride SA (KDUR) 20 MEQ Tab CR Take 1 Tab by mouth 2 times a day. 60 Tab 11   • bumetanide (BUMEX) 1 MG Tab Take 1-2 Tabs by mouth every day. Take 1 mg daily and if needed take additional 1 mg in PM for increased weight, SOB, edema (Patient taking differently: Take 1-2 mg by mouth every day. Take 1 mg daily and if needed take additional 1 mg in PM for increased weight, SOB, edema  Patient is taking 2 daily.) 30 Tab 1   • warfarin (COUMADIN) 2.5 MG Tab Take 1-2 Tabs by mouth every day. Or as directed (Patient taking differently: Take 5 mg by mouth every day. Or as directed) 60 Tab 3   • gabapentin (NEURONTIN) 300 MG Cap Take 2 Caps by mouth every evening. 90 Cap 1   • topiramate (TOPAMAX) 25 MG Tab Take 1 Tab by mouth 2 times a day. 60 Tab 3   • sertraline (ZOLOFT) 50 MG Tab Take 0.5 Tabs by mouth every bedtime. 30 Tab 11   • rosuvastatin (CRESTOR) 10 MG Tab Take 1 Tab by mouth every evening. 30 Tab 11   • metoprolol SR (TOPROL XL) 25 MG TABLET SR 24 HR Take 1 Tab by mouth every morning. 30 Tab 1   • allopurinol (ZYLOPRIM) 100 MG Tab Take 1 Tab by mouth 2 times a day. 30 Tab 1   • cyanocobalamin (VITAMIN B12) 1000 MCG Tab Take 1 Tab by mouth every day. 30 Tab 3   • Calcium-Vitamin D 500-125 MG-UNIT Tab Take 1 tablet by mouth every morning. 600mg - 400 iu 30 Tab 1   • clopidogrel (PLAVIX) 75 MG Tab Take 1 Tab by mouth every day. 90 Tab 3   • levothyroxine (SYNTHROID) 50 MCG Tab Take 1 Tab by mouth Every morning on an empty stomach. 90 Tab 3   • omeprazole (PRILOSEC) 20 MG delayed-release capsule Take 1 Cap by mouth 2 times a day. 30 Cap 1   • ascorbic acid (VITAMIN C) 500 MG tablet  "Take 1 Tab by mouth every day. 30 Tab 3   • phenazopyridine (PYRIDIUM) 100 MG Tab Take 1 Tab by mouth 3 times a day as needed. 6 Tab 0   • Melatonin 5 MG Tab Take 1 Tab by mouth at bedtime as needed (insomnia).     • acetaminophen (TYLENOL) 500 MG Tab Take 2 Tabs by mouth 1 time daily as needed for Mild Pain. 30 Tab 0   • calcium carbonate (TUMS) 500 MG Chew Tab Take 1 Tab by mouth every four hours as needed (heartburn). 30 Tab 0     No facility-administered encounter medications on file as of 10/24/2018.      Review of Systems   Constitutional: Negative for fever and malaise/fatigue.   Respiratory: Positive for shortness of breath. Negative for cough.    Cardiovascular: Positive for leg swelling. Negative for chest pain, palpitations, orthopnea, claudication and PND.   Gastrointestinal: Negative for abdominal pain.        Some abdominal bloating   Musculoskeletal: Negative for myalgias.   Neurological: Negative for dizziness.   All other systems reviewed and are negative.       Objective:   /78 (BP Location: Right arm, Patient Position: Sitting, BP Cuff Size: Adult)   Pulse 82   Ht 1.613 m (5' 3.5\")   Wt 65.8 kg (145 lb)   LMP  (LMP Unknown)   SpO2 99%   BMI 25.28 kg/m²     Physical Exam   Constitutional: She is oriented to person, place, and time. She appears well-developed and well-nourished.   HENT:   Head: Normocephalic and atraumatic.   Eyes: Pupils are equal, round, and reactive to light. EOM are normal.   Neck: Normal range of motion. Neck supple. JVD present.   Right neck nodule posteriorly. No tenderness palpated.    Cardiovascular: Normal rate.  An irregularly irregular rhythm present.   Murmur heard.   Systolic murmur is present with a grade of 3/6   Pulmonary/Chest: Effort normal. No respiratory distress. She has no wheezes. She has rales.   Abdominal: Soft. Bowel sounds are normal.   Musculoskeletal: She exhibits edema (Bilateral 2-3+ lower extremity edema).   Neurological: She is alert and " oriented to person, place, and time.   Skin: Skin is warm and dry.   Psychiatric: She has a normal mood and affect. Her behavior is normal.   Vitals reviewed.       Lab Results   Component Value Date/Time    CHOLSTRLTOT 67 (L) 07/09/2018 02:22 AM    LDL 17 07/09/2018 02:22 AM    HDL 40 07/09/2018 02:22 AM    TRIGLYCERIDE 50 07/09/2018 02:22 AM       Lab Results   Component Value Date/Time    SODIUM 133 (L) 10/16/2018 11:10 AM    POTASSIUM 4.0 10/16/2018 11:10 AM    CHLORIDE 102 10/16/2018 11:10 AM    CO2 28 10/16/2018 11:10 AM    GLUCOSE 57 (L) 10/16/2018 11:10 AM    BUN 25 (H) 10/16/2018 11:10 AM    CREATININE 1.48 (H) 10/16/2018 11:10 AM     Lab Results   Component Value Date/Time    ALKPHOSPHAT 111 (H) 08/14/2018 01:05 PM    ASTSGOT 22 08/14/2018 01:05 PM    ALTSGPT 6 08/14/2018 01:05 PM    TBILIRUBIN 1.2 08/14/2018 01:05 PM      Transthoracic Echo Report 5/19/18  Severe bi-atrial enlargement. Normal LV and RV systolic function. LVEF is 55% by visual estimation. Diastolic function difficult to assess.   There is at least mild aortic stenosis but valve is opening. Mean   gradient is 10 mmHg, with V max of 2.01 m/s. RVSP is elevated at 70   mmHg suggesting pulmonary hypertension. No prior study is available for   comparison.      Heart Cath 7/2/2018   POSTPROCEDURE DIAGNOSES:  1.  No evidence of aortic stenosis with 0 mmHg peak to peak gradient, mean gradient   of 8 mmHg, calculated CHRISTIANE of 2.09 cm2.  2.  Coronary artery disease with high grade proximal and high grade mid left   anterior descending artery.  3.  Successful percutaneous transluminal coronary angioplasty/stent placement   of the mid left anterior descending artery with 2.25x12 mm Synergy   drug-eluting stent.  4.  Successful percutaneous transluminal coronary angioplasty/stent placement   of the proximal left anterior descending artery with 2.5x12 mm Synergy   drug-eluting stent.  5.  Normal left ventricular systolic function with ejection fraction of  55%.  6.  Normal left ventricular end-diastolic pressure.  7.  Elevated right heart pressure with PA systolic pressure of 60 mmHg.  8.  Successful Angio-Seal closure.     Transthoracic Echo Report 7/8/2018  Prior done 05-.  Compared to prior study, the right-sided chambers appear dilated.    Left ventricular systolic function is low normal.  Left ventricular ejection fraction is visually estimated to be 50-55%.  Abnormal septal motion consistent with right ventricular (RV) volume overload and/or elevated RV end-diastolic pressure.  Dilated right ventricle with reduced systolic function.  Severe tricuspid regurgitation. Estimated right ventricular systolic pressure  is 55 mmHg.  Dilated inferior vena cava without inspiratory collapse    Assessment:     1. ACC/AHA stage C heart failure with preserved ejection fraction (HCC)  bumetanide (BUMEX) 2 MG tablet   2. Heart failure, NYHA class 2 (HCC)     3. Neck nodule  US-SOFT TISSUES OF HEAD - NECK   4. Chronic atrial fibrillation (HCC)     5. Cardiac pacemaker in situ     6. Long term current use of anticoagulant     7. Hypokalemia  bumetanide (BUMEX) 2 MG tablet   8. Coronary artery disease involving native coronary artery of native heart without angina pectoris     9. S/P drug eluting coronary stent placement     10. CKD (chronic kidney disease) stage 3, GFR 30-59 ml/min ([facility])  bumetanide (BUMEX) 2 MG tablet       Medical Decision Making:  Today's Assessment / Status / Plan:   1. HFpEF, Stage C, Class 2, LVEF 50-55%: Patient is volume overloaded today  -Increase Bumex to 2 mg twice a day  -Repeat BMP in 1 week  -Continue potassium 20 mg twice a day for now  -Continue to follow with community paramedic program and if worsening swelling or symptoms over the weekend to contact them for evaluation and treatment.  -Reinforced s/sx of worsening heart failure with patient and weight monitoring. Pt verbalizes understanding. Pt to call office or RTC if present.     2.   Right posterior neck nodule:  -Obtain neck ultrasound  -Encourage patient to follow-up with PCP    3.  A. fib: Rate controlled  -Continue Toprol-XL 25 mg daily  -Continue warfarin, followed by the anticoagulation clinic  -Patient can switch over to Eliquis as long as it is renal dosed for her kidneys.  -Next device check 11/27/2018    4.  CAD, status post BRITTON x2: Last LDL 17 on 7/9/2018  -Continue clopidogrel 75 mg daily  -Continue rosuvastatin 10 mg daily    5.  CKD, d/t contrast-induced nephropathy:  -Continue to follow-up with nephrology  -BMP in 1 week    FU in clinic in 1 week with Labs. Sooner if needed.    Patient verbalizes understanding and agrees with the plan of care.     Collaborating MD: Jose Simms MD

## 2018-10-25 ENCOUNTER — HOME CARE VISIT (OUTPATIENT)
Dept: HOME HEALTH SERVICES | Facility: HOME HEALTHCARE | Age: 83
End: 2018-10-25
Payer: MEDICARE

## 2018-10-25 ENCOUNTER — TELEPHONE (OUTPATIENT)
Dept: RADIOLOGY | Facility: IMAGING CENTER | Age: 83
End: 2018-10-25

## 2018-10-25 VITALS
OXYGEN SATURATION: 97 % | TEMPERATURE: 99.2 F | RESPIRATION RATE: 17 BRPM | HEART RATE: 82 BPM | BODY MASS INDEX: 24.76 KG/M2 | SYSTOLIC BLOOD PRESSURE: 122 MMHG | DIASTOLIC BLOOD PRESSURE: 70 MMHG | WEIGHT: 142 LBS

## 2018-10-25 DIAGNOSIS — R06.02 SOB (SHORTNESS OF BREATH): ICD-10-CM

## 2018-10-25 PROCEDURE — G0299 HHS/HOSPICE OF RN EA 15 MIN: HCPCS

## 2018-10-25 ASSESSMENT — ENCOUNTER SYMPTOMS
SHORTNESS OF BREATH: T
NAUSEA: DENIES
VOMITING: DENIES

## 2018-10-26 ENCOUNTER — HOME CARE VISIT (OUTPATIENT)
Dept: HOME HEALTH SERVICES | Facility: HOME HEALTHCARE | Age: 83
End: 2018-10-26
Payer: MEDICARE

## 2018-10-27 ENCOUNTER — HOME CARE VISIT (OUTPATIENT)
Dept: HOME HEALTH SERVICES | Facility: HOME HEALTHCARE | Age: 83
End: 2018-10-27
Payer: MEDICARE

## 2018-10-27 VITALS
TEMPERATURE: 98.7 F | SYSTOLIC BLOOD PRESSURE: 116 MMHG | RESPIRATION RATE: 18 BRPM | OXYGEN SATURATION: 99 % | DIASTOLIC BLOOD PRESSURE: 80 MMHG | WEIGHT: 142 LBS | HEART RATE: 69 BPM | BODY MASS INDEX: 24.76 KG/M2

## 2018-10-27 PROCEDURE — G0493 RN CARE EA 15 MIN HH/HOSPICE: HCPCS

## 2018-10-27 SDOH — ECONOMIC STABILITY: HOUSING INSECURITY: UNSAFE APPLIANCES: 0

## 2018-10-27 SDOH — ECONOMIC STABILITY: HOUSING INSECURITY: UNSAFE COOKING RANGE AREA: 0

## 2018-10-27 ASSESSMENT — ENCOUNTER SYMPTOMS
NAUSEA: DENIES
DEPRESSED MOOD: 1
SHORTNESS OF BREATH: T

## 2018-10-29 ENCOUNTER — HOSPITAL ENCOUNTER (OUTPATIENT)
Dept: RADIOLOGY | Facility: MEDICAL CENTER | Age: 83
End: 2018-10-29
Attending: NURSE PRACTITIONER
Payer: MEDICARE

## 2018-10-29 ENCOUNTER — HOME CARE VISIT (OUTPATIENT)
Dept: HOME HEALTH SERVICES | Facility: HOME HEALTHCARE | Age: 83
End: 2018-10-29
Payer: MEDICARE

## 2018-10-29 ENCOUNTER — TELEPHONE (OUTPATIENT)
Dept: CARDIOLOGY | Facility: MEDICAL CENTER | Age: 83
End: 2018-10-29

## 2018-10-29 DIAGNOSIS — R22.1 NECK NODULE: ICD-10-CM

## 2018-10-29 PROCEDURE — 76536 US EXAM OF HEAD AND NECK: CPT

## 2018-10-29 NOTE — TELEPHONE ENCOUNTER
0940 Alerted by [facility] that patient is up 1 pd as of this AM. Pt scheduled to be seen in office on Wednesday 10/31.     1330 Contacted by JENNY, Paramedic is in patient home and pts weight today is 146. Pts weight was 134.    Monday 10/22 Pts weight was 140 lb and was given Lasix 20 mg    Friday 10/26 Pts weight was 146 lb and pt was given Lasix 20 mg.    Todays call is pts weight remains at 146 pds. Pre-tibial edema present. BUN 23, creatinine 1.5 via ISTAT and INR 1.3 (will notify coag clinic).    MD brizuela's 40 mg Lasix administration. Medics to f/u with patient post administration later this afternoon.

## 2018-10-30 ENCOUNTER — ANTICOAGULATION MONITORING (OUTPATIENT)
Dept: VASCULAR LAB | Facility: MEDICAL CENTER | Age: 83
End: 2018-10-30

## 2018-10-30 ENCOUNTER — TELEPHONE (OUTPATIENT)
Dept: PULMONOLOGY | Facility: HOSPICE | Age: 83
End: 2018-10-30

## 2018-10-30 ENCOUNTER — HOME CARE VISIT (OUTPATIENT)
Dept: HOME HEALTH SERVICES | Facility: HOME HEALTHCARE | Age: 83
End: 2018-10-30
Payer: MEDICARE

## 2018-10-30 ENCOUNTER — TELEPHONE (OUTPATIENT)
Dept: CARDIOLOGY | Facility: MEDICAL CENTER | Age: 83
End: 2018-10-30

## 2018-10-30 VITALS
RESPIRATION RATE: 16 BRPM | TEMPERATURE: 98.2 F | HEART RATE: 61 BPM | SYSTOLIC BLOOD PRESSURE: 118 MMHG | DIASTOLIC BLOOD PRESSURE: 65 MMHG | BODY MASS INDEX: 25.28 KG/M2 | OXYGEN SATURATION: 98 % | WEIGHT: 145 LBS

## 2018-10-30 DIAGNOSIS — J44.9 CHRONIC OBSTRUCTIVE PULMONARY DISEASE, UNSPECIFIED COPD TYPE (HCC): ICD-10-CM

## 2018-10-30 DIAGNOSIS — R06.02 SOB (SHORTNESS OF BREATH): ICD-10-CM

## 2018-10-30 DIAGNOSIS — I48.20 CHRONIC ATRIAL FIBRILLATION (HCC): ICD-10-CM

## 2018-10-30 DIAGNOSIS — Z79.01 CHRONIC ANTICOAGULATION: ICD-10-CM

## 2018-10-30 LAB — INR PPP: 1.3 (ref 2–3.5)

## 2018-10-30 PROCEDURE — G0299 HHS/HOSPICE OF RN EA 15 MIN: HCPCS

## 2018-10-30 PROCEDURE — 6650331 HCR  COAGCHECK STRIPS

## 2018-10-30 SDOH — ECONOMIC STABILITY: HOUSING INSECURITY: UNSAFE COOKING RANGE AREA: 0

## 2018-10-30 SDOH — ECONOMIC STABILITY: HOUSING INSECURITY: UNSAFE APPLIANCES: 0

## 2018-10-30 ASSESSMENT — ENCOUNTER SYMPTOMS
NAUSEA: DENIES
VOMITING: DENIES
SHORTNESS OF BREATH: T

## 2018-10-30 NOTE — TELEPHONE ENCOUNTER
I called and LVM for Shelly at [facility] regarding the EKG that it states that it was performed. We have all of the notes but no EKG.     Patient has upcoming with Karina on 10/31/2018.

## 2018-10-30 NOTE — PROGRESS NOTES
Anticoagulation Summary  As of 10/30/2018    INR goal:   2.0-3.0   TTR:   44.6 % (5.7 mo)   Today's INR:   1.3!   Warfarin maintenance plan:   No maintenance plan   Plan last modified:   Chadd Celeste, PharmD (10/30/2018)   Next INR check:   11/27/2018   Target end date:   Indefinite    Indications    Chronic atrial fibrillation (HCC) [I48.2]  Long term current use of anticoagulant (Resolved) [Z79.01]             Anticoagulation Episode Summary     INR check location:       Preferred lab:       Send INR reminders to:       Comments:   [facility]; Patient Daughter Palma takes care of her dosing; Palma Redd @ [phone] and fax dosing instructions      Anticoagulation Care Providers     Provider Role Specialty Phone number    [facility] Referring  [phone]    Razia Sims D.O. Responsible Family Medicine [phone]    Sebastian Thomas M.D. Responsible Interventional Cardiology [phone]        Anticoagulation Patient Findings    Pt discussed DOAC with Cardiology and will transition to Eliquis.  INR has been very labile which is likely not providing optimal benefit.  SCr ~1.5 and age of 86 as well as weight of 64 kg, pt should initiate Eliquis 2.5 mg BID and stop  Warfarin.  Explained these changes to pt's granddaughter Palma and the pt and they were able to confirm these changes. Will continue to monitor renal function.  Provided pt counseling.    Follow up in 1 month.    Chadd Celeste, VeraD

## 2018-10-31 ENCOUNTER — OFFICE VISIT (OUTPATIENT)
Dept: PULMONOLOGY | Facility: HOSPICE | Age: 83
End: 2018-10-31
Payer: MEDICARE

## 2018-10-31 ENCOUNTER — APPOINTMENT (OUTPATIENT)
Dept: RADIOLOGY | Facility: IMAGING CENTER | Age: 83
End: 2018-10-31
Payer: MEDICARE

## 2018-10-31 ENCOUNTER — OFFICE VISIT (OUTPATIENT)
Dept: CARDIOLOGY | Facility: MEDICAL CENTER | Age: 83
End: 2018-10-31
Payer: MEDICARE

## 2018-10-31 ENCOUNTER — NON-PROVIDER VISIT (OUTPATIENT)
Dept: PULMONOLOGY | Facility: HOSPICE | Age: 83
End: 2018-10-31
Payer: MEDICARE

## 2018-10-31 VITALS
OXYGEN SATURATION: 98 % | RESPIRATION RATE: 14 BRPM | DIASTOLIC BLOOD PRESSURE: 76 MMHG | TEMPERATURE: 97.9 F | SYSTOLIC BLOOD PRESSURE: 118 MMHG | BODY MASS INDEX: 25.52 KG/M2 | WEIGHT: 144 LBS | HEART RATE: 78 BPM | HEIGHT: 63 IN

## 2018-10-31 VITALS
BODY MASS INDEX: 25.94 KG/M2 | SYSTOLIC BLOOD PRESSURE: 104 MMHG | HEIGHT: 63 IN | OXYGEN SATURATION: 98 % | WEIGHT: 146.4 LBS | DIASTOLIC BLOOD PRESSURE: 68 MMHG | HEART RATE: 84 BPM

## 2018-10-31 VITALS — HEIGHT: 63 IN | BODY MASS INDEX: 25.52 KG/M2 | WEIGHT: 144 LBS

## 2018-10-31 DIAGNOSIS — I25.10 CORONARY ARTERY DISEASE INVOLVING NATIVE CORONARY ARTERY OF NATIVE HEART WITHOUT ANGINA PECTORIS: ICD-10-CM

## 2018-10-31 DIAGNOSIS — Z66 DNR (DO NOT RESUSCITATE): ICD-10-CM

## 2018-10-31 DIAGNOSIS — I48.20 CHRONIC ATRIAL FIBRILLATION (HCC): ICD-10-CM

## 2018-10-31 DIAGNOSIS — R09.02 HYPOXEMIA: ICD-10-CM

## 2018-10-31 DIAGNOSIS — J44.9 CHRONIC OBSTRUCTIVE PULMONARY DISEASE, UNSPECIFIED COPD TYPE (HCC): ICD-10-CM

## 2018-10-31 DIAGNOSIS — Z79.01 LONG TERM CURRENT USE OF ANTICOAGULANT: ICD-10-CM

## 2018-10-31 DIAGNOSIS — Z95.0 CARDIAC PACEMAKER IN SITU: ICD-10-CM

## 2018-10-31 DIAGNOSIS — N18.30 CKD (CHRONIC KIDNEY DISEASE) STAGE 3, GFR 30-59 ML/MIN (HCC): ICD-10-CM

## 2018-10-31 DIAGNOSIS — I50.30 ACC/AHA STAGE C HEART FAILURE WITH PRESERVED EJECTION FRACTION (HCC): ICD-10-CM

## 2018-10-31 DIAGNOSIS — I50.9 HEART FAILURE, NYHA CLASS 2 (HCC): ICD-10-CM

## 2018-10-31 DIAGNOSIS — E87.6 HYPOKALEMIA: ICD-10-CM

## 2018-10-31 DIAGNOSIS — R06.02 SOB (SHORTNESS OF BREATH): ICD-10-CM

## 2018-10-31 DIAGNOSIS — I50.32 CHRONIC DIASTOLIC CHF (CONGESTIVE HEART FAILURE) (HCC): ICD-10-CM

## 2018-10-31 DIAGNOSIS — I27.20 PULMONARY HYPERTENSION (HCC): ICD-10-CM

## 2018-10-31 DIAGNOSIS — N28.9 RENAL INSUFFICIENCY: ICD-10-CM

## 2018-10-31 DIAGNOSIS — Z95.5 S/P DRUG ELUTING CORONARY STENT PLACEMENT: ICD-10-CM

## 2018-10-31 DIAGNOSIS — R22.1 NECK NODULE: ICD-10-CM

## 2018-10-31 DIAGNOSIS — R60.0 BILATERAL LOWER EXTREMITY EDEMA: ICD-10-CM

## 2018-10-31 PROCEDURE — 99205 OFFICE O/P NEW HI 60 MIN: CPT | Mod: 25 | Performed by: INTERNAL MEDICINE

## 2018-10-31 PROCEDURE — 99214 OFFICE O/P EST MOD 30 MIN: CPT | Performed by: NURSE PRACTITIONER

## 2018-10-31 PROCEDURE — 94761 N-INVAS EAR/PLS OXIMETRY MLT: CPT | Performed by: INTERNAL MEDICINE

## 2018-10-31 PROCEDURE — 71046 X-RAY EXAM CHEST 2 VIEWS: CPT | Mod: TC,FY | Performed by: INTERNAL MEDICINE

## 2018-10-31 PROCEDURE — 94729 DIFFUSING CAPACITY: CPT | Performed by: INTERNAL MEDICINE

## 2018-10-31 PROCEDURE — 94726 PLETHYSMOGRAPHY LUNG VOLUMES: CPT | Performed by: INTERNAL MEDICINE

## 2018-10-31 PROCEDURE — 94060 EVALUATION OF WHEEZING: CPT | Performed by: INTERNAL MEDICINE

## 2018-10-31 RX ORDER — POTASSIUM CHLORIDE 20 MEQ/1
20-40 TABLET, EXTENDED RELEASE ORAL 2 TIMES DAILY
Qty: 90 TAB | Refills: 11 | Status: SHIPPED | OUTPATIENT
Start: 2018-10-31 | End: 2018-11-12 | Stop reason: DRUGHIGH

## 2018-10-31 RX ORDER — METOPROLOL SUCCINATE 25 MG/1
25 TABLET, EXTENDED RELEASE ORAL EVERY MORNING
Qty: 30 TAB | Refills: 11 | Status: SHIPPED | OUTPATIENT
Start: 2018-10-31

## 2018-10-31 RX ORDER — METOLAZONE 2.5 MG/1
2.5 TABLET ORAL DAILY
Qty: 30 TAB | Refills: 11 | Status: SHIPPED | OUTPATIENT
Start: 2018-10-31 | End: 2019-03-01

## 2018-10-31 ASSESSMENT — ENCOUNTER SYMPTOMS
CLAUDICATION: 0
ABDOMINAL PAIN: 0
PALPITATIONS: 0
MYALGIAS: 0
ORTHOPNEA: 0
COUGH: 0
PND: 0
DIZZINESS: 0
SHORTNESS OF BREATH: 1
FEVER: 0

## 2018-10-31 ASSESSMENT — PULMONARY FUNCTION TESTS
FVC: 1.22
FVC: 1.22
FEV1_PERCENT_CHANGE: 19
FEV1/FVC: 77.87
FEV1: .95
FEV1/FVC_PERCENT_PREDICTED: 73
FEV1_PREDICTED: 1.61
FEV1/FVC_PERCENT_LLN: 60
FEV1_PERCENT_PREDICTED: 59
FEV1/FVC_PERCENT_PREDICTED: 107
FEV1_PERCENT_CHANGE: 0
FEV1_LLN: 1.34
FVC_PREDICTED: 2.2
FEV1_PERCENT_PREDICTED: 49
FEV1: .8
FEV1/FVC_PREDICTED: 72
FEV1/FVC_PERCENT_PREDICTED: 89
FEV1/FVC: 78
FVC_PERCENT_PREDICTED: 55
FEV1/FVC_PERCENT_PREDICTED: 107
FEV1/FVC: 65
FVC_PERCENT_PREDICTED: 55
FVC_LLN: 1.84
FEV1/FVC: 66
FEV1/FVC_PERCENT_CHANGE: 19
FEV1/FVC_PERCENT_PREDICTED: 90

## 2018-10-31 ASSESSMENT — PAIN SCALES - GENERAL: PAINLEVEL: NO PAIN

## 2018-10-31 NOTE — PROCEDURES
Technician Jeanette Torres, RRT  Tech Comments:The predicted sets used for Spirometry are NHanesIII, for Lung Volumes are [facility] [place], and for DLCO is [facility].  The results of this test meet the ATS standards for acceptability and repeatability.  The DLCO was uncorrected for Hb.   A bronchodilator of Ventolin HFA- 2puffs via spacer was administered. The DLCO was performed during the dilation period.   ATS/ERS standards for the DLCO were not met due to the IVC was less than 85% of predicted.    Interpretation:    1. Baseline spirometry demonstrates a severe reduction in FEV1 and FVC. FEV1/FVC ratio is reduced.    2. After administration of an inhaled bronchodilator there is 19% improvement in FEV1.    3. Lung volumes demonstrate a total lung capacity of 73% of predicted.    4. Gas exchange as estimated by DLCO is reduced at 63% of predicted.    5. Airway resistance is normal.          Impression:    This study demonstrates the presence of restrictive pulmonary process with an additional obstructive component.  Definite reversibility is noted on the study.

## 2018-10-31 NOTE — PROGRESS NOTES
Chief Complaint   Patient presents with   • New Patient     COPD       HPI:  The patient is an 86-year-old woman with a history of chronic diastolic congestive heart failure, pulmonary hypertension, coronary artery disease status post stenting, chronic peripheral edema, atrial fibrillation, permanent pacemaker, on anticoagulation.  She also has renal insufficiency.  She moved to this area in the spring 2018.  She has been hospitalized several times for issues related to shortness of breath, her heart, and peripheral edema.  The patient did have cardiac problems prior to moving to this area.  However her shortness of breath and peripheral edema have worsened over the past few months.    The patient is a remote smoker.  She smoked about a half a pack a day for 15 years.  She quit 48 years ago.  She was never diagnosed with COPD.  She does not relate any history of asthma or allergies.  She has never been on inhaled medications or supplemental oxygen.  She is short of breath with minimum activity.  She has no current complaint of chest pain or pressure.  She has no cough or sputum production.  No history of wheezing.  A chest x-ray shows cardiomegaly and bilateral pleural effusions right greater than left.  She does have some atelectasis.  Pulmonary function testing shows an FEV1 of 0.80 L which is 49% of predicted.  Her FEV1 FVC ratio is reduced at 65%.  She had a 19% improvement in FEV1 after administration of an inhaled bronchodilator.  Her total lung capacity is 73% of predicted.  Her DLCO is 63% of predicted.  This is a combined restrictive and obstructive picture.  At rest her oxygen saturation was 97%.  We had her walk around the clinic and her saturation dropped to 80% very quickly.  On 2 L of supplemental oxygen she was able to maintain her oxygen saturations at about 95% while walking.      Past Medical History:   Diagnosis Date   • Arthritis     back/neck   • Atrial fibrillation (CMS-[facility]) [I48.91] 3/29/2018    • Back pain    • Blood clotting disorder (HCC)    • Breast cancer (HCC)    • Breath shortness     water retention   • Cancer (HCC)     breast   • Cataract    • Congestive heart failure (CHF) (HCC) 4/3/2018   • Constipation    • Coronary heart disease    • Daytime sleepiness    • Diarrhea    • BERTRAND (dyspnea on exertion) 5/4/2018   • GERD (gastroesophageal reflux disease)    • Heart attack (HCC)    • Heart burn    • Heart valve disease    • Heartburn    • Hemorrhagic disorder (HCC)    • Hiatus hernia syndrome    • Hyperlipidemia    • Hypertension    • Hypothyroidism    • Kidney disease    • Long term current use of anticoagulant 3/29/2018   • Nasal drainage    • Osteoporosis    • Pacemaker 4/3/2018   • Pneumonia    • Pulmonary emphysema (HCC)    • Restless leg syndrome    • Rheumatoid arthritis (HCC)    • Ringing in ears    • Secondary hypertension 4/3/2018   • Shortness of breath    • Swelling of lower extremity    • Thyroid disease    • Tonsillitis    • Tremors of nervous system 4/3/2018   • Urinary incontinence    • Urinary tract infection    • Wears glasses        ROS:   Constitutional: Denies fevers, chills, night sweats, fatigue or weight loss  Eyes: Denies vision loss, pain, drainage, double vision  Ears, Nose, Throat: Denies earache, tinnitus, hoarseness  Cardiovascular: Denies chest pain, tightness, palpitations at this time  Respiratory: See HPI  Sleep: Denies, snoring, apnea  GI: Denies abdominal pain, nausea, vomiting, diarrhea  : Denies frequent urination, hematuria, painful urination  Musculoskeletal: Denies back pain, painful joints, sore muscles  Neurological: Denies headaches, seizures  Skin: Denies rashes, color changes  Psychiatric: Denies depression or thoughts of suicide  Hematologic: Chronic anticoagulation  Allergic/Immunologic: Denies rhinitis, skin sensitivity    Social History     Social History   • Marital status: Single     Spouse name: N/A   • Number of children: N/A   • Years of  education: N/A     Occupational History   • Not on file.     Social History Main Topics   • Smoking status: Former Smoker     Packs/day: 0.50     Types: Cigarettes     Quit date: 1/1/1970   • Smokeless tobacco: Never Used   • Alcohol use 1.8 oz/week     3 Glasses of wine per week      Comment: 1-2 glasses wine before dinner   • Drug use: No   • Sexual activity: Not on file     Other Topics Concern   • Not on file     Social History Narrative   • No narrative on file     Codeine and Bloodless  Current Outpatient Prescriptions on File Prior to Visit   Medication Sig Dispense Refill   • apixaban (ELIQUIS) 2.5mg Tab Take 1 Tab by mouth 2 Times a Day. 60 Tab 5   • potassium chloride SA (KDUR) 20 MEQ Tab CR Take 1 Tab by mouth 2 times a day. 60 Tab 11   • gabapentin (NEURONTIN) 300 MG Cap Take 2 Caps by mouth every evening. 90 Cap 1   • metoprolol SR (TOPROL XL) 25 MG TABLET SR 24 HR Take 1 Tab by mouth every morning. 30 Tab 1   • allopurinol (ZYLOPRIM) 100 MG Tab Take 1 Tab by mouth 2 times a day. 30 Tab 1   • cyanocobalamin (VITAMIN B12) 1000 MCG Tab Take 1 Tab by mouth every day. 30 Tab 3   • Calcium-Vitamin D 500-125 MG-UNIT Tab Take 1 tablet by mouth every morning. 600mg - 400 iu 30 Tab 1   • clopidogrel (PLAVIX) 75 MG Tab Take 1 Tab by mouth every day. 90 Tab 3   • levothyroxine (SYNTHROID) 50 MCG Tab Take 1 Tab by mouth Every morning on an empty stomach. 90 Tab 3   • omeprazole (PRILOSEC) 20 MG delayed-release capsule Take 1 Cap by mouth 2 times a day. 30 Cap 1   • ascorbic acid (VITAMIN C) 500 MG tablet Take 1 Tab by mouth every day. 30 Tab 3   • bumetanide (BUMEX) 2 MG tablet Take 1 Tab by mouth 2 times a day. 60 Tab 11   • phenazopyridine (PYRIDIUM) 100 MG Tab Take 1 Tab by mouth 3 times a day as needed. 6 Tab 0   • Melatonin 5 MG Tab Take 1 Tab by mouth at bedtime as needed (insomnia).     • acetaminophen (TYLENOL) 500 MG Tab Take 2 Tabs by mouth 1 time daily as needed for Mild Pain. 30 Tab 0   • calcium  "carbonate (TUMS) 500 MG Chew Tab Take 1 Tab by mouth every four hours as needed (heartburn). 30 Tab 0   • topiramate (TOPAMAX) 25 MG Tab Take 1 Tab by mouth 2 times a day. 60 Tab 3   • sertraline (ZOLOFT) 50 MG Tab Take 0.5 Tabs by mouth every bedtime. 30 Tab 11   • rosuvastatin (CRESTOR) 10 MG Tab Take 1 Tab by mouth every evening. 30 Tab 11     No current facility-administered medications on file prior to visit.      Blood pressure 118/76, pulse 78, temperature 36.6 °C (97.9 °F), temperature source Oral, resp. rate 14, height 1.6 m (5' 3\"), weight 65.3 kg (144 lb), SpO2 98 %, not currently breastfeeding.  Family History   Problem Relation Age of Onset   • Lung Disease Mother    • Cancer Mother    • Heart Disease Mother    • No Known Problems Father    • Diabetes Brother    • Fainting Neg Hx        Physical Exam:  Elderly, wheelchair, no distress at rest  HEENT: PERRLA, EOMI, no scleral icterus, no nasal or oral lesions  Neck: No thyromegaly, no adenopathy, no bruits  Mallampatti: Grade II  Lungs: Distant breath sounds, no wheezes or crackles  Heart: Irregular, pacer  Abdomen: Soft, benign, no organomegaly  Extremities: Significant pedal edema  Neurologic: Cranial nerve, motor, and sensory exam are normal    1. Hypoxemia    2. Chronic atrial fibrillation (HCC)    3. Cardiac pacemaker in situ    4. Chronic diastolic CHF (congestive heart failure) (HCC)    5. Bilateral lower extremity edema    6. Pulmonary hypertension (HCC)    7. DNR (do not resuscitate)    8. Renal insufficiency        This woman has chronic diastolic congestive heart failure with pulmonary hypertension and right heart strain.  She has significant peripheral edema.  Unfortunately she also has renal insufficiency.  She will continue cautious diuresis as managed by cardiology.  Her pulmonary function testing demonstrated a combined obstructive and restrictive process with 19% improvement in FEV1 after inhaled bronchodilator therapy.  We will add " Trelegy to see if that can help her dyspnea.  In addition she has significant oxygen desaturation with walking.  She will be started on supplemental oxygen at 2 L/min 24/7.  This should help her pulmonary hypertension and peripheral edema.  We will see her back in several weeks to see how she is doing with this additional therapy.

## 2018-10-31 NOTE — PROGRESS NOTES
Chief Complaint   Patient presents with   • Congestive Heart Failure     HF est.       Subjective:   Andree Egan is a 86 y.o. female who presents today for follow up on her Heart failure with her Granddaughter, Hank.    Patient of Dr. Simms. Pt was last seen 10/24/18.  During her last visit, Bumex was increased to 2 mg twice a day.  Pt continues to have weight gain. Pt was evaluated and treated by the [facility] through [facility]. Patient was recently treated with IV lasix 40 mg on 10/29. Pt reports that her SOB with Exertion has improved and some improvement of her LE edema.  Her weight is down 1 pound.    For her symptoms, she continues to have shortness breath with ADLs and exertion and lower extremity edema.  She denies denies chest pain, shortness of breath at rest, palpitations, dizziness/lightheadedness, orthopnea, PND.     Her home weight today was 144 lbs. She was as high as 146 lbs. Her dry weight is around 130 lbs.     During her last visit, patient was also sent for an ultrasound of her neck for a nodule.    Patient saw her pulmonologist today and was placed on oxygen at 2 L continuous and started on a new inhaler in hopes to help with her pulmonary hypertension.    Additonally, patient has the following medical problems:     -Hospitalization from 7/24/2018-7/28/2018 is for heart failure.      -Hospitalization from 7/8/2018-7/11/2018 for heart failure exacerbation. Pt had a thoracentesis (failed 1st attempt and 2nd attempt successful) for a right Pleural effusion.      -Chronic atrial fibrillation: Rate controlled, taking warfarin, followed by the anticoagulation clinic     -Has permanent pacemaker     -Aortic stenosis     -Pulmonary HTN     -Hypertension     -Hyperlipidemia     -Kidney disease: has an upcoming appointment with Neph.     -Hypothyroidism: Taking levothyroxine.     -COPD     -History of breast cancer    Past Medical History:   Diagnosis Date   • Arthritis     back/neck    • Atrial fibrillation (CMS-HCC) [I48.91] 3/29/2018   • Back pain    • Blood clotting disorder (HCC)    • Breast cancer (HCC)    • Breath shortness     water retention   • Cancer (HCC)     breast   • Cataract    • Congestive heart failure (CHF) (HCC) 4/3/2018   • Constipation    • Coronary heart disease    • Daytime sleepiness    • Diarrhea    • BERTRAND (dyspnea on exertion) 5/4/2018   • GERD (gastroesophageal reflux disease)    • Heart attack (HCC)    • Heart burn    • Heart valve disease    • Heartburn    • Hemorrhagic disorder (HCC)    • Hiatus hernia syndrome    • Hyperlipidemia    • Hypertension    • Hypothyroidism    • Kidney disease    • Long term current use of anticoagulant 3/29/2018   • Nasal drainage    • Osteoporosis    • Pacemaker 4/3/2018   • Pneumonia    • Pulmonary emphysema (HCC)    • Restless leg syndrome    • Rheumatoid arthritis (HCC)    • Ringing in ears    • Secondary hypertension 4/3/2018   • Shortness of breath    • Swelling of lower extremity    • Thyroid disease    • Tonsillitis    • Tremors of nervous system 4/3/2018   • Urinary incontinence    • Urinary tract infection    • Wears glasses      Past Surgical History:   Procedure Laterality Date   • HYSTERECTOMY LAPAROSCOPY     • MASTECTOMY     • OTHER      left breast mastectomy   • OTHER CARDIAC SURGERY      pacemaker placement   • PACEMAKER INSERTION     • PB REMV 2ND CATARACT,CORN-SCLER SECTN       Family History   Problem Relation Age of Onset   • Lung Disease Mother    • Cancer Mother    • Heart Disease Mother    • No Known Problems Father    • Diabetes Brother    • Fainting Neg Hx      Social History     Social History   • Marital status: Single     Spouse name: N/A   • Number of children: N/A   • Years of education: N/A     Occupational History   • Not on file.     Social History Main Topics   • Smoking status: Former Smoker     Packs/day: 0.50     Types: Cigarettes     Quit date: 1/1/1970   • Smokeless tobacco: Never Used   • Alcohol  use 1.8 oz/week     3 Glasses of wine per week      Comment: 1-2 glasses wine before dinner   • Drug use: No   • Sexual activity: Not on file     Other Topics Concern   • Not on file     Social History Narrative   • No narrative on file     Allergies   Allergen Reactions   • Codeine Vomiting   • Bloodless Unspecified     unknown     Outpatient Encounter Prescriptions as of 10/31/2018   Medication Sig Dispense Refill   • Fluticasone-Umeclidin-Vilant (TRELEGY ELLIPTA) 100-62.5-25 MCG/INH AEROSOL POWDER, BREATH ACTIVATED Inhale 1 Puff by mouth every day. 1 Each 11   • apixaban (ELIQUIS) 2.5mg Tab Take 1 Tab by mouth 2 Times a Day. 60 Tab 5   • bumetanide (BUMEX) 2 MG tablet Take 1 Tab by mouth 2 times a day. 60 Tab 11   • potassium chloride SA (KDUR) 20 MEQ Tab CR Take 1 Tab by mouth 2 times a day. 60 Tab 11   • acetaminophen (TYLENOL) 500 MG Tab Take 2 Tabs by mouth 1 time daily as needed for Mild Pain. 30 Tab 0   • calcium carbonate (TUMS) 500 MG Chew Tab Take 1 Tab by mouth every four hours as needed (heartburn). 30 Tab 0   • gabapentin (NEURONTIN) 300 MG Cap Take 2 Caps by mouth every evening. 90 Cap 1   • topiramate (TOPAMAX) 25 MG Tab Take 1 Tab by mouth 2 times a day. 60 Tab 3   • sertraline (ZOLOFT) 50 MG Tab Take 0.5 Tabs by mouth every bedtime. 30 Tab 11   • rosuvastatin (CRESTOR) 10 MG Tab Take 1 Tab by mouth every evening. 30 Tab 11   • metoprolol SR (TOPROL XL) 25 MG TABLET SR 24 HR Take 1 Tab by mouth every morning. 30 Tab 1   • allopurinol (ZYLOPRIM) 100 MG Tab Take 1 Tab by mouth 2 times a day. 30 Tab 1   • cyanocobalamin (VITAMIN B12) 1000 MCG Tab Take 1 Tab by mouth every day. 30 Tab 3   • Calcium-Vitamin D 500-125 MG-UNIT Tab Take 1 tablet by mouth every morning. 600mg - 400 iu 30 Tab 1   • clopidogrel (PLAVIX) 75 MG Tab Take 1 Tab by mouth every day. 90 Tab 3   • levothyroxine (SYNTHROID) 50 MCG Tab Take 1 Tab by mouth Every morning on an empty stomach. 90 Tab 3   • omeprazole (PRILOSEC) 20 MG  "delayed-release capsule Take 1 Cap by mouth 2 times a day. 30 Cap 1   • ascorbic acid (VITAMIN C) 500 MG tablet Take 1 Tab by mouth every day. 30 Tab 3   • Fluticasone-Umeclidin-Vilant (TRELEGY ELLIPTA) 100-62.5-25 MCG/INH AEROSOL POWDER, BREATH ACTIVATED Inhale 1 Puff by mouth every day. 2 Each 0   • phenazopyridine (PYRIDIUM) 100 MG Tab Take 1 Tab by mouth 3 times a day as needed. 6 Tab 0   • Melatonin 5 MG Tab Take 1 Tab by mouth at bedtime as needed (insomnia).       No facility-administered encounter medications on file as of 10/31/2018.      Review of Systems   Constitutional: Negative for fever and malaise/fatigue.   Respiratory: Positive for shortness of breath. Negative for cough.    Cardiovascular: Positive for leg swelling. Negative for chest pain, palpitations, orthopnea, claudication and PND.   Gastrointestinal: Negative for abdominal pain.        Abdominal blaoting   Musculoskeletal: Negative for myalgias.   Neurological: Negative for dizziness.   All other systems reviewed and are negative.       Objective:   /68 (BP Location: Right arm, Patient Position: Sitting, BP Cuff Size: Adult)   Pulse 84   Ht 1.6 m (5' 3\")   Wt 66.4 kg (146 lb 6.4 oz)   LMP  (LMP Unknown)   SpO2 98%   BMI 25.93 kg/m²     Physical Exam   Constitutional: She is oriented to person, place, and time. She appears well-developed and well-nourished.   HENT:   Head: Normocephalic and atraumatic.   Eyes: Pupils are equal, round, and reactive to light. EOM are normal.   Neck: Normal range of motion. Neck supple. JVD present.   Cardiovascular: Normal rate.  An irregularly irregular rhythm present.   Murmur heard.   Systolic murmur is present with a grade of 3/6   Pulmonary/Chest: Effort normal and breath sounds normal. No respiratory distress. She has no wheezes. She has no rales.   Diminished lung sounds in bases   Abdominal: Soft. Bowel sounds are normal.   Musculoskeletal: She exhibits edema (Bilateral 3+ LE edema). "   Neurological: She is alert and oriented to person, place, and time.   Skin: Skin is warm and dry.   Psychiatric: She has a normal mood and affect. Her behavior is normal.   Vitals reviewed.    Lab Results   Component Value Date/Time    CHOLSTRLTOT 67 (L) 07/09/2018 02:22 AM    LDL 17 07/09/2018 02:22 AM    HDL 40 07/09/2018 02:22 AM    TRIGLYCERIDE 50 07/09/2018 02:22 AM       Lab Results   Component Value Date/Time    SODIUM 133 (L) 10/16/2018 11:10 AM    POTASSIUM 4.0 10/16/2018 11:10 AM    CHLORIDE 102 10/16/2018 11:10 AM    CO2 28 10/16/2018 11:10 AM    GLUCOSE 57 (L) 10/16/2018 11:10 AM    BUN 25 (H) 10/16/2018 11:10 AM    CREATININE 1.48 (H) 10/16/2018 11:10 AM     Lab Results   Component Value Date/Time    ALKPHOSPHAT 111 (H) 08/14/2018 01:05 PM    ASTSGOT 22 08/14/2018 01:05 PM    ALTSGPT 6 08/14/2018 01:05 PM    TBILIRUBIN 1.2 08/14/2018 01:05 PM      Transthoracic Echo Report 5/19/18  Severe bi-atrial enlargement. Normal LV and RV systolic function. LVEF is 55% by visual estimation. Diastolic function difficult to assess.   There is at least mild aortic stenosis but valve is opening. Mean   gradient is 10 mmHg, with V max of 2.01 m/s. RVSP is elevated at 70   mmHg suggesting pulmonary hypertension. No prior study is available for   comparison.      Heart Cath 7/2/2018   POSTPROCEDURE DIAGNOSES:  1.  No evidence of aortic stenosis with 0 mmHg peak to peak gradient, mean gradient   of 8 mmHg, calculated CHRISTIANE of 2.09 cm2.  2.  Coronary artery disease with high grade proximal and high grade mid left   anterior descending artery.  3.  Successful percutaneous transluminal coronary angioplasty/stent placement   of the mid left anterior descending artery with 2.25x12 mm Synergy   drug-eluting stent.  4.  Successful percutaneous transluminal coronary angioplasty/stent placement   of the proximal left anterior descending artery with 2.5x12 mm Synergy   drug-eluting stent.  5.  Normal left ventricular systolic  function with ejection fraction of 55%.  6.  Normal left ventricular end-diastolic pressure.  7.  Elevated right heart pressure with PA systolic pressure of 60 mmHg.  8.  Successful Angio-Seal closure.     Transthoracic Echo Report 7/8/2018  Prior done 05-.  Compared to prior study, the right-sided chambers appear dilated.    Left ventricular systolic function is low normal.  Left ventricular ejection fraction is visually estimated to be 50-55%.  Abnormal septal motion consistent with right ventricular (RV) volume overload and/or elevated RV end-diastolic pressure.  Dilated right ventricle with reduced systolic function.  Severe tricuspid regurgitation. Estimated right ventricular systolic pressure  is 55 mmHg.  Dilated inferior vena cava without inspiratory collapse    Ultrasound of neck 10/29/2018-results reviewed with patient  Palpable lump in the posterior right neck corresponds to a small lymph node.    Records from [facility] paramedic program of recent treatments and labs scanned into media and reviewed with patient     Assessment:     1. ACC/AHA stage C heart failure with preserved ejection fraction (HCC)  metoprolol SR (TOPROL XL) 25 MG TABLET SR 24 HR    metOLazone (ZAROXOLYN) 2.5 MG Tab    BASIC METABOLIC PANEL    potassium chloride SA (KDUR) 20 MEQ Tab CR   2. Heart failure, NYHA class 2 (HCC)  metoprolol SR (TOPROL XL) 25 MG TABLET SR 24 HR    metOLazone (ZAROXOLYN) 2.5 MG Tab    BASIC METABOLIC PANEL    potassium chloride SA (KDUR) 20 MEQ Tab CR   3. Hypokalemia  potassium chloride SA (KDUR) 20 MEQ Tab CR   4. Neck nodule     5. Chronic atrial fibrillation (HCC)     6. Cardiac pacemaker in situ     7. Long term current use of anticoagulant     8. Coronary artery disease involving native coronary artery of native heart without angina pectoris     9. S/P drug eluting coronary stent placement     10. CKD (chronic kidney disease) stage 3, GFR 30-59 ml/min ([facility])  potassium chloride SA (KDUR) 20 MEQ  Tab CR   11. Chronic obstructive pulmonary disease, unspecified COPD type (HCC)         Medical Decision Making:  Today's Assessment / Status / Plan:   1. HFpEF, Stage C, Class 3, LVEF 50-55%: Patient continues to be volume overloaded  -Start metolazone 2.5 mg daily  -Continue Bumex 2 mg twice a day  -Increase potassium to 40 mEq in the morning and 20 mEq in the afternoon (hx Hypokalemia, recent Potassium level was 3.7 per [facility])  -Repeat BMP in 1 week  -Continue with the community paramedic program  -Reinforced s/sx of worsening heart failure with patient and weight monitoring. Pt verbalizes understanding. Pt to call office or RTC if present.     2.  Right posterior neck nodule:  -Results reviewed with patient and granddaughter, nodule consistent with lymph node.    3.  A. fib: Rate controlled  -Continue Toprol-XL 25 mg daily  -Continue Eliquis 2.5 mg daily  -Has pacemaker: Next check 11/27/2018    4.  CAD, s/p BRITTON x2: Last LDL 17 on 7/9/2018  -Continue clopidogrel 75 mg daily  -Continue rosuvastatin 10 mg daily    5.  CKD, due to contrast-induced nephropathy:  -Will follow labs  -Continue follow-up with nephrology    6. Hypoxemia/obstructive lung disease:  -Continue inhalers and oxygen therapy once received    FU in clinic in 1 week with labs. Sooner if needed.    Patient verbalizes understanding and agrees with the plan of care.     Collaborating MD: Garrett Sharp MD

## 2018-10-31 NOTE — PROCEDURES
Multi-Ox Readings  Multi Ox #1 Room air   O2 sat % at rest 97   O2 sat % on exertion 80   O2 sat average on exertion 87   Multi Ox #2 2 LPM   O2 sat % at rest 97   O2 sat % on exertion 95   O2 sat average on exertion 96     Oxygen Use     Oxygen Frequency     Duration of need     Is the patient mobile within the home?     CPAP Use?     BIPAP Use?     Servo Titration

## 2018-11-02 ENCOUNTER — HOME CARE VISIT (OUTPATIENT)
Dept: HOME HEALTH SERVICES | Facility: HOME HEALTHCARE | Age: 83
End: 2018-11-02
Payer: MEDICARE

## 2018-11-02 DIAGNOSIS — I50.30 ACC/AHA STAGE C HEART FAILURE WITH PRESERVED EJECTION FRACTION (HCC): ICD-10-CM

## 2018-11-02 DIAGNOSIS — I50.9 HEART FAILURE, NYHA CLASS 2 (HCC): ICD-10-CM

## 2018-11-04 ENCOUNTER — OFFICE VISIT (OUTPATIENT)
Dept: URGENT CARE | Facility: PHYSICIAN GROUP | Age: 83
End: 2018-11-04
Payer: MEDICARE

## 2018-11-04 ENCOUNTER — HOSPITAL ENCOUNTER (OUTPATIENT)
Facility: MEDICAL CENTER | Age: 83
End: 2018-11-04
Attending: FAMILY MEDICINE
Payer: MEDICARE

## 2018-11-04 VITALS
HEART RATE: 84 BPM | HEIGHT: 64 IN | RESPIRATION RATE: 14 BRPM | BODY MASS INDEX: 23.39 KG/M2 | OXYGEN SATURATION: 97 % | DIASTOLIC BLOOD PRESSURE: 70 MMHG | TEMPERATURE: 97.8 F | SYSTOLIC BLOOD PRESSURE: 110 MMHG | WEIGHT: 137 LBS

## 2018-11-04 DIAGNOSIS — N30.00 ACUTE CYSTITIS WITHOUT HEMATURIA: ICD-10-CM

## 2018-11-04 LAB
APPEARANCE UR: CLEAR
BILIRUB UR STRIP-MCNC: NORMAL MG/DL
COLOR UR AUTO: YELLOW
GLUCOSE UR STRIP.AUTO-MCNC: NORMAL MG/DL
KETONES UR STRIP.AUTO-MCNC: NORMAL MG/DL
LEUKOCYTE ESTERASE UR QL STRIP.AUTO: NORMAL
NITRITE UR QL STRIP.AUTO: NORMAL
PH UR STRIP.AUTO: 7 [PH] (ref 5–8)
PROT UR QL STRIP: NORMAL MG/DL
RBC UR QL AUTO: NORMAL
SP GR UR STRIP.AUTO: 1.01
UROBILINOGEN UR STRIP-MCNC: 0.2 MG/DL

## 2018-11-04 PROCEDURE — 99214 OFFICE O/P EST MOD 30 MIN: CPT | Performed by: FAMILY MEDICINE

## 2018-11-04 PROCEDURE — 81002 URINALYSIS NONAUTO W/O SCOPE: CPT | Performed by: FAMILY MEDICINE

## 2018-11-04 PROCEDURE — 87086 URINE CULTURE/COLONY COUNT: CPT

## 2018-11-04 RX ORDER — CEFDINIR 300 MG/1
300 CAPSULE ORAL 2 TIMES DAILY
Qty: 10 CAP | Refills: 0 | Status: SHIPPED | OUTPATIENT
Start: 2018-11-04 | End: 2018-11-09

## 2018-11-04 ASSESSMENT — ENCOUNTER SYMPTOMS
PALPITATIONS: 0
PSYCHIATRIC NEGATIVE: 1
NEUROLOGICAL NEGATIVE: 1
CHILLS: 0
EYE DISCHARGE: 0
SORE THROAT: 0
WHEEZING: 0
EYE REDNESS: 0
FEVER: 0
CONSTITUTIONAL NEGATIVE: 1
SHORTNESS OF BREATH: 0
MUSCULOSKELETAL NEGATIVE: 1
VOMITING: 0
NAUSEA: 0
COUGH: 0
ABDOMINAL PAIN: 0

## 2018-11-04 NOTE — PROGRESS NOTES
Subjective:      Andree Egan is a 86 y.o. female who presents with UTI (poss uti, frequency, hard time voiding)      Patient presents with her granddaughter with whom she lives. Patient reports that yesterday she felt like she might be getting another UTI due to frequency and urgency, however, she states that the symptoms have resolved and now she is feeling at her baseline. Per granddaughter, she is at baseline mental status (no confusion). Denies fever/chills, abd pain, flank pain, nausea or vomiting.        Review of Systems   Constitutional: Negative.  Negative for chills and fever.   HENT: Negative for congestion and sore throat.    Eyes: Negative for discharge and redness.   Respiratory: Negative for cough, shortness of breath and wheezing.    Cardiovascular: Negative for chest pain and palpitations.   Gastrointestinal: Negative for abdominal pain, nausea and vomiting.   Genitourinary: Positive for frequency (yesterday only, now resolved) and urgency (yesterday only, now resolved). Negative for dysuria and hematuria.   Musculoskeletal: Negative.    Skin: Negative for itching and rash.   Neurological: Negative.    Endo/Heme/Allergies: Negative.    Psychiatric/Behavioral: Negative.    All other systems reviewed and are negative.    Past Medical History:   Diagnosis Date   • Arthritis     back/neck   • Atrial fibrillation (CMS-HCC) [I48.91] 3/29/2018   • Back pain    • Blood clotting disorder (HCC)    • Breast cancer (HCC)    • Breath shortness     water retention   • Cancer (HCC)     breast   • Cataract    • Congestive heart failure (CHF) ([facility]) 4/3/2018   • Constipation    • Coronary heart disease    • Daytime sleepiness    • Diarrhea    • BERTRAND (dyspnea on exertion) 5/4/2018   • GERD (gastroesophageal reflux disease)    • Heart attack (HCC)    • Heart burn    • Heart valve disease    • Heartburn    • Hemorrhagic disorder (HCC)    • Hiatus hernia syndrome    • Hyperlipidemia    • Hypertension    •  Hypothyroidism    • Kidney disease    • Long term current use of anticoagulant 3/29/2018   • Nasal drainage    • Osteoporosis    • Pacemaker 4/3/2018   • Pneumonia    • Pulmonary emphysema (HCC)    • Restless leg syndrome    • Rheumatoid arthritis (HCC)    • Ringing in ears    • Secondary hypertension 4/3/2018   • Shortness of breath    • Swelling of lower extremity    • Thyroid disease    • Tonsillitis    • Tremors of nervous system 4/3/2018   • Urinary incontinence    • Urinary tract infection    • Wears glasses      Past Surgical History:   Procedure Laterality Date   • HYSTERECTOMY LAPAROSCOPY     • MASTECTOMY     • OTHER      left breast mastectomy   • OTHER CARDIAC SURGERY      pacemaker placement   • PACEMAKER INSERTION     • PB REMV 2ND CATARACT,CORN-SCLER SECTN       Social History   Substance Use Topics   • Smoking status: Former Smoker     Packs/day: 0.50     Types: Cigarettes     Quit date: 1/1/1970   • Smokeless tobacco: Never Used   • Alcohol use 1.8 oz/week     3 Glasses of wine per week      Comment: 1-2 glasses wine before dinner     Allergies   Allergen Reactions   • Codeine Vomiting   • Bloodless Unspecified     unknown     Current Outpatient Prescriptions on File Prior to Visit   Medication Sig Dispense Refill   • Fluticasone-Umeclidin-Vilant (TRELEGY ELLIPTA) 100-62.5-25 MCG/INH AEROSOL POWDER, BREATH ACTIVATED Inhale 1 Puff by mouth every day. 2 Each 0   • Fluticasone-Umeclidin-Vilant (TRELEGY ELLIPTA) 100-62.5-25 MCG/INH AEROSOL POWDER, BREATH ACTIVATED Inhale 1 Puff by mouth every day. 1 Each 11   • metoprolol SR (TOPROL XL) 25 MG TABLET SR 24 HR Take 1 Tab by mouth every morning. 30 Tab 11   • metOLazone (ZAROXOLYN) 2.5 MG Tab Take 1 Tab by mouth every day. 30 Tab 11   • potassium chloride SA (KDUR) 20 MEQ Tab CR Take 1-2 Tabs by mouth 2 times a day. 40 mEq in AM and 20 mEq in PM 90 Tab 11   • apixaban (ELIQUIS) 2.5mg Tab Take 1 Tab by mouth 2 Times a Day. 60 Tab 5   • bumetanide (BUMEX) 2  "MG tablet Take 1 Tab by mouth 2 times a day. 60 Tab 11   • acetaminophen (TYLENOL) 500 MG Tab Take 2 Tabs by mouth 1 time daily as needed for Mild Pain. 30 Tab 0   • calcium carbonate (TUMS) 500 MG Chew Tab Take 1 Tab by mouth every four hours as needed (heartburn). 30 Tab 0   • gabapentin (NEURONTIN) 300 MG Cap Take 2 Caps by mouth every evening. 90 Cap 1   • topiramate (TOPAMAX) 25 MG Tab Take 1 Tab by mouth 2 times a day. 60 Tab 3   • sertraline (ZOLOFT) 50 MG Tab Take 0.5 Tabs by mouth every bedtime. 30 Tab 11   • rosuvastatin (CRESTOR) 10 MG Tab Take 1 Tab by mouth every evening. 30 Tab 11   • allopurinol (ZYLOPRIM) 100 MG Tab Take 1 Tab by mouth 2 times a day. 30 Tab 1   • cyanocobalamin (VITAMIN B12) 1000 MCG Tab Take 1 Tab by mouth every day. 30 Tab 3   • Calcium-Vitamin D 500-125 MG-UNIT Tab Take 1 tablet by mouth every morning. 600mg - 400 iu 30 Tab 1   • clopidogrel (PLAVIX) 75 MG Tab Take 1 Tab by mouth every day. 90 Tab 3   • levothyroxine (SYNTHROID) 50 MCG Tab Take 1 Tab by mouth Every morning on an empty stomach. 90 Tab 3   • omeprazole (PRILOSEC) 20 MG delayed-release capsule Take 1 Cap by mouth 2 times a day. 30 Cap 1   • ascorbic acid (VITAMIN C) 500 MG tablet Take 1 Tab by mouth every day. 30 Tab 3     No current facility-administered medications on file prior to visit.           Objective:     /70 (BP Location: Right arm, Patient Position: Sitting, BP Cuff Size: Small adult)   Pulse 84   Temp 36.6 °C (97.8 °F) (Temporal)   Resp 14   Ht 1.613 m (5' 3.5\")   Wt 62.1 kg (137 lb)   LMP  (LMP Unknown)   SpO2 97%   BMI 23.89 kg/m²      Physical Exam   Constitutional: She is oriented to person, place, and time. She appears well-developed and well-nourished. No distress.   HENT:   Head: Normocephalic.   Right Ear: External ear normal.   Left Ear: External ear normal.   Eyes: Conjunctivae and EOM are normal.   Neck: Normal range of motion.   Cardiovascular: Normal rate.    Pulmonary/Chest: " Effort normal and breath sounds normal.   Abdominal: Soft. Bowel sounds are normal. There is no tenderness.   Musculoskeletal:   Ambulates with walker   Lymphadenopathy:     She has no cervical adenopathy.   Neurological: She is alert and oriented to person, place, and time.   Skin: Skin is warm and dry. Capillary refill takes less than 2 seconds. She is not diaphoretic.   Psychiatric: She has a normal mood and affect.   Nursing note and vitals reviewed.    UA: no nitrates, +small leukos    Assessment/Plan:     1. Acute cystitis without hematuria  URINE CULTURE(NEW)    cefdinir (OMNICEF) 300 MG Cap     - No current symtpoms  - Follow up urine culture results  - Last UTI patient took Cefdinir without adverse effects, prescription printed and given to granddaughter. Only fill antibiotics if patient develops new symptoms of UTI or confusion before culture results are available    Differential diagnosis, natural history, supportive care discussed. Follow-up with primary care provider within 7-10 days, emergency room precautions discussed.  Patient and/or family appears understanding of information.    Guerita Nichols  11/04/18  12:10 PM    Note:  If patient does not answer phone, try calling her daughter with results  Daughter Demetrio) [phone]

## 2018-11-05 ENCOUNTER — TELEPHONE (OUTPATIENT)
Dept: CARDIOLOGY | Facility: MEDICAL CENTER | Age: 83
End: 2018-11-05

## 2018-11-05 DIAGNOSIS — N30.00 ACUTE CYSTITIS WITHOUT HEMATURIA: ICD-10-CM

## 2018-11-06 ENCOUNTER — HOME CARE VISIT (OUTPATIENT)
Dept: HOME HEALTH SERVICES | Facility: HOME HEALTHCARE | Age: 83
End: 2018-11-06
Payer: MEDICARE

## 2018-11-06 VITALS
OXYGEN SATURATION: 98 % | RESPIRATION RATE: 18 BRPM | BODY MASS INDEX: 22.84 KG/M2 | WEIGHT: 131 LBS | DIASTOLIC BLOOD PRESSURE: 81 MMHG | SYSTOLIC BLOOD PRESSURE: 125 MMHG | HEART RATE: 81 BPM | TEMPERATURE: 98.5 F

## 2018-11-06 PROCEDURE — G0299 HHS/HOSPICE OF RN EA 15 MIN: HCPCS

## 2018-11-06 ASSESSMENT — ENCOUNTER SYMPTOMS
NAUSEA: DENIES
VOMITING: DENIES
SHORTNESS OF BREATH: T

## 2018-11-06 NOTE — TELEPHONE ENCOUNTER
"concerned about Metolazone medication.   Received: Today   Message Contents   MATHEW Cohn/Cecilia       Patient's granddaughter Hank is calling about medication. She was under the assumption the Metolazone was only for 5 days. She can be reached at [phone]. She is concerned about giving it to her since she is already taking potassium. She is anxious to speak to you and asking for a call back today.      Returned call to Hank. Long, lengthy discussion re: metolazone dosing. Discussed continuing through 11/8 and have labs drawn 11/9 and we will see her 11/12. This is repeated and then re-asked approx 4 times for Hank. They state they received an AVS but do not know its whereabouts. She refers to metolazone as \"lasix booster\" and when discussed that she is not on Lasix Hank asks if Bumex is Lasix. They appear to be confused about medications overall. May benefit from additional teaching. APRN and HF educator made aware of call.   "

## 2018-11-07 ENCOUNTER — TELEPHONE (OUTPATIENT)
Dept: URGENT CARE | Facility: PHYSICIAN GROUP | Age: 83
End: 2018-11-07

## 2018-11-07 LAB
BACTERIA UR CULT: NORMAL
SIGNIFICANT IND 70042: NORMAL
SITE SITE: NORMAL
SOURCE SOURCE: NORMAL

## 2018-11-08 NOTE — TELEPHONE ENCOUNTER
Left patient message regarding her urine culture results which were negative, she does not need to start the antibiotics.

## 2018-11-09 ENCOUNTER — HOSPITAL ENCOUNTER (OUTPATIENT)
Dept: LAB | Facility: MEDICAL CENTER | Age: 83
End: 2018-11-09
Attending: NURSE PRACTITIONER
Payer: MEDICARE

## 2018-11-09 DIAGNOSIS — N18.30 CKD (CHRONIC KIDNEY DISEASE) STAGE 3, GFR 30-59 ML/MIN (HCC): ICD-10-CM

## 2018-11-09 DIAGNOSIS — I50.32 CHRONIC DIASTOLIC CHF (CONGESTIVE HEART FAILURE) (HCC): ICD-10-CM

## 2018-11-09 LAB
ANION GAP SERPL CALC-SCNC: 9 MMOL/L (ref 0–11.9)
BUN SERPL-MCNC: 39 MG/DL (ref 8–22)
CALCIUM SERPL-MCNC: 9.7 MG/DL (ref 8.5–10.5)
CHLORIDE SERPL-SCNC: 91 MMOL/L (ref 96–112)
CO2 SERPL-SCNC: 40 MMOL/L (ref 20–33)
CREAT SERPL-MCNC: 1.64 MG/DL (ref 0.5–1.4)
GLUCOSE SERPL-MCNC: 78 MG/DL (ref 65–99)
POTASSIUM SERPL-SCNC: 2.9 MMOL/L (ref 3.6–5.5)
SODIUM SERPL-SCNC: 140 MMOL/L (ref 135–145)

## 2018-11-09 PROCEDURE — 80048 BASIC METABOLIC PNL TOTAL CA: CPT

## 2018-11-09 PROCEDURE — 36415 COLL VENOUS BLD VENIPUNCTURE: CPT

## 2018-11-12 ENCOUNTER — OFFICE VISIT (OUTPATIENT)
Dept: CARDIOLOGY | Facility: MEDICAL CENTER | Age: 83
End: 2018-11-12
Payer: MEDICARE

## 2018-11-12 VITALS
WEIGHT: 122.2 LBS | HEIGHT: 64 IN | SYSTOLIC BLOOD PRESSURE: 110 MMHG | BODY MASS INDEX: 20.86 KG/M2 | HEART RATE: 77 BPM | OXYGEN SATURATION: 92 % | DIASTOLIC BLOOD PRESSURE: 60 MMHG

## 2018-11-12 DIAGNOSIS — N18.30 CKD (CHRONIC KIDNEY DISEASE) STAGE 3, GFR 30-59 ML/MIN (HCC): ICD-10-CM

## 2018-11-12 DIAGNOSIS — E87.6 HYPOKALEMIA: ICD-10-CM

## 2018-11-12 DIAGNOSIS — I50.30 ACC/AHA STAGE C HEART FAILURE WITH PRESERVED EJECTION FRACTION (HCC): ICD-10-CM

## 2018-11-12 DIAGNOSIS — I48.20 CHRONIC ATRIAL FIBRILLATION (HCC): ICD-10-CM

## 2018-11-12 DIAGNOSIS — Z95.0 CARDIAC PACEMAKER IN SITU: ICD-10-CM

## 2018-11-12 DIAGNOSIS — I50.9 HEART FAILURE, NYHA CLASS 2 (HCC): ICD-10-CM

## 2018-11-12 DIAGNOSIS — Z79.01 LONG TERM CURRENT USE OF ANTICOAGULANT: ICD-10-CM

## 2018-11-12 DIAGNOSIS — F41.1 GAD (GENERALIZED ANXIETY DISORDER): ICD-10-CM

## 2018-11-12 PROCEDURE — 99214 OFFICE O/P EST MOD 30 MIN: CPT | Performed by: NURSE PRACTITIONER

## 2018-11-12 RX ORDER — POTASSIUM CHLORIDE 20 MEQ/1
40 TABLET, EXTENDED RELEASE ORAL 2 TIMES DAILY
Qty: 90 TAB | Refills: 11 | Status: SHIPPED | OUTPATIENT
Start: 2018-11-12 | End: 2018-11-27 | Stop reason: DRUGHIGH

## 2018-11-12 ASSESSMENT — ENCOUNTER SYMPTOMS
PALPITATIONS: 0
DIZZINESS: 0
MYALGIAS: 0
ORTHOPNEA: 0
FEVER: 0
ABDOMINAL PAIN: 0
SHORTNESS OF BREATH: 1
COUGH: 0
PND: 0
CLAUDICATION: 0

## 2018-11-12 NOTE — PROGRESS NOTES
Chief Complaint   Patient presents with   • Congestive Heart Failure       Subjective:   Andree Egan is a 86 y.o. female who presents today for follow up on her heart failure with her granddaughter, Hank.    Patient of Dr. Simms.  Patient was last seen in clinic on 10/31/2018.  During that visit, patient was started on metolazone 2.5 mg daily for 5 days and her potassium was increased to 40 mEq in the a.m. and 20 mEq in the p.m.  Patient's granddaughter decreased her Bumex to 1 mg twice a day starting yesterday.  They are unsure what dose of potassium she is taking today.    Over the past, 1.5 weeks.  Patient's weight has been going down andhas lost 24 lbs since her last visit.  She is now weighing 120 pounds from yesterday and today.  Patient reports she is feeling well.  She does continue to have some mild lower extremity edema, fatigue and shortness of breath with exertion.  Otherwise, she denies chest pain, palpitations, orthopnea, PND or dizziness/lightheadedness.    Patient's family concerned about her her mood and asking if her depression medication can be increased.    Patient continues to use oxygen at night.    Additonally, patient has the following medical problems:     -Hospitalization from 7/24/2018-7/28/2018 is for heart failure.      -Hospitalization from 7/8/2018-7/11/2018 for heart failure exacerbation. Pt had a thoracentesis (failed 1st attempt and 2nd attempt successful) for a right Pleural effusion.      -Chronic atrial fibrillation: Rate controlled, taking warfarin, followed by the anticoagulation clinic     -Has permanent pacemaker     -Aortic stenosis     -Pulmonary HTN     -Hypertension     -Hyperlipidemia     -Kidney disease: has an upcoming appointment with Neph.     -Hypothyroidism: Taking levothyroxine.     -COPD, using inhalers and 2 L oxygen at night     -History of breast cancer      Past Medical History:   Diagnosis Date   • Arthritis     back/neck   • Atrial fibrillation  (CMS-[facility]) [I48.91] 3/29/2018   • Back pain    • Blood clotting disorder (HCC)    • Breast cancer (HCC)    • Breath shortness     water retention   • Cancer (HCC)     breast   • Cataract    • Congestive heart failure (CHF) (HCC) 4/3/2018   • Constipation    • Coronary heart disease    • Daytime sleepiness    • Diarrhea    • BERTRAND (dyspnea on exertion) 5/4/2018   • GERD (gastroesophageal reflux disease)    • Heart attack (HCC)    • Heart burn    • Heart valve disease    • Heartburn    • Hemorrhagic disorder (HCC)    • Hiatus hernia syndrome    • Hyperlipidemia    • Hypertension    • Hypothyroidism    • Kidney disease    • Long term current use of anticoagulant 3/29/2018   • Nasal drainage    • Osteoporosis    • Pacemaker 4/3/2018   • Pneumonia    • Pulmonary emphysema (HCC)    • Restless leg syndrome    • Rheumatoid arthritis (HCC)    • Ringing in ears    • Secondary hypertension 4/3/2018   • Shortness of breath    • Swelling of lower extremity    • Thyroid disease    • Tonsillitis    • Tremors of nervous system 4/3/2018   • Urinary incontinence    • Urinary tract infection    • Wears glasses      Past Surgical History:   Procedure Laterality Date   • HYSTERECTOMY LAPAROSCOPY     • MASTECTOMY     • OTHER      left breast mastectomy   • OTHER CARDIAC SURGERY      pacemaker placement   • PACEMAKER INSERTION     • PB REMV 2ND CATARACT,CORN-SCLER SECTN       Family History   Problem Relation Age of Onset   • Lung Disease Mother    • Cancer Mother    • Heart Disease Mother    • No Known Problems Father    • Diabetes Brother    • Fainting Neg Hx      Social History     Social History   • Marital status: Single     Spouse name: N/A   • Number of children: N/A   • Years of education: N/A     Occupational History   • Not on file.     Social History Main Topics   • Smoking status: Former Smoker     Packs/day: 0.50     Types: Cigarettes     Quit date: 1/1/1970   • Smokeless tobacco: Never Used   • Alcohol use 1.8 oz/week     3  Glasses of wine per week      Comment: 1-2 glasses wine before dinner   • Drug use: No   • Sexual activity: Not on file     Other Topics Concern   • Not on file     Social History Narrative   • No narrative on file     Allergies   Allergen Reactions   • Codeine Vomiting   • Bloodless Unspecified     unknown     Outpatient Encounter Prescriptions as of 11/12/2018   Medication Sig Dispense Refill   • metoprolol SR (TOPROL XL) 25 MG TABLET SR 24 HR Take 1 Tab by mouth every morning. 30 Tab 11   • metOLazone (ZAROXOLYN) 2.5 MG Tab Take 1 Tab by mouth every day. 30 Tab 11   • potassium chloride SA (KDUR) 20 MEQ Tab CR Take 1-2 Tabs by mouth 2 times a day. 40 mEq in AM and 20 mEq in PM 90 Tab 11   • apixaban (ELIQUIS) 2.5mg Tab Take 1 Tab by mouth 2 Times a Day. 60 Tab 5   • bumetanide (BUMEX) 2 MG tablet Take 1 Tab by mouth 2 times a day. 60 Tab 11   • calcium carbonate (TUMS) 500 MG Chew Tab Take 1 Tab by mouth every four hours as needed (heartburn). 30 Tab 0   • gabapentin (NEURONTIN) 300 MG Cap Take 2 Caps by mouth every evening. 90 Cap 1   • topiramate (TOPAMAX) 25 MG Tab Take 1 Tab by mouth 2 times a day. 60 Tab 3   • sertraline (ZOLOFT) 50 MG Tab Take 0.5 Tabs by mouth every bedtime. 30 Tab 11   • rosuvastatin (CRESTOR) 10 MG Tab Take 1 Tab by mouth every evening. 30 Tab 11   • allopurinol (ZYLOPRIM) 100 MG Tab Take 1 Tab by mouth 2 times a day. 30 Tab 1   • cyanocobalamin (VITAMIN B12) 1000 MCG Tab Take 1 Tab by mouth every day. 30 Tab 3   • Calcium-Vitamin D 500-125 MG-UNIT Tab Take 1 tablet by mouth every morning. 600mg - 400 iu 30 Tab 1   • clopidogrel (PLAVIX) 75 MG Tab Take 1 Tab by mouth every day. 90 Tab 3   • levothyroxine (SYNTHROID) 50 MCG Tab Take 1 Tab by mouth Every morning on an empty stomach. 90 Tab 3   • omeprazole (PRILOSEC) 20 MG delayed-release capsule Take 1 Cap by mouth 2 times a day. 30 Cap 1   • ascorbic acid (VITAMIN C) 500 MG tablet Take 1 Tab by mouth every day. 30 Tab 3   •  "Fluticasone-Umeclidin-Vilant (TRELEGY ELLIPTA) 100-62.5-25 MCG/INH AEROSOL POWDER, BREATH ACTIVATED Inhale 1 Puff by mouth every day. (Patient not taking: Reported on 11/12/2018) 2 Each 0   • Fluticasone-Umeclidin-Vilant (TRELEGY ELLIPTA) 100-62.5-25 MCG/INH AEROSOL POWDER, BREATH ACTIVATED Inhale 1 Puff by mouth every day. (Patient not taking: Reported on 11/12/2018) 1 Each 11   • acetaminophen (TYLENOL) 500 MG Tab Take 2 Tabs by mouth 1 time daily as needed for Mild Pain. 30 Tab 0     No facility-administered encounter medications on file as of 11/12/2018.      Review of Systems   Constitutional: Positive for malaise/fatigue. Negative for fever.   Respiratory: Positive for shortness of breath. Negative for cough.    Cardiovascular: Positive for leg swelling. Negative for chest pain, palpitations, orthopnea, claudication and PND.   Gastrointestinal: Negative for abdominal pain.   Musculoskeletal: Negative for myalgias.   Neurological: Negative for dizziness.   All other systems reviewed and are negative.       Objective:   /60 (BP Location: Left arm, Patient Position: Sitting, BP Cuff Size: Adult)   Pulse 77   Ht 1.613 m (5' 3.5\")   Wt 55.4 kg (122 lb 3.2 oz)   LMP  (LMP Unknown)   SpO2 92%   BMI 21.31 kg/m²     Physical Exam   Constitutional: She is oriented to person, place, and time. She appears well-developed and well-nourished.   HENT:   Head: Normocephalic and atraumatic.   Eyes: Pupils are equal, round, and reactive to light. EOM are normal.   Neck: Normal range of motion. Neck supple. No JVD present.   Cardiovascular: Normal rate.  An irregularly irregular rhythm present.   Murmur heard.  Pulmonary/Chest: Effort normal and breath sounds normal. No respiratory distress. She has no wheezes. She has no rales.   Right chest PPM site-no erosion, drainage or erythema   Abdominal: Soft. Bowel sounds are normal.   Musculoskeletal: She exhibits edema (Bilateral 1+ lower extremity edema).   Neurological: " She is alert and oriented to person, place, and time.   Skin: Skin is warm and dry.   Psychiatric: She has a normal mood and affect. Her behavior is normal.   Vitals reviewed.       Lab Results   Component Value Date/Time    CHOLSTRLTOT 67 (L) 07/09/2018 02:22 AM    LDL 17 07/09/2018 02:22 AM    HDL 40 07/09/2018 02:22 AM    TRIGLYCERIDE 50 07/09/2018 02:22 AM       Lab Results   Component Value Date/Time    SODIUM 140 11/09/2018 11:55 AM    POTASSIUM 2.9 (L) 11/09/2018 11:55 AM    CHLORIDE 91 (L) 11/09/2018 11:55 AM    CO2 40 (H) 11/09/2018 11:55 AM    GLUCOSE 78 11/09/2018 11:55 AM    BUN 39 (H) 11/09/2018 11:55 AM    CREATININE 1.64 (H) 11/09/2018 11:55 AM     Lab Results   Component Value Date/Time    ALKPHOSPHAT 111 (H) 08/14/2018 01:05 PM    ASTSGOT 22 08/14/2018 01:05 PM    ALTSGPT 6 08/14/2018 01:05 PM    TBILIRUBIN 1.2 08/14/2018 01:05 PM      Transthoracic Echo Report 5/19/18  Severe bi-atrial enlargement. Normal LV and RV systolic function. LVEF is 55% by visual estimation. Diastolic function difficult to assess.   There is at least mild aortic stenosis but valve is opening. Mean   gradient is 10 mmHg, with V max of 2.01 m/s. RVSP is elevated at 70   mmHg suggesting pulmonary hypertension. No prior study is available for   comparison.      Heart Cath 7/2/2018   POSTPROCEDURE DIAGNOSES:  1.  No evidence of aortic stenosis with 0 mmHg peak to peak gradient, mean gradient   of 8 mmHg, calculated CHRISTIANE of 2.09 cm2.  2.  Coronary artery disease with high grade proximal and high grade mid left   anterior descending artery.  3.  Successful percutaneous transluminal coronary angioplasty/stent placement   of the mid left anterior descending artery with 2.25x12 mm Synergy   drug-eluting stent.  4.  Successful percutaneous transluminal coronary angioplasty/stent placement   of the proximal left anterior descending artery with 2.5x12 mm Synergy   drug-eluting stent.  5.  Normal left ventricular systolic function with  ejection fraction of 55%.  6.  Normal left ventricular end-diastolic pressure.  7.  Elevated right heart pressure with PA systolic pressure of 60 mmHg.  8.  Successful Angio-Seal closure.     Transthoracic Echo Report 7/8/2018  Prior done 05-.  Compared to prior study, the right-sided chambers appear dilated.    Left ventricular systolic function is low normal.  Left ventricular ejection fraction is visually estimated to be 50-55%.  Abnormal septal motion consistent with right ventricular (RV) volume overload and/or elevated RV end-diastolic pressure.  Dilated right ventricle with reduced systolic function.  Severe tricuspid regurgitation. Estimated right ventricular systolic pressure  is 55 mmHg.  Dilated inferior vena cava without inspiratory collapse     Ultrasound of neck 10/29/2018-results reviewed with patient  Palpable lump in the posterior right neck corresponds to a small lymph node.     Records from [facility] paramedic program of recent treatments and labs scanned into media and reviewed with patient    Assessment:     1. ACC/AHA stage C heart failure with preserved ejection fraction (HCC)  potassium chloride SA (KDUR) 20 MEQ Tab CR    BASIC METABOLIC PANEL   2. Heart failure, NYHA class 2 (HCC)  potassium chloride SA (KDUR) 20 MEQ Tab CR   3. CKD (chronic kidney disease) stage 3, GFR 30-59 ml/min (HCC)  potassium chloride SA (KDUR) 20 MEQ Tab CR    BASIC METABOLIC PANEL   4. Hypokalemia  potassium chloride SA (KDUR) 20 MEQ Tab CR    BASIC METABOLIC PANEL   5. ARELY (generalized anxiety disorder)  sertraline (ZOLOFT) 50 MG Tab   6. Chronic atrial fibrillation (HCC)     7. Cardiac pacemaker in situ     8. Long term current use of anticoagulant         Medical Decision Making:  Today's Assessment / Status / Plan:   1. HFpEF, Stage C, Class 2-3, LVEF 50-55%: Patient continues to have lower extremity edema  -Continue Bumex at 1 mg twice a day, discussed with family can increase to 2 mg twice a day if  weight increases along with lower extremity edema and shortness of breath  -Hold metolazone for now  -Increase potassium to 40 mEq twice a day  -Repeat BMP in 1 week  -Patient followed by community paramedic program  -Reinforced s/sx of worsening heart failure with patient and weight monitoring. Pt verbalizes understanding. Pt to call office or RTC if present.    2.  A. fib: Rate controlled  -Continue Toprol-XL 25 mg daily  -Continue Eliquis 2.5 mg twice a day  -Next pacemaker check on 11/27/2018    3.  Hypokalemia: Potassium level 2.9  -Increase potassium to 40 mEq twice a day  -BMP in 1 week    4.  CKD, due to contrast-induced nephropathy:  -Continue to monitor labs  -Followed by nephrology    5.  Depression/anxiety:  -We will increase sertraline to 50 mg daily  -Patient encouraged to follow-up with PCP for further management    FU in clinic in 2 weeks with labs in 1 week. Sooner if needed.    Patient verbalizes understanding and agrees with the plan of care.     Collaborating MD: Tanner Sun MD

## 2018-11-13 ENCOUNTER — HOME CARE VISIT (OUTPATIENT)
Dept: HOME HEALTH SERVICES | Facility: HOME HEALTHCARE | Age: 83
End: 2018-11-13
Payer: MEDICARE

## 2018-11-13 VITALS
RESPIRATION RATE: 18 BRPM | DIASTOLIC BLOOD PRESSURE: 70 MMHG | OXYGEN SATURATION: 96 % | BODY MASS INDEX: 20.75 KG/M2 | HEART RATE: 86 BPM | TEMPERATURE: 98.4 F | SYSTOLIC BLOOD PRESSURE: 128 MMHG | WEIGHT: 119 LBS

## 2018-11-13 PROCEDURE — G0493 RN CARE EA 15 MIN HH/HOSPICE: HCPCS

## 2018-11-13 RX ORDER — OMEPRAZOLE 20 MG/1
20 CAPSULE, DELAYED RELEASE ORAL 2 TIMES DAILY
Qty: 30 CAP | Refills: 2 | Status: SHIPPED | OUTPATIENT
Start: 2018-11-13 | End: 2019-03-05 | Stop reason: SDUPTHER

## 2018-11-13 SDOH — ECONOMIC STABILITY: HOUSING INSECURITY: UNSAFE APPLIANCES: 0

## 2018-11-13 SDOH — ECONOMIC STABILITY: HOUSING INSECURITY: UNSAFE COOKING RANGE AREA: 0

## 2018-11-13 ASSESSMENT — ENCOUNTER SYMPTOMS
NAUSEA: DENIES
VOMITING: DENIES

## 2018-11-19 ENCOUNTER — HOSPITAL ENCOUNTER (OUTPATIENT)
Dept: LAB | Facility: MEDICAL CENTER | Age: 83
End: 2018-11-19
Attending: NURSE PRACTITIONER
Payer: MEDICARE

## 2018-11-19 DIAGNOSIS — E87.6 HYPOKALEMIA: ICD-10-CM

## 2018-11-19 DIAGNOSIS — I50.30 ACC/AHA STAGE C HEART FAILURE WITH PRESERVED EJECTION FRACTION (HCC): ICD-10-CM

## 2018-11-19 DIAGNOSIS — N18.30 CKD (CHRONIC KIDNEY DISEASE) STAGE 3, GFR 30-59 ML/MIN (HCC): ICD-10-CM

## 2018-11-19 LAB
ANION GAP SERPL CALC-SCNC: 8 MMOL/L (ref 0–11.9)
BUN SERPL-MCNC: 34 MG/DL (ref 8–22)
CALCIUM SERPL-MCNC: 9.1 MG/DL (ref 8.5–10.5)
CHLORIDE SERPL-SCNC: 106 MMOL/L (ref 96–112)
CO2 SERPL-SCNC: 26 MMOL/L (ref 20–33)
CREAT SERPL-MCNC: 1.4 MG/DL (ref 0.5–1.4)
GLUCOSE SERPL-MCNC: 90 MG/DL (ref 65–99)
POTASSIUM SERPL-SCNC: 4.3 MMOL/L (ref 3.6–5.5)
SODIUM SERPL-SCNC: 140 MMOL/L (ref 135–145)

## 2018-11-19 PROCEDURE — 80048 BASIC METABOLIC PNL TOTAL CA: CPT

## 2018-11-19 PROCEDURE — 36415 COLL VENOUS BLD VENIPUNCTURE: CPT

## 2018-11-20 ENCOUNTER — HOME CARE VISIT (OUTPATIENT)
Dept: HOME HEALTH SERVICES | Facility: HOME HEALTHCARE | Age: 83
End: 2018-11-20
Payer: MEDICARE

## 2018-11-20 VITALS
RESPIRATION RATE: 18 BRPM | SYSTOLIC BLOOD PRESSURE: 122 MMHG | OXYGEN SATURATION: 97 % | WEIGHT: 122 LBS | DIASTOLIC BLOOD PRESSURE: 58 MMHG | TEMPERATURE: 98 F | HEART RATE: 82 BPM | BODY MASS INDEX: 21.27 KG/M2

## 2018-11-20 PROCEDURE — G0493 RN CARE EA 15 MIN HH/HOSPICE: HCPCS

## 2018-11-20 SDOH — ECONOMIC STABILITY: HOUSING INSECURITY
HOME SAFETY: OXYGEN SAFETY RISK ASSESSMENT PERFORMED. PATIENT DOES HAVE A NO SMOKING SIGN POSTED IN THE HOME. PATIENT DOES HAVE A WORKING FIRE EXTINGUISHER PRESENT IN THE HOME. SMOKE ALARMS ARE PRESENT AND FUNCTIONAL ON EACH LEVEL OF THE HOME. PATIENT DOES HAVE A

## 2018-11-20 SDOH — ECONOMIC STABILITY: HOUSING INSECURITY: UNSAFE COOKING RANGE AREA: 0

## 2018-11-20 SDOH — ECONOMIC STABILITY: HOUSING INSECURITY
HOME SAFETY: FIRE ESCAPE PLAN DEVELOPED. PATIENT DOES NOT HAVE FLAMMABLE MATERIALS PRESENT IN THE HOME PRESENTING A FIRE HAZARD. NO EVIDENCE FOUND OF SMOKING MATERIALS PRESENT IN THE HOME.

## 2018-11-20 SDOH — ECONOMIC STABILITY: HOUSING INSECURITY: UNSAFE APPLIANCES: 0

## 2018-11-20 ASSESSMENT — ACTIVITIES OF DAILY LIVING (ADL)
OASIS_M1830: 01
HOME_HEALTH_OASIS: 01
HOME_HEALTH_OASIS: 00

## 2018-11-23 ENCOUNTER — HOME CARE VISIT (OUTPATIENT)
Dept: HOME HEALTH SERVICES | Facility: HOME HEALTHCARE | Age: 83
End: 2018-11-23
Payer: MEDICARE

## 2018-11-27 ENCOUNTER — OFFICE VISIT (OUTPATIENT)
Dept: CARDIOLOGY | Facility: MEDICAL CENTER | Age: 83
End: 2018-11-27
Payer: MEDICARE

## 2018-11-27 ENCOUNTER — TELEPHONE (OUTPATIENT)
Dept: VASCULAR LAB | Facility: MEDICAL CENTER | Age: 83
End: 2018-11-27

## 2018-11-27 ENCOUNTER — NON-PROVIDER VISIT (OUTPATIENT)
Dept: CARDIOLOGY | Facility: MEDICAL CENTER | Age: 83
End: 2018-11-27
Payer: MEDICARE

## 2018-11-27 VITALS
WEIGHT: 126.2 LBS | DIASTOLIC BLOOD PRESSURE: 68 MMHG | OXYGEN SATURATION: 100 % | SYSTOLIC BLOOD PRESSURE: 124 MMHG | HEART RATE: 78 BPM | BODY MASS INDEX: 21.54 KG/M2 | HEIGHT: 64 IN

## 2018-11-27 DIAGNOSIS — Z95.0 CARDIAC PACEMAKER IN SITU: ICD-10-CM

## 2018-11-27 DIAGNOSIS — R06.02 SOB (SHORTNESS OF BREATH): ICD-10-CM

## 2018-11-27 DIAGNOSIS — I50.9 HEART FAILURE, NYHA CLASS 2 (HCC): ICD-10-CM

## 2018-11-27 DIAGNOSIS — I25.10 CORONARY ARTERY DISEASE INVOLVING NATIVE CORONARY ARTERY OF NATIVE HEART WITHOUT ANGINA PECTORIS: ICD-10-CM

## 2018-11-27 DIAGNOSIS — Z95.5 S/P DRUG ELUTING CORONARY STENT PLACEMENT: ICD-10-CM

## 2018-11-27 DIAGNOSIS — Z79.01 LONG TERM CURRENT USE OF ANTICOAGULANT: ICD-10-CM

## 2018-11-27 DIAGNOSIS — I48.20 CHRONIC ATRIAL FIBRILLATION (HCC): ICD-10-CM

## 2018-11-27 DIAGNOSIS — E87.6 HYPOKALEMIA: ICD-10-CM

## 2018-11-27 DIAGNOSIS — N18.30 CKD (CHRONIC KIDNEY DISEASE) STAGE 3, GFR 30-59 ML/MIN (HCC): ICD-10-CM

## 2018-11-27 DIAGNOSIS — I50.30 ACC/AHA STAGE C HEART FAILURE WITH PRESERVED EJECTION FRACTION (HCC): ICD-10-CM

## 2018-11-27 DIAGNOSIS — D50.8 OTHER IRON DEFICIENCY ANEMIA: ICD-10-CM

## 2018-11-27 PROCEDURE — 93279 PRGRMG DEV EVAL PM/LDLS PM: CPT | Performed by: INTERNAL MEDICINE

## 2018-11-27 PROCEDURE — 99214 OFFICE O/P EST MOD 30 MIN: CPT | Performed by: NURSE PRACTITIONER

## 2018-11-27 RX ORDER — BUMETANIDE 1 MG/1
1 TABLET ORAL 2 TIMES DAILY
COMMUNITY
End: 2019-03-05 | Stop reason: SDUPTHER

## 2018-11-27 RX ORDER — POTASSIUM CHLORIDE 20 MEQ/1
20 TABLET, EXTENDED RELEASE ORAL 2 TIMES DAILY
Qty: 60 TAB | Refills: 11 | Status: SHIPPED | OUTPATIENT
Start: 2018-11-27 | End: 2019-05-23 | Stop reason: SDUPTHER

## 2018-11-27 ASSESSMENT — ENCOUNTER SYMPTOMS
DIZZINESS: 0
MYALGIAS: 0
ORTHOPNEA: 0
CLAUDICATION: 0
FEVER: 0
SHORTNESS OF BREATH: 1
PND: 0
ABDOMINAL PAIN: 0
COUGH: 0
PALPITATIONS: 1

## 2018-11-27 NOTE — TELEPHONE ENCOUNTER
[facility]    Spoke with pt and left VM with pt concerning anticoagulation.  Pt states she has only had one episode of epistaxis and was <5 min.  Pt>80 , <60 kg and SCr ~1.5, agree with Eliquis 2.5 mg BID.    Chadd Celeste, PharmD

## 2018-11-27 NOTE — PROGRESS NOTES
Chief Complaint   Patient presents with   • Congestive Heart Failure       Subjective:   Andree Egan is a 86 y.o. female who presents today for follow-up on her heart failure with her granddaughter, Palma.    Patient of Dr. Simms.  Patient was last seen in clinic on 11/12/2018.  During her last visit, her metolazone was held, patient continued Bumex at 1 mg twice a day, and potassium was increased to 40 mEq twice a day for low potassium.    Patient reports since her last visit, her weight has remained stable between 122-123 pounds.  Patient has not needed to take an increase of Bumex or metolazone.  Patient reports she has been taking potassium at 20 mEq twice a day not 40 mEq twice a day.    For her symptoms, patient states she continues to have mild shortness of breath with exertion, lower extremity edema.  She reports her energy level has been fairly well.  She denies any chest pain, palpitations, orthopnea, PND or dizziness/lightheadedness.    Patient also has been tolerating her increased dose of sertraline.    Additonally, patient has the following medical problems:     -Hospitalization from 7/24/2018-7/28/2018 is for heart failure.      -Hospitalization from 7/8/2018-7/11/2018 for heart failure exacerbation. Pt had a thoracentesis (failed 1st attempt and 2nd attempt successful) for a right Pleural effusion.      -Chronic atrial fibrillation: Rate controlled, taking warfarin, followed by the anticoagulation clinic     -Has permanent pacemaker     -Aortic stenosis     -Pulmonary HTN     -Hypertension     -Hyperlipidemia     -Kidney disease: has an upcoming appointment with Neph.     -Hypothyroidism: Taking levothyroxine.     -COPD, using inhalers and 2 L oxygen at night     -History of breast cancer    Past Medical History:   Diagnosis Date   • Arthritis     back/neck   • Atrial fibrillation (CMS-HCC) [I48.91] 3/29/2018   • Back pain    • Blood clotting disorder (HCC)    • Breast cancer (HCC)    • Breath  shortness     water retention   • Cancer (HCC)     breast   • Cataract    • Congestive heart failure (CHF) ([facility]) 4/3/2018   • Constipation    • Coronary heart disease    • Daytime sleepiness    • Diarrhea    • BERTRAND (dyspnea on exertion) 5/4/2018   • GERD (gastroesophageal reflux disease)    • Heart attack ([facility])    • Heart burn    • Heart valve disease    • Heartburn    • Hemorrhagic disorder ([facility])    • Hiatus hernia syndrome    • Hyperlipidemia    • Hypertension    • Hypothyroidism    • Kidney disease    • Long term current use of anticoagulant 3/29/2018   • Nasal drainage    • Osteoporosis    • Pacemaker 4/3/2018   • Pneumonia    • Pulmonary emphysema ([facility])    • Restless leg syndrome    • Rheumatoid arthritis ([facility])    • Ringing in ears    • Secondary hypertension 4/3/2018   • Shortness of breath    • Swelling of lower extremity    • Thyroid disease    • Tonsillitis    • Tremors of nervous system 4/3/2018   • Urinary incontinence    • Urinary tract infection    • Wears glasses      Past Surgical History:   Procedure Laterality Date   • HYSTERECTOMY LAPAROSCOPY     • MASTECTOMY     • OTHER      left breast mastectomy   • OTHER CARDIAC SURGERY      pacemaker placement   • PACEMAKER INSERTION     • PB REMV 2ND CATARACT,CORN-SCLER SECTN       Family History   Problem Relation Age of Onset   • Lung Disease Mother    • Cancer Mother    • Heart Disease Mother    • No Known Problems Father    • Diabetes Brother    • Fainting Neg Hx      Social History     Social History   • Marital status: Single     Spouse name: N/A   • Number of children: N/A   • Years of education: N/A     Occupational History   • Not on file.     Social History Main Topics   • Smoking status: Former Smoker     Packs/day: 0.50     Types: Cigarettes     Quit date: 1/1/1970   • Smokeless tobacco: Never Used   • Alcohol use 1.8 oz/week     3 Glasses of wine per week      Comment: 1-2 glasses wine before dinner   • Drug use: No   • Sexual activity: Not on  file     Other Topics Concern   • Not on file     Social History Narrative   • No narrative on file     Allergies   Allergen Reactions   • Codeine Vomiting   • Bloodless Unspecified     unknown     Outpatient Encounter Prescriptions as of 11/27/2018   Medication Sig Dispense Refill   • omeprazole (PRILOSEC) 20 MG delayed-release capsule Take 1 Cap by mouth 2 times a day. 30 Cap 2   • potassium chloride SA (KDUR) 20 MEQ Tab CR Take 2 Tabs by mouth 2 times a day. 90 Tab 11   • sertraline (ZOLOFT) 50 MG Tab Take 1 Tab by mouth every bedtime. 30 Tab 11   • metoprolol SR (TOPROL XL) 25 MG TABLET SR 24 HR Take 1 Tab by mouth every morning. 30 Tab 11   • metOLazone (ZAROXOLYN) 2.5 MG Tab Take 1 Tab by mouth every day. 30 Tab 11   • apixaban (ELIQUIS) 2.5mg Tab Take 1 Tab by mouth 2 Times a Day. 60 Tab 5   • bumetanide (BUMEX) 2 MG tablet Take 1 Tab by mouth 2 times a day. 60 Tab 11   • gabapentin (NEURONTIN) 300 MG Cap Take 2 Caps by mouth every evening. 90 Cap 1   • topiramate (TOPAMAX) 25 MG Tab Take 1 Tab by mouth 2 times a day. 60 Tab 3   • rosuvastatin (CRESTOR) 10 MG Tab Take 1 Tab by mouth every evening. 30 Tab 11   • allopurinol (ZYLOPRIM) 100 MG Tab Take 1 Tab by mouth 2 times a day. 30 Tab 1   • cyanocobalamin (VITAMIN B12) 1000 MCG Tab Take 1 Tab by mouth every day. 30 Tab 3   • Calcium-Vitamin D 500-125 MG-UNIT Tab Take 1 tablet by mouth every morning. 600mg - 400 iu 30 Tab 1   • clopidogrel (PLAVIX) 75 MG Tab Take 1 Tab by mouth every day. 90 Tab 3   • levothyroxine (SYNTHROID) 50 MCG Tab Take 1 Tab by mouth Every morning on an empty stomach. 90 Tab 3   • ascorbic acid (VITAMIN C) 500 MG tablet Take 1 Tab by mouth every day. 30 Tab 3   • Fluticasone-Umeclidin-Vilant (TRELEGY ELLIPTA) 100-62.5-25 MCG/INH AEROSOL POWDER, BREATH ACTIVATED Inhale 1 Puff by mouth every day. (Patient not taking: Reported on 11/27/2018) 2 Each 0   • Fluticasone-Umeclidin-Vilant (TRELEGY ELLIPTA) 100-62.5-25 MCG/INH AEROSOL POWDER,  "BREATH ACTIVATED Inhale 1 Puff by mouth every day. (Patient not taking: Reported on 11/27/2018) 1 Each 11   • acetaminophen (TYLENOL) 500 MG Tab Take 2 Tabs by mouth 1 time daily as needed for Mild Pain. 30 Tab 0   • calcium carbonate (TUMS) 500 MG Chew Tab Take 1 Tab by mouth every four hours as needed (heartburn). 30 Tab 0     No facility-administered encounter medications on file as of 11/27/2018.      Review of Systems   Constitutional: Negative for fever and malaise/fatigue.   Respiratory: Positive for shortness of breath. Negative for cough.    Cardiovascular: Positive for palpitations. Negative for chest pain, orthopnea, claudication, leg swelling and PND.   Gastrointestinal: Negative for abdominal pain.   Musculoskeletal: Negative for myalgias.   Neurological: Negative for dizziness.   All other systems reviewed and are negative.       Objective:   /68 (BP Location: Right arm, Patient Position: Sitting, BP Cuff Size: Adult)   Pulse 78   Ht 1.613 m (5' 3.5\")   Wt 57.2 kg (126 lb 3.2 oz)   LMP  (LMP Unknown)   SpO2 100%   BMI 22.00 kg/m²     Physical Exam   Constitutional: She is oriented to person, place, and time. She appears well-developed and well-nourished.   HENT:   Head: Normocephalic and atraumatic.   Eyes: Pupils are equal, round, and reactive to light. EOM are normal.   Neck: Normal range of motion. Neck supple. No JVD present.   Cardiovascular: Normal rate.  An irregularly irregular rhythm present.   Murmur heard.  Pulmonary/Chest: Effort normal and breath sounds normal. No respiratory distress. She has no wheezes. She has no rales.   Right chest pacemaker in place-no erosion, drainage or erythema   Abdominal: Soft. Bowel sounds are normal.   Musculoskeletal: She exhibits edema (Bilateral 1+ lower extremity edema at her ankles).   Neurological: She is alert and oriented to person, place, and time.   Skin: Skin is warm and dry.   Psychiatric: She has a normal mood and affect. Her behavior " is normal.   Vitals reviewed.    Lab Results   Component Value Date/Time    CHOLSTRLTOT 67 (L) 07/09/2018 02:22 AM    LDL 17 07/09/2018 02:22 AM    HDL 40 07/09/2018 02:22 AM    TRIGLYCERIDE 50 07/09/2018 02:22 AM       Lab Results   Component Value Date/Time    SODIUM 140 11/19/2018 09:25 AM    POTASSIUM 4.3 11/19/2018 09:25 AM    CHLORIDE 106 11/19/2018 09:25 AM    CO2 26 11/19/2018 09:25 AM    GLUCOSE 90 11/19/2018 09:25 AM    BUN 34 (H) 11/19/2018 09:25 AM    CREATININE 1.40 11/19/2018 09:25 AM     Lab Results   Component Value Date/Time    ALKPHOSPHAT 111 (H) 08/14/2018 01:05 PM    ASTSGOT 22 08/14/2018 01:05 PM    ALTSGPT 6 08/14/2018 01:05 PM    TBILIRUBIN 1.2 08/14/2018 01:05 PM      Transthoracic Echo Report 5/19/18  Severe bi-atrial enlargement. Normal LV and RV systolic function. LVEF is 55% by visual estimation. Diastolic function difficult to assess.   There is at least mild aortic stenosis but valve is opening. Mean   gradient is 10 mmHg, with V max of 2.01 m/s. RVSP is elevated at 70   mmHg suggesting pulmonary hypertension. No prior study is available for   comparison.      Heart Cath 7/2/2018   POSTPROCEDURE DIAGNOSES:  1.  No evidence of aortic stenosis with 0 mmHg peak to peak gradient, mean gradient   of 8 mmHg, calculated CHRISTIANE of 2.09 cm2.  2.  Coronary artery disease with high grade proximal and high grade mid left   anterior descending artery.  3.  Successful percutaneous transluminal coronary angioplasty/stent placement   of the mid left anterior descending artery with 2.25x12 mm Synergy   drug-eluting stent.  4.  Successful percutaneous transluminal coronary angioplasty/stent placement   of the proximal left anterior descending artery with 2.5x12 mm Synergy   drug-eluting stent.  5.  Normal left ventricular systolic function with ejection fraction of 55%.  6.  Normal left ventricular end-diastolic pressure.  7.  Elevated right heart pressure with PA systolic pressure of 60 mmHg.  8.   Successful Angio-Seal closure.     Transthoracic Echo Report 7/8/2018  Prior done 05-.  Compared to prior study, the right-sided chambers appear dilated.    Left ventricular systolic function is low normal.  Left ventricular ejection fraction is visually estimated to be 50-55%.  Abnormal septal motion consistent with right ventricular (RV) volume overload and/or elevated RV end-diastolic pressure.  Dilated right ventricle with reduced systolic function.  Severe tricuspid regurgitation. Estimated right ventricular systolic pressure  is 55 mmHg.  Dilated inferior vena cava without inspiratory collapse     Ultrasound of neck 10/29/2018-results reviewed with patient  Palpable lump in the posterior right neck corresponds to a small lymph node.      Assessment:     1. ACC/AHA stage C heart failure with preserved ejection fraction (HCC)  potassium chloride SA (KDUR) 20 MEQ Tab CR    BASIC METABOLIC PANEL   2. Heart failure, NYHA class 2 (HCC)  potassium chloride SA (KDUR) 20 MEQ Tab CR    BASIC METABOLIC PANEL   3. Hypokalemia  potassium chloride SA (KDUR) 20 MEQ Tab CR    BASIC METABOLIC PANEL   4. CKD (chronic kidney disease) stage 3, GFR 30-59 ml/min ([facility])  potassium chloride SA (KDUR) 20 MEQ Tab CR    BASIC METABOLIC PANEL   5. SOB (shortness of breath)  CBC WITH DIFFERENTIAL   6. Other iron deficiency anemia  CBC WITH DIFFERENTIAL   7. Chronic atrial fibrillation (HCC)     8. Cardiac pacemaker in situ     9. Long term current use of anticoagulant     10. Coronary artery disease involving native coronary artery of native heart without angina pectoris     11. S/P drug eluting coronary stent placement         Medical Decision Making:  Today's Assessment / Status / Plan:   1. HFpEF, Stage C, Class 2, LVEF 50-55%: Patient continues to have mild lower extremity edema  -Continue Bumex 1 mg twice a day, if needed can increase to 2 mg twice a day  -Continue to hold metolazone for now  -Continue potassium 20 mEq twice a  day, increased to potassium 40 mEq twice a day with increased dose of Bumex.  -Repeat BMP in 1 month, also will repeat CBC  -Patient continues to be followed by the community paramedic program  -Reinforced s/sx of worsening heart failure with patient and weight monitoring. Pt verbalizes understanding. Pt to call office or RTC if present.     2.  Hypokalemia: Current potassium 4.3  -Continue potassium 20 mEq twice a day  -Repeat BMP in 1 month    3.  A. fib: Rate controlled  -Continue Toprol-XL 25 mg daily  -Continue Eliquis 2.5 mg twice a day  -Patient had a pacemaker check today    4.  CAD, s/p BRITTON x2:  -Continue rosuvastatin 10 mg daily  -Continue clopidogrel 75 mg daily    5. CKD, due to contrast-induced nephropathy:  -Kidney function stable  -Continue to follow-up with nephrology    6.  Anemia:  -We will repeat CBC  -Patient has a follow-up with hematology in January 2019    FU in clinic in 2 months with Dr. Simms. Sooner if needed.    Patient verbalizes understanding and agrees with the plan of care.     Collaborating MD: Jose Simms MD

## 2018-11-29 ENCOUNTER — OFFICE VISIT (OUTPATIENT)
Dept: NEPHROLOGY | Facility: MEDICAL CENTER | Age: 83
End: 2018-11-29
Payer: MEDICARE

## 2018-11-29 VITALS
SYSTOLIC BLOOD PRESSURE: 118 MMHG | TEMPERATURE: 98.4 F | WEIGHT: 128 LBS | HEART RATE: 77 BPM | BODY MASS INDEX: 21.85 KG/M2 | OXYGEN SATURATION: 100 % | RESPIRATION RATE: 14 BRPM | HEIGHT: 64 IN | DIASTOLIC BLOOD PRESSURE: 64 MMHG

## 2018-11-29 DIAGNOSIS — N39.0 RECURRENT UTI: ICD-10-CM

## 2018-11-29 DIAGNOSIS — I10 HYPERTENSION, UNSPECIFIED TYPE: ICD-10-CM

## 2018-11-29 DIAGNOSIS — I50.32 CHRONIC DIASTOLIC CHF (CONGESTIVE HEART FAILURE) (HCC): ICD-10-CM

## 2018-11-29 DIAGNOSIS — N18.30 CKD (CHRONIC KIDNEY DISEASE) STAGE 3, GFR 30-59 ML/MIN (HCC): ICD-10-CM

## 2018-11-29 DIAGNOSIS — E87.1 HYPONATREMIA: ICD-10-CM

## 2018-11-29 DIAGNOSIS — E55.9 VITAMIN D DEFICIENCY: ICD-10-CM

## 2018-11-29 DIAGNOSIS — D50.9 IRON DEFICIENCY ANEMIA, UNSPECIFIED IRON DEFICIENCY ANEMIA TYPE: ICD-10-CM

## 2018-11-29 PROCEDURE — 99214 OFFICE O/P EST MOD 30 MIN: CPT | Performed by: INTERNAL MEDICINE

## 2018-11-29 ASSESSMENT — ENCOUNTER SYMPTOMS
CHILLS: 0
ORTHOPNEA: 0
ABDOMINAL PAIN: 0
FLANK PAIN: 0
PALPITATIONS: 0
HEMOPTYSIS: 0
COUGH: 0
BACK PAIN: 1
FEVER: 0
NAUSEA: 0
VOMITING: 0
MYALGIAS: 0
SHORTNESS OF BREATH: 0

## 2018-11-29 NOTE — PROGRESS NOTES
Nephrology Progress Note, Adult, Complex               Author: Neela Delmi Date & Time created: 11/29/2018  12:11 PM     Interval History:  Pt is 87 y/o female with CHF, CKD III, S/P coronary angiogram, developed MARICRUZ / contrast induced nephropathy -recivered  Doing much better.  First Creat level improved to 1.2 - then worse to 1.7 -now at 1.4  CHF -noticed weight gain, worsening edema -on Bumex  No dysuria/hematuria/flank pain  Recently treated for UTI  Review of Systems:  Review of Systems   Constitutional: Positive for malaise/fatigue. Negative for chills and fever.   HENT: Negative.    Respiratory: Negative for cough, hemoptysis and shortness of breath.    Cardiovascular: Positive for leg swelling. Negative for chest pain, palpitations and orthopnea.   Gastrointestinal: Negative for abdominal pain, nausea and vomiting.   Genitourinary: Negative for dysuria, flank pain, frequency, hematuria and urgency.   Musculoskeletal: Positive for back pain. Negative for myalgias.        Restless legs   Skin: Negative.        Physical Exam:  Physical Exam   Constitutional: She is oriented to person, place, and time. No distress.   HENT:   Head: Normocephalic and atraumatic.   Nose: Nose normal.   Eyes: Conjunctivae are normal. Right eye exhibits no discharge. Left eye exhibits no discharge. No scleral icterus.   Cardiovascular: Normal rate and regular rhythm.    Murmur heard.  Pulmonary/Chest: Effort normal and breath sounds normal. No respiratory distress. She has no wheezes. She has no rales.   Abdominal: Soft. Bowel sounds are normal. She exhibits no distension. There is no tenderness. There is no rebound.   Musculoskeletal: She exhibits edema.   Neurological: She is alert and oriented to person, place, and time.   Skin: She is not diaphoretic.   Psychiatric: She has a normal mood and affect.   Nursing note and vitals reviewed.      Labs:          No results for input(s): SODIUM, POTASSIUM, CHLORIDE, CO2, BUN, CREATININE,  MAGNESIUM, PHOSPHORUS, CALCIUM in the last 72 hours.  No results for input(s): ALTSGPT, ASTSGOT, ALKPHOSPHAT, TBILIRUBIN, DBILIRUBIN, GAMMAGT, AMYLASE, LIPASE, ALB, PREALBUMIN, GLUCOSE in the last 72 hours.  No results for input(s): RBC, HEMOGLOBIN, HEMATOCRIT, PLATELETCT, PROTHROMBTM, APTT, INR, IRON, FERRITIN, TOTIRONBC in the last 72 hours.            Hemodynamics:  Temp (24hrs), Av.7 °C (98.1 °F), Min:36.4 °C (97.6 °F), Max:37 °C (98.6 °F)  Temperature: 36.9 °C (98.4 °F)  Pulse  Av.6  Min: 56  Max: 91   Blood Pressure : 118/64     Respiratory:    Respiration: 14, Pulse Oximetry: 100 %           Fluids:    Intake/Output Summary (Last 24 hours) at 07/10/18 1841  Last data filed at 07/10/18 1501   Gross per 24 hour   Intake              240 ml   Output              725 ml   Net             -485 ml     Weight: 58.1 kg (128 lb)      Quality-Core Measures   Reviewed items::  Labs reviewed, Medications reviewed and Radiology images reviewed    Assessment and Plan  1 MARICRUZ on CKD III: sec to BETSY, creat improved -at baseline  2 volume overload -controlled with Bumex -to increase dose  3 Anemia: low Hb level -better  4 Electrolytes well controlled  5.HTN: BP well controlled  Recs:  Low Na diet  Check uric acid  Increase Bumex dose -f/u with Cardiology  F/u in 3 months with CBC, BMP, UA, vit D, PTH, urine microalb/creat ratio

## 2018-12-04 ENCOUNTER — ANTICOAGULATION MONITORING (OUTPATIENT)
Dept: VASCULAR LAB | Facility: MEDICAL CENTER | Age: 83
End: 2018-12-04

## 2018-12-04 ENCOUNTER — TELEPHONE (OUTPATIENT)
Dept: CARDIOLOGY | Facility: MEDICAL CENTER | Age: 83
End: 2018-12-04

## 2018-12-04 DIAGNOSIS — I48.20 CHRONIC ATRIAL FIBRILLATION (HCC): ICD-10-CM

## 2018-12-04 NOTE — PROGRESS NOTES
I spoke with Andree's daughter Palma.  Her mother has been having slight nosebleeds due to recently starting on oxygen.  They have been using nasal saline and this appears to help a little bit.  She asked about specific products to use such as petroleum-based products.  I cautioned her to please contact the oxygen company/durable medical supply company as some products are not compatible with oxygen and might cause a fire risk with certain petroleum products and oxygen.  She will contact the oxygen company to determine which products are safe to use with oxygen and her nasal cannula.    Scottie Younger, PharmD

## 2018-12-04 NOTE — TELEPHONE ENCOUNTER
bleeding & clotting/bloody noses   Received: Today   Message Contents   Anita Hodge R.N.   Phone Number: [phone]             MAGDY/alexander     Pt's granddaughter Palma Singer calling to discuss bloody noses pt has been having. This morning, pt awoke at 2am with bleeding & clotting issues.     Palma thinks med changes to eliquis might be recommended.       Please call Palma at .        Coag PharmD aware and will f/u with patient.    Palma called. Saline and vaseline has been started. Emphasized this as well secondary to O2 dryness. Informed that PharmD will also be following up with her regarding medication. Palma appreciative.

## 2018-12-06 ENCOUNTER — OFFICE VISIT (OUTPATIENT)
Dept: PULMONOLOGY | Facility: HOSPICE | Age: 83
End: 2018-12-06
Payer: MEDICARE

## 2018-12-06 ENCOUNTER — TELEPHONE (OUTPATIENT)
Dept: PULMONOLOGY | Facility: HOSPICE | Age: 83
End: 2018-12-06

## 2018-12-06 VITALS
BODY MASS INDEX: 22.2 KG/M2 | WEIGHT: 130 LBS | HEART RATE: 62 BPM | TEMPERATURE: 97.6 F | RESPIRATION RATE: 14 BRPM | HEIGHT: 64 IN | OXYGEN SATURATION: 98 % | DIASTOLIC BLOOD PRESSURE: 76 MMHG | SYSTOLIC BLOOD PRESSURE: 136 MMHG

## 2018-12-06 DIAGNOSIS — I50.32 CHRONIC DIASTOLIC CHF (CONGESTIVE HEART FAILURE) (HCC): ICD-10-CM

## 2018-12-06 DIAGNOSIS — R06.02 SOB (SHORTNESS OF BREATH): ICD-10-CM

## 2018-12-06 DIAGNOSIS — N28.9 RENAL INSUFFICIENCY: ICD-10-CM

## 2018-12-06 DIAGNOSIS — Z95.0 CARDIAC PACEMAKER IN SITU: ICD-10-CM

## 2018-12-06 DIAGNOSIS — J44.9 CHRONIC OBSTRUCTIVE PULMONARY DISEASE, UNSPECIFIED COPD TYPE (HCC): ICD-10-CM

## 2018-12-06 DIAGNOSIS — R09.02 HYPOXEMIA: ICD-10-CM

## 2018-12-06 DIAGNOSIS — I27.20 PULMONARY HYPERTENSION (HCC): ICD-10-CM

## 2018-12-06 DIAGNOSIS — I48.20 CHRONIC ATRIAL FIBRILLATION (HCC): ICD-10-CM

## 2018-12-06 DIAGNOSIS — R60.0 BILATERAL LOWER EXTREMITY EDEMA: ICD-10-CM

## 2018-12-06 PROCEDURE — 99214 OFFICE O/P EST MOD 30 MIN: CPT | Performed by: INTERNAL MEDICINE

## 2018-12-06 RX ORDER — ALBUTEROL SULFATE 90 UG/1
2 AEROSOL, METERED RESPIRATORY (INHALATION) EVERY 6 HOURS PRN
COMMUNITY
End: 2019-03-05 | Stop reason: SDUPTHER

## 2018-12-06 ASSESSMENT — PAIN SCALES - GENERAL: PAINLEVEL: NO PAIN

## 2018-12-06 NOTE — TELEPHONE ENCOUNTER
Proair and Trelegy inhalers were both called into [facility] on [address] with 11 refills. I spoke with Shelly the pharmacist.

## 2018-12-06 NOTE — PROGRESS NOTES
Chief Complaint   Patient presents with   • Follow-Up     Hypoxemia       HPI:  The patient is an 86-year-old woman with a history of chronic diastolic congestive heart failure, pulmonary hypertension, coronary artery disease status post stenting, chronic peripheral edema, atrial fibrillation, permanent pacemaker, on anticoagulation.  She also has renal insufficiency.  She moved to this area in the spring 2018.  She has been hospitalized several times for issues related to shortness of breath, her heart, and peripheral edema.  The patient did have cardiac problems prior to moving to this area.  However her shortness of breath and peripheral edema have worsened over the past few months.     The patient is a remote smoker.  She smoked about a half a pack a day for 15 years.  She quit 48 years ago.  She was never diagnosed with COPD.  She does not relate any history of asthma or allergies.  She has never been on inhaled medications or supplemental oxygen.  She is short of breath with minimum activity.  She has no current complaint of chest pain or pressure.  She has no cough or sputum production.  No history of wheezing.  A chest x-ray shows cardiomegaly and bilateral pleural effusions right greater than left.  She does have some atelectasis.  Pulmonary function testing shows an FEV1 of 0.80 L which is 49% of predicted.  Her FEV1 FVC ratio is reduced at 65%.  She had a 19% improvement in FEV1 after administration of an inhaled bronchodilator.  Her total lung capacity is 73% of predicted.  Her DLCO is 63% of predicted.  This is a combined restrictive and obstructive picture.  At rest her oxygen saturation was 97%.  We had her walk around the clinic and her saturation dropped to 80% very quickly.  On 2 L of supplemental oxygen she was able to maintain her oxygen saturations at about 95% while walking.    After her last visit we started her on Trelegy.  She did not feel as though it provided much relief.  She has stopped  using it.  We also suggested that she use her oxygen 24/ 7.  She is only using her oxygen during sleep.    Past Medical History:   Diagnosis Date   • Arthritis     back/neck   • Atrial fibrillation (CMS-HCC) [I48.91] 3/29/2018   • Back pain    • Blood clotting disorder (HCC)    • Breast cancer (HCC)    • Breath shortness     water retention   • Cancer (HCC)     breast   • Cataract    • Congestive heart failure (CHF) (HCC) 4/3/2018   • Constipation    • Coronary heart disease    • Daytime sleepiness    • Diarrhea    • BERTRAND (dyspnea on exertion) 5/4/2018   • GERD (gastroesophageal reflux disease)    • Heart attack (HCC)    • Heart burn    • Heart valve disease    • Heartburn    • Hemorrhagic disorder (HCC)    • Hiatus hernia syndrome    • Hyperlipidemia    • Hypertension    • Hypothyroidism    • Kidney disease    • Long term current use of anticoagulant 3/29/2018   • Nasal drainage    • Osteoporosis    • Pacemaker 4/3/2018   • Pneumonia    • Pulmonary emphysema (HCC)    • Restless leg syndrome    • Rheumatoid arthritis (HCC)    • Ringing in ears    • Secondary hypertension 4/3/2018   • Shortness of breath    • Swelling of lower extremity    • Thyroid disease    • Tonsillitis    • Tremors of nervous system 4/3/2018   • Urinary incontinence    • Urinary tract infection    • Wears glasses        ROS:   Constitutional: Denies fevers, chills, night sweats, fatigue or weight loss  Eyes: Denies vision loss, pain, drainage, double vision  Ears, Nose, Throat: Denies earache, tinnitus, hoarseness  Cardiovascular: Denies chest pain, tightness, palpitations at this time  Respiratory: See HPI  Sleep: Denies, snoring, apnea  GI: Denies abdominal pain, nausea, vomiting, diarrhea  : Denies frequent urination, hematuria, painful urination  Musculoskeletal: Denies back pain, painful joints, sore muscles  Neurological: Denies headaches, seizures  Skin: Denies rashes, color changes.  Has chronic edema  Psychiatric: Denies depression or  thoughts of suicide  Hematologic: Chronic anticoagulation  Allergic/Immunologic: Denies rhinitis, skin sensitivity    Social History     Social History   • Marital status: Single     Spouse name: N/A   • Number of children: N/A   • Years of education: N/A     Occupational History   • Not on file.     Social History Main Topics   • Smoking status: Former Smoker     Packs/day: 0.50     Types: Cigarettes     Quit date: 1/1/1970   • Smokeless tobacco: Never Used   • Alcohol use 1.8 oz/week     3 Glasses of wine per week      Comment: 1-2 glasses wine before dinner   • Drug use: No   • Sexual activity: Not on file     Other Topics Concern   • Not on file     Social History Narrative   • No narrative on file     Codeine and Bloodless  Current Outpatient Prescriptions on File Prior to Visit   Medication Sig Dispense Refill   • potassium chloride SA (KDUR) 20 MEQ Tab CR Take 1 Tab by mouth 2 times a day. 60 Tab 11   • bumetanide (BUMEX) 1 MG Tab Take 1 mg by mouth 2 Times a Day.     • omeprazole (PRILOSEC) 20 MG delayed-release capsule Take 1 Cap by mouth 2 times a day. 30 Cap 2   • sertraline (ZOLOFT) 50 MG Tab Take 1 Tab by mouth every bedtime. 30 Tab 11   • metoprolol SR (TOPROL XL) 25 MG TABLET SR 24 HR Take 1 Tab by mouth every morning. 30 Tab 11   • metOLazone (ZAROXOLYN) 2.5 MG Tab Take 1 Tab by mouth every day. 30 Tab 11   • apixaban (ELIQUIS) 2.5mg Tab Take 1 Tab by mouth 2 Times a Day. 60 Tab 5   • gabapentin (NEURONTIN) 300 MG Cap Take 2 Caps by mouth every evening. 90 Cap 1   • topiramate (TOPAMAX) 25 MG Tab Take 1 Tab by mouth 2 times a day. 60 Tab 3   • rosuvastatin (CRESTOR) 10 MG Tab Take 1 Tab by mouth every evening. 30 Tab 11   • allopurinol (ZYLOPRIM) 100 MG Tab Take 1 Tab by mouth 2 times a day. 30 Tab 1   • cyanocobalamin (VITAMIN B12) 1000 MCG Tab Take 1 Tab by mouth every day. 30 Tab 3   • Calcium-Vitamin D 500-125 MG-UNIT Tab Take 1 tablet by mouth every morning. 600mg - 400 iu 30 Tab 1   •  "clopidogrel (PLAVIX) 75 MG Tab Take 1 Tab by mouth every day. 90 Tab 3   • levothyroxine (SYNTHROID) 50 MCG Tab Take 1 Tab by mouth Every morning on an empty stomach. 90 Tab 3   • ascorbic acid (VITAMIN C) 500 MG tablet Take 1 Tab by mouth every day. 30 Tab 3   • Fluticasone-Umeclidin-Vilant (TRELEGY ELLIPTA) 100-62.5-25 MCG/INH AEROSOL POWDER, BREATH ACTIVATED Inhale 1 Puff by mouth every day. (Patient not taking: Reported on 11/27/2018) 2 Each 0   • acetaminophen (TYLENOL) 500 MG Tab Take 2 Tabs by mouth 1 time daily as needed for Mild Pain. 30 Tab 0   • calcium carbonate (TUMS) 500 MG Chew Tab Take 1 Tab by mouth every four hours as needed (heartburn). 30 Tab 0     No current facility-administered medications on file prior to visit.      Blood pressure 136/76, pulse 62, temperature 36.4 °C (97.6 °F), temperature source Oral, resp. rate 14, height 1.613 m (5' 3.5\"), weight 59 kg (130 lb), SpO2 98 %, not currently breastfeeding.  Family History   Problem Relation Age of Onset   • Lung Disease Mother    • Cancer Mother    • Heart Disease Mother    • No Known Problems Father    • Diabetes Brother    • Fainting Neg Hx        Physical Exam:    HEENT: PERRLA, EOMI, no scleral icterus, no nasal or oral lesions  Neck: No thyromegaly, no adenopathy, no bruits  Mallampatti: Grade II  Lungs: Distant breath sounds, no wheezes or crackles  Heart: Irregular, pacer  Abdomen: Soft, benign, no organomegaly  Extremities: Has peripheral edema  Neurologic: Cranial nerve, motor, and sensory exam are normal    1. Hypoxemia    2. Chronic atrial fibrillation (HCC)    3. Cardiac pacemaker in situ    4. Chronic diastolic CHF (congestive heart failure) ([facility])    5. Bilateral lower extremity edema    6. Pulmonary hypertension (HCC)    7. Renal insufficiency    8. Chronic obstructive pulmonary disease, unspecified COPD type ([facility])    9. SOB (shortness of breath)           This woman has chronic diastolic congestive heart failure with pulmonary " hypertension and right heart strain.  She has significant peripheral edema.  Unfortunately she also has renal insufficiency.  She will continue cautious diuresis as managed by cardiology.  Her pulmonary function testing demonstrated a combined obstructive and restrictive process with 19% improvement in FEV1 after inhaled bronchodilator therapy.  We  added Trelegy to see if that can help her dyspnea.  However, she did not feel it helped.  I have urged her to try it for more prolonged period of time.  In addition she has significant oxygen desaturation with walking.    It was suggested that she be on supplemental oxygen at 2 L/min 24/7.  This should help her pulmonary hypertension and peripheral edema.  However the patient is only using her oxygen at night.  We will see her back on an as-needed basis.  She will continue to follow-up with cardiology and nephrology.

## 2018-12-11 ENCOUNTER — TELEPHONE (OUTPATIENT)
Dept: CARDIOLOGY | Facility: MEDICAL CENTER | Age: 83
End: 2018-12-11

## 2018-12-11 NOTE — TELEPHONE ENCOUNTER
"Palma calling with possible stroke like symptoms and  puts call directly through.    Palma reports that day before yesterday pt fell, pt felt weak in the knees, toppled over sideways    Pt lives with Palma's sister and reports pt was weak yesterday    Today pt is \"Not cognitive, still weak, cant remember convo 5 mins prior, and eye lid appears droopy\".    She is calling seeking guidance and advisement. She states she called the [facility] paramedicine program and was told patient was graduated and they need an MD order to continue.    Discussed with Palma that 911 for urgent assessment and/or ER would be more appropriate services. She states understanding. I explain that UC or other may not be a high enough level of service. Palma thanks me and states understanding.    "

## 2018-12-13 ENCOUNTER — TELEPHONE (OUTPATIENT)
Dept: CARDIOLOGY | Facility: MEDICAL CENTER | Age: 83
End: 2018-12-13

## 2018-12-13 NOTE — TELEPHONE ENCOUNTER
Received call from main scheduling stating they have the pt's daughter on the phone saying she thinks the pt is having TIAs. Instructed pt's daughter to call 911 or go to the ER to have the pt evaluated immediately.

## 2018-12-13 NOTE — TELEPHONE ENCOUNTER
Called and spoke directly with pt. She states she has not gone to the ER to be evaluated since calling us about this on 12/11/18. Stressed need to call 911 or go to the ER immediately to be evaluated as this is very serious with life long consequences . Explained that she could call 911 and EMS would evaluate her and if they felt she did not need to go to the ER they would not have to take her. Pt states understanding and says she will call 911 or go to the ER.

## 2018-12-27 ENCOUNTER — HOSPITAL ENCOUNTER (OUTPATIENT)
Dept: LAB | Facility: MEDICAL CENTER | Age: 83
DRG: 480 | End: 2018-12-27
Attending: NURSE PRACTITIONER
Payer: MEDICARE

## 2018-12-27 DIAGNOSIS — I50.9 HEART FAILURE, NYHA CLASS 2 (HCC): ICD-10-CM

## 2018-12-27 DIAGNOSIS — E87.6 HYPOKALEMIA: ICD-10-CM

## 2018-12-27 DIAGNOSIS — N18.30 CKD (CHRONIC KIDNEY DISEASE) STAGE 3, GFR 30-59 ML/MIN (HCC): ICD-10-CM

## 2018-12-27 DIAGNOSIS — I50.30 ACC/AHA STAGE C HEART FAILURE WITH PRESERVED EJECTION FRACTION (HCC): ICD-10-CM

## 2018-12-27 LAB
ANION GAP SERPL CALC-SCNC: 8 MMOL/L (ref 0–11.9)
BUN SERPL-MCNC: 39 MG/DL (ref 8–22)
CALCIUM SERPL-MCNC: 9.3 MG/DL (ref 8.5–10.5)
CHLORIDE SERPL-SCNC: 102 MMOL/L (ref 96–112)
CO2 SERPL-SCNC: 27 MMOL/L (ref 20–33)
CREAT SERPL-MCNC: 1.38 MG/DL (ref 0.5–1.4)
GLUCOSE SERPL-MCNC: 106 MG/DL (ref 65–99)
POTASSIUM SERPL-SCNC: 4.4 MMOL/L (ref 3.6–5.5)
SODIUM SERPL-SCNC: 137 MMOL/L (ref 135–145)

## 2018-12-27 PROCEDURE — 80048 BASIC METABOLIC PNL TOTAL CA: CPT

## 2018-12-27 PROCEDURE — 36415 COLL VENOUS BLD VENIPUNCTURE: CPT

## 2018-12-28 ENCOUNTER — APPOINTMENT (OUTPATIENT)
Dept: RADIOLOGY | Facility: MEDICAL CENTER | Age: 83
DRG: 480 | End: 2018-12-28
Attending: EMERGENCY MEDICINE
Payer: MEDICARE

## 2018-12-28 ENCOUNTER — HOSPITAL ENCOUNTER (INPATIENT)
Facility: MEDICAL CENTER | Age: 83
LOS: 10 days | DRG: 480 | End: 2019-01-07
Attending: EMERGENCY MEDICINE | Admitting: HOSPITALIST
Payer: MEDICARE

## 2018-12-28 DIAGNOSIS — I48.91 ATRIAL FIBRILLATION, UNSPECIFIED TYPE (HCC): ICD-10-CM

## 2018-12-28 DIAGNOSIS — Z79.01 CHRONIC ANTICOAGULATION: ICD-10-CM

## 2018-12-28 DIAGNOSIS — S72.002A CLOSED FRACTURE OF LEFT HIP, INITIAL ENCOUNTER (HCC): ICD-10-CM

## 2018-12-28 DIAGNOSIS — D64.9 ANEMIA, UNSPECIFIED TYPE: ICD-10-CM

## 2018-12-28 LAB
ALBUMIN SERPL BCP-MCNC: 3.6 G/DL (ref 3.2–4.9)
ALBUMIN/GLOB SERPL: 1.6 G/DL
ALP SERPL-CCNC: 132 U/L (ref 30–99)
ALT SERPL-CCNC: 18 U/L (ref 2–50)
ANION GAP SERPL CALC-SCNC: 11 MMOL/L (ref 0–11.9)
APTT PPP: 34.1 SEC (ref 24.7–36)
AST SERPL-CCNC: 24 U/L (ref 12–45)
BASOPHILS # BLD AUTO: 0.8 % (ref 0–1.8)
BASOPHILS # BLD: 0.03 K/UL (ref 0–0.12)
BILIRUB SERPL-MCNC: 0.6 MG/DL (ref 0.1–1.5)
BUN SERPL-MCNC: 38 MG/DL (ref 8–22)
CALCIUM SERPL-MCNC: 8.2 MG/DL (ref 8.5–10.5)
CHLORIDE SERPL-SCNC: 106 MMOL/L (ref 96–112)
CO2 SERPL-SCNC: 25 MMOL/L (ref 20–33)
CREAT SERPL-MCNC: 1.24 MG/DL (ref 0.5–1.4)
EOSINOPHIL # BLD AUTO: 0 K/UL (ref 0–0.51)
EOSINOPHIL NFR BLD: 0 % (ref 0–6.9)
ERYTHROCYTE [DISTWIDTH] IN BLOOD BY AUTOMATED COUNT: 62.4 FL (ref 35.9–50)
GLOBULIN SER CALC-MCNC: 2.2 G/DL (ref 1.9–3.5)
GLUCOSE SERPL-MCNC: 95 MG/DL (ref 65–99)
HCT VFR BLD AUTO: 28.7 % (ref 37–47)
HGB BLD-MCNC: 8.7 G/DL (ref 12–16)
IMM GRANULOCYTES # BLD AUTO: 0.03 K/UL (ref 0–0.11)
IMM GRANULOCYTES NFR BLD AUTO: 0.8 % (ref 0–0.9)
INR PPP: 1.18 (ref 0.87–1.13)
LYMPHOCYTES # BLD AUTO: 0.66 K/UL (ref 1–4.8)
LYMPHOCYTES NFR BLD: 17.2 % (ref 22–41)
MCH RBC QN AUTO: 29.9 PG (ref 27–33)
MCHC RBC AUTO-ENTMCNC: 30.3 G/DL (ref 33.6–35)
MCV RBC AUTO: 98.6 FL (ref 81.4–97.8)
MONOCYTES # BLD AUTO: 0.34 K/UL (ref 0–0.85)
MONOCYTES NFR BLD AUTO: 8.9 % (ref 0–13.4)
NEUTROPHILS # BLD AUTO: 2.78 K/UL (ref 2–7.15)
NEUTROPHILS NFR BLD: 72.3 % (ref 44–72)
NRBC # BLD AUTO: 0 K/UL
NRBC BLD-RTO: 0 /100 WBC
PLATELET # BLD AUTO: 117 K/UL (ref 164–446)
PMV BLD AUTO: 11.3 FL (ref 9–12.9)
POTASSIUM SERPL-SCNC: 3.8 MMOL/L (ref 3.6–5.5)
PROT SERPL-MCNC: 5.8 G/DL (ref 6–8.2)
PROTHROMBIN TIME: 15.1 SEC (ref 12–14.6)
RBC # BLD AUTO: 2.91 M/UL (ref 4.2–5.4)
SODIUM SERPL-SCNC: 142 MMOL/L (ref 135–145)
WBC # BLD AUTO: 3.8 K/UL (ref 4.8–10.8)

## 2018-12-28 PROCEDURE — 73552 X-RAY EXAM OF FEMUR 2/>: CPT | Mod: LT

## 2018-12-28 PROCEDURE — 80053 COMPREHEN METABOLIC PANEL: CPT

## 2018-12-28 PROCEDURE — 770006 HCHG ROOM/CARE - MED/SURG/GYN SEMI*

## 2018-12-28 PROCEDURE — 85730 THROMBOPLASTIN TIME PARTIAL: CPT

## 2018-12-28 PROCEDURE — 700111 HCHG RX REV CODE 636 W/ 250 OVERRIDE (IP): Performed by: EMERGENCY MEDICINE

## 2018-12-28 PROCEDURE — 85610 PROTHROMBIN TIME: CPT

## 2018-12-28 PROCEDURE — 700105 HCHG RX REV CODE 258

## 2018-12-28 PROCEDURE — 73700 CT LOWER EXTREMITY W/O DYE: CPT | Mod: LT

## 2018-12-28 PROCEDURE — 700102 HCHG RX REV CODE 250 W/ 637 OVERRIDE(OP): Performed by: HOSPITALIST

## 2018-12-28 PROCEDURE — 85025 COMPLETE CBC W/AUTO DIFF WBC: CPT

## 2018-12-28 PROCEDURE — 96374 THER/PROPH/DIAG INJ IV PUSH: CPT

## 2018-12-28 PROCEDURE — A9270 NON-COVERED ITEM OR SERVICE: HCPCS | Performed by: HOSPITALIST

## 2018-12-28 PROCEDURE — 99223 1ST HOSP IP/OBS HIGH 75: CPT | Performed by: HOSPITALIST

## 2018-12-28 PROCEDURE — 99285 EMERGENCY DEPT VISIT HI MDM: CPT

## 2018-12-28 RX ORDER — GABAPENTIN 300 MG/1
600 CAPSULE ORAL EVERY EVENING
Status: DISCONTINUED | OUTPATIENT
Start: 2018-12-28 | End: 2018-12-28

## 2018-12-28 RX ORDER — BUMETANIDE 1 MG/1
1 TABLET ORAL 2 TIMES DAILY
Status: DISCONTINUED | OUTPATIENT
Start: 2018-12-28 | End: 2018-12-30

## 2018-12-28 RX ORDER — TOPIRAMATE 25 MG/1
25 TABLET ORAL 2 TIMES DAILY
Status: DISCONTINUED | OUTPATIENT
Start: 2018-12-28 | End: 2019-01-07 | Stop reason: HOSPADM

## 2018-12-28 RX ORDER — POLYETHYLENE GLYCOL 3350 17 G/17G
1 POWDER, FOR SOLUTION ORAL
Status: DISCONTINUED | OUTPATIENT
Start: 2018-12-28 | End: 2018-12-29

## 2018-12-28 RX ORDER — ROPINIROLE 0.5 MG/1
0.25 TABLET, FILM COATED ORAL 3 TIMES DAILY
Status: DISCONTINUED | OUTPATIENT
Start: 2018-12-29 | End: 2019-01-07 | Stop reason: HOSPADM

## 2018-12-28 RX ORDER — METOLAZONE 2.5 MG/1
2.5 TABLET ORAL DAILY
Status: DISCONTINUED | OUTPATIENT
Start: 2018-12-29 | End: 2019-01-07 | Stop reason: HOSPADM

## 2018-12-28 RX ORDER — HYDROMORPHONE HYDROCHLORIDE 1 MG/ML
0.5 INJECTION, SOLUTION INTRAMUSCULAR; INTRAVENOUS; SUBCUTANEOUS
Status: DISCONTINUED | OUTPATIENT
Start: 2018-12-28 | End: 2018-12-29

## 2018-12-28 RX ORDER — METOPROLOL SUCCINATE 25 MG/1
25 TABLET, EXTENDED RELEASE ORAL EVERY MORNING
Status: DISCONTINUED | OUTPATIENT
Start: 2018-12-29 | End: 2019-01-07 | Stop reason: HOSPADM

## 2018-12-28 RX ORDER — ONDANSETRON 4 MG/1
4 TABLET, ORALLY DISINTEGRATING ORAL EVERY 4 HOURS PRN
Status: DISCONTINUED | OUTPATIENT
Start: 2018-12-28 | End: 2018-12-29

## 2018-12-28 RX ORDER — ROSUVASTATIN CALCIUM 5 MG/1
10 TABLET, COATED ORAL EVERY EVENING
Status: DISCONTINUED | OUTPATIENT
Start: 2018-12-28 | End: 2019-01-07 | Stop reason: HOSPADM

## 2018-12-28 RX ORDER — ONDANSETRON 2 MG/ML
4 INJECTION INTRAMUSCULAR; INTRAVENOUS EVERY 4 HOURS PRN
Status: DISCONTINUED | OUTPATIENT
Start: 2018-12-28 | End: 2018-12-29

## 2018-12-28 RX ORDER — OXYCODONE HYDROCHLORIDE 5 MG/1
5 TABLET ORAL
Status: DISCONTINUED | OUTPATIENT
Start: 2018-12-28 | End: 2018-12-29

## 2018-12-28 RX ORDER — ALLOPURINOL 100 MG/1
100 TABLET ORAL 2 TIMES DAILY
Status: DISCONTINUED | OUTPATIENT
Start: 2018-12-28 | End: 2018-12-30

## 2018-12-28 RX ORDER — OXYCODONE HYDROCHLORIDE 10 MG/1
10 TABLET ORAL
Status: DISCONTINUED | OUTPATIENT
Start: 2018-12-28 | End: 2018-12-29

## 2018-12-28 RX ORDER — OMEPRAZOLE 20 MG/1
20 CAPSULE, DELAYED RELEASE ORAL 2 TIMES DAILY
Status: DISCONTINUED | OUTPATIENT
Start: 2018-12-28 | End: 2019-01-07 | Stop reason: HOSPADM

## 2018-12-28 RX ORDER — AMOXICILLIN 250 MG
2 CAPSULE ORAL 2 TIMES DAILY
Status: DISCONTINUED | OUTPATIENT
Start: 2018-12-28 | End: 2018-12-29

## 2018-12-28 RX ORDER — SODIUM CHLORIDE 9 MG/ML
INJECTION, SOLUTION INTRAVENOUS
Status: COMPLETED
Start: 2018-12-28 | End: 2018-12-28

## 2018-12-28 RX ORDER — LEVOTHYROXINE SODIUM 0.05 MG/1
50 TABLET ORAL
Status: DISCONTINUED | OUTPATIENT
Start: 2018-12-29 | End: 2019-01-07 | Stop reason: HOSPADM

## 2018-12-28 RX ORDER — BISACODYL 10 MG
10 SUPPOSITORY, RECTAL RECTAL
Status: DISCONTINUED | OUTPATIENT
Start: 2018-12-28 | End: 2018-12-29

## 2018-12-28 RX ADMIN — SODIUM CHLORIDE: 9 INJECTION, SOLUTION INTRAVENOUS at 23:45

## 2018-12-28 RX ADMIN — ALLOPURINOL 100 MG: 100 TABLET ORAL at 23:41

## 2018-12-28 RX ADMIN — OMEPRAZOLE 20 MG: 20 CAPSULE, DELAYED RELEASE ORAL at 23:42

## 2018-12-28 RX ADMIN — OXYCODONE HYDROCHLORIDE 10 MG: 10 TABLET ORAL at 23:09

## 2018-12-28 RX ADMIN — TOPIRAMATE 25 MG: 25 TABLET, FILM COATED ORAL at 23:42

## 2018-12-28 RX ADMIN — FENTANYL CITRATE 25 MCG: 50 INJECTION, SOLUTION INTRAMUSCULAR; INTRAVENOUS at 22:06

## 2018-12-28 RX ADMIN — SERTRALINE 50 MG: 50 TABLET, FILM COATED ORAL at 23:42

## 2018-12-28 ASSESSMENT — ENCOUNTER SYMPTOMS
VOMITING: 0
CHILLS: 0
HEARTBURN: 0
PALPITATIONS: 0
SPEECH CHANGE: 0
BLURRED VISION: 0
COUGH: 0
WEAKNESS: 1
DOUBLE VISION: 0
BRUISES/BLEEDS EASILY: 0
HEMOPTYSIS: 0
FOCAL WEAKNESS: 0
NAUSEA: 0
DIZZINESS: 0
FALLS: 1
MYALGIAS: 0
FLANK PAIN: 0
SHORTNESS OF BREATH: 0
FEVER: 0
ABDOMINAL PAIN: 0
HALLUCINATIONS: 0
DEPRESSION: 0
SENSORY CHANGE: 0
EYE DISCHARGE: 0

## 2018-12-28 ASSESSMENT — LIFESTYLE VARIABLES
EVER HAD A DRINK FIRST THING IN THE MORNING TO STEADY YOUR NERVES TO GET RID OF A HANGOVER: NO
ALCOHOL_USE: YES
AVERAGE NUMBER OF DAYS PER WEEK YOU HAVE A DRINK CONTAINING ALCOHOL: 7
EVER FELT BAD OR GUILTY ABOUT YOUR DRINKING: NO
TOTAL SCORE: 1
HOW MANY TIMES IN THE PAST YEAR HAVE YOU HAD 5 OR MORE DRINKS IN A DAY: 0
TOTAL SCORE: 1
CONSUMPTION TOTAL: POSITIVE
HAVE YOU EVER FELT YOU SHOULD CUT DOWN ON YOUR DRINKING: NO
TOTAL SCORE: 1
SUBSTANCE_ABUSE: 0
ON A TYPICAL DAY WHEN YOU DRINK ALCOHOL HOW MANY DRINKS DO YOU HAVE: 2
HAVE PEOPLE ANNOYED YOU BY CRITICIZING YOUR DRINKING: YES

## 2018-12-28 ASSESSMENT — PAIN SCALES - GENERAL: PAINLEVEL_OUTOF10: 10

## 2018-12-29 ENCOUNTER — APPOINTMENT (OUTPATIENT)
Dept: RADIOLOGY | Facility: MEDICAL CENTER | Age: 83
DRG: 480 | End: 2018-12-29
Attending: ORTHOPAEDIC SURGERY
Payer: MEDICARE

## 2018-12-29 PROBLEM — S72.90XA FEMUR FRACTURE (HCC): Status: ACTIVE | Noted: 2018-12-29

## 2018-12-29 PROBLEM — Z78.9 FULL CODE STATUS: Status: ACTIVE | Noted: 2018-12-29

## 2018-12-29 PROBLEM — D64.9 ANEMIA: Status: ACTIVE | Noted: 2018-12-29

## 2018-12-29 LAB
25(OH)D3 SERPL-MCNC: 45 NG/ML (ref 30–100)
ABO GROUP BLD: NORMAL
ABO GROUP BLD: NORMAL
ALBUMIN SERPL BCP-MCNC: 3.2 G/DL (ref 3.2–4.9)
ALBUMIN/GLOB SERPL: 1.6 G/DL
ALP SERPL-CCNC: 115 U/L (ref 30–99)
ALT SERPL-CCNC: 15 U/L (ref 2–50)
ANION GAP SERPL CALC-SCNC: 6 MMOL/L (ref 0–11.9)
AST SERPL-CCNC: 19 U/L (ref 12–45)
BARCODED ABORH UBTYP: 9500
BARCODED PRD CODE UBPRD: NORMAL
BARCODED UNIT NUM UBUNT: NORMAL
BASOPHILS # BLD AUTO: 0.5 % (ref 0–1.8)
BASOPHILS # BLD: 0.02 K/UL (ref 0–0.12)
BILIRUB SERPL-MCNC: 0.8 MG/DL (ref 0.1–1.5)
BLD GP AB SCN SERPL QL: NORMAL
BUN SERPL-MCNC: 34 MG/DL (ref 8–22)
CALCIUM SERPL-MCNC: 8.2 MG/DL (ref 8.5–10.5)
CHLORIDE SERPL-SCNC: 107 MMOL/L (ref 96–112)
CO2 SERPL-SCNC: 25 MMOL/L (ref 20–33)
COMPONENT R 8504R: NORMAL
CREAT SERPL-MCNC: 1.18 MG/DL (ref 0.5–1.4)
EOSINOPHIL # BLD AUTO: 0 K/UL (ref 0–0.51)
EOSINOPHIL NFR BLD: 0 % (ref 0–6.9)
ERYTHROCYTE [DISTWIDTH] IN BLOOD BY AUTOMATED COUNT: 62.7 FL (ref 35.9–50)
GLOBULIN SER CALC-MCNC: 2 G/DL (ref 1.9–3.5)
GLUCOSE SERPL-MCNC: 105 MG/DL (ref 65–99)
HCT VFR BLD AUTO: 25 % (ref 37–47)
HGB BLD-MCNC: 7.6 G/DL (ref 12–16)
IMM GRANULOCYTES # BLD AUTO: 0.03 K/UL (ref 0–0.11)
IMM GRANULOCYTES NFR BLD AUTO: 0.8 % (ref 0–0.9)
LYMPHOCYTES # BLD AUTO: 0.65 K/UL (ref 1–4.8)
LYMPHOCYTES NFR BLD: 17.5 % (ref 22–41)
MCH RBC QN AUTO: 30 PG (ref 27–33)
MCHC RBC AUTO-ENTMCNC: 30.4 G/DL (ref 33.6–35)
MCV RBC AUTO: 98.8 FL (ref 81.4–97.8)
MONOCYTES # BLD AUTO: 0.47 K/UL (ref 0–0.85)
MONOCYTES NFR BLD AUTO: 12.7 % (ref 0–13.4)
NEUTROPHILS # BLD AUTO: 2.54 K/UL (ref 2–7.15)
NEUTROPHILS NFR BLD: 68.5 % (ref 44–72)
NRBC # BLD AUTO: 0 K/UL
NRBC BLD-RTO: 0 /100 WBC
PLATELET # BLD AUTO: 107 K/UL (ref 164–446)
PMV BLD AUTO: 11 FL (ref 9–12.9)
POTASSIUM SERPL-SCNC: 3.7 MMOL/L (ref 3.6–5.5)
PRODUCT TYPE UPROD: NORMAL
PROT SERPL-MCNC: 5.2 G/DL (ref 6–8.2)
RBC # BLD AUTO: 2.53 M/UL (ref 4.2–5.4)
RH BLD: NORMAL
RH BLD: NORMAL
SODIUM SERPL-SCNC: 138 MMOL/L (ref 135–145)
UNIT STATUS USTAT: NORMAL
VIT B12 SERPL-MCNC: 627 PG/ML (ref 211–911)
WBC # BLD AUTO: 3.7 K/UL (ref 4.8–10.8)

## 2018-12-29 PROCEDURE — C1713 ANCHOR/SCREW BN/BN,TIS/BN: HCPCS | Performed by: ORTHOPAEDIC SURGERY

## 2018-12-29 PROCEDURE — 700101 HCHG RX REV CODE 250

## 2018-12-29 PROCEDURE — A9270 NON-COVERED ITEM OR SERVICE: HCPCS | Performed by: ORTHOPAEDIC SURGERY

## 2018-12-29 PROCEDURE — 85025 COMPLETE CBC W/AUTO DIFF WBC: CPT

## 2018-12-29 PROCEDURE — A9270 NON-COVERED ITEM OR SERVICE: HCPCS | Performed by: HOSPITALIST

## 2018-12-29 PROCEDURE — 160036 HCHG PACU - EA ADDL 30 MINS PHASE I: Performed by: ORTHOPAEDIC SURGERY

## 2018-12-29 PROCEDURE — 770006 HCHG ROOM/CARE - MED/SURG/GYN SEMI*

## 2018-12-29 PROCEDURE — 700102 HCHG RX REV CODE 250 W/ 637 OVERRIDE(OP): Performed by: ORTHOPAEDIC SURGERY

## 2018-12-29 PROCEDURE — 160002 HCHG RECOVERY MINUTES (STAT): Performed by: ORTHOPAEDIC SURGERY

## 2018-12-29 PROCEDURE — A6402 STERILE GAUZE <= 16 SQ IN: HCPCS | Performed by: ORTHOPAEDIC SURGERY

## 2018-12-29 PROCEDURE — 160041 HCHG SURGERY MINUTES - EA ADDL 1 MIN LEVEL 4: Performed by: ORTHOPAEDIC SURGERY

## 2018-12-29 PROCEDURE — 700102 HCHG RX REV CODE 250 W/ 637 OVERRIDE(OP): Performed by: HOSPITALIST

## 2018-12-29 PROCEDURE — 700102 HCHG RX REV CODE 250 W/ 637 OVERRIDE(OP): Performed by: ANESTHESIOLOGY

## 2018-12-29 PROCEDURE — 73502 X-RAY EXAM HIP UNI 2-3 VIEWS: CPT | Mod: LT

## 2018-12-29 PROCEDURE — 501838 HCHG SUTURE GENERAL: Performed by: ORTHOPAEDIC SURGERY

## 2018-12-29 PROCEDURE — 160009 HCHG ANES TIME/MIN: Performed by: ORTHOPAEDIC SURGERY

## 2018-12-29 PROCEDURE — 160035 HCHG PACU - 1ST 60 MINS PHASE I: Performed by: ORTHOPAEDIC SURGERY

## 2018-12-29 PROCEDURE — 94760 N-INVAS EAR/PLS OXIMETRY 1: CPT

## 2018-12-29 PROCEDURE — 86900 BLOOD TYPING SEROLOGIC ABO: CPT

## 2018-12-29 PROCEDURE — 86901 BLOOD TYPING SEROLOGIC RH(D): CPT

## 2018-12-29 PROCEDURE — 0QS736Z REPOSITION LEFT UPPER FEMUR WITH INTRAMEDULLARY INTERNAL FIXATION DEVICE, PERCUTANEOUS APPROACH: ICD-10-PCS | Performed by: ORTHOPAEDIC SURGERY

## 2018-12-29 PROCEDURE — 160048 HCHG OR STATISTICAL LEVEL 1-5: Performed by: ORTHOPAEDIC SURGERY

## 2018-12-29 PROCEDURE — 36415 COLL VENOUS BLD VENIPUNCTURE: CPT

## 2018-12-29 PROCEDURE — A9270 NON-COVERED ITEM OR SERVICE: HCPCS | Performed by: ANESTHESIOLOGY

## 2018-12-29 PROCEDURE — 86850 RBC ANTIBODY SCREEN: CPT

## 2018-12-29 PROCEDURE — 82607 VITAMIN B-12: CPT

## 2018-12-29 PROCEDURE — 82306 VITAMIN D 25 HYDROXY: CPT

## 2018-12-29 PROCEDURE — 500891 HCHG PACK, ORTHO MAJOR: Performed by: ORTHOPAEDIC SURGERY

## 2018-12-29 PROCEDURE — 80053 COMPREHEN METABOLIC PANEL: CPT

## 2018-12-29 PROCEDURE — 700111 HCHG RX REV CODE 636 W/ 250 OVERRIDE (IP): Performed by: HOSPITALIST

## 2018-12-29 PROCEDURE — 99233 SBSQ HOSP IP/OBS HIGH 50: CPT | Performed by: HOSPITALIST

## 2018-12-29 PROCEDURE — 160029 HCHG SURGERY MINUTES - 1ST 30 MINS LEVEL 4: Performed by: ORTHOPAEDIC SURGERY

## 2018-12-29 PROCEDURE — 700111 HCHG RX REV CODE 636 W/ 250 OVERRIDE (IP)

## 2018-12-29 PROCEDURE — BQ111ZZ FLUOROSCOPY OF LEFT HIP USING LOW OSMOLAR CONTRAST: ICD-10-PCS | Performed by: ORTHOPAEDIC SURGERY

## 2018-12-29 PROCEDURE — 700111 HCHG RX REV CODE 636 W/ 250 OVERRIDE (IP): Performed by: ORTHOPAEDIC SURGERY

## 2018-12-29 DEVICE — SCREW CROSS LOCK 5MM X 32.5MM (4TX5=20): Type: IMPLANTABLE DEVICE | Site: HIP | Status: FUNCTIONAL

## 2018-12-29 DEVICE — SCREW LAG 10.5MM X 90MM (4TX2=8): Type: IMPLANTABLE DEVICE | Site: HIP | Status: FUNCTIONAL

## 2018-12-29 RX ORDER — SODIUM CHLORIDE 9 MG/ML
INJECTION, SOLUTION INTRAVENOUS CONTINUOUS
Status: ACTIVE | OUTPATIENT
Start: 2018-12-29 | End: 2018-12-29

## 2018-12-29 RX ORDER — HYDROMORPHONE HYDROCHLORIDE 1 MG/ML
0.1 INJECTION, SOLUTION INTRAMUSCULAR; INTRAVENOUS; SUBCUTANEOUS
Status: DISCONTINUED | OUTPATIENT
Start: 2018-12-29 | End: 2018-12-29 | Stop reason: HOSPADM

## 2018-12-29 RX ORDER — AMOXICILLIN 250 MG
1 CAPSULE ORAL NIGHTLY
Status: DISCONTINUED | OUTPATIENT
Start: 2018-12-29 | End: 2019-01-07 | Stop reason: HOSPADM

## 2018-12-29 RX ORDER — ONDANSETRON 2 MG/ML
4 INJECTION INTRAMUSCULAR; INTRAVENOUS
Status: DISCONTINUED | OUTPATIENT
Start: 2018-12-29 | End: 2018-12-29 | Stop reason: HOSPADM

## 2018-12-29 RX ORDER — CEFAZOLIN SODIUM 2 G/100ML
2 INJECTION, SOLUTION INTRAVENOUS EVERY 8 HOURS
Status: COMPLETED | OUTPATIENT
Start: 2018-12-29 | End: 2018-12-30

## 2018-12-29 RX ORDER — HALOPERIDOL 5 MG/ML
1 INJECTION INTRAMUSCULAR
Status: DISCONTINUED | OUTPATIENT
Start: 2018-12-29 | End: 2018-12-29 | Stop reason: HOSPADM

## 2018-12-29 RX ORDER — DOCUSATE SODIUM 100 MG/1
100 CAPSULE, LIQUID FILLED ORAL 2 TIMES DAILY
Status: DISCONTINUED | OUTPATIENT
Start: 2018-12-29 | End: 2019-01-07 | Stop reason: HOSPADM

## 2018-12-29 RX ORDER — ONDANSETRON 2 MG/ML
4 INJECTION INTRAMUSCULAR; INTRAVENOUS EVERY 4 HOURS PRN
Status: DISCONTINUED | OUTPATIENT
Start: 2018-12-29 | End: 2018-12-29

## 2018-12-29 RX ORDER — HYDROMORPHONE HYDROCHLORIDE 1 MG/ML
0.2 INJECTION, SOLUTION INTRAMUSCULAR; INTRAVENOUS; SUBCUTANEOUS
Status: DISCONTINUED | OUTPATIENT
Start: 2018-12-29 | End: 2018-12-29 | Stop reason: HOSPADM

## 2018-12-29 RX ORDER — KETOROLAC TROMETHAMINE 30 MG/ML
15 INJECTION, SOLUTION INTRAMUSCULAR; INTRAVENOUS EVERY 6 HOURS
Status: DISCONTINUED | OUTPATIENT
Start: 2018-12-29 | End: 2018-12-30

## 2018-12-29 RX ORDER — ACETAMINOPHEN 325 MG/1
650 TABLET ORAL EVERY 6 HOURS
Status: DISPENSED | OUTPATIENT
Start: 2018-12-29 | End: 2019-01-03

## 2018-12-29 RX ORDER — SCOLOPAMINE TRANSDERMAL SYSTEM 1 MG/1
1 PATCH, EXTENDED RELEASE TRANSDERMAL
Status: DISCONTINUED | OUTPATIENT
Start: 2018-12-29 | End: 2019-01-07 | Stop reason: HOSPADM

## 2018-12-29 RX ORDER — HYDROMORPHONE HYDROCHLORIDE 1 MG/ML
0.5 INJECTION, SOLUTION INTRAMUSCULAR; INTRAVENOUS; SUBCUTANEOUS
Status: DISCONTINUED | OUTPATIENT
Start: 2018-12-29 | End: 2018-12-31

## 2018-12-29 RX ORDER — HALOPERIDOL 5 MG/ML
1 INJECTION INTRAMUSCULAR EVERY 6 HOURS PRN
Status: DISCONTINUED | OUTPATIENT
Start: 2018-12-29 | End: 2018-12-31

## 2018-12-29 RX ORDER — DIPHENHYDRAMINE HCL 25 MG
25 TABLET ORAL EVERY 6 HOURS PRN
Status: DISCONTINUED | OUTPATIENT
Start: 2018-12-29 | End: 2018-12-31

## 2018-12-29 RX ORDER — OXYCODONE HYDROCHLORIDE 10 MG/1
10 TABLET ORAL
Status: DISCONTINUED | OUTPATIENT
Start: 2018-12-29 | End: 2019-01-07 | Stop reason: HOSPADM

## 2018-12-29 RX ORDER — BISACODYL 10 MG
10 SUPPOSITORY, RECTAL RECTAL
Status: DISCONTINUED | OUTPATIENT
Start: 2018-12-29 | End: 2019-01-07 | Stop reason: HOSPADM

## 2018-12-29 RX ORDER — ENEMA 19; 7 G/133ML; G/133ML
1 ENEMA RECTAL
Status: DISCONTINUED | OUTPATIENT
Start: 2018-12-29 | End: 2019-01-07 | Stop reason: HOSPADM

## 2018-12-29 RX ORDER — HYDROMORPHONE HYDROCHLORIDE 1 MG/ML
0.4 INJECTION, SOLUTION INTRAMUSCULAR; INTRAVENOUS; SUBCUTANEOUS
Status: DISCONTINUED | OUTPATIENT
Start: 2018-12-29 | End: 2018-12-29 | Stop reason: HOSPADM

## 2018-12-29 RX ORDER — DIPHENHYDRAMINE HYDROCHLORIDE 50 MG/ML
25 INJECTION INTRAMUSCULAR; INTRAVENOUS EVERY 6 HOURS PRN
Status: DISCONTINUED | OUTPATIENT
Start: 2018-12-29 | End: 2018-12-31

## 2018-12-29 RX ORDER — POLYETHYLENE GLYCOL 3350 17 G/17G
1 POWDER, FOR SOLUTION ORAL 2 TIMES DAILY PRN
Status: DISCONTINUED | OUTPATIENT
Start: 2018-12-29 | End: 2019-01-07 | Stop reason: HOSPADM

## 2018-12-29 RX ORDER — OXYCODONE HYDROCHLORIDE 5 MG/1
5 TABLET ORAL
Status: DISCONTINUED | OUTPATIENT
Start: 2018-12-29 | End: 2019-01-07 | Stop reason: HOSPADM

## 2018-12-29 RX ORDER — DEXAMETHASONE SODIUM PHOSPHATE 4 MG/ML
4 INJECTION, SOLUTION INTRA-ARTICULAR; INTRALESIONAL; INTRAMUSCULAR; INTRAVENOUS; SOFT TISSUE
Status: DISCONTINUED | OUTPATIENT
Start: 2018-12-29 | End: 2018-12-31

## 2018-12-29 RX ORDER — SODIUM CHLORIDE, SODIUM LACTATE, POTASSIUM CHLORIDE, CALCIUM CHLORIDE 600; 310; 30; 20 MG/100ML; MG/100ML; MG/100ML; MG/100ML
INJECTION, SOLUTION INTRAVENOUS CONTINUOUS
Status: DISCONTINUED | OUTPATIENT
Start: 2018-12-29 | End: 2018-12-29 | Stop reason: HOSPADM

## 2018-12-29 RX ORDER — ACETAMINOPHEN 325 MG/1
650 TABLET ORAL EVERY 6 HOURS PRN
Status: DISCONTINUED | OUTPATIENT
Start: 2018-12-29 | End: 2019-01-07 | Stop reason: HOSPADM

## 2018-12-29 RX ORDER — SODIUM CHLORIDE 9 MG/ML
INJECTION, SOLUTION INTRAVENOUS
Status: ACTIVE
Start: 2018-12-29 | End: 2018-12-29

## 2018-12-29 RX ORDER — AMOXICILLIN 250 MG
1 CAPSULE ORAL
Status: DISCONTINUED | OUTPATIENT
Start: 2018-12-29 | End: 2019-01-07 | Stop reason: HOSPADM

## 2018-12-29 RX ADMIN — SERTRALINE 50 MG: 50 TABLET, FILM COATED ORAL at 20:59

## 2018-12-29 RX ADMIN — HYDROMORPHONE HYDROCHLORIDE 0.5 MG: 1 INJECTION, SOLUTION INTRAMUSCULAR; INTRAVENOUS; SUBCUTANEOUS at 08:40

## 2018-12-29 RX ADMIN — ALLOPURINOL 100 MG: 100 TABLET ORAL at 18:20

## 2018-12-29 RX ADMIN — HYDROCODONE BITARTRATE AND ACETAMINOPHEN 15 ML: 2.5; 108 SOLUTION ORAL at 13:18

## 2018-12-29 RX ADMIN — ROPINIROLE HYDROCHLORIDE 0.25 MG: 0.5 TABLET, FILM COATED ORAL at 18:19

## 2018-12-29 RX ADMIN — CEFAZOLIN SODIUM 2 G: 2 INJECTION, SOLUTION INTRAVENOUS at 18:29

## 2018-12-29 RX ADMIN — ROSUVASTATIN CALCIUM 10 MG: 10 TABLET, FILM COATED ORAL at 18:21

## 2018-12-29 RX ADMIN — OMEPRAZOLE 20 MG: 20 CAPSULE, DELAYED RELEASE ORAL at 18:20

## 2018-12-29 RX ADMIN — HYDROMORPHONE HYDROCHLORIDE 0.5 MG: 1 INJECTION, SOLUTION INTRAMUSCULAR; INTRAVENOUS; SUBCUTANEOUS at 04:49

## 2018-12-29 RX ADMIN — KETOROLAC TROMETHAMINE 15 MG: 30 INJECTION, SOLUTION INTRAMUSCULAR at 15:16

## 2018-12-29 RX ADMIN — HYDROMORPHONE HYDROCHLORIDE 0.5 MG: 1 INJECTION, SOLUTION INTRAMUSCULAR; INTRAVENOUS; SUBCUTANEOUS at 01:12

## 2018-12-29 RX ADMIN — ACETAMINOPHEN 650 MG: 325 TABLET, FILM COATED ORAL at 18:19

## 2018-12-29 RX ADMIN — OXYCODONE HYDROCHLORIDE 5 MG: 5 TABLET ORAL at 20:59

## 2018-12-29 RX ADMIN — TOPIRAMATE 25 MG: 25 TABLET, FILM COATED ORAL at 18:19

## 2018-12-29 RX ADMIN — KETOROLAC TROMETHAMINE 15 MG: 30 INJECTION, SOLUTION INTRAMUSCULAR at 20:58

## 2018-12-29 ASSESSMENT — ENCOUNTER SYMPTOMS
EYE DISCHARGE: 0
PALPITATIONS: 0
MYALGIAS: 0
HEMOPTYSIS: 0
VOMITING: 0
EYE PAIN: 0
BRUISES/BLEEDS EASILY: 0
WEAKNESS: 1
LOSS OF CONSCIOUSNESS: 0
SHORTNESS OF BREATH: 1
FEVER: 0
SPUTUM PRODUCTION: 0
COUGH: 0
HALLUCINATIONS: 0
BLOOD IN STOOL: 0
SEIZURES: 0
STRIDOR: 0
FOCAL WEAKNESS: 0
ROS SKIN COMMENTS: PALE
NERVOUS/ANXIOUS: 0
EYE REDNESS: 0
DIARRHEA: 0
ABDOMINAL PAIN: 0
FLANK PAIN: 0
CHILLS: 0
SORE THROAT: 0
SPEECH CHANGE: 0

## 2018-12-29 ASSESSMENT — PAIN SCALES - GENERAL
PAINLEVEL_OUTOF10: 0
PAINLEVEL_OUTOF10: 4
PAINLEVEL_OUTOF10: 6
PAINLEVEL_OUTOF10: 6
PAINLEVEL_OUTOF10: 0
PAINLEVEL_OUTOF10: 7
PAINLEVEL_OUTOF10: 0
PAINLEVEL_OUTOF10: 3
PAINLEVEL_OUTOF10: 6

## 2018-12-29 ASSESSMENT — COGNITIVE AND FUNCTIONAL STATUS - GENERAL
WALKING IN HOSPITAL ROOM: A LOT
SUGGESTED CMS G CODE MODIFIER DAILY ACTIVITY: CL
DAILY ACTIVITIY SCORE: 12
TURNING FROM BACK TO SIDE WHILE IN FLAT BAD: A LOT
HELP NEEDED FOR BATHING: A LOT
MOVING FROM LYING ON BACK TO SITTING ON SIDE OF FLAT BED: A LOT
EATING MEALS: A LOT
PERSONAL GROOMING: A LOT
MOVING TO AND FROM BED TO CHAIR: A LOT
DRESSING REGULAR LOWER BODY CLOTHING: A LOT
DRESSING REGULAR UPPER BODY CLOTHING: A LOT
STANDING UP FROM CHAIR USING ARMS: A LOT
CLIMB 3 TO 5 STEPS WITH RAILING: A LOT
MOBILITY SCORE: 12
TOILETING: A LOT
SUGGESTED CMS G CODE MODIFIER MOBILITY: CL

## 2018-12-29 ASSESSMENT — LIFESTYLE VARIABLES: EVER_SMOKED: YES

## 2018-12-29 ASSESSMENT — COPD QUESTIONNAIRES
COPD SCREENING SCORE: 7
DO YOU EVER COUGH UP ANY MUCUS OR PHLEGM?: YES, A FEW DAYS A WEEK OR MONTH
DURING THE PAST 4 WEEKS HOW MUCH DID YOU FEEL SHORT OF BREATH: SOME OF THE TIME
HAVE YOU SMOKED AT LEAST 100 CIGARETTES IN YOUR ENTIRE LIFE: YES

## 2018-12-29 NOTE — PROGRESS NOTES
2 RN skin check. Pt has fragile skin. Pt has bruising on back, left hip and scattered throughout her body.pts back is pink and blanching.

## 2018-12-29 NOTE — PROGRESS NOTES
85 yo female with glf and left intertroch fx  Admit to hospitalist  Npo p midnight  Anticipate orif intertroch femur fx when cleared  Full note to follow

## 2018-12-29 NOTE — OP REPORT
DATE OF SERVICE:  12/29/2018    PREOPERATIVE DIAGNOSIS:  Left intertrochanteric femur fracture.    POSTOPERATIVE DIAGNOSIS:  Left intertrochanteric femur fracture.    PROCEDURE:  Intramedullary nail, left hip.    SURGEON:  Juan Daniel Ruffin MD    ASSISTANT:  Kain Andre DO    ESTIMATED BLOOD LOSS:  None.    INDICATIONS:  This is an 86-year-old female status post fall during which she   sustained a left intertrochanteric femur fracture.  Risks and benefits of   intramedullary nailing were discussed at length which include but not limited   to bleeding, infection, neurovascular damage, pain, stiffness, malunion,   nonunion, DVT, PE, MI, stroke, and death.  She understands all these risks and   wishes to proceed.    DESCRIPTION OF PROCEDURE:  Patient was sedated with LMA anesthesia and   administered preoperative antibiotics.  She was placed on the fracture table   and the fracture reduced with slight traction and internal rotation.  Left hip   and lower extremity were prepped and draped in usual sterile fashion.  A   guide pin for OIC hip nail was inserted at the tip of the greater trochanter.    Anterior reamer was utilized.  A 180x127.5x11 nail was inserted to the   appropriate depth.  The guide pin was placed in center-center position in the   femoral head, 9 mm lag screw was placed.  Compression was obtained.  Set screw   was tightened.  One distal cross lock screw was placed.  Wounds were   irrigated and closed with 2-0 Vicryl suture and staples.  Sterile dressings   were applied.  Patient tolerated the procedure well.    POSTOPERATIVE PLAN:  The patient to be weightbearing as tolerated, admitted   for perioperative antibiotics, DVT prophylaxis, and pain control.       ____________________________________     JUAN DANIEL RUFFIN MD    [facility] / [facility]    DD:  12/29/2018 13:07:36  DT:  12/29/2018 13:16:35    D#:  [id number]  Job#:  [id number]

## 2018-12-29 NOTE — ASSESSMENT & PLAN NOTE
Metoprolol,   Eliquis, initially held for surgery, then restarted, now being held for 48 hours due to increased drainage at surgical site    Restarted

## 2018-12-29 NOTE — ASSESSMENT & PLAN NOTE
Ortho consulted- surgery 12/29/2018   Pain control  PT OT evaluation recommended SNF, referral placed   Incision vac placed to help with drainage

## 2018-12-29 NOTE — ED PROVIDER NOTES
ED Provider Note    ER PROVIDER NOTE        CHIEF COMPLAINT  Chief Complaint   Patient presents with   • T-5000 FALL   • Hip Pain     left       HPI  Andree Egan is a 86 y.o. female who presents to the emergency department complaining of Left hip pain.  Patient was walking down a slope with her walker when she tripped in the gutter landing on her left hip.  Has had pain to that site and difficulty walking, brought in by EMS.  Currently pain is well controlled after IV pain medications prehospital.  She denies any abdominal pain or lower leg pain.  Did not hit her head, no chest pain, no other focal complaints of pain.  No focal weakness numbness or tingling.    With history of atrial fibrillation on Eliquis    REVIEW OF SYSTEMS  Pertinent positives include hip pain. Pertinent negatives include no abdominal pain. See HPI for details. All other systems reviewed and are negative.    PAST MEDICAL HISTORY   has a past medical history of Arthritis; Atrial fibrillation (CMS-HCC) [I48.91] (3/29/2018); Back pain; Blood clotting disorder ([facility]); Breast cancer ([facility]); Breath shortness; Cancer ([facility]); Cataract; Congestive heart failure (CHF) ([facility]) (4/3/2018); Constipation; Coronary heart disease; Daytime sleepiness; Diarrhea; BERTRAND (dyspnea on exertion) (5/4/2018); GERD (gastroesophageal reflux disease); Heart attack ([facility]); Heart burn; Heart valve disease; Heartburn; Hemorrhagic disorder ([facility]); Hiatus hernia syndrome; Hyperlipidemia; Hypertension; Hypothyroidism; Kidney disease; Long term current use of anticoagulant (3/29/2018); Nasal drainage; Osteoporosis; Pacemaker (4/3/2018); Pneumonia; Pulmonary emphysema ([facility]); Restless leg syndrome; Rheumatoid arthritis ([facility]); Ringing in ears; Secondary hypertension (4/3/2018); Shortness of breath; Swelling of lower extremity; Thyroid disease; Tonsillitis; Tremors of nervous system (4/3/2018); Urinary incontinence; Urinary tract infection; and Wears glasses.    SURGICAL HISTORY   has a  "past surgical history that includes other; other cardiac surgery; remv 2nd cataract,corn-scler sectn; hysterectomy laparoscopy; pacemaker insertion; and mastectomy.    FAMILY HISTORY  Family History   Problem Relation Age of Onset   • Lung Disease Mother    • Cancer Mother    • Heart Disease Mother    • No Known Problems Father    • Diabetes Brother    • Fainting Neg Hx        SOCIAL HISTORY  Social History     Social History   • Marital status: Single     Spouse name: N/A   • Number of children: N/A   • Years of education: N/A     Social History Main Topics   • Smoking status: Former Smoker     Packs/day: 0.50     Types: Cigarettes     Quit date: 1/1/1970   • Smokeless tobacco: Never Used   • Alcohol use 1.8 oz/week     3 Glasses of wine per week      Comment: 1-2 glasses wine before dinner   • Drug use: No   • Sexual activity: Not on file     Other Topics Concern   • Not on file     Social History Narrative   • No narrative on file      History   Drug Use No       CURRENT MEDICATIONS  Home Medications    **Home medications have not yet been reviewed for this encounter**         ALLERGIES  Allergies   Allergen Reactions   • Codeine Vomiting   • Bloodless Unspecified     unknown       PHYSICAL EXAM  VITAL SIGNS: /60   Pulse 60   Temp 37 °C (98.6 °F) (Temporal)   Resp 18   Ht 1.6 m (5' 3\")   Wt 55.8 kg (123 lb)   LMP  (LMP Unknown)   SpO2 94%   BMI 21.79 kg/m²   Pulse ox interpretation: I interpret this pulse ox as normal.    Constitutional: Alert in no apparent distress.  HENT: No signs of trauma, Bilateral external ears normal, Nose normal.   Eyes: Pupils are equal and reactive, Conjunctiva normal, Non-icteric.   Neck: Normal range of motion, No tenderness, Supple, No stridor.   Lymphatic: No lymphadenopathy noted.   Cardiovascular: Regular rate, irregular rhythm, no murmurs.   Thorax & Lungs: Normal breath sounds, No respiratory distress, No wheezing, No chest tenderness.   Abdomen: Bowel sounds " normal, Soft, No tenderness, No masses, No pulsatile masses. No peritoneal signs.  Skin: Warm, Dry, No erythema, No rash.   Back: No bony tenderness, No CVA tenderness.   Extremities: Intact distal pulses, No edema, No tenderness, No cyanosis,  Musculoskeletal: Tenderness over left hip with shortening and external rotation, some bruising noted over distal femur which patient reports is subacute otherwise good range of motion in all major joints. No tenderness to palpation or major deformities noted.   Neurologic: Alert , Normal motor function, Normal sensory function, No focal deficits noted.   Psychiatric: Affect normal, Judgment normal, Mood normal.     DIAGNOSTIC STUDIES / PROCEDURES        LABS  Labs Reviewed   CBC WITH DIFFERENTIAL - Abnormal; Notable for the following:        Result Value    WBC 3.8 (*)     RBC 2.91 (*)     Hemoglobin 8.7 (*)     Hematocrit 28.7 (*)     MCV 98.6 (*)     MCHC 30.3 (*)     RDW 62.4 (*)     Platelet Count 117 (*)     Neutrophils-Polys 72.30 (*)     Lymphocytes 17.20 (*)     Lymphs (Absolute) 0.66 (*)     All other components within normal limits    Narrative:     Indicate which anticoagulants the patient is on:->UNKNOWN   COMP METABOLIC PANEL - Abnormal; Notable for the following:     Bun 38 (*)     Calcium 8.2 (*)     Alkaline Phosphatase 132 (*)     Total Protein 5.8 (*)     All other components within normal limits    Narrative:     Indicate which anticoagulants the patient is on:->UNKNOWN   PROTHROMBIN TIME - Abnormal; Notable for the following:     PT 15.1 (*)     INR 1.18 (*)     All other components within normal limits    Narrative:     Indicate which anticoagulants the patient is on:->UNKNOWN   ESTIMATED GFR - Abnormal; Notable for the following:     GFR If  50 (*)     GFR If Non  41 (*)     All other components within normal limits    Narrative:     Indicate which anticoagulants the patient is on:->UNKNOWN   APTT    Narrative:      Indicate which anticoagulants the patient is on:->UNKNOWN       All labs reviewed by me.    RADIOLOGY  CT-HIP W/O PLUS RECONS LEFT   Final Result      Acute significantly displaced intertrochanteric fracture of the left femur.      DX-FEMUR-2+ LEFT   Final Result            Acute significantly displaced intertrochanteric fracture of the left femur.        The radiologist's interpretation of all radiological studies have been reviewed by me.    COURSE & MEDICAL DECISION MAKING  Nursing notes, VS, PMSFHx reviewed in chart.    8:11 PM Patient seen and examined at bedside.  Patient states she is comfortable after IV medications provided by prehospital. Ordered for CT, labs to evaluate her symptoms.     9:48 PM  Patient reevaluated she is having some return of pain after her imaging.  Updated patient and family on results, additional pain medication ordered    Orthopedics as well as hospitalist for admission    Discussed case with Dr. Parr for admission    Discussed case with Dr. Alvarez from orthopedics.    Decision Making:  This is a 86 y.o. female presenting after mechanical ground-level fall.  She does have a left-sided hip fracture and will be admitted for further care.  Patient does have multiple comorbidities as well including atrial fibrillation CHF and is on Eliquis for anticoagulation.  As such she is admitted to the hospitalist service with orthopedics consultation for hip fracture.  Patient is anemic although this appears to be baseline for her I do not suspect any other acute traumatic pathology, intra-abdominal bleed or similar    Patient is admitted in guarded condition    FINAL IMPRESSION  1. Closed fracture of left hip, initial encounter (HCC)    2. Chronic anticoagulation    3. Atrial fibrillation, unspecified type (HCC)    4. Anemia, unspecified type          The note accurately reflects work and decisions made by me.  Keyur Cano  12/28/2018  10:14 PM         58 56.6

## 2018-12-29 NOTE — CARE PLAN
Problem: Infection  Goal: Will remain free from infection  Outcome: PROGRESSING AS EXPECTED  No s/s of infection. Hands are washed before and after entering the room. Prior to IV access the hub is scrubbed for 15 secs and allowed to dry.     Problem: Venous Thromboembolism (VTW)/Deep Vein Thrombosis (DVT) Prevention:  Goal: Patient will participate in Venous Thrombosis (VTE)/Deep Vein Thrombosis (DVT)Prevention Measures  Outcome: PROGRESSING AS EXPECTED  No s/s of DVT. Pt has SCDs on. Pt is not on anticoagulation medication because of need for surgery.

## 2018-12-29 NOTE — OR NURSING
Pt on 10 L oxy-mask per ERAS order, 6 hours complete at 1930.  No c/o nausea, tolerating PO fluids/juice and medication.  Pain tolerable per pt, ice packs on left hip.  Left hip dressings CDI.  VSS, afebrile, YUSUF, A/O x4.  2+ bilateral pedal pulses.     No belongings in PACU.

## 2018-12-29 NOTE — PROGRESS NOTES
Pt and family spoke to MD regarding being bloodless and surgery plan. Pt and family reconsidered and accepted blood products. Verbal order from MD with readback to infuse 1 unit of RBC stat as pt has become symptomatic. Informed consent has been signed by pts granddaughter, her POA. Consent faxed to blood bank. Original consent in chart. Blood bank called to cross match blood.  MD ordered PRN Tylenol and PRN Benadryl.

## 2018-12-29 NOTE — ASSESSMENT & PLAN NOTE
Discussed with patient and family by previous provider, however patient has been DNR in past. Have asked Palliative care to speak with patient to complete POLST form. Patient is currently A&O

## 2018-12-29 NOTE — CONSULTS
12/29/2018    Andree Egan is a 86 y.o. female who presents after a fall with a Left hip fracture and is here for operative management. Patient denies numbness, parasthesias, loss of concousness or other trauma    Past Medical History:   Diagnosis Date   • Arthritis     back/neck   • Atrial fibrillation (CMS-HCC) [I48.91] 3/29/2018   • Back pain    • Blood clotting disorder (HCC)    • Breast cancer (HCC)    • Breath shortness     water retention   • Cancer (HCC)     breast   • Cataract    • Congestive heart failure (CHF) ([facility]) 4/3/2018   • Constipation    • Coronary heart disease    • Daytime sleepiness    • Diarrhea    • BERTRAND (dyspnea on exertion) 5/4/2018   • GERD (gastroesophageal reflux disease)    • Heart attack ([facility])    • Heart burn    • Heart valve disease    • Heartburn    • Hemorrhagic disorder ([facility])    • Hiatus hernia syndrome    • Hyperlipidemia    • Hypertension    • Hypothyroidism    • Kidney disease    • Long term current use of anticoagulant 3/29/2018   • Nasal drainage    • Osteoporosis    • Pacemaker 4/3/2018   • Pneumonia    • Pulmonary emphysema ([facility])    • Restless leg syndrome    • Rheumatoid arthritis ([facility])    • Ringing in ears    • Secondary hypertension 4/3/2018   • Shortness of breath    • Swelling of lower extremity    • Thyroid disease    • Tonsillitis    • Tremors of nervous system 4/3/2018   • Urinary incontinence    • Urinary tract infection    • Wears glasses        Past Surgical History:   Procedure Laterality Date   • HYSTERECTOMY LAPAROSCOPY     • MASTECTOMY     • OTHER      left breast mastectomy   • OTHER CARDIAC SURGERY      pacemaker placement   • PACEMAKER INSERTION     • PB REMV 2ND CATARACT,CORN-SCLER SECTN         Medications  No current facility-administered medications on file prior to encounter.      Current Outpatient Prescriptions on File Prior to Encounter   Medication Sig Dispense Refill   • albuterol (PROAIR HFA) 108 (90 Base) MCG/ACT Aero Soln inhalation aerosol  Inhale 2 Puffs by mouth every 6 hours as needed for Shortness of Breath.     • Fluticasone-Umeclidin-Vilant (TRELEGY ELLIPTA) 100-62.5-25 MCG/INH AEROSOL POWDER, BREATH ACTIVATED Inhale 1 Puff by mouth every day. 1 Each 11   • potassium chloride SA (KDUR) 20 MEQ Tab CR Take 1 Tab by mouth 2 times a day. 60 Tab 11   • bumetanide (BUMEX) 1 MG Tab Take 1 mg by mouth 2 Times a Day.     • omeprazole (PRILOSEC) 20 MG delayed-release capsule Take 1 Cap by mouth 2 times a day. 30 Cap 2   • sertraline (ZOLOFT) 50 MG Tab Take 1 Tab by mouth every bedtime. 30 Tab 11   • metoprolol SR (TOPROL XL) 25 MG TABLET SR 24 HR Take 1 Tab by mouth every morning. 30 Tab 11   • metOLazone (ZAROXOLYN) 2.5 MG Tab Take 1 Tab by mouth every day. 30 Tab 11   • apixaban (ELIQUIS) 2.5mg Tab Take 1 Tab by mouth 2 Times a Day. 60 Tab 5   • acetaminophen (TYLENOL) 500 MG Tab Take 2 Tabs by mouth 1 time daily as needed for Mild Pain. 30 Tab 0   • calcium carbonate (TUMS) 500 MG Chew Tab Take 1 Tab by mouth every four hours as needed (heartburn). 30 Tab 0   • gabapentin (NEURONTIN) 300 MG Cap Take 2 Caps by mouth every evening. 90 Cap 1   • topiramate (TOPAMAX) 25 MG Tab Take 1 Tab by mouth 2 times a day. 60 Tab 3   • rosuvastatin (CRESTOR) 10 MG Tab Take 1 Tab by mouth every evening. 30 Tab 11   • allopurinol (ZYLOPRIM) 100 MG Tab Take 1 Tab by mouth 2 times a day. 30 Tab 1   • cyanocobalamin (VITAMIN B12) 1000 MCG Tab Take 1 Tab by mouth every day. 30 Tab 3   • Calcium-Vitamin D 500-125 MG-UNIT Tab Take 1 tablet by mouth every morning. 600mg - 400 iu 30 Tab 1   • clopidogrel (PLAVIX) 75 MG Tab Take 1 Tab by mouth every day. 90 Tab 3   • levothyroxine (SYNTHROID) 50 MCG Tab Take 1 Tab by mouth Every morning on an empty stomach. 90 Tab 3   • ascorbic acid (VITAMIN C) 500 MG tablet Take 1 Tab by mouth every day. 30 Tab 3       Allergies  Codeine    ROS  Left hip pain. All other systems were reviewed and found to be negative    Family History   Problem  "Relation Age of Onset   • Lung Disease Mother    • Cancer Mother    • Heart Disease Mother    • No Known Problems Father    • Diabetes Brother    • Fainting Neg Hx        Social History     Social History   • Marital status: Single     Spouse name: N/A   • Number of children: N/A   • Years of education: N/A     Social History Main Topics   • Smoking status: Former Smoker     Packs/day: 0.50     Types: Cigarettes     Quit date: 1/1/1970   • Smokeless tobacco: Never Used   • Alcohol use 1.8 oz/week     3 Glasses of wine per week      Comment: 1-2 glasses wine before dinner   • Drug use: No   • Sexual activity: Not on file     Other Topics Concern   • Not on file     Social History Narrative   • No narrative on file       Physical Exam  Vitals  Blood pressure 126/68, pulse 65, temperature 37.3 °C (99.1 °F), temperature source Temporal, resp. rate 16, height 1.6 m (5' 3\"), weight 55.8 kg (123 lb), SpO2 99 %, not currently breastfeeding.  General: Well Developed, Well Nourished, no acute distress  HEENT: Normocephalic, atraumatic  Eyes: Anicteric, PERRLA, EOMI  Neck: Supple, nontender, no masses  Lungs: CTA, no wheezes or crackles  Heart: RRR, no murmurs, rubs or gallops  Abdomen: Soft, NT, ND  Pelvis: Stable to AP and Lateral Compression  Skin: Intact, no open wounds  Extremities: Left hip pain and deformity  Neuro: NVI  Vascular: 2+DP/PT, Capillary refill <2 seconds    Radiographs:  CT-HIP W/O PLUS RECONS LEFT   Final Result      Acute significantly displaced intertrochanteric fracture of the left femur.      DX-FEMUR-2+ LEFT   Final Result            Acute significantly displaced intertrochanteric fracture of the left femur.      DX-HIP-UNILATERAL-W/O PELVIS-2/3 VIEWS LEFT    (Results Pending)   DX-PORTABLE FLUORO > 1 HOUR    (Results Pending)       Laboratory Values  Recent Labs      12/28/18 2017 12/29/18   0501   WBC  3.8*  3.7*   RBC  2.91*  2.53*   HEMOGLOBIN  8.7*  7.6*   HEMATOCRIT  28.7*  25.0*   MCV  98.6* "  98.8*   MCH  29.9  30.0   MCHC  30.3*  30.4*   RDW  62.4*  62.7*   PLATELETCT  117*  107*   MPV  11.3  11.0     Recent Labs      12/27/18   1525  12/28/18 2017 12/29/18   0501   SODIUM  137  142  138   POTASSIUM  4.4  3.8  3.7   CHLORIDE  102  106  107   CO2  27  25  25   GLUCOSE  106*  95  105*   BUN  39*  38*  34*     Recent Labs      12/28/18 2017   APTT  34.1   INR  1.18*         Impression: Left intertrochanteric femur fracture    Plan:Operative intervention. Risks and benefits of surgery were discussed which include but are not limited to bleeding, infection, neurovascular damage, malunion, nonunion, instability, limb length discrepancy, DVT, PE, MI, Stroke and death. They understand these risks and wish to proceed.

## 2018-12-29 NOTE — ASSESSMENT & PLAN NOTE
Not in acute exacerbation   On metoprolol, rosuvastatin   Recieved 2 units transfusion  Does not appear to have fluid overload  Monitor

## 2018-12-29 NOTE — PROGRESS NOTES
Pt came to floor at 2300. Pt was made comfortable in bed. Vitals and assessment was done. Pt was taught about floor policy and procedure.

## 2018-12-29 NOTE — PROGRESS NOTES
Hospital Medicine Daily Progress Note    Date of Service  12/29/2018    Chief Complaint  Left hip pain after a fall    Hospital Course      86 y.o. female with past medical history of atrial fibrillation on anticoagulation, hypothyroidism, pulmonary hypertension Estimated right ventricular systolic pressure  is 55 mmHg, coronary artery disease, gastroesophageal reflux disease admitted 12/28/2018 with left hip pain and swelling after a ground-level fall.  Orthopedics consulted, found to have marked anemia with a hemoglobin of 7.6 transfused 1 unit of blood as the patient was symptomatic and she had a plan for surgery that day. Has surgery on 12/29/2018 **        Interval Problem Update  Heart rate 53-74, blood pressure within normal limits  Saturating well on 1-2 L of oxygen  Hemoglobin 7.6, platelets 107  Potassium 3.7, sodium 138, creatinine 1.18  Symptomatic anemia requiring blood transfusion  Initially patient was refusing transfusion, had a prolonged discussion with patient and family members.  Eventually all parties agreed on blood transfusion.  Transfusing 1 unit of blood.  Watch for reactions, Benadryl, acetaminophen ordered as needed.  Monitor volume status closely, patient has slightly reduced ejection fraction, and pulmonary hypertension  Continue to monitor hemoglobin and transfuse as needed for hemoglobin below 7 or higher if patient is symptomatic.   Plan for surgery today  Will be evaluated by physical and occupational therapy after surgery    Consultants/Specialty  Orthopedics    Code Status  Full    Disposition  To be determined    Review of Systems  Review of Systems   Constitutional: Positive for malaise/fatigue. Negative for chills and fever.   HENT: Negative for congestion and sore throat.    Eyes: Negative for pain, discharge and redness.   Respiratory: Positive for shortness of breath. Negative for cough, hemoptysis, sputum production and stridor.    Cardiovascular: Negative for chest pain,  palpitations and leg swelling.   Gastrointestinal: Negative for abdominal pain, blood in stool, diarrhea and vomiting.   Genitourinary: Negative for flank pain, hematuria and urgency.   Musculoskeletal: Negative for myalgias.        Pain left hip   Skin:        Pale    Neurological: Positive for weakness. Negative for speech change, focal weakness, seizures and loss of consciousness.        Left hip movement limited secondary to pain   Endo/Heme/Allergies: Does not bruise/bleed easily.   Psychiatric/Behavioral: Negative for hallucinations and suicidal ideas. The patient is not nervous/anxious.         Physical Exam  Temp:  [36.6 °C (97.8 °F)-37.6 °C (99.7 °F)] 36.7 °C (98 °F)  Pulse:  [53-88] 65  Resp:  [12-20] 18  BP: (102-145)/(44-68) 121/60    Physical Exam   Constitutional: She is oriented to person, place, and time. She appears well-developed and well-nourished. No distress.   HENT:   Head: Normocephalic and atraumatic.   Right Ear: External ear normal.   Left Ear: External ear normal.   Eyes: Pupils are equal, round, and reactive to light. Conjunctivae are normal. Right eye exhibits no discharge. Left eye exhibits no discharge.   Neck: Normal range of motion. Neck supple. No JVD present. No tracheal deviation present. No thyromegaly present.   Cardiovascular: Normal rate.  Exam reveals no gallop and no friction rub.    Murmur (Systolic) heard.  Pulmonary/Chest: Effort normal and breath sounds normal. No stridor. No respiratory distress. She has no wheezes. She has no rales. She exhibits no tenderness.   Faint bilateral basal crackles   Abdominal: Soft. Bowel sounds are normal. She exhibits no distension. There is no tenderness. There is no rebound and no guarding.   Musculoskeletal: Normal range of motion. She exhibits tenderness (Left hip). She exhibits no edema or deformity.   Neurological: She is alert and oriented to person, place, and time. No cranial nerve deficit. Coordination normal.   Skin: Skin is  warm and dry. No rash noted. She is not diaphoretic. No erythema. There is pallor.   Psychiatric: She has a normal mood and affect. Judgment normal.   Nursing note and vitals reviewed.      Fluids    Intake/Output Summary (Last 24 hours) at 12/29/18 1508  Last data filed at 12/29/18 1400   Gross per 24 hour   Intake              555 ml   Output               30 ml   Net              525 ml       Laboratory  Recent Labs      12/28/18 2017 12/29/18   0501   WBC  3.8*  3.7*   RBC  2.91*  2.53*   HEMOGLOBIN  8.7*  7.6*   HEMATOCRIT  28.7*  25.0*   MCV  98.6*  98.8*   MCH  29.9  30.0   MCHC  30.3*  30.4*   RDW  62.4*  62.7*   PLATELETCT  117*  107*   MPV  11.3  11.0     Recent Labs      12/27/18   1525  12/28/18 2017 12/29/18   0501   SODIUM  137  142  138   POTASSIUM  4.4  3.8  3.7   CHLORIDE  102  106  107   CO2  27  25  25   GLUCOSE  106*  95  105*   BUN  39*  38*  34*   CREATININE  1.38  1.24  1.18   CALCIUM  9.3  8.2*  8.2*     Recent Labs      12/28/18 2017   APTT  34.1   INR  1.18*               Imaging  DX-HIP-UNILATERAL-W/O PELVIS-2/3 VIEWS LEFT   Final Result      Intraoperative fluoroscopic spot images as described above.      DX-PORTABLE FLUORO > 1 HOUR   Final Result      Intraoperative fluoroscopic spot images as described above.      CT-HIP W/O PLUS RECONS LEFT   Final Result      Acute significantly displaced intertrochanteric fracture of the left femur.      DX-FEMUR-2+ LEFT   Final Result            Acute significantly displaced intertrochanteric fracture of the left femur.           Assessment/Plan  * Femur fracture (HCC)   Assessment & Plan    Ortho consulted, plan for surgery 12/29/2018   Pain control  PT OT evaluation will likely need placement      Anemia- (present on admission)   Assessment & Plan    Symptomatic anemia requiring blood transfusion  Initially patient was refusing transfusion, had a prolonged discussion with patient and family members.  Eventually all parties agreed on blood  transfusion.  Continue to monitor hemoglobin and transfuse as needed for hemoglobin below 7 or higher if patient is symptomatic.       Pancytopenia (HCC)- (present on admission)   Assessment & Plan    Chronic per reccords   B12 WNL   Cont to monitor    Transfused 1 RBC 12/29/2018 for symptomatic anemia     COPD (chronic obstructive pulmonary disease) (HCC)- (present on admission)   Assessment & Plan    Not in acute exacerbation   Respiratory protocol, oxygen as needed     Chronic diastolic CHF (congestive heart failure) ([facility])- (present on admission)   Assessment & Plan    Not in acute exacerbation   On metoprolol, bumetanide, rosuvastatin   Getting 1 units transfusion, monitor volume status closely     Chronic atrial fibrillation (HCC)- (present on admission)   Assessment & Plan    Metoprolol   Holding Eliquis for surgery, resume when okay with orthopedics      Full code status   Assessment & Plan    Discussed with patient and family      Weakness- (present on admission)   Assessment & Plan    Diffuse weakness, needs pt/ot eval      Hypothyroidism- (present on admission)   Assessment & Plan    Resume levo      GERD (gastroesophageal reflux disease)- (present on admission)   Assessment & Plan    Omperazole          VTE prophylaxis: SCDs, Eliquis after Ortho clearance

## 2018-12-29 NOTE — ED NOTES
Med rec complete per pt at bedside with family   Allergies reviewed  Pt finished a course of ABX for UTI about a month ago

## 2018-12-29 NOTE — PROGRESS NOTES
Pt arrived from PACU. Pt on 10L oxy mask. Pt states feeling tired, no pain. Ice pack to L hip. 3 incision sites, clean dry and intact. Report received from PACU nurse prior to pt arrival.

## 2018-12-29 NOTE — H&P
Hospital Medicine History & Physical Note    Date of Service  12/28/2018    Primary Care Physician  ELIZABETH Patel    Consultants  ortho    Code Status  full    Chief Complaint  Hip pain, fall    History of Presenting Illness  86 y.o. female who presented 12/28/2018 with left hip pain and swelling.  This patient was walking down her driveway with her walker tripped into the garden and landed on her left hip.  This was witnessed by her daughter.  She was unable to bear weight after the event.  She had difficulty walking and standing.  She has had multiple falls over the last year similar.  She denies any loss of consciousness or head trauma.  She is on Eliquis for atrial fibrillation.  She has slight numbness and tingling.  She has no other symptoms at this time.  Improved pain with IV pain medication in the emergency department.  Will be admitted with hip fracture and orthopedic surgery consultation.    Review of Systems  Review of Systems   Constitutional: Positive for malaise/fatigue. Negative for chills and fever.   HENT: Negative for congestion, hearing loss and tinnitus.    Eyes: Negative for blurred vision, double vision and discharge.   Respiratory: Negative for cough, hemoptysis and shortness of breath.    Cardiovascular: Negative for chest pain, palpitations and leg swelling.   Gastrointestinal: Negative for abdominal pain, heartburn, nausea and vomiting.   Genitourinary: Negative for dysuria and flank pain.   Musculoskeletal: Positive for falls and joint pain. Negative for myalgias.   Skin: Negative for rash.   Neurological: Positive for weakness. Negative for dizziness, sensory change, speech change and focal weakness.   Endo/Heme/Allergies: Negative for environmental allergies. Does not bruise/bleed easily.   Psychiatric/Behavioral: Negative for depression, hallucinations and substance abuse.       Past Medical History   has a past medical history of Arthritis; Atrial fibrillation ([facility])  [I48.91] (3/29/2018); Back pain; Blood clotting disorder ([facility]); Breast cancer ([facility]); Breath shortness; Cancer ([facility]); Cataract; Congestive heart failure (CHF) ([facility]) (4/3/2018); Constipation; Coronary heart disease; Daytime sleepiness; Diarrhea; BERTRAND (dyspnea on exertion) (5/4/2018); GERD (gastroesophageal reflux disease); Heart attack ([facility]); Heart burn; Heart valve disease; Heartburn; Hemorrhagic disorder ([facility]); Hiatus hernia syndrome; Hyperlipidemia; Hypertension; Hypothyroidism; Kidney disease; Long term current use of anticoagulant (3/29/2018); Nasal drainage; Osteoporosis; Pacemaker (4/3/2018); Pneumonia; Pulmonary emphysema ([facility]); Restless leg syndrome; Rheumatoid arthritis ([facility]); Ringing in ears; Secondary hypertension (4/3/2018); Shortness of breath; Swelling of lower extremity; Thyroid disease; Tonsillitis; Tremors of nervous system (4/3/2018); Urinary incontinence; Urinary tract infection; and Wears glasses.    Surgical History   has a past surgical history that includes other; other cardiac surgery; pr remv 2nd cataract,corn-scler sectn; hysterectomy laparoscopy; pacemaker insertion; and mastectomy.     Family History  family history includes Cancer in her mother; Diabetes in her brother; Heart Disease in her mother; Lung Disease in her mother; No Known Problems in her father.     Social History   reports that she quit smoking about 49 years ago. Her smoking use included Cigarettes. She smoked 0.50 packs per day. She has never used smokeless tobacco. She reports that she drinks about 1.8 oz of alcohol per week . She reports that she does not use drugs.    Allergies  Allergies   Allergen Reactions   • Codeine Vomiting   • Bloodless Unspecified     unknown       Medications  Prior to Admission Medications   Prescriptions Last Dose Informant Patient Reported? Taking?   Calcium-Vitamin D 500-125 MG-UNIT Tab   No No   Sig: Take 1 tablet by mouth every morning. 600mg - 400 iu   Fluticasone-Umeclidin-Vilant  (TRELEGY ELLIPTA) 100-62.5-25 MCG/INH AEROSOL POWDER, BREATH ACTIVATED   No No   Sig: Inhale 1 Puff by mouth every day.   acetaminophen (TYLENOL) 500 MG Tab   No No   Sig: Take 2 Tabs by mouth 1 time daily as needed for Mild Pain.   albuterol (PROAIR HFA) 108 (90 Base) MCG/ACT Aero Soln inhalation aerosol   Yes No   Sig: Inhale 2 Puffs by mouth every 6 hours as needed for Shortness of Breath.   allopurinol (ZYLOPRIM) 100 MG Tab   No No   Sig: Take 1 Tab by mouth 2 times a day.   apixaban (ELIQUIS) 2.5mg Tab   No No   Sig: Take 1 Tab by mouth 2 Times a Day.   ascorbic acid (VITAMIN C) 500 MG tablet   No No   Sig: Take 1 Tab by mouth every day.   bumetanide (BUMEX) 1 MG Tab   Yes No   Sig: Take 1 mg by mouth 2 Times a Day.   calcium carbonate (TUMS) 500 MG Chew Tab   No No   Sig: Take 1 Tab by mouth every four hours as needed (heartburn).   clopidogrel (PLAVIX) 75 MG Tab   No No   Sig: Take 1 Tab by mouth every day.   cyanocobalamin (VITAMIN B12) 1000 MCG Tab   No No   Sig: Take 1 Tab by mouth every day.   gabapentin (NEURONTIN) 300 MG Cap   No No   Sig: Take 2 Caps by mouth every evening.   levothyroxine (SYNTHROID) 50 MCG Tab   No No   Sig: Take 1 Tab by mouth Every morning on an empty stomach.   metOLazone (ZAROXOLYN) 2.5 MG Tab   No No   Sig: Take 1 Tab by mouth every day.   metoprolol SR (TOPROL XL) 25 MG TABLET SR 24 HR   No No   Sig: Take 1 Tab by mouth every morning.   omeprazole (PRILOSEC) 20 MG delayed-release capsule   No No   Sig: Take 1 Cap by mouth 2 times a day.   potassium chloride SA (KDUR) 20 MEQ Tab CR   No No   Sig: Take 1 Tab by mouth 2 times a day.   rosuvastatin (CRESTOR) 10 MG Tab   No No   Sig: Take 1 Tab by mouth every evening.   sertraline (ZOLOFT) 50 MG Tab   No No   Sig: Take 1 Tab by mouth every bedtime.   topiramate (TOPAMAX) 25 MG Tab   No No   Sig: Take 1 Tab by mouth 2 times a day.      Facility-Administered Medications: None       Physical Exam  Temp:  [37 °C (98.6 °F)] 37 °C (98.6  °F)  Pulse:  [60-77] 60  Resp:  [18] 18  BP: (104)/(60) 104/60    Physical Exam   Constitutional: She is oriented to person, place, and time. She appears well-developed and well-nourished. She appears distressed.   HENT:   Head: Normocephalic and atraumatic.   Eyes: Pupils are equal, round, and reactive to light. Conjunctivae and EOM are normal.   Neck: Normal range of motion. Neck supple. No JVD present.   Cardiovascular: Normal rate, regular rhythm and intact distal pulses.    Murmur heard.  Pulmonary/Chest: Effort normal and breath sounds normal. No respiratory distress. She has no wheezes.   Abdominal: Soft. Bowel sounds are normal. She exhibits no distension. There is no tenderness.   Musculoskeletal: Normal range of motion. She exhibits edema.   Left hip TTP   Neurological: She is alert and oriented to person, place, and time. She exhibits normal muscle tone.   Skin: Skin is warm and dry.   Psychiatric: She has a normal mood and affect. Her behavior is normal. Judgment and thought content normal.   Nursing note and vitals reviewed.      Laboratory:  Recent Labs      12/28/18 2017   WBC  3.8*   RBC  2.91*   HEMOGLOBIN  8.7*   HEMATOCRIT  28.7*   MCV  98.6*   MCH  29.9   MCHC  30.3*   RDW  62.4*   PLATELETCT  117*   MPV  11.3     Recent Labs      12/27/18   1525  12/28/18 2017   SODIUM  137  142   POTASSIUM  4.4  3.8   CHLORIDE  102  106   CO2  27  25   GLUCOSE  106*  95   BUN  39*  38*   CREATININE  1.38  1.24   CALCIUM  9.3  8.2*     Recent Labs      12/27/18   1525  12/28/18 2017   ALTSGPT   --   18   ASTSGOT   --   24   ALKPHOSPHAT   --   132*   TBILIRUBIN   --   0.6   GLUCOSE  106*  95     Recent Labs      12/28/18 2017   APTT  34.1   INR  1.18*             No results for input(s): TROPONINI in the last 72 hours.    Urinalysis:    No results found     Imaging:  CT-HIP W/O PLUS RECONS LEFT   Final Result      Acute significantly displaced intertrochanteric fracture of the left femur.      DX-FEMUR-2+  LEFT   Final Result            Acute significantly displaced intertrochanteric fracture of the left femur.            Assessment/Plan:  I anticipate this patient will require at least two midnights for appropriate medical management, necessitating inpatient admission.    * Femur fracture (HCC)   Assessment & Plan    Due to mechanical fall   Continue with IV pain control   Pt/ot eval when able   Ortho consulted, NPO after midnight  On eliquis has been held        Full code status   Assessment & Plan    Discussed with patient and family      Weakness- (present on admission)   Assessment & Plan    Diffuse weakness, needs pt/ot eval     Pancytopenia (HCC)- (present on admission)   Assessment & Plan    Chronic per reccords   Checking b12 level   Cont to monitor      Hypothyroidism- (present on admission)   Assessment & Plan    Resume levo     GERD (gastroesophageal reflux disease)- (present on admission)   Assessment & Plan    Resume omperazole     COPD (chronic obstructive pulmonary disease) ([facility])- (present on admission)   Assessment & Plan    Not in acute exacerbation   resp care protocol ordered     Chronic diastolic CHF (congestive heart failure) ([facility])- (present on admission)   Assessment & Plan    Not in acute exacerbation   On BB, and Resume home diuretics for now        Chronic atrial fibrillation (HCC)- (present on admission)   Assessment & Plan    Resume BB   Hold home eliquis          VTE prophylaxis: scd

## 2018-12-29 NOTE — ASSESSMENT & PLAN NOTE
Symptomatic anemia requiring blood transfusion  Initially patient was refusing transfusion, provider had a prolonged discussion with patient and family members.  Eventually all parties agreed on blood transfusion.    Received 2 units of blood transfusion  Metoprolol, Eliquis, initially held for surgery, then restarted, now held for 48 hours due to drainage from surgical site   Continue to monitor hemoglobin and transfuse as needed for hemoglobin below 7 or higher if patient is symptomatic.     Plavix held for surgery, and anemia, consider restarting when okay with surgery   Hemoglobin low stable 7. 3.  No obvious bleeding

## 2018-12-30 PROBLEM — N17.9 ACUTE KIDNEY INJURY (HCC): Status: ACTIVE | Noted: 2018-12-30

## 2018-12-30 LAB
ANION GAP SERPL CALC-SCNC: 7 MMOL/L (ref 0–11.9)
BASOPHILS # BLD AUTO: 0.5 % (ref 0–1.8)
BASOPHILS # BLD: 0.02 K/UL (ref 0–0.12)
BUN SERPL-MCNC: 39 MG/DL (ref 8–22)
CALCIUM SERPL-MCNC: 8 MG/DL (ref 8.5–10.5)
CHLORIDE SERPL-SCNC: 109 MMOL/L (ref 96–112)
CO2 SERPL-SCNC: 26 MMOL/L (ref 20–33)
CREAT SERPL-MCNC: 1.56 MG/DL (ref 0.5–1.4)
EOSINOPHIL # BLD AUTO: 0 K/UL (ref 0–0.51)
EOSINOPHIL NFR BLD: 0 % (ref 0–6.9)
ERYTHROCYTE [DISTWIDTH] IN BLOOD BY AUTOMATED COUNT: 63.1 FL (ref 35.9–50)
GLUCOSE SERPL-MCNC: 112 MG/DL (ref 65–99)
HCT VFR BLD AUTO: 21.4 % (ref 37–47)
HCT VFR BLD AUTO: 25.8 % (ref 37–47)
HGB BLD-MCNC: 6.5 G/DL (ref 12–16)
HGB BLD-MCNC: 8 G/DL (ref 12–16)
IMM GRANULOCYTES # BLD AUTO: 0.02 K/UL (ref 0–0.11)
IMM GRANULOCYTES NFR BLD AUTO: 0.5 % (ref 0–0.9)
LYMPHOCYTES # BLD AUTO: 0.62 K/UL (ref 1–4.8)
LYMPHOCYTES NFR BLD: 15.6 % (ref 22–41)
MCH RBC QN AUTO: 30.2 PG (ref 27–33)
MCHC RBC AUTO-ENTMCNC: 30.4 G/DL (ref 33.6–35)
MCV RBC AUTO: 99.5 FL (ref 81.4–97.8)
MONOCYTES # BLD AUTO: 0.53 K/UL (ref 0–0.85)
MONOCYTES NFR BLD AUTO: 13.4 % (ref 0–13.4)
NEUTROPHILS # BLD AUTO: 2.78 K/UL (ref 2–7.15)
NEUTROPHILS NFR BLD: 70 % (ref 44–72)
NRBC # BLD AUTO: 0 K/UL
NRBC BLD-RTO: 0 /100 WBC
PLATELET # BLD AUTO: 97 K/UL (ref 164–446)
PMV BLD AUTO: 11.5 FL (ref 9–12.9)
POTASSIUM SERPL-SCNC: 3.9 MMOL/L (ref 3.6–5.5)
RBC # BLD AUTO: 2.15 M/UL (ref 4.2–5.4)
SODIUM SERPL-SCNC: 142 MMOL/L (ref 135–145)
WBC # BLD AUTO: 4 K/UL (ref 4.8–10.8)

## 2018-12-30 PROCEDURE — A9270 NON-COVERED ITEM OR SERVICE: HCPCS | Performed by: ORTHOPAEDIC SURGERY

## 2018-12-30 PROCEDURE — P9016 RBC LEUKOCYTES REDUCED: HCPCS

## 2018-12-30 PROCEDURE — A9270 NON-COVERED ITEM OR SERVICE: HCPCS | Performed by: HOSPITALIST

## 2018-12-30 PROCEDURE — 700102 HCHG RX REV CODE 250 W/ 637 OVERRIDE(OP): Performed by: ORTHOPAEDIC SURGERY

## 2018-12-30 PROCEDURE — 97163 PT EVAL HIGH COMPLEX 45 MIN: CPT

## 2018-12-30 PROCEDURE — G8987 SELF CARE CURRENT STATUS: HCPCS | Mod: CK

## 2018-12-30 PROCEDURE — 36415 COLL VENOUS BLD VENIPUNCTURE: CPT

## 2018-12-30 PROCEDURE — 85018 HEMOGLOBIN: CPT

## 2018-12-30 PROCEDURE — G8978 MOBILITY CURRENT STATUS: HCPCS | Mod: CL

## 2018-12-30 PROCEDURE — G8988 SELF CARE GOAL STATUS: HCPCS | Mod: CJ

## 2018-12-30 PROCEDURE — 700111 HCHG RX REV CODE 636 W/ 250 OVERRIDE (IP): Performed by: ORTHOPAEDIC SURGERY

## 2018-12-30 PROCEDURE — 770006 HCHG ROOM/CARE - MED/SURG/GYN SEMI*

## 2018-12-30 PROCEDURE — 80048 BASIC METABOLIC PNL TOTAL CA: CPT

## 2018-12-30 PROCEDURE — 30233N1 TRANSFUSION OF NONAUTOLOGOUS RED BLOOD CELLS INTO PERIPHERAL VEIN, PERCUTANEOUS APPROACH: ICD-10-PCS | Performed by: HOSPITALIST

## 2018-12-30 PROCEDURE — 85025 COMPLETE CBC W/AUTO DIFF WBC: CPT

## 2018-12-30 PROCEDURE — 700102 HCHG RX REV CODE 250 W/ 637 OVERRIDE(OP): Performed by: HOSPITALIST

## 2018-12-30 PROCEDURE — 51798 US URINE CAPACITY MEASURE: CPT

## 2018-12-30 PROCEDURE — 36430 TRANSFUSION BLD/BLD COMPNT: CPT

## 2018-12-30 PROCEDURE — 86923 COMPATIBILITY TEST ELECTRIC: CPT

## 2018-12-30 PROCEDURE — G8979 MOBILITY GOAL STATUS: HCPCS | Mod: CJ

## 2018-12-30 PROCEDURE — 85014 HEMATOCRIT: CPT

## 2018-12-30 PROCEDURE — 700105 HCHG RX REV CODE 258

## 2018-12-30 PROCEDURE — 99233 SBSQ HOSP IP/OBS HIGH 50: CPT | Performed by: HOSPITALIST

## 2018-12-30 PROCEDURE — 97165 OT EVAL LOW COMPLEX 30 MIN: CPT

## 2018-12-30 RX ORDER — SODIUM CHLORIDE 9 MG/ML
INJECTION, SOLUTION INTRAVENOUS
Status: COMPLETED
Start: 2018-12-30 | End: 2018-12-30

## 2018-12-30 RX ADMIN — SODIUM CHLORIDE: 9 INJECTION, SOLUTION INTRAVENOUS at 06:15

## 2018-12-30 RX ADMIN — ACETAMINOPHEN 650 MG: 325 TABLET, FILM COATED ORAL at 06:41

## 2018-12-30 RX ADMIN — ALLOPURINOL 100 MG: 100 TABLET ORAL at 06:42

## 2018-12-30 RX ADMIN — CEFAZOLIN SODIUM 2 G: 2 INJECTION, SOLUTION INTRAVENOUS at 02:04

## 2018-12-30 RX ADMIN — TOPIRAMATE 25 MG: 25 TABLET, FILM COATED ORAL at 17:19

## 2018-12-30 RX ADMIN — OXYCODONE HYDROCHLORIDE 5 MG: 5 TABLET ORAL at 14:15

## 2018-12-30 RX ADMIN — TOPIRAMATE 25 MG: 25 TABLET, FILM COATED ORAL at 06:42

## 2018-12-30 RX ADMIN — OMEPRAZOLE 20 MG: 20 CAPSULE, DELAYED RELEASE ORAL at 06:41

## 2018-12-30 RX ADMIN — ROPINIROLE HYDROCHLORIDE 0.25 MG: 0.5 TABLET, FILM COATED ORAL at 17:14

## 2018-12-30 RX ADMIN — ACETAMINOPHEN 650 MG: 325 TABLET, FILM COATED ORAL at 02:05

## 2018-12-30 RX ADMIN — ACETAMINOPHEN 650 MG: 325 TABLET, FILM COATED ORAL at 17:14

## 2018-12-30 RX ADMIN — ROPINIROLE HYDROCHLORIDE 0.25 MG: 0.5 TABLET, FILM COATED ORAL at 12:41

## 2018-12-30 RX ADMIN — LEVOTHYROXINE SODIUM 50 MCG: 50 TABLET ORAL at 06:41

## 2018-12-30 RX ADMIN — ROPINIROLE HYDROCHLORIDE 0.25 MG: 0.5 TABLET, FILM COATED ORAL at 06:45

## 2018-12-30 RX ADMIN — SERTRALINE 50 MG: 50 TABLET, FILM COATED ORAL at 21:47

## 2018-12-30 RX ADMIN — ENOXAPARIN SODIUM 40 MG: 100 INJECTION SUBCUTANEOUS at 02:05

## 2018-12-30 RX ADMIN — ROSUVASTATIN CALCIUM 10 MG: 10 TABLET, FILM COATED ORAL at 17:15

## 2018-12-30 RX ADMIN — ACETAMINOPHEN 650 MG: 325 TABLET, FILM COATED ORAL at 12:41

## 2018-12-30 RX ADMIN — KETOROLAC TROMETHAMINE 15 MG: 30 INJECTION, SOLUTION INTRAMUSCULAR at 02:06

## 2018-12-30 RX ADMIN — SODIUM CHLORIDE: 9 INJECTION, SOLUTION INTRAVENOUS at 17:15

## 2018-12-30 RX ADMIN — OMEPRAZOLE 20 MG: 20 CAPSULE, DELAYED RELEASE ORAL at 17:14

## 2018-12-30 ASSESSMENT — GAIT ASSESSMENTS
DISTANCE (FEET): 3
ASSISTIVE DEVICE: FRONT WHEEL WALKER
GAIT LEVEL OF ASSIST: MODERATE ASSIST

## 2018-12-30 ASSESSMENT — COGNITIVE AND FUNCTIONAL STATUS - GENERAL
DAILY ACTIVITIY SCORE: 17
CLIMB 3 TO 5 STEPS WITH RAILING: TOTAL
SUGGESTED CMS G CODE MODIFIER MOBILITY: CL
STANDING UP FROM CHAIR USING ARMS: A LOT
WALKING IN HOSPITAL ROOM: TOTAL
HELP NEEDED FOR BATHING: A LOT
DRESSING REGULAR UPPER BODY CLOTHING: A LITTLE
MOBILITY SCORE: 13
MOVING FROM LYING ON BACK TO SITTING ON SIDE OF FLAT BED: A LITTLE
TURNING FROM BACK TO SIDE WHILE IN FLAT BAD: A LITTLE
TOILETING: A LITTLE
SUGGESTED CMS G CODE MODIFIER DAILY ACTIVITY: CK
DRESSING REGULAR LOWER BODY CLOTHING: A LOT
MOVING TO AND FROM BED TO CHAIR: A LITTLE
PERSONAL GROOMING: A LITTLE

## 2018-12-30 ASSESSMENT — ENCOUNTER SYMPTOMS
CHILLS: 0
SORE THROAT: 0
PALPITATIONS: 0
SEIZURES: 0
FLANK PAIN: 0
SPUTUM PRODUCTION: 0
NERVOUS/ANXIOUS: 0
FOCAL WEAKNESS: 0
STRIDOR: 0
COUGH: 0
EYE REDNESS: 0
HEMOPTYSIS: 0
SPEECH CHANGE: 0
EYE DISCHARGE: 0
SHORTNESS OF BREATH: 1
EYE PAIN: 0
DIARRHEA: 0
FEVER: 0
LOSS OF CONSCIOUSNESS: 0
BLOOD IN STOOL: 0
HALLUCINATIONS: 0
MYALGIAS: 0
BRUISES/BLEEDS EASILY: 0
VOMITING: 0
ROS SKIN COMMENTS: PALE
ABDOMINAL PAIN: 0
WEAKNESS: 1

## 2018-12-30 ASSESSMENT — ACTIVITIES OF DAILY LIVING (ADL): TOILETING: INDEPENDENT

## 2018-12-30 ASSESSMENT — PAIN SCALES - GENERAL
PAINLEVEL_OUTOF10: 1

## 2018-12-30 NOTE — CARE PLAN
Problem: Safety  Goal: Will remain free from falls  Call light and belongings within reach, bed down and locked, hourly rounding in progress.     Problem: Venous Thromboembolism (VTW)/Deep Vein Thrombosis (DVT) Prevention:  Goal: Patient will participate in Venous Thrombosis (VTE)/Deep Vein Thrombosis (DVT)Prevention Measures  SCD's in use, lovenox ordered.         Problem: Bowel/Gastric:  Goal: Will not experience complications related to bowel motility  Last BM 12/28 per pt prior to arrival.

## 2018-12-30 NOTE — ASSESSMENT & PLAN NOTE
Creatinine stable  Acute kidney injury, mostly due to hypovolemia, blood loss recent surgery, nonsteroidal anti-inflammatory drugs  Stopped Toradol, allopurinol    bumetanide initially held, then restarted     Kidney function improved

## 2018-12-30 NOTE — PROGRESS NOTES
Hospital Medicine Daily Progress Note    Date of Service  12/30/2018    Chief Complaint  Left hip pain after a fall    Hospital Course      86 y.o. female with past medical history of atrial fibrillation on anticoagulation, hypothyroidism, pulmonary hypertension Estimated right ventricular systolic pressure  is 55 mmHg, coronary artery disease, gastroesophageal reflux disease admitted 12/28/2018 with left hip pain and swelling after a ground-level fall.  Orthopedics consulted, found to have marked anemia with a hemoglobin of 7.6 transfused 1 unit of blood as the patient was symptomatic and she had a plan for surgery that day. Has Tolerated Intramedullary nail, left hip on 12/29/2018, noticed to have acute kidney injury, diuretics were stopped.  Nephrotoxic medications were stopped.  She needed 2 units of blood transfusion, physical and occupational therapy recommended **        Interval Problem Update  Heart rate 60s-70s  Saturating well on 2 L nasal cannula  Blood pressure was low this morning 97-52, improved after blood transfusion.  Hemoglobin low, requiring blood transfusion, continue to monitor, transfuse for hemoglobin less than 7  Creatinine 1.56, up from 1.18, Acute kidney injury, mostly due to hypovolemia, blood loss recent surgery, nonsteroidal anti-inflammatory drugs  Stopping Toradol, stop allopurinol, stop bumetanide,  getting blood , watch volume status closely especially with her diastolic heart failure  Hemoglobin 6.5, improved to 8 after transfusion today  Platelets 97, potassium 3.9, glucose 112  Tolerated Intramedullary nail, left hip yesterday  Pending PT OT evaluation    Discussed with patient nurse and multiple family members, questions answered  DPFRANKLIN is the daughter Palma Redd    Consultants/Specialty   Orthopedics    Code Status  Full    Disposition  To be determined    Review of Systems  Review of Systems   Constitutional: Positive for malaise/fatigue. Negative for chills and fever.    HENT: Negative for congestion and sore throat.    Eyes: Negative for pain, discharge and redness.   Respiratory: Positive for shortness of breath. Negative for cough, hemoptysis, sputum production and stridor.    Cardiovascular: Negative for chest pain, palpitations and leg swelling.   Gastrointestinal: Negative for abdominal pain, blood in stool, diarrhea and vomiting.   Genitourinary: Negative for flank pain, hematuria and urgency.   Musculoskeletal: Negative for myalgias.        Pain left hip   Skin:        Pale    Neurological: Positive for weakness. Negative for speech change, focal weakness, seizures and loss of consciousness.        Left hip movement limited secondary to pain   Endo/Heme/Allergies: Does not bruise/bleed easily.   Psychiatric/Behavioral: Negative for hallucinations and suicidal ideas. The patient is not nervous/anxious.         Physical Exam  Temp:  [36.1 °C (97 °F)-37 °C (98.6 °F)] 36.7 °C (98 °F)  Pulse:  [60-73] 62  Resp:  [16-18] 16  BP: ()/(48-65) 106/48    Physical Exam   Constitutional: She is oriented to person, place, and time. She appears well-developed and well-nourished. No distress.   HENT:   Head: Normocephalic and atraumatic.   Right Ear: External ear normal.   Left Ear: External ear normal.   Eyes: Pupils are equal, round, and reactive to light. Conjunctivae are normal. Right eye exhibits no discharge. Left eye exhibits no discharge.   Neck: Normal range of motion. Neck supple. No JVD present. No tracheal deviation present. No thyromegaly present.   Cardiovascular: Normal rate.  Exam reveals no gallop and no friction rub.    Murmur (Systolic) heard.  Pulmonary/Chest: Effort normal and breath sounds normal. No stridor. No respiratory distress. She has no wheezes. She has no rales. She exhibits no tenderness.   Faint bilateral basal crackles   Abdominal: Soft. Bowel sounds are normal. She exhibits no distension. There is no tenderness. There is no rebound and no guarding.    Musculoskeletal: Normal range of motion. She exhibits tenderness (Left hip). She exhibits no edema or deformity.   Neurological: She is alert and oriented to person, place, and time. No cranial nerve deficit. Coordination normal.   Skin: Skin is warm and dry. No rash noted. She is not diaphoretic. No erythema. There is pallor.   Psychiatric: She has a normal mood and affect. Judgment normal.   Nursing note and vitals reviewed.      Fluids    Intake/Output Summary (Last 24 hours) at 12/30/18 1437  Last data filed at 12/30/18 1300   Gross per 24 hour   Intake              350 ml   Output              100 ml   Net              250 ml       Laboratory  Recent Labs      12/28/18 2017 12/29/18   0501  12/30/18   0419  12/30/18   1217   WBC  3.8*  3.7*  4.0*   --    RBC  2.91*  2.53*  2.15*   --    HEMOGLOBIN  8.7*  7.6*  6.5*  8.0*   HEMATOCRIT  28.7*  25.0*  21.4*  25.8*   MCV  98.6*  98.8*  99.5*   --    MCH  29.9  30.0  30.2   --    MCHC  30.3*  30.4*  30.4*   --    RDW  62.4*  62.7*  63.1*   --    PLATELETCT  117*  107*  97*   --    MPV  11.3  11.0  11.5   --      Recent Labs      12/28/18 2017 12/29/18   0501  12/30/18   0419   SODIUM  142  138  142   POTASSIUM  3.8  3.7  3.9   CHLORIDE  106  107  109   CO2  25  25  26   GLUCOSE  95  105*  112*   BUN  38*  34*  39*   CREATININE  1.24  1.18  1.56*   CALCIUM  8.2*  8.2*  8.0*     Recent Labs      12/28/18 2017   APTT  34.1   INR  1.18*               Imaging  DX-HIP-UNILATERAL-W/O PELVIS-2/3 VIEWS LEFT   Final Result      Intraoperative fluoroscopic spot images as described above.      DX-PORTABLE FLUORO > 1 HOUR   Final Result      Intraoperative fluoroscopic spot images as described above.      CT-HIP W/O PLUS RECONS LEFT   Final Result      Acute significantly displaced intertrochanteric fracture of the left femur.      DX-FEMUR-2+ LEFT   Final Result            Acute significantly displaced intertrochanteric fracture of the left femur.            Assessment/Plan  * Femur fracture (HCC)   Assessment & Plan    Ortho consulted, plan for surgery 12/29/2018   Pain control  PT OT evaluation will likely need placement       Acute kidney injury (HCC)   Assessment & Plan    Creatinine 1.56, up from 1.18  Acute kidney injury, mostly due to hypovolemia, blood loss recent surgery, nonsteroidal anti-inflammatory drugs  Stopping Toradol, stop allopurinol, stop bumetanide  Getting blood,, watch input and output      Anemia- (present on admission)   Assessment & Plan    Symptomatic anemia requiring blood transfusion  Initially patient was refusing transfusion, had a prolonged discussion with patient and family members.  Eventually all parties agreed on blood transfusion.    Got 2 units of blood transfusion so far   Continue to monitor hemoglobin and transfuse as needed for hemoglobin below 7 or higher if patient is symptomatic.       Pancytopenia (HCC)- (present on admission)   Assessment & Plan    Chronic per reccords   B12 WNL   Cont to monitor    Transfused 1 RBC 12/29/2018 for symptomatic anemia     COPD (chronic obstructive pulmonary disease) ([facility])- (present on admission)   Assessment & Plan    Not in acute exacerbation   Respiratory protocol, oxygen as needed      Chronic diastolic CHF (congestive heart failure) ([facility])- (present on admission)   Assessment & Plan    Not in acute exacerbation   On metoprolol, bumetanide, rosuvastatin   Getting 1 units transfusion, monitor volume status closely      Chronic atrial fibrillation (HCC)- (present on admission)   Assessment & Plan    Metoprolol   Holding Eliquis for surgery, resume when okay with orthopedics       Full code status   Assessment & Plan    Discussed with patient and family      Weakness- (present on admission)   Assessment & Plan    Diffuse weakness, needs pt/ot eval      Hypothyroidism- (present on admission)   Assessment & Plan    Resume levo      GERD (gastroesophageal reflux disease)- (present on  admission)   Assessment & Plan    Omperazole          VTE prophylaxis: SCDs, Eliquis after Ortho clearance

## 2018-12-30 NOTE — PROGRESS NOTES
Received bedside shift report from Vivian/Corinne RN at 1900.     Pt resting in bed at this time, a&o x4, states pain 6/10--medicated per MAR. Dressing in place x3 to left hip/thigh CDI (hip dressing has small amount of serosanguinous drainage). Cap refill 3 sec, pedal pulse 1+, sensation intact.     Call light and belongings within reach, bed down and locked, hourly rounding in progress.

## 2018-12-30 NOTE — CARE PLAN
Problem: Safety  Goal: Will remain free from injury    Intervention: Provide assistance with mobility  Pt educated to call for assistance with mobility. Pt called when she needed to void.       Problem: Pain Management  Goal: Pain level will decrease to patient's comfort goal    Intervention: Educate and implement non-pharmacologic comfort measures. Examples: relaxation, distration, play therapy, activity therapy, massage, etc.  Pt uses ice packs to help with pain. Family is at bedside for distraction and comfort

## 2018-12-30 NOTE — RESPIRATORY CARE
COPD EDUCATION by COPD CLINICAL EDUCATOR  12/30/2018 at 7:29 AM by Chelsea Winters     Patient reviewed by COPD education team. Patient does not qualify for COPD program at this time

## 2018-12-31 PROBLEM — I25.10 CORONARY ARTERY DISEASE: Status: ACTIVE | Noted: 2018-12-31

## 2018-12-31 LAB
ANION GAP SERPL CALC-SCNC: 9 MMOL/L (ref 0–11.9)
BASOPHILS # BLD AUTO: 0.5 % (ref 0–1.8)
BASOPHILS # BLD: 0.02 K/UL (ref 0–0.12)
BUN SERPL-MCNC: 46 MG/DL (ref 8–22)
CALCIUM SERPL-MCNC: 8.3 MG/DL (ref 8.5–10.5)
CHLORIDE SERPL-SCNC: 105 MMOL/L (ref 96–112)
CO2 SERPL-SCNC: 27 MMOL/L (ref 20–33)
CREAT SERPL-MCNC: 1.56 MG/DL (ref 0.5–1.4)
EOSINOPHIL # BLD AUTO: 0 K/UL (ref 0–0.51)
EOSINOPHIL NFR BLD: 0 % (ref 0–6.9)
ERYTHROCYTE [DISTWIDTH] IN BLOOD BY AUTOMATED COUNT: 65 FL (ref 35.9–50)
GLUCOSE SERPL-MCNC: 123 MG/DL (ref 65–99)
HCT VFR BLD AUTO: 25.2 % (ref 37–47)
HGB BLD-MCNC: 8 G/DL (ref 12–16)
IMM GRANULOCYTES # BLD AUTO: 0.03 K/UL (ref 0–0.11)
IMM GRANULOCYTES NFR BLD AUTO: 0.7 % (ref 0–0.9)
LYMPHOCYTES # BLD AUTO: 0.69 K/UL (ref 1–4.8)
LYMPHOCYTES NFR BLD: 15.6 % (ref 22–41)
MCH RBC QN AUTO: 30.9 PG (ref 27–33)
MCHC RBC AUTO-ENTMCNC: 31.7 G/DL (ref 33.6–35)
MCV RBC AUTO: 97.3 FL (ref 81.4–97.8)
MONOCYTES # BLD AUTO: 0.43 K/UL (ref 0–0.85)
MONOCYTES NFR BLD AUTO: 9.7 % (ref 0–13.4)
NEUTROPHILS # BLD AUTO: 3.26 K/UL (ref 2–7.15)
NEUTROPHILS NFR BLD: 73.5 % (ref 44–72)
NRBC # BLD AUTO: 0 K/UL
NRBC BLD-RTO: 0 /100 WBC
PLATELET # BLD AUTO: 104 K/UL (ref 164–446)
PMV BLD AUTO: 11.4 FL (ref 9–12.9)
POTASSIUM SERPL-SCNC: 4 MMOL/L (ref 3.6–5.5)
RBC # BLD AUTO: 2.59 M/UL (ref 4.2–5.4)
SODIUM SERPL-SCNC: 141 MMOL/L (ref 135–145)
WBC # BLD AUTO: 4.4 K/UL (ref 4.8–10.8)

## 2018-12-31 PROCEDURE — 36415 COLL VENOUS BLD VENIPUNCTURE: CPT

## 2018-12-31 PROCEDURE — A9270 NON-COVERED ITEM OR SERVICE: HCPCS | Performed by: ORTHOPAEDIC SURGERY

## 2018-12-31 PROCEDURE — 97530 THERAPEUTIC ACTIVITIES: CPT

## 2018-12-31 PROCEDURE — 700102 HCHG RX REV CODE 250 W/ 637 OVERRIDE(OP): Performed by: HOSPITALIST

## 2018-12-31 PROCEDURE — 700111 HCHG RX REV CODE 636 W/ 250 OVERRIDE (IP): Performed by: HOSPITALIST

## 2018-12-31 PROCEDURE — 99232 SBSQ HOSP IP/OBS MODERATE 35: CPT | Performed by: HOSPITALIST

## 2018-12-31 PROCEDURE — 80048 BASIC METABOLIC PNL TOTAL CA: CPT

## 2018-12-31 PROCEDURE — A9270 NON-COVERED ITEM OR SERVICE: HCPCS | Performed by: HOSPITALIST

## 2018-12-31 PROCEDURE — 85025 COMPLETE CBC W/AUTO DIFF WBC: CPT

## 2018-12-31 PROCEDURE — 97110 THERAPEUTIC EXERCISES: CPT

## 2018-12-31 PROCEDURE — 770006 HCHG ROOM/CARE - MED/SURG/GYN SEMI*

## 2018-12-31 PROCEDURE — 700102 HCHG RX REV CODE 250 W/ 637 OVERRIDE(OP): Performed by: ORTHOPAEDIC SURGERY

## 2018-12-31 RX ORDER — BUMETANIDE 1 MG/1
1 TABLET ORAL
Status: DISCONTINUED | OUTPATIENT
Start: 2018-12-31 | End: 2019-01-03

## 2018-12-31 RX ORDER — TOPIRAMATE 25 MG/1
TABLET ORAL
Qty: 60 TAB | Refills: 1 | Status: SHIPPED | OUTPATIENT
Start: 2018-12-31 | End: 2019-03-05 | Stop reason: SDUPTHER

## 2018-12-31 RX ADMIN — ACETAMINOPHEN 650 MG: 325 TABLET, FILM COATED ORAL at 23:46

## 2018-12-31 RX ADMIN — OXYCODONE HYDROCHLORIDE 5 MG: 5 TABLET ORAL at 20:59

## 2018-12-31 RX ADMIN — METOLAZONE 2.5 MG: 2.5 TABLET ORAL at 05:25

## 2018-12-31 RX ADMIN — OMEPRAZOLE 20 MG: 20 CAPSULE, DELAYED RELEASE ORAL at 05:26

## 2018-12-31 RX ADMIN — METOPROLOL SUCCINATE 25 MG: 25 TABLET, EXTENDED RELEASE ORAL at 05:25

## 2018-12-31 RX ADMIN — ACETAMINOPHEN 650 MG: 325 TABLET, FILM COATED ORAL at 05:25

## 2018-12-31 RX ADMIN — ROPINIROLE HYDROCHLORIDE 0.25 MG: 0.5 TABLET, FILM COATED ORAL at 11:40

## 2018-12-31 RX ADMIN — OMEPRAZOLE 20 MG: 20 CAPSULE, DELAYED RELEASE ORAL at 17:41

## 2018-12-31 RX ADMIN — ROPINIROLE HYDROCHLORIDE 0.25 MG: 0.5 TABLET, FILM COATED ORAL at 05:25

## 2018-12-31 RX ADMIN — OXYCODONE HYDROCHLORIDE 5 MG: 5 TABLET ORAL at 11:40

## 2018-12-31 RX ADMIN — ROPINIROLE HYDROCHLORIDE 0.25 MG: 0.5 TABLET, FILM COATED ORAL at 17:41

## 2018-12-31 RX ADMIN — ROSUVASTATIN CALCIUM 10 MG: 10 TABLET, FILM COATED ORAL at 17:41

## 2018-12-31 RX ADMIN — LEVOTHYROXINE SODIUM 50 MCG: 50 TABLET ORAL at 05:26

## 2018-12-31 RX ADMIN — ACETAMINOPHEN 650 MG: 325 TABLET, FILM COATED ORAL at 17:41

## 2018-12-31 RX ADMIN — TOPIRAMATE 25 MG: 25 TABLET, FILM COATED ORAL at 17:41

## 2018-12-31 RX ADMIN — OXYCODONE HYDROCHLORIDE 5 MG: 5 TABLET ORAL at 04:06

## 2018-12-31 RX ADMIN — SERTRALINE 50 MG: 50 TABLET, FILM COATED ORAL at 20:01

## 2018-12-31 RX ADMIN — ENOXAPARIN SODIUM 30 MG: 100 INJECTION SUBCUTANEOUS at 05:26

## 2018-12-31 RX ADMIN — OXYCODONE HYDROCHLORIDE 5 MG: 5 TABLET ORAL at 08:18

## 2018-12-31 RX ADMIN — TOPIRAMATE 25 MG: 25 TABLET, FILM COATED ORAL at 05:26

## 2018-12-31 RX ADMIN — OXYCODONE HYDROCHLORIDE 5 MG: 5 TABLET ORAL at 17:41

## 2018-12-31 ASSESSMENT — ENCOUNTER SYMPTOMS
SHORTNESS OF BREATH: 0
SPEECH CHANGE: 0
COUGH: 0
FOCAL WEAKNESS: 0
BRUISES/BLEEDS EASILY: 0
ABDOMINAL PAIN: 0
EYE PAIN: 0
SORE THROAT: 0
HALLUCINATIONS: 0
FLANK PAIN: 0
STRIDOR: 0
CHILLS: 0
FEVER: 0
PALPITATIONS: 0
DIARRHEA: 0
EYE DISCHARGE: 0
LOSS OF CONSCIOUSNESS: 0
NERVOUS/ANXIOUS: 0
MYALGIAS: 0
SEIZURES: 0
EYE REDNESS: 0
VOMITING: 0
ROS SKIN COMMENTS: PALE
BLOOD IN STOOL: 0
SPUTUM PRODUCTION: 0
HEMOPTYSIS: 0

## 2018-12-31 ASSESSMENT — CHA2DS2 SCORE
CHF OR LEFT VENTRICULAR DYSFUNCTION: YES
AGE 65 TO 74: NO
HYPERTENSION: YES
AGE 75 OR GREATER: YES
CHA2DS2 VASC SCORE: 8
SEX: FEMALE
DIABETES: NO
PRIOR STROKE OR TIA OR THROMBOEMBOLISM: YES
VASCULAR DISEASE: YES

## 2018-12-31 ASSESSMENT — PAIN SCALES - GENERAL
PAINLEVEL_OUTOF10: 10
PAINLEVEL_OUTOF10: 7
PAINLEVEL_OUTOF10: 8

## 2018-12-31 ASSESSMENT — GAIT ASSESSMENTS: GAIT LEVEL OF ASSIST: UNABLE TO PARTICIPATE

## 2018-12-31 ASSESSMENT — COGNITIVE AND FUNCTIONAL STATUS - GENERAL
WALKING IN HOSPITAL ROOM: TOTAL
CLIMB 3 TO 5 STEPS WITH RAILING: TOTAL
MOBILITY SCORE: 10
TURNING FROM BACK TO SIDE WHILE IN FLAT BAD: A LITTLE
MOVING FROM LYING ON BACK TO SITTING ON SIDE OF FLAT BED: A LOT
STANDING UP FROM CHAIR USING ARMS: A LOT
MOVING TO AND FROM BED TO CHAIR: UNABLE
SUGGESTED CMS G CODE MODIFIER MOBILITY: CL

## 2018-12-31 NOTE — CARE PLAN
Problem: Safety  Goal: Will remain free from falls  Call light and belongings within reach, bed down and locked, hourly rounding in progress.    Problem: Urinary Elimination:  Goal: Ability to reestablish a normal urinary elimination pattern will improve  Pt voided 2x this shift without issue.

## 2018-12-31 NOTE — DISCHARGE PLANNING
Transitional Care Coordinator:    Per chart review pt may benefit from home health services for hip fracture, CHF.  Pt's LACE+ score indicates a high risk of readmission.  Please consider a home health order and F2F for discharge planning.

## 2018-12-31 NOTE — DIETARY
Nutrition services:Day 3 of admit.  85 yo female admitted following a fall sustaining a hip fracture.  She had a hip nailing on 12/29.   Follow up for weight loss on admitting screen.    Evaluation:  1.  Pt states weight loss PTA was related to fluid losses. She has heart failure and was taking a diuretic.  2.  Currently on a regular diet taking 50-75% of meals.     Recommendations/Plan:  1. encourage intake of meals. Order supplements if taking less than 50% of most meals.  2. document intake of all meals and supplements as % taken in ADL's to provide interdisciplinary communication across all shifts   3. monitor daily weights  4. Nutrition rep will continue to see patient for ongoing meal and snack preferences.

## 2018-12-31 NOTE — PROGRESS NOTES
Received updated bedside shift report from Clara BARNHART at 1900.      Pt resting in bed at this time, a&o x4, denies pain at this time. Dressing in place x3 to left hip/thigh CDI (hip dressing has small amount of serosanguinous drainage). Cap refill 3 sec, pedal pulse 1+, sensation intact.      Call light and belongings within reach, bed down and locked, hourly rounding in progress.

## 2018-12-31 NOTE — CARE PLAN
Problem: Safety  Goal: Will remain free from injury  Outcome: PROGRESSING AS EXPECTED  Safety precautions in place. Call light within reach. Bed is low and in locked position. Hourly rounding in place.

## 2018-12-31 NOTE — PROGRESS NOTES
"   Orthopaedic Progress Note    Interval changes:  Patient doing well post op  Dressings CDI    ROS - Patient denies any new issues.  Pain well controlled.    Blood pressure 118/58, pulse 60, temperature 36.5 °C (97.7 °F), temperature source Temporal, resp. rate 16, height 1.6 m (5' 3\"), weight 55.8 kg (123 lb), SpO2 100 %, not currently breastfeeding.      Patient seen and examined  No acute distress  Breathing non labored  RRR  LLE surgical dressings are clean, dry, and intact. Patient clearly fires tibialis anterior, EHL, and gastrocnemius/soleus. Sensation is intact to light touch throughout superficial peroneal, deep peroneal, tibial, saphenous, and sural nerve distributions. Strong and palpable 2+ dorsalis pedis and posterior tibial pulses with capillary refill less than 2 seconds. No lower leg tenderness or discomfort.       Recent Labs      12/28/18 2017 12/29/18   0501  12/30/18   0419  12/30/18   1217   WBC  3.8*  3.7*  4.0*   --    RBC  2.91*  2.53*  2.15*   --    HEMOGLOBIN  8.7*  7.6*  6.5*  8.0*   HEMATOCRIT  28.7*  25.0*  21.4*  25.8*   MCV  98.6*  98.8*  99.5*   --    MCH  29.9  30.0  30.2   --    MCHC  30.3*  30.4*  30.4*   --    RDW  62.4*  62.7*  63.1*   --    PLATELETCT  117*  107*  97*   --    MPV  11.3  11.0  11.5   --        Active Hospital Problems    Diagnosis   • Acute kidney injury (HCC) [N17.9]     Priority: High   • Femur fracture ([facility]) [S72.90XA]     Priority: High   • Anemia [D64.9]     Priority: High   • Pancytopenia ([facility]) [D61.818]     Priority: Medium   • COPD (chronic obstructive pulmonary disease) ([facility]) [J44.9]     Priority: Medium     Overview:   PFT 2017    Last Assessment & Plan:   Mixed obstructive and restrictive defect per spirometry with severe reduction in diffusion capacity per PFTs 2/2017.  Continue routine follow-up with PCP in pulmonology as indicated.     • Chronic diastolic CHF (congestive heart failure) ([facility]) [I50.32]     Priority: Medium     Overview:   Echo " (06/14) shows normal LV size and fxn, but mod LV hypertrophy.  Has EF of 63%.  Severe diastolic dysfunction    Last Assessment & Plan:   Heart failure with preserved ejection fraction, severe diastolic dysfunction and moderate to severe TR with severe pulmonary hypertension noted per echocardiogram 2/2017.    Currently stable and euvolemic on exam today.  NYHA class II-III, stage C.  Functionally most limited by exertional shortness of breath in the setting of COPD.  Blood pressures are well controlled.  She continues to be well compensated with current diuretic therapy, torsemide 40 mg twice daily.  Routine surveillance laboratories are ordered today.    Heart failure self-management is reviewed and reinforced.  At this time, she will follow-up with [facility] cardiology on an as-needed basis as she is moving to [place] next month to be closer to family but is welcome to call in the meantime for any concerns.     • Chronic atrial fibrillation (HCC) [I48.2]     Priority: Medium     Overview:   History of AF, Coumadin initiated 03/2015.      Last Assessment & Plan:   Chronic atrial fibrillation for which patient is asymptomatic.  Rate is well controlled and she remained therapeutically anticoagulated with warfarin as monitored by the [facility] anticoagulation clinic.     • GERD (gastroesophageal reflux disease) [K21.9]     Priority: Low     Overview:   (with diaphragmatic hernia)    Last Assessment & Plan:   On omeprazole daily.     • Hypothyroidism [E03.9]     Priority: Low     Last Assessment & Plan:   On thyroid replacement, normal labs 11/2017.     • Full code status [Z78.9]   • Weakness [R53.1]       Assessment/Plan:  Cleared for DC by ortho pending medicine and therapy clearance  POD#1 S/P Intramedullary nail, left hip  Wt bearing status - WBAT  Wound care/Drains - dressings changed every other day by nursing starting POD#2  Future Procedures - none planned   Lovenox: Start 12/30, Duration-until ambulatory >  150'  Sutures/Staples out- 10-14 days post operatively  PT/OT-initiated  Antibiotics: completed  DVT Prophylaxis- TEDS/SCDs/Foot pumps  Hunt-none  Case Coordination for Discharge Planning - Disposition [facility] likely

## 2018-12-31 NOTE — CARE PLAN
Problem: Mobility  Goal: Risk for activity intolerance will decrease  Outcome: PROGRESSING AS EXPECTED  Working with PT and OT.

## 2018-12-31 NOTE — PROGRESS NOTES
Hospital Medicine Daily Progress Note    Date of Service  12/31/2018    Chief Complaint  Left hip pain after a fall    Hospital Course      86 y.o. female with past medical history of atrial fibrillation on anticoagulation, hypothyroidism, pulmonary hypertension Estimated right ventricular systolic pressure  is 55 mmHg, coronary artery disease, gastroesophageal reflux disease admitted 12/28/2018 with left hip pain and swelling after a ground-level fall.  Orthopedics consulted, found to have marked anemia with a hemoglobin of 7.6 transfused 1 unit of blood as the patient was symptomatic and she had a plan for surgery that day. Has Tolerated Intramedullary nail, left hip on 12/29/2018, noticed to have acute kidney injury, diuretics were stopped.  Nephrotoxic medications were stopped.  She needed 2 units of blood transfusion, physical and occupational therapy recommended S and F, referral placed        Interval Problem Update  Heart rate 60s, blood pressure within normal limits, apart from one reading of 98/51, patient did not have dizziness or lightheadedness  Saturating well on 2-1/2 L nasal cannula, encourage incentive spirometer  Hemoglobin 8, platelets 104  Sodium 141, potassium 4, creatinine 1.56  Watch volume status closely, slightly hypervolemic today, raise JVD, and increased oxygen needs, restarted bumetanide  Continue to watch her renal function  Discussed with nurse and patient, emphasized urine output charting.   Pending PT OT evaluation, recommended to skilled nursing facility, as the patient does not have 24-hour supervision from family members.  Referral to skilled nursing facility placed.    KEVYN is the daughter Palma Redd  Discussed with patient nurse and with multidisciplinary team during including , and charge nurse.     Consultants/Specialty   Orthopedics    Code Status  Full    Disposition  SNF, referral placed 12/31/2018     Review of Systems  Review of Systems   Constitutional:  Negative for chills, fever and malaise/fatigue (Improved after blood transfusion).   HENT: Negative for congestion and sore throat.    Eyes: Negative for pain, discharge and redness.   Respiratory: Negative for cough, hemoptysis, sputum production, shortness of breath and stridor.    Cardiovascular: Negative for chest pain, palpitations and leg swelling.   Gastrointestinal: Negative for abdominal pain, blood in stool, diarrhea and vomiting.   Genitourinary: Negative for flank pain, hematuria and urgency.   Musculoskeletal: Negative for myalgias.        Pain left hip   Skin:        Pale    Neurological: Negative for speech change, focal weakness, seizures and loss of consciousness.        Left hip movement limited secondary to pain   Endo/Heme/Allergies: Does not bruise/bleed easily.   Psychiatric/Behavioral: Negative for hallucinations and suicidal ideas. The patient is not nervous/anxious.         Physical Exam  Temp:  [36.5 °C (97.7 °F)-37.2 °C (98.9 °F)] 36.6 °C (97.8 °F)  Pulse:  [60-64] 62  Resp:  [16] 16  BP: ()/(51-63) 115/62    Physical Exam   Constitutional: She is oriented to person, place, and time. She appears well-developed and well-nourished. No distress.   HENT:   Head: Normocephalic and atraumatic.   Right Ear: External ear normal.   Left Ear: External ear normal.   Eyes: Pupils are equal, round, and reactive to light. Conjunctivae are normal. Right eye exhibits no discharge. Left eye exhibits no discharge.   Neck: Normal range of motion. Neck supple. JVD present. No tracheal deviation present. No thyromegaly present.   Cardiovascular: Normal rate.  Exam reveals no gallop and no friction rub.    Murmur (Systolic) heard.  Pulmonary/Chest: Effort normal and breath sounds normal. No stridor. No respiratory distress. She has no wheezes. She has no rales. She exhibits no tenderness.   Faint bilateral basal crackles   Abdominal: Soft. Bowel sounds are normal. She exhibits no distension. There is no  tenderness. There is no rebound and no guarding.   Musculoskeletal: Normal range of motion. She exhibits tenderness (Left hip). She exhibits no edema or deformity.   Neurological: She is alert and oriented to person, place, and time. No cranial nerve deficit. Coordination normal.   Skin: Skin is warm and dry. No rash noted. She is not diaphoretic. No erythema. There is pallor.   Psychiatric: She has a normal mood and affect. Judgment normal.   Nursing note and vitals reviewed.      Fluids    Intake/Output Summary (Last 24 hours) at 12/31/18 1623  Last data filed at 12/31/18 1200   Gross per 24 hour   Intake              240 ml   Output              350 ml   Net             -110 ml       Laboratory  Recent Labs      12/29/18   0501  12/30/18   0419  12/30/18   1217  12/31/18   0407   WBC  3.7*  4.0*   --   4.4*   RBC  2.53*  2.15*   --   2.59*   HEMOGLOBIN  7.6*  6.5*  8.0*  8.0*   HEMATOCRIT  25.0*  21.4*  25.8*  25.2*   MCV  98.8*  99.5*   --   97.3   MCH  30.0  30.2   --   30.9   MCHC  30.4*  30.4*   --   31.7*   RDW  62.7*  63.1*   --   65.0*   PLATELETCT  107*  97*   --   104*   MPV  11.0  11.5   --   11.4     Recent Labs      12/29/18   0501  12/30/18   0419  12/31/18   0407   SODIUM  138  142  141   POTASSIUM  3.7  3.9  4.0   CHLORIDE  107  109  105   CO2  25  26  27   GLUCOSE  105*  112*  123*   BUN  34*  39*  46*   CREATININE  1.18  1.56*  1.56*   CALCIUM  8.2*  8.0*  8.3*     Recent Labs      12/28/18 2017   APTT  34.1   INR  1.18*               Imaging  DX-HIP-UNILATERAL-W/O PELVIS-2/3 VIEWS LEFT   Final Result      Intraoperative fluoroscopic spot images as described above.      DX-PORTABLE FLUORO > 1 HOUR   Final Result      Intraoperative fluoroscopic spot images as described above.      CT-HIP W/O PLUS RECONS LEFT   Final Result      Acute significantly displaced intertrochanteric fracture of the left femur.      DX-FEMUR-2+ LEFT   Final Result            Acute significantly displaced  intertrochanteric fracture of the left femur.           Assessment/Plan  * Femur fracture (HCC)   Assessment & Plan    Ortho consulted, plan for surgery 12/29/2018   Pain control  PT OT evaluation recommended SNF, referral placed      Acute kidney injury (HCC)   Assessment & Plan    Creatinine 1.56, up from 1.18  Acute kidney injury, mostly due to hypovolemia, blood loss recent surgery, nonsteroidal anti-inflammatory drugs  Stopped Toradol, allopurinol  S/p blood, watch input and output    bumetanide initially held, then restarted      Anemia- (present on admission)   Assessment & Plan    Symptomatic anemia requiring blood transfusion  Initially patient was refusing transfusion, had a prolonged discussion with patient and family members.  Eventually all parties agreed on blood transfusion.    Got 2 units of blood transfusion so far.  Metoprolol, Eliquis, initially held for surgery, then restarted.  Continue to monitor hemoglobin and transfuse as needed for hemoglobin below 7 or higher if patient is symptomatic.     Plavix held for surgery, and anemia, consider restarting when okay with surgery      Coronary artery disease- (present on admission)   Assessment & Plan    Metoprolol, rosuvastatin  Clopidogrel initially held for surgery, restart when okay with surgery      Pancytopenia (HCC)- (present on admission)   Assessment & Plan    Chronic per reccords   B12 WNL   Cont to monitor    Status post 2 units of blood transfusion       COPD (chronic obstructive pulmonary disease) ([facility])- (present on admission)   Assessment & Plan    Not in acute exacerbation   Respiratory protocol, oxygen as needed       Chronic diastolic CHF (congestive heart failure) ([facility])- (present on admission)   Assessment & Plan    Not in acute exacerbation   On metoprolol, rosuvastatin   Getting 2 units transfusion, watching volume status closely     Bumetanide initially held then restarted      Chronic atrial fibrillation ([facility])- (present on  admission)   Assessment & Plan    Metoprolol, Eliquis, initially held for surgery, then restarted       Full code status   Assessment & Plan    Discussed with patient and family      Weakness- (present on admission)   Assessment & Plan    Diffuse weakness, needs pt/ot eval       Hypothyroidism- (present on admission)   Assessment & Plan    Resume levo      GERD (gastroesophageal reflux disease)- (present on admission)   Assessment & Plan    Omperazole          VTE prophylaxis: SCDs, Eliquis

## 2018-12-31 NOTE — CARE PLAN
Problem: Pain Management  Goal: Pain level will decrease to patient's comfort goal  Outcome: PROGRESSING AS EXPECTED   Follow pain managment plan developed in collaboration with patient and Interdisciplinary Team  Pain well managed with current regimen per MAR

## 2019-01-01 LAB
ANION GAP SERPL CALC-SCNC: 5 MMOL/L (ref 0–11.9)
BASOPHILS # BLD AUTO: 0.3 % (ref 0–1.8)
BASOPHILS # BLD: 0.01 K/UL (ref 0–0.12)
BUN SERPL-MCNC: 45 MG/DL (ref 8–22)
CALCIUM SERPL-MCNC: 8.4 MG/DL (ref 8.5–10.5)
CHLORIDE SERPL-SCNC: 106 MMOL/L (ref 96–112)
CO2 SERPL-SCNC: 27 MMOL/L (ref 20–33)
CREAT SERPL-MCNC: 1.37 MG/DL (ref 0.5–1.4)
EOSINOPHIL # BLD AUTO: 0 K/UL (ref 0–0.51)
EOSINOPHIL NFR BLD: 0 % (ref 0–6.9)
ERYTHROCYTE [DISTWIDTH] IN BLOOD BY AUTOMATED COUNT: 63.2 FL (ref 35.9–50)
GLUCOSE SERPL-MCNC: 135 MG/DL (ref 65–99)
HCT VFR BLD AUTO: 23.1 % (ref 37–47)
HGB BLD-MCNC: 7.1 G/DL (ref 12–16)
IMM GRANULOCYTES # BLD AUTO: 0.03 K/UL (ref 0–0.11)
IMM GRANULOCYTES NFR BLD AUTO: 0.8 % (ref 0–0.9)
LYMPHOCYTES # BLD AUTO: 0.62 K/UL (ref 1–4.8)
LYMPHOCYTES NFR BLD: 16.2 % (ref 22–41)
MCH RBC QN AUTO: 29.8 PG (ref 27–33)
MCHC RBC AUTO-ENTMCNC: 30.7 G/DL (ref 33.6–35)
MCV RBC AUTO: 97.1 FL (ref 81.4–97.8)
MONOCYTES # BLD AUTO: 0.4 K/UL (ref 0–0.85)
MONOCYTES NFR BLD AUTO: 10.4 % (ref 0–13.4)
NEUTROPHILS # BLD AUTO: 2.77 K/UL (ref 2–7.15)
NEUTROPHILS NFR BLD: 72.3 % (ref 44–72)
NRBC # BLD AUTO: 0 K/UL
NRBC BLD-RTO: 0 /100 WBC
PLATELET # BLD AUTO: 98 K/UL (ref 164–446)
PMV BLD AUTO: 11.3 FL (ref 9–12.9)
POTASSIUM SERPL-SCNC: 4 MMOL/L (ref 3.6–5.5)
RBC # BLD AUTO: 2.38 M/UL (ref 4.2–5.4)
SODIUM SERPL-SCNC: 138 MMOL/L (ref 135–145)
WBC # BLD AUTO: 3.8 K/UL (ref 4.8–10.8)

## 2019-01-01 PROCEDURE — 770006 HCHG ROOM/CARE - MED/SURG/GYN SEMI*

## 2019-01-01 PROCEDURE — A9270 NON-COVERED ITEM OR SERVICE: HCPCS | Performed by: HOSPITALIST

## 2019-01-01 PROCEDURE — 700102 HCHG RX REV CODE 250 W/ 637 OVERRIDE(OP): Performed by: ORTHOPAEDIC SURGERY

## 2019-01-01 PROCEDURE — 80048 BASIC METABOLIC PNL TOTAL CA: CPT

## 2019-01-01 PROCEDURE — 99232 SBSQ HOSP IP/OBS MODERATE 35: CPT | Mod: 25 | Performed by: INTERNAL MEDICINE

## 2019-01-01 PROCEDURE — 99497 ADVNCD CARE PLAN 30 MIN: CPT | Performed by: INTERNAL MEDICINE

## 2019-01-01 PROCEDURE — A9270 NON-COVERED ITEM OR SERVICE: HCPCS | Performed by: ORTHOPAEDIC SURGERY

## 2019-01-01 PROCEDURE — 700102 HCHG RX REV CODE 250 W/ 637 OVERRIDE(OP): Performed by: HOSPITALIST

## 2019-01-01 PROCEDURE — 36415 COLL VENOUS BLD VENIPUNCTURE: CPT

## 2019-01-01 PROCEDURE — 85025 COMPLETE CBC W/AUTO DIFF WBC: CPT

## 2019-01-01 PROCEDURE — 700112 HCHG RX REV CODE 229: Performed by: ORTHOPAEDIC SURGERY

## 2019-01-01 RX ADMIN — OXYCODONE HYDROCHLORIDE 5 MG: 5 TABLET ORAL at 08:47

## 2019-01-01 RX ADMIN — ROPINIROLE HYDROCHLORIDE 0.25 MG: 0.5 TABLET, FILM COATED ORAL at 17:47

## 2019-01-01 RX ADMIN — SERTRALINE 50 MG: 50 TABLET, FILM COATED ORAL at 20:30

## 2019-01-01 RX ADMIN — ACETAMINOPHEN 650 MG: 325 TABLET, FILM COATED ORAL at 05:33

## 2019-01-01 RX ADMIN — ACETAMINOPHEN 650 MG: 325 TABLET, FILM COATED ORAL at 13:12

## 2019-01-01 RX ADMIN — METOPROLOL SUCCINATE 25 MG: 25 TABLET, EXTENDED RELEASE ORAL at 05:35

## 2019-01-01 RX ADMIN — DOCUSATE SODIUM 100 MG: 100 CAPSULE, LIQUID FILLED ORAL at 05:35

## 2019-01-01 RX ADMIN — METOLAZONE 2.5 MG: 2.5 TABLET ORAL at 05:34

## 2019-01-01 RX ADMIN — TOPIRAMATE 25 MG: 25 TABLET, FILM COATED ORAL at 17:46

## 2019-01-01 RX ADMIN — APIXABAN 2.5 MG: 5 TABLET, FILM COATED ORAL at 17:46

## 2019-01-01 RX ADMIN — OXYCODONE HYDROCHLORIDE 5 MG: 5 TABLET ORAL at 20:30

## 2019-01-01 RX ADMIN — APIXABAN 2.5 MG: 5 TABLET, FILM COATED ORAL at 05:33

## 2019-01-01 RX ADMIN — OXYCODONE HYDROCHLORIDE 5 MG: 5 TABLET ORAL at 03:01

## 2019-01-01 RX ADMIN — ACETAMINOPHEN 650 MG: 325 TABLET, FILM COATED ORAL at 17:45

## 2019-01-01 RX ADMIN — OMEPRAZOLE 20 MG: 20 CAPSULE, DELAYED RELEASE ORAL at 05:35

## 2019-01-01 RX ADMIN — ROSUVASTATIN CALCIUM 10 MG: 10 TABLET, FILM COATED ORAL at 17:47

## 2019-01-01 RX ADMIN — ROPINIROLE HYDROCHLORIDE 0.25 MG: 0.5 TABLET, FILM COATED ORAL at 13:12

## 2019-01-01 RX ADMIN — OXYCODONE HYDROCHLORIDE 5 MG: 5 TABLET ORAL at 15:38

## 2019-01-01 RX ADMIN — ROPINIROLE HYDROCHLORIDE 0.25 MG: 0.5 TABLET, FILM COATED ORAL at 05:34

## 2019-01-01 RX ADMIN — TOPIRAMATE 25 MG: 25 TABLET, FILM COATED ORAL at 05:34

## 2019-01-01 RX ADMIN — LEVOTHYROXINE SODIUM 50 MCG: 50 TABLET ORAL at 05:35

## 2019-01-01 RX ADMIN — OMEPRAZOLE 20 MG: 20 CAPSULE, DELAYED RELEASE ORAL at 17:46

## 2019-01-01 RX ADMIN — BUMETANIDE 1 MG: 1 TABLET ORAL at 05:34

## 2019-01-01 ASSESSMENT — PAIN SCALES - GENERAL
PAINLEVEL_OUTOF10: 9
PAINLEVEL_OUTOF10: 4
PAINLEVEL_OUTOF10: 6
PAINLEVEL_OUTOF10: 8
PAINLEVEL_OUTOF10: 10
PAINLEVEL_OUTOF10: 6
PAINLEVEL_OUTOF10: 10
PAINLEVEL_OUTOF10: 6
PAINLEVEL_OUTOF10: 8

## 2019-01-01 ASSESSMENT — ENCOUNTER SYMPTOMS
DIARRHEA: 0
WEAKNESS: 1
COUGH: 0
DOUBLE VISION: 0
SORE THROAT: 0
CHILLS: 0
HEADACHES: 0
DIZZINESS: 0
SHORTNESS OF BREATH: 0
BLURRED VISION: 0
INSOMNIA: 1
DEPRESSION: 0
FEVER: 0
VOMITING: 0
FOCAL WEAKNESS: 1
MYALGIAS: 0
ABDOMINAL PAIN: 0

## 2019-01-01 NOTE — PROGRESS NOTES
Hospital Medicine Daily Progress Note    Date of Service  1/1/2019    Chief Complaint  Left hip pain after a fall    Hospital Course      86 y.o. female with past medical history of atrial fibrillation on anticoagulation, hypothyroidism, pulmonary hypertension Estimated right ventricular systolic pressure  is 55 mmHg, coronary artery disease, gastroesophageal reflux disease admitted 12/28/2018 with left hip pain and swelling after a ground-level fall.  Orthopedics consulted, found to have marked anemia with a hemoglobin of 7.6 transfused 1 unit of blood as the patient was symptomatic and she had a plan for surgery that day. Has Tolerated Intramedullary nail, left hip on 12/29/2018, noticed to have acute kidney injury, diuretics were stopped.  Nephrotoxic medications were stopped.  She needed 2 units of blood transfusion, physical and occupational therapy recommended S and F, referral placed        Interval Problem Update  1/1- Patient states some tenderness to left hip but states pain is controlled at this time. Bruising noted near dressing with dressing having serosanguineous drainage. Referrals pending for Skilled facility as patient continues to have difficulty working with therapies. Question regarding code status as patient has previously had DNR status on prior admissions. Have asked palliative to obtain POLST for patient to clearly state her wishes. Patient's Hgb decreased slightly to 7.1, will continue to watch closely.     Consultants/Specialty   Orthopedics    Code Status  Full    Disposition  SNF, referral placed 12/31/2018     Review of Systems  Review of Systems   Constitutional: Negative for chills and fever.   HENT: Negative for congestion and sore throat.    Eyes: Negative for blurred vision and double vision.   Respiratory: Negative for cough and shortness of breath.    Cardiovascular: Negative for chest pain and leg swelling.   Gastrointestinal: Negative for abdominal pain, diarrhea and vomiting.    Genitourinary: Negative for dysuria and urgency.   Musculoskeletal: Negative for myalgias.        Pain left hip   Neurological: Positive for focal weakness and weakness. Negative for dizziness and headaches.   Psychiatric/Behavioral: Negative for depression. The patient has insomnia (states she did not sleep well ).         Physical Exam  Temp:  [36.1 °C (97 °F)-36.9 °C (98.5 °F)] 36.6 °C (97.8 °F)  Pulse:  [62-69] 62  Resp:  [14-16] 16  BP: (104-120)/(50-62) 104/50    Physical Exam   Constitutional: She is oriented to person, place, and time. Vital signs are normal. She appears well-developed. She is cooperative. No distress.   Pleasant thin frail elderly female laying in bed.    HENT:   Head: Normocephalic and atraumatic.   Right Ear: External ear normal.   Left Ear: External ear normal.   Eyes: Conjunctivae and lids are normal. Right eye exhibits no discharge. Left eye exhibits no discharge.   Neck: Normal range of motion. No JVD present. No tracheal tenderness present.   Cardiovascular: Normal rate.  Exam reveals no gallop.    Murmur (Systolic) heard.  Pulmonary/Chest: Effort normal and breath sounds normal. No respiratory distress. She has no decreased breath sounds. She has no wheezes.   Abdominal: Soft. Normal appearance and bowel sounds are normal. She exhibits no distension. There is no tenderness.   Musculoskeletal: She exhibits tenderness (Left hip).   Neurological: She is alert and oriented to person, place, and time.   Skin: Skin is warm and dry. Bruising (to left hip surgical site ) and ecchymosis noted. No rash noted. She is not diaphoretic.   Psychiatric: She has a normal mood and affect. Her speech is normal. Judgment normal.   Nursing note and vitals reviewed.      Fluids    Intake/Output Summary (Last 24 hours) at 01/01/19 1314  Last data filed at 12/31/18 1500   Gross per 24 hour   Intake                0 ml   Output              150 ml   Net             -150 ml       Laboratory  Recent Labs       12/30/18 0419  12/30/18   1217  12/31/18 0407  01/01/19   0236   WBC  4.0*   --   4.4*  3.8*   RBC  2.15*   --   2.59*  2.38*   HEMOGLOBIN  6.5*  8.0*  8.0*  7.1*   HEMATOCRIT  21.4*  25.8*  25.2*  23.1*   MCV  99.5*   --   97.3  97.1   MCH  30.2   --   30.9  29.8   MCHC  30.4*   --   31.7*  30.7*   RDW  63.1*   --   65.0*  63.2*   PLATELETCT  97*   --   104*  98*   MPV  11.5   --   11.4  11.3     Recent Labs      12/30/18 0419 12/31/18 0407 01/01/19   0236   SODIUM  142  141  138   POTASSIUM  3.9  4.0  4.0   CHLORIDE  109  105  106   CO2  26  27  27   GLUCOSE  112*  123*  135*   BUN  39*  46*  45*   CREATININE  1.56*  1.56*  1.37   CALCIUM  8.0*  8.3*  8.4*                   Imaging  DX-HIP-UNILATERAL-W/O PELVIS-2/3 VIEWS LEFT   Final Result      Intraoperative fluoroscopic spot images as described above.      DX-PORTABLE FLUORO > 1 HOUR   Final Result      Intraoperative fluoroscopic spot images as described above.      CT-HIP W/O PLUS RECONS LEFT   Final Result      Acute significantly displaced intertrochanteric fracture of the left femur.      DX-FEMUR-2+ LEFT   Final Result            Acute significantly displaced intertrochanteric fracture of the left femur.           Assessment/Plan  * Femur fracture (HCC)   Assessment & Plan    Ortho consulted- surgery 12/29/2018   Pain control  PT OT evaluation recommended SNF, referral placed      Acute kidney injury (HCC)   Assessment & Plan    Creatinine improved now at 1.37  Acute kidney injury, mostly due to hypovolemia, blood loss recent surgery, nonsteroidal anti-inflammatory drugs  Stopped Toradol, allopurinol  S/p blood, watch input and output    bumetanide initially held, then restarted      Anemia- (present on admission)   Assessment & Plan    Symptomatic anemia requiring blood transfusion  Initially patient was refusing transfusion, provider had a prolonged discussion with patient and family members.  Eventually all parties agreed on blood  transfusion.    Received 2 units of blood transfusion  Metoprolol, Eliquis, initially held for surgery, then restarted.  Continue to monitor hemoglobin and transfuse as needed for hemoglobin below 7 or higher if patient is symptomatic.     Plavix held for surgery, and anemia, consider restarting when okay with surgery      Coronary artery disease- (present on admission)   Assessment & Plan    Metoprolol, rosuvastatin  Clopidogrel initially held for surgery, restart when okay with surgery      Pancytopenia (HCC)- (present on admission)   Assessment & Plan    Chronic per reccords   B12 WNL   Cont to monitor    Status post 2 units of blood transfusion       COPD (chronic obstructive pulmonary disease) (HCC)- (present on admission)   Assessment & Plan    Not in acute exacerbation   Respiratory protocol, oxygen as needed       Chronic diastolic CHF (congestive heart failure) (HCC)- (present on admission)   Assessment & Plan    Not in acute exacerbation   On metoprolol, rosuvastatin   Recieved 2 units transfusion, watching volume status closely     Bumetanide initially held then restarted      Chronic atrial fibrillation (HCC)- (present on admission)   Assessment & Plan    Metoprolol, Eliquis, initially held for surgery, then restarted       Full code status   Assessment & Plan    Discussed with patient and family by previous provider, however patient has been DNR in past. Have asked Palliative care to speak with patient to complete POLST form. Patient is currently A&O     Weakness- (present on admission)   Assessment & Plan    Diffuse weakness, needs pt/ot eval       Hypothyroidism- (present on admission)   Assessment & Plan    Resume levo      GERD (gastroesophageal reflux disease)- (present on admission)   Assessment & Plan    Omperazole          VTE prophylaxis: SCDs, Eliquis

## 2019-01-01 NOTE — PROGRESS NOTES
"   Orthopaedic Progress Note    Interval changes:  Patient doing well    Dressings changed by nursing - incisions without issue    ROS - Patient denies any new issues.  Pain well controlled.    Blood pressure 104/50, pulse 62, temperature 36.6 °C (97.8 °F), temperature source Temporal, resp. rate 16, height 1.6 m (5' 3\"), weight 62.5 kg (137 lb 12.6 oz), SpO2 100 %, not currently breastfeeding.      Patient seen and examined  No acute distress  Breathing non labored  RRR  LLE surgical dressings changed, surgical incisions are well approximated and are dry and clean.  There is no erythema, induration, or signs of infection at any of the incision sites. Patient clearly fires tibialis anterior, EHL, and gastrocnemius/soleus. Sensation is intact to light touch throughout superficial peroneal, deep peroneal, tibial, saphenous, and sural nerve distributions. Strong and palpable 2+ dorsalis pedis and posterior tibial pulses with capillary refill less than 2 seconds. No lower leg tenderness or discomfort.       Recent Labs      12/30/18   0419  12/30/18   1217  12/31/18   0407  01/01/19   0236   WBC  4.0*   --   4.4*  3.8*   RBC  2.15*   --   2.59*  2.38*   HEMOGLOBIN  6.5*  8.0*  8.0*  7.1*   HEMATOCRIT  21.4*  25.8*  25.2*  23.1*   MCV  99.5*   --   97.3  97.1   MCH  30.2   --   30.9  29.8   MCHC  30.4*   --   31.7*  30.7*   RDW  63.1*   --   65.0*  63.2*   PLATELETCT  97*   --   104*  98*   MPV  11.5   --   11.4  11.3       Active Hospital Problems    Diagnosis   • Acute kidney injury (HCC) [N17.9]     Priority: High   • Femur fracture (HCC) [S72.90XA]     Priority: High   • Anemia [D64.9]     Priority: High   • Coronary artery disease [I25.10]     Priority: Medium   • Pancytopenia (HCC) [D61.818]     Priority: Medium   • COPD (chronic obstructive pulmonary disease) (HCC) [J44.9]     Priority: Medium     Overview:   PFT 2017    Last Assessment & Plan:   Mixed obstructive and restrictive defect per spirometry with severe " reduction in diffusion capacity per PFTs 2/2017.  Continue routine follow-up with PCP in pulmonology as indicated.     • Chronic diastolic CHF (congestive heart failure) (HCC) [I50.32]     Priority: Medium     Overview:   Echo (06/14) shows normal LV size and fxn, but mod LV hypertrophy.  Has EF of 63%.  Severe diastolic dysfunction    Last Assessment & Plan:   Heart failure with preserved ejection fraction, severe diastolic dysfunction and moderate to severe TR with severe pulmonary hypertension noted per echocardiogram 2/2017.    Currently stable and euvolemic on exam today.  NYHA class II-III, stage C.  Functionally most limited by exertional shortness of breath in the setting of COPD.  Blood pressures are well controlled.  She continues to be well compensated with current diuretic therapy, torsemide 40 mg twice daily.  Routine surveillance laboratories are ordered today.    Heart failure self-management is reviewed and reinforced.  At this time, she will follow-up with [facility] cardiology on an as-needed basis as she is moving to [place] next month to be closer to family but is welcome to call in the meantime for any concerns.     • Chronic atrial fibrillation (HCC) [I48.2]     Priority: Medium     Overview:   History of AF, Coumadin initiated 03/2015.      Last Assessment & Plan:   Chronic atrial fibrillation for which patient is asymptomatic.  Rate is well controlled and she remained therapeutically anticoagulated with warfarin as monitored by the [facility] anticoagulation clinic.     • GERD (gastroesophageal reflux disease) [K21.9]     Priority: Low     Overview:   (with diaphragmatic hernia)    Last Assessment & Plan:   On omeprazole daily.     • Hypothyroidism [E03.9]     Priority: Low     Last Assessment & Plan:   On thyroid replacement, normal labs 11/2017.     • Full code status [Z78.9]   • Weakness [R53.1]       Assessment/Plan:  Incisions without issue  Cleared for DC by ortho pending medicine and  therapy clearance  POD#2 S/P Intramedullary nail, left hip  Wt bearing status - WBAT  Wound care/Drains - dressings changed every other day by nursing   Future Procedures - none planned   Lovenox: Start 12/30, Duration-until ambulatory > 150'  Sutures/Staples out- 10-14 days post operatively  PT/OT-initiated  Antibiotics: completed  DVT Prophylaxis- TEDS/SCDs/Foot pumps  Hunt-none  Case Coordination for Discharge Planning - Disposition [facility] likely

## 2019-01-01 NOTE — CONSULTS
"Reason for PC Consult: Advance Care Planning;  \"Question regarding code status as patient has previously had DNR status on prior admissions. Have asked palliative to obtain POLST for patient to clearly state her wishes\" ./APRN    Assessment:  General:   86yF admitted 12/28/18 after a GLF. Dx  Left intertrochanteric femur fracture. Surgery 12/29/18 Intramedullary nail, left hip.  Patient remains admitted and await SNF acceptance.  Notes reflect patient \"continues to have difficulty working with therapies\".    Past Medical History AFib on anticoagulation, hypothyroidism, pulmonary HTN, CAD, CHF EF 55%, GERD, leukopenia and anemia, Breast Cancer, pacemaker, kidney disease,   Social- Patient is single and lives alone, independent in all ADLs.  Patient previous hx of tobacco use, 0.50 pk/day; quit 49yrs ago. She reports that she drinks about 1.8 oz of alcohol per week . She reports that she does not use drugs.  Dyspnea: No- 100% 2.5L/NC  Last BM: 12/28/18- Per RN assessment  Pain: No- Denied  Depression: Mood appropriate for situation-    Dementia: No;       Spiritual:  Is Synagogue or spirituality important for coping with this illness? No-    Has a  or spiritual provider visit been requested? No    Palliative Performance Scale: 30%    Advance Directive: Advance Directive, DPOA-    DPOA: Yes- Palma Matias, grandchild, [phone]  POLST: None on File.  Patient decline new POLST.  She states that she has one at home.  AD on page 2 under General Statement of Authority Granted, page 3 \"Do not resuscitate\". MD notified    Code Status: Full-  Discussed in detail, DNR elected.  MD notified    Outcome:  I met with the patient at bedside.  I introduced myself, role of Palliative Care in POC and reason for visit. Patient stated that she elected DNR previously and this remains her wish still. Patient declined POLST as she stated that she has one.  All questions were answered in full, stated understanding and agreed. " Patient denied further needs at this time.    Contact for Palliative Care was provided and patient/family is encourage to call for questions, concerns and/or support.      Plan: SNF    Recommendations: Hospice/Ethics referral is inappropriate at this time as the patient is actively seeking treatment.      Updated: Dr. Jacques    Thank you for allowing Palliative Care to participate in this patient's care. Please feel free to call [phone] with any questions or concerns.

## 2019-01-01 NOTE — PROGRESS NOTES
Pt is AAOx4  Pt reports a 8/10 pain level  Medicated per MAR  VS WNL  Dressing with scant drainaige  POC discussed  All needs met at this time  Bed in low position  Call light within reach  Rounding in place

## 2019-01-01 NOTE — CARE PLAN
Problem: Safety  Goal: Will remain free from falls  Bed alarm in place.  Call light within reach.  Pt knows to call for assistance.  1-2 people when pt uses to commode    Problem: Bowel/Gastric:  Goal: Normal bowel function is maintained or improved  Pt has not had a bowel movement since 12/28/18.  Pt refusing stool softeners or suppository because she says they hurt her stomach.  Education provided.  Pt says she would like raisin brand cereal in the morning as that usually hlpes her have a BM.  Kitchen called a nd left a message for that cereal in the morning

## 2019-01-02 LAB
ANION GAP SERPL CALC-SCNC: 5 MMOL/L (ref 0–11.9)
BASOPHILS # BLD AUTO: 0.3 % (ref 0–1.8)
BASOPHILS # BLD: 0.01 K/UL (ref 0–0.12)
BUN SERPL-MCNC: 46 MG/DL (ref 8–22)
CALCIUM SERPL-MCNC: 8.6 MG/DL (ref 8.5–10.5)
CHLORIDE SERPL-SCNC: 105 MMOL/L (ref 96–112)
CO2 SERPL-SCNC: 29 MMOL/L (ref 20–33)
CREAT SERPL-MCNC: 1.39 MG/DL (ref 0.5–1.4)
EOSINOPHIL # BLD AUTO: 0 K/UL (ref 0–0.51)
EOSINOPHIL NFR BLD: 0 % (ref 0–6.9)
ERYTHROCYTE [DISTWIDTH] IN BLOOD BY AUTOMATED COUNT: 60.7 FL (ref 35.9–50)
GLUCOSE SERPL-MCNC: 98 MG/DL (ref 65–99)
HCT VFR BLD AUTO: 22.6 % (ref 37–47)
HGB BLD-MCNC: 7.2 G/DL (ref 12–16)
IMM GRANULOCYTES # BLD AUTO: 0.02 K/UL (ref 0–0.11)
IMM GRANULOCYTES NFR BLD AUTO: 0.7 % (ref 0–0.9)
LYMPHOCYTES # BLD AUTO: 0.61 K/UL (ref 1–4.8)
LYMPHOCYTES NFR BLD: 19.9 % (ref 22–41)
MCH RBC QN AUTO: 30.5 PG (ref 27–33)
MCHC RBC AUTO-ENTMCNC: 31.9 G/DL (ref 33.6–35)
MCV RBC AUTO: 95.8 FL (ref 81.4–97.8)
MONOCYTES # BLD AUTO: 0.31 K/UL (ref 0–0.85)
MONOCYTES NFR BLD AUTO: 10.1 % (ref 0–13.4)
NEUTROPHILS # BLD AUTO: 2.11 K/UL (ref 2–7.15)
NEUTROPHILS NFR BLD: 69 % (ref 44–72)
NRBC # BLD AUTO: 0 K/UL
NRBC BLD-RTO: 0 /100 WBC
PLATELET # BLD AUTO: 124 K/UL (ref 164–446)
PMV BLD AUTO: 10.6 FL (ref 9–12.9)
POTASSIUM SERPL-SCNC: 3 MMOL/L (ref 3.6–5.5)
RBC # BLD AUTO: 2.36 M/UL (ref 4.2–5.4)
SODIUM SERPL-SCNC: 139 MMOL/L (ref 135–145)
WBC # BLD AUTO: 3.1 K/UL (ref 4.8–10.8)

## 2019-01-02 PROCEDURE — 700112 HCHG RX REV CODE 229: Performed by: ORTHOPAEDIC SURGERY

## 2019-01-02 PROCEDURE — 97535 SELF CARE MNGMENT TRAINING: CPT

## 2019-01-02 PROCEDURE — A9270 NON-COVERED ITEM OR SERVICE: HCPCS | Performed by: NURSE PRACTITIONER

## 2019-01-02 PROCEDURE — 99232 SBSQ HOSP IP/OBS MODERATE 35: CPT | Performed by: INTERNAL MEDICINE

## 2019-01-02 PROCEDURE — 85025 COMPLETE CBC W/AUTO DIFF WBC: CPT

## 2019-01-02 PROCEDURE — 700102 HCHG RX REV CODE 250 W/ 637 OVERRIDE(OP): Performed by: HOSPITALIST

## 2019-01-02 PROCEDURE — 97110 THERAPEUTIC EXERCISES: CPT

## 2019-01-02 PROCEDURE — 97530 THERAPEUTIC ACTIVITIES: CPT

## 2019-01-02 PROCEDURE — 770006 HCHG ROOM/CARE - MED/SURG/GYN SEMI*

## 2019-01-02 PROCEDURE — 36415 COLL VENOUS BLD VENIPUNCTURE: CPT

## 2019-01-02 PROCEDURE — 700102 HCHG RX REV CODE 250 W/ 637 OVERRIDE(OP): Performed by: NURSE PRACTITIONER

## 2019-01-02 PROCEDURE — A9270 NON-COVERED ITEM OR SERVICE: HCPCS | Performed by: HOSPITALIST

## 2019-01-02 PROCEDURE — 700102 HCHG RX REV CODE 250 W/ 637 OVERRIDE(OP): Performed by: ORTHOPAEDIC SURGERY

## 2019-01-02 PROCEDURE — A9270 NON-COVERED ITEM OR SERVICE: HCPCS | Performed by: ORTHOPAEDIC SURGERY

## 2019-01-02 PROCEDURE — 80048 BASIC METABOLIC PNL TOTAL CA: CPT

## 2019-01-02 RX ORDER — POTASSIUM CHLORIDE 20 MEQ/1
40 TABLET, EXTENDED RELEASE ORAL ONCE
Status: COMPLETED | OUTPATIENT
Start: 2019-01-02 | End: 2019-01-02

## 2019-01-02 RX ADMIN — TOPIRAMATE 25 MG: 25 TABLET, FILM COATED ORAL at 05:58

## 2019-01-02 RX ADMIN — METOPROLOL SUCCINATE 25 MG: 25 TABLET, EXTENDED RELEASE ORAL at 05:55

## 2019-01-02 RX ADMIN — ACETAMINOPHEN 650 MG: 325 TABLET, FILM COATED ORAL at 00:03

## 2019-01-02 RX ADMIN — STANDARDIZED SENNA CONCENTRATE AND DOCUSATE SODIUM 1 TABLET: 8.6; 5 TABLET, FILM COATED ORAL at 20:39

## 2019-01-02 RX ADMIN — APIXABAN 2.5 MG: 5 TABLET, FILM COATED ORAL at 05:55

## 2019-01-02 RX ADMIN — OXYCODONE HYDROCHLORIDE 5 MG: 5 TABLET ORAL at 18:53

## 2019-01-02 RX ADMIN — SERTRALINE 50 MG: 50 TABLET, FILM COATED ORAL at 20:39

## 2019-01-02 RX ADMIN — OXYCODONE HYDROCHLORIDE 5 MG: 5 TABLET ORAL at 12:42

## 2019-01-02 RX ADMIN — ACETAMINOPHEN 650 MG: 325 TABLET, FILM COATED ORAL at 17:04

## 2019-01-02 RX ADMIN — ACETAMINOPHEN 650 MG: 325 TABLET, FILM COATED ORAL at 12:42

## 2019-01-02 RX ADMIN — DOCUSATE SODIUM 100 MG: 100 CAPSULE, LIQUID FILLED ORAL at 05:55

## 2019-01-02 RX ADMIN — OMEPRAZOLE 20 MG: 20 CAPSULE, DELAYED RELEASE ORAL at 17:03

## 2019-01-02 RX ADMIN — ACETAMINOPHEN 650 MG: 325 TABLET, FILM COATED ORAL at 05:55

## 2019-01-02 RX ADMIN — POLYETHYLENE GLYCOL 3350 1 PACKET: 17 POWDER, FOR SOLUTION ORAL at 17:03

## 2019-01-02 RX ADMIN — LEVOTHYROXINE SODIUM 50 MCG: 50 TABLET ORAL at 05:55

## 2019-01-02 RX ADMIN — METOLAZONE 2.5 MG: 2.5 TABLET ORAL at 05:58

## 2019-01-02 RX ADMIN — ROPINIROLE HYDROCHLORIDE 0.25 MG: 0.5 TABLET, FILM COATED ORAL at 05:58

## 2019-01-02 RX ADMIN — POTASSIUM CHLORIDE 40 MEQ: 1500 TABLET, EXTENDED RELEASE ORAL at 08:01

## 2019-01-02 RX ADMIN — ROPINIROLE HYDROCHLORIDE 0.25 MG: 0.5 TABLET, FILM COATED ORAL at 17:05

## 2019-01-02 RX ADMIN — ROSUVASTATIN CALCIUM 10 MG: 10 TABLET, FILM COATED ORAL at 17:03

## 2019-01-02 RX ADMIN — APIXABAN 2.5 MG: 5 TABLET, FILM COATED ORAL at 17:04

## 2019-01-02 RX ADMIN — ROPINIROLE HYDROCHLORIDE 0.25 MG: 0.5 TABLET, FILM COATED ORAL at 12:42

## 2019-01-02 RX ADMIN — OXYCODONE HYDROCHLORIDE 5 MG: 5 TABLET ORAL at 23:17

## 2019-01-02 RX ADMIN — OXYCODONE HYDROCHLORIDE 5 MG: 5 TABLET ORAL at 08:01

## 2019-01-02 RX ADMIN — ACETAMINOPHEN 650 MG: 325 TABLET, FILM COATED ORAL at 23:16

## 2019-01-02 RX ADMIN — OMEPRAZOLE 20 MG: 20 CAPSULE, DELAYED RELEASE ORAL at 05:55

## 2019-01-02 ASSESSMENT — COGNITIVE AND FUNCTIONAL STATUS - GENERAL
SUGGESTED CMS G CODE MODIFIER MOBILITY: CL
SUGGESTED CMS G CODE MODIFIER DAILY ACTIVITY: CK
TURNING FROM BACK TO SIDE WHILE IN FLAT BAD: A LITTLE
HELP NEEDED FOR BATHING: A LOT
MOVING TO AND FROM BED TO CHAIR: A LOT
DRESSING REGULAR LOWER BODY CLOTHING: A LOT
WALKING IN HOSPITAL ROOM: TOTAL
PERSONAL GROOMING: A LITTLE
DAILY ACTIVITIY SCORE: 16
MOBILITY SCORE: 13
MOVING FROM LYING ON BACK TO SITTING ON SIDE OF FLAT BED: A LITTLE
CLIMB 3 TO 5 STEPS WITH RAILING: TOTAL
TOILETING: A LOT
DRESSING REGULAR UPPER BODY CLOTHING: A LITTLE
STANDING UP FROM CHAIR USING ARMS: A LITTLE

## 2019-01-02 ASSESSMENT — ENCOUNTER SYMPTOMS
DIZZINESS: 0
ABDOMINAL PAIN: 0
HEADACHES: 0
CHILLS: 0
DEPRESSION: 0
MYALGIAS: 1
FOCAL WEAKNESS: 1
SHORTNESS OF BREATH: 0
BLURRED VISION: 0
SORE THROAT: 0
WEAKNESS: 1
DIARRHEA: 0
FEVER: 0
COUGH: 0
DOUBLE VISION: 0
VOMITING: 0

## 2019-01-02 ASSESSMENT — PAIN SCALES - GENERAL
PAINLEVEL_OUTOF10: 10
PAINLEVEL_OUTOF10: 8
PAINLEVEL_OUTOF10: 4
PAINLEVEL_OUTOF10: 3
PAINLEVEL_OUTOF10: 10
PAINLEVEL_OUTOF10: 5
PAINLEVEL_OUTOF10: 0

## 2019-01-02 ASSESSMENT — GAIT ASSESSMENTS: GAIT LEVEL OF ASSIST: UNABLE TO PARTICIPATE

## 2019-01-02 NOTE — PROGRESS NOTES
Pt is AAOx4  Pt reports a 9/10 pain level  Medicated per MAR  VS WNL  Dressing with scant drainage  POC discussed  All needs met at this time  Bed in low position  Call light within reach  Rounding in place

## 2019-01-02 NOTE — PROGRESS NOTES
Hospital Medicine Daily Progress Note    Date of Service  1/2/2019    Chief Complaint  Left hip pain after a fall    Hospital Course      86 y.o. female with past medical history of atrial fibrillation on anticoagulation, hypothyroidism, pulmonary hypertension Estimated right ventricular systolic pressure  is 55 mmHg, coronary artery disease, gastroesophageal reflux disease admitted 12/28/2018 with left hip pain and swelling after a ground-level fall.  Orthopedics consulted, found to have marked anemia with a hemoglobin of 7.6 transfused 1 unit of blood as the patient was symptomatic and she had a plan for surgery that day. Has Tolerated Intramedullary nail, left hip on 12/29/2018, noticed to have acute kidney injury, diuretics were stopped.  Nephrotoxic medications were stopped.  She needed 2 units of blood transfusion, physical and occupational therapy recommended S and F, referral placed        Interval Problem Update  1/1- Patient states some tenderness to left hip but states pain is controlled at this time. Bruising noted near dressing with dressing having serosanguineous drainage. Referrals pending for Skilled facility as patient continues to have difficulty working with therapies. Question regarding code status as patient has previously had DNR status on prior admissions. Have asked palliative to obtain POLST for patient to clearly state her wishes. Patient's Hgb decreased slightly to 7.1, will continue to watch closely.   1/2- Patient attempting to work with therapies, states she is unable to do most of exercises asked. Patient is not near baseline according to family. Pain controlled while patient is in bed, but states it hurts to move. Will need SNF placement prior to return home to help with strengthening.      Consultants/Specialty   Orthopedics    Code Status  DNR    Disposition  SNF, referral placed 12/31/2018     Review of Systems  Review of Systems   Constitutional: Negative for chills and fever.    HENT: Negative for congestion and sore throat.    Eyes: Negative for blurred vision and double vision.   Respiratory: Negative for cough and shortness of breath.    Cardiovascular: Negative for chest pain and leg swelling.   Gastrointestinal: Negative for abdominal pain, diarrhea and vomiting.   Genitourinary: Negative for dysuria and urgency.   Musculoskeletal: Positive for joint pain (left hip ) and myalgias.        Pain left hip   Neurological: Positive for focal weakness and weakness. Negative for dizziness and headaches.   Psychiatric/Behavioral: Negative for depression. Insomnia: states she did not sleep well          Physical Exam  Temp:  [36.5 °C (97.7 °F)-37.3 °C (99.1 °F)] 36.5 °C (97.7 °F)  Pulse:  [63-78] 64  Resp:  [14-16] 16  BP: (113-115)/(51-56) 113/51    Physical Exam   Constitutional: She is oriented to person, place, and time. Vital signs are normal. She appears well-developed. She is cooperative. No distress.   Pleasant thin frail elderly female sitting on side of bed working with therapies    HENT:   Head: Normocephalic and atraumatic.   Right Ear: External ear normal.   Left Ear: External ear normal.   Eyes: Conjunctivae and lids are normal. Right eye exhibits no discharge. Left eye exhibits no discharge.   Neck: Normal range of motion. No JVD present. No tracheal tenderness present.   Cardiovascular: Normal rate.  Exam reveals no gallop.    Murmur (Systolic) heard.  Pulmonary/Chest: Effort normal and breath sounds normal. No respiratory distress. She has no decreased breath sounds. She has no wheezes.   Abdominal: Soft. Normal appearance and bowel sounds are normal. She exhibits no distension. There is no tenderness.   Musculoskeletal: She exhibits tenderness (Left hip).   Neurological: She is alert and oriented to person, place, and time.   Little forgetful    Skin: Skin is warm and dry. Bruising (to left hip surgical site ) and ecchymosis noted. No rash noted. She is not diaphoretic.    Psychiatric: She has a normal mood and affect. Her speech is normal. Judgment normal.   Nursing note and vitals reviewed.      Fluids  No intake or output data in the 24 hours ending 01/02/19 1414    Laboratory  Recent Labs      12/31/18   0407  01/01/19   0236  01/02/19   0602   WBC  4.4*  3.8*  3.1*   RBC  2.59*  2.38*  2.36*   HEMOGLOBIN  8.0*  7.1*  7.2*   HEMATOCRIT  25.2*  23.1*  22.6*   MCV  97.3  97.1  95.8   MCH  30.9  29.8  30.5   MCHC  31.7*  30.7*  31.9*   RDW  65.0*  63.2*  60.7*   PLATELETCT  104*  98*  124*   MPV  11.4  11.3  10.6     Recent Labs      12/31/18   0407  01/01/19   0236  01/02/19   0602   SODIUM  141  138  139   POTASSIUM  4.0  4.0  3.0*   CHLORIDE  105  106  105   CO2  27  27  29   GLUCOSE  123*  135*  98   BUN  46*  45*  46*   CREATININE  1.56*  1.37  1.39   CALCIUM  8.3*  8.4*  8.6                   Imaging  DX-HIP-UNILATERAL-W/O PELVIS-2/3 VIEWS LEFT   Final Result      Intraoperative fluoroscopic spot images as described above.      DX-PORTABLE FLUORO > 1 HOUR   Final Result      Intraoperative fluoroscopic spot images as described above.      CT-HIP W/O PLUS RECONS LEFT   Final Result      Acute significantly displaced intertrochanteric fracture of the left femur.      DX-FEMUR-2+ LEFT   Final Result            Acute significantly displaced intertrochanteric fracture of the left femur.           Assessment/Plan  * Femur fracture (HCC)   Assessment & Plan    Ortho consulted- surgery 12/29/2018   Pain control  PT OT evaluation recommended SNF, referral placed      Acute kidney injury (HCC)   Assessment & Plan    Creatinine stable,  now at 1.39  Acute kidney injury, mostly due to hypovolemia, blood loss recent surgery, nonsteroidal anti-inflammatory drugs  Stopped Toradol, allopurinol  S/p blood, watch input and output    bumetanide initially held, then restarted      Anemia- (present on admission)   Assessment & Plan    Symptomatic anemia requiring blood transfusion  Initially  patient was refusing transfusion, provider had a prolonged discussion with patient and family members.  Eventually all parties agreed on blood transfusion.    Received 2 units of blood transfusion  Metoprolol, Eliquis, initially held for surgery, then restarted.  Continue to monitor hemoglobin and transfuse as needed for hemoglobin below 7 or higher if patient is symptomatic.     Plavix held for surgery, and anemia, consider restarting when okay with surgery      Coronary artery disease- (present on admission)   Assessment & Plan    Metoprolol, rosuvastatin  Clopidogrel initially held for surgery, restart when okay with surgery      Pancytopenia (HCC)- (present on admission)   Assessment & Plan    Chronic per reccords   B12 WNL   Cont to monitor    Status post 2 units of blood transfusion       COPD (chronic obstructive pulmonary disease) (HCC)- (present on admission)   Assessment & Plan    Not in acute exacerbation   Respiratory protocol, oxygen as needed       Chronic diastolic CHF (congestive heart failure) (HCC)- (present on admission)   Assessment & Plan    Not in acute exacerbation   On metoprolol, rosuvastatin   Recieved 2 units transfusion, watching volume status closely     Bumetanide initially held then restarted      Chronic atrial fibrillation (HCC)- (present on admission)   Assessment & Plan    Metoprolol, Eliquis, initially held for surgery, then restarted       Full code status   Assessment & Plan    Discussed with patient and family by previous provider, however patient has been DNR in past. Have asked Palliative care to speak with patient to complete POLST form. Patient is currently A&O     Weakness- (present on admission)   Assessment & Plan    Diffuse weakness, needs continued therapies   SNF placement      Hypothyroidism- (present on admission)   Assessment & Plan    Resume levo      GERD (gastroesophageal reflux disease)- (present on admission)   Assessment & Plan    Omperazole          VTE  prophylaxis: SCDs, Eliquis

## 2019-01-02 NOTE — DISCHARGE PLANNING
Anticipated Discharge Disposition: SNF    Action: Met with patient to discuss discharge plans, patient lives with grandchild Palma and her  Jose. They are present at bedside, discussed with them Skilled Rehab and they are agreeable.  List provided to them of SNF's and my contact number.  They will make choice and let me know.     Barriers to Discharge: medical clearance and pending SNF acceptance.     Plan:  SNF        PASRR [id number] and LOC [id number]

## 2019-01-02 NOTE — THERAPY
"Occupational Therapy Evaluation completed.   Functional Status:  Moderate assist for some self-care and min - mod assist with functional transfer using FWW  Plan of Care: Will benefit from Occupational Therapy 3 times per week  Discharge Recommendations:  Equipment: Will Continue to Assess for Equipment Needs. Post-acute therapy: may require post acute skilled setting depending on progress. May be able to discharge to home with outpatient or home health for additional skilled therapy services if mobility improved and family available 24 hours;   See \"Rehab Therapy-Acute\" Patient Summary Report for complete documentation.    "
"Occupational Therapy Treatment completed with focus on ADLs, ADL transfers, patient education and caregiver training.  Functional Status:  Mod A for sit>Stand and toilet transfer; mod A for LB dressing with Max VC's; mod A for toileting; max A for sit>supine  Plan of Care: Will benefit from Occupational Therapy 3 times per week  Discharge Recommendations:  Equipment Will Continue to Assess for Equipment Needs. Recommend inpatient transitional care services for continued occupational therapy services.     See \"Rehab Therapy-Acute\" Patient Summary Report for complete documentation.     Pt participated in OT tx session. Pt educated on AE available for LB dressing and required max VC's for sequencing and attention to task at hand, sequencing and cognition. Pt appears to have poor retention to new learning. Pt completed multiple functional transfers this tx session, requiring increased time, VC's for safety and hand placement. Pt with heavy BUE reliance with standing and difficulty weight shifting for transfers. Pt's granddtr present for tx session and confirms pt was I with BADLs and did not require any physical assist prior to admit. Pt has been living for granddtr for last 4 months. Pt's granddtr reports she would like pt to stay at inpatient transitional care prior to d/c home.   "
"Physical Therapy Evaluation completed.   Bed Mobility:  Supine to Sit: Minimal Assist (x2 )  Transfers: Sit to Stand: Minimal Assist  Gait: Level Of Assist: Moderate Assist with Front-Wheel Walker       Plan of Care: Will benefit from Physical Therapy 4 times per week  Discharge Recommendations: Equipment: Will Continue to Assess for Equipment Needs.     See \"Rehab Therapy-Acute\" Patient Summary Report for complete documentation.     Pt was recenlty admitted for a GLF and presented with L hip fracture. Pt is now s/p IMN of L hip and has WBAT precautions in place for LLE. Pt reports a hx of multiple falls this year. Pt presented with impaired balance, impaired gait, weakness, pain, and dec activity tolerance. Pt was able to demosntrate Min A x2 for all functional mobility at this time w/FWW use. Pt was able to stand w/FWW use and demonstrate weight shifts in a static position. Pt was primarily limited due to pain and weakness in LLE; pt presented with poor WB tolerance on LLE, able to weight shift, however, unable to demonstrate appropriate WB on LLE for ambulation; pt was able to take a few side steps towards the chair. Pt educated on seated ther-ex to assist in strengthening LLE and pain mangament. Pt was receptive to PT education. Pt will continue to benefit from skilled PT while in house, with current functional mobility will recommend post acute therapy services prior to d/c home given current objective findings, age, IPLOF, enviornmental barriers, and limtied social support. Pt reports she would like her goal to be to go home, however, pt is limited due to pain at this time, if pain is medically managed and improves in functional mobility, pt may be able to d/c home with 24/7 supervision support and  therapy. Will continue to follow.   "
"Physical Therapy Treatment completed.   Bed Mobility:  Supine to Sit: Minimal Assist  Transfers: Sit to Stand: Minimal Assist  Gait: Level Of Assist: Unable to Participate with Front-Wheel Walker       Plan of Care: Will benefit from Physical Therapy 3 times per week  Discharge Recommendations: Equipment: Will Continue to Assess for Equipment Needs. Recommend inpatient transitional care services for continued physical therapy services.      See \"Rehab Therapy-Acute\" Patient Summary Report for complete documentation.     Pt continues to progress slower than expected with functional mobility and continues to be limtied by pain. Pt was able to tolerate supine ther-ex today with less pain, however, during SLR with assitance pt reported of increased pain; Continues to demonstrate poor activiation of musculature of LLE. Pt was able to demonstrate Min A for all functional mobility today and able to improve WB tolerance on LLE, however, still non functional for ambulation. Pt will continue to benefit from skilled PT while in house, with recommendation for post acute therapy   "
"Physical Therapy Treatment completed.   Bed Mobility:  Supine to Sit: Minimal Assist (x2 )  Transfers: Sit to Stand: Moderate Assist (Max A for last stand ; attempted 3x )  Gait: Level Of Assist: Unable to Participate with Front-Wheel Walker       Plan of Care: Will benefit from Physical Therapy 4 times per week  Discharge Recommendations: Equipment: Will Continue to Assess for Equipment Needs. Recommend inpatient transitional care services for continued physical therapy services.       See \"Rehab Therapy-Acute\" Patient Summary Report for complete documentation.     Pt is progressing slower than expected with functional mobility. Pt was able to demonstrate Min to Mod A at this time with functional mobility. Pt is still unable to ambulate at this time, pt was unable to attempt short steps today. Pt educated on ther-ex and was only able to perfrom with active assisted motion. Pt was able to stand x3, with Mod A intially, and Max A for last rep; pt requires cues to bend forward and scoot to the edge of the chair for set up. Pt will continue to benefit from skilled PT while in house, with recommendation for post acute therapy prior to d/c home given current objective findings at this time.   "
OT consult received, pt pending sx for hip fx per ortho note, will see pt post-op as appropriate.    
consult received; awaiting orif intertroch femur fx; will see post as appropriate   
[place]

## 2019-01-02 NOTE — PROGRESS NOTES
"   Orthopaedic Progress Note    Interval changes:  Patient doing well    Dressings changed continued serosanguinous exudate from proximal incision - recommend holding anticoagulants for 48hrs    ROS - Patient denies any new issues.  Pain well controlled.    Blood pressure 113/51, pulse 64, temperature 36.5 °C (97.7 °F), temperature source Temporal, resp. rate 16, height 1.6 m (5' 3\"), weight 62.5 kg (137 lb 12.6 oz), SpO2 99 %, not currently breastfeeding.      Patient seen and examined  No acute distress  Breathing non labored  RRR  LLE dressings changed, surgical incisions are well approximated and clean.  Proximal incision with serosanguinous exudate.  There is no erythema, induration, or signs of infection at any of the incision sites. Patient clearly fires tibialis anterior, EHL, and gastrocnemius/soleus. Sensation is intact to light touch throughout superficial peroneal, deep peroneal, tibial, saphenous, and sural nerve distributions. Strong and palpable 2+ dorsalis pedis and posterior tibial pulses with capillary refill less than 2 seconds. No lower leg tenderness or discomfort.       Recent Labs      12/31/18   0407  01/01/19   0236  01/02/19   0602   WBC  4.4*  3.8*  3.1*   RBC  2.59*  2.38*  2.36*   HEMOGLOBIN  8.0*  7.1*  7.2*   HEMATOCRIT  25.2*  23.1*  22.6*   MCV  97.3  97.1  95.8   MCH  30.9  29.8  30.5   MCHC  31.7*  30.7*  31.9*   RDW  65.0*  63.2*  60.7*   PLATELETCT  104*  98*  124*   MPV  11.4  11.3  10.6       Active Hospital Problems    Diagnosis   • Acute kidney injury (HCC) [N17.9]     Priority: High   • Femur fracture (HCC) [S72.90XA]     Priority: High   • Anemia [D64.9]     Priority: High   • Coronary artery disease [I25.10]     Priority: Medium   • Pancytopenia (HCC) [D61.818]     Priority: Medium   • COPD (chronic obstructive pulmonary disease) (HCC) [J44.9]     Priority: Medium     Overview:   PFT 2017    Last Assessment & Plan:   Mixed obstructive and restrictive defect per spirometry " with severe reduction in diffusion capacity per PFTs 2/2017.  Continue routine follow-up with PCP in pulmonology as indicated.     • Chronic diastolic CHF (congestive heart failure) (HCC) [I50.32]     Priority: Medium     Overview:   Echo (06/14) shows normal LV size and fxn, but mod LV hypertrophy.  Has EF of 63%.  Severe diastolic dysfunction    Last Assessment & Plan:   Heart failure with preserved ejection fraction, severe diastolic dysfunction and moderate to severe TR with severe pulmonary hypertension noted per echocardiogram 2/2017.    Currently stable and euvolemic on exam today.  NYHA class II-III, stage C.  Functionally most limited by exertional shortness of breath in the setting of COPD.  Blood pressures are well controlled.  She continues to be well compensated with current diuretic therapy, torsemide 40 mg twice daily.  Routine surveillance laboratories are ordered today.    Heart failure self-management is reviewed and reinforced.  At this time, she will follow-up with [facility] cardiology on an as-needed basis as she is moving to [place] next month to be closer to family but is welcome to call in the meantime for any concerns.     • Chronic atrial fibrillation (HCC) [I48.2]     Priority: Medium     Overview:   History of AF, Coumadin initiated 03/2015.      Last Assessment & Plan:   Chronic atrial fibrillation for which patient is asymptomatic.  Rate is well controlled and she remained therapeutically anticoagulated with warfarin as monitored by the [facility] anticoagulation clinic.     • GERD (gastroesophageal reflux disease) [K21.9]     Priority: Low     Overview:   (with diaphragmatic hernia)    Last Assessment & Plan:   On omeprazole daily.     • Hypothyroidism [E03.9]     Priority: Low     Last Assessment & Plan:   On thyroid replacement, normal labs 11/2017.     • Full code status [Z78.9]   • Weakness [R53.1]       Assessment/Plan:  Recommend holding anticoagulants for 48 hours due to exudate  from proximal incision  Cleared for DC by ortho pending medicine and therapy clearance  POD#4 S/P Intramedullary nail, left hip  Wt bearing status - WBAT  Wound care/Drains - dressings changed every other day by nursing   Future Procedures - none planned   Lovenox: Start 12/30, Duration-until ambulatory > 150'  Sutures/Staples out- 10-14 days post operatively  PT/OT-initiated  Antibiotics: completed  DVT Prophylaxis- TEDS/SCDs/Foot pumps  Hunt-none  Case Coordination for Discharge Planning - Disposition [facility] likely

## 2019-01-02 NOTE — CARE PLAN
Problem: Skin Integrity  Goal: Risk for impaired skin integrity will decrease  Pt refusing Q 2 turns.  Pt able to reposition herself.  Pt encouraged to reposition herself frequently.  Waffle overlay placed on bed    Problem: Mobility  Goal: Risk for activity intolerance will decrease  Pt uses the bedside commode rather then the bed pan.  Mobility encouraged.  Provide pt rests periods when mobilizing

## 2019-01-02 NOTE — DISCHARGE PLANNING
Care Transition Team Assessment    Information Source  Orientation : Oriented x 4  Information Given By: Patient  Informant's Name: Andree Egan  Who is responsible for making decisions for patient? : Patient    Readmission Evaluation  Is this a readmission?: No    Elopement Risk  Legal Hold: No  Ambulatory or Self Mobile in Wheelchair: No-Not an Elopement Risk  Elopement Risk: Not at Risk for Elopement    Interdisciplinary Discharge Planning  Lives with - Patient's Self Care Capacity: Adult Children, Other (Comments) (grand daughter and grand daughters' )  Patient or legal guardian wants to designate a caregiver (see row info): No  Housing / Facility: [address] [facility]  Prior Services: Intermittent Physical Support for ADL Per Family, Home-Independent    Discharge Preparedness  What is your plan after discharge?: Skilled nursing facility  What are your discharge supports?: Child  Prior Functional Level: Needs Assist with Activities of Daily Living  Difficulity with ADLs: None  Difficulity with IADLs: Cooking, Driving    Functional Assesment  Prior Functional Level: Needs Assist with Activities of Daily Living    Finances  Financial Barriers to Discharge: No  Prescription Coverage: Yes    Vision / Hearing Impairment  Vision Impairment : No (reading glasses)  Hearing Impairment : No    Values / Beliefs / Concerns  Values / Beliefs Concerns : Yes  Spiritual Requests During Hospitalization: Yes    Advance Directive  Advance Directive?: DPOA for Health Care    Domestic Abuse  Have you ever been the victim of abuse or violence?: No    Psychological Assessment  History of Substance Abuse: None  History of Psychiatric Problems: No  Non-compliant with Treatment: No  Newly Diagnosed Illness: No    Discharge Risks or Barriers  Discharge risks or barriers?: No    Anticipated Discharge Information  Anticipated discharge disposition: SNF  Discharge Contact Phone Number:  ([phone])

## 2019-01-02 NOTE — PROGRESS NOTES
"   Orthopaedic Progress Note    Interval changes:  Patient doing well    Dressings changed by nursing - incisions without issue    ROS - Patient denies any new issues.  Pain well controlled.    Blood pressure 115/56, pulse 78, temperature 36.7 °C (98.1 °F), temperature source Temporal, resp. rate 16, height 1.6 m (5' 3\"), weight 62.5 kg (137 lb 12.6 oz), SpO2 100 %, not currently breastfeeding.      Patient seen and examined  No acute distress  Breathing non labored  RRR  LLE surgical dressings changed, surgical incisions are well approximated and are dry and clean.  There is no erythema, induration, or signs of infection at any of the incision sites. Patient clearly fires tibialis anterior, EHL, and gastrocnemius/soleus. Sensation is intact to light touch throughout superficial peroneal, deep peroneal, tibial, saphenous, and sural nerve distributions. Strong and palpable 2+ dorsalis pedis and posterior tibial pulses with capillary refill less than 2 seconds. No lower leg tenderness or discomfort.       Recent Labs      12/30/18   0419  12/30/18   1217  12/31/18   0407  01/01/19   0236   WBC  4.0*   --   4.4*  3.8*   RBC  2.15*   --   2.59*  2.38*   HEMOGLOBIN  6.5*  8.0*  8.0*  7.1*   HEMATOCRIT  21.4*  25.8*  25.2*  23.1*   MCV  99.5*   --   97.3  97.1   MCH  30.2   --   30.9  29.8   MCHC  30.4*   --   31.7*  30.7*   RDW  63.1*   --   65.0*  63.2*   PLATELETCT  97*   --   104*  98*   MPV  11.5   --   11.4  11.3       Active Hospital Problems    Diagnosis   • Acute kidney injury (HCC) [N17.9]     Priority: High   • Femur fracture (HCC) [S72.90XA]     Priority: High   • Anemia [D64.9]     Priority: High   • Coronary artery disease [I25.10]     Priority: Medium   • Pancytopenia (HCC) [D61.818]     Priority: Medium   • COPD (chronic obstructive pulmonary disease) (HCC) [J44.9]     Priority: Medium     Overview:   PFT 2017    Last Assessment & Plan:   Mixed obstructive and restrictive defect per spirometry with severe " reduction in diffusion capacity per PFTs 2/2017.  Continue routine follow-up with PCP in pulmonology as indicated.     • Chronic diastolic CHF (congestive heart failure) (HCC) [I50.32]     Priority: Medium     Overview:   Echo (06/14) shows normal LV size and fxn, but mod LV hypertrophy.  Has EF of 63%.  Severe diastolic dysfunction    Last Assessment & Plan:   Heart failure with preserved ejection fraction, severe diastolic dysfunction and moderate to severe TR with severe pulmonary hypertension noted per echocardiogram 2/2017.    Currently stable and euvolemic on exam today.  NYHA class II-III, stage C.  Functionally most limited by exertional shortness of breath in the setting of COPD.  Blood pressures are well controlled.  She continues to be well compensated with current diuretic therapy, torsemide 40 mg twice daily.  Routine surveillance laboratories are ordered today.    Heart failure self-management is reviewed and reinforced.  At this time, she will follow-up with [facility] cardiology on an as-needed basis as she is moving to [place] next month to be closer to family but is welcome to call in the meantime for any concerns.     • Chronic atrial fibrillation (HCC) [I48.2]     Priority: Medium     Overview:   History of AF, Coumadin initiated 03/2015.      Last Assessment & Plan:   Chronic atrial fibrillation for which patient is asymptomatic.  Rate is well controlled and she remained therapeutically anticoagulated with warfarin as monitored by the [facility] anticoagulation clinic.     • GERD (gastroesophageal reflux disease) [K21.9]     Priority: Low     Overview:   (with diaphragmatic hernia)    Last Assessment & Plan:   On omeprazole daily.     • Hypothyroidism [E03.9]     Priority: Low     Last Assessment & Plan:   On thyroid replacement, normal labs 11/2017.     • Full code status [Z78.9]   • Weakness [R53.1]       Assessment/Plan:  Incisions without issue  Cleared for DC by ortho pending medicine and  therapy clearance  POD#3 S/P Intramedullary nail, left hip  Wt bearing status - WBAT  Wound care/Drains - dressings changed every other day by nursing   Future Procedures - none planned   Lovenox: Start 12/30, Duration-until ambulatory > 150'  Sutures/Staples out- 10-14 days post operatively  PT/OT-initiated  Antibiotics: completed  DVT Prophylaxis- TEDS/SCDs/Foot pumps  Hunt-none  Case Coordination for Discharge Planning - Disposition [facility] likely

## 2019-01-03 PROBLEM — Z78.9 FULL CODE STATUS: Status: RESOLVED | Noted: 2018-12-29 | Resolved: 2019-01-03

## 2019-01-03 LAB
ANION GAP SERPL CALC-SCNC: 5 MMOL/L (ref 0–11.9)
BUN SERPL-MCNC: 48 MG/DL (ref 8–22)
CALCIUM SERPL-MCNC: 8.4 MG/DL (ref 8.5–10.5)
CHLORIDE SERPL-SCNC: 106 MMOL/L (ref 96–112)
CO2 SERPL-SCNC: 30 MMOL/L (ref 20–33)
CREAT SERPL-MCNC: 1.13 MG/DL (ref 0.5–1.4)
ERYTHROCYTE [DISTWIDTH] IN BLOOD BY AUTOMATED COUNT: 62 FL (ref 35.9–50)
GLUCOSE SERPL-MCNC: 110 MG/DL (ref 65–99)
HCT VFR BLD AUTO: 24.1 % (ref 37–47)
HGB BLD-MCNC: 7.4 G/DL (ref 12–16)
MCH RBC QN AUTO: 30.1 PG (ref 27–33)
MCHC RBC AUTO-ENTMCNC: 30.7 G/DL (ref 33.6–35)
MCV RBC AUTO: 98 FL (ref 81.4–97.8)
PLATELET # BLD AUTO: 126 K/UL (ref 164–446)
PMV BLD AUTO: 9.9 FL (ref 9–12.9)
POTASSIUM SERPL-SCNC: 3.5 MMOL/L (ref 3.6–5.5)
RBC # BLD AUTO: 2.46 M/UL (ref 4.2–5.4)
SODIUM SERPL-SCNC: 141 MMOL/L (ref 135–145)
WBC # BLD AUTO: 3.4 K/UL (ref 4.8–10.8)

## 2019-01-03 PROCEDURE — A9270 NON-COVERED ITEM OR SERVICE: HCPCS | Performed by: HOSPITALIST

## 2019-01-03 PROCEDURE — 36415 COLL VENOUS BLD VENIPUNCTURE: CPT

## 2019-01-03 PROCEDURE — 700112 HCHG RX REV CODE 229: Performed by: ORTHOPAEDIC SURGERY

## 2019-01-03 PROCEDURE — 700102 HCHG RX REV CODE 250 W/ 637 OVERRIDE(OP): Performed by: NURSE PRACTITIONER

## 2019-01-03 PROCEDURE — 85027 COMPLETE CBC AUTOMATED: CPT

## 2019-01-03 PROCEDURE — 700102 HCHG RX REV CODE 250 W/ 637 OVERRIDE(OP): Performed by: INTERNAL MEDICINE

## 2019-01-03 PROCEDURE — 700102 HCHG RX REV CODE 250 W/ 637 OVERRIDE(OP): Performed by: ORTHOPAEDIC SURGERY

## 2019-01-03 PROCEDURE — A9270 NON-COVERED ITEM OR SERVICE: HCPCS | Performed by: ORTHOPAEDIC SURGERY

## 2019-01-03 PROCEDURE — 700102 HCHG RX REV CODE 250 W/ 637 OVERRIDE(OP): Performed by: HOSPITALIST

## 2019-01-03 PROCEDURE — 80048 BASIC METABOLIC PNL TOTAL CA: CPT

## 2019-01-03 PROCEDURE — A9270 NON-COVERED ITEM OR SERVICE: HCPCS | Performed by: INTERNAL MEDICINE

## 2019-01-03 PROCEDURE — A9270 NON-COVERED ITEM OR SERVICE: HCPCS | Performed by: NURSE PRACTITIONER

## 2019-01-03 PROCEDURE — 99232 SBSQ HOSP IP/OBS MODERATE 35: CPT | Performed by: INTERNAL MEDICINE

## 2019-01-03 PROCEDURE — 770006 HCHG ROOM/CARE - MED/SURG/GYN SEMI*

## 2019-01-03 RX ORDER — FLUTICASONE PROPIONATE 110 UG/1
1 AEROSOL, METERED RESPIRATORY (INHALATION) DAILY
Status: DISCONTINUED | OUTPATIENT
Start: 2019-01-03 | End: 2019-01-07 | Stop reason: HOSPADM

## 2019-01-03 RX ORDER — BUMETANIDE 1 MG/1
1 TABLET ORAL DAILY
Status: DISCONTINUED | OUTPATIENT
Start: 2019-01-04 | End: 2019-01-07 | Stop reason: HOSPADM

## 2019-01-03 RX ORDER — POTASSIUM CHLORIDE 20 MEQ/1
20 TABLET, EXTENDED RELEASE ORAL ONCE
Status: COMPLETED | OUTPATIENT
Start: 2019-01-03 | End: 2019-01-03

## 2019-01-03 RX ADMIN — OXYCODONE HYDROCHLORIDE 5 MG: 5 TABLET ORAL at 12:48

## 2019-01-03 RX ADMIN — STANDARDIZED SENNA CONCENTRATE AND DOCUSATE SODIUM 1 TABLET: 8.6; 5 TABLET, FILM COATED ORAL at 20:44

## 2019-01-03 RX ADMIN — LEVOTHYROXINE SODIUM 50 MCG: 50 TABLET ORAL at 04:30

## 2019-01-03 RX ADMIN — OMEPRAZOLE 20 MG: 20 CAPSULE, DELAYED RELEASE ORAL at 16:40

## 2019-01-03 RX ADMIN — ROPINIROLE HYDROCHLORIDE 0.25 MG: 0.5 TABLET, FILM COATED ORAL at 16:40

## 2019-01-03 RX ADMIN — ACETAMINOPHEN 650 MG: 325 TABLET, FILM COATED ORAL at 23:25

## 2019-01-03 RX ADMIN — METOPROLOL SUCCINATE 25 MG: 25 TABLET, EXTENDED RELEASE ORAL at 04:31

## 2019-01-03 RX ADMIN — DOCUSATE SODIUM 100 MG: 100 CAPSULE, LIQUID FILLED ORAL at 04:29

## 2019-01-03 RX ADMIN — OXYCODONE HYDROCHLORIDE 5 MG: 5 TABLET ORAL at 04:26

## 2019-01-03 RX ADMIN — ROPINIROLE HYDROCHLORIDE 0.25 MG: 0.5 TABLET, FILM COATED ORAL at 12:50

## 2019-01-03 RX ADMIN — SERTRALINE 50 MG: 50 TABLET, FILM COATED ORAL at 20:44

## 2019-01-03 RX ADMIN — BUMETANIDE 1 MG: 1 TABLET ORAL at 06:00

## 2019-01-03 RX ADMIN — ACETAMINOPHEN 650 MG: 325 TABLET, FILM COATED ORAL at 04:28

## 2019-01-03 RX ADMIN — METOLAZONE 2.5 MG: 2.5 TABLET ORAL at 04:37

## 2019-01-03 RX ADMIN — OXYCODONE HYDROCHLORIDE 5 MG: 5 TABLET ORAL at 16:23

## 2019-01-03 RX ADMIN — APIXABAN 2.5 MG: 5 TABLET, FILM COATED ORAL at 04:29

## 2019-01-03 RX ADMIN — ROSUVASTATIN CALCIUM 10 MG: 10 TABLET, FILM COATED ORAL at 16:39

## 2019-01-03 RX ADMIN — BISACODYL 10 MG: 10 SUPPOSITORY RECTAL at 16:28

## 2019-01-03 RX ADMIN — OXYCODONE HYDROCHLORIDE 5 MG: 5 TABLET ORAL at 07:51

## 2019-01-03 RX ADMIN — ROPINIROLE HYDROCHLORIDE 0.25 MG: 0.5 TABLET, FILM COATED ORAL at 04:37

## 2019-01-03 RX ADMIN — OXYCODONE HYDROCHLORIDE 5 MG: 5 TABLET ORAL at 20:57

## 2019-01-03 RX ADMIN — POTASSIUM CHLORIDE 20 MEQ: 1500 TABLET, EXTENDED RELEASE ORAL at 08:57

## 2019-01-03 RX ADMIN — FLUTICASONE PROPIONATE 110 MCG: 110 AEROSOL, METERED RESPIRATORY (INHALATION) at 12:56

## 2019-01-03 RX ADMIN — ACETAMINOPHEN 650 MG: 325 TABLET, FILM COATED ORAL at 12:47

## 2019-01-03 RX ADMIN — OMEPRAZOLE 20 MG: 20 CAPSULE, DELAYED RELEASE ORAL at 04:32

## 2019-01-03 RX ADMIN — TOPIRAMATE 25 MG: 25 TABLET, FILM COATED ORAL at 04:32

## 2019-01-03 RX ADMIN — TOPIRAMATE 25 MG: 25 TABLET, FILM COATED ORAL at 16:42

## 2019-01-03 ASSESSMENT — ENCOUNTER SYMPTOMS
COUGH: 0
SHORTNESS OF BREATH: 0
HEADACHES: 0
FEVER: 0
DOUBLE VISION: 0
VOMITING: 0
DEPRESSION: 0
ABDOMINAL PAIN: 0
SORE THROAT: 0
CHILLS: 0
FOCAL WEAKNESS: 1
BLURRED VISION: 0
MYALGIAS: 1
DIZZINESS: 0
DIARRHEA: 0
WEAKNESS: 1

## 2019-01-03 ASSESSMENT — PAIN SCALES - GENERAL
PAINLEVEL_OUTOF10: 4
PAINLEVEL_OUTOF10: 3
PAINLEVEL_OUTOF10: 10
PAINLEVEL_OUTOF10: 8
PAINLEVEL_OUTOF10: 6

## 2019-01-03 NOTE — CARE PLAN
Problem: Safety  Goal: Will remain free from falls  Bed alarm is in use.  Patient is compliant with use of call light and waits for staff assistance.  Frequent rounding.

## 2019-01-03 NOTE — CARE PLAN
Problem: Mobility  Goal: Risk for activity intolerance will decrease  Patient is up with assist of 2 and a walker.  Patient is really having difficulty moving.

## 2019-01-03 NOTE — PROGRESS NOTES
Hospital Medicine Daily Progress Note    Date of Service  1/3/2019    Chief Complaint  Left hip pain after a fall    Hospital Course      86 y.o. female with past medical history of atrial fibrillation on anticoagulation, hypothyroidism, pulmonary hypertension Estimated right ventricular systolic pressure  is 55 mmHg, coronary artery disease, gastroesophageal reflux disease admitted 12/28/2018 with left hip pain and swelling after a ground-level fall.  Orthopedics consulted, found to have marked anemia with a hemoglobin of 7.6 transfused 1 unit of blood as the patient was symptomatic and she had a plan for surgery that day. Has Tolerated Intramedullary nail, left hip on 12/29/2018, noticed to have acute kidney injury, diuretics were stopped.  Nephrotoxic medications were stopped.  She needed 2 units of blood transfusion, physical and occupational therapy recommended S and [facility], referral placed        Interval Problem Update  1/1- Patient states some tenderness to left hip but states pain is controlled at this time. Bruising noted near dressing with dressing having serosanguineous drainage. Referrals pending for Skilled facility as patient continues to have difficulty working with therapies. Question regarding code status as patient has previously had DNR status on prior admissions. Have asked palliative to obtain POLST for patient to clearly state her wishes. Patient's Hgb decreased slightly to 7.1, will continue to watch closely.   1/2- Patient attempting to work with therapies, states she is unable to do most of exercises asked. Patient is not near baseline according to family. Pain controlled while patient is in bed, but states it hurts to move. Will need SNF placement prior to return home to help with strengthening.    1/3- Patient upset this morning regarding when bumex is ordered, have adjusted medication. Patient continues to have drainage at surgical site, will hold anticoagulants per ortho recommendations.  Scheduled to start back on Eliquis on 1/5. Patient continues to require significant help with ADL's and mobility, SNF pending acceptance.     Consultants/Specialty   Orthopedics    Code Status  DNR    Disposition  SNF, referral placed 12/31/2018     Review of Systems  Review of Systems   Constitutional: Negative for chills and fever.   HENT: Positive for congestion. Negative for sore throat.    Eyes: Negative for blurred vision and double vision.   Respiratory: Negative for cough and shortness of breath.    Cardiovascular: Negative for chest pain and leg swelling.   Gastrointestinal: Negative for abdominal pain, diarrhea and vomiting.   Genitourinary: Positive for frequency. Negative for dysuria and urgency.   Musculoskeletal: Positive for joint pain (left hip ) and myalgias.        Pain left hip and buttocks   Neurological: Positive for focal weakness and weakness. Negative for dizziness and headaches.   Psychiatric/Behavioral: Negative for depression. Insomnia: states she did not sleep well          Physical Exam  Temp:  [36.6 °C (97.9 °F)-37.4 °C (99.4 °F)] 36.6 °C (97.9 °F)  Pulse:  [65-78] 68  Resp:  [15-16] 16  BP: (111-152)/(53-67) 114/53    Physical Exam   Constitutional: She is oriented to person, place, and time. Vital signs are normal. She appears well-developed. No distress. Nasal cannula in place.   Thin frail elderly female sitting in chair in no acute distress    HENT:   Head: Normocephalic and atraumatic.   Right Ear: External ear normal.   Left Ear: External ear normal.   Eyes: Conjunctivae and lids are normal. Right eye exhibits no discharge. Left eye exhibits no discharge.   Neck: Normal range of motion. No tracheal tenderness present.   Cardiovascular: Normal rate.  Exam reveals no gallop.    Murmur (Systolic) heard.  Pulmonary/Chest: Effort normal and breath sounds normal. No respiratory distress. She has no decreased breath sounds.   Abdominal: Soft. Normal appearance and bowel sounds are  normal. She exhibits no distension. There is no tenderness.   Musculoskeletal: She exhibits tenderness (Left hip).   Weakness noted to left leg    Neurological: She is alert and oriented to person, place, and time.   Little forgetful    Skin: Skin is warm and dry. Bruising (to left hip surgical site ) and ecchymosis noted. No rash noted. She is not diaphoretic.   Dressing saturated with serosanguinous drainage   Psychiatric: Her speech is normal. Judgment normal. Her affect is labile.   Patient upset this am with bumex order, does not want to take it to early or late and requests we change to once a day    Nursing note and vitals reviewed.      Fluids    Intake/Output Summary (Last 24 hours) at 01/03/19 1051  Last data filed at 01/02/19 2030   Gross per 24 hour   Intake              240 ml   Output                0 ml   Net              240 ml       Laboratory  Recent Labs      01/01/19   0236  01/02/19   0602  01/03/19   0424   WBC  3.8*  3.1*  3.4*   RBC  2.38*  2.36*  2.46*   HEMOGLOBIN  7.1*  7.2*  7.4*   HEMATOCRIT  23.1*  22.6*  24.1*   MCV  97.1  95.8  98.0*   MCH  29.8  30.5  30.1   MCHC  30.7*  31.9*  30.7*   RDW  63.2*  60.7*  62.0*   PLATELETCT  98*  124*  126*   MPV  11.3  10.6  9.9     Recent Labs      01/01/19   0236  01/02/19   0602  01/03/19   0424   SODIUM  138  139  141   POTASSIUM  4.0  3.0*  3.5*   CHLORIDE  106  105  106   CO2  27  29  30   GLUCOSE  135*  98  110*   BUN  45*  46*  48*   CREATININE  1.37  1.39  1.13   CALCIUM  8.4*  8.6  8.4*                   Imaging  DX-HIP-UNILATERAL-W/O PELVIS-2/3 VIEWS LEFT   Final Result      Intraoperative fluoroscopic spot images as described above.      DX-PORTABLE FLUORO > 1 HOUR   Final Result      Intraoperative fluoroscopic spot images as described above.      CT-HIP W/O PLUS RECONS LEFT   Final Result      Acute significantly displaced intertrochanteric fracture of the left femur.      DX-FEMUR-2+ LEFT   Final Result            Acute significantly  displaced intertrochanteric fracture of the left femur.           Assessment/Plan  * Femur fracture (HCC)- (present on admission)   Assessment & Plan    Ortho consulted- surgery 12/29/2018   Pain control  PT OT evaluation recommended SNF, referral placed      Acute kidney injury (HCC)   Assessment & Plan    Creatinine stable,  now at 1.13  Acute kidney injury, mostly due to hypovolemia, blood loss recent surgery, nonsteroidal anti-inflammatory drugs  Stopped Toradol, allopurinol  S/p blood, watch input and output    bumetanide initially held, then restarted      Anemia- (present on admission)   Assessment & Plan    Symptomatic anemia requiring blood transfusion  Initially patient was refusing transfusion, provider had a prolonged discussion with patient and family members.  Eventually all parties agreed on blood transfusion.    Received 2 units of blood transfusion  Metoprolol, Eliquis, initially held for surgery, then restarted, now held for 48 hours due to drainage from surgical site   Continue to monitor hemoglobin and transfuse as needed for hemoglobin below 7 or higher if patient is symptomatic.     Plavix held for surgery, and anemia, consider restarting when okay with surgery      Coronary artery disease- (present on admission)   Assessment & Plan    Metoprolol, rosuvastatin  Clopidogrel initially held for surgery, restart when okay with surgery      Pancytopenia (HCC)- (present on admission)   Assessment & Plan    Chronic per reccords   B12 WNL   Cont to monitor    Status post 2 units of blood transfusion       COPD (chronic obstructive pulmonary disease) ([facility])- (present on admission)   Assessment & Plan    Not in acute exacerbation   Respiratory protocol, oxygen as needed       Chronic diastolic CHF (congestive heart failure) ([facility])- (present on admission)   Assessment & Plan    Not in acute exacerbation   On metoprolol, rosuvastatin   Recieved 2 units transfusion, watching volume status closely      Bumetanide initially held then restarted      Chronic atrial fibrillation (HCC)- (present on admission)   Assessment & Plan    Metoprolol, Eliquis, initially held for surgery, then restarted, now being held for 48 hours due to increased drainage at surgical site       Weakness- (present on admission)   Assessment & Plan    Diffuse weakness, needs continued therapies   SNF placement, awaiting acceptance      DNR (do not resuscitate)- (present on admission)   Assessment & Plan    Discussion with patient and family.      Hypothyroidism- (present on admission)   Assessment & Plan    Resume levo      GERD (gastroesophageal reflux disease)- (present on admission)   Assessment & Plan    Omperazole          VTE prophylaxis: SCDs, Eliquis on hold for 48 hours

## 2019-01-03 NOTE — DISCHARGE PLANNING
Anticipated Discharge Disposition: SNF    Action: Met with family received choice for SNF #1 [place] #2 [facility] and #3 [facility], verbal choice form faxed to [facility]    Barriers to Discharge: medical clearance and pending SNF acceptance.    Plan: SNF

## 2019-01-03 NOTE — PROGRESS NOTES
Pt incision on upper left hip still bleeding/has discharge. NOTIFIED ORTHO PA Mike and Hospitalist services

## 2019-01-04 ENCOUNTER — APPOINTMENT (OUTPATIENT)
Dept: RADIOLOGY | Facility: MEDICAL CENTER | Age: 84
DRG: 480 | End: 2019-01-04
Attending: HOSPITALIST
Payer: MEDICARE

## 2019-01-04 ENCOUNTER — TELEPHONE (OUTPATIENT)
Dept: HEMATOLOGY ONCOLOGY | Facility: MEDICAL CENTER | Age: 84
End: 2019-01-04

## 2019-01-04 LAB
ANION GAP SERPL CALC-SCNC: 6 MMOL/L (ref 0–11.9)
BASOPHILS # BLD AUTO: 0.6 % (ref 0–1.8)
BASOPHILS # BLD: 0.02 K/UL (ref 0–0.12)
BUN SERPL-MCNC: 45 MG/DL (ref 8–22)
CALCIUM SERPL-MCNC: 8.5 MG/DL (ref 8.5–10.5)
CHLORIDE SERPL-SCNC: 105 MMOL/L (ref 96–112)
CO2 SERPL-SCNC: 28 MMOL/L (ref 20–33)
CREAT SERPL-MCNC: 1.22 MG/DL (ref 0.5–1.4)
EOSINOPHIL # BLD AUTO: 0 K/UL (ref 0–0.51)
EOSINOPHIL NFR BLD: 0 % (ref 0–6.9)
ERYTHROCYTE [DISTWIDTH] IN BLOOD BY AUTOMATED COUNT: 60.6 FL (ref 35.9–50)
GLUCOSE SERPL-MCNC: 104 MG/DL (ref 65–99)
HCT VFR BLD AUTO: 23.7 % (ref 37–47)
HGB BLD-MCNC: 7.4 G/DL (ref 12–16)
IMM GRANULOCYTES # BLD AUTO: 0.02 K/UL (ref 0–0.11)
IMM GRANULOCYTES NFR BLD AUTO: 0.6 % (ref 0–0.9)
LYMPHOCYTES # BLD AUTO: 0.64 K/UL (ref 1–4.8)
LYMPHOCYTES NFR BLD: 17.7 % (ref 22–41)
MAGNESIUM SERPL-MCNC: 2.4 MG/DL (ref 1.5–2.5)
MCH RBC QN AUTO: 30.5 PG (ref 27–33)
MCHC RBC AUTO-ENTMCNC: 31.2 G/DL (ref 33.6–35)
MCV RBC AUTO: 97.5 FL (ref 81.4–97.8)
MONOCYTES # BLD AUTO: 0.35 K/UL (ref 0–0.85)
MONOCYTES NFR BLD AUTO: 9.7 % (ref 0–13.4)
NEUTROPHILS # BLD AUTO: 2.59 K/UL (ref 2–7.15)
NEUTROPHILS NFR BLD: 71.4 % (ref 44–72)
NRBC # BLD AUTO: 0 K/UL
NRBC BLD-RTO: 0 /100 WBC
PHOSPHATE SERPL-MCNC: 3.4 MG/DL (ref 2.5–4.5)
PLATELET # BLD AUTO: 136 K/UL (ref 164–446)
PMV BLD AUTO: 10.4 FL (ref 9–12.9)
POTASSIUM SERPL-SCNC: 3.3 MMOL/L (ref 3.6–5.5)
RBC # BLD AUTO: 2.43 M/UL (ref 4.2–5.4)
SODIUM SERPL-SCNC: 139 MMOL/L (ref 135–145)
TROPONIN I SERPL-MCNC: 0.04 NG/ML (ref 0–0.04)
WBC # BLD AUTO: 3.6 K/UL (ref 4.8–10.8)

## 2019-01-04 PROCEDURE — A9270 NON-COVERED ITEM OR SERVICE: HCPCS | Performed by: HOSPITALIST

## 2019-01-04 PROCEDURE — 93010 ELECTROCARDIOGRAM REPORT: CPT | Performed by: INTERNAL MEDICINE

## 2019-01-04 PROCEDURE — 700102 HCHG RX REV CODE 250 W/ 637 OVERRIDE(OP): Performed by: NURSE PRACTITIONER

## 2019-01-04 PROCEDURE — 84100 ASSAY OF PHOSPHORUS: CPT

## 2019-01-04 PROCEDURE — 80048 BASIC METABOLIC PNL TOTAL CA: CPT

## 2019-01-04 PROCEDURE — 700112 HCHG RX REV CODE 229: Performed by: ORTHOPAEDIC SURGERY

## 2019-01-04 PROCEDURE — 700102 HCHG RX REV CODE 250 W/ 637 OVERRIDE(OP): Performed by: HOSPITALIST

## 2019-01-04 PROCEDURE — A9270 NON-COVERED ITEM OR SERVICE: HCPCS | Performed by: ORTHOPAEDIC SURGERY

## 2019-01-04 PROCEDURE — 99232 SBSQ HOSP IP/OBS MODERATE 35: CPT | Performed by: INTERNAL MEDICINE

## 2019-01-04 PROCEDURE — 85025 COMPLETE CBC W/AUTO DIFF WBC: CPT

## 2019-01-04 PROCEDURE — 83735 ASSAY OF MAGNESIUM: CPT

## 2019-01-04 PROCEDURE — 71045 X-RAY EXAM CHEST 1 VIEW: CPT

## 2019-01-04 PROCEDURE — 770020 HCHG ROOM/CARE - TELE (206)

## 2019-01-04 PROCEDURE — A9270 NON-COVERED ITEM OR SERVICE: HCPCS | Performed by: NURSE PRACTITIONER

## 2019-01-04 PROCEDURE — 700102 HCHG RX REV CODE 250 W/ 637 OVERRIDE(OP): Performed by: ORTHOPAEDIC SURGERY

## 2019-01-04 PROCEDURE — 84484 ASSAY OF TROPONIN QUANT: CPT

## 2019-01-04 PROCEDURE — 36415 COLL VENOUS BLD VENIPUNCTURE: CPT

## 2019-01-04 PROCEDURE — 93005 ELECTROCARDIOGRAM TRACING: CPT | Performed by: ORTHOPAEDIC SURGERY

## 2019-01-04 RX ORDER — POTASSIUM CHLORIDE 20 MEQ/1
40 TABLET, EXTENDED RELEASE ORAL ONCE
Status: COMPLETED | OUTPATIENT
Start: 2019-01-04 | End: 2019-01-04

## 2019-01-04 RX ADMIN — OXYCODONE HYDROCHLORIDE 5 MG: 5 TABLET ORAL at 00:24

## 2019-01-04 RX ADMIN — OMEPRAZOLE 20 MG: 20 CAPSULE, DELAYED RELEASE ORAL at 17:54

## 2019-01-04 RX ADMIN — SERTRALINE 50 MG: 50 TABLET, FILM COATED ORAL at 20:41

## 2019-01-04 RX ADMIN — TOPIRAMATE 25 MG: 25 TABLET, FILM COATED ORAL at 06:12

## 2019-01-04 RX ADMIN — OXYCODONE HYDROCHLORIDE 5 MG: 5 TABLET ORAL at 11:12

## 2019-01-04 RX ADMIN — OXYCODONE HYDROCHLORIDE 5 MG: 5 TABLET ORAL at 15:52

## 2019-01-04 RX ADMIN — TOPIRAMATE 25 MG: 25 TABLET, FILM COATED ORAL at 17:54

## 2019-01-04 RX ADMIN — OXYCODONE HYDROCHLORIDE 5 MG: 5 TABLET ORAL at 06:15

## 2019-01-04 RX ADMIN — ROPINIROLE HYDROCHLORIDE 0.25 MG: 0.5 TABLET, FILM COATED ORAL at 17:54

## 2019-01-04 RX ADMIN — ROPINIROLE HYDROCHLORIDE 0.25 MG: 0.5 TABLET, FILM COATED ORAL at 06:12

## 2019-01-04 RX ADMIN — POTASSIUM CHLORIDE 40 MEQ: 1500 TABLET, EXTENDED RELEASE ORAL at 11:12

## 2019-01-04 RX ADMIN — LEVOTHYROXINE SODIUM 50 MCG: 50 TABLET ORAL at 06:21

## 2019-01-04 RX ADMIN — ROSUVASTATIN CALCIUM 10 MG: 10 TABLET, FILM COATED ORAL at 17:54

## 2019-01-04 RX ADMIN — LIDOCAINE HYDROCHLORIDE 30 ML: 20 SOLUTION OROPHARYNGEAL at 20:41

## 2019-01-04 RX ADMIN — DOCUSATE SODIUM 100 MG: 100 CAPSULE, LIQUID FILLED ORAL at 17:54

## 2019-01-04 RX ADMIN — OXYCODONE HYDROCHLORIDE 10 MG: 10 TABLET ORAL at 19:41

## 2019-01-04 RX ADMIN — ACETAMINOPHEN 650 MG: 325 TABLET, FILM COATED ORAL at 15:52

## 2019-01-04 RX ADMIN — FLUTICASONE PROPIONATE 110 MCG: 110 AEROSOL, METERED RESPIRATORY (INHALATION) at 06:00

## 2019-01-04 RX ADMIN — OMEPRAZOLE 20 MG: 20 CAPSULE, DELAYED RELEASE ORAL at 06:16

## 2019-01-04 ASSESSMENT — ENCOUNTER SYMPTOMS
WEAKNESS: 1
DIARRHEA: 0
COUGH: 0
HEADACHES: 0
DIZZINESS: 0
VOMITING: 0
CHILLS: 0
DEPRESSION: 0
DOUBLE VISION: 0
BLURRED VISION: 0
FEVER: 0
FOCAL WEAKNESS: 1
ABDOMINAL PAIN: 0
MYALGIAS: 1
SHORTNESS OF BREATH: 0
SORE THROAT: 0

## 2019-01-04 ASSESSMENT — PAIN SCALES - GENERAL
PAINLEVEL_OUTOF10: 10
PAINLEVEL_OUTOF10: 3
PAINLEVEL_OUTOF10: 3
PAINLEVEL_OUTOF10: 7
PAINLEVEL_OUTOF10: 0
PAINLEVEL_OUTOF10: 3
PAINLEVEL_OUTOF10: 7

## 2019-01-04 NOTE — PALLIATIVE CARE
Spiritual Care Note           Patient's Name: Andree Egan   MRN: [id number]    Age and Gender: 86 y.o. female   YOB: 1932   Place of Residence: [place], [place]   Unit: Orthopedics   Room (and Bed): [place] [address]   Bed 1   Ethnicity or Nationality: white   Primary Language: English   Medical Diagnosis(-es)/Procedure(s): femur fracture  MARICRUZ  CAD  COPD  chronic diastolic CHF  chronic a-fib   Islam Affiliation: none   Code Status: DNAR/DNI    Date of Admission: 12/28/2018    Length of Stay: 7 days   Date of Visit: 1/4/2019       Situation/Reason for Visit:  Patient followed by palliative care.  Also received  Services Request [id number].    Receptivity to Visit:  Patient politely declined a spiritual care visit.    Continuing Care:  Upon request.      Contact Information:  Chaplain BLUE Briseno  [phone]   [email or web address]

## 2019-01-04 NOTE — TELEPHONE ENCOUNTER
Patients family member called to cancel patients appointment on Monday 01/07/19 with Dr. Atkinson at 10:00 am due to patient being admitted. Patients family member will call back to reschedule once patient has been discharged.

## 2019-01-04 NOTE — PROGRESS NOTES
Hospital Medicine Daily Progress Note    Date of Service  1/4/2019    Chief Complaint  Left hip pain after a fall    Hospital Course      86 y.o. female with past medical history of atrial fibrillation on anticoagulation, hypothyroidism, pulmonary hypertension Estimated right ventricular systolic pressure  is 55 mmHg, coronary artery disease, gastroesophageal reflux disease admitted 12/28/2018 with left hip pain and swelling after a ground-level fall.  Orthopedics consulted, found to have marked anemia with a hemoglobin of 7.6 transfused 1 unit of blood as the patient was symptomatic and she had a plan for surgery that day. Has Tolerated Intramedullary nail, left hip on 12/29/2018, noticed to have acute kidney injury, diuretics were stopped.  Nephrotoxic medications were stopped.  She needed 2 units of blood transfusion, physical and occupational therapy recommended [facility], referral placed        Interval Problem Update   1/2- Patient attempting to work with therapies, states she is unable to do most of exercises asked. Patient is not near baseline according to family. Pain controlled while patient is in bed, but states it hurts to move. Will need SNF placement prior to return home to help with strengthening.    1/3- Patient upset this morning regarding when bumex is ordered, have adjusted medication. Patient continues to have drainage at surgical site, will hold anticoagulants per ortho recommendations. Scheduled to start back on Eliquis on 1/5. Patient continues to require significant help with ADL's and mobility, SNF pending acceptance.   1/4- Patient laying in bed, does not appear to be in good spirits today. Only answers questions with few words. Incision vac placed to left hip. Drainage appears improved with holding of anticoagulation. Will continue with therapies and pursuing placement. Large BM on 1/3.     Consultants/Specialty   Orthopedics    Code Status  DNR    Disposition  SNF, referral placed  12/31/2018     Review of Systems  Review of Systems   Constitutional: Negative for chills and fever.   HENT: Positive for congestion (in her nose ). Negative for sore throat.    Eyes: Negative for blurred vision and double vision.   Respiratory: Negative for cough and shortness of breath.    Cardiovascular: Negative for chest pain and leg swelling.   Gastrointestinal: Negative for abdominal pain, diarrhea and vomiting.   Genitourinary: Positive for frequency (due to diuretic). Negative for dysuria and urgency.   Musculoskeletal: Positive for joint pain (left hip ) and myalgias.        Pain left hip and buttocks   Neurological: Positive for focal weakness and weakness. Negative for dizziness and headaches.   Psychiatric/Behavioral: Negative for depression. Insomnia: states she did not sleep well          Physical Exam  Temp:  [36.2 °C (97.2 °F)-37.7 °C (99.8 °F)] 36.9 °C (98.4 °F)  Pulse:  [65-78] 65  Resp:  [16] 16  BP: (112-140)/(54-64) 123/57    Physical Exam   Constitutional: She is oriented to person, place, and time. Vital signs are normal. She appears well-developed. No distress. Nasal cannula in place.   Thin frail elderly female laying in bed, in no acute distress   HENT:   Head: Normocephalic and atraumatic.   Right Ear: External ear normal.   Left Ear: External ear normal.   Eyes: Conjunctivae and lids are normal.   Neck: Normal range of motion. No tracheal tenderness present.   Cardiovascular: Normal rate.  Exam reveals no gallop.    Murmur (Systolic) heard.  Pulmonary/Chest: Effort normal and breath sounds normal. No respiratory distress. She has no decreased breath sounds. She has no wheezes.   Abdominal: Soft. Bowel sounds are normal. She exhibits no distension. There is no tenderness.   Musculoskeletal: She exhibits tenderness (Left hip).   Weakness noted to left leg    Neurological: She is alert and oriented to person, place, and time.   Little forgetful    Skin: Skin is warm and dry. Bruising (to  left hip surgical site ) and ecchymosis noted. No rash noted. She is not diaphoretic.   Dressings to left leg, surgical incision vac in place to hip    Psychiatric: Her speech is normal. Judgment normal. Her affect is labile.   Patient has flat affect today and is very short with answers.    Nursing note and vitals reviewed.      Fluids  No intake or output data in the 24 hours ending 01/04/19 1549    Laboratory  Recent Labs      01/02/19   0602 01/03/19 0424  01/04/19   0530   WBC  3.1*  3.4*  3.6*   RBC  2.36*  2.46*  2.43*   HEMOGLOBIN  7.2*  7.4*  7.4*   HEMATOCRIT  22.6*  24.1*  23.7*   MCV  95.8  98.0*  97.5   MCH  30.5  30.1  30.5   MCHC  31.9*  30.7*  31.2*   RDW  60.7*  62.0*  60.6*   PLATELETCT  124*  126*  136*   MPV  10.6  9.9  10.4     Recent Labs      01/02/19   0602  01/03/19   0424  01/04/19   0530   SODIUM  139  141  139   POTASSIUM  3.0*  3.5*  3.3*   CHLORIDE  105  106  105   CO2  29  30  28   GLUCOSE  98  110*  104*   BUN  46*  48*  45*   CREATININE  1.39  1.13  1.22   CALCIUM  8.6  8.4*  8.5                   Imaging  DX-HIP-UNILATERAL-W/O PELVIS-2/3 VIEWS LEFT   Final Result      Intraoperative fluoroscopic spot images as described above.      DX-PORTABLE FLUORO > 1 HOUR   Final Result      Intraoperative fluoroscopic spot images as described above.      CT-HIP W/O PLUS RECONS LEFT   Final Result      Acute significantly displaced intertrochanteric fracture of the left femur.      DX-FEMUR-2+ LEFT   Final Result            Acute significantly displaced intertrochanteric fracture of the left femur.           Assessment/Plan  * Femur fracture (HCC)- (present on admission)   Assessment & Plan    Ortho consulted- surgery 12/29/2018   Pain control  PT OT evaluation recommended SNF, referral placed   Incision vac placed to help with drainage      Acute kidney injury (HCC)   Assessment & Plan    Creatinine stable  Acute kidney injury, mostly due to hypovolemia, blood loss recent surgery,  nonsteroidal anti-inflammatory drugs  Stopped Toradol, allopurinol  S/p blood, watch input and output    bumetanide initially held, then restarted      Anemia- (present on admission)   Assessment & Plan    Symptomatic anemia requiring blood transfusion  Initially patient was refusing transfusion, provider had a prolonged discussion with patient and family members.  Eventually all parties agreed on blood transfusion.    Received 2 units of blood transfusion  Metoprolol, Eliquis, initially held for surgery, then restarted, now held for 48 hours due to drainage from surgical site   Continue to monitor hemoglobin and transfuse as needed for hemoglobin below 7 or higher if patient is symptomatic.     Plavix held for surgery, and anemia, consider restarting when okay with surgery      Coronary artery disease- (present on admission)   Assessment & Plan    Metoprolol, rosuvastatin  Clopidogrel initially held for surgery, restart when okay with surgery      Pancytopenia (HCC)- (present on admission)   Assessment & Plan    Chronic per reccords   B12 WNL   Cont to monitor    Status post 2 units of blood transfusion       COPD (chronic obstructive pulmonary disease) (HCC)- (present on admission)   Assessment & Plan    Not in acute exacerbation   Respiratory protocol, oxygen as needed       Chronic diastolic CHF (congestive heart failure) (HCC)- (present on admission)   Assessment & Plan    Not in acute exacerbation   On metoprolol, rosuvastatin   Recieved 2 units transfusion, watching volume status closely     Bumetanide initially held then restarted      Chronic atrial fibrillation (HCC)- (present on admission)   Assessment & Plan    Metoprolol, Eliquis, initially held for surgery, then restarted, now being held for 48 hours due to increased drainage at surgical site       Weakness- (present on admission)   Assessment & Plan    Diffuse weakness, needs continued therapies   SNF placement, awaiting acceptance      DNR (do not  resuscitate)- (present on admission)   Assessment & Plan    Discussion with patient and family.      Hypothyroidism- (present on admission)   Assessment & Plan    Resume levo      GERD (gastroesophageal reflux disease)- (present on admission)   Assessment & Plan    Omperazole          VTE prophylaxis: SCDs, Eliquis on hold for 48 hours

## 2019-01-04 NOTE — PROGRESS NOTES
"   Orthopaedic Progress Note    Interval changes:  Patient doing well    Incisional wound vac to be placed due to continued drainage    ROS - Patient denies any new issues.  Pain well controlled.    Blood pressure 113/54, pulse 65, temperature 36.2 °C (97.2 °F), temperature source Temporal, resp. rate 16, height 1.6 m (5' 3\"), weight 64.3 kg (141 lb 12.1 oz), SpO2 100 %, not currently breastfeeding.      Patient seen and examined  No acute distress  Breathing non labored  RRR  LLE dressings CDI distally proximal incision dressing saturated. Patient clearly fires tibialis anterior, EHL, and gastrocnemius/soleus. Sensation is intact to light touch throughout superficial peroneal, deep peroneal, tibial, saphenous, and sural nerve distributions. Strong and palpable 2+ dorsalis pedis and posterior tibial pulses with capillary refill less than 2 seconds. No lower leg tenderness or discomfort.       Recent Labs      01/01/19   0236  01/02/19   0602  01/03/19   0424   WBC  3.8*  3.1*  3.4*   RBC  2.38*  2.36*  2.46*   HEMOGLOBIN  7.1*  7.2*  7.4*   HEMATOCRIT  23.1*  22.6*  24.1*   MCV  97.1  95.8  98.0*   MCH  29.8  30.5  30.1   MCHC  30.7*  31.9*  30.7*   RDW  63.2*  60.7*  62.0*   PLATELETCT  98*  124*  126*   MPV  11.3  10.6  9.9       Active Hospital Problems    Diagnosis   • Acute kidney injury (HCC) [N17.9]     Priority: High   • Femur fracture ([facility]) [S72.90XA]     Priority: High   • Anemia [D64.9]     Priority: High   • Coronary artery disease [I25.10]     Priority: Medium   • Pancytopenia ([facility]) [D61.818]     Priority: Medium   • COPD (chronic obstructive pulmonary disease) ([facility]) [J44.9]     Priority: Medium     Overview:   PFT 2017    Last Assessment & Plan:   Mixed obstructive and restrictive defect per spirometry with severe reduction in diffusion capacity per PFTs 2/2017.  Continue routine follow-up with PCP in pulmonology as indicated.     • Chronic diastolic CHF (congestive heart failure) ([facility]) [I50.32]     " Priority: Medium     Overview:   Echo (06/14) shows normal LV size and fxn, but mod LV hypertrophy.  Has EF of 63%.  Severe diastolic dysfunction    Last Assessment & Plan:   Heart failure with preserved ejection fraction, severe diastolic dysfunction and moderate to severe TR with severe pulmonary hypertension noted per echocardiogram 2/2017.    Currently stable and euvolemic on exam today.  NYHA class II-III, stage C.  Functionally most limited by exertional shortness of breath in the setting of COPD.  Blood pressures are well controlled.  She continues to be well compensated with current diuretic therapy, torsemide 40 mg twice daily.  Routine surveillance laboratories are ordered today.    Heart failure self-management is reviewed and reinforced.  At this time, she will follow-up with [facility] cardiology on an as-needed basis as she is moving to [place] next month to be closer to family but is welcome to call in the meantime for any concerns.     • Chronic atrial fibrillation (HCC) [I48.2]     Priority: Medium     Overview:   History of AF, Coumadin initiated 03/2015.      Last Assessment & Plan:   Chronic atrial fibrillation for which patient is asymptomatic.  Rate is well controlled and she remained therapeutically anticoagulated with warfarin as monitored by the [facility] anticoagulation clinic.     • GERD (gastroesophageal reflux disease) [K21.9]     Priority: Low     Overview:   (with diaphragmatic hernia)    Last Assessment & Plan:   On omeprazole daily.     • Hypothyroidism [E03.9]     Priority: Low     Last Assessment & Plan:   On thyroid replacement, normal labs 11/2017.     • Weakness [R53.1]   • DNR (do not resuscitate) [Z66]       Assessment/Plan:  Pending incisional wound vac placement  Cleared for DC by ortho pending medicine and therapy clearance  POD#5 S/P Intramedullary nail, left hip  Wt bearing status - WBAT  Wound care/Drains - vac to be placed    Future Procedures - none planned   Lovenox:  Start 12/30, Duration-until ambulatory > 150'  Sutures/Staples out- 10-14 days post operatively  PT/OT-initiated  Antibiotics: completed  DVT Prophylaxis- TEDS/SCDs/Foot pumps  Hunt-none  Case Coordination for Discharge Planning - Disposition SNF

## 2019-01-04 NOTE — DISCHARGE PLANNING
Received Choice form at 1600  Agency/Facility Name: [facility] (#1), [facility] (#2) and [facility] (#3)  Referral sent per Choice form @ [id number]

## 2019-01-04 NOTE — CARE PLAN
Problem: Safety  Goal: Will remain free from falls  Outcome: PROGRESSING AS EXPECTED   Treaded socks in place, bed in the lowest position, bed alarm on, call light and belongings within reach, pt call for assistance appropriately    Problem: Discharge Barriers/Planning  Goal: Patient's continuum of care needs will be met  Outcome: PROGRESSING AS EXPECTED  Pt to DC to SNF once bed is found

## 2019-01-04 NOTE — CARE PLAN
Problem: Pain Management  Goal: Pain level will decrease to patient's comfort goal  Outcome: PROGRESSING AS EXPECTED  Administered pain medications per MAR, teach non pharmacological techniques such as relaxation, imagery    Problem: Skin Integrity  Goal: Risk for impaired skin integrity will decrease  Outcome: PROGRESSING AS EXPECTED  q2 keyonna, carmen risk assessment, applied barrier cream

## 2019-01-04 NOTE — WOUND TEAM
Wound consult received to place Prevena Plus on left hip incision. Patient also has two incisions on left lateral thigh, verified with brenden Mckeon RN that Prevena dressing is only for left hip incision. Carefully removed dressing on left hip. Cleansed incision w/ NS and gauze, patted dry. Cleansed periwound w/ NS and gauze, patted dry. Benzoin applied to periwound. Prevena plus dressing placed on incision, secured w/ drape. Patient connected to suction. No leaks or drains noted. Patient is to be discharged to SNF. Educated BRONWYN Mckeon on Prevena Plus and notified patient and Linda RN that extra drape and Prevena booklet is at bedside. Wound Team signing off.

## 2019-01-05 ENCOUNTER — APPOINTMENT (OUTPATIENT)
Dept: RADIOLOGY | Facility: MEDICAL CENTER | Age: 84
DRG: 480 | End: 2019-01-05
Attending: INTERNAL MEDICINE
Payer: MEDICARE

## 2019-01-05 PROBLEM — R07.9 CHEST PAIN: Status: ACTIVE | Noted: 2019-01-05

## 2019-01-05 LAB
ANION GAP SERPL CALC-SCNC: 6 MMOL/L (ref 0–11.9)
BASOPHILS # BLD AUTO: 0.3 % (ref 0–1.8)
BASOPHILS # BLD: 0.01 K/UL (ref 0–0.12)
BUN SERPL-MCNC: 45 MG/DL (ref 8–22)
CALCIUM SERPL-MCNC: 8.7 MG/DL (ref 8.5–10.5)
CHLORIDE SERPL-SCNC: 107 MMOL/L (ref 96–112)
CO2 SERPL-SCNC: 28 MMOL/L (ref 20–33)
CREAT SERPL-MCNC: 1.11 MG/DL (ref 0.5–1.4)
D DIMER PPP IA.FEU-MCNC: 2 UG/ML (FEU) (ref 0–0.5)
EKG IMPRESSION: NORMAL
EOSINOPHIL # BLD AUTO: 0 K/UL (ref 0–0.51)
EOSINOPHIL NFR BLD: 0 % (ref 0–6.9)
ERYTHROCYTE [DISTWIDTH] IN BLOOD BY AUTOMATED COUNT: 59.7 FL (ref 35.9–50)
GLUCOSE SERPL-MCNC: 104 MG/DL (ref 65–99)
HCT VFR BLD AUTO: 23.6 % (ref 37–47)
HGB BLD-MCNC: 7.3 G/DL (ref 12–16)
IMM GRANULOCYTES # BLD AUTO: 0.02 K/UL (ref 0–0.11)
IMM GRANULOCYTES NFR BLD AUTO: 0.6 % (ref 0–0.9)
LYMPHOCYTES # BLD AUTO: 0.68 K/UL (ref 1–4.8)
LYMPHOCYTES NFR BLD: 19 % (ref 22–41)
MCH RBC QN AUTO: 30.3 PG (ref 27–33)
MCHC RBC AUTO-ENTMCNC: 30.9 G/DL (ref 33.6–35)
MCV RBC AUTO: 97.9 FL (ref 81.4–97.8)
MONOCYTES # BLD AUTO: 0.37 K/UL (ref 0–0.85)
MONOCYTES NFR BLD AUTO: 10.3 % (ref 0–13.4)
NEUTROPHILS # BLD AUTO: 2.5 K/UL (ref 2–7.15)
NEUTROPHILS NFR BLD: 69.8 % (ref 44–72)
NRBC # BLD AUTO: 0 K/UL
NRBC BLD-RTO: 0 /100 WBC
PLATELET # BLD AUTO: 148 K/UL (ref 164–446)
PMV BLD AUTO: 10.6 FL (ref 9–12.9)
POTASSIUM SERPL-SCNC: 3.8 MMOL/L (ref 3.6–5.5)
RBC # BLD AUTO: 2.41 M/UL (ref 4.2–5.4)
SODIUM SERPL-SCNC: 141 MMOL/L (ref 135–145)
TROPONIN I SERPL-MCNC: 0.04 NG/ML (ref 0–0.04)
WBC # BLD AUTO: 3.6 K/UL (ref 4.8–10.8)

## 2019-01-05 PROCEDURE — 700102 HCHG RX REV CODE 250 W/ 637 OVERRIDE(OP): Performed by: HOSPITALIST

## 2019-01-05 PROCEDURE — 99232 SBSQ HOSP IP/OBS MODERATE 35: CPT | Performed by: INTERNAL MEDICINE

## 2019-01-05 PROCEDURE — 85379 FIBRIN DEGRADATION QUANT: CPT

## 2019-01-05 PROCEDURE — 84484 ASSAY OF TROPONIN QUANT: CPT

## 2019-01-05 PROCEDURE — 85025 COMPLETE CBC W/AUTO DIFF WBC: CPT

## 2019-01-05 PROCEDURE — 700102 HCHG RX REV CODE 250 W/ 637 OVERRIDE(OP): Performed by: ORTHOPAEDIC SURGERY

## 2019-01-05 PROCEDURE — 700117 HCHG RX CONTRAST REV CODE 255: Performed by: INTERNAL MEDICINE

## 2019-01-05 PROCEDURE — A9270 NON-COVERED ITEM OR SERVICE: HCPCS | Performed by: INTERNAL MEDICINE

## 2019-01-05 PROCEDURE — A9270 NON-COVERED ITEM OR SERVICE: HCPCS | Performed by: NURSE PRACTITIONER

## 2019-01-05 PROCEDURE — A9270 NON-COVERED ITEM OR SERVICE: HCPCS | Performed by: ORTHOPAEDIC SURGERY

## 2019-01-05 PROCEDURE — 700112 HCHG RX REV CODE 229: Performed by: ORTHOPAEDIC SURGERY

## 2019-01-05 PROCEDURE — 700102 HCHG RX REV CODE 250 W/ 637 OVERRIDE(OP): Performed by: INTERNAL MEDICINE

## 2019-01-05 PROCEDURE — 36415 COLL VENOUS BLD VENIPUNCTURE: CPT

## 2019-01-05 PROCEDURE — 71275 CT ANGIOGRAPHY CHEST: CPT

## 2019-01-05 PROCEDURE — A9270 NON-COVERED ITEM OR SERVICE: HCPCS | Performed by: HOSPITALIST

## 2019-01-05 PROCEDURE — 770020 HCHG ROOM/CARE - TELE (206)

## 2019-01-05 PROCEDURE — 700101 HCHG RX REV CODE 250: Performed by: HOSPITALIST

## 2019-01-05 PROCEDURE — 700102 HCHG RX REV CODE 250 W/ 637 OVERRIDE(OP): Performed by: NURSE PRACTITIONER

## 2019-01-05 PROCEDURE — 80048 BASIC METABOLIC PNL TOTAL CA: CPT

## 2019-01-05 RX ADMIN — TOPIRAMATE 25 MG: 25 TABLET, FILM COATED ORAL at 06:58

## 2019-01-05 RX ADMIN — OMEPRAZOLE 20 MG: 20 CAPSULE, DELAYED RELEASE ORAL at 05:37

## 2019-01-05 RX ADMIN — METOLAZONE 2.5 MG: 2.5 TABLET ORAL at 05:57

## 2019-01-05 RX ADMIN — OXYCODONE HYDROCHLORIDE 10 MG: 10 TABLET ORAL at 21:37

## 2019-01-05 RX ADMIN — APIXABAN 5 MG: 5 TABLET, FILM COATED ORAL at 17:53

## 2019-01-05 RX ADMIN — ALBUTEROL SULFATE 2.5 MG: 2.5 SOLUTION RESPIRATORY (INHALATION) at 14:45

## 2019-01-05 RX ADMIN — IOHEXOL 75 ML: 350 INJECTION, SOLUTION INTRAVENOUS at 19:02

## 2019-01-05 RX ADMIN — STANDARDIZED SENNA CONCENTRATE AND DOCUSATE SODIUM 1 TABLET: 8.6; 5 TABLET, FILM COATED ORAL at 20:38

## 2019-01-05 RX ADMIN — ACETAMINOPHEN 650 MG: 325 TABLET, FILM COATED ORAL at 20:38

## 2019-01-05 RX ADMIN — ROPINIROLE HYDROCHLORIDE 0.25 MG: 0.5 TABLET, FILM COATED ORAL at 11:20

## 2019-01-05 RX ADMIN — APIXABAN 2.5 MG: 2.5 TABLET, FILM COATED ORAL at 05:37

## 2019-01-05 RX ADMIN — DOCUSATE SODIUM 100 MG: 100 CAPSULE, LIQUID FILLED ORAL at 05:38

## 2019-01-05 RX ADMIN — TOPIRAMATE 25 MG: 25 TABLET, FILM COATED ORAL at 20:37

## 2019-01-05 RX ADMIN — ROPINIROLE HYDROCHLORIDE 0.25 MG: 0.5 TABLET, FILM COATED ORAL at 05:37

## 2019-01-05 RX ADMIN — DOCUSATE SODIUM 100 MG: 100 CAPSULE, LIQUID FILLED ORAL at 17:59

## 2019-01-05 RX ADMIN — LEVOTHYROXINE SODIUM 50 MCG: 50 TABLET ORAL at 05:37

## 2019-01-05 RX ADMIN — ROPINIROLE HYDROCHLORIDE 0.25 MG: 0.5 TABLET, FILM COATED ORAL at 18:00

## 2019-01-05 RX ADMIN — METOPROLOL SUCCINATE 25 MG: 25 TABLET, EXTENDED RELEASE ORAL at 05:57

## 2019-01-05 RX ADMIN — OXYCODONE HYDROCHLORIDE 10 MG: 10 TABLET ORAL at 12:53

## 2019-01-05 RX ADMIN — OXYCODONE HYDROCHLORIDE 10 MG: 10 TABLET ORAL at 18:13

## 2019-01-05 RX ADMIN — SERTRALINE 50 MG: 50 TABLET, FILM COATED ORAL at 20:38

## 2019-01-05 RX ADMIN — ROSUVASTATIN CALCIUM 10 MG: 10 TABLET, FILM COATED ORAL at 18:02

## 2019-01-05 RX ADMIN — OMEPRAZOLE 20 MG: 20 CAPSULE, DELAYED RELEASE ORAL at 17:59

## 2019-01-05 RX ADMIN — FLUTICASONE PROPIONATE 110 MCG: 110 AEROSOL, METERED RESPIRATORY (INHALATION) at 05:43

## 2019-01-05 RX ADMIN — BUMETANIDE 1 MG: 1 TABLET ORAL at 05:36

## 2019-01-05 ASSESSMENT — ENCOUNTER SYMPTOMS
BRUISES/BLEEDS EASILY: 0
FEVER: 0
HEADACHES: 0
ORTHOPNEA: 0
PHOTOPHOBIA: 0
VOMITING: 0
DOUBLE VISION: 0
CHILLS: 0
TINGLING: 0
POLYDIPSIA: 0
DEPRESSION: 0
SHORTNESS OF BREATH: 0
COUGH: 0
ABDOMINAL PAIN: 0
WEAKNESS: 1
BACK PAIN: 0
WEIGHT LOSS: 0
DIARRHEA: 0
PALPITATIONS: 0
NECK PAIN: 0
DIZZINESS: 0
FOCAL WEAKNESS: 0
BLURRED VISION: 0
SORE THROAT: 0
MYALGIAS: 1

## 2019-01-05 ASSESSMENT — LIFESTYLE VARIABLES: SUBSTANCE_ABUSE: 0

## 2019-01-05 ASSESSMENT — PAIN SCALES - GENERAL
PAINLEVEL_OUTOF10: 10
PAINLEVEL_OUTOF10: 0
PAINLEVEL_OUTOF10: 8
PAINLEVEL_OUTOF10: 0
PAINLEVEL_OUTOF10: 8

## 2019-01-05 NOTE — PROGRESS NOTES
[facility] Medicine Daily Progress Note    Date of Service  1/5/2019    Chief Complaint  Left hip pain after a fall    Hospital Course      86 y.o. female with past medical history of atrial fibrillation on anticoagulation, hypothyroidism, pulmonary hypertension Estimated right ventricular systolic pressure  is 55 mmHg, coronary artery disease, gastroesophageal reflux disease admitted 12/28/2018 with left hip pain and swelling after a ground-level fall.  Orthopedics consulted, found to have marked anemia with a hemoglobin of 7.6 transfused 1 unit of blood as the patient was symptomatic and she had a plan for surgery that day. Has Tolerated Intramedullary nail, left hip on 12/29/2018, noticed to have acute kidney injury, diuretics were stopped.  Nephrotoxic medications were stopped.  She needed 2 units of blood transfusion, physical and occupational therapy recommended [facility], referral placed        Interval Problem Update   1/2- Patient attempting to work with therapies, states she is unable to do most of exercises asked. Patient is not near baseline according to family. Pain controlled while patient is in bed, but states it hurts to move. Will need SNF placement prior to return home to help with strengthening.    1/3- Patient upset this morning regarding when bumex is ordered, have adjusted medication. Patient continues to have drainage at surgical site, will hold anticoagulants per ortho recommendations. Scheduled to start back on Eliquis on 1/5. Patient continues to require significant help with ADL's and mobility, SNF pending acceptance.   1/4- Patient laying in bed, does not appear to be in good spirits today. Only answers questions with few words. Incision vac placed to left hip. Drainage appears improved with holding of anticoagulation. Will continue with therapies and pursuing placement. Large BM on 1/3.   1/5 patient was transferred to telemetry last night due to episode of sharp left-sided chest pain and  heaviness that started when patient was laying in the breath, lasted 30 minutes to 1 hour, was worsening with movements, associated with subjective shortness of breath.  Currently chest pain-free.  A. fib with paced rhythm on EKG, 11 beats of V. tach asymptomatic.  Heart rate 62..  Currently on room air.  Troponin is not elevated.  We will repeat a troponin   Anticoagulation: Apixaban dose increased up to full dose according patients weight.  Consultants/Specialty   Orthopedics    Code Status  DNR    Disposition  SNF, referral placed 12/31/2018     Review of Systems  Review of Systems   Constitutional: Negative for chills, fever and weight loss.   HENT: Negative for congestion (in her nose ), ear discharge, ear pain, nosebleeds and sore throat.    Eyes: Negative for blurred vision, double vision and photophobia.   Respiratory: Negative for cough and shortness of breath.    Cardiovascular: Positive for chest pain. Negative for palpitations, orthopnea and leg swelling.   Gastrointestinal: Negative for abdominal pain, diarrhea and vomiting.   Genitourinary: Positive for frequency (due to diuretic). Negative for dysuria, hematuria and urgency.   Musculoskeletal: Positive for myalgias. Negative for back pain, joint pain (left hip ) and neck pain.        Pain left hip and buttocks   Neurological: Positive for weakness. Negative for dizziness, tingling, focal weakness and headaches.   Endo/Heme/Allergies: Negative for environmental allergies and polydipsia. Does not bruise/bleed easily.   Psychiatric/Behavioral: Negative for depression, substance abuse and suicidal ideas. Insomnia: states she did not sleep well          Physical Exam  Temp:  [36.1 °C (97 °F)-37.2 °C (98.9 °F)] 36.7 °C (98 °F)  Pulse:  [60-76] 76  Resp:  [14-18] 16  BP: (114-151)/(50-68) 114/58    Physical Exam   Constitutional: She is oriented to person, place, and time. Vital signs are normal. She appears well-developed. No distress. Nasal cannula in  place.   Thin frail elderly female   HENT:   Head: Normocephalic and atraumatic.   Right Ear: External ear normal.   Left Ear: External ear normal.   Eyes: Conjunctivae and lids are normal.   Neck: Normal range of motion. No tracheal tenderness present.   Cardiovascular: Normal rate.  Exam reveals no gallop.    Murmur (Systolic) heard.  Pulmonary/Chest: Effort normal and breath sounds normal. No respiratory distress. She has no decreased breath sounds. She has no wheezes.   Abdominal: Soft. Bowel sounds are normal. She exhibits no distension. There is no tenderness.   Musculoskeletal: She exhibits tenderness (Left hip).   Neurological: She is alert and oriented to person, place, and time.   Skin: Skin is warm and dry. Bruising (to left hip surgical site ) and ecchymosis noted. No rash noted. She is not diaphoretic.   Dressings to left leg, surgical incision vac in place to hip    Psychiatric: Her speech is normal. Judgment normal. Her mood appears anxious. Her affect is blunt. Her affect is not labile. Cognition and memory are impaired.   Nursing note and vitals reviewed.      Fluids    Intake/Output Summary (Last 24 hours) at 01/05/19 1415  Last data filed at 01/05/19 0800   Gross per 24 hour   Intake              240 ml   Output                0 ml   Net              240 ml       Laboratory  Recent Labs      01/03/19 0424 01/04/19 0530 01/05/19 0226   WBC  3.4*  3.6*  3.6*   RBC  2.46*  2.43*  2.41*   HEMOGLOBIN  7.4*  7.4*  7.3*   HEMATOCRIT  24.1*  23.7*  23.6*   MCV  98.0*  97.5  97.9*   MCH  30.1  30.5  30.3   MCHC  30.7*  31.2*  30.9*   RDW  62.0*  60.6*  59.7*   PLATELETCT  126*  136*  148*   MPV  9.9  10.4  10.6     Recent Labs      01/03/19 0424 01/04/19 0530 01/05/19 0225   SODIUM  141  139  141   POTASSIUM  3.5*  3.3*  3.8   CHLORIDE  106  105  107   CO2  30  28  28   GLUCOSE  110*  104*  104*   BUN  48*  45*  45*   CREATININE  1.13  1.22  1.11   CALCIUM  8.4*  8.5  8.7                    Imaging  DX-CHEST-PORTABLE (1 VIEW)   Final Result      1.  Hyperinflation. No focal consolidation. Small bilateral pleural effusions.   2.  Stable cardiomegaly.      DX-HIP-UNILATERAL-W/O PELVIS-2/3 VIEWS LEFT   Final Result      Intraoperative fluoroscopic spot images as described above.      DX-PORTABLE FLUORO > 1 HOUR   Final Result      Intraoperative fluoroscopic spot images as described above.      CT-HIP W/O PLUS RECONS LEFT   Final Result      Acute significantly displaced intertrochanteric fracture of the left femur.      DX-FEMUR-2+ LEFT   Final Result            Acute significantly displaced intertrochanteric fracture of the left femur.           Assessment/Plan  * Femur fracture (HCC)- (present on admission)   Assessment & Plan    Ortho consulted- surgery 12/29/2018   Pain control  PT OT evaluation recommended SNF, referral placed   Incision vac placed to help with drainage      Chest pain   Assessment & Plan    Troponin is not elevated.  EKG is showing paced rhythm  Chest pain resolved.  Continue monitoring on telemetry  Repeat troponin and d-dimer pains in     Acute kidney injury (HCC)   Assessment & Plan    Creatinine stable  Acute kidney injury, mostly due to hypovolemia, blood loss recent surgery, nonsteroidal anti-inflammatory drugs  Stopped Toradol, allopurinol    bumetanide initially held, then restarted     Kidney function improved     Anemia- (present on admission)   Assessment & Plan    Symptomatic anemia requiring blood transfusion  Initially patient was refusing transfusion, provider had a prolonged discussion with patient and family members.  Eventually all parties agreed on blood transfusion.    Received 2 units of blood transfusion  Metoprolol, Eliquis, initially held for surgery, then restarted, now held for 48 hours due to drainage from surgical site   Continue to monitor hemoglobin and transfuse as needed for hemoglobin below 7 or higher if patient is symptomatic.     Plavix held  for surgery, and anemia, consider restarting when okay with surgery   Hemoglobin low stable 7. 3.  No obvious bleeding     Acute respiratory failure with hypoxia (HCC)- (present on admission)   Assessment & Plan    Chest x-ray showed hyperinflation, small bilateral basilar infusion  We will check d-dimer and CTA if it is elevated.  Otherwise hypoxia probably secondary to emphysema/COPD  Incentive spirometry,  RT protocol     Coronary artery disease- (present on admission)   Assessment & Plan    Metoprolol, rosuvastatin  Clopidogrel initially held for surgery, restart when okay with surgery   Monitoring telemetry for chest pain.     Pancytopenia (HCC)- (present on admission)   Assessment & Plan    Chronic per reccords   B12 WNL   Cont to monitor    Status post 2 units of blood transfusion       COPD (chronic obstructive pulmonary disease) (HCC)- (present on admission)   Assessment & Plan    Not in acute exacerbation   Respiratory protocol, oxygen as needed       Chronic diastolic CHF (congestive heart failure) (HCC)- (present on admission)   Assessment & Plan    Not in acute exacerbation   On metoprolol, rosuvastatin   Recieved 2 units transfusion  Does not appear to have fluid overload       Chronic atrial fibrillation (HCC)- (present on admission)   Assessment & Plan    Metoprolol, Eliquis, initially held for surgery, then restarted, now being held for 48 hours due to increased drainage at surgical site       Weakness- (present on admission)   Assessment & Plan    Diffuse weakness, needs continued therapies   SNF placement, awaiting acceptance      DNR (do not resuscitate)- (present on admission)   Assessment & Plan    Discussion with patient and family.      Hypothyroidism- (present on admission)   Assessment & Plan    Resume levo      GERD (gastroesophageal reflux disease)- (present on admission)   Assessment & Plan    Omperazole          VTE prophylaxis: On Eliquis

## 2019-01-05 NOTE — PROGRESS NOTES
"   Orthopaedic Progress Note    Interval changes:  Patient doing well    Incisional wound vac placed  Cleared for DC by ortho pending medicine clearance    ROS - Patient denies any new issues.  Pain well controlled.    Blood pressure 114/55, pulse 65, temperature 37.1 °C (98.7 °F), temperature source Temporal, resp. rate 17, height 1.6 m (5' 3\"), weight 64.3 kg (141 lb 12.1 oz), SpO2 99 %, not currently breastfeeding.      Patient seen and examined  No acute distress  Breathing non labored  RRR  LLE vac dressing in place without leak. Patient clearly fires tibialis anterior, EHL, and gastrocnemius/soleus. Sensation is intact to light touch throughout superficial peroneal, deep peroneal, tibial, saphenous, and sural nerve distributions. Strong and palpable 2+ dorsalis pedis and posterior tibial pulses with capillary refill less than 2 seconds. No lower leg tenderness or discomfort.       Recent Labs      01/02/19   0602  01/03/19   0424  01/04/19   0530   WBC  3.1*  3.4*  3.6*   RBC  2.36*  2.46*  2.43*   HEMOGLOBIN  7.2*  7.4*  7.4*   HEMATOCRIT  22.6*  24.1*  23.7*   MCV  95.8  98.0*  97.5   MCH  30.5  30.1  30.5   MCHC  31.9*  30.7*  31.2*   RDW  60.7*  62.0*  60.6*   PLATELETCT  124*  126*  136*   MPV  10.6  9.9  10.4       Active Hospital Problems    Diagnosis   • Acute kidney injury ([facility]) [N17.9]     Priority: High   • Femur fracture ([facility]) [S72.90XA]     Priority: High   • Anemia [D64.9]     Priority: High   • Coronary artery disease [I25.10]     Priority: Medium   • Pancytopenia ([facility]) [D61.818]     Priority: Medium   • COPD (chronic obstructive pulmonary disease) ([facility]) [J44.9]     Priority: Medium     Overview:   PFT 2017    Last Assessment & Plan:   Mixed obstructive and restrictive defect per spirometry with severe reduction in diffusion capacity per PFTs 2/2017.  Continue routine follow-up with PCP in pulmonology as indicated.     • Chronic diastolic CHF (congestive heart failure) ([facility]) [I50.32]     " Priority: Medium     Overview:   Echo (06/14) shows normal LV size and fxn, but mod LV hypertrophy.  Has EF of 63%.  Severe diastolic dysfunction    Last Assessment & Plan:   Heart failure with preserved ejection fraction, severe diastolic dysfunction and moderate to severe TR with severe pulmonary hypertension noted per echocardiogram 2/2017.    Currently stable and euvolemic on exam today.  NYHA class II-III, stage C.  Functionally most limited by exertional shortness of breath in the setting of COPD.  Blood pressures are well controlled.  She continues to be well compensated with current diuretic therapy, torsemide 40 mg twice daily.  Routine surveillance laboratories are ordered today.    Heart failure self-management is reviewed and reinforced.  At this time, she will follow-up with [facility] cardiology on an as-needed basis as she is moving to [place] next month to be closer to family but is welcome to call in the meantime for any concerns.     • Chronic atrial fibrillation (HCC) [I48.2]     Priority: Medium     Overview:   History of AF, Coumadin initiated 03/2015.      Last Assessment & Plan:   Chronic atrial fibrillation for which patient is asymptomatic.  Rate is well controlled and she remained therapeutically anticoagulated with warfarin as monitored by the [facility] anticoagulation clinic.     • GERD (gastroesophageal reflux disease) [K21.9]     Priority: Low     Overview:   (with diaphragmatic hernia)    Last Assessment & Plan:   On omeprazole daily.     • Hypothyroidism [E03.9]     Priority: Low     Last Assessment & Plan:   On thyroid replacement, normal labs 11/2017.     • Weakness [R53.1]   • DNR (do not resuscitate) [Z66]       Assessment/Plan:  Pending placement  Incisional wound vac placed  Cleared for DC by ortho pending medicine and therapy clearance  POD#6 S/P Intramedullary nail, left hip  Wt bearing status - WBAT  Wound care/Drains - vac    Future Procedures - none planned   Lovenox: Start  12/30, Duration-until ambulatory > 150'  Sutures/Staples out- 10-14 days post operatively  PT/OT-initiated  Antibiotics: completed  DVT Prophylaxis- TEDS/SCDs/Foot pumps  Hunt-none  Case Coordination for Discharge Planning - Disposition SNF

## 2019-01-05 NOTE — PROGRESS NOTES
2 RN Skin Check done with BRONWYN Cuevas.    Ears, elbows and torso intact. Pacemaker noted to R upper chest. Bruising noted to R hand and L lateral hip. Dressing and Prevena Plus noted to L hip. Sacrum light pink, blanching and intact. Heels blanching and intact. Pt on waffle mattress and [id number].

## 2019-01-05 NOTE — CARE PLAN
Problem: Respiratory:  Goal: Respiratory status will improve  Outcome: PROGRESSING AS EXPECTED  Educated pt on coughing, turning and deep breathing.     Problem: Psychosocial Needs:  Goal: Level of anxiety will decrease  Outcome: PROGRESSING AS EXPECTED  Provide reassurance as needed.

## 2019-01-05 NOTE — CODE DOCUMENTATION
Dr. Henderson returned page. Discussed LBBB on EKG. Discussed current interventions placed on patient. Dr. Henderson to place orders on patient and transfer patient to tele for cardiac monitoring.   
Patient describes chest pain as tightness. EKG ordered. Troponins drawn. CXR ordered. Primary RN paged hospitalist on call to update on patient's EKG result. Patient medicated per MAR.   
Declined

## 2019-01-05 NOTE — ASSESSMENT & PLAN NOTE
Troponin is not elevated.  EKG is showing paced rhythm  Chest pain resolved.  Probably noncardiogenic chest pain.  Possibly secondary to hiatal hernia

## 2019-01-05 NOTE — PROGRESS NOTES
Assumed care at 2140. Pt A&O x 4. Pt paced (69) on monitor. First assessment completed, call light within reach, bed locked and in low position. All questions asked. Will continue to monitor.

## 2019-01-05 NOTE — PROGRESS NOTES
Pt's grand daughter Palma was notified of rapid response via phone.  Pt's family was also notified of Tele 8 transfer.

## 2019-01-05 NOTE — PROGRESS NOTES
Pt reported sharp chest pain.  Pt denies nausea or radiating pain in neck or shoulders.  RRT was notified and present at bedside.

## 2019-01-05 NOTE — PROGRESS NOTES
"S:  Seen and examined.  POD #7 s/p L hip nail.  Doing well this morning.  No new complaints, pain controlled.    O: Blood pressure 135/65, pulse 60, temperature 37.2 °C (98.9 °F), temperature source Temporal, resp. rate 17, height 1.6 m (5' 3\"), weight 64.3 kg (141 lb 12.1 oz), SpO2 100 %, not currently breastfeeding..    Intake/Output Summary (Last 24 hours) at 01/05/19 0942  Last data filed at 01/05/19 0800   Gross per 24 hour   Intake              240 ml   Output                0 ml   Net              240 ml   .    Operative/injured extremity examined.  Compartments soft, distal light touch sensation intact, firing EHL/TA/GS/P.  Toes warm, well-perfused.  Wound with Provena vac in place, no drainage in canister    Recent Labs      01/03/19   0424  01/04/19   0530  01/05/19   0226   WBC  3.4*  3.6*  3.6*   RBC  2.46*  2.43*  2.41*   HEMOGLOBIN  7.4*  7.4*  7.3*   HEMATOCRIT  24.1*  23.7*  23.6*   MCV  98.0*  97.5  97.9*   MCH  30.1  30.5  30.3   MCHC  30.7*  31.2*  30.9*   RDW  62.0*  60.6*  59.7*   PLATELETCT  126*  136*  148*   MPV  9.9  10.4  10.6       A/P:    POD #7 s/p L hip nail    Antibiotics: None required  Activity: WBAT operative extremity.  PT today.  Diet: General  DVT: Mechanical (SCDs) + Pharmacologic (Holding due to wound drainage)  Dispo: D/C planning    "

## 2019-01-05 NOTE — PROGRESS NOTES
Bedside report received by night RN.  Patient resting comfortably in bed.  Prevena Plus noted to L hip.  Patient on 3L NC, uses 3L at home at HS.  Safety precautions in place.  Call light within reach.

## 2019-01-05 NOTE — ASSESSMENT & PLAN NOTE
Chest x-ray showed hyperinflation, small bilateral basilar infusion  CTA negative for pulmonary embolism  Otherwise hypoxia probably secondary to emphysema/COPD  Incentive spirometry,  RT protocol

## 2019-01-05 NOTE — PROGRESS NOTES
Report given to Tele BRONWYN Claros.  Pt med and belongings were provided.  Updated family of pt's current status.

## 2019-01-06 PROCEDURE — 700102 HCHG RX REV CODE 250 W/ 637 OVERRIDE(OP): Performed by: INTERNAL MEDICINE

## 2019-01-06 PROCEDURE — A9270 NON-COVERED ITEM OR SERVICE: HCPCS | Performed by: ORTHOPAEDIC SURGERY

## 2019-01-06 PROCEDURE — 700102 HCHG RX REV CODE 250 W/ 637 OVERRIDE(OP): Performed by: HOSPITALIST

## 2019-01-06 PROCEDURE — A9270 NON-COVERED ITEM OR SERVICE: HCPCS | Performed by: HOSPITALIST

## 2019-01-06 PROCEDURE — 700102 HCHG RX REV CODE 250 W/ 637 OVERRIDE(OP): Performed by: ORTHOPAEDIC SURGERY

## 2019-01-06 PROCEDURE — 700112 HCHG RX REV CODE 229: Performed by: ORTHOPAEDIC SURGERY

## 2019-01-06 PROCEDURE — A9270 NON-COVERED ITEM OR SERVICE: HCPCS | Performed by: NURSE PRACTITIONER

## 2019-01-06 PROCEDURE — 700102 HCHG RX REV CODE 250 W/ 637 OVERRIDE(OP): Performed by: NURSE PRACTITIONER

## 2019-01-06 PROCEDURE — A9270 NON-COVERED ITEM OR SERVICE: HCPCS | Performed by: INTERNAL MEDICINE

## 2019-01-06 PROCEDURE — 99232 SBSQ HOSP IP/OBS MODERATE 35: CPT | Performed by: INTERNAL MEDICINE

## 2019-01-06 PROCEDURE — 770006 HCHG ROOM/CARE - MED/SURG/GYN SEMI*

## 2019-01-06 RX ADMIN — LEVOTHYROXINE SODIUM 50 MCG: 50 TABLET ORAL at 05:16

## 2019-01-06 RX ADMIN — OMEPRAZOLE 20 MG: 20 CAPSULE, DELAYED RELEASE ORAL at 17:48

## 2019-01-06 RX ADMIN — ROSUVASTATIN CALCIUM 10 MG: 10 TABLET, FILM COATED ORAL at 21:31

## 2019-01-06 RX ADMIN — METOLAZONE 2.5 MG: 2.5 TABLET ORAL at 05:21

## 2019-01-06 RX ADMIN — ROPINIROLE HYDROCHLORIDE 0.25 MG: 0.5 TABLET, FILM COATED ORAL at 11:50

## 2019-01-06 RX ADMIN — OXYCODONE HYDROCHLORIDE 10 MG: 10 TABLET ORAL at 08:27

## 2019-01-06 RX ADMIN — STANDARDIZED SENNA CONCENTRATE AND DOCUSATE SODIUM 1 TABLET: 8.6; 5 TABLET, FILM COATED ORAL at 21:31

## 2019-01-06 RX ADMIN — ROPINIROLE HYDROCHLORIDE 0.25 MG: 0.5 TABLET, FILM COATED ORAL at 05:15

## 2019-01-06 RX ADMIN — FLUTICASONE PROPIONATE 110 MCG: 110 AEROSOL, METERED RESPIRATORY (INHALATION) at 05:14

## 2019-01-06 RX ADMIN — OXYCODONE HYDROCHLORIDE 10 MG: 10 TABLET ORAL at 15:00

## 2019-01-06 RX ADMIN — DOCUSATE SODIUM 100 MG: 100 CAPSULE, LIQUID FILLED ORAL at 17:48

## 2019-01-06 RX ADMIN — TOPIRAMATE 25 MG: 25 TABLET, FILM COATED ORAL at 05:16

## 2019-01-06 RX ADMIN — DOCUSATE SODIUM 100 MG: 100 CAPSULE, LIQUID FILLED ORAL at 05:16

## 2019-01-06 RX ADMIN — APIXABAN 5 MG: 5 TABLET, FILM COATED ORAL at 05:16

## 2019-01-06 RX ADMIN — APIXABAN 5 MG: 5 TABLET, FILM COATED ORAL at 17:48

## 2019-01-06 RX ADMIN — OMEPRAZOLE 20 MG: 20 CAPSULE, DELAYED RELEASE ORAL at 05:17

## 2019-01-06 RX ADMIN — ROPINIROLE HYDROCHLORIDE 0.25 MG: 0.5 TABLET, FILM COATED ORAL at 17:48

## 2019-01-06 RX ADMIN — BUMETANIDE 1 MG: 1 TABLET ORAL at 05:17

## 2019-01-06 RX ADMIN — METOPROLOL SUCCINATE 25 MG: 25 TABLET, EXTENDED RELEASE ORAL at 05:18

## 2019-01-06 RX ADMIN — TOPIRAMATE 25 MG: 25 TABLET, FILM COATED ORAL at 17:48

## 2019-01-06 RX ADMIN — OXYCODONE HYDROCHLORIDE 10 MG: 10 TABLET ORAL at 11:50

## 2019-01-06 RX ADMIN — SERTRALINE 50 MG: 50 TABLET, FILM COATED ORAL at 21:31

## 2019-01-06 ASSESSMENT — ENCOUNTER SYMPTOMS
VOMITING: 0
SORE THROAT: 0
ORTHOPNEA: 0
PHOTOPHOBIA: 0
TINGLING: 0
POLYDIPSIA: 0
WEIGHT LOSS: 0
PALPITATIONS: 0
MYALGIAS: 1
FOCAL WEAKNESS: 0
DIARRHEA: 0
WEAKNESS: 1
HEADACHES: 0
COUGH: 0
CHILLS: 0
NECK PAIN: 0
BRUISES/BLEEDS EASILY: 0
DEPRESSION: 0
SHORTNESS OF BREATH: 0
BACK PAIN: 0
ABDOMINAL PAIN: 0
DIZZINESS: 0
FEVER: 0
BLURRED VISION: 0
DOUBLE VISION: 0

## 2019-01-06 ASSESSMENT — PAIN SCALES - GENERAL
PAINLEVEL_OUTOF10: 2
PAINLEVEL_OUTOF10: 1
PAINLEVEL_OUTOF10: 5
PAINLEVEL_OUTOF10: 2
PAINLEVEL_OUTOF10: 0
PAINLEVEL_OUTOF10: 7
PAINLEVEL_OUTOF10: 7
PAINLEVEL_OUTOF10: 10
PAINLEVEL_OUTOF10: 0

## 2019-01-06 ASSESSMENT — LIFESTYLE VARIABLES: SUBSTANCE_ABUSE: 0

## 2019-01-06 NOTE — CARE PLAN
Problem: Safety  Goal: Will remain free from injury  Outcome: PROGRESSING AS EXPECTED      Problem: Pain Management  Goal: Pain level will decrease to patient's comfort goal  Outcome: PROGRESSING AS EXPECTED      Problem: Skin Integrity  Goal: Risk for impaired skin integrity will decrease  Outcome: PROGRESSING AS EXPECTED

## 2019-01-06 NOTE — PROGRESS NOTES
Assumed care at 1900, bedside report received from day shift RN. Pt returned from CT at this time. She is paced at 66 bpm on the monitor. Initial assessment completed, orders reviewed, call light within reach, bed alarm in use, and hourly rounding in place. POC addressed with patient, no additional questions at this time.    Plan: pain management, wound vac, encourage mobility

## 2019-01-06 NOTE — PROGRESS NOTES
" Subjective:      No events. Pain controlled.    Objective:    Blood pressure 128/68, pulse 70, temperature 36.6 °C (97.9 °F), temperature source Temporal, resp. rate 18, height 1.6 m (5' 3\"), weight 62.3 kg (137 lb 5.6 oz), SpO2 90 %, not currently breastfeeding.    Recent Labs      01/04/19 0530 01/05/19 0226   WBC  3.6*  3.6*   RBC  2.43*  2.41*   HEMOGLOBIN  7.4*  7.3*   HEMATOCRIT  23.7*  23.6*   MCV  97.5  97.9*   MCH  30.5  30.3   MCHC  31.2*  30.9*   RDW  60.6*  59.7*   PLATELETCT  136*  148*   MPV  10.4  10.6     Recent Labs      01/04/19 0530 01/05/19 0225   SODIUM  139  141   POTASSIUM  3.3*  3.8   CHLORIDE  105  107   CO2  28  28   GLUCOSE  104*  104*   BUN  45*  45*   CREATININE  1.22  1.11   CALCIUM  8.5  8.7         Intake/Output Summary (Last 24 hours) at 01/06/19 0724  Last data filed at 01/05/19 1805   Gross per 24 hour   Intake              570 ml   Output                0 ml   Net              570 ml       Comfortable, no distress  Neurologically and vascularly intact with palpable pedal pulses bilaterally.  Dressing C/D/I      Assessment:    S/p hip nail    Plan:      WBAT  PT/OT  Dispo planning  "

## 2019-01-06 NOTE — PROGRESS NOTES
Bedside report received.  Patient awake and alert, oriented x4.  Wound vac in place.  POC discussed, all questions answered.  Safety precautions in place.  Call light within reach.

## 2019-01-07 ENCOUNTER — APPOINTMENT (OUTPATIENT)
Dept: HEMATOLOGY ONCOLOGY | Facility: MEDICAL CENTER | Age: 84
End: 2019-01-07
Payer: MEDICARE

## 2019-01-07 VITALS
SYSTOLIC BLOOD PRESSURE: 150 MMHG | WEIGHT: 137.35 LBS | HEART RATE: 74 BPM | OXYGEN SATURATION: 97 % | DIASTOLIC BLOOD PRESSURE: 67 MMHG | HEIGHT: 63 IN | BODY MASS INDEX: 24.34 KG/M2 | RESPIRATION RATE: 15 BRPM | TEMPERATURE: 98.5 F

## 2019-01-07 PROBLEM — N17.9 ACUTE KIDNEY INJURY (HCC): Status: RESOLVED | Noted: 2018-12-30 | Resolved: 2019-01-07

## 2019-01-07 PROBLEM — R07.9 CHEST PAIN: Status: RESOLVED | Noted: 2019-01-05 | Resolved: 2019-01-07

## 2019-01-07 PROBLEM — J96.01 ACUTE RESPIRATORY FAILURE WITH HYPOXIA (HCC): Status: RESOLVED | Noted: 2018-05-22 | Resolved: 2019-01-07

## 2019-01-07 LAB
ERYTHROCYTE [DISTWIDTH] IN BLOOD BY AUTOMATED COUNT: 61.2 FL (ref 35.9–50)
HCT VFR BLD AUTO: 25.2 % (ref 37–47)
HGB BLD-MCNC: 7.7 G/DL (ref 12–16)
IRON SATN MFR SERPL: 12 % (ref 15–55)
IRON SERPL-MCNC: 37 UG/DL (ref 40–170)
MCH RBC QN AUTO: 30.2 PG (ref 27–33)
MCHC RBC AUTO-ENTMCNC: 30.6 G/DL (ref 33.6–35)
MCV RBC AUTO: 98.8 FL (ref 81.4–97.8)
PLATELET # BLD AUTO: 181 K/UL (ref 164–446)
PMV BLD AUTO: 10.6 FL (ref 9–12.9)
RBC # BLD AUTO: 2.55 M/UL (ref 4.2–5.4)
TIBC SERPL-MCNC: 300 UG/DL (ref 250–450)
WBC # BLD AUTO: 3.4 K/UL (ref 4.8–10.8)

## 2019-01-07 PROCEDURE — A9270 NON-COVERED ITEM OR SERVICE: HCPCS | Performed by: INTERNAL MEDICINE

## 2019-01-07 PROCEDURE — A9270 NON-COVERED ITEM OR SERVICE: HCPCS | Performed by: NURSE PRACTITIONER

## 2019-01-07 PROCEDURE — 700102 HCHG RX REV CODE 250 W/ 637 OVERRIDE(OP): Performed by: ORTHOPAEDIC SURGERY

## 2019-01-07 PROCEDURE — 700102 HCHG RX REV CODE 250 W/ 637 OVERRIDE(OP): Performed by: NURSE PRACTITIONER

## 2019-01-07 PROCEDURE — 83540 ASSAY OF IRON: CPT

## 2019-01-07 PROCEDURE — A9270 NON-COVERED ITEM OR SERVICE: HCPCS | Performed by: ORTHOPAEDIC SURGERY

## 2019-01-07 PROCEDURE — 700102 HCHG RX REV CODE 250 W/ 637 OVERRIDE(OP): Performed by: HOSPITALIST

## 2019-01-07 PROCEDURE — 85027 COMPLETE CBC AUTOMATED: CPT

## 2019-01-07 PROCEDURE — 99239 HOSP IP/OBS DSCHRG MGMT >30: CPT | Performed by: INTERNAL MEDICINE

## 2019-01-07 PROCEDURE — 700102 HCHG RX REV CODE 250 W/ 637 OVERRIDE(OP): Performed by: INTERNAL MEDICINE

## 2019-01-07 PROCEDURE — 83550 IRON BINDING TEST: CPT

## 2019-01-07 PROCEDURE — A9270 NON-COVERED ITEM OR SERVICE: HCPCS | Performed by: HOSPITALIST

## 2019-01-07 PROCEDURE — 700112 HCHG RX REV CODE 229: Performed by: ORTHOPAEDIC SURGERY

## 2019-01-07 PROCEDURE — 36415 COLL VENOUS BLD VENIPUNCTURE: CPT

## 2019-01-07 RX ORDER — ROPINIROLE 0.25 MG/1
0.25 TABLET, FILM COATED ORAL 3 TIMES DAILY
Qty: 90 TAB | Refills: 11 | Status: SHIPPED | OUTPATIENT
Start: 2019-01-07 | End: 2019-03-05 | Stop reason: SDUPTHER

## 2019-01-07 RX ORDER — ACETAMINOPHEN 325 MG/1
650 TABLET ORAL EVERY 6 HOURS PRN
Qty: 30 TAB | Refills: 0 | Status: SHIPPED | OUTPATIENT
Start: 2019-01-07

## 2019-01-07 RX ORDER — OXYCODONE HYDROCHLORIDE 10 MG/1
10 TABLET ORAL EVERY 4 HOURS PRN
Qty: 12 TAB | Refills: 0 | Status: SHIPPED | OUTPATIENT
Start: 2019-01-07 | End: 2019-01-09

## 2019-01-07 RX ORDER — FLUTICASONE PROPIONATE 110 UG/1
1 AEROSOL, METERED RESPIRATORY (INHALATION) DAILY
Qty: 1 INHALER
Start: 2019-01-08 | End: 2019-03-05 | Stop reason: SDUPTHER

## 2019-01-07 RX ORDER — CLOPIDOGREL BISULFATE 75 MG/1
75 TABLET ORAL DAILY
Status: DISCONTINUED | OUTPATIENT
Start: 2019-01-07 | End: 2019-01-07 | Stop reason: HOSPADM

## 2019-01-07 RX ORDER — CLOPIDOGREL BISULFATE 75 MG/1
75 TABLET ORAL DAILY
Qty: 30 TAB
Start: 2019-01-07 | End: 2019-03-05 | Stop reason: SDUPTHER

## 2019-01-07 RX ORDER — AMOXICILLIN 250 MG
2 CAPSULE ORAL 2 TIMES DAILY
Qty: 30 TAB | Refills: 0
Start: 2019-01-07 | End: 2019-03-05

## 2019-01-07 RX ORDER — POLYETHYLENE GLYCOL 3350 17 G/17G
17 POWDER, FOR SOLUTION ORAL 2 TIMES DAILY PRN
Refills: 3
Start: 2019-01-07 | End: 2019-05-23

## 2019-01-07 RX ADMIN — BUMETANIDE 1 MG: 1 TABLET ORAL at 11:42

## 2019-01-07 RX ADMIN — METOLAZONE 2.5 MG: 2.5 TABLET ORAL at 06:38

## 2019-01-07 RX ADMIN — CLOPIDOGREL 75 MG: 75 TABLET, FILM COATED ORAL at 11:37

## 2019-01-07 RX ADMIN — METOPROLOL SUCCINATE 25 MG: 25 TABLET, EXTENDED RELEASE ORAL at 06:39

## 2019-01-07 RX ADMIN — LEVOTHYROXINE SODIUM 50 MCG: 50 TABLET ORAL at 06:39

## 2019-01-07 RX ADMIN — APIXABAN 5 MG: 5 TABLET, FILM COATED ORAL at 06:38

## 2019-01-07 RX ADMIN — OMEPRAZOLE 20 MG: 20 CAPSULE, DELAYED RELEASE ORAL at 06:38

## 2019-01-07 RX ADMIN — DOCUSATE SODIUM 100 MG: 100 CAPSULE, LIQUID FILLED ORAL at 06:38

## 2019-01-07 RX ADMIN — FLUTICASONE PROPIONATE 110 MCG: 110 AEROSOL, METERED RESPIRATORY (INHALATION) at 06:46

## 2019-01-07 RX ADMIN — OXYCODONE HYDROCHLORIDE 10 MG: 10 TABLET ORAL at 09:29

## 2019-01-07 RX ADMIN — ROPINIROLE HYDROCHLORIDE 0.25 MG: 0.5 TABLET, FILM COATED ORAL at 11:37

## 2019-01-07 RX ADMIN — TOPIRAMATE 25 MG: 25 TABLET, FILM COATED ORAL at 06:38

## 2019-01-07 RX ADMIN — ROPINIROLE HYDROCHLORIDE 0.25 MG: 0.5 TABLET, FILM COATED ORAL at 06:38

## 2019-01-07 ASSESSMENT — PAIN SCALES - GENERAL: PAINLEVEL_OUTOF10: 9

## 2019-01-07 NOTE — DISCHARGE PLANNING
Anticipated Discharge Disposition: skilled    Action: informed patient her grand daughter would like her to go to [place] and they can take her today at 2:00.  Patient stated good as she wants to go today.  Faxed transport communication form to [facility] for a 2:00 transport time.  PC to [place], informed her patient will be leaving at 2:00 today      Barriers to Discharge: n/a    Plan: n/a

## 2019-01-07 NOTE — DISCHARGE INSTRUCTIONS
Discharge Instructions    Discharged to other by medical transportation with escort. Discharged via wheelchair, hospital escort: Yes.  Special equipment needed: none      Be sure to schedule a follow-up appointment with your primary care doctor or any specialists as instructed.     Discharge Plan:   Influenza Vaccine Indication: Not indicated: Previously immunized this influenza season and > 8 years of age    I understand that a diet low in cholesterol, fat, and sodium is recommended for good health. Unless I have been given specific instructions below for another diet, I accept this instruction as my diet prescription.   Other diet: regular    Special Instructions: None    · Is patient discharged on Warfarin / Coumadin?   No       Understanding Hip Fractures  The hip is the largest weight-bearing joint in the body. It’s also a common place for a fracture after a fall--especially in older people. Hip fractures are even more likely in people with osteoporosis a disease that leads to weakened bones.     A fractured hip  The hip can fracture in many places. Most often, the fracture occurs in the upper part of the femur. In rare cases, you can also have more than 1 type of fracture at a time:  · Transcervical fracture. A break across the neck of the femur, just under the ball. This type of fracture can interrupt blood flow to the joint.  · Intertrochanteric fracture. A break down through the top of the femur.  · Subtrochanteric fracture. A break across the upper shaft of the femur.  A healthy hip  The hip is a ball-and-socket joint where the thighbone (femur) joins the pelvis. When the hip is healthy, you can walk, turn, and move without pain. The head or “ball” of the femur fits into a socket in the pelvis. The ball and socket are each covered with smooth cartilage. This allows the ball to glide easily in the socket. Blood vessels supply oxygen and nutrients to keep the hip joint healthy.      After a Hip Fracture:  Common Questions  No one plans on having a hip fracture. But a sudden fall or accident can be a life-changing event. You’ll often need surgery to repair the fracture, and time for it to heal. It’s normal to have concerns about what to expect at this time. Below are answers to some common questions.  Can I be as active as I was before?  After a hip fracture, you may not be able to move around as easily as you did before. But with some effort and a positive attitude, you can get back to doing many things you enjoy.  When will the pain in my hip stop?  Your hip will likely be sore for several weeks after surgery. But this pain can be managed with medicine. The pain should also lessen with time and proper exercise.   Why do I need to begin doing exercises right after surgery?  Exercise is needed for proper healing. Some exercises help prevent blood clots, while others build strength to help you get out of bed and get moving.  When can I go home?  This often depends on your health and how well you can get around. You'll often leave the hospital within a week. But you may need to go to a rehabilitation center or skilled nursing facility for an additional week or two before returning home.  How long before I can use the bathroom on my own?  Your catheter is removed once you can move to the bathroom. This is often a day or two after surgery. A therapist will teach you how to get on and off the toilet safely.  When will I walk again?  With the help of a physical therapist, you’ll begin learning how to walk again before you leave the hospital. Your healthcare provider may restrict your amount of weight-bearing activities after surgery, depending on the location of the fracture, as well as the surgery type. For several months after your surgery, you may need to continue physical therapy and walking with a cane or walker.        Depression / Suicide Risk    As you are discharged from this [facility] facility, it is important  to learn how to keep safe from harming yourself.    Recognize the warning signs:  · Abrupt changes in personality, positive or negative- including increase in energy   · Giving away possessions  · Change in eating patterns- significant weight changes-  positive or negative  · Change in sleeping patterns- unable to sleep or sleeping all the time   · Unwillingness or inability to communicate  · Depression  · Unusual sadness, discouragement and loneliness  · Talk of wanting to die  · Neglect of personal appearance   · Rebelliousness- reckless behavior  · Withdrawal from people/activities they love  · Confusion- inability to concentrate     If you or a loved one observes any of these behaviors or has concerns about self-harm, here's what you can do:  · Talk about it- your feelings and reasons for harming yourself  · Remove any means that you might use to hurt yourself (examples: pills, rope, extension cords, firearm)  · Get professional help from the community (Mental Health, Substance Abuse, psychological counseling)  · Do not be alone:Call your Safe Contact- someone whom you trust who will be there for you.  · Call your local CRISIS HOTLINE 398-8426 or 560-159-2913  · Call your local Children's Mobile Crisis Response Team [place] (732) 892-2514 or www.Tigermed  · Call the toll free National Suicide Prevention Hotlines   · National Suicide Prevention Lifeline 150-513-ESZJ (4657)  · National Hope Line Network 800-SUICIDE (535-4036)

## 2019-01-07 NOTE — PALLIATIVE CARE
"PALLIATIVE CARE FOLLOW-UP:    Pt reports being \"very tired\" but nit in pain.    Addressed completing POLST for documenting pt's wishes for DNR, DNI and no artificial nutrition so that these can be followed in the community as doctor's orders.  Pt initially felt that the DNR written into her AD would be sufficient but saw the reasoning and completed POLST.    POLST placed on hard chart for MD review.    Discussed with/Updated:  Dr. Gallegos        Plan:  likely SNF rehab for d/c    Thank you for allowing Palliative Care to support this pt.  Contact [phone] for additional assistance, questions or concerns.  "

## 2019-01-07 NOTE — DISCHARGE PLANNING
Medical Social Work  PC from Palma selected [facility]  Skype to [facility] to see if they can take patient today.

## 2019-01-07 NOTE — PROGRESS NOTES
Palma, pt's granddaugher, notified that her grandmother is going to [place] at 2pm. Palma acknowledged.

## 2019-01-07 NOTE — PROGRESS NOTES
Patient being transferred to [facility].  Patient made aware and Palma, patient's granddaughter made aware.  Report called to RN.  Transport in to move patient.

## 2019-01-07 NOTE — DISCHARGE PLANNING
Received Transport Form @ 105  Spoke to Jamison @ [place]    Transport is scheduled for [facility] @1400 going to [place].

## 2019-01-07 NOTE — DISCHARGE PLANNING
Anticipated Discharge Disposition: Skilled    Action: spoke to patient about acceptance at skilled facilities.  Patient requested I talked to Palma about this.  PC to Palma, grand daughter, [phone]: message left to call    Barriers to Discharge: SNF    Plan: follow up with grand daughter

## 2019-01-07 NOTE — CARE PLAN
Problem: Safety  Goal: Will remain free from falls  Outcome: PROGRESSING AS EXPECTED  Fall precautions in place. Non-skid socks in place. Personal possessions within reach. Mobility sign on door. Bed-alarm on. Call light within reach. Pt educated regarding fall prevention and states understanding.     Problem: Skin Integrity  Goal: Risk for impaired skin integrity will decrease  Outcome: PROGRESSING AS EXPECTED  Pt turned and positioned every two hours. Incontinence care provided and barrier paste applied as needed.     Problem: Urinary Elimination:  Goal: Ability to reestablish a normal urinary elimination pattern will improve  Outcome: PROGRESSING AS EXPECTED

## 2019-01-07 NOTE — PROGRESS NOTES
Discharging patient to [facility], per physician order. Demonstrated understanding of discharge instructions, for hip fracture. Ambulating 2 assistance, voiding without difficulty, pain well controlled, tolerating oral medications, oxygen saturation greater than 90%, tolerating regular diet, IV removed. Educational handouts for hip fracture given and discussed. Verbalized understanding of discharge instructions and educational handouts. All questions answered. Belongings with patient at time of discharge. Paperwork place in pt's chart. Pt transferred to [place] with [facility] transport..  Report given to YANCY Martinez, [place]

## 2019-01-07 NOTE — DISCHARGE PLANNING
Agency/Facility Name: [facility]  Spoke To: Rachel  Outcome: Accepted    Agency/Facility Name: [facility]  Spoke To: Jamison  Outcome: Accepted    Agency/Facility Name: [facility]  Spoke To: Neema  Outcome: Accepted

## 2019-01-07 NOTE — DISCHARGE SUMMARY
Discharge Summary    CHIEF COMPLAINT ON ADMISSION  Chief Complaint   Patient presents with   • T-5000 FALL   • Hip Pain     left       Reason for Admission  EMS     CODE STATUS  DNAR/DNI    Patient Active Problem List   Diagnosis   • Chronic atrial fibrillation (HCC)   • Secondary hypertension   • Cardiac pacemaker in situ   • Tremors of nervous system   • ASCVD (arteriosclerotic cardiovascular disease)   • Chronic diastolic CHF (congestive heart failure) (HCC)   • COPD (chronic obstructive pulmonary disease) (HCC)   • Essential tremor   • ARELY (generalized anxiety disorder)   • GERD (gastroesophageal reflux disease)   • Hypothyroidism   • Nonrheumatic aortic valve stenosis   • Osteoporosis   • Persistent insomnia   • Psoriasis   • Recurrent UTI   • Sinoatrial node dysfunction (HCC)   • Vaginal atrophy   • Warfarin anticoagulation   • Adjustment disorder with anxiety   • Bilateral lower extremity edema   • Polypharmacy   • Hypoxia   • CKD (chronic kidney disease) stage 3, GFR 30-59 ml/min (HCC)   • Pulmonary hypertension (HCC)   • Iron deficiency anemia   • Hyponatremia   • S/P coronary angioplasty   • Acute on chronic renal failure (HCC)   • Severe tricuspid regurgitation   • Pancytopenia ([facility])   • Pernicious anemia   • Fall   • DNR (do not resuscitate)   • Weakness   • Femur fracture ([facility])   • Anemia   • Acute kidney injury (HCC)   • Coronary artery disease     HPI & HOSPITAL COURSE  This is a 86 y.o. female here with left hip fracture.  Please see the dictated history of present illness 12/28/2018 for complete details.  In short, the patient was walking down her driveway with her walker when she tripped into the garden and landed on her left hip.  This was witnessed by the daughter.  CT scan of the left hip revealed acute, significantly displaced, intertrochanteric fracture of the left femur.  Orthopedics consultation was obtained and the patient underwent left intertrochanteric femur fracture repair with  intramedullary nail 12/29/2018.    Patient has extensive medical history including atrial fibrillation on anticoagulation with Eliquis, severe tricuspid regurgitation, hypothyroidism, pulmonary hypertension, coronary artery disease with 2 drug-eluting stents July 2, 2018, CHF with preserved EF, GERD, leukopenia and anemia, breast cancer, COPD and pacemaker placement.  Her Plavix and low-dose Eliquis were held prior to surgery.  Due to long-standing and chronic anemia she was given 1 unit of blood prior to surgery. Postoperatively she had a somewhat alcira course.  She had drainage from the incision site and wound care was contacted.  Pravena Plus was placed to the left hip incision by the wound team. This can be discontinued 1/11/19.  She developed acute kidney injury and her diuretics were held briefly.  Her creatinine has stabilized and her Bumex has been restarted.    Additionally, the patient had one episode of chest pain.  Repeat troponins have been negative.  She remains in atrial fibrillation on a paced rhythm on EKG. Her chest pain resolved spontaneously. Due to her history of atrial fibrillation her anticoagulation was increased from 2.5 mg of Eliquis twice daily to 5 mg twice daily initially.  Review of the last cardiology office note from November shows the patient was on 2.5 mg of Eliquis twice daily with Plavix.  I discussed the case with both pharmacy and orthopedics, and considering the skant drainage from her incision, and her labile renal status and borderline weight (63kg), we will resume her 2.5 mg of Eliquis twice daily and Plavix 75 mg daily. The patient has follow up with Cardiology 1/17 for further discussion.    Due to the complexity of her care and advanced age consultation with the palliative care team was made. The patient wishes to be DNR\DNI.  Therefore, she is discharged in guarded and stable condition to skilled nursing facility. Accepted to [facility] with transport at 1400  today.    FOLLOW UP ITEMS POST DISCHARGE  DC wound vac 1/11/19.    FOLLOW UP  Future Appointments  Date Time Provider Department Center   1/17/2019 2:15 PM Jose Simms M.D. [facility] None   2/7/2019 1:00 PM [facility]  [facility]   6/3/2019 10:15 AM PACER CHECK-CAM B 2 [facility] None     No follow-up provider specified.    MEDICATIONS ON DISCHARGE     Medication List      START taking these medications      Instructions   fluticasone 110 MCG/ACT Aero  Start taking on:  1/8/2019  Commonly known as:  FLOVENT HFA   Inhale 1 Puff by mouth every day.  Dose:  1 Puff     oxyCODONE immediate release 10 MG immediate release tablet  Commonly known as:  ROXICODONE   Take 1 Tab by mouth every four hours as needed for Severe Pain for up to 2 days.  Dose:  10 mg     polyethylene glycol/lytes Pack  Commonly known as:  MIRALAX   Take 1 Packet by mouth 2 times a day as needed (if sennosides and/or docusate ineffective or not ordered).  Dose:  17 g     ROPINIRole 0.25 MG Tabs  Commonly known as:  REQUIP   Take 1 Tab by mouth 3 times a day.  Dose:  0.25 mg     senna-docusate 8.6-50 MG Tabs  Commonly known as:  PERICOLACE or SENOKOT S   Take 2 Tabs by mouth 2 times a day.  Dose:  2 Tab     topiramate 25 MG Tabs  Commonly known as:  TOPAMAX   TAKE ONE TABLET BY MOUTH TWICE A DAY     umeclidinium-vilanterol 62.5-25 MCG/INH Aepb inhaler  Start taking on:  1/8/2019  Commonly known as:  ANORO ELLIPTA   Inhale 1 Puff by mouth every day.  Dose:  1 Puff        CHANGE how you take these medications      Instructions   acetaminophen 325 MG Tabs  What changed:  · medication strength  · how much to take  · when to take this  · reasons to take this  Commonly known as:  TYLENOL   Take 2 Tabs by mouth every 6 hours as needed.  Dose:  650 mg     * apixaban 2.5mg Tabs  What changed:  how much to take  Commonly known as:  ELIQUIS   Take 2 Tabs by mouth 2 Times a Day.  Dose:  5 mg     * apixaban 2.5mg Tabs  What changed:  You were  already taking a medication with the same name, and this prescription was added. Make sure you understand how and when to take each.  Commonly known as:  ELIQUIS   Take 1 Tab by mouth 2 Times a Day.  Dose:  2.5 mg        * This list has 2 medication(s) that are the same as other medications prescribed for you. Read the directions carefully, and ask your doctor or other care provider to review them with you.            CONTINUE taking these medications      Instructions   allopurinol 100 MG Tabs  Commonly known as:  ZYLOPRIM   Take 1 Tab by mouth 2 times a day.  Dose:  100 mg     ascorbic acid 500 MG tablet  Commonly known as:  VITAMIN C   Take 1 Tab by mouth every day.  Dose:  500 mg     bumetanide 1 MG Tabs  Commonly known as:  BUMEX   Take 1 mg by mouth 2 Times a Day.  Dose:  1 mg     calcium carbonate 500 MG Chew  Commonly known as:  TUMS   Take 1 Tab by mouth every four hours as needed (heartburn).  Dose:  500 mg     Calcium-Vitamin D 500-125 MG-UNIT Tabs   Take 1 tablet by mouth every morning. 600mg - 400 iu  Dose:  1 tablet     clopidogrel 75 MG Tabs  Commonly known as:  PLAVIX   Take 1 Tab by mouth every day.  Dose:  75 mg     cyanocobalamin 1000 MCG Tabs  Commonly known as:  VITAMIN B12   Take 1 Tab by mouth every day.  Dose:  1000 mcg     Fluticasone-Umeclidin-Vilant 100-62.5-25 MCG/INH Aepb  Commonly known as:  TRELEGY ELLIPTA   Inhale 1 Puff by mouth every day.  Dose:  1 Puff     gabapentin 300 MG Caps  Commonly known as:  NEURONTIN   Take 2 Caps by mouth every evening.  Dose:  600 mg     levothyroxine 50 MCG Tabs  Commonly known as:  SYNTHROID   Take 1 Tab by mouth Every morning on an empty stomach.  Dose:  50 mcg     metOLazone 2.5 MG Tabs  Commonly known as:  ZAROXOLYN   Take 1 Tab by mouth every day.  Dose:  2.5 mg     metoprolol SR 25 MG Tb24  Commonly known as:  TOPROL XL   Take 1 Tab by mouth every morning.  Dose:  25 mg     omeprazole 20 MG delayed-release capsule  Commonly known as:  PRILOSEC    Take 1 Cap by mouth 2 times a day.  Dose:  20 mg     potassium chloride SA 20 MEQ Tbcr  Commonly known as:  Kdur   Take 1 Tab by mouth 2 times a day.  Dose:  20 mEq     PROAIR  (90 Base) MCG/ACT Aers inhalation aerosol  Generic drug:  albuterol   Inhale 2 Puffs by mouth every 6 hours as needed for Shortness of Breath.  Dose:  2 Puff     rosuvastatin 10 MG Tabs  Commonly known as:  CRESTOR   Take 1 Tab by mouth every evening.  Dose:  10 mg     sertraline 50 MG Tabs  Commonly known as:  ZOLOFT   Take 1 Tab by mouth every bedtime.  Dose:  50 mg            Allergies  Allergies   Allergen Reactions   • Codeine Vomiting       DIET  Orders Placed This Encounter   Procedures   • Diet Order Regular     Standing Status:   Standing     Number of Occurrences:   1     Order Specific Question:   Diet:     Answer:   Regular [1]       ACTIVITY  As tolerated.  Weight bearing as tolerated    LINES, DRAINS, AND WOUNDS  This is an automated list. Peripheral IVs will be removed prior to discharge.  Peripheral IV 12/28/18 20 G Right Antecubital (Active)   Site Assessment Clean;Dry;Intact 1/6/2019  9:00 PM   Dressing Type Transparent;Occlusive 1/6/2019  9:00 PM   Line Status Saline locked 1/6/2019  9:00 PM   Dressing Status Clean;Dry;Intact 1/6/2019  9:00 PM   Dressing Intervention N/A 1/6/2019  9:00 PM   Infiltration Grading ([facility] 0 1/6/2019  9:00 PM   Phlebitis Scale ([facility] Only) 0 1/6/2019  9:00 PM          Peripheral IV 12/28/18 20 G Right Antecubital (Active)   Site Assessment Clean;Dry;Intact 1/6/2019  9:00 PM   Dressing Type Transparent;Occlusive 1/6/2019  9:00 PM   Line Status Saline locked 1/6/2019  9:00 PM   Dressing Status Clean;Dry;Intact 1/6/2019  9:00 PM   Dressing Intervention N/A 1/6/2019  9:00 PM   Infiltration Grading ([facility] 0 1/6/2019  9:00 PM   Phlebitis Scale ([facility] Only) 0 1/6/2019  9:00 PM               MENTAL STATUS ON TRANSFER  Level of Consciousness: Alert  Orientation : Oriented x  4  Speech: Speech Clear    CONSULTATIONS  Orthopedics  Palliative    PROCEDURES  Left hip IM nailing; Dr. Zaragoza    LABORATORY  Lab Results   Component Value Date    SODIUM 141 01/05/2019    POTASSIUM 3.8 01/05/2019    CHLORIDE 107 01/05/2019    CO2 28 01/05/2019    GLUCOSE 104 (H) 01/05/2019    BUN 45 (H) 01/05/2019    CREATININE 1.11 01/05/2019        Lab Results   Component Value Date    WBC 3.6 (L) 01/05/2019    HEMOGLOBIN 7.3 (L) 01/05/2019    HEMATOCRIT 23.6 (L) 01/05/2019    PLATELETCT 148 (L) 01/05/2019     In prescribing controlled substances to this patient, I certify that I have obtained and reviewed the medical history of Andree Egan. I have also made a good sg effort to obtain applicable records from other providers who have treated the patient and records did not demonstrate any increased risk of substance abuse that would prevent me from prescribing controlled substances.     I have conducted a physical exam and documented it. I have reviewed Ms. Egan’s prescription history as maintained by the [place] Prescription Monitoring Program.     I have assessed the patient’s risk for abuse, dependency, and addiction using the validated Opioid Risk Tool available at https://www.mdcalc.com/ltwurq-tygu-ohay-ort-narcotic-abuse.     Given the above, I believe the benefits of controlled substance therapy outweigh the risks. The reasons for prescribing controlled substances include non-narcotic, oral analgesic alternatives have been inadequate for pain control. Accordingly, I have discussed the risk and benefits, treatment plan, and alternative therapies with the patient.      Total time of the discharge process exceeds 40 minutes.

## 2019-01-17 ENCOUNTER — OFFICE VISIT (OUTPATIENT)
Dept: CARDIOLOGY | Facility: MEDICAL CENTER | Age: 84
End: 2019-01-17
Payer: MEDICARE

## 2019-01-17 VITALS
WEIGHT: 126 LBS | DIASTOLIC BLOOD PRESSURE: 66 MMHG | HEIGHT: 63 IN | SYSTOLIC BLOOD PRESSURE: 120 MMHG | HEART RATE: 92 BPM | OXYGEN SATURATION: 96 % | BODY MASS INDEX: 22.32 KG/M2

## 2019-01-17 DIAGNOSIS — N18.30 CKD (CHRONIC KIDNEY DISEASE) STAGE 3, GFR 30-59 ML/MIN (HCC): ICD-10-CM

## 2019-01-17 DIAGNOSIS — I48.20 CHRONIC ATRIAL FIBRILLATION (HCC): ICD-10-CM

## 2019-01-17 DIAGNOSIS — I50.32 CHRONIC DIASTOLIC CHF (CONGESTIVE HEART FAILURE) (HCC): ICD-10-CM

## 2019-01-17 DIAGNOSIS — I25.10 ASCVD (ARTERIOSCLEROTIC CARDIOVASCULAR DISEASE): ICD-10-CM

## 2019-01-17 DIAGNOSIS — I27.20 PULMONARY HYPERTENSION (HCC): ICD-10-CM

## 2019-01-17 DIAGNOSIS — R60.0 BILATERAL LOWER EXTREMITY EDEMA: ICD-10-CM

## 2019-01-17 DIAGNOSIS — I25.10 CORONARY ARTERY DISEASE WITHOUT ANGINA PECTORIS, UNSPECIFIED VESSEL OR LESION TYPE, UNSPECIFIED WHETHER NATIVE OR TRANSPLANTED HEART: ICD-10-CM

## 2019-01-17 DIAGNOSIS — Z95.0 CARDIAC PACEMAKER IN SITU: ICD-10-CM

## 2019-01-17 DIAGNOSIS — Z79.01 WARFARIN ANTICOAGULATION: ICD-10-CM

## 2019-01-17 DIAGNOSIS — I15.9 SECONDARY HYPERTENSION: ICD-10-CM

## 2019-01-17 PROCEDURE — 99214 OFFICE O/P EST MOD 30 MIN: CPT | Performed by: INTERNAL MEDICINE

## 2019-01-17 ASSESSMENT — ENCOUNTER SYMPTOMS
CHILLS: 0
COUGH: 0
LOSS OF CONSCIOUSNESS: 0
BRUISES/BLEEDS EASILY: 0
CARDIOVASCULAR NEGATIVE: 1
CONSTITUTIONAL NEGATIVE: 1
SPUTUM PRODUCTION: 0
EYES NEGATIVE: 1
RESPIRATORY NEGATIVE: 1
SORE THROAT: 0
ORTHOPNEA: 0
FEVER: 0
WHEEZING: 0
WEAKNESS: 0
PND: 0
PALPITATIONS: 0
NEUROLOGICAL NEGATIVE: 1
GASTROINTESTINAL NEGATIVE: 1
SHORTNESS OF BREATH: 0
STRIDOR: 0
HEMOPTYSIS: 0
DIZZINESS: 0
MUSCULOSKELETAL NEGATIVE: 1
CLAUDICATION: 0

## 2019-01-17 NOTE — PROGRESS NOTES
Chief Complaint   Patient presents with   • Congestive Heart Failure     F/V: 2 MO       Subjective:   Andree Egan is a 86 y.o. female who presents today as a follow-up for her chronic kidney disease and chronic diastolic heart failure.  She recently broke her hip in the hospital for this.  She is now in a rehab facility.  Some of her diuretics have been changed.  In the last week that she is been in there which is been only 1 week she is noticed worsening left greater than right increasing lower extremity edema.  She is on Bumex 1 mg twice daily as well as metolazone 2.5.  She is also having shortness of breath.  Her blood pressures otherwise controlled.    Past Medical History:   Diagnosis Date   • Arthritis     back/neck   • Atrial fibrillation (CMS-HCC) [I48.91] 3/29/2018   • Back pain    • Blood clotting disorder (HCC)    • Breast cancer (HCC)    • Breath shortness     water retention   • Cancer (HCC)     breast   • Cataract    • Congestive heart failure (CHF) ([facility]) 4/3/2018   • Constipation    • Coronary heart disease    • Daytime sleepiness    • Diarrhea    • BERTRAND (dyspnea on exertion) 5/4/2018   • GERD (gastroesophageal reflux disease)    • Heart attack (HCC)    • Heart burn    • Heart valve disease    • Heartburn    • Hemorrhagic disorder (HCC)    • Hiatus hernia syndrome    • Hyperlipidemia    • Hypertension    • Hypothyroidism    • Kidney disease    • Long term current use of anticoagulant 3/29/2018   • Nasal drainage    • Osteoporosis    • Pacemaker 4/3/2018   • Pneumonia    • Pulmonary emphysema (HCC)    • Restless leg syndrome    • Rheumatoid arthritis (HCC)    • Ringing in ears    • Secondary hypertension 4/3/2018   • Shortness of breath    • Swelling of lower extremity    • Thyroid disease    • Tonsillitis    • Tremors of nervous system 4/3/2018   • Urinary incontinence    • Urinary tract infection    • Wears glasses      Past Surgical History:   Procedure Laterality Date   • HIP NAILING  INTRAMEDULLARY Left 12/29/2018    Procedure: HIP NAILING INTRAMEDULLARY;  Surgeon: Juan Daniel Zaragoza M.D.;  Location: SURGERY [facility];  Service: Orthopedics   • HYSTERECTOMY LAPAROSCOPY     • MASTECTOMY     • OTHER      left breast mastectomy   • OTHER CARDIAC SURGERY      pacemaker placement   • PACEMAKER INSERTION     • PB REMV 2ND CATARACT,CORN-SCLER SECTN       Family History   Problem Relation Age of Onset   • Lung Disease Mother    • Cancer Mother    • Heart Disease Mother    • No Known Problems Father    • Diabetes Brother    • Fainting Neg Hx      Social History     Social History   • Marital status: Single     Spouse name: N/A   • Number of children: N/A   • Years of education: N/A     Occupational History   • Not on file.     Social History Main Topics   • Smoking status: Former Smoker     Packs/day: 0.50     Types: Cigarettes     Quit date: 1/1/1970   • Smokeless tobacco: Never Used   • Alcohol use 1.8 oz/week     3 Glasses of wine per week      Comment: 1-2 glasses wine before dinner   • Drug use: No   • Sexual activity: Not on file     Other Topics Concern   • Not on file     Social History Narrative   • No narrative on file     Allergies   Allergen Reactions   • Codeine Vomiting     Outpatient Encounter Prescriptions as of 1/17/2019   Medication Sig Dispense Refill   • apixaban (ELIQUIS) 2.5mg Tab Take 2 Tabs by mouth 2 Times a Day. 60 Tab 5   • ROPINIRole (REQUIP) 0.25 MG Tab Take 1 Tab by mouth 3 times a day. 90 Tab 11   • senna-docusate (PERICOLACE OR SENOKOT S) 8.6-50 MG Tab Take 2 Tabs by mouth 2 times a day. 30 Tab 0   • umeclidinium-vilanterol (ANORO ELLIPTA) 62.5-25 MCG/INH AEROSOL POWDER, BREATH ACTIVATED inhaler Inhale 1 Puff by mouth every day. 1 Inhaler 0   • fluticasone (FLOVENT HFA) 110 MCG/ACT Aerosol Inhale 1 Puff by mouth every day. 1 Inhaler    • clopidogrel (PLAVIX) 75 MG Tab Take 1 Tab by mouth every day. 30 Tab    • topiramate (TOPAMAX) 25 MG Tab TAKE ONE TABLET BY MOUTH  TWICE A DAY 60 Tab 1   • albuterol (PROAIR HFA) 108 (90 Base) MCG/ACT Aero Soln inhalation aerosol Inhale 2 Puffs by mouth every 6 hours as needed for Shortness of Breath.     • potassium chloride SA (KDUR) 20 MEQ Tab CR Take 1 Tab by mouth 2 times a day. 60 Tab 11   • bumetanide (BUMEX) 1 MG Tab Take 1 mg by mouth 2 Times a Day.     • omeprazole (PRILOSEC) 20 MG delayed-release capsule Take 1 Cap by mouth 2 times a day. 30 Cap 2   • sertraline (ZOLOFT) 50 MG Tab Take 1 Tab by mouth every bedtime. 30 Tab 11   • metoprolol SR (TOPROL XL) 25 MG TABLET SR 24 HR Take 1 Tab by mouth every morning. 30 Tab 11   • metOLazone (ZAROXOLYN) 2.5 MG Tab Take 1 Tab by mouth every day. 30 Tab 11   • rosuvastatin (CRESTOR) 10 MG Tab Take 1 Tab by mouth every evening. 30 Tab 11   • allopurinol (ZYLOPRIM) 100 MG Tab Take 1 Tab by mouth 2 times a day. 30 Tab 1   • cyanocobalamin (VITAMIN B12) 1000 MCG Tab Take 1 Tab by mouth every day. 30 Tab 3   • Calcium-Vitamin D 500-125 MG-UNIT Tab Take 1 tablet by mouth every morning. 600mg - 400 iu 30 Tab 1   • levothyroxine (SYNTHROID) 50 MCG Tab Take 1 Tab by mouth Every morning on an empty stomach. 90 Tab 3   • ascorbic acid (VITAMIN C) 500 MG tablet Take 1 Tab by mouth every day. 30 Tab 3   • acetaminophen (TYLENOL) 325 MG Tab Take 2 Tabs by mouth every 6 hours as needed. 30 Tab 0   • polyethylene glycol/lytes (MIRALAX) Pack Take 1 Packet by mouth 2 times a day as needed (if sennosides and/or docusate ineffective or not ordered).  3   • [DISCONTINUED] apixaban (ELIQUIS) 2.5mg Tab Take 1 Tab by mouth 2 Times a Day. (Patient not taking: Reported on 1/17/2019) 60 Tab    • [DISCONTINUED] Fluticasone-Umeclidin-Vilant (TRELEGY ELLIPTA) 100-62.5-25 MCG/INH AEROSOL POWDER, BREATH ACTIVATED Inhale 1 Puff by mouth every day. (Patient not taking: Reported on 1/17/2019) 1 Each 11   • calcium carbonate (TUMS) 500 MG Chew Tab Take 1 Tab by mouth every four hours as needed (heartburn). 30 Tab 0   •  "[DISCONTINUED] gabapentin (NEURONTIN) 300 MG Cap Take 2 Caps by mouth every evening. (Patient not taking: Reported on 1/17/2019) 90 Cap 1     No facility-administered encounter medications on file as of 1/17/2019.      Review of Systems   Constitutional: Negative.  Negative for chills, fever and malaise/fatigue.   HENT: Negative.  Negative for sore throat.    Eyes: Negative.    Respiratory: Negative.  Negative for cough, hemoptysis, sputum production, shortness of breath, wheezing and stridor.    Cardiovascular: Negative.  Negative for chest pain, palpitations, orthopnea, claudication, leg swelling and PND.   Gastrointestinal: Negative.    Genitourinary: Negative.    Musculoskeletal: Negative.    Skin: Negative.    Neurological: Negative.  Negative for dizziness, loss of consciousness and weakness.   Endo/Heme/Allergies: Negative.  Does not bruise/bleed easily.   All other systems reviewed and are negative.       Objective:   /66 (BP Location: Left arm, Patient Position: Sitting, BP Cuff Size: Adult)   Pulse 92   Ht 1.6 m (5' 3\")   Wt 57.2 kg (126 lb)   LMP  (LMP Unknown)   SpO2 96%   BMI 22.32 kg/m²     Physical Exam   Constitutional: She appears well-developed and well-nourished. No distress.   HENT:   Head: Normocephalic and atraumatic.   Right Ear: External ear normal.   Left Ear: External ear normal.   Nose: Nose normal.   Mouth/Throat: No oropharyngeal exudate.   Eyes: Pupils are equal, round, and reactive to light. Conjunctivae and EOM are normal. Right eye exhibits no discharge. Left eye exhibits no discharge. No scleral icterus.   Neck: Neck supple. No JVD present.   Cardiovascular: Normal rate, regular rhythm and intact distal pulses.  Exam reveals no gallop and no friction rub.    No murmur heard.  Pulmonary/Chest: Effort normal. No stridor. No respiratory distress. She has no wheezes. She has no rales. She exhibits no tenderness.   Abdominal: Soft. She exhibits no distension. There is no " guarding.   Musculoskeletal: Normal range of motion. She exhibits no edema, tenderness or deformity.   Neurological: She is alert. She has normal reflexes. She displays normal reflexes. No cranial nerve deficit. She exhibits normal muscle tone. Coordination normal.   Skin: Skin is warm and dry. No rash noted. She is not diaphoretic. No erythema. No pallor.   Psychiatric: She has a normal mood and affect. Her behavior is normal. Judgment and thought content normal.   Nursing note and vitals reviewed.      Assessment:     1. Chronic atrial fibrillation ([facility])     2. Chronic diastolic CHF (congestive heart failure) ([facility])     3. Coronary artery disease without angina pectoris, unspecified vessel or lesion type, unspecified whether native or transplanted heart     4. Secondary hypertension     5. Cardiac pacemaker in situ     6. ASCVD (arteriosclerotic cardiovascular disease)     7. CKD (chronic kidney disease) stage 3, GFR 30-59 ml/min ([facility])     8. Pulmonary hypertension ([facility])     9. Bilateral lower extremity edema     10. Warfarin anticoagulation         Medical Decision Making:  Today's Assessment / Status / Plan:     86-year-old female with diastolic dysfunction and chronic kidney disease in the setting of a recent hip fracture.  Who this point is very cautious to increase her diuretics.  I did discuss with her the risks and benefits of this.  We will increase her Bumex to 2 mg in the morning but keep her on 1 mg in the afternoon with the current dose of metolazone.  Her rehab facility will follow up in her kidney function.  We will see her back in 1 month.    Thank for you allowing me to take part in your patient's care, please call should you have any questions or would like to discuss this patient.

## 2019-01-17 NOTE — LETTER
[facility]   [address] [place] [address]  [place] [address]  Phone: [phone]  Fax: [phone]              Andree Egan  4/21/1932    Encounter Date: 1/17/2019    Jose Simms M.D.          PROGRESS NOTE:  Chief Complaint   Patient presents with   • Congestive Heart Failure     F/V: 2 MO       Subjective:   Andree Egan is a 86 y.o. female who presents today as a follow-up for her chronic kidney disease and chronic diastolic heart failure.  She recently broke her hip in the hospital for this.  She is now in a rehab facility.  Some of her diuretics have been changed.  In the last week that she is been in there which is been only 1 week she is noticed worsening left greater than right increasing lower extremity edema.  She is on Bumex 1 mg twice daily as well as metolazone 2.5.  She is also having shortness of breath.  Her blood pressures otherwise controlled.    Past Medical History:   Diagnosis Date   • Arthritis     back/neck   • Atrial fibrillation (CMS-HCC) [I48.91] 3/29/2018   • Back pain    • Blood clotting disorder (Formerly [facility])    • Breast cancer (Formerly [facility])    • Breath shortness     water retention   • Cancer (HCC)     breast   • Cataract    • Congestive heart failure (CHF) (Formerly [facility]) 4/3/2018   • Constipation    • Coronary heart disease    • Daytime sleepiness    • Diarrhea    • BERTRAND (dyspnea on exertion) 5/4/2018   • GERD (gastroesophageal reflux disease)    • Heart attack (Formerly [facility])    • Heart burn    • Heart valve disease    • Heartburn    • Hemorrhagic disorder (Formerly [facility])    • Hiatus hernia syndrome    • Hyperlipidemia    • Hypertension    • Hypothyroidism    • Kidney disease    • Long term current use of anticoagulant 3/29/2018   • Nasal drainage    • Osteoporosis    • Pacemaker 4/3/2018   • Pneumonia    • Pulmonary emphysema (HCC)    • Restless leg syndrome    • Rheumatoid arthritis (HCC)    • Ringing in ears    • Secondary hypertension 4/3/2018   • Shortness of  breath    • Swelling of lower extremity    • Thyroid disease    • Tonsillitis    • Tremors of nervous system 4/3/2018   • Urinary incontinence    • Urinary tract infection    • Wears glasses      Past Surgical History:   Procedure Laterality Date   • HIP NAILING INTRAMEDULLARY Left 12/29/2018    Procedure: HIP NAILING INTRAMEDULLARY;  Surgeon: Juan Daniel Zaragoza M.D.;  Location: SURGERY [facility];  Service: Orthopedics   • HYSTERECTOMY LAPAROSCOPY     • MASTECTOMY     • OTHER      left breast mastectomy   • OTHER CARDIAC SURGERY      pacemaker placement   • PACEMAKER INSERTION     • PB REMV 2ND CATARACT,CORN-SCLER SECTN       Family History   Problem Relation Age of Onset   • Lung Disease Mother    • Cancer Mother    • Heart Disease Mother    • No Known Problems Father    • Diabetes Brother    • Fainting Neg Hx      Social History     Social History   • Marital status: Single     Spouse name: N/A   • Number of children: N/A   • Years of education: N/A     Occupational History   • Not on file.     Social History Main Topics   • Smoking status: Former Smoker     Packs/day: 0.50     Types: Cigarettes     Quit date: 1/1/1970   • Smokeless tobacco: Never Used   • Alcohol use 1.8 oz/week     3 Glasses of wine per week      Comment: 1-2 glasses wine before dinner   • Drug use: No   • Sexual activity: Not on file     Other Topics Concern   • Not on file     Social History Narrative   • No narrative on file     Allergies   Allergen Reactions   • Codeine Vomiting     Outpatient Encounter Prescriptions as of 1/17/2019   Medication Sig Dispense Refill   • apixaban (ELIQUIS) 2.5mg Tab Take 2 Tabs by mouth 2 Times a Day. 60 Tab 5   • ROPINIRole (REQUIP) 0.25 MG Tab Take 1 Tab by mouth 3 times a day. 90 Tab 11   • senna-docusate (PERICOLACE OR SENOKOT S) 8.6-50 MG Tab Take 2 Tabs by mouth 2 times a day. 30 Tab 0   • umeclidinium-vilanterol (ANORO ELLIPTA) 62.5-25 MCG/INH AEROSOL POWDER, BREATH ACTIVATED inhaler Inhale 1 Puff  by mouth every day. 1 Inhaler 0   • fluticasone (FLOVENT HFA) 110 MCG/ACT Aerosol Inhale 1 Puff by mouth every day. 1 Inhaler    • clopidogrel (PLAVIX) 75 MG Tab Take 1 Tab by mouth every day. 30 Tab    • topiramate (TOPAMAX) 25 MG Tab TAKE ONE TABLET BY MOUTH TWICE A DAY 60 Tab 1   • albuterol (PROAIR HFA) 108 (90 Base) MCG/ACT Aero Soln inhalation aerosol Inhale 2 Puffs by mouth every 6 hours as needed for Shortness of Breath.     • potassium chloride SA (KDUR) 20 MEQ Tab CR Take 1 Tab by mouth 2 times a day. 60 Tab 11   • bumetanide (BUMEX) 1 MG Tab Take 1 mg by mouth 2 Times a Day.     • omeprazole (PRILOSEC) 20 MG delayed-release capsule Take 1 Cap by mouth 2 times a day. 30 Cap 2   • sertraline (ZOLOFT) 50 MG Tab Take 1 Tab by mouth every bedtime. 30 Tab 11   • metoprolol SR (TOPROL XL) 25 MG TABLET SR 24 HR Take 1 Tab by mouth every morning. 30 Tab 11   • metOLazone (ZAROXOLYN) 2.5 MG Tab Take 1 Tab by mouth every day. 30 Tab 11   • rosuvastatin (CRESTOR) 10 MG Tab Take 1 Tab by mouth every evening. 30 Tab 11   • allopurinol (ZYLOPRIM) 100 MG Tab Take 1 Tab by mouth 2 times a day. 30 Tab 1   • cyanocobalamin (VITAMIN B12) 1000 MCG Tab Take 1 Tab by mouth every day. 30 Tab 3   • Calcium-Vitamin D 500-125 MG-UNIT Tab Take 1 tablet by mouth every morning. 600mg - 400 iu 30 Tab 1   • levothyroxine (SYNTHROID) 50 MCG Tab Take 1 Tab by mouth Every morning on an empty stomach. 90 Tab 3   • ascorbic acid (VITAMIN C) 500 MG tablet Take 1 Tab by mouth every day. 30 Tab 3   • acetaminophen (TYLENOL) 325 MG Tab Take 2 Tabs by mouth every 6 hours as needed. 30 Tab 0   • polyethylene glycol/lytes (MIRALAX) Pack Take 1 Packet by mouth 2 times a day as needed (if sennosides and/or docusate ineffective or not ordered).  3   • [DISCONTINUED] apixaban (ELIQUIS) 2.5mg Tab Take 1 Tab by mouth 2 Times a Day. (Patient not taking: Reported on 1/17/2019) 60 Tab    • [DISCONTINUED] Fluticasone-Umeclidin-Vilant (TRELEGY ELLIPTA)  "100-62.5-25 MCG/INH AEROSOL POWDER, BREATH ACTIVATED Inhale 1 Puff by mouth every day. (Patient not taking: Reported on 1/17/2019) 1 Each 11   • calcium carbonate (TUMS) 500 MG Chew Tab Take 1 Tab by mouth every four hours as needed (heartburn). 30 Tab 0   • [DISCONTINUED] gabapentin (NEURONTIN) 300 MG Cap Take 2 Caps by mouth every evening. (Patient not taking: Reported on 1/17/2019) 90 Cap 1     No facility-administered encounter medications on file as of 1/17/2019.      Review of Systems   Constitutional: Negative.  Negative for chills, fever and malaise/fatigue.   HENT: Negative.  Negative for sore throat.    Eyes: Negative.    Respiratory: Negative.  Negative for cough, hemoptysis, sputum production, shortness of breath, wheezing and stridor.    Cardiovascular: Negative.  Negative for chest pain, palpitations, orthopnea, claudication, leg swelling and PND.   Gastrointestinal: Negative.    Genitourinary: Negative.    Musculoskeletal: Negative.    Skin: Negative.    Neurological: Negative.  Negative for dizziness, loss of consciousness and weakness.   Endo/Heme/Allergies: Negative.  Does not bruise/bleed easily.   All other systems reviewed and are negative.       Objective:   /66 (BP Location: Left arm, Patient Position: Sitting, BP Cuff Size: Adult)   Pulse 92   Ht 1.6 m (5' 3\")   Wt 57.2 kg (126 lb)   LMP  (LMP Unknown)   SpO2 96%   BMI 22.32 kg/m²      Physical Exam   Constitutional: She appears well-developed and well-nourished. No distress.   HENT:   Head: Normocephalic and atraumatic.   Right Ear: External ear normal.   Left Ear: External ear normal.   Nose: Nose normal.   Mouth/Throat: No oropharyngeal exudate.   Eyes: Pupils are equal, round, and reactive to light. Conjunctivae and EOM are normal. Right eye exhibits no discharge. Left eye exhibits no discharge. No scleral icterus.   Neck: Neck supple. No JVD present.   Cardiovascular: Normal rate, regular rhythm and intact distal pulses.  " Exam reveals no gallop and no friction rub.    No murmur heard.  Pulmonary/Chest: Effort normal. No stridor. No respiratory distress. She has no wheezes. She has no rales. She exhibits no tenderness.   Abdominal: Soft. She exhibits no distension. There is no guarding.   Musculoskeletal: Normal range of motion. She exhibits no edema, tenderness or deformity.   Neurological: She is alert. She has normal reflexes. She displays normal reflexes. No cranial nerve deficit. She exhibits normal muscle tone. Coordination normal.   Skin: Skin is warm and dry. No rash noted. She is not diaphoretic. No erythema. No pallor.   Psychiatric: She has a normal mood and affect. Her behavior is normal. Judgment and thought content normal.   Nursing note and vitals reviewed.      Assessment:     1. Chronic atrial fibrillation (HCC)     2. Chronic diastolic CHF (congestive heart failure) ([facility])     3. Coronary artery disease without angina pectoris, unspecified vessel or lesion type, unspecified whether native or transplanted heart     4. Secondary hypertension     5. Cardiac pacemaker in situ     6. ASCVD (arteriosclerotic cardiovascular disease)     7. CKD (chronic kidney disease) stage 3, GFR 30-59 ml/min ([facility])     8. Pulmonary hypertension (HCC)     9. Bilateral lower extremity edema     10. Warfarin anticoagulation         Medical Decision Making:  Today's Assessment / Status / Plan:     86-year-old female with diastolic dysfunction and chronic kidney disease in the setting of a recent hip fracture.  Who this point is very cautious to increase her diuretics.  I did discuss with her the risks and benefits of this.  We will increase her Bumex to 2 mg in the morning but keep her on 1 mg in the afternoon with the current dose of metolazone.  Her rehab facility will follow up in her kidney function.  We will see her back in 1 month.    Thank for you allowing me to take part in your patient's care, please call should you have any questions  or would like to discuss this patient.      IMELDA PatelRRikiN.  [address] [facility] [address]  [place] [address]  VIA In Basket

## 2019-01-30 ENCOUNTER — PATIENT OUTREACH (OUTPATIENT)
Dept: HEALTH INFORMATION MANAGEMENT | Facility: OTHER | Age: 84
End: 2019-01-30

## 2019-01-30 DIAGNOSIS — Z71.89 COMPLEX CARE COORDINATION: ICD-10-CM

## 2019-01-30 ASSESSMENT — ENCOUNTER SYMPTOMS
DEPRESSION: 0
LOSS OF SENSATION IN FEET: 1
OCCASIONAL FEELINGS OF UNSTEADINESS: 1

## 2019-01-30 ASSESSMENT — PATIENT HEALTH QUESTIONNAIRE - PHQ9
CLINICAL INTERPRETATION OF PHQ2 SCORE: 2
5. POOR APPETITE OR OVEREATING: 0 - NOT AT ALL
SUM OF ALL RESPONSES TO PHQ QUESTIONS 1-9: 6

## 2019-01-31 NOTE — PROGRESS NOTES
Situation    TCN met with patient at SNF to discuss her DC plan and [facility] CCM program. Patient reports she lives with her Grandaughter but is home alone most of the time. Patient has had [facility] and [facility] in the past and would like to resume them when she discharges home. TCN offered information on CCM program and patient is agreeable to enrollment. Patient would benefit from long term CCM follow up and will be transitioned to CCM RN at SNF DC.     Patient states when at home she use FWW, 4WW, SCP And WC for hr mobility depending on how she is feeling. Patient reports her greatest barrier to gait is her left hip pain which is often 6/10 with WB. Patient states medication helps pain. When at home patient grand-daughter manages her medication. Patient is on 2L of O2 at night only but states she gets SOB with prolonged activity.    Background       86 y.o. female with PMH of: Afib and on anticoagulation, hypothyroidism, pulmonary hypertension, CAD, and GERD. Pt was admitted 12/28/2018 with left hip pain and swelling after a ground-level fall.  Patient was found to have Left Hip fracture and completed Intramedullary nail, left hip on 12/29/2018. Patient did require 2 units of blood transfusion while in hospital. SNF was recommended due to decline in functional status.     Patient was admitted to hospital on 12/28/18 and DC to SNF on 1/7/19.   Assessment    Patient is making slow and steady progress at SNF and currently requires Min to CGA with all mobility. Patient will likely be at SNF for 1-2 more weeks.    Recommendation TCN to follow up with patient on 2/13/19 at SNF facility to finalize DC plan. TCN to meet with patient sooner if DC date is set sooner.     Chelita Mitchell, PT DPT TCN  [phone]

## 2019-02-05 ENCOUNTER — PATIENT OUTREACH (OUTPATIENT)
Dept: HEALTH INFORMATION MANAGEMENT | Facility: OTHER | Age: 84
End: 2019-02-05

## 2019-02-20 ENCOUNTER — PATIENT OUTREACH (OUTPATIENT)
Dept: HEALTH INFORMATION MANAGEMENT | Facility: OTHER | Age: 84
End: 2019-02-20

## 2019-02-22 NOTE — PROGRESS NOTES
Situation    TCN met with patient and her family at SNF to discuss her upcoming DC date and plan. Patient has care conference on 2/21/19 to set a date within the next week to DC home. Family reports they are looking into getting paid care givers to assist with patient when she goes home. TCN informed family that DC arrangements will need to be made with in the next week. Patient will have [facility] and [facility] at DC.    Background       86 y.o. female with PMH of: Afib and on anticoagulation, hypothyroidism, pulmonary hypertension, CAD, and GERD. Pt was admitted 12/28/2018 with left hip pain and swelling after a ground-level fall.  Patient was found to have Left Hip fracture and completed Intramedullary nail, left hip on 12/29/2018. Patient did require 2 units of blood transfusion while in hospital. SNF was recommended due to decline in functional status.     Patient was admitted to hospital on 12/28/18 and DC to SNF on 1/7/19.   Assessment    Patient is currently at a SBA level for gait up to 75ft and transfers. Patient does require Min A for ADLS but nursing reports patient can take herself to and from the bathroom unassisted. Patient is medically stable per IDT and ready to DC home.    Recommendation TCN to contact patient on 2/28/19 to follow up on her progress at home following SNF DC.     Patient will DC home with [facility] and [facility].     Chelita Mitchell, PT DPT TCN  [phone]

## 2019-02-27 ENCOUNTER — HOME HEALTH ADMISSION (OUTPATIENT)
Dept: HOME HEALTH SERVICES | Facility: HOME HEALTHCARE | Age: 84
End: 2019-02-27
Payer: MEDICARE

## 2019-03-01 ENCOUNTER — OFFICE VISIT (OUTPATIENT)
Dept: CARDIOLOGY | Facility: MEDICAL CENTER | Age: 84
End: 2019-03-01
Payer: MEDICARE

## 2019-03-01 ENCOUNTER — PATIENT OUTREACH (OUTPATIENT)
Dept: HEALTH INFORMATION MANAGEMENT | Facility: OTHER | Age: 84
End: 2019-03-01

## 2019-03-01 VITALS
SYSTOLIC BLOOD PRESSURE: 118 MMHG | HEART RATE: 79 BPM | OXYGEN SATURATION: 98 % | HEIGHT: 63 IN | DIASTOLIC BLOOD PRESSURE: 58 MMHG | WEIGHT: 113 LBS | BODY MASS INDEX: 20.02 KG/M2

## 2019-03-01 DIAGNOSIS — N18.9 ACUTE RENAL FAILURE SUPERIMPOSED ON CHRONIC KIDNEY DISEASE, UNSPECIFIED CKD STAGE, UNSPECIFIED ACUTE RENAL FAILURE TYPE: ICD-10-CM

## 2019-03-01 DIAGNOSIS — Z95.0 CARDIAC PACEMAKER IN SITU: ICD-10-CM

## 2019-03-01 DIAGNOSIS — D50.0 IRON DEFICIENCY ANEMIA DUE TO CHRONIC BLOOD LOSS: ICD-10-CM

## 2019-03-01 DIAGNOSIS — G25.0 ESSENTIAL TREMOR: ICD-10-CM

## 2019-03-01 DIAGNOSIS — Z66 DNR (DO NOT RESUSCITATE): ICD-10-CM

## 2019-03-01 DIAGNOSIS — I50.32 CHRONIC DIASTOLIC CHF (CONGESTIVE HEART FAILURE) (HCC): ICD-10-CM

## 2019-03-01 DIAGNOSIS — R60.0 BILATERAL LOWER EXTREMITY EDEMA: ICD-10-CM

## 2019-03-01 DIAGNOSIS — Z79.899 HIGH RISK MEDICATION USE: ICD-10-CM

## 2019-03-01 DIAGNOSIS — N17.9 ACUTE RENAL FAILURE SUPERIMPOSED ON CHRONIC KIDNEY DISEASE, UNSPECIFIED CKD STAGE, UNSPECIFIED ACUTE RENAL FAILURE TYPE: ICD-10-CM

## 2019-03-01 DIAGNOSIS — I25.10 ASCVD (ARTERIOSCLEROTIC CARDIOVASCULAR DISEASE): ICD-10-CM

## 2019-03-01 DIAGNOSIS — I25.10 CORONARY ARTERY DISEASE WITHOUT ANGINA PECTORIS, UNSPECIFIED VESSEL OR LESION TYPE, UNSPECIFIED WHETHER NATIVE OR TRANSPLANTED HEART: ICD-10-CM

## 2019-03-01 DIAGNOSIS — N18.30 CKD (CHRONIC KIDNEY DISEASE) STAGE 3, GFR 30-59 ML/MIN (HCC): ICD-10-CM

## 2019-03-01 DIAGNOSIS — J44.9 CHRONIC OBSTRUCTIVE PULMONARY DISEASE, UNSPECIFIED COPD TYPE (HCC): ICD-10-CM

## 2019-03-01 DIAGNOSIS — I48.20 CHRONIC ATRIAL FIBRILLATION (HCC): ICD-10-CM

## 2019-03-01 PROCEDURE — 99215 OFFICE O/P EST HI 40 MIN: CPT | Performed by: INTERNAL MEDICINE

## 2019-03-01 ASSESSMENT — ENCOUNTER SYMPTOMS
SPUTUM PRODUCTION: 0
NEUROLOGICAL NEGATIVE: 1
LOSS OF CONSCIOUSNESS: 0
EYES NEGATIVE: 1
HEMOPTYSIS: 0
PALPITATIONS: 0
PND: 0
BRUISES/BLEEDS EASILY: 0
FEVER: 0
MUSCULOSKELETAL NEGATIVE: 1
RESPIRATORY NEGATIVE: 1
CHILLS: 0
STRIDOR: 0
WHEEZING: 0
COUGH: 0
SORE THROAT: 0
ORTHOPNEA: 0
GASTROINTESTINAL NEGATIVE: 1
SHORTNESS OF BREATH: 0
CARDIOVASCULAR NEGATIVE: 1
DIZZINESS: 0
CLAUDICATION: 0
CONSTITUTIONAL NEGATIVE: 1
WEAKNESS: 0

## 2019-03-01 NOTE — LETTER
[facility]   [address] [place] [address]  KO[place]pin [address]  Phone: [phone]  Fax: [phone]              Andree Egan  4/21/1932    Encounter Date: 3/1/2019    Jose Simms M.D.          PROGRESS NOTE:  Chief Complaint   Patient presents with   • Atrial Fibrillation       Subjective:   Andree Egan is a 86 y.o. female who presents today as a follow-up for her atrial fibrillation and CAD status post stent placement.  Since we last saw her she has developed an anemia which is continuing.  Her last CBC was showing a hematocrit in the 20s.  She is on oral iron.  This is not been rechecked.  She is feeling fatigued but not having any lower extremity edema.  She was seen by her provider at her rehab facility who stopped her Bumex and switched her to metolazone for some unknown reason.  She is otherwise having good blood pressure control with no chest pain.    Past Medical History:   Diagnosis Date   • Arthritis     back/neck   • Atrial fibrillation (CMS-HCC) [I48.91] 3/29/2018   • Back pain    • Blood clotting disorder (HCC)    • Breast cancer (HCC)    • Breath shortness     water retention   • Cancer (HCC)     breast   • Cataract    • Congestive heart failure (CHF) ([facility]) 4/3/2018   • Constipation    • Coronary heart disease    • Daytime sleepiness    • Diarrhea    • BERTRAND (dyspnea on exertion) 5/4/2018   • GERD (gastroesophageal reflux disease)    • Heart attack (HCC)    • Heart burn    • Heart valve disease    • Heartburn    • Hemorrhagic disorder (HCC)    • Hiatus hernia syndrome    • Hyperlipidemia    • Hypertension    • Hypothyroidism    • Kidney disease    • Long term current use of anticoagulant 3/29/2018   • Nasal drainage    • Osteoporosis    • Pacemaker 4/3/2018   • Pneumonia    • Pulmonary emphysema (HCC)    • Restless leg syndrome    • Rheumatoid arthritis (HCC)    • Ringing in ears    • Secondary hypertension 4/3/2018   • Shortness of breath    •  Swelling of lower extremity    • Thyroid disease    • Tonsillitis    • Tremors of nervous system 4/3/2018   • Urinary incontinence    • Urinary tract infection    • Wears glasses      Past Surgical History:   Procedure Laterality Date   • HIP NAILING INTRAMEDULLARY Left 12/29/2018    Procedure: HIP NAILING INTRAMEDULLARY;  Surgeon: Juan Daniel Zaragoza M.D.;  Location: SURGERY [facility];  Service: Orthopedics   • HYSTERECTOMY LAPAROSCOPY     • MASTECTOMY     • OTHER      left breast mastectomy   • OTHER CARDIAC SURGERY      pacemaker placement   • PACEMAKER INSERTION     • PB REMV 2ND CATARACT,CORN-SCLER SECTN       Family History   Problem Relation Age of Onset   • Lung Disease Mother    • Cancer Mother    • Heart Disease Mother    • No Known Problems Father    • Diabetes Brother    • Fainting Neg Hx      Social History     Social History   • Marital status: Single     Spouse name: N/A   • Number of children: N/A   • Years of education: N/A     Occupational History   • Not on file.     Social History Main Topics   • Smoking status: Former Smoker     Packs/day: 0.50     Types: Cigarettes     Quit date: 1/1/1970   • Smokeless tobacco: Never Used   • Alcohol use 1.8 oz/week     3 Glasses of wine per week      Comment: 1-2 glasses wine before dinner   • Drug use: No   • Sexual activity: Not on file     Other Topics Concern   • Not on file     Social History Narrative   • No narrative on file     Allergies   Allergen Reactions   • Codeine Vomiting     Outpatient Encounter Prescriptions as of 3/1/2019   Medication Sig Dispense Refill   • acetaminophen (TYLENOL) 325 MG Tab Take 2 Tabs by mouth every 6 hours as needed. 30 Tab 0   • apixaban (ELIQUIS) 2.5mg Tab Take 2 Tabs by mouth 2 Times a Day. 60 Tab 5   • ROPINIRole (REQUIP) 0.25 MG Tab Take 1 Tab by mouth 3 times a day. 90 Tab 11   • umeclidinium-vilanterol (ANORO ELLIPTA) 62.5-25 MCG/INH AEROSOL POWDER, BREATH ACTIVATED inhaler Inhale 1 Puff by mouth every day. 1  Inhaler 0   • fluticasone (FLOVENT HFA) 110 MCG/ACT Aerosol Inhale 1 Puff by mouth every day. 1 Inhaler    • clopidogrel (PLAVIX) 75 MG Tab Take 1 Tab by mouth every day. 30 Tab    • topiramate (TOPAMAX) 25 MG Tab TAKE ONE TABLET BY MOUTH TWICE A DAY 60 Tab 1   • albuterol (PROAIR HFA) 108 (90 Base) MCG/ACT Aero Soln inhalation aerosol Inhale 2 Puffs by mouth every 6 hours as needed for Shortness of Breath.     • potassium chloride SA (KDUR) 20 MEQ Tab CR Take 1 Tab by mouth 2 times a day. 60 Tab 11   • bumetanide (BUMEX) 1 MG Tab Take 1 mg by mouth 2 Times a Day.     • omeprazole (PRILOSEC) 20 MG delayed-release capsule Take 1 Cap by mouth 2 times a day. 30 Cap 2   • sertraline (ZOLOFT) 50 MG Tab Take 1 Tab by mouth every bedtime. 30 Tab 11   • metoprolol SR (TOPROL XL) 25 MG TABLET SR 24 HR Take 1 Tab by mouth every morning. 30 Tab 11   • calcium carbonate (TUMS) 500 MG Chew Tab Take 1 Tab by mouth every four hours as needed (heartburn). 30 Tab 0   • rosuvastatin (CRESTOR) 10 MG Tab Take 1 Tab by mouth every evening. 30 Tab 11   • allopurinol (ZYLOPRIM) 100 MG Tab Take 1 Tab by mouth 2 times a day. 30 Tab 1   • cyanocobalamin (VITAMIN B12) 1000 MCG Tab Take 1 Tab by mouth every day. 30 Tab 3   • Calcium-Vitamin D 500-125 MG-UNIT Tab Take 1 tablet by mouth every morning. 600mg - 400 iu 30 Tab 1   • levothyroxine (SYNTHROID) 50 MCG Tab Take 1 Tab by mouth Every morning on an empty stomach. 90 Tab 3   • ascorbic acid (VITAMIN C) 500 MG tablet Take 1 Tab by mouth every day. 30 Tab 3   • polyethylene glycol/lytes (MIRALAX) Pack Take 1 Packet by mouth 2 times a day as needed (if sennosides and/or docusate ineffective or not ordered).  3   • senna-docusate (PERICOLACE OR SENOKOT S) 8.6-50 MG Tab Take 2 Tabs by mouth 2 times a day. 30 Tab 0   • [DISCONTINUED] metOLazone (ZAROXOLYN) 2.5 MG Tab Take 1 Tab by mouth every day. 30 Tab 11     No facility-administered encounter medications on file as of 3/1/2019.      Review of  "Systems   Constitutional: Negative.  Negative for chills, fever and malaise/fatigue.   HENT: Negative.  Negative for sore throat.    Eyes: Negative.    Respiratory: Negative.  Negative for cough, hemoptysis, sputum production, shortness of breath, wheezing and stridor.    Cardiovascular: Negative.  Negative for chest pain, palpitations, orthopnea, claudication, leg swelling and PND.   Gastrointestinal: Negative.    Genitourinary: Negative.    Musculoskeletal: Negative.    Skin: Negative.    Neurological: Negative.  Negative for dizziness, loss of consciousness and weakness.   Endo/Heme/Allergies: Negative.  Does not bruise/bleed easily.   All other systems reviewed and are negative.       Objective:   /58 (BP Location: Right arm, Patient Position: Sitting, BP Cuff Size: Adult)   Pulse 79   Ht 1.6 m (5' 3\")   Wt 51.3 kg (113 lb)   LMP  (LMP Unknown)   SpO2 98%   BMI 20.02 kg/m²      Physical Exam   Constitutional: She appears well-developed and well-nourished. No distress.   HENT:   Head: Normocephalic and atraumatic.   Right Ear: External ear normal.   Left Ear: External ear normal.   Nose: Nose normal.   Mouth/Throat: No oropharyngeal exudate.   Eyes: Pupils are equal, round, and reactive to light. Conjunctivae and EOM are normal. Right eye exhibits no discharge. Left eye exhibits no discharge. No scleral icterus.   Neck: Neck supple. No JVD present.   Cardiovascular: Normal rate, regular rhythm and intact distal pulses.  Exam reveals no gallop and no friction rub.    No murmur heard.  Pulmonary/Chest: Effort normal. No stridor. No respiratory distress. She has no wheezes. She has no rales. She exhibits no tenderness.   Abdominal: Soft. She exhibits no distension. There is no guarding.   Musculoskeletal: Normal range of motion. She exhibits no edema, tenderness or deformity.   Neurological: She is alert. She has normal reflexes. She displays normal reflexes. No cranial nerve deficit. She exhibits normal " muscle tone. Coordination normal.   Skin: Skin is warm and dry. No rash noted. She is not diaphoretic. No erythema. No pallor.   Psychiatric: She has a normal mood and affect. Her behavior is normal. Judgment and thought content normal.   Nursing note and vitals reviewed.      Assessment:     1. Acute renal failure superimposed on chronic kidney disease, unspecified CKD stage, unspecified acute renal failure type ([facility])  CBC WITH DIFFERENTIAL    Basic Metabolic Panel    IRON/TOTAL IRON BIND (FEIBC)   2. ASCVD (arteriosclerotic cardiovascular disease)  CBC WITH DIFFERENTIAL    Basic Metabolic Panel    IRON/TOTAL IRON BIND (FEIBC)   3. Bilateral lower extremity edema  CBC WITH DIFFERENTIAL    Basic Metabolic Panel    IRON/TOTAL IRON BIND (FEIBC)   4. Cardiac pacemaker in situ  CBC WITH DIFFERENTIAL    Basic Metabolic Panel    IRON/TOTAL IRON BIND (FEIBC)   5. Chronic atrial fibrillation ([facility])  CBC WITH DIFFERENTIAL    Basic Metabolic Panel    IRON/TOTAL IRON BIND (FEIBC)   6. Chronic diastolic CHF (congestive heart failure) ([facility])  CBC WITH DIFFERENTIAL    Basic Metabolic Panel    IRON/TOTAL IRON BIND (FEIBC)   7. CKD (chronic kidney disease) stage 3, GFR 30-59 ml/min ([facility])  CBC WITH DIFFERENTIAL    Basic Metabolic Panel    IRON/TOTAL IRON BIND (FEIBC)   8. Chronic obstructive pulmonary disease, unspecified COPD type ([facility])  CBC WITH DIFFERENTIAL    Basic Metabolic Panel    IRON/TOTAL IRON BIND (FEIBC)   9. Essential tremor  CBC WITH DIFFERENTIAL    Basic Metabolic Panel    IRON/TOTAL IRON BIND (FEIBC)   10. DNR (do not resuscitate)  CBC WITH DIFFERENTIAL    Basic Metabolic Panel    IRON/TOTAL IRON BIND (FEIBC)   11. Coronary artery disease without angina pectoris, unspecified vessel or lesion type, unspecified whether native or transplanted heart  CBC WITH DIFFERENTIAL    Basic Metabolic Panel    IRON/TOTAL IRON BIND (FEIBC)   12. High risk medication use  CBC WITH DIFFERENTIAL    Basic Metabolic Panel    IRON/TOTAL  IRON BIND (FEIBC)   13. Iron deficiency anemia due to chronic blood loss  CBC WITH DIFFERENTIAL    Basic Metabolic Panel    IRON/TOTAL IRON BIND (FEIBC)       Medical Decision Making:  Today's Assessment / Status / Plan:     86-year-old female with anemia with heart failure with reduced ejection fraction atrial fibrillation diastolic dysfunction and lower extremity edema.  I would like to stop her metolazone as I think this is the incorrect medication for her.  I do asked her daughter to restart her Bumex at a dose that keeps her euvolemic.  Meanwhile we will check some labs including her iron studies to look to see if she continues to be anemic.  Unfortunately we are stuck with Plavix as well as an oral anticoagulant.  Would like to see her back in 1 month.    Thank for you allowing me to take part in your patient's care, please call should you have any questions or would like to discuss this patient.      IMELDA PatelR.N.  [id number]  [address]  [facility] [id number]  VIA In Basket

## 2019-03-01 NOTE — PROGRESS NOTES
Chief Complaint   Patient presents with   • Atrial Fibrillation       Subjective:   Andree Egan is a 86 y.o. female who presents today as a follow-up for her atrial fibrillation and CAD status post stent placement.  Since we last saw her she has developed an anemia which is continuing.  Her last CBC was showing a hematocrit in the 20s.  She is on oral iron.  This is not been rechecked.  She is feeling fatigued but not having any lower extremity edema.  She was seen by her provider at her rehab facility who stopped her Bumex and switched her to metolazone for some unknown reason.  She is otherwise having good blood pressure control with no chest pain.    Past Medical History:   Diagnosis Date   • Arthritis     back/neck   • Atrial fibrillation (CMS-HCC) [I48.91] 3/29/2018   • Back pain    • Blood clotting disorder (HCC)    • Breast cancer (HCC)    • Breath shortness     water retention   • Cancer (HCC)     breast   • Cataract    • Congestive heart failure (CHF) (HCC) 4/3/2018   • Constipation    • Coronary heart disease    • Daytime sleepiness    • Diarrhea    • BERTRAND (dyspnea on exertion) 5/4/2018   • GERD (gastroesophageal reflux disease)    • Heart attack (HCC)    • Heart burn    • Heart valve disease    • Heartburn    • Hemorrhagic disorder (HCC)    • Hiatus hernia syndrome    • Hyperlipidemia    • Hypertension    • Hypothyroidism    • Kidney disease    • Long term current use of anticoagulant 3/29/2018   • Nasal drainage    • Osteoporosis    • Pacemaker 4/3/2018   • Pneumonia    • Pulmonary emphysema (HCC)    • Restless leg syndrome    • Rheumatoid arthritis (HCC)    • Ringing in ears    • Secondary hypertension 4/3/2018   • Shortness of breath    • Swelling of lower extremity    • Thyroid disease    • Tonsillitis    • Tremors of nervous system 4/3/2018   • Urinary incontinence    • Urinary tract infection    • Wears glasses      Past Surgical History:   Procedure Laterality Date   • HIP NAILING INTRAMEDULLARY  Left 12/29/2018    Procedure: HIP NAILING INTRAMEDULLARY;  Surgeon: Juan Daniel Zaragoza M.D.;  Location: SURGERY [facility];  Service: Orthopedics   • HYSTERECTOMY LAPAROSCOPY     • MASTECTOMY     • OTHER      left breast mastectomy   • OTHER CARDIAC SURGERY      pacemaker placement   • PACEMAKER INSERTION     • PB REMV 2ND CATARACT,CORN-SCLER SECTN       Family History   Problem Relation Age of Onset   • Lung Disease Mother    • Cancer Mother    • Heart Disease Mother    • No Known Problems Father    • Diabetes Brother    • Fainting Neg Hx      Social History     Social History   • Marital status: Single     Spouse name: N/A   • Number of children: N/A   • Years of education: N/A     Occupational History   • Not on file.     Social History Main Topics   • Smoking status: Former Smoker     Packs/day: 0.50     Types: Cigarettes     Quit date: 1/1/1970   • Smokeless tobacco: Never Used   • Alcohol use 1.8 oz/week     3 Glasses of wine per week      Comment: 1-2 glasses wine before dinner   • Drug use: No   • Sexual activity: Not on file     Other Topics Concern   • Not on file     Social History Narrative   • No narrative on file     Allergies   Allergen Reactions   • Codeine Vomiting     Outpatient Encounter Prescriptions as of 3/1/2019   Medication Sig Dispense Refill   • acetaminophen (TYLENOL) 325 MG Tab Take 2 Tabs by mouth every 6 hours as needed. 30 Tab 0   • apixaban (ELIQUIS) 2.5mg Tab Take 2 Tabs by mouth 2 Times a Day. 60 Tab 5   • ROPINIRole (REQUIP) 0.25 MG Tab Take 1 Tab by mouth 3 times a day. 90 Tab 11   • umeclidinium-vilanterol (ANORO ELLIPTA) 62.5-25 MCG/INH AEROSOL POWDER, BREATH ACTIVATED inhaler Inhale 1 Puff by mouth every day. 1 Inhaler 0   • fluticasone (FLOVENT HFA) 110 MCG/ACT Aerosol Inhale 1 Puff by mouth every day. 1 Inhaler    • clopidogrel (PLAVIX) 75 MG Tab Take 1 Tab by mouth every day. 30 Tab    • topiramate (TOPAMAX) 25 MG Tab TAKE ONE TABLET BY MOUTH TWICE A DAY 60 Tab 1   •  albuterol (PROAIR HFA) 108 (90 Base) MCG/ACT Aero Soln inhalation aerosol Inhale 2 Puffs by mouth every 6 hours as needed for Shortness of Breath.     • potassium chloride SA (KDUR) 20 MEQ Tab CR Take 1 Tab by mouth 2 times a day. 60 Tab 11   • bumetanide (BUMEX) 1 MG Tab Take 1 mg by mouth 2 Times a Day.     • omeprazole (PRILOSEC) 20 MG delayed-release capsule Take 1 Cap by mouth 2 times a day. 30 Cap 2   • sertraline (ZOLOFT) 50 MG Tab Take 1 Tab by mouth every bedtime. 30 Tab 11   • metoprolol SR (TOPROL XL) 25 MG TABLET SR 24 HR Take 1 Tab by mouth every morning. 30 Tab 11   • calcium carbonate (TUMS) 500 MG Chew Tab Take 1 Tab by mouth every four hours as needed (heartburn). 30 Tab 0   • rosuvastatin (CRESTOR) 10 MG Tab Take 1 Tab by mouth every evening. 30 Tab 11   • allopurinol (ZYLOPRIM) 100 MG Tab Take 1 Tab by mouth 2 times a day. 30 Tab 1   • cyanocobalamin (VITAMIN B12) 1000 MCG Tab Take 1 Tab by mouth every day. 30 Tab 3   • Calcium-Vitamin D 500-125 MG-UNIT Tab Take 1 tablet by mouth every morning. 600mg - 400 iu 30 Tab 1   • levothyroxine (SYNTHROID) 50 MCG Tab Take 1 Tab by mouth Every morning on an empty stomach. 90 Tab 3   • ascorbic acid (VITAMIN C) 500 MG tablet Take 1 Tab by mouth every day. 30 Tab 3   • polyethylene glycol/lytes (MIRALAX) Pack Take 1 Packet by mouth 2 times a day as needed (if sennosides and/or docusate ineffective or not ordered).  3   • senna-docusate (PERICOLACE OR SENOKOT S) 8.6-50 MG Tab Take 2 Tabs by mouth 2 times a day. 30 Tab 0   • [DISCONTINUED] metOLazone (ZAROXOLYN) 2.5 MG Tab Take 1 Tab by mouth every day. 30 Tab 11     No facility-administered encounter medications on file as of 3/1/2019.      Review of Systems   Constitutional: Negative.  Negative for chills, fever and malaise/fatigue.   HENT: Negative.  Negative for sore throat.    Eyes: Negative.    Respiratory: Negative.  Negative for cough, hemoptysis, sputum production, shortness of breath, wheezing and  "stridor.    Cardiovascular: Negative.  Negative for chest pain, palpitations, orthopnea, claudication, leg swelling and PND.   Gastrointestinal: Negative.    Genitourinary: Negative.    Musculoskeletal: Negative.    Skin: Negative.    Neurological: Negative.  Negative for dizziness, loss of consciousness and weakness.   Endo/Heme/Allergies: Negative.  Does not bruise/bleed easily.   All other systems reviewed and are negative.       Objective:   /58 (BP Location: Right arm, Patient Position: Sitting, BP Cuff Size: Adult)   Pulse 79   Ht 1.6 m (5' 3\")   Wt 51.3 kg (113 lb)   LMP  (LMP Unknown)   SpO2 98%   BMI 20.02 kg/m²     Physical Exam   Constitutional: She appears well-developed and well-nourished. No distress.   HENT:   Head: Normocephalic and atraumatic.   Right Ear: External ear normal.   Left Ear: External ear normal.   Nose: Nose normal.   Mouth/Throat: No oropharyngeal exudate.   Eyes: Pupils are equal, round, and reactive to light. Conjunctivae and EOM are normal. Right eye exhibits no discharge. Left eye exhibits no discharge. No scleral icterus.   Neck: Neck supple. No JVD present.   Cardiovascular: Normal rate, regular rhythm and intact distal pulses.  Exam reveals no gallop and no friction rub.    No murmur heard.  Pulmonary/Chest: Effort normal. No stridor. No respiratory distress. She has no wheezes. She has no rales. She exhibits no tenderness.   Abdominal: Soft. She exhibits no distension. There is no guarding.   Musculoskeletal: Normal range of motion. She exhibits no edema, tenderness or deformity.   Neurological: She is alert. She has normal reflexes. She displays normal reflexes. No cranial nerve deficit. She exhibits normal muscle tone. Coordination normal.   Skin: Skin is warm and dry. No rash noted. She is not diaphoretic. No erythema. No pallor.   Psychiatric: She has a normal mood and affect. Her behavior is normal. Judgment and thought content normal.   Nursing note and " vitals reviewed.      Assessment:     1. Acute renal failure superimposed on chronic kidney disease, unspecified CKD stage, unspecified acute renal failure type (HCC)  CBC WITH DIFFERENTIAL    Basic Metabolic Panel    IRON/TOTAL IRON BIND (FEIBC)   2. ASCVD (arteriosclerotic cardiovascular disease)  CBC WITH DIFFERENTIAL    Basic Metabolic Panel    IRON/TOTAL IRON BIND (FEIBC)   3. Bilateral lower extremity edema  CBC WITH DIFFERENTIAL    Basic Metabolic Panel    IRON/TOTAL IRON BIND (FEIBC)   4. Cardiac pacemaker in situ  CBC WITH DIFFERENTIAL    Basic Metabolic Panel    IRON/TOTAL IRON BIND (FEIBC)   5. Chronic atrial fibrillation (HCC)  CBC WITH DIFFERENTIAL    Basic Metabolic Panel    IRON/TOTAL IRON BIND (FEIBC)   6. Chronic diastolic CHF (congestive heart failure) ([facility])  CBC WITH DIFFERENTIAL    Basic Metabolic Panel    IRON/TOTAL IRON BIND (FEIBC)   7. CKD (chronic kidney disease) stage 3, GFR 30-59 ml/min ([facility])  CBC WITH DIFFERENTIAL    Basic Metabolic Panel    IRON/TOTAL IRON BIND (FEIBC)   8. Chronic obstructive pulmonary disease, unspecified COPD type (HCC)  CBC WITH DIFFERENTIAL    Basic Metabolic Panel    IRON/TOTAL IRON BIND (FEIBC)   9. Essential tremor  CBC WITH DIFFERENTIAL    Basic Metabolic Panel    IRON/TOTAL IRON BIND (FEIBC)   10. DNR (do not resuscitate)  CBC WITH DIFFERENTIAL    Basic Metabolic Panel    IRON/TOTAL IRON BIND (FEIBC)   11. Coronary artery disease without angina pectoris, unspecified vessel or lesion type, unspecified whether native or transplanted heart  CBC WITH DIFFERENTIAL    Basic Metabolic Panel    IRON/TOTAL IRON BIND (FEIBC)   12. High risk medication use  CBC WITH DIFFERENTIAL    Basic Metabolic Panel    IRON/TOTAL IRON BIND (FEIBC)   13. Iron deficiency anemia due to chronic blood loss  CBC WITH DIFFERENTIAL    Basic Metabolic Panel    IRON/TOTAL IRON BIND (FEIBC)       Medical Decision Making:  Today's Assessment / Status / Plan:     86-year-old female with anemia  with heart failure with reduced ejection fraction atrial fibrillation diastolic dysfunction and lower extremity edema.  I would like to stop her metolazone as I think this is the incorrect medication for her.  I do asked her daughter to restart her Bumex at a dose that keeps her euvolemic.  Meanwhile we will check some labs including her iron studies to look to see if she continues to be anemic.  Unfortunately we are stuck with Plavix as well as an oral anticoagulant.  Would like to see her back in 1 month.    Thank for you allowing me to take part in your patient's care, please call should you have any questions or would like to discuss this patient.

## 2019-03-04 ENCOUNTER — PATIENT OUTREACH (OUTPATIENT)
Dept: HEALTH INFORMATION MANAGEMENT | Facility: OTHER | Age: 84
End: 2019-03-04

## 2019-03-04 NOTE — PROGRESS NOTES
Situation    TCN attempted to contact patient but received no answer. TCN contacted [facility] who stated they will be out to see patient on 3/6/19 for for start of care and reported delay was due to difficulty getting in touch with patient. TCN contacted patient grand-daughter Palma who reports that the patient is doing alright but prefers to sleep most of the day. Patient has seemed to have a very drastic decline since December as she prefers not to be up and moving or talking with people and spends most of her day sleeping. Patient is home alone most of the day but she has life alert as well as motion detecting cameras that allow her family to watch and check on her throughout the day. Family also visits her throughout the day to make sure she takes her medication and eats meals. Palma is concerned that patient may need hopsice in the near future.    Background       86 y.o. female with PMH of: Afib and on anticoagulation, hypothyroidism, pulmonary hypertension, CAD, and GERD. Pt was admitted 12/28/2018 with left hip pain and swelling after a ground-level fall.  Patient was found to have Left Hip fracture and completed Intramedullary nail, left hip on 12/29/2018. Patient did require 2 units of blood transfusion while in hospital. SNF was recommended due to decline in functional status.     Patient was admitted to hospital on 12/28/18 and DC to SNF on 1/7/19.   Assessment    Per Palma patient is having a significant functional and cognitive decline since December and the care options her family can provide are limited due to cost. Palma is hopeful that patient will improve in her engagement with with day to day activities when home health starts but she is also realistic that patient may need hospice services in the near future. TCN encouraged Palma to discuss potential hospice referral with [facility] when they come to evaluate the patient on Wednesday.    Recommendation TCN to DC patient at this time as she  will be receiving long term follow up by [facility]. Palma provided with Sheyla's contact information.       Chelita Mitchell, PT DPT TCN  [phone]

## 2019-03-05 ENCOUNTER — OFFICE VISIT (OUTPATIENT)
Dept: MEDICAL GROUP | Facility: MEDICAL CENTER | Age: 84
End: 2019-03-05
Payer: MEDICARE

## 2019-03-05 ENCOUNTER — PATIENT OUTREACH (OUTPATIENT)
Dept: HEALTH INFORMATION MANAGEMENT | Facility: OTHER | Age: 84
End: 2019-03-05

## 2019-03-05 ENCOUNTER — HOSPITAL ENCOUNTER (OUTPATIENT)
Dept: LAB | Facility: MEDICAL CENTER | Age: 84
End: 2019-03-05
Attending: INTERNAL MEDICINE
Payer: MEDICARE

## 2019-03-05 VITALS
BODY MASS INDEX: 19.84 KG/M2 | HEIGHT: 63 IN | SYSTOLIC BLOOD PRESSURE: 110 MMHG | WEIGHT: 112 LBS | HEART RATE: 74 BPM | RESPIRATION RATE: 16 BRPM | TEMPERATURE: 97.6 F | DIASTOLIC BLOOD PRESSURE: 60 MMHG | OXYGEN SATURATION: 97 %

## 2019-03-05 DIAGNOSIS — G25.0 ESSENTIAL TREMOR: ICD-10-CM

## 2019-03-05 DIAGNOSIS — I48.20 CHRONIC ATRIAL FIBRILLATION (HCC): ICD-10-CM

## 2019-03-05 DIAGNOSIS — I50.32 CHRONIC DIASTOLIC CHF (CONGESTIVE HEART FAILURE) (HCC): ICD-10-CM

## 2019-03-05 DIAGNOSIS — D50.8 IRON DEFICIENCY ANEMIA SECONDARY TO INADEQUATE DIETARY IRON INTAKE: ICD-10-CM

## 2019-03-05 DIAGNOSIS — R60.0 BILATERAL LOWER EXTREMITY EDEMA: ICD-10-CM

## 2019-03-05 DIAGNOSIS — M1A.0720 IDIOPATHIC CHRONIC GOUT OF LEFT FOOT WITHOUT TOPHUS: ICD-10-CM

## 2019-03-05 DIAGNOSIS — R25.1 TREMORS OF NERVOUS SYSTEM: ICD-10-CM

## 2019-03-05 DIAGNOSIS — J44.9 CHRONIC OBSTRUCTIVE PULMONARY DISEASE, UNSPECIFIED COPD TYPE (HCC): ICD-10-CM

## 2019-03-05 DIAGNOSIS — I25.10 ASCVD (ARTERIOSCLEROTIC CARDIOVASCULAR DISEASE): ICD-10-CM

## 2019-03-05 DIAGNOSIS — G25.81 RESTLESS LEGS SYNDROME (RLS): ICD-10-CM

## 2019-03-05 DIAGNOSIS — K21.9 GASTROESOPHAGEAL REFLUX DISEASE WITHOUT ESOPHAGITIS: ICD-10-CM

## 2019-03-05 DIAGNOSIS — Z66 DNR (DO NOT RESUSCITATE): ICD-10-CM

## 2019-03-05 DIAGNOSIS — N17.9 ACUTE RENAL FAILURE SUPERIMPOSED ON CHRONIC KIDNEY DISEASE, UNSPECIFIED CKD STAGE, UNSPECIFIED ACUTE RENAL FAILURE TYPE: ICD-10-CM

## 2019-03-05 DIAGNOSIS — I27.20 PULMONARY HYPERTENSION (HCC): ICD-10-CM

## 2019-03-05 DIAGNOSIS — Z95.0 CARDIAC PACEMAKER IN SITU: ICD-10-CM

## 2019-03-05 DIAGNOSIS — N18.30 CKD (CHRONIC KIDNEY DISEASE) STAGE 3, GFR 30-59 ML/MIN (HCC): ICD-10-CM

## 2019-03-05 DIAGNOSIS — D61.818 PANCYTOPENIA (HCC): ICD-10-CM

## 2019-03-05 DIAGNOSIS — D50.0 IRON DEFICIENCY ANEMIA DUE TO CHRONIC BLOOD LOSS: ICD-10-CM

## 2019-03-05 DIAGNOSIS — Z79.899 HIGH RISK MEDICATION USE: ICD-10-CM

## 2019-03-05 DIAGNOSIS — F41.1 GAD (GENERALIZED ANXIETY DISORDER): ICD-10-CM

## 2019-03-05 DIAGNOSIS — Z78.0 POSTMENOPAUSAL: ICD-10-CM

## 2019-03-05 DIAGNOSIS — I25.10 CORONARY ARTERY DISEASE WITHOUT ANGINA PECTORIS, UNSPECIFIED VESSEL OR LESION TYPE, UNSPECIFIED WHETHER NATIVE OR TRANSPLANTED HEART: ICD-10-CM

## 2019-03-05 DIAGNOSIS — N18.9 ACUTE RENAL FAILURE SUPERIMPOSED ON CHRONIC KIDNEY DISEASE, UNSPECIFIED CKD STAGE, UNSPECIFIED ACUTE RENAL FAILURE TYPE: ICD-10-CM

## 2019-03-05 DIAGNOSIS — D51.0 PERNICIOUS ANEMIA: ICD-10-CM

## 2019-03-05 LAB
ANION GAP SERPL CALC-SCNC: 5 MMOL/L (ref 0–11.9)
BASOPHILS # BLD AUTO: 0.8 % (ref 0–1.8)
BASOPHILS # BLD: 0.03 K/UL (ref 0–0.12)
BUN SERPL-MCNC: 31 MG/DL (ref 8–22)
CALCIUM SERPL-MCNC: 9.6 MG/DL (ref 8.5–10.5)
CHLORIDE SERPL-SCNC: 103 MMOL/L (ref 96–112)
CO2 SERPL-SCNC: 29 MMOL/L (ref 20–33)
CREAT SERPL-MCNC: 1.22 MG/DL (ref 0.5–1.4)
EOSINOPHIL # BLD AUTO: 0 K/UL (ref 0–0.51)
EOSINOPHIL NFR BLD: 0 % (ref 0–6.9)
ERYTHROCYTE [DISTWIDTH] IN BLOOD BY AUTOMATED COUNT: 55.9 FL (ref 35.9–50)
FASTING STATUS PATIENT QL REPORTED: NORMAL
GLUCOSE SERPL-MCNC: 84 MG/DL (ref 65–99)
HCT VFR BLD AUTO: 36.4 % (ref 37–47)
HGB BLD-MCNC: 11.1 G/DL (ref 12–16)
IMM GRANULOCYTES # BLD AUTO: 0.01 K/UL (ref 0–0.11)
IMM GRANULOCYTES NFR BLD AUTO: 0.3 % (ref 0–0.9)
IRON SATN MFR SERPL: 8 % (ref 15–55)
IRON SERPL-MCNC: 25 UG/DL (ref 40–170)
LYMPHOCYTES # BLD AUTO: 0.8 K/UL (ref 1–4.8)
LYMPHOCYTES NFR BLD: 20.3 % (ref 22–41)
MCH RBC QN AUTO: 29.6 PG (ref 27–33)
MCHC RBC AUTO-ENTMCNC: 30.5 G/DL (ref 33.6–35)
MCV RBC AUTO: 97.1 FL (ref 81.4–97.8)
MONOCYTES # BLD AUTO: 0.37 K/UL (ref 0–0.85)
MONOCYTES NFR BLD AUTO: 9.4 % (ref 0–13.4)
NEUTROPHILS # BLD AUTO: 2.73 K/UL (ref 2–7.15)
NEUTROPHILS NFR BLD: 69.2 % (ref 44–72)
NRBC # BLD AUTO: 0 K/UL
NRBC BLD-RTO: 0 /100 WBC
PLATELET # BLD AUTO: 159 K/UL (ref 164–446)
PMV BLD AUTO: 10.9 FL (ref 9–12.9)
POTASSIUM SERPL-SCNC: 3.7 MMOL/L (ref 3.6–5.5)
RBC # BLD AUTO: 3.75 M/UL (ref 4.2–5.4)
SODIUM SERPL-SCNC: 137 MMOL/L (ref 135–145)
TIBC SERPL-MCNC: 312 UG/DL (ref 250–450)
WBC # BLD AUTO: 3.9 K/UL (ref 4.8–10.8)

## 2019-03-05 PROCEDURE — 85025 COMPLETE CBC W/AUTO DIFF WBC: CPT

## 2019-03-05 PROCEDURE — 83540 ASSAY OF IRON: CPT

## 2019-03-05 PROCEDURE — 80048 BASIC METABOLIC PNL TOTAL CA: CPT

## 2019-03-05 PROCEDURE — 83550 IRON BINDING TEST: CPT

## 2019-03-05 PROCEDURE — 99214 OFFICE O/P EST MOD 30 MIN: CPT | Performed by: NURSE PRACTITIONER

## 2019-03-05 PROCEDURE — 36415 COLL VENOUS BLD VENIPUNCTURE: CPT

## 2019-03-05 RX ORDER — BUMETANIDE 1 MG/1
1 TABLET ORAL 2 TIMES DAILY
Qty: 60 TAB | Refills: 6 | Status: SHIPPED | OUTPATIENT
Start: 2019-03-05 | End: 2019-05-23 | Stop reason: SDUPTHER

## 2019-03-05 RX ORDER — CLOPIDOGREL BISULFATE 75 MG/1
75 TABLET ORAL DAILY
Qty: 30 TAB | Refills: 6 | Status: SHIPPED | OUTPATIENT
Start: 2019-03-05

## 2019-03-05 RX ORDER — ALBUTEROL SULFATE 90 UG/1
2 AEROSOL, METERED RESPIRATORY (INHALATION) EVERY 6 HOURS PRN
Qty: 8.5 G | Refills: 4 | Status: SHIPPED | OUTPATIENT
Start: 2019-03-05

## 2019-03-05 RX ORDER — ASCORBIC ACID 500 MG
500 TABLET ORAL DAILY
Qty: 30 TAB | Refills: 6 | Status: SHIPPED | OUTPATIENT
Start: 2019-03-05

## 2019-03-05 RX ORDER — FERROUS SULFATE 325(65) MG
325 TABLET ORAL
Qty: 30 TAB | Refills: 6 | Status: SHIPPED | OUTPATIENT
Start: 2019-03-05 | End: 2019-06-06 | Stop reason: SDUPTHER

## 2019-03-05 RX ORDER — ROPINIROLE 0.5 MG/1
0.5 TABLET, FILM COATED ORAL 2 TIMES DAILY
Qty: 60 TAB | Refills: 11 | Status: SHIPPED | OUTPATIENT
Start: 2019-03-05 | End: 2019-06-06 | Stop reason: SDUPTHER

## 2019-03-05 RX ORDER — FLUTICASONE PROPIONATE 110 UG/1
1 AEROSOL, METERED RESPIRATORY (INHALATION) DAILY
Qty: 1 INHALER | Refills: 5 | Status: SHIPPED | OUTPATIENT
Start: 2019-03-05

## 2019-03-05 RX ORDER — TOPIRAMATE 25 MG/1
25 TABLET ORAL 2 TIMES DAILY
Qty: 60 TAB | Refills: 6 | Status: SHIPPED | OUTPATIENT
Start: 2019-03-05

## 2019-03-05 RX ORDER — SERTRALINE HYDROCHLORIDE 100 MG/1
100 TABLET, FILM COATED ORAL
Qty: 30 TAB | Refills: 11 | Status: SHIPPED | OUTPATIENT
Start: 2019-03-05

## 2019-03-05 RX ORDER — CYANOCOBALAMIN 1000 UG/ML
1000 INJECTION, SOLUTION INTRAMUSCULAR; SUBCUTANEOUS ONCE
Status: COMPLETED | OUTPATIENT
Start: 2019-03-05 | End: 2019-03-05

## 2019-03-05 RX ORDER — OMEPRAZOLE 20 MG/1
20 CAPSULE, DELAYED RELEASE ORAL 2 TIMES DAILY
Qty: 30 CAP | Refills: 6 | Status: SHIPPED | OUTPATIENT
Start: 2019-03-05

## 2019-03-05 RX ORDER — ALLOPURINOL 100 MG/1
100 TABLET ORAL 2 TIMES DAILY
Qty: 30 TAB | Refills: 6 | Status: SHIPPED | OUTPATIENT
Start: 2019-03-05 | End: 2019-05-02

## 2019-03-05 RX ADMIN — CYANOCOBALAMIN 1000 MCG: 1000 INJECTION, SOLUTION INTRAMUSCULAR; SUBCUTANEOUS at 10:32

## 2019-03-05 NOTE — ASSESSMENT & PLAN NOTE
Chronic condition.  Last PFT 2017.  Followed by pulmonology.  Stable on Anoro 1 puff daily, Flovent 1 puff daily, and albuterol as needed.  Denies wheezing or shortness of breath today.  Requesting refills.

## 2019-03-05 NOTE — ASSESSMENT & PLAN NOTE
Chronic problem.  Followed by cardiology.  No current exacerbation.  She denies shortness of breath and lower extremity edema.  Taking medications as prescribed.  Weighing herself daily.  Weight has been stable.

## 2019-03-05 NOTE — ASSESSMENT & PLAN NOTE
Chronic condition.  Currently stable on Bumex 1 mg twice daily.  Established with cardiology and pulmonology.

## 2019-03-05 NOTE — ASSESSMENT & PLAN NOTE
Chronic condition.  Rate controlled on metoprolol.  Anticoagulated with Eliquis.  Established with cardiology.

## 2019-03-06 ENCOUNTER — HOME CARE VISIT (OUTPATIENT)
Dept: HOME HEALTH SERVICES | Facility: HOME HEALTHCARE | Age: 84
End: 2019-03-06
Payer: MEDICARE

## 2019-03-06 VITALS
SYSTOLIC BLOOD PRESSURE: 105 MMHG | WEIGHT: 111.4 LBS | DIASTOLIC BLOOD PRESSURE: 62 MMHG | TEMPERATURE: 99 F | BODY MASS INDEX: 19.74 KG/M2 | RESPIRATION RATE: 16 BRPM | OXYGEN SATURATION: 93 % | HEIGHT: 63 IN | HEART RATE: 63 BPM

## 2019-03-06 PROCEDURE — 665001 SOC-HOME HEALTH

## 2019-03-06 PROCEDURE — G0493 RN CARE EA 15 MIN HH/HOSPICE: HCPCS

## 2019-03-06 SDOH — ECONOMIC STABILITY: HOUSING INSECURITY: EVIDENCE OF SMOKING MATERIAL: 0

## 2019-03-06 ASSESSMENT — ENCOUNTER SYMPTOMS
VOMITING: DENIES
SHORTNESS OF BREATH: T
DIFFICULTY THINKING: 1
NAUSEA: DENIES

## 2019-03-06 ASSESSMENT — PATIENT HEALTH QUESTIONNAIRE - PHQ9
1. LITTLE INTEREST OR PLEASURE IN DOING THINGS: 00
CLINICAL INTERPRETATION OF PHQ2 SCORE: 0
2. FEELING DOWN, DEPRESSED, IRRITABLE, OR HOPELESS: 00

## 2019-03-06 ASSESSMENT — ACTIVITIES OF DAILY LIVING (ADL): OASIS_M1830: 03

## 2019-03-06 NOTE — ASSESSMENT & PLAN NOTE
Started on requip 0.25mg TID in January, symptoms have not improved. Previously tried on gabapentin which caused weakness and confusion. Has not tried OTC magnesium.

## 2019-03-06 NOTE — PROGRESS NOTES
SBAR Documentation: Situation, Background, Assessment, Recommendation     Situation    Recent discharge from SNF   Background       S/P left hip nailing due to femur fracture, anemia, chronic A-fib, Chronic diastolic CHF, COPD, CAD, CKD stage 3, GERD   Assessment    Outbound call to Andree and spoke with her grand daughter, Palma.  Palma states Andree will not answer her cell phone.  Palma is concerned Andree has declined since being discharged.  She states Andree is sleeping about 20 hours per day, and will only get out of bed to go to the bathroom and eat.  Palma states Andree does not carry on conversations except to say she is going back to bed.  Palma states she is not sure what to do, but did say she was with Andree at her MD appointment today and her antidepressant has been increased.   Recommendation CC RN and Palma discussed having home health come in and initiate Physical Therapy, Occupational Therapy, Home Health Aide and see if Andree improves.  CCM RN explained if Andree does not progress and if she requests a hospice consult could be ordered to obtain information about hospice.  Plan:  CCM RN to schedule for telephone call next week to discuss enrollment in the [facility] program.

## 2019-03-06 NOTE — PROGRESS NOTES
Subjective:   Andree Egan is a 86 y.o. female here today for follow up    Chronic diastolic CHF (congestive heart failure) ([facility])  Chronic problem.  Followed by cardiology.  No current exacerbation.  She denies shortness of breath and lower extremity edema.  Taking medications as prescribed.  Weighing herself daily.  Weight has been stable.    COPD (chronic obstructive pulmonary disease) ([facility])  Chronic condition.  Last PFT 2017.  Followed by pulmonology.  Stable on Anoro 1 puff daily, Flovent 1 puff daily, and albuterol as needed.  Denies wheezing or shortness of breath today.  Requesting refills.    Pulmonary hypertension (HCC)  Chronic condition.  Currently stable on Bumex 1 mg twice daily.  Established with cardiology and pulmonology.    Chronic atrial fibrillation ([facility])  Chronic condition.  Rate controlled on metoprolol.  Anticoagulated with Eliquis.  Established with cardiology.     CKD (chronic kidney disease) stage 3, GFR 30-59 ml/min  Chronic condition.  GFR stable.  Established with nephrology.    ASCVD (arteriosclerotic cardiovascular disease)  Chronic condition. Stable. On eliquis, beta blocker, plavix, statin. Followed by cardiology.     Restless legs syndrome (RLS)  Started on requip 0.25mg TID in January, symptoms have not improved. Previously tried on gabapentin which caused weakness and confusion. Has not tried OTC magnesium.     ARELY (generalized anxiety disorder)  Worsening with recent stressors related to her medical issues. Still having some depression related to her son and  passing away. Has been sleeping around 20 hours per day. Is worried and anxious while awake. Sertraline was started and is not helping. No suicidal thoughts.        Current medicines (including changes today)  Current Outpatient Prescriptions   Medication Sig Dispense Refill   • umeclidinium-vilanterol (ANORO ELLIPTA) 62.5-25 MCG/INH AEROSOL POWDER, BREATH ACTIVATED inhaler Inhale 1 Puff by mouth every day. 1  Inhaler 0   • topiramate (TOPAMAX) 25 MG Tab Take 1 Tab by mouth 2 times a day. TAKE ONE TABLET BY MOUTH TWICE A DAY 60 Tab 6   • ROPINIRole (REQUIP) 0.5 MG Tab Take 1 Tab by mouth 2 Times a Day. 60 Tab 11   • omeprazole (PRILOSEC) 20 MG delayed-release capsule Take 1 Cap by mouth 2 times a day. 30 Cap 6   • fluticasone (FLOVENT HFA) 110 MCG/ACT Aerosol Inhale 1 Puff by mouth every day. 1 Inhaler 5   • clopidogrel (PLAVIX) 75 MG Tab Take 1 Tab by mouth every day. 30 Tab 6   • Calcium-Vitamin D 500-125 MG-UNIT Tab Take 1 tablet by mouth every morning. 600mg - 400 iu 30 Tab 6   • bumetanide (BUMEX) 1 MG Tab Take 1 Tab by mouth 2 Times a Day. 60 Tab 6   • ascorbic acid (VITAMIN C) 500 MG tablet Take 1 Tab by mouth every day. 30 Tab 6   • allopurinol (ZYLOPRIM) 100 MG Tab Take 1 Tab by mouth 2 times a day. 30 Tab 6   • albuterol (PROAIR HFA) 108 (90 Base) MCG/ACT Aero Soln inhalation aerosol Inhale 2 Puffs by mouth every 6 hours as needed for Shortness of Breath. 8.5 g 4   • sertraline (ZOLOFT) 100 MG Tab Take 1 Tab by mouth every bedtime. 30 Tab 11   • ferrous sulfate 325 (65 Fe) MG tablet Take 1 Tab by mouth every 48 hours. 30 Tab 6   • acetaminophen (TYLENOL) 325 MG Tab Take 2 Tabs by mouth every 6 hours as needed. 30 Tab 0   • apixaban (ELIQUIS) 2.5mg Tab Take 2 Tabs by mouth 2 Times a Day. 60 Tab 5   • polyethylene glycol/lytes (MIRALAX) Pack Take 1 Packet by mouth 2 times a day as needed (if sennosides and/or docusate ineffective or not ordered).  3   • potassium chloride SA (KDUR) 20 MEQ Tab CR Take 1 Tab by mouth 2 times a day. 60 Tab 11   • metoprolol SR (TOPROL XL) 25 MG TABLET SR 24 HR Take 1 Tab by mouth every morning. 30 Tab 11   • calcium carbonate (TUMS) 500 MG Chew Tab Take 1 Tab by mouth every four hours as needed (heartburn). 30 Tab 0   • rosuvastatin (CRESTOR) 10 MG Tab Take 1 Tab by mouth every evening. 30 Tab 11   • cyanocobalamin (VITAMIN B12) 1000 MCG Tab Take 1 Tab by mouth every day. 30 Tab 3  "  • levothyroxine (SYNTHROID) 50 MCG Tab Take 1 Tab by mouth Every morning on an empty stomach. 90 Tab 3     No current facility-administered medications for this visit.      She  has a past medical history of Arthritis; Atrial fibrillation (CMS-HCC) [I48.91] (3/29/2018); Back pain; Blood clotting disorder ([facility]); Breast cancer ([facility]); Breath shortness; Cancer ([facility]); Cataract; Congestive heart failure (CHF) ([facility]) (4/3/2018); Constipation; Coronary heart disease; Daytime sleepiness; Diarrhea; BERTRAND (dyspnea on exertion) (5/4/2018); GERD (gastroesophageal reflux disease); Heart attack ([facility]); Heart burn; Heart valve disease; Heartburn; Hemorrhagic disorder ([facility]); Hiatus hernia syndrome; Hyperlipidemia; Hypertension; Hypothyroidism; Kidney disease; Long term current use of anticoagulant (3/29/2018); Nasal drainage; Osteoporosis; Pacemaker (4/3/2018); Pneumonia; Pulmonary emphysema ([facility]); Restless leg syndrome; Rheumatoid arthritis ([facility]); Ringing in ears; Secondary hypertension (4/3/2018); Shortness of breath; Swelling of lower extremity; Thyroid disease; Tonsillitis; Tremors of nervous system (4/3/2018); Urinary incontinence; Urinary tract infection; and Wears glasses.    ROS  No chest pain, no shortness of breath, no abdominal pain  Positive ROS as per HPI.  All other systems reviewed and are negative.     Objective:     Blood pressure 110/60, pulse 74, temperature 36.4 °C (97.6 °F), temperature source Temporal, resp. rate 16, height 1.6 m (5' 2.99\"), weight 50.8 kg (112 lb), SpO2 97 %, not currently breastfeeding. Body mass index is 19.84 kg/m².    Physical Exam:  Constitutional: Alert, no distress.  Skin: Warm, dry, good turgor, no rashes in visible areas.  Eye: Equal, round, conjunctiva clear, lids normal.  ENMT: Lips without lesions, good dentition  Neck: Trachea midline  Respiratory: Unlabored respiratory effort, lungs clear to auscultation, no wheezes, no ronchi.  Cardiovascular: Normal S1, S2, no murmur, no " edema.  Psych: Alert and oriented x3, normal affect and mood.      Assessment and Plan:   The following treatment plan was discussed    1. ARELY (generalized anxiety disorder)  Unstalbe  Increase sertraline to 100mg daily  - sertraline (ZOLOFT) 100 MG Tab; Take 1 Tab by mouth every bedtime.  Dispense: 30 Tab; Refill: 11    2. Chronic atrial fibrillation (HCC)  Stable  Refilled plavix  Continue follow up with Cardiology  - clopidogrel (PLAVIX) 75 MG Tab; Take 1 Tab by mouth every day.  Dispense: 30 Tab; Refill: 6    3. Chronic diastolic CHF (congestive heart failure) (HCC)  Stable  Refilled bumex  Continue follow up with Cardiology  - bumetanide (BUMEX) 1 MG Tab; Take 1 Tab by mouth 2 Times a Day.  Dispense: 60 Tab; Refill: 6    4. Chronic obstructive pulmonary disease, unspecified COPD type ([facility])  Stable  Refilled inhalers  Continue follow-up with pulmonology  - umeclidinium-vilanterol (ANORO ELLIPTA) 62.5-25 MCG/INH AEROSOL POWDER, BREATH ACTIVATED inhaler; Inhale 1 Puff by mouth every day.  Dispense: 1 Inhaler; Refill: 0  - fluticasone (FLOVENT HFA) 110 MCG/ACT Aerosol; Inhale 1 Puff by mouth every day.  Dispense: 1 Inhaler; Refill: 5  - albuterol (PROAIR HFA) 108 (90 Base) MCG/ACT Aero Soln inhalation aerosol; Inhale 2 Puffs by mouth every 6 hours as needed for Shortness of Breath.  Dispense: 8.5 g; Refill: 4    5.  Essential tremor  Unstable  Likely worsening due to uncontrolled anxiety  Continue topiramate 25 mg twice daily  - topiramate (TOPAMAX) 25 MG Tab; Take 1 Tab by mouth 2 times a day. TAKE ONE TABLET BY MOUTH TWICE A DAY  Dispense: 60 Tab; Refill: 6    7. Pernicious anemia  Stable  Continue oral B12  1000 mcg injection given today in office.  - cyanocobalamin (VITAMIN B-12) injection 1,000 mcg; 1 mL by Intramuscular route Once.    8. Gastroesophageal reflux disease without esophagitis  Stable  Refilled omeprazole  - omeprazole (PRILOSEC) 20 MG delayed-release capsule; Take 1 Cap by mouth 2 times a day.   Dispense: 30 Cap; Refill: 6    9. Restless legs syndrome (RLS)  Unstable  Increase ropinirole 2.5 mg twice daily  May try OTC magnesium nightly  - ROPINIRole (REQUIP) 0.5 MG Tab; Take 1 Tab by mouth 2 Times a Day.  Dispense: 60 Tab; Refill: 11    10. Idiopathic chronic gout of left foot without tophus  Stable  Refilled allopurinol 100 mg twice daily  May reduce allopurinol to 100 mg once daily to see if symptoms return, if not we will consider stopping allopurinol as she has not had a flareup in many years.  - allopurinol (ZYLOPRIM) 100 MG Tab; Take 1 Tab by mouth 2 times a day.  Dispense: 30 Tab; Refill: 6    11. Postmenopausal  Stable  Refilled calcium vitamin D  - Calcium-Vitamin D 500-125 MG-UNIT Tab; Take 1 tablet by mouth every morning. 600mg - 400 iu  Dispense: 30 Tab; Refill: 6    12. Iron deficiency anemia secondary to inadequate dietary iron intake  Unstable  H&H slowly declining  Restart iron tablets every other day  - ferrous sulfate 325 (65 Fe) MG tablet; Take 1 Tab by mouth every 48 hours.  Dispense: 30 Tab; Refill: 6    13. Pancytopenia (HCC)  Stable  Continue monitoring labs    14. Pulmonary hypertension (HCC)  Stable  Continue Bumex daily and follow-up with pulmonary and cardiology  Continue daily weights  Weight currently stable  No signs of fluid volume overload today    15. CKD (chronic kidney disease) stage 3, GFR 30-59 ml/min (HCC)  Stable  Continue to monitor GFR  Continue follow-up with nephrology    16. ASCVD (arteriosclerotic cardiovascular disease)  Stable  Continue medications per cardiology      Followup: Return in about 2 months (around 5/5/2019) for Depression/Anxiety, restless legs, Review Labs.    I have placed the below orders and discussed them with an approved delegating provider. The MA is performing the below orders under the direction of Dr. Wang

## 2019-03-06 NOTE — ASSESSMENT & PLAN NOTE
Worsening with recent stressors related to her medical issues. Still having some depression related to her son and  passing away. Has been sleeping around 20 hours per day. Is worried and anxious while awake. Sertraline was started and is not helping. No suicidal thoughts.

## 2019-03-08 ENCOUNTER — TELEPHONE (OUTPATIENT)
Dept: HEALTH INFORMATION MANAGEMENT | Facility: OTHER | Age: 84
End: 2019-03-08

## 2019-03-08 ENCOUNTER — HOME CARE VISIT (OUTPATIENT)
Dept: HOME HEALTH SERVICES | Facility: HOME HEALTHCARE | Age: 84
End: 2019-03-08
Payer: MEDICARE

## 2019-03-08 PROCEDURE — G0299 HHS/HOSPICE OF RN EA 15 MIN: HCPCS

## 2019-03-08 NOTE — TELEPHONE ENCOUNTER
Referral from [facility]. Med review completed. Interaction noted between Plavix and Eliquis with potential to increase risk of bleeding. Please educate patient on s/sx of bleeding.

## 2019-03-09 ENCOUNTER — HOME CARE VISIT (OUTPATIENT)
Dept: HOME HEALTH SERVICES | Facility: HOME HEALTHCARE | Age: 84
End: 2019-03-09
Payer: MEDICARE

## 2019-03-09 VITALS
DIASTOLIC BLOOD PRESSURE: 60 MMHG | WEIGHT: 115 LBS | SYSTOLIC BLOOD PRESSURE: 108 MMHG | RESPIRATION RATE: 18 BRPM | HEART RATE: 74 BPM | BODY MASS INDEX: 20.37 KG/M2 | TEMPERATURE: 98 F | OXYGEN SATURATION: 99 %

## 2019-03-09 PROCEDURE — G0151 HHCP-SERV OF PT,EA 15 MIN: HCPCS

## 2019-03-09 ASSESSMENT — ENCOUNTER SYMPTOMS
DIFFICULTY THINKING: 1
DEBILITATING PAIN: 1

## 2019-03-09 ASSESSMENT — ACTIVITIES OF DAILY LIVING (ADL)
IADLS_COMMENTS: <!--EPICS-->SEE OT EVAL<!--EPICE-->
ADLS_COMMENTS: <!--EPICS-->SEE OT EVAL<!--EPICE-->

## 2019-03-10 VITALS
RESPIRATION RATE: 18 BRPM | WEIGHT: 113 LBS | OXYGEN SATURATION: 97 % | TEMPERATURE: 98 F | HEART RATE: 72 BPM | BODY MASS INDEX: 20.02 KG/M2 | DIASTOLIC BLOOD PRESSURE: 90 MMHG | SYSTOLIC BLOOD PRESSURE: 140 MMHG

## 2019-03-10 SDOH — ECONOMIC STABILITY: HOUSING INSECURITY: EVIDENCE OF SMOKING MATERIAL: 0

## 2019-03-10 ASSESSMENT — ENCOUNTER SYMPTOMS
DEPRESSED MOOD: 1
SEVERE DYSPNEA: 1
DEBILITATING PAIN: 1

## 2019-03-11 ENCOUNTER — HOME CARE VISIT (OUTPATIENT)
Dept: HOME HEALTH SERVICES | Facility: HOME HEALTHCARE | Age: 84
End: 2019-03-11
Payer: MEDICARE

## 2019-03-11 VITALS
WEIGHT: 115.4 LBS | SYSTOLIC BLOOD PRESSURE: 124 MMHG | RESPIRATION RATE: 18 BRPM | BODY MASS INDEX: 20.44 KG/M2 | HEART RATE: 88 BPM | TEMPERATURE: 98.2 F | OXYGEN SATURATION: 98 % | DIASTOLIC BLOOD PRESSURE: 60 MMHG

## 2019-03-11 PROCEDURE — G0151 HHCP-SERV OF PT,EA 15 MIN: HCPCS

## 2019-03-11 PROCEDURE — G0493 RN CARE EA 15 MIN HH/HOSPICE: HCPCS

## 2019-03-11 ASSESSMENT — ENCOUNTER SYMPTOMS
VOMITING: DENIES
NAUSEA: DENIES
SHORTNESS OF BREATH: T

## 2019-03-12 ENCOUNTER — HOME CARE VISIT (OUTPATIENT)
Dept: HOME HEALTH SERVICES | Facility: HOME HEALTHCARE | Age: 84
End: 2019-03-12
Payer: MEDICARE

## 2019-03-12 VITALS
DIASTOLIC BLOOD PRESSURE: 72 MMHG | OXYGEN SATURATION: 99 % | SYSTOLIC BLOOD PRESSURE: 138 MMHG | HEART RATE: 72 BPM | RESPIRATION RATE: 20 BRPM | TEMPERATURE: 99.7 F

## 2019-03-12 VITALS
TEMPERATURE: 98.2 F | DIASTOLIC BLOOD PRESSURE: 60 MMHG | SYSTOLIC BLOOD PRESSURE: 124 MMHG | OXYGEN SATURATION: 98 % | HEART RATE: 88 BPM

## 2019-03-12 PROCEDURE — G0152 HHCP-SERV OF OT,EA 15 MIN: HCPCS

## 2019-03-12 ASSESSMENT — ACTIVITIES OF DAILY LIVING (ADL)
TRANSPORTATION_ASSISTANCE: 6
DRESSING_LB_ASSISTANCE: 0
TELEPHONE_ASSISTANCE: 6
SHOPPING_ASSISTANCE: 6
GROOMING_ASSISTANCE: 0
HOUSEKEEPING_ASSISTANCE: 6
DRESSING_UB_ASSISTANCE: 0
EATING_ASSISTANCE: 0
LAUNDRY_ASSISTANCE: 6
BATHING_ASSISTANCE: 2
TOILETING_EQUIPMENT_USED: TOILET RISER WITH HANDLES
TOILETING_ASSISTANCE: 0
MEAL_PREP_ASSISTANCE: 0
ORAL_CARE_ASSISTANCE: 0

## 2019-03-14 ENCOUNTER — HOME CARE VISIT (OUTPATIENT)
Dept: HOME HEALTH SERVICES | Facility: HOME HEALTHCARE | Age: 84
End: 2019-03-14
Payer: MEDICARE

## 2019-03-14 PROCEDURE — G0495 RN CARE TRAIN/EDU IN HH: HCPCS

## 2019-03-14 NOTE — PROGRESS NOTES
Chief Complaint   Patient presents with   • Atrial Fibrillation       Subjective:   Andree Egan is a 86 y.o. Female with right-sided heart failure, pulmonary hypertension, COPD, atrial fibrillation, coronary artery disease s/p BRITTON to prox and mid LAD July 2018, who presents today for follow up with her granddaughters Palma and Hank.     Last seen by Dr. Simms 3/1/19.  Restarted Bumex, discontinued metolazone.    Today Mrs. Egan is feeling well.  She is now living with her granddaughter Hank.  Continues to have hip discomfort, especially at night which is interrupting her sleep.  Today her biggest complaint is insomnia.  She does have some lower extremity edema, started yesterday.  She typically takes Bumex once daily and last he had swelling like this in which she takes it twice daily.  She does not have any symptoms at rest, when she ambulates she occasionally becomes short of breath.  No orthopnea, abdominal bloating.    Blood pressures and weights have been stable at home.     Past Medical History:   Diagnosis Date   • Arthritis     back/neck   • Atrial fibrillation (CMS-HCC) [I48.91] 3/29/2018   • Back pain    • Blood clotting disorder (HCC)    • Breast cancer (HCC)    • Breath shortness     water retention   • Cancer (HCC)     breast   • Cataract    • Congestive heart failure (CHF) ([facility]) 4/3/2018   • Constipation    • Coronary heart disease    • Daytime sleepiness    • Diarrhea    • BERTRAND (dyspnea on exertion) 5/4/2018   • GERD (gastroesophageal reflux disease)    • Heart attack (HCC)    • Heart burn    • Heart valve disease    • Heartburn    • Hemorrhagic disorder (HCC)    • Hiatus hernia syndrome    • Hyperlipidemia    • Hypertension    • Hypothyroidism    • Kidney disease    • Long term current use of anticoagulant 3/29/2018   • Nasal drainage    • Osteoporosis    • Pacemaker 4/3/2018   • Pneumonia    • Pulmonary emphysema (HCC)    • Restless leg syndrome    • Rheumatoid arthritis (HCC)    • Ringing  in ears    • Secondary hypertension 4/3/2018   • Shortness of breath    • Swelling of lower extremity    • Thyroid disease    • Tonsillitis    • Tremors of nervous system 4/3/2018   • Urinary incontinence    • Urinary tract infection    • Wears glasses      Past Surgical History:   Procedure Laterality Date   • HIP NAILING INTRAMEDULLARY Left 12/29/2018    Procedure: HIP NAILING INTRAMEDULLARY;  Surgeon: Juan Daniel Zaragoza M.D.;  Location: SURGERY [facility];  Service: Orthopedics   • HYSTERECTOMY LAPAROSCOPY     • MASTECTOMY     • OTHER      left breast mastectomy   • OTHER CARDIAC SURGERY      pacemaker placement   • PACEMAKER INSERTION     • PB REMV 2ND CATARACT,CORN-SCLER SECTN       Family History   Problem Relation Age of Onset   • Lung Disease Mother    • Cancer Mother    • Heart Disease Mother    • No Known Problems Father    • Diabetes Brother    • Fainting Neg Hx      Social History     Social History   • Marital status: Single     Spouse name: N/A   • Number of children: N/A   • Years of education: N/A     Occupational History   • Not on file.     Social History Main Topics   • Smoking status: Former Smoker     Packs/day: 0.50     Types: Cigarettes     Quit date: 1/1/1970   • Smokeless tobacco: Never Used   • Alcohol use 1.8 oz/week     3 Glasses of wine per week      Comment: 1-2 glasses wine before dinner   • Drug use: No   • Sexual activity: Not on file     Other Topics Concern   • Not on file     Social History Narrative   • No narrative on file     Allergies   Allergen Reactions   • Codeine Vomiting     Outpatient Encounter Prescriptions as of 3/15/2019   Medication Sig Dispense Refill   • topiramate (TOPAMAX) 25 MG Tab Take 1 Tab by mouth 2 times a day. TAKE ONE TABLET BY MOUTH TWICE A DAY 60 Tab 6   • ROPINIRole (REQUIP) 0.5 MG Tab Take 1 Tab by mouth 2 Times a Day. 60 Tab 11   • omeprazole (PRILOSEC) 20 MG delayed-release capsule Take 1 Cap by mouth 2 times a day. 30 Cap 6   • fluticasone  (FLOVENT HFA) 110 MCG/ACT Aerosol Inhale 1 Puff by mouth every day. 1 Inhaler 5   • clopidogrel (PLAVIX) 75 MG Tab Take 1 Tab by mouth every day. 30 Tab 6   • Calcium-Vitamin D 500-125 MG-UNIT Tab Take 1 tablet by mouth every morning. 600mg - 400 iu 30 Tab 6   • bumetanide (BUMEX) 1 MG Tab Take 1 Tab by mouth 2 Times a Day. (Patient taking differently: Take 1 Tab by mouth every day.) 60 Tab 6   • ascorbic acid (VITAMIN C) 500 MG tablet Take 1 Tab by mouth every day. 30 Tab 6   • allopurinol (ZYLOPRIM) 100 MG Tab Take 1 Tab by mouth 2 times a day. 30 Tab 6   • albuterol (PROAIR HFA) 108 (90 Base) MCG/ACT Aero Soln inhalation aerosol Inhale 2 Puffs by mouth every 6 hours as needed for Shortness of Breath. 8.5 g 4   • sertraline (ZOLOFT) 100 MG Tab Take 1 Tab by mouth every bedtime. 30 Tab 11   • ferrous sulfate 325 (65 Fe) MG tablet Take 1 Tab by mouth every 48 hours. 30 Tab 6   • apixaban (ELIQUIS) 2.5mg Tab Take 2 Tabs by mouth 2 Times a Day. 60 Tab 5   • polyethylene glycol/lytes (MIRALAX) Pack Take 1 Packet by mouth 2 times a day as needed (if sennosides and/or docusate ineffective or not ordered).  3   • potassium chloride SA (KDUR) 20 MEQ Tab CR Take 1 Tab by mouth 2 times a day. 60 Tab 11   • metoprolol SR (TOPROL XL) 25 MG TABLET SR 24 HR Take 1 Tab by mouth every morning. 30 Tab 11   • calcium carbonate (TUMS) 500 MG Chew Tab Take 1 Tab by mouth every four hours as needed (heartburn). 30 Tab 0   • rosuvastatin (CRESTOR) 10 MG Tab Take 1 Tab by mouth every evening. 30 Tab 11   • cyanocobalamin (VITAMIN B12) 1000 MCG Tab Take 1 Tab by mouth every day. 30 Tab 3   • levothyroxine (SYNTHROID) 50 MCG Tab Take 1 Tab by mouth Every morning on an empty stomach. 90 Tab 3   • [DISCONTINUED] umeclidinium-vilanterol (ANORO ELLIPTA) 62.5-25 MCG/INH AEROSOL POWDER, BREATH ACTIVATED inhaler Inhale 1 Puff by mouth every day. (Patient not taking: Reported on 3/15/2019) 1 Inhaler 0   • acetaminophen (TYLENOL) 325 MG Tab Take 2  "Tabs by mouth every 6 hours as needed. 30 Tab 0     No facility-administered encounter medications on file as of 3/15/2019.      Review of Systems   Constitutional: Negative for chills, fever and weight loss.   Respiratory: Positive for shortness of breath. Negative for cough.    Cardiovascular: Positive for palpitations and leg swelling. Negative for chest pain and orthopnea.   Gastrointestinal: Negative for nausea and vomiting.   Musculoskeletal: Positive for joint pain and myalgias.   Neurological: Positive for weakness. Negative for dizziness and loss of consciousness.        Restless legs        Objective:   /62 (BP Location: Left arm, Patient Position: Sitting, BP Cuff Size: Adult)   Pulse 72   Ht 1.613 m (5' 3.5\")   Wt 55.8 kg (123 lb)   LMP  (LMP Unknown)   BMI 21.45 kg/m²     Physical Exam   Constitutional: She is oriented to person, place, and time. She appears well-developed and well-nourished. No distress.   Elderly female, using wheelchair.  No acute distress.   HENT:   Head: Normocephalic and atraumatic.   Eyes: Conjunctivae are normal. No scleral icterus.   Neck: Neck supple. No JVD (No JVD seen at 90 degree angle) present.   Cardiovascular: Normal rate and intact distal pulses.  An irregularly irregular rhythm present.   No murmur heard.  Pulses:       Radial pulses are 1+ on the right side, and 1+ on the left side.   Pulmonary/Chest: Effort normal and breath sounds normal. No respiratory distress. She has no wheezes. She has no rales.   Diminished breath sounds at the bases, no rales.   Abdominal: Soft. Bowel sounds are normal. She exhibits no distension. There is no tenderness.   Musculoskeletal: She exhibits edema (2+ pitting edema at the ankles bilaterally).   Neurological: She is alert and oriented to person, place, and time. No cranial nerve deficit.   Skin: Skin is warm and dry. She is not diaphoretic. No pallor.   Psychiatric: Judgment normal.   Vitals reviewed.       Echo " 5/18/18  Severe bi-atrial enlargement. Normal LV and RV systolic function. LVEF   is 55% by visual estimation. Diastolic function difficult to assess.   There is at least mild aortic stenosis but valve is opening. Mean   gradient is 10 mmHg, with V max of 2.01 m/s. RVSP is elevated at 70   mmHg suggesting pulmonary hypertension. No prior study is available for   comparison.     Transthoracic Echo 7/8/18  CONCLUSIONS  Prior done 05-.  Compared to prior study, the right-sided   chambers appear dilated.    Left ventricular systolic function is low normal.  Left ventricular ejection fraction is visually estimated to be 50-55%.  Abnormal septal motion consistent with right ventricular (RV) volume   overload and/or elevated RV end-diastolic pressure.  Dilated right ventricle with reduced systolic function.  Severe tricuspid regurgitation. Estimated right ventricular systolic   pressure  is 55 mmHg.  Dilated inferior vena cava without inspiratory collapse.    Left and Right Cardiac Catheterization 7/3/18  .  No evidence of aortic stenosis with 0 mmHg peak to peak gradient, mean gradient of 8 mmHg, calculated CHRISTIANE of 2.09 cm2.  2.  Coronary artery disease with high grade proximal and high grade mid left   anterior descending artery.  3.  Successful percutaneous transluminal coronary angioplasty/stent placement   of the mid left anterior descending artery with 2.25x12 mm Synergy   drug-eluting stent.  4.  Successful percutaneous transluminal coronary angioplasty/stent placement   of the proximal left anterior descending artery with 2.5x12 mm Synergy   drug-eluting stent.  5.  Normal left ventricular systolic function with ejection fraction of 55%.  6.  Normal left ventricular end-diastolic pressure.  7.  Elevated right heart pressure with PA systolic pressure of 60 mmHg.  8.  Successful Angio-Seal closure.    HEMODYNAMIC DATA:  Hemodynamic data shows right heart pressures of RA 20 mmHg, RV 60/12 with RVEDP of 18 mmHg,  PA 60/25 with mean of 38 mmHg.  Cardiac output by thermodilution 3.0, cardiac index 1.82    Lab Results   Component Value Date/Time    CHOLSTRLTOT 67 (L) 07/09/2018 02:22 AM    LDL 17 07/09/2018 02:22 AM    HDL 40 07/09/2018 02:22 AM    TRIGLYCERIDE 50 07/09/2018 02:22 AM       Lab Results   Component Value Date/Time    SODIUM 137 03/05/2019 11:03 AM    POTASSIUM 3.7 03/05/2019 11:03 AM    CHLORIDE 103 03/05/2019 11:03 AM    CO2 29 03/05/2019 11:03 AM    GLUCOSE 84 03/05/2019 11:03 AM    BUN 31 (H) 03/05/2019 11:03 AM    CREATININE 1.22 03/05/2019 11:03 AM       Assessment:     1. Iron deficiency anemia due to chronic blood loss     2. Chronic atrial fibrillation (HCC)     3. Chronic diastolic CHF (congestive heart failure) ([facility])     4. Chronic obstructive pulmonary disease, unspecified COPD type ([facility])     5. Coronary artery disease without angina pectoris, unspecified vessel or lesion type, unspecified whether native or transplanted heart     6. Cardiac pacemaker in situ     7. CKD (chronic kidney disease) stage 3, GFR 30-59 ml/min ([facility])     8. Pulmonary hypertension ([facility])     9. Iron deficiency anemia secondary to inadequate dietary iron intake     10. Hyponatremia         Medical Decision Making:  Today's Assessment / Status / Plan:   HFpEF, Stage C, Class II-III, LVEF 50%  Ischemic Cardiomyopathy  Pulmonary hypertension with reduced RV systolic function  - She exhibits LE edema, new as of yesterday, recommended increasing Bumex to twice daily for 3 days then reassessing  -Toprol-XL 25 mg, she can move this dose to evening to help her sleep  - potassium 20 mEq 2 times daily  -Reinforced s/sx of worsening heart failure with patient and weight monitoring. Pt verbalizes understanding. Pt to call office or RTC if present.     Coronary artery disease, s/p 2 BRITTON to mid and proximal left anterior descending arteryJuly 2018  -Plavix 75 mg, Eliquis 2.5mg BID  -Crestor 10 mg    Chronic atrial fibrillation, rate  controlled  -[id number] 5  -Eliquis 2.5 mg twice daily  -Toprol-XL 25    Anemia, multifactorial/Iron deficiency and B12  -Hemoglobin significantly improved, iron still low, restarted ferrous sulfate 325 mg EOD  - B12 injections per PCP     Chronic kidney disease stage III  - stable, continue to monitor    COPD  - stable, continue daily inhaler and albuterol PRN    Insomnia  -She is on able to sleep at night secondary to restless leg, pain in her hip, occasional anxiety.  I recommended positional changes, she can take her metoprolol in the evening if that helps.  I offered referrals to palliative care for symptom management while she is still seeking treatment for her heart conditions, she declined this.    Plan: Increase Bumex to twice daily for 3 days for lower extremity edema.  An attempt to reduce burden of appointments she can return to see us in 3 months, sooner if needed.    Collaborating MD: Dr. Sharp

## 2019-03-15 ENCOUNTER — OFFICE VISIT (OUTPATIENT)
Dept: CARDIOLOGY | Facility: MEDICAL CENTER | Age: 84
End: 2019-03-15
Payer: MEDICARE

## 2019-03-15 VITALS
BODY MASS INDEX: 21 KG/M2 | WEIGHT: 123 LBS | HEIGHT: 64 IN | DIASTOLIC BLOOD PRESSURE: 62 MMHG | HEART RATE: 72 BPM | SYSTOLIC BLOOD PRESSURE: 112 MMHG

## 2019-03-15 VITALS
DIASTOLIC BLOOD PRESSURE: 60 MMHG | OXYGEN SATURATION: 98 % | TEMPERATURE: 97.6 F | BODY MASS INDEX: 19.95 KG/M2 | SYSTOLIC BLOOD PRESSURE: 120 MMHG | RESPIRATION RATE: 18 BRPM | HEART RATE: 72 BPM | WEIGHT: 112.6 LBS

## 2019-03-15 DIAGNOSIS — I48.20 CHRONIC ATRIAL FIBRILLATION (HCC): ICD-10-CM

## 2019-03-15 DIAGNOSIS — D50.0 IRON DEFICIENCY ANEMIA DUE TO CHRONIC BLOOD LOSS: ICD-10-CM

## 2019-03-15 DIAGNOSIS — E87.1 HYPONATREMIA: ICD-10-CM

## 2019-03-15 DIAGNOSIS — Z95.0 CARDIAC PACEMAKER IN SITU: ICD-10-CM

## 2019-03-15 DIAGNOSIS — I50.32 CHRONIC DIASTOLIC CHF (CONGESTIVE HEART FAILURE) (HCC): ICD-10-CM

## 2019-03-15 DIAGNOSIS — I27.20 PULMONARY HYPERTENSION (HCC): ICD-10-CM

## 2019-03-15 DIAGNOSIS — N18.30 CKD (CHRONIC KIDNEY DISEASE) STAGE 3, GFR 30-59 ML/MIN (HCC): ICD-10-CM

## 2019-03-15 DIAGNOSIS — D50.8 IRON DEFICIENCY ANEMIA SECONDARY TO INADEQUATE DIETARY IRON INTAKE: ICD-10-CM

## 2019-03-15 DIAGNOSIS — J44.9 CHRONIC OBSTRUCTIVE PULMONARY DISEASE, UNSPECIFIED COPD TYPE (HCC): ICD-10-CM

## 2019-03-15 DIAGNOSIS — I25.10 CORONARY ARTERY DISEASE WITHOUT ANGINA PECTORIS, UNSPECIFIED VESSEL OR LESION TYPE, UNSPECIFIED WHETHER NATIVE OR TRANSPLANTED HEART: ICD-10-CM

## 2019-03-15 PROCEDURE — 99214 OFFICE O/P EST MOD 30 MIN: CPT | Performed by: PHYSICIAN ASSISTANT

## 2019-03-15 SDOH — ECONOMIC STABILITY: HOUSING INSECURITY: EVIDENCE OF SMOKING MATERIAL: 0

## 2019-03-15 ASSESSMENT — ENCOUNTER SYMPTOMS
ORTHOPNEA: 0
FEVER: 0
CHILLS: 0
LOSS OF CONSCIOUSNESS: 0
PALPITATIONS: 1
NAUSEA: 0
DIZZINESS: 0
MYALGIAS: 1
WEAKNESS: 1
WEIGHT LOSS: 0
SHORTNESS OF BREATH: 1
VOMITING: 0
COUGH: 0

## 2019-03-17 ASSESSMENT — ENCOUNTER SYMPTOMS
SEVERE DYSPNEA: 1
DEBILITATING PAIN: 1
DEPRESSED MOOD: 1

## 2019-03-18 ENCOUNTER — PATIENT OUTREACH (OUTPATIENT)
Dept: HEALTH INFORMATION MANAGEMENT | Facility: OTHER | Age: 84
End: 2019-03-18

## 2019-03-18 ENCOUNTER — HOME CARE VISIT (OUTPATIENT)
Dept: HOME HEALTH SERVICES | Facility: HOME HEALTHCARE | Age: 84
End: 2019-03-18
Payer: MEDICARE

## 2019-03-18 ENCOUNTER — TELEPHONE (OUTPATIENT)
Dept: CARDIOLOGY | Facility: MEDICAL CENTER | Age: 84
End: 2019-03-18

## 2019-03-18 VITALS
TEMPERATURE: 98.2 F | RESPIRATION RATE: 16 BRPM | DIASTOLIC BLOOD PRESSURE: 58 MMHG | OXYGEN SATURATION: 98 % | HEART RATE: 67 BPM | BODY MASS INDEX: 21.83 KG/M2 | WEIGHT: 125.2 LBS | SYSTOLIC BLOOD PRESSURE: 130 MMHG

## 2019-03-18 PROCEDURE — G0493 RN CARE EA 15 MIN HH/HOSPICE: HCPCS

## 2019-03-18 ASSESSMENT — ENCOUNTER SYMPTOMS
NAUSEA: DENIES
VOMITING: DENIES
SHORTNESS OF BREATH: T

## 2019-03-18 NOTE — TELEPHONE ENCOUNTER
"wt gain 10 lbs   Received: Today   Message Contents   Anita AWADRiki Hodge R.N.   Phone Number: [phone]               RUPERTO/alexander Doty calling from [facility] to report wt gain of 10 lbs since last week. Please call Lalitha       Returned call. No answer. LVM with contact information.     returning your call   Received: Today   Message Contents   Chelita GARSIA Janriri  MATHEW Sparks/Alexander Doty at [facility] is returning your call from today. She can be reached at [phone].      Weight today is 125. Up to pds since Friday, despite increased plan of oral diuretics outlined in PA-C dictation. No increased swelling. No increased dyspnea. Good diet. She \"tries\" to observe low sodium. [facility] is an option, secondary to patients insurance. No labs in approx 2 weeks. Sent to ORALIA and MD for advisement in PA-C absence.   "

## 2019-03-18 NOTE — PROGRESS NOTES
SBAR Documentation: Situation, Background, Assessment, Recommendation     Situation    Recent discharge from SNF   Background       S/P left hip nailing due to femur fracture, anemia, chronic A-fib, Chronic diastolic CHF, COPD, CAD, CKD stage 3, GERD   Assessment    Outbound call to Andree and spoke with her grand-daughter Palma.  Palma stats Andree is doing much better.  She is still on service with [facility] and is doing well with PT.  She states Andree is eating well.  She states Andree is now having a hard time sleeping due to her left hip discomfort.     Recommendation CCM RN to continue monitoring for home health discharge.  Plan:  CCM RN to schedule for telephone call in 2 weeks to discuss enrollment in the [facility] program.

## 2019-03-19 ENCOUNTER — HOME CARE VISIT (OUTPATIENT)
Dept: HOME HEALTH SERVICES | Facility: HOME HEALTHCARE | Age: 84
End: 2019-03-19
Payer: MEDICARE

## 2019-03-19 ENCOUNTER — TELEPHONE (OUTPATIENT)
Dept: CARDIOLOGY | Facility: MEDICAL CENTER | Age: 84
End: 2019-03-19

## 2019-03-19 VITALS
WEIGHT: 125.2 LBS | HEART RATE: 82 BPM | SYSTOLIC BLOOD PRESSURE: 124 MMHG | TEMPERATURE: 97.7 F | DIASTOLIC BLOOD PRESSURE: 62 MMHG | RESPIRATION RATE: 16 BRPM | BODY MASS INDEX: 21.83 KG/M2 | OXYGEN SATURATION: 98 %

## 2019-03-19 DIAGNOSIS — Z79.899 HIGH RISK MEDICATION USE: ICD-10-CM

## 2019-03-19 DIAGNOSIS — I50.32 CHRONIC DIASTOLIC CHF (CONGESTIVE HEART FAILURE) (HCC): ICD-10-CM

## 2019-03-19 PROCEDURE — G0151 HHCP-SERV OF PT,EA 15 MIN: HCPCS

## 2019-03-19 PROCEDURE — G0180 MD CERTIFICATION HHA PATIENT: HCPCS | Performed by: FAMILY MEDICINE

## 2019-03-19 PROCEDURE — G0493 RN CARE EA 15 MIN HH/HOSPICE: HCPCS

## 2019-03-19 ASSESSMENT — ENCOUNTER SYMPTOMS
NAUSEA: DENIES
VOMITING: DENIES
SHORTNESS OF BREATH: T

## 2019-03-19 NOTE — TELEPHONE ENCOUNTER
Lalitha at [facility]   Received: Today   Message Contents   MATHEW Cohn/Cecilia De Diosthia at [facility] called again. She is asking you to call the patient's granddaughter, Palma. Palma wants to set her grandmother up with IV Lasix at home. Palma's #[phone]. Lalitha's #[phone].      Returned Lalitha's call. No answer, LVM with contact information.    Returned Palma's call. Length conversation re: patient.  She states she has been at this for more than a year and thinks she knows what is best to treat her grandmother. She states she is NOT agreeable to home IV Lasix and feels that would not be in her grandmothers best interest.     Per office notes.    Last seen by Dr. Simms 3/1/19.  Restarted Bumex, discontinued metolazone.  Plan: Increase Bumex to twice daily for 3 days for lower extremity edema.  An attempt to reduce burden of appointments she can return to see us in 3 months, sooner if needed.    Palma states that she was given license by Dr. Simms and Karina to adjust meds as needed and that is what she has done and below is what patient is still or has been taking.    We discuss patients history of kidney issues. She is agreeable to a lab drawn. BMP ordered and faxed to [facility] for Thursday drawn.    Palma will call us if she feels patient needs further intervention. She states she is not opposed to an office visit but feels she wants more time with increased medications to see if they work or not. We thoroughly discuss the dangers of this. She states she is trying to keep her out of the hospital, we discuss that sometimes that fear can work the opposite direction. We agree to proceed with labs and assess from there. She will call us in the meantime for any needs.    - Potassium 20 mEq 2 times daily  -BUMEX 1mg BID (Started Friday PM)  -Metolazone 2.5 mg daily (Started Monday AM, and plan is for Mon-Wed AM)

## 2019-03-19 NOTE — TELEPHONE ENCOUNTER
Jose Simms M.D.   You 1 hour ago (12:08 PM)      Can you call her and find out about her diuretic doseing, fluid, edema etc.  If she is gaining weight have her increase her diuretic dosing.       =================================  Spoke with Pt has +2 pitting edema to knees. Same as Friday. Her daughter has been giving her an extra dose of Bumex since Sunday and restarted her metolazone yesterday without any change. Lalitha states that their protocol advises to call [facility] for treatment. Advised Lalitha that as the pt has increased her diuretics already without success she should follow their home health protocol. Called lalitha again and the Grand daughter has refused [facility]. Spoke with Grand Daughter. She would like to give the increased diuretics more time to work.    She states she is giving the pt 1mg Bumex BID since the appointment friday and she is giving her 2.5mg of metolazone for three days starting yesterday. She would like to know if this plan is OK. She says as of yesterday the pt was pretty much the same as when she was seen on Friday, but was up 2lbs which is when she started the Metolazone.

## 2019-03-20 VITALS
DIASTOLIC BLOOD PRESSURE: 62 MMHG | OXYGEN SATURATION: 98 % | SYSTOLIC BLOOD PRESSURE: 124 MMHG | TEMPERATURE: 97.7 F | HEART RATE: 82 BPM

## 2019-03-21 ENCOUNTER — HOME CARE VISIT (OUTPATIENT)
Dept: HOME HEALTH SERVICES | Facility: HOME HEALTHCARE | Age: 84
End: 2019-03-21
Payer: MEDICARE

## 2019-03-21 VITALS
RESPIRATION RATE: 18 BRPM | BODY MASS INDEX: 20.92 KG/M2 | HEART RATE: 77 BPM | OXYGEN SATURATION: 97 % | WEIGHT: 120 LBS | TEMPERATURE: 97.8 F | DIASTOLIC BLOOD PRESSURE: 64 MMHG | SYSTOLIC BLOOD PRESSURE: 120 MMHG

## 2019-03-21 PROCEDURE — G0151 HHCP-SERV OF PT,EA 15 MIN: HCPCS

## 2019-03-21 PROCEDURE — G0493 RN CARE EA 15 MIN HH/HOSPICE: HCPCS

## 2019-03-21 ASSESSMENT — ENCOUNTER SYMPTOMS
VOMITING: DENIES
NAUSEA: DENIES

## 2019-03-22 ENCOUNTER — HOSPITAL ENCOUNTER (OUTPATIENT)
Dept: LAB | Facility: MEDICAL CENTER | Age: 84
End: 2019-03-22
Attending: NURSE PRACTITIONER
Payer: MEDICARE

## 2019-03-22 VITALS
OXYGEN SATURATION: 97 % | HEART RATE: 77 BPM | TEMPERATURE: 97.8 F | DIASTOLIC BLOOD PRESSURE: 64 MMHG | WEIGHT: 120 LBS | BODY MASS INDEX: 20.92 KG/M2 | SYSTOLIC BLOOD PRESSURE: 120 MMHG

## 2019-03-22 DIAGNOSIS — Z79.899 HIGH RISK MEDICATION USE: ICD-10-CM

## 2019-03-22 DIAGNOSIS — I50.32 CHRONIC DIASTOLIC CHF (CONGESTIVE HEART FAILURE) (HCC): ICD-10-CM

## 2019-03-22 LAB
ANION GAP SERPL CALC-SCNC: 9 MMOL/L (ref 0–11.9)
BUN SERPL-MCNC: 42 MG/DL (ref 8–22)
CALCIUM SERPL-MCNC: 9 MG/DL (ref 8.5–10.5)
CHLORIDE SERPL-SCNC: 97 MMOL/L (ref 96–112)
CO2 SERPL-SCNC: 33 MMOL/L (ref 20–33)
CREAT SERPL-MCNC: 1.26 MG/DL (ref 0.5–1.4)
GLUCOSE SERPL-MCNC: 93 MG/DL (ref 65–99)
POTASSIUM SERPL-SCNC: 3.6 MMOL/L (ref 3.6–5.5)
SODIUM SERPL-SCNC: 139 MMOL/L (ref 135–145)

## 2019-03-22 PROCEDURE — 36415 COLL VENOUS BLD VENIPUNCTURE: CPT

## 2019-03-22 PROCEDURE — 80048 BASIC METABOLIC PNL TOTAL CA: CPT

## 2019-03-22 ASSESSMENT — ENCOUNTER SYMPTOMS: SEVERE DYSPNEA: 1

## 2019-03-26 ENCOUNTER — HOME CARE VISIT (OUTPATIENT)
Dept: HOME HEALTH SERVICES | Facility: HOME HEALTHCARE | Age: 84
End: 2019-03-26
Payer: MEDICARE

## 2019-03-26 PROCEDURE — G0151 HHCP-SERV OF PT,EA 15 MIN: HCPCS

## 2019-03-27 ENCOUNTER — TELEPHONE (OUTPATIENT)
Dept: CARDIOLOGY | Facility: MEDICAL CENTER | Age: 84
End: 2019-03-27

## 2019-03-27 ENCOUNTER — HOME CARE VISIT (OUTPATIENT)
Dept: HOME HEALTH SERVICES | Facility: HOME HEALTHCARE | Age: 84
End: 2019-03-27
Payer: MEDICARE

## 2019-03-27 ENCOUNTER — TELEPHONE (OUTPATIENT)
Dept: MEDICAL GROUP | Facility: MEDICAL CENTER | Age: 84
End: 2019-03-27

## 2019-03-27 VITALS
HEART RATE: 84 BPM | SYSTOLIC BLOOD PRESSURE: 122 MMHG | DIASTOLIC BLOOD PRESSURE: 68 MMHG | OXYGEN SATURATION: 98 % | TEMPERATURE: 98.1 F

## 2019-03-27 VITALS
SYSTOLIC BLOOD PRESSURE: 120 MMHG | WEIGHT: 128 LBS | HEART RATE: 77 BPM | OXYGEN SATURATION: 98 % | TEMPERATURE: 98.6 F | RESPIRATION RATE: 20 BRPM | BODY MASS INDEX: 22.32 KG/M2 | DIASTOLIC BLOOD PRESSURE: 70 MMHG

## 2019-03-27 PROCEDURE — G0299 HHS/HOSPICE OF RN EA 15 MIN: HCPCS

## 2019-03-27 SDOH — ECONOMIC STABILITY: HOUSING INSECURITY: EVIDENCE OF SMOKING MATERIAL: 0

## 2019-03-27 NOTE — TELEPHONE ENCOUNTER
Home nurse calling to report 8 pound weight gain   Received: Today   Message Contents   MATHEW Cohn/Cecilia       Message left on voicemail today. [facility] is calling to report 8 pound weight gain in less than a week. She can be reached at [phone].      Returned call. Pt is asymptomatic. Weight today is 128. Advised pt weight was 123 on 3/15. Continue to observe patient and call for yellow zone symptoms. Ssoa states understanding.

## 2019-03-28 NOTE — TELEPHONE ENCOUNTER
Please advise patient to take her extra 1 mg tablet of Bumex daily (total 3 mg/day), in the morning, for the next 3 days.  She should continue to monitor her weight daily.  She will need to follow-up on this with her cardiologist, Dr. Cano, at her appointment next Tuesday.    So she will take 2 mg of Bumex in the morning and 1 mg at night for the next 3 days.    MARIJA Patel.

## 2019-03-29 ENCOUNTER — HOME CARE VISIT (OUTPATIENT)
Dept: HOME HEALTH SERVICES | Facility: HOME HEALTHCARE | Age: 84
End: 2019-03-29
Payer: MEDICARE

## 2019-03-29 NOTE — PROGRESS NOTES
Chief Complaint   Patient presents with   • Atrial Fibrillation     F/V:        Subjective:   Andree Egan is a 86 y.o. female who presents today for evaluation at home health request for weight gain/LE edema with her granddaughter Palma.    Patient of Dr. Marshall.  I last saw Andree Haider 3/15/19, increased Bumex 1 mg to twice daily for 3 days for lower extremity swelling.  Since then she has been seen by home health and continues to have difficulty with lower extremity swelling.    Today in the office to is feeling well.  She states her appetite has improved significantly since receiving a B12 shot and thinks her weight gain is more associated with caloric weight as opposed to fluid retention.  No abdominal distention or nausea.  She is off oxygen 24/7, uses it at night.  Denies shortness of breath at rest, no orthopnea, she is sleeping much better since I last saw her.  She continues to have persistent lower extremities swelling, appears stable per her granddaughter.      Past Medical History:   Diagnosis Date   • Arthritis     back/neck   • Atrial fibrillation (CMS-HCC) [I48.91] 3/29/2018   • Back pain    • Blood clotting disorder (HCC)    • Breast cancer (HCC)    • Breath shortness     water retention   • Cancer (HCC)     breast   • Cataract    • Congestive heart failure (CHF) (HCC) 4/3/2018   • Constipation    • Coronary heart disease    • Daytime sleepiness    • Diarrhea    • BERTRAND (dyspnea on exertion) 5/4/2018   • GERD (gastroesophageal reflux disease)    • Heart attack (HCC)    • Heart burn    • Heart valve disease    • Heartburn    • Hemorrhagic disorder (HCC)    • Hiatus hernia syndrome    • Hyperlipidemia    • Hypertension    • Hypothyroidism    • Kidney disease    • Long term current use of anticoagulant 3/29/2018   • Nasal drainage    • Osteoporosis    • Pacemaker 4/3/2018   • Pneumonia    • Pulmonary emphysema (HCC)    • Restless leg syndrome    • Rheumatoid arthritis (HCC)    • Ringing in ears    •  Secondary hypertension 4/3/2018   • Shortness of breath    • Swelling of lower extremity    • Thyroid disease    • Tonsillitis    • Tremors of nervous system 4/3/2018   • Urinary incontinence    • Urinary tract infection    • Wears glasses      Past Surgical History:   Procedure Laterality Date   • HIP NAILING INTRAMEDULLARY Left 12/29/2018    Procedure: HIP NAILING INTRAMEDULLARY;  Surgeon: Juan Daniel Zaragoza M.D.;  Location: SURGERY [facility];  Service: Orthopedics   • HYSTERECTOMY LAPAROSCOPY     • MASTECTOMY     • OTHER      left breast mastectomy   • OTHER CARDIAC SURGERY      pacemaker placement   • PACEMAKER INSERTION     • PB REMV 2ND CATARACT,CORN-SCLER SECTN       Family History   Problem Relation Age of Onset   • Lung Disease Mother    • Cancer Mother    • Heart Disease Mother    • No Known Problems Father    • Diabetes Brother    • Fainting Neg Hx      Social History     Social History   • Marital status: Single     Spouse name: N/A   • Number of children: N/A   • Years of education: N/A     Occupational History   • Not on file.     Social History Main Topics   • Smoking status: Former Smoker     Packs/day: 0.50     Types: Cigarettes     Quit date: 1/1/1970   • Smokeless tobacco: Never Used   • Alcohol use 1.8 oz/week     3 Glasses of wine per week      Comment: 1-2 glasses wine before dinner   • Drug use: No   • Sexual activity: Not on file     Other Topics Concern   • Not on file     Social History Narrative   • No narrative on file     Allergies   Allergen Reactions   • Codeine Vomiting     Outpatient Encounter Prescriptions as of 4/2/2019   Medication Sig Dispense Refill   • metOLazone (ZAROXOLYN) 2.5 MG Tab Take 2.5 mg by mouth every day.     • apixaban (ELIQUIS) 2.5mg Tab Take 2.5 mg by mouth 2 Times a Day.     • Home Care Oxygen Inhale 2 L/min by mouth every bedtime. via nasal cannula     • topiramate (TOPAMAX) 25 MG Tab Take 1 Tab by mouth 2 times a day. TAKE ONE TABLET BY MOUTH TWICE A DAY  60 Tab 6   • ROPINIRole (REQUIP) 0.5 MG Tab Take 1 Tab by mouth 2 Times a Day. 60 Tab 11   • omeprazole (PRILOSEC) 20 MG delayed-release capsule Take 1 Cap by mouth 2 times a day. 30 Cap 6   • fluticasone (FLOVENT HFA) 110 MCG/ACT Aerosol Inhale 1 Puff by mouth every day. 1 Inhaler 5   • clopidogrel (PLAVIX) 75 MG Tab Take 1 Tab by mouth every day. 30 Tab 6   • Calcium-Vitamin D 500-125 MG-UNIT Tab Take 1 tablet by mouth every morning. 600mg - 400 iu 30 Tab 6   • bumetanide (BUMEX) 1 MG Tab Take 1 Tab by mouth 2 Times a Day. (Patient taking differently: Take 1 Tab by mouth every day.  1 PRN tablet if pt develop swelling or SOB. ) 60 Tab 6   • ascorbic acid (VITAMIN C) 500 MG tablet Take 1 Tab by mouth every day. 30 Tab 6   • allopurinol (ZYLOPRIM) 100 MG Tab Take 1 Tab by mouth 2 times a day. 30 Tab 6   • albuterol (PROAIR HFA) 108 (90 Base) MCG/ACT Aero Soln inhalation aerosol Inhale 2 Puffs by mouth every 6 hours as needed for Shortness of Breath. 8.5 g 4   • sertraline (ZOLOFT) 100 MG Tab Take 1 Tab by mouth every bedtime. 30 Tab 11   • ferrous sulfate 325 (65 Fe) MG tablet Take 1 Tab by mouth every 48 hours. 30 Tab 6   • potassium chloride SA (KDUR) 20 MEQ Tab CR Take 1 Tab by mouth 2 times a day. 60 Tab 11   • metoprolol SR (TOPROL XL) 25 MG TABLET SR 24 HR Take 1 Tab by mouth every morning. 30 Tab 11   • rosuvastatin (CRESTOR) 10 MG Tab Take 1 Tab by mouth every evening. 30 Tab 11   • cyanocobalamin (VITAMIN B12) 1000 MCG Tab Take 1 Tab by mouth every day. 30 Tab 3   • levothyroxine (SYNTHROID) 50 MCG Tab Take 1 Tab by mouth Every morning on an empty stomach. 90 Tab 3   • acetaminophen (TYLENOL) 325 MG Tab Take 2 Tabs by mouth every 6 hours as needed. 30 Tab 0   • polyethylene glycol/lytes (MIRALAX) Pack Take 1 Packet by mouth 2 times a day as needed (if sennosides and/or docusate ineffective or not ordered).  3   • calcium carbonate (TUMS) 500 MG Chew Tab Take 1 Tab by mouth every four hours as needed  "(heartburn). 30 Tab 0     No facility-administered encounter medications on file as of 4/2/2019.      Review of Systems   Constitutional: Negative for chills, fever and weight loss.        Weight gain   HENT: Negative for nosebleeds.    Respiratory: Positive for shortness of breath (with minimal activity, stable). Negative for cough.    Cardiovascular: Positive for leg swelling. Negative for chest pain, palpitations, orthopnea and PND.   Gastrointestinal: Negative for heartburn and nausea.   Musculoskeletal: Negative for joint pain.   Neurological: Negative for dizziness and loss of consciousness.        Objective:   /64 (BP Location: Left arm, Patient Position: Sitting, BP Cuff Size: Adult)   Pulse 70   Ht 1.613 m (5' 3.5\")   Wt 57.6 kg (127 lb)   LMP  (LMP Unknown)   SpO2 100%   BMI 22.14 kg/m²     Physical Exam   Constitutional: She is oriented to person, place, and time. She appears well-developed and well-nourished. No distress.   Frail female, NAD, appears well   HENT:   Head: Normocephalic and atraumatic.   Eyes: Conjunctivae are normal. No scleral icterus.   Neck: Neck supple. No JVD present.   Cardiovascular: Normal rate, regular rhythm and intact distal pulses.    Murmur (at sternal border) heard.  Pulmonary/Chest: Effort normal and breath sounds normal. No respiratory distress. She has no wheezes. She has no rales.   Pacemaker present in left upper chest without evidence   of erosion, drainage,or erythema.   Abdominal: Soft. Bowel sounds are normal. She exhibits no distension. There is no tenderness.   Musculoskeletal: She exhibits edema (1+ at top of socks).   Neurological: She is alert and oriented to person, place, and time. No cranial nerve deficit.   Skin: Skin is warm and dry. She is not diaphoretic. No pallor.   Various ecchymosis    Psychiatric: Judgment normal.   Nursing note and vitals reviewed.       Lab Results   Component Value Date/Time    CHOLSTRLTOT 67 (L) 07/09/2018 02:22 AM "    LDL 17 07/09/2018 02:22 AM    HDL 40 07/09/2018 02:22 AM    TRIGLYCERIDE 50 07/09/2018 02:22 AM         Lab Results   Component Value Date/Time    SODIUM 139 03/22/2019 10:17 AM    POTASSIUM 3.6 03/22/2019 10:17 AM    CHLORIDE 97 03/22/2019 10:17 AM    CO2 33 03/22/2019 10:17 AM    GLUCOSE 93 03/22/2019 10:17 AM    BUN 42 (H) 03/22/2019 10:17 AM    CREATININE 1.26 03/22/2019 10:17 AM     Lab Results   Component Value Date/Time    ALKPHOSPHAT 115 (H) 12/29/2018 05:01 AM    ASTSGOT 19 12/29/2018 05:01 AM    ALTSGPT 15 12/29/2018 05:01 AM    TBILIRUBIN 0.8 12/29/2018 05:01 AM      ECHO 5/19/18  Severe bi-atrial enlargement. Normal LV and RV systolic function. LVEF   is 55% by visual estimation. Diastolic function difficult to assess.   There is at least mild aortic stenosis but valve is opening. Mean   gradient is 10 mmHg, with V max of 2.01 m/s. RVSP is elevated at 70   mmHg suggesting pulmonary hypertension. No prior study is available for   comparison.      Transthoracic Echo 7/8/18  CONCLUSIONS  Prior done 05-.  Compared to prior study, the right-sided   chambers appear dilated.    Left ventricular systolic function is low normal.  Left ventricular ejection fraction is visually estimated to be 50-55%.  Abnormal septal motion consistent with right ventricular (RV) volume   overload and/or elevated RV end-diastolic pressure.  Dilated right ventricle with reduced systolic function.  Severe tricuspid regurgitation. Estimated right ventricular systolic   pressure  is 55 mmHg.  Dilated inferior vena cava without inspiratory collapse.     Left and Right Cardiac Catheterization 7/3/18   No evidence of aortic stenosis with 0 mmHg peak to peak gradient, mean gradient of 8 mmHg, calculated CHRISTIANE of 2.09 cm2.  2.  Coronary artery disease with high grade proximal and high grade mid left   anterior descending artery.  3.  Successful percutaneous transluminal coronary angioplasty/stent placement   of the mid left anterior  descending artery with 2.25x12 mm Synergy   drug-eluting stent.  4.  Successful percutaneous transluminal coronary angioplasty/stent placement   of the proximal left anterior descending artery with 2.5x12 mm Synergy   drug-eluting stent.  5.  Normal left ventricular systolic function with ejection fraction of 55%.  6.  Normal left ventricular end-diastolic pressure.  7.  Elevated right heart pressure with PA systolic pressure of 60 mmHg.  8.  Successful Angio-Seal closure.     HEMODYNAMIC DATA:  Hemodynamic data shows right heart pressures of RA 20 mmHg, RV 60/12 with RVEDP of 18 mmHg, PA 60/25 with mean of 38 mmHg.  Cardiac output by thermodilution 3.0, cardiac index 1.82  Assessment:     1. Chronic atrial fibrillation (HCC)     2. Chronic diastolic CHF (congestive heart failure) (HCC)     3. Coronary artery disease without angina pectoris, unspecified vessel or lesion type, unspecified whether native or transplanted heart     4. Secondary hypertension         Medical Decision Making:  Today's Assessment / Status / Plan:        HFpEF, Stage C, Class II-III, LVEF 50%  Ischemic Cardiomyopathy  Pulmonary hypertension with reduced RV systolic function  -She appears close to euvolemic on exam, I suspect her residual lower extremity swelling is chronic and due to venous insufficiency, certainly her symptoms are much improved since last I saw her.    - recommended continuing Bumex 1 mg daily, increase to 2 mg plus metolazone for 2-3 days for worsening swelling, weight gain, shortness of breath.  Her caretaker Palma is in agreement with this plan.  - potassium 20 mEq 2 times daily  -Toprol-XL 25 mg q. evening  -Reinforced s/sx of worsening heart failure with patient and weight monitoring. Pt verbalizes understanding. Pt to call office or RTC if present.     Aortic Stenosis  -Echo and cardiac cath reports differ on the severity  -3/6 murmur on exam  -Should have echo 1-2 years    Coronary artery disease, s/p 2 BRITTON to mid  and proximal left anterior descending arteryJuly 2018  -Plavix 75 mg, Eliquis 2.5mg BID  -Crestor 10 mg     Chronic atrial fibrillation, rate controlled  History of SSS s/p PPM  -[id number] 5  -Eliquis 2.5 mg twice daily  -Toprol-XL 25  - next pacer check 6/19    Anemia, multifactorial/Iron deficiency and B12  -Hemoglobin significantly improved, iron still low, restarted ferrous sulfate 325 mg EOD  - B12 injections per PCP     Chronic kidney disease stage III  - stable, continue to monitor     COPD  - stable, continue daily inhaler and albuterol PRN     Insomnia, improved    Plan: continue medication regimen, increase Bumex 2mg plus metolazone for 2-3 days as needed for swelling, weight gain, SOB. Follow up in 1 mo with me, 2mo with RO, labs in 2months    Collaborating MD: Dr Simms

## 2019-04-02 ENCOUNTER — OFFICE VISIT (OUTPATIENT)
Dept: CARDIOLOGY | Facility: MEDICAL CENTER | Age: 84
End: 2019-04-02
Payer: MEDICARE

## 2019-04-02 ENCOUNTER — PATIENT OUTREACH (OUTPATIENT)
Dept: HEALTH INFORMATION MANAGEMENT | Facility: OTHER | Age: 84
End: 2019-04-02

## 2019-04-02 VITALS
HEART RATE: 70 BPM | BODY MASS INDEX: 21.68 KG/M2 | WEIGHT: 127 LBS | OXYGEN SATURATION: 100 % | HEIGHT: 64 IN | DIASTOLIC BLOOD PRESSURE: 64 MMHG | SYSTOLIC BLOOD PRESSURE: 122 MMHG

## 2019-04-02 DIAGNOSIS — I25.10 CORONARY ARTERY DISEASE WITHOUT ANGINA PECTORIS, UNSPECIFIED VESSEL OR LESION TYPE, UNSPECIFIED WHETHER NATIVE OR TRANSPLANTED HEART: ICD-10-CM

## 2019-04-02 DIAGNOSIS — I50.32 CHRONIC DIASTOLIC CHF (CONGESTIVE HEART FAILURE) (HCC): ICD-10-CM

## 2019-04-02 DIAGNOSIS — I15.9 SECONDARY HYPERTENSION: ICD-10-CM

## 2019-04-02 DIAGNOSIS — I48.20 CHRONIC ATRIAL FIBRILLATION (HCC): ICD-10-CM

## 2019-04-02 PROCEDURE — 99213 OFFICE O/P EST LOW 20 MIN: CPT | Performed by: PHYSICIAN ASSISTANT

## 2019-04-02 ASSESSMENT — ENCOUNTER SYMPTOMS
ORTHOPNEA: 0
LOSS OF CONSCIOUSNESS: 0
SHORTNESS OF BREATH: 1
DIZZINESS: 0
PND: 0
COUGH: 0
CHILLS: 0
HEARTBURN: 0
WEIGHT LOSS: 0
NAUSEA: 0
FEVER: 0
PALPITATIONS: 0

## 2019-04-02 NOTE — PROGRESS NOTES
SBAR Documentation: Situation, Background, Assessment, Recommendation     Situation    Discharged from SNF   Background       S/P left hip nailing due to femur fracture, anemia, chronic A-fib, Chronic diastolic CHF, COPD, CAD, CKD stage 3, GERD      Assessment    Outbound call to Andree and spoke with her grand-daughter, Palma.  Palma Candelario is doing well and is still on service with [facility].  Palma Candelario has been discharged from [facility], and is dong very well.    Recommendation CCM RN to continue to monitor for discharge from home health.  CCM RN to schedule for telephone call in 2 weeks to discuss enrollment in the [facility] program.

## 2019-04-04 ENCOUNTER — HOME CARE VISIT (OUTPATIENT)
Dept: HOME HEALTH SERVICES | Facility: HOME HEALTHCARE | Age: 84
End: 2019-04-04
Payer: MEDICARE

## 2019-04-04 VITALS
SYSTOLIC BLOOD PRESSURE: 122 MMHG | DIASTOLIC BLOOD PRESSURE: 64 MMHG | HEART RATE: 83 BPM | RESPIRATION RATE: 18 BRPM | BODY MASS INDEX: 22.67 KG/M2 | WEIGHT: 130 LBS | TEMPERATURE: 98 F | OXYGEN SATURATION: 95 %

## 2019-04-04 PROCEDURE — G0493 RN CARE EA 15 MIN HH/HOSPICE: HCPCS

## 2019-04-04 ASSESSMENT — ENCOUNTER SYMPTOMS
NAUSEA: DENIES
SHORTNESS OF BREATH: T
VOMITING: DENIES

## 2019-04-10 ENCOUNTER — HOME CARE VISIT (OUTPATIENT)
Dept: HOME HEALTH SERVICES | Facility: HOME HEALTHCARE | Age: 84
End: 2019-04-10
Payer: MEDICARE

## 2019-04-10 VITALS
TEMPERATURE: 97.7 F | SYSTOLIC BLOOD PRESSURE: 124 MMHG | BODY MASS INDEX: 21.8 KG/M2 | WEIGHT: 125 LBS | OXYGEN SATURATION: 95 % | DIASTOLIC BLOOD PRESSURE: 56 MMHG | HEART RATE: 80 BPM | RESPIRATION RATE: 16 BRPM

## 2019-04-10 PROCEDURE — G0493 RN CARE EA 15 MIN HH/HOSPICE: HCPCS

## 2019-04-10 ASSESSMENT — ENCOUNTER SYMPTOMS
VOMITING: DENIES
NAUSEA: DENIES

## 2019-04-17 ENCOUNTER — HOME CARE VISIT (OUTPATIENT)
Dept: HOME HEALTH SERVICES | Facility: HOME HEALTHCARE | Age: 84
End: 2019-04-17
Payer: MEDICARE

## 2019-04-17 PROCEDURE — G0299 HHS/HOSPICE OF RN EA 15 MIN: HCPCS

## 2019-04-22 VITALS
WEIGHT: 131 LBS | TEMPERATURE: 98.1 F | DIASTOLIC BLOOD PRESSURE: 50 MMHG | SYSTOLIC BLOOD PRESSURE: 116 MMHG | HEART RATE: 66 BPM | OXYGEN SATURATION: 99 % | RESPIRATION RATE: 19 BRPM | BODY MASS INDEX: 22.84 KG/M2

## 2019-04-22 SDOH — ECONOMIC STABILITY: HOUSING INSECURITY: EVIDENCE OF SMOKING MATERIAL: 0

## 2019-04-22 ASSESSMENT — ENCOUNTER SYMPTOMS: DIFFICULTY THINKING: 1

## 2019-04-23 ENCOUNTER — ANTICOAGULATION MONITORING (OUTPATIENT)
Dept: VASCULAR LAB | Facility: MEDICAL CENTER | Age: 84
End: 2019-04-23

## 2019-04-23 DIAGNOSIS — I48.20 CHRONIC ATRIAL FIBRILLATION (HCC): ICD-10-CM

## 2019-04-24 ENCOUNTER — HOME CARE VISIT (OUTPATIENT)
Dept: HOME HEALTH SERVICES | Facility: HOME HEALTHCARE | Age: 84
End: 2019-04-24
Payer: MEDICARE

## 2019-04-24 VITALS
HEART RATE: 80 BPM | SYSTOLIC BLOOD PRESSURE: 124 MMHG | OXYGEN SATURATION: 96 % | DIASTOLIC BLOOD PRESSURE: 70 MMHG | TEMPERATURE: 98.6 F | RESPIRATION RATE: 18 BRPM

## 2019-04-24 PROCEDURE — G0493 RN CARE EA 15 MIN HH/HOSPICE: HCPCS

## 2019-04-24 SDOH — ECONOMIC STABILITY: HOUSING INSECURITY: EVIDENCE OF SMOKING MATERIAL: 0

## 2019-04-24 ASSESSMENT — ACTIVITIES OF DAILY LIVING (ADL): OASIS_M1830: 02

## 2019-04-24 ASSESSMENT — PATIENT HEALTH QUESTIONNAIRE - PHQ9: CLINICAL INTERPRETATION OF PHQ2 SCORE: 0

## 2019-04-25 ASSESSMENT — ACTIVITIES OF DAILY LIVING (ADL): HOME_HEALTH_OASIS: 01

## 2019-05-02 ENCOUNTER — OFFICE VISIT (OUTPATIENT)
Dept: CARDIOLOGY | Facility: MEDICAL CENTER | Age: 84
End: 2019-05-02
Payer: MEDICARE

## 2019-05-02 VITALS
WEIGHT: 132 LBS | BODY MASS INDEX: 23.39 KG/M2 | OXYGEN SATURATION: 99 % | DIASTOLIC BLOOD PRESSURE: 80 MMHG | HEART RATE: 62 BPM | SYSTOLIC BLOOD PRESSURE: 136 MMHG | HEIGHT: 63 IN

## 2019-05-02 DIAGNOSIS — Z79.899 HIGH RISK MEDICATION USE: ICD-10-CM

## 2019-05-02 DIAGNOSIS — D50.8 IRON DEFICIENCY ANEMIA SECONDARY TO INADEQUATE DIETARY IRON INTAKE: ICD-10-CM

## 2019-05-02 DIAGNOSIS — D51.9 ANEMIA DUE TO VITAMIN B12 DEFICIENCY, UNSPECIFIED B12 DEFICIENCY TYPE: ICD-10-CM

## 2019-05-02 DIAGNOSIS — Z79.899 POLYPHARMACY: ICD-10-CM

## 2019-05-02 DIAGNOSIS — N18.30 CKD (CHRONIC KIDNEY DISEASE) STAGE 3, GFR 30-59 ML/MIN (HCC): ICD-10-CM

## 2019-05-02 DIAGNOSIS — I50.9 HEART FAILURE, NYHA CLASS 3 (HCC): ICD-10-CM

## 2019-05-02 DIAGNOSIS — Z95.0 CARDIAC PACEMAKER IN SITU: ICD-10-CM

## 2019-05-02 DIAGNOSIS — I50.30 (HFPEF) HEART FAILURE WITH PRESERVED EJECTION FRACTION (HCC): ICD-10-CM

## 2019-05-02 DIAGNOSIS — I49.5 SINOATRIAL NODE DYSFUNCTION (HCC): ICD-10-CM

## 2019-05-02 DIAGNOSIS — I27.20 PULMONARY HYPERTENSION (HCC): ICD-10-CM

## 2019-05-02 DIAGNOSIS — I48.20 CHRONIC ATRIAL FIBRILLATION (HCC): ICD-10-CM

## 2019-05-02 PROCEDURE — 99214 OFFICE O/P EST MOD 30 MIN: CPT | Performed by: PHYSICIAN ASSISTANT

## 2019-05-02 ASSESSMENT — ENCOUNTER SYMPTOMS
BLOOD IN STOOL: 0
PND: 0
ABDOMINAL PAIN: 0
WHEEZING: 0
COUGH: 0
WEAKNESS: 1
DIAPHORESIS: 0
WEIGHT LOSS: 0
SHORTNESS OF BREATH: 1
BRUISES/BLEEDS EASILY: 1
ORTHOPNEA: 0
SPUTUM PRODUCTION: 0
DIZZINESS: 0
CHILLS: 0
PALPITATIONS: 0
NAUSEA: 0
FEVER: 0

## 2019-05-02 NOTE — PROGRESS NOTES
Chief Complaint   Patient presents with   • Atrial Fibrillation       Subjective:   Andree Egan is a 87 y.o. female with chronic illnesses of ischemic cardiomyopathy, preserved ejection fraction, pulmonary hypertension, coronary artery disease, chronic atrial fibrillation who presents today for follow up    Patient of Dr. Simms. Last seen by myself 4 weeks ago.     Today she is feeling well. Reports shortness of breath after ambulating 30-40ft. Wears O2 at night. No orthopnea.  Has been more active and improved appetite.    Weights at home are stable 127lbs. Her caretaker Palma increases her diuretics as needed for weight gain.     Struggling with restless legs and muscle cramps lately.     Past Medical History:   Diagnosis Date   • Arthritis     back/neck   • Atrial fibrillation (CMS-HCC) [I48.91] 3/29/2018   • Back pain    • Blood clotting disorder (HCC)    • Breast cancer (HCC)    • Breath shortness     water retention   • Cancer (HCC)     breast   • Cataract    • Congestive heart failure (CHF) ([facility]) 4/3/2018   • Constipation    • Coronary heart disease    • Daytime sleepiness    • Diarrhea    • BERTRAND (dyspnea on exertion) 5/4/2018   • GERD (gastroesophageal reflux disease)    • Heart attack (HCC)    • Heart burn    • Heart valve disease    • Heartburn    • Hemorrhagic disorder (HCC)    • Hiatus hernia syndrome    • Hyperlipidemia    • Hypertension    • Hypothyroidism    • Kidney disease    • Long term current use of anticoagulant 3/29/2018   • Nasal drainage    • Osteoporosis    • Pacemaker 4/3/2018   • Pneumonia    • Pulmonary emphysema (HCC)    • Restless leg syndrome    • Rheumatoid arthritis (HCC)    • Ringing in ears    • Secondary hypertension 4/3/2018   • Shortness of breath    • Swelling of lower extremity    • Thyroid disease    • Tonsillitis    • Tremors of nervous system 4/3/2018   • Urinary incontinence    • Urinary tract infection    • Wears glasses      Past Surgical History:   Procedure  Laterality Date   • HIP NAILING INTRAMEDULLARY Left 12/29/2018    Procedure: HIP NAILING INTRAMEDULLARY;  Surgeon: Juan Daniel Zaragoza M.D.;  Location: SURGERY [facility];  Service: Orthopedics   • HYSTERECTOMY LAPAROSCOPY     • MASTECTOMY     • OTHER      left breast mastectomy   • OTHER CARDIAC SURGERY      pacemaker placement   • PACEMAKER INSERTION     • PB REMV 2ND CATARACT,CORN-SCLER SECTN       Family History   Problem Relation Age of Onset   • Lung Disease Mother    • Cancer Mother    • Heart Disease Mother    • No Known Problems Father    • Diabetes Brother    • Fainting Neg Hx      Social History     Social History   • Marital status: Single     Spouse name: N/A   • Number of children: N/A   • Years of education: N/A     Occupational History   • Not on file.     Social History Main Topics   • Smoking status: Former Smoker     Packs/day: 0.50     Types: Cigarettes     Quit date: 1/1/1970   • Smokeless tobacco: Never Used   • Alcohol use 1.8 oz/week     3 Glasses of wine per week      Comment: 1-2 glasses wine before dinner   • Drug use: No   • Sexual activity: Not on file     Other Topics Concern   • Not on file     Social History Narrative   • No narrative on file     Allergies   Allergen Reactions   • Codeine Vomiting     Outpatient Encounter Prescriptions as of 5/2/2019   Medication Sig Dispense Refill   • metOLazone (ZAROXOLYN) 2.5 MG Tab Take 2.5 mg by mouth every day.     • apixaban (ELIQUIS) 2.5mg Tab Take 2.5 mg by mouth 2 Times a Day.     • Home Care Oxygen Inhale 2 L/min by mouth every bedtime. via nasal cannula     • topiramate (TOPAMAX) 25 MG Tab Take 1 Tab by mouth 2 times a day. TAKE ONE TABLET BY MOUTH TWICE A DAY 60 Tab 6   • ROPINIRole (REQUIP) 0.5 MG Tab Take 1 Tab by mouth 2 Times a Day. 60 Tab 11   • omeprazole (PRILOSEC) 20 MG delayed-release capsule Take 1 Cap by mouth 2 times a day. 30 Cap 6   • fluticasone (FLOVENT HFA) 110 MCG/ACT Aerosol Inhale 1 Puff by mouth every day. 1  Inhaler 5   • clopidogrel (PLAVIX) 75 MG Tab Take 1 Tab by mouth every day. 30 Tab 6   • Calcium-Vitamin D 500-125 MG-UNIT Tab Take 1 tablet by mouth every morning. 600mg - 400 iu 30 Tab 6   • bumetanide (BUMEX) 1 MG Tab Take 1 Tab by mouth 2 Times a Day. (Patient taking differently: Take 2 mg by mouth every day.) 60 Tab 6   • ascorbic acid (VITAMIN C) 500 MG tablet Take 1 Tab by mouth every day. 30 Tab 6   • sertraline (ZOLOFT) 100 MG Tab Take 1 Tab by mouth every bedtime. 30 Tab 11   • ferrous sulfate 325 (65 Fe) MG tablet Take 1 Tab by mouth every 48 hours. 30 Tab 6   • potassium chloride SA (KDUR) 20 MEQ Tab CR Take 1 Tab by mouth 2 times a day. 60 Tab 11   • metoprolol SR (TOPROL XL) 25 MG TABLET SR 24 HR Take 1 Tab by mouth every morning. 30 Tab 11   • rosuvastatin (CRESTOR) 10 MG Tab Take 1 Tab by mouth every evening. 30 Tab 11   • cyanocobalamin (VITAMIN B12) 1000 MCG Tab Take 1 Tab by mouth every day. 30 Tab 3   • levothyroxine (SYNTHROID) 50 MCG Tab Take 1 Tab by mouth Every morning on an empty stomach. 90 Tab 3   • albuterol (PROAIR HFA) 108 (90 Base) MCG/ACT Aero Soln inhalation aerosol Inhale 2 Puffs by mouth every 6 hours as needed for Shortness of Breath. 8.5 g 4   • [DISCONTINUED] allopurinol (ZYLOPRIM) 100 MG Tab Take 1 Tab by mouth 2 times a day. (Patient not taking: Reported on 5/2/2019) 30 Tab 6   • acetaminophen (TYLENOL) 325 MG Tab Take 2 Tabs by mouth every 6 hours as needed. 30 Tab 0   • polyethylene glycol/lytes (MIRALAX) Pack Take 1 Packet by mouth 2 times a day as needed (if sennosides and/or docusate ineffective or not ordered).  3   • calcium carbonate (TUMS) 500 MG Chew Tab Take 1 Tab by mouth every four hours as needed (heartburn). 30 Tab 0     No facility-administered encounter medications on file as of 5/2/2019.      Review of Systems   Constitutional: Negative for chills, diaphoresis, fever, malaise/fatigue and weight loss.   Respiratory: Positive for shortness of breath. Negative  "for cough, sputum production and wheezing.    Cardiovascular: Positive for leg swelling. Negative for chest pain, palpitations, orthopnea and PND.   Gastrointestinal: Negative for abdominal pain, blood in stool, melena and nausea.   Genitourinary: Negative for hematuria.   Neurological: Positive for weakness (leg weakness). Negative for dizziness.   Endo/Heme/Allergies: Bruises/bleeds easily.        Objective:   /80 (BP Location: Left arm, Patient Position: Sitting, BP Cuff Size: Adult)   Pulse 62   Ht 1.6 m (5' 3\")   Wt 59.9 kg (132 lb)   LMP  (LMP Unknown)   SpO2 99%   BMI 23.38 kg/m²     Physical Exam   Constitutional: She is oriented to person, place, and time. She appears well-developed and well-nourished. No distress.   Elderly female, appears well   HENT:   Head: Normocephalic and atraumatic.   Eyes: Conjunctivae are normal. No scleral icterus.   Neck: Neck supple. No JVD (at 90degrees) present.   Cardiovascular: Normal rate, regular rhythm and intact distal pulses.    Murmur heard.  Pulmonary/Chest: Effort normal and breath sounds normal. No respiratory distress. She has no wheezes. She has no rales.   Abdominal: Soft. Bowel sounds are normal. She exhibits no distension. There is no tenderness.   Musculoskeletal: She exhibits edema (2+ lower 1/3 of tibia, left greater than right).   Neurological: She is alert and oriented to person, place, and time. No cranial nerve deficit.   Skin: Skin is warm and dry. She is not diaphoretic. No pallor.   Psychiatric: Judgment normal.   Vitals reviewed.    ECHO 5/19/18  Severe bi-atrial enlargement. Normal LV and RV systolic function. LVEF   is 55% by visual estimation. Diastolic function difficult to assess.   There is at least mild aortic stenosis but valve is opening. Mean   gradient is 10 mmHg, with V max of 2.01 m/s. RVSP is elevated at 70   mmHg suggesting pulmonary hypertension. No prior study is available for   comparison.     Tricuspid aortic valve. " Calcified aortic valve leaflets. Aortic valve   area calculated from the continuity equation is 1.1 cm². Vmax is 2.01   m/s. Transvalvular gradients are - Peak: 16 mmHg, Mean: 10  mmHg.   Dimensionless index is 0.41.     Transthoracic Echo 7/8/18  CONCLUSIONS  Prior done 05-.  Compared to prior study, the right-sided   chambers appear dilated.    Left ventricular systolic function is low normal.  Left ventricular ejection fraction is visually estimated to be 50-55%.  Abnormal septal motion consistent with right ventricular (RV) volume   overload and/or elevated RV end-diastolic pressure.  Dilated right ventricle with reduced systolic function.  Severe tricuspid regurgitation. Estimated right ventricular systolic   pressure  is 55 mmHg.  Dilated inferior vena cava without inspiratory collapse.     Left and Right Cardiac Catheterization 7/3/18   No evidence of aortic stenosis with 0 mmHg peak to peak gradient, mean gradient of 8 mmHg, calculated CHRISTIANE of 2.09 cm2.  2.  Coronary artery disease with high grade proximal and high grade mid left   anterior descending artery.  3.  Successful percutaneous transluminal coronary angioplasty/stent placement of the mid left anterior descending artery with 2.25x12 mm Synergy   drug-eluting stent.  4.  Successful percutaneous transluminal coronary angioplasty/stent placement of the proximal left anterior descending artery with 2.5x12 mm Synergy drug-eluting stent.  5.  Normal left ventricular systolic function with ejection fraction of 55%.  6.  Normal left ventricular end-diastolic pressure.  7.  Elevated right heart pressure with PA systolic pressure of 60 mmHg.  8.  Successful Angio-Seal closure.     HEMODYNAMIC DATA:  Hemodynamic data shows right heart pressures of RA 20 mmHg, RV 60/12 with RVEDP of 18 mmHg, PA 60/25 with mean of 38 mmHg.  Cardiac output by thermodilution 3.0, cardiac index 1.82  Assessment:     1. Chronic atrial fibrillation (HCC)     2. Cardiac  pacemaker in situ     3. Chronic diastolic CHF (congestive heart failure) ([facility])  Basic Metabolic Panel    MAGNESIUM   4. Polypharmacy     5. Sinoatrial node dysfunction ([facility])     6. CKD (chronic kidney disease) stage 3, GFR 30-59 ml/min ([facility])  Basic Metabolic Panel   7. Pulmonary hypertension ([facility])     8. Iron deficiency anemia secondary to inadequate dietary iron intake  VIT B12,  FOLIC ACID    CBC WITHOUT DIFFERENTIAL   9. Anemia due to vitamin B12 deficiency, unspecified B12 deficiency type  VIT B12,  FOLIC ACID    CBC WITHOUT DIFFERENTIAL   10. High risk medication use  Basic Metabolic Panel    MAGNESIUM       Medical Decision Making:  Today's Assessment / Status / Plan:   HFpEF, Stage C, Class II-III, LVEF 50%  Ischemic Cardiomyopathy  Pulmonary hypertension with reduced RV systolic function  -euvolemic today with chronic LE swelling  -  Bumex 2 mg once daily plus metolazone for 2-3 days for worsening swelling, weight gain, shortness of breath.  Her caretaker Palma agrees with plan.  - labs within 1 week  - potassium 20 mEq 2 times daily  -Toprol-XL 25 mg q. evening  -Reinforced s/sx of worsening heart failure with patient and weight monitoring. Pt verbalizes understanding. Pt to call office or RTC if present.      Aortic Stenosis, mild  -Should have echo 1-2 years from 7/2018     Coronary artery disease, s/p 2 BRITTON to mid and proximal left anterior descending arteryJuly 2018  -Plavix 75 mg, Eliquis 2.5mg BID  -Crestor 10 mg     Chronic atrial fibrillation, rate controlled  History of SSS s/p PPM  -[id number] 5  -Eliquis 2.5 mg twice daily  -Toprol-XL 25  - next pacer check 6/19     Anemia, multifactorial/Iron deficiency and B12  - symptoms of restless leg and weakness, will recheck CBC, B12/Folate  - B12 injections per PCP     Chronic kidney disease stage III  - stable, continue to monitor     COPD  - stable, continue daily inhaler and albuterol PRN     Plan: labs in 1 week. Follow up in 3mo, sooner if  needed. Confirm code status at next appointment    Collaborating MD: Dr. Durant

## 2019-05-03 ENCOUNTER — PATIENT OUTREACH (OUTPATIENT)
Dept: HEALTH INFORMATION MANAGEMENT | Facility: OTHER | Age: 84
End: 2019-05-03

## 2019-05-03 ENCOUNTER — HOSPITAL ENCOUNTER (OUTPATIENT)
Dept: LAB | Facility: MEDICAL CENTER | Age: 84
End: 2019-05-03
Attending: PHYSICIAN ASSISTANT
Payer: MEDICARE

## 2019-05-03 DIAGNOSIS — N18.30 CKD (CHRONIC KIDNEY DISEASE) STAGE 3, GFR 30-59 ML/MIN (HCC): ICD-10-CM

## 2019-05-03 DIAGNOSIS — D50.8 IRON DEFICIENCY ANEMIA SECONDARY TO INADEQUATE DIETARY IRON INTAKE: ICD-10-CM

## 2019-05-03 DIAGNOSIS — Z79.899 HIGH RISK MEDICATION USE: ICD-10-CM

## 2019-05-03 DIAGNOSIS — D51.9 ANEMIA DUE TO VITAMIN B12 DEFICIENCY, UNSPECIFIED B12 DEFICIENCY TYPE: ICD-10-CM

## 2019-05-03 LAB
ANION GAP SERPL CALC-SCNC: 10 MMOL/L (ref 0–11.9)
BUN SERPL-MCNC: 40 MG/DL (ref 8–22)
CALCIUM SERPL-MCNC: 9.1 MG/DL (ref 8.5–10.5)
CHLORIDE SERPL-SCNC: 106 MMOL/L (ref 96–112)
CO2 SERPL-SCNC: 26 MMOL/L (ref 20–33)
CREAT SERPL-MCNC: 1.43 MG/DL (ref 0.5–1.4)
ERYTHROCYTE [DISTWIDTH] IN BLOOD BY AUTOMATED COUNT: 59 FL (ref 35.9–50)
FOLATE SERPL-MCNC: 21.4 NG/ML
GLUCOSE SERPL-MCNC: 84 MG/DL (ref 65–99)
HCT VFR BLD AUTO: 34.4 % (ref 37–47)
HGB BLD-MCNC: 10.1 G/DL (ref 12–16)
MAGNESIUM SERPL-MCNC: 2.5 MG/DL (ref 1.5–2.5)
MCH RBC QN AUTO: 28.4 PG (ref 27–33)
MCHC RBC AUTO-ENTMCNC: 29.4 G/DL (ref 33.6–35)
MCV RBC AUTO: 96.6 FL (ref 81.4–97.8)
PLATELET # BLD AUTO: 140 K/UL (ref 164–446)
PMV BLD AUTO: 11.2 FL (ref 9–12.9)
POTASSIUM SERPL-SCNC: 3.9 MMOL/L (ref 3.6–5.5)
RBC # BLD AUTO: 3.56 M/UL (ref 4.2–5.4)
SODIUM SERPL-SCNC: 142 MMOL/L (ref 135–145)
VIT B12 SERPL-MCNC: >1500 PG/ML (ref 211–911)
WBC # BLD AUTO: 4.1 K/UL (ref 4.8–10.8)

## 2019-05-03 PROCEDURE — 82746 ASSAY OF FOLIC ACID SERUM: CPT

## 2019-05-03 PROCEDURE — 83735 ASSAY OF MAGNESIUM: CPT

## 2019-05-03 PROCEDURE — 82607 VITAMIN B-12: CPT

## 2019-05-03 PROCEDURE — 36415 COLL VENOUS BLD VENIPUNCTURE: CPT

## 2019-05-03 PROCEDURE — 85027 COMPLETE CBC AUTOMATED: CPT

## 2019-05-03 PROCEDURE — 80048 BASIC METABOLIC PNL TOTAL CA: CPT

## 2019-05-07 ENCOUNTER — TELEPHONE (OUTPATIENT)
Dept: CARDIOLOGY | Facility: MEDICAL CENTER | Age: 84
End: 2019-05-07

## 2019-05-07 NOTE — TELEPHONE ENCOUNTER
"Message   Received: 3 days ago   Message Contents   INA Mc R.N.             Tried to call Palma about Meliza's lab results over the weekend, left message to call office on Monday.  Meliza's creatinine is slightly elevated, please ensure that she is adequately hydrating, avoiding any NSAIDs.  Palma has been using Bumex 2 mg twice daily and metolazone needed please ensure metolazone is being used judiciously.   Thanks   JARED      Palma called. Discussed above with her. Strongly encouraged 2L fluid intake. She states she is jesus if she gets Meliza to drink 16 ozs per day. She states she is \"very fickle\". She will enjoy drinking something for awhile and then give up. Palma confirms she is using the metolazone and little as possible. Palma is very appreciative of the call.   "

## 2019-05-08 ENCOUNTER — PATIENT OUTREACH (OUTPATIENT)
Dept: HEALTH INFORMATION MANAGEMENT | Facility: OTHER | Age: 84
End: 2019-05-08

## 2019-05-14 ENCOUNTER — DOCUMENTATION (OUTPATIENT)
Dept: HEALTH INFORMATION MANAGEMENT | Facility: OTHER | Age: 84
End: 2019-05-14

## 2019-05-15 DIAGNOSIS — Z79.01 CHRONIC ANTICOAGULATION: ICD-10-CM

## 2019-05-15 NOTE — PROGRESS NOTES
SBAR Documentation: Situation, Background, Assessment, Recommendation     Situation    Discharged from SNF after left hip nailing   Background       S/P left hip nailing due to femur fracture, anemia, chronic A-fib, Chronic diastolic CHF, COPD, CAD, CKD stage 3, GERD    Assessment    Outbound call to Palma, Andree's grand-daughter.  Palma states Andree is doing well and was discharged from [facility] last month.  Palma states Andree valdovinos water retention daily.  She states Andree weighs herself daily and they call the cardiologist for weight gain of 3 pounds in 1 day.  Palma states she is able to use My Chart to communicate with her mothers providers.     Recommendation CCM RN and Palma discussed the [facility] program, and both agree that Andree does not need to enroll in the program at this time as Elisa feels comfortable providing 24 hour care for Andree.  CCM RN will complete referral at this time.

## 2019-05-22 ENCOUNTER — TELEPHONE (OUTPATIENT)
Dept: CARDIOLOGY | Facility: MEDICAL CENTER | Age: 84
End: 2019-05-22

## 2019-05-22 NOTE — TELEPHONE ENCOUNTER
[facility]   Received: Today      Call patient   Message Contents   Nadira YAMILE Blackburn, Med Ass't  Cecilia Hodge, R.N.   Phone Number: [phone]             Patient would like to speak with RN or RUPERTO today regarding her mothers labs taken by [facility] before patient ends up back in the hospital.     Thanks      Returned call. Ron from [facility] texted labs to Palma, granddaughter. K+ 2.7  Creatinine 1.7 ER advised. Pt refuses. Metolazone given x 6 days. Discussed caution with giving that much without calling us first to increase potassium dosing. She will give 40 meq tonight. She will give 40 meq in AM and will be seen by APRN tomorrow at 1:20 pm to assess for increased need for diuretics. Palma states they can barely get her to drink 16 ozs of water. ER precautions repeated. Discussed potassium dosing with MD remaining in office.

## 2019-05-23 ENCOUNTER — OFFICE VISIT (OUTPATIENT)
Dept: CARDIOLOGY | Facility: MEDICAL CENTER | Age: 84
End: 2019-05-23
Payer: MEDICARE

## 2019-05-23 VITALS
DIASTOLIC BLOOD PRESSURE: 64 MMHG | SYSTOLIC BLOOD PRESSURE: 122 MMHG | RESPIRATION RATE: 18 BRPM | HEIGHT: 64 IN | WEIGHT: 134.8 LBS | HEART RATE: 78 BPM | BODY MASS INDEX: 23.01 KG/M2 | OXYGEN SATURATION: 97 %

## 2019-05-23 DIAGNOSIS — I25.10 CORONARY ARTERY DISEASE WITHOUT ANGINA PECTORIS, UNSPECIFIED VESSEL OR LESION TYPE, UNSPECIFIED WHETHER NATIVE OR TRANSPLANTED HEART: ICD-10-CM

## 2019-05-23 DIAGNOSIS — N18.30 CKD (CHRONIC KIDNEY DISEASE) STAGE 3, GFR 30-59 ML/MIN (HCC): ICD-10-CM

## 2019-05-23 DIAGNOSIS — I48.20 CHRONIC ATRIAL FIBRILLATION (HCC): ICD-10-CM

## 2019-05-23 DIAGNOSIS — E87.6 HYPOKALEMIA: ICD-10-CM

## 2019-05-23 DIAGNOSIS — Z79.01 LONG TERM CURRENT USE OF ANTICOAGULANT: ICD-10-CM

## 2019-05-23 DIAGNOSIS — I50.30 ACC/AHA STAGE C HEART FAILURE WITH PRESERVED EJECTION FRACTION (HCC): ICD-10-CM

## 2019-05-23 DIAGNOSIS — Z95.0 CARDIAC PACEMAKER IN SITU: ICD-10-CM

## 2019-05-23 DIAGNOSIS — I50.9 HEART FAILURE, NYHA CLASS 2 (HCC): ICD-10-CM

## 2019-05-23 PROCEDURE — 99214 OFFICE O/P EST MOD 30 MIN: CPT | Performed by: NURSE PRACTITIONER

## 2019-05-23 RX ORDER — BUMETANIDE 2 MG/1
2 TABLET ORAL 2 TIMES DAILY
Qty: 60 TAB | Refills: 11 | Status: SHIPPED | OUTPATIENT
Start: 2019-05-23 | End: 2019-05-30

## 2019-05-23 RX ORDER — POTASSIUM CHLORIDE 20 MEQ/1
20 TABLET, EXTENDED RELEASE ORAL
Qty: 30 TAB | Refills: 11 | Status: SHIPPED | OUTPATIENT
Start: 2019-05-23 | End: 2019-06-11

## 2019-05-23 RX ORDER — POTASSIUM CHLORIDE 20 MEQ/1
40 TABLET, EXTENDED RELEASE ORAL 2 TIMES DAILY
Qty: 60 TAB | Refills: 11 | Status: SHIPPED | OUTPATIENT
Start: 2019-05-23 | End: 2019-05-30

## 2019-05-23 ASSESSMENT — ENCOUNTER SYMPTOMS
SHORTNESS OF BREATH: 1
ABDOMINAL PAIN: 0
COUGH: 0
ORTHOPNEA: 0
FEVER: 0
MYALGIAS: 0
PALPITATIONS: 1
PND: 0
INSOMNIA: 1
CLAUDICATION: 0
DIZZINESS: 0

## 2019-05-23 ASSESSMENT — PAIN SCALES - GENERAL: PAINLEVEL: NO PAIN

## 2019-05-23 NOTE — PATIENT INSTRUCTIONS
Increase Bumex to 2 mg twice a day  Increase Potassium to 40 mEq twice a day  Take metolazone 2.5 mg sparingly as needed and with additional 20 mEq of potassium  Labs tomorrow or early next week

## 2019-05-23 NOTE — PROGRESS NOTES
Chief Complaint   Patient presents with   • Abnormal Labs     per [facility]-see RN Note from 5/22       Subjective:   Andree Egan is a 87 y.o. female who presents today for follow-up on her heart failure with her granddaughter, Shena.    Patient of Dr. Simms.  Patient was last seen in clinic on 5/2/2019.  During her last visit, No changes were made to he medical regimen.    Patient's granddaughter called the clinic yesterday reporting that community paramedic ran labs on patient and found that her potassium level was 2.7, creatinine was 1.7.  They recommend patient to go to the ER.  Patient refusing to go.  Granddaughter reported that patient had taken metolazone 2.5 mg on Th, Fri, Sat, held dose on Sunday then resumed Metolazone 2.5 mg on Mon, Tues, And Wed.  She was not taking any extra potassium supplements.  Patient was recommended to take 40 mEq of potassium last night, this morning.      Patient's granddaughter in the office reports that her weights increased 2 to 3 pounds if she does not take the metolazone.  Today patient reports she is weighing 131 pounds at home.  Her baseline weight has been around 127-129 pounds.    For her symptoms, patient reports occasional palpitations, and occasional episodes of shortness of breath with ADLs and exertion.  She has not noticed significant fatigue, chest pain, orthopnea, PND or dizziness/lightheadedness.  She does have lower extremity edema in which she feels has slightly worsened.    Patient also reports trouble sleeping and restless legs.    Additonally, patient has the following medical problems:     -Hospitalization from 7/24/2018-7/28/2018 is for heart failure.      -Hospitalization from 7/8/2018-7/11/2018 for heart failure exacerbation. Pt had a thoracentesis (failed 1st attempt and 2nd attempt successful) for a right Pleural effusion.      -Chronic atrial fibrillation: Rate controlled, taking warfarin, followed by the anticoagulation clinic     -Has permanent  pacemaker     -Aortic stenosis     -Pulmonary HTN     -Hypertension     -Hyperlipidemia     -Kidney disease: has an upcoming appointment with Neph.     -Hypothyroidism: Taking levothyroxine.     -COPD, using inhalers and 2 L oxygen at night     -History of breast cancer    Past Medical History:   Diagnosis Date   • Arthritis     back/neck   • Atrial fibrillation (CMS-HCC) [I48.91] 3/29/2018   • Back pain    • Blood clotting disorder (HCC)    • Breast cancer (HCC)    • Breath shortness     water retention   • Cancer (HCC)     breast   • Cataract    • Congestive heart failure (CHF) (HCC) 4/3/2018   • Constipation    • Coronary heart disease    • Daytime sleepiness    • Diarrhea    • BERTRAND (dyspnea on exertion) 5/4/2018   • GERD (gastroesophageal reflux disease)    • Heart attack (HCC)    • Heart burn    • Heart valve disease    • Heartburn    • Hemorrhagic disorder (HCC)    • Hiatus hernia syndrome    • Hyperlipidemia    • Hypertension    • Hypothyroidism    • Kidney disease    • Long term current use of anticoagulant 3/29/2018   • Nasal drainage    • Osteoporosis    • Pacemaker 4/3/2018   • Pneumonia    • Pulmonary emphysema (HCC)    • Restless leg syndrome    • Rheumatoid arthritis (HCC)    • Ringing in ears    • Secondary hypertension 4/3/2018   • Shortness of breath    • Swelling of lower extremity    • Thyroid disease    • Tonsillitis    • Tremors of nervous system 4/3/2018   • Urinary incontinence    • Urinary tract infection    • Wears glasses      Past Surgical History:   Procedure Laterality Date   • HIP NAILING INTRAMEDULLARY Left 12/29/2018    Procedure: HIP NAILING INTRAMEDULLARY;  Surgeon: Juan Daniel Zaragoza M.D.;  Location: SURGERY [facility];  Service: Orthopedics   • HYSTERECTOMY LAPAROSCOPY     • MASTECTOMY     • OTHER      left breast mastectomy   • OTHER CARDIAC SURGERY      pacemaker placement   • PACEMAKER INSERTION     • PB REMV 2ND CATARACT,CORN-SCLER SECTN       Family History   Problem  Relation Age of Onset   • Lung Disease Mother    • Cancer Mother    • Heart Disease Mother    • No Known Problems Father    • Diabetes Brother    • Fainting Neg Hx      Social History     Social History   • Marital status: Single     Spouse name: N/A   • Number of children: N/A   • Years of education: N/A     Occupational History   • Not on file.     Social History Main Topics   • Smoking status: Former Smoker     Packs/day: 0.50     Types: Cigarettes     Quit date: 1/1/1970   • Smokeless tobacco: Never Used   • Alcohol use 1.8 oz/week     3 Glasses of wine per week      Comment: 1-2 glasses wine before dinner   • Drug use: No   • Sexual activity: Not on file     Other Topics Concern   • Not on file     Social History Narrative   • No narrative on file     Allergies   Allergen Reactions   • Codeine Vomiting     Outpatient Encounter Prescriptions as of 5/23/2019   Medication Sig Dispense Refill   • Home Care Oxygen Inhale 2 L/min by mouth every bedtime. via nasal cannula     • topiramate (TOPAMAX) 25 MG Tab Take 1 Tab by mouth 2 times a day. TAKE ONE TABLET BY MOUTH TWICE A DAY 60 Tab 6   • ROPINIRole (REQUIP) 0.5 MG Tab Take 1 Tab by mouth 2 Times a Day. 60 Tab 11   • omeprazole (PRILOSEC) 20 MG delayed-release capsule Take 1 Cap by mouth 2 times a day. 30 Cap 6   • clopidogrel (PLAVIX) 75 MG Tab Take 1 Tab by mouth every day. 30 Tab 6   • Calcium-Vitamin D 500-125 MG-UNIT Tab Take 1 tablet by mouth every morning. 600mg - 400 iu 30 Tab 6   • bumetanide (BUMEX) 1 MG Tab Take 1 Tab by mouth 2 Times a Day. (Patient taking differently: Take 2 mg by mouth every day.) 60 Tab 6   • ascorbic acid (VITAMIN C) 500 MG tablet Take 1 Tab by mouth every day. 30 Tab 6   • sertraline (ZOLOFT) 100 MG Tab Take 1 Tab by mouth every bedtime. 30 Tab 11   • ferrous sulfate 325 (65 Fe) MG tablet Take 1 Tab by mouth every 48 hours. 30 Tab 6   • acetaminophen (TYLENOL) 325 MG Tab Take 2 Tabs by mouth every 6 hours as needed. 30 Tab 0   •  potassium chloride SA (KDUR) 20 MEQ Tab CR Take 1 Tab by mouth 2 times a day. 60 Tab 11   • metoprolol SR (TOPROL XL) 25 MG TABLET SR 24 HR Take 1 Tab by mouth every morning. 30 Tab 11   • rosuvastatin (CRESTOR) 10 MG Tab Take 1 Tab by mouth every evening. 30 Tab 11   • cyanocobalamin (VITAMIN B12) 1000 MCG Tab Take 1 Tab by mouth every day. 30 Tab 3   • levothyroxine (SYNTHROID) 50 MCG Tab Take 1 Tab by mouth Every morning on an empty stomach. 90 Tab 3   • metOLazone (ZAROXOLYN) 2.5 MG Tab Take 2.5 mg by mouth every day.     • apixaban (ELIQUIS) 2.5mg Tab Take 2.5 mg by mouth 2 Times a Day.     • fluticasone (FLOVENT HFA) 110 MCG/ACT Aerosol Inhale 1 Puff by mouth every day. (Patient not taking: Reported on 5/23/2019) 1 Inhaler 5   • albuterol (PROAIR HFA) 108 (90 Base) MCG/ACT Aero Soln inhalation aerosol Inhale 2 Puffs by mouth every 6 hours as needed for Shortness of Breath. 8.5 g 4   • polyethylene glycol/lytes (MIRALAX) Pack Take 1 Packet by mouth 2 times a day as needed (if sennosides and/or docusate ineffective or not ordered). (Patient not taking: Reported on 5/23/2019)  3   • calcium carbonate (TUMS) 500 MG Chew Tab Take 1 Tab by mouth every four hours as needed (heartburn). 30 Tab 0     No facility-administered encounter medications on file as of 5/23/2019.      Review of Systems   Constitutional: Negative for fever and malaise/fatigue.   Respiratory: Positive for shortness of breath (comes and goes). Negative for cough.    Cardiovascular: Positive for palpitations (occ) and leg swelling. Negative for chest pain, orthopnea, claudication and PND.   Gastrointestinal: Negative for abdominal pain.   Musculoskeletal: Negative for myalgias.   Neurological: Negative for dizziness.   Psychiatric/Behavioral: The patient has insomnia.    All other systems reviewed and are negative.       Objective:   /64 (BP Location: Right arm, Patient Position: Sitting, BP Cuff Size: Adult)   Pulse 78   Resp 18   Ht  "1.613 m (5' 3.5\")   Wt 61.1 kg (134 lb 12.8 oz)   LMP  (LMP Unknown)   SpO2 97%   BMI 23.50 kg/m²     Physical Exam   Constitutional: She is oriented to person, place, and time. She appears well-developed and well-nourished.   Using a wheelchair today   HENT:   Head: Normocephalic and atraumatic.   Eyes: Pupils are equal, round, and reactive to light. EOM are normal.   Neck: Normal range of motion. Neck supple. JVD present.   Cardiovascular: Normal rate and regular rhythm.    Murmur heard.  Pulmonary/Chest: Effort normal. No respiratory distress. She has no wheezes. She has rales.   Abdominal: Soft. Bowel sounds are normal.   Musculoskeletal: She exhibits edema (Bilateral 2+ lower extremity edema).   Neurological: She is alert and oriented to person, place, and time.   Skin: Skin is warm and dry.   Psychiatric: She has a normal mood and affect. Her behavior is normal.   Vitals reviewed.    Lab Results   Component Value Date/Time    CHOLSTRLTOT 67 (L) 07/09/2018 02:22 AM    LDL 17 07/09/2018 02:22 AM    HDL 40 07/09/2018 02:22 AM    TRIGLYCERIDE 50 07/09/2018 02:22 AM       Lab Results   Component Value Date/Time    SODIUM 142 05/03/2019 11:54 AM    POTASSIUM 3.9 05/03/2019 11:54 AM    CHLORIDE 106 05/03/2019 11:54 AM    CO2 26 05/03/2019 11:54 AM    GLUCOSE 84 05/03/2019 11:54 AM    BUN 40 (H) 05/03/2019 11:54 AM    CREATININE 1.43 (H) 05/03/2019 11:54 AM     Lab Results   Component Value Date/Time    ALKPHOSPHAT 115 (H) 12/29/2018 05:01 AM    ASTSGOT 19 12/29/2018 05:01 AM    ALTSGPT 15 12/29/2018 05:01 AM    TBILIRUBIN 0.8 12/29/2018 05:01 AM      Transthoracic Echo Report 5/19/18  Severe bi-atrial enlargement. Normal LV and RV systolic function. LVEF is 55% by visual estimation. Diastolic function difficult to assess.   There is at least mild aortic stenosis but valve is opening. Mean   gradient is 10 mmHg, with V max of 2.01 m/s. RVSP is elevated at 70   mmHg suggesting pulmonary hypertension. No prior study " is available for   comparison.      Heart Cath 7/2/2018   POSTPROCEDURE DIAGNOSES:  1.  No evidence of aortic stenosis with 0 mmHg peak to peak gradient, mean gradient   of 8 mmHg, calculated CHRISTIANE of 2.09 cm2.  2.  Coronary artery disease with high grade proximal and high grade mid left   anterior descending artery.  3.  Successful percutaneous transluminal coronary angioplasty/stent placement   of the mid left anterior descending artery with 2.25x12 mm Synergy   drug-eluting stent.  4.  Successful percutaneous transluminal coronary angioplasty/stent placement   of the proximal left anterior descending artery with 2.5x12 mm Synergy   drug-eluting stent.  5.  Normal left ventricular systolic function with ejection fraction of 55%.  6.  Normal left ventricular end-diastolic pressure.  7.  Elevated right heart pressure with PA systolic pressure of 60 mmHg.  8.  Successful Angio-Seal closure.     Transthoracic Echo Report 7/8/2018  Prior done 05-.  Compared to prior study, the right-sided chambers appear dilated.    Left ventricular systolic function is low normal.  Left ventricular ejection fraction is visually estimated to be 50-55%.  Abnormal septal motion consistent with right ventricular (RV) volume overload and/or elevated RV end-diastolic pressure.  Dilated right ventricle with reduced systolic function.  Severe tricuspid regurgitation. Estimated right ventricular systolic pressure  is 55 mmHg.  Dilated inferior vena cava without inspiratory collapse     Ultrasound of neck 10/29/2018  Palpable lump in the posterior right neck corresponds to a small lymph node.      Assessment:     1. ACC/AHA stage C heart failure with preserved ejection fraction (HCC)  potassium chloride SA (KDUR) 20 MEQ Tab CR    potassium chloride SA (KDUR) 20 MEQ Tab CR    Basic Metabolic Panel   2. Heart failure, NYHA class 2 (HCC)  potassium chloride SA (KDUR) 20 MEQ Tab CR    potassium chloride SA (KDUR) 20 MEQ Tab CR    Basic  Metabolic Panel   3. CKD (chronic kidney disease) stage 3, GFR 30-59 ml/min ([facility])  potassium chloride SA (KDUR) 20 MEQ Tab CR    potassium chloride SA (KDUR) 20 MEQ Tab CR    Basic Metabolic Panel   4. Hypokalemia  potassium chloride SA (KDUR) 20 MEQ Tab CR    potassium chloride SA (KDUR) 20 MEQ Tab CR    Basic Metabolic Panel   5. Chronic atrial fibrillation (HCC)     6. Cardiac pacemaker in situ     7. Long term current use of anticoagulant     8. Coronary artery disease without angina pectoris, unspecified vessel or lesion type, unspecified whether native or transplanted heart         Medical Decision Making:  Today's Assessment / Status / Plan:   1. HFpEF, Stage C, Class 2, LVEF 50-55%: Patient is volume overloaded today  -Increase Bumex to 2 mg twice a day  -Increase potassium to 40 mEq twice a day  -Recommend using metolazone 2.5 mg PRN recommend using sparingly (1-2 times per week).  Patient to take an extra 20 mEq of potassium with metolazone PRN  -Repeat BMP tomorrow or early next week  -Patient continues to be followed by the community paramedic program  -Reinforced s/sx of worsening heart failure with patient and weight monitoring. Pt verbalizes understanding. Pt to call office or RTC if present.     2.  Hypokalemia: Potassium 2.7 yesterday per community paramedic  -Increase potassium per above  -Labs tomorrow or early next week    3.  A. fib: Rate controlled  -Continue Toprol-XL 25 mg daily  -Continue Eliquis 2.5 mg twice a day  -Next device check 6/17/2019    4.  CAD, s/p BRITTON x2:  -Continue rosuvastatin 10 mg daily  -Continue clopidogrel 75 mg daily    5. CKD, due to contrast-induced nephropathy:  -Recommendations per above  -Continue to follow-up with nephrology    Discussed with patient and granddaughter to discuss increasing Requip with her primary care provider at her next visit.    FU in clinic in 3 weeks with Dr. Simms. Sooner if needed.    Patient verbalizes understanding and agrees with the plan  of care.     Collaborating MD: Allan Cano MD     [phone]

## 2019-05-28 ENCOUNTER — HOSPITAL ENCOUNTER (OUTPATIENT)
Dept: LAB | Facility: MEDICAL CENTER | Age: 84
End: 2019-05-28
Attending: NURSE PRACTITIONER
Payer: MEDICARE

## 2019-05-28 DIAGNOSIS — N18.30 CKD (CHRONIC KIDNEY DISEASE) STAGE 3, GFR 30-59 ML/MIN (HCC): ICD-10-CM

## 2019-05-28 DIAGNOSIS — I50.30 ACC/AHA STAGE C HEART FAILURE WITH PRESERVED EJECTION FRACTION (HCC): ICD-10-CM

## 2019-05-28 DIAGNOSIS — I50.9 HEART FAILURE, NYHA CLASS 2 (HCC): ICD-10-CM

## 2019-05-28 DIAGNOSIS — E87.6 HYPOKALEMIA: ICD-10-CM

## 2019-05-28 LAB
ANION GAP SERPL CALC-SCNC: 15 MMOL/L (ref 0–11.9)
BUN SERPL-MCNC: 59 MG/DL (ref 8–22)
CALCIUM SERPL-MCNC: 9 MG/DL (ref 8.5–10.5)
CHLORIDE SERPL-SCNC: 102 MMOL/L (ref 96–112)
CO2 SERPL-SCNC: 24 MMOL/L (ref 20–33)
CREAT SERPL-MCNC: 1.75 MG/DL (ref 0.5–1.4)
FASTING STATUS PATIENT QL REPORTED: NORMAL
GLUCOSE SERPL-MCNC: 92 MG/DL (ref 65–99)
POTASSIUM SERPL-SCNC: 4.5 MMOL/L (ref 3.6–5.5)
SODIUM SERPL-SCNC: 141 MMOL/L (ref 135–145)

## 2019-05-28 PROCEDURE — 80048 BASIC METABOLIC PNL TOTAL CA: CPT

## 2019-05-28 PROCEDURE — 36415 COLL VENOUS BLD VENIPUNCTURE: CPT

## 2019-05-30 ENCOUNTER — TELEPHONE (OUTPATIENT)
Dept: CARDIOLOGY | Facility: MEDICAL CENTER | Age: 84
End: 2019-05-30

## 2019-05-30 DIAGNOSIS — N18.30 CKD (CHRONIC KIDNEY DISEASE) STAGE 3, GFR 30-59 ML/MIN (HCC): ICD-10-CM

## 2019-05-30 DIAGNOSIS — E87.6 HYPOKALEMIA: ICD-10-CM

## 2019-05-30 DIAGNOSIS — I50.30 ACC/AHA STAGE C HEART FAILURE WITH PRESERVED EJECTION FRACTION (HCC): ICD-10-CM

## 2019-05-30 DIAGNOSIS — I50.9 HEART FAILURE, NYHA CLASS 2 (HCC): ICD-10-CM

## 2019-05-30 DIAGNOSIS — I50.30 (HFPEF) HEART FAILURE WITH PRESERVED EJECTION FRACTION (HCC): ICD-10-CM

## 2019-05-30 RX ORDER — POTASSIUM CHLORIDE 20 MEQ/1
20 TABLET, EXTENDED RELEASE ORAL 2 TIMES DAILY
Qty: 30 TAB | Refills: 11 | Status: SHIPPED | OUTPATIENT
Start: 2019-05-30 | End: 2019-06-11 | Stop reason: SDUPTHER

## 2019-05-30 RX ORDER — BUMETANIDE 1 MG/1
1 TABLET ORAL 2 TIMES DAILY
Qty: 60 TAB | Refills: 11 | Status: SHIPPED | OUTPATIENT
Start: 2019-05-30 | End: 2019-06-17

## 2019-05-30 RX ORDER — METOLAZONE 2.5 MG/1
2.5 TABLET ORAL
Qty: 15 TAB | Refills: 11 | Status: SHIPPED | OUTPATIENT
Start: 2019-05-30

## 2019-05-30 NOTE — TELEPHONE ENCOUNTER
Notes recorded by Karina Lopez on 5/30/2019 at 10:13 AM PDT  Please let patient or granddaughter know that patient's kidney function has worsened slightly, potassium is normal. Let's try reducing bumex back down to 1 mg twice a day. Reduce potassium to 20 mEq twice a day. Please stay hydrated, sip water through the day. I wioll order a repeat lab to be done next week. Keep an eye on weights may need to go back up on the bumex. Use metolazone sparingly, 1-2 times per week.    =========================================================================    Palma called. Advised as above. She states pt is 129 lbs today. She is concerned about med changes as patient seems to be doing well on current dose. Reassured to call and advise us of any changes that concern her and we will titrate as needed. She is appreciative. Med rec changes made.

## 2019-06-02 ENCOUNTER — TELEPHONE (OUTPATIENT)
Dept: CARDIOLOGY | Facility: MEDICAL CENTER | Age: 84
End: 2019-06-02

## 2019-06-02 NOTE — TELEPHONE ENCOUNTER
Telephone call with Palma the patient's granddaughter.  Yesterday she was instructed to change Bumex to 1 mg twice a day with KCl 20 mEq twice daily.  She has not taken metolazone in several days.  She has gained 5 pounds in the last 2 days.    Per Dr Gaitan, patient will take an extra 1 mg of Bumex now and 20 mEq of potassium.  She will obtain BMP first thing in the morning.  She will call for instructions on Bumex dosing a few hours after obtaining lab.

## 2019-06-03 ENCOUNTER — TELEPHONE (OUTPATIENT)
Dept: CARDIOLOGY | Facility: MEDICAL CENTER | Age: 84
End: 2019-06-03

## 2019-06-03 ENCOUNTER — HOSPITAL ENCOUNTER (OUTPATIENT)
Dept: LAB | Facility: MEDICAL CENTER | Age: 84
End: 2019-06-03
Attending: NURSE PRACTITIONER
Payer: MEDICARE

## 2019-06-03 DIAGNOSIS — I50.30 (HFPEF) HEART FAILURE WITH PRESERVED EJECTION FRACTION (HCC): ICD-10-CM

## 2019-06-03 DIAGNOSIS — I50.32 CHRONIC DIASTOLIC CHF (CONGESTIVE HEART FAILURE) (HCC): ICD-10-CM

## 2019-06-03 DIAGNOSIS — Z79.899 HIGH RISK MEDICATION USE: ICD-10-CM

## 2019-06-03 DIAGNOSIS — E87.1 HYPONATREMIA: ICD-10-CM

## 2019-06-03 DIAGNOSIS — I27.20 PULMONARY HYPERTENSION (HCC): ICD-10-CM

## 2019-06-03 DIAGNOSIS — Z79.899 POLYPHARMACY: ICD-10-CM

## 2019-06-03 DIAGNOSIS — N18.30 CKD (CHRONIC KIDNEY DISEASE) STAGE 3, GFR 30-59 ML/MIN (HCC): ICD-10-CM

## 2019-06-03 LAB
ANION GAP SERPL CALC-SCNC: 10 MMOL/L (ref 0–11.9)
BUN SERPL-MCNC: 54 MG/DL (ref 8–22)
CALCIUM SERPL-MCNC: 8.6 MG/DL (ref 8.5–10.5)
CHLORIDE SERPL-SCNC: 101 MMOL/L (ref 96–112)
CO2 SERPL-SCNC: 29 MMOL/L (ref 20–33)
CREAT SERPL-MCNC: 1.42 MG/DL (ref 0.5–1.4)
GLUCOSE SERPL-MCNC: 75 MG/DL (ref 65–99)
POTASSIUM SERPL-SCNC: 3.5 MMOL/L (ref 3.6–5.5)
SODIUM SERPL-SCNC: 140 MMOL/L (ref 135–145)

## 2019-06-03 PROCEDURE — 36415 COLL VENOUS BLD VENIPUNCTURE: CPT

## 2019-06-03 PROCEDURE — 80048 BASIC METABOLIC PNL TOTAL CA: CPT

## 2019-06-04 NOTE — TELEPHONE ENCOUNTER
Labs obtained, APRN reviewed.    Discussed plan of care. Continue Bumex 1 mg BID with potassium. Take one extra 1mg Bumex as needed for weight gain greater than 3 lb in one day of 5 lb in one week with potassium also.    Palma called and reviewed. She is agreeable to that plan. She states no gain in 2 days. Current weight 134 lbs today. Repeat BMP before 6/17 appt advised. Also reassured to call if she felt patient needs to be seen sooner than 6/17. She states she may do so secondary to her concern re: SOB and fatigue. We discuss hydration and dietary intake and she says they tell her all day long but it gets tiring but at the same time they are equally concerned about her.    She says patient has odd eating habits secondaryy to her inability to taste. Such as putting ranch on fruit, etc.

## 2019-06-05 ENCOUNTER — TELEPHONE (OUTPATIENT)
Dept: CARDIOLOGY | Facility: MEDICAL CENTER | Age: 84
End: 2019-06-05

## 2019-06-05 ENCOUNTER — TELEPHONE (OUTPATIENT)
Dept: MEDICAL GROUP | Facility: MEDICAL CENTER | Age: 84
End: 2019-06-05

## 2019-06-05 NOTE — TELEPHONE ENCOUNTER
ESTABLISHED PATIENT PRE-VISIT PLANNING     Patient was contacted to complete PVP.     Note: Patient will not be contacted if there is no indication to call.     1.  Reviewed notes from the last few office visits within the medical group: Yes    2.  If any orders were placed at last visit or intended to be done for this visit (i.e. 6 mos follow-up), do we have Results/Consult Notes?        •  Labs - Most recent labs ordered by different provider, completed by pt on 6/3/19   Note: If patient appointment is for lab review and patient did not complete labs, check with provider if OK to reschedule patient until labs completed.       •  Imaging - Imaging was not ordered at last office visit.       •  Referrals - No referrals were ordered at last office visit.    3. Is this appointment scheduled as a Hospital Follow-Up? No    4.  Immunizations were updated in Epic using WebIZ?: Epic matches WebIZ       •  Web Iz Recommendations: PNEUMOVAX (PPSV23), TDAP, VARICELLA (Chicken Pox)  and SHINGRIX (Shingles)    5.  Patient is due for the following Health Maintenance Topics:   Health Maintenance Due   Topic Date Due   • Annual Wellness Visit  04/21/1932   • PAP SMEAR  04/21/1953   • MAMMOGRAM  04/21/1972   • COLONOSCOPY  04/21/1982   • IMM ZOSTER VACCINES (1 of 2) 04/21/1982   • BONE DENSITY  04/21/1997       - Patient declines Annual Wellness Visit (AWV) and Pap.  - Pt states she will go over the mammo, colonoscopy and bone density with PCP during appt.  6. Orders for overdue Health Maintenance topics pended in Pre-Charting? NO    7.  AHA (MDX) form printed for Provider? No, already completed    8.  Patient was informed to arrive 15 min prior to their scheduled appointment and bring in their medication bottles.

## 2019-06-05 NOTE — TELEPHONE ENCOUNTER
"Palma calls.  attempts to assist. She would like to discuss medications. Weight increased 2 pounds. Labored breathing \"all the time\". JENNY coming this afternoon. Scheduled for a quick 20 min with MAGDY tomorrow for a touch to attempt to keep patient out of hospital per patients wishes. She will give additional Bumex 1 mg now and we will await JENNY assessment information later today. Palma is comfortable with this plan of care and we will more than likely touch base again later today after JENNY is there to visit patient.     JENNY contacted. Taiwo is off. Will contact Ron next.   "
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
## 2019-06-06 ENCOUNTER — TELEPHONE (OUTPATIENT)
Dept: CARDIOLOGY | Facility: MEDICAL CENTER | Age: 84
End: 2019-06-06

## 2019-06-06 ENCOUNTER — OFFICE VISIT (OUTPATIENT)
Dept: MEDICAL GROUP | Facility: MEDICAL CENTER | Age: 84
End: 2019-06-06
Payer: MEDICARE

## 2019-06-06 VITALS
HEIGHT: 64 IN | HEART RATE: 67 BPM | BODY MASS INDEX: 22.88 KG/M2 | DIASTOLIC BLOOD PRESSURE: 62 MMHG | WEIGHT: 134 LBS | SYSTOLIC BLOOD PRESSURE: 120 MMHG | TEMPERATURE: 97.6 F | OXYGEN SATURATION: 98 % | RESPIRATION RATE: 16 BRPM

## 2019-06-06 DIAGNOSIS — D51.0 PERNICIOUS ANEMIA: ICD-10-CM

## 2019-06-06 DIAGNOSIS — N18.30 CKD (CHRONIC KIDNEY DISEASE) STAGE 3, GFR 30-59 ML/MIN: ICD-10-CM

## 2019-06-06 DIAGNOSIS — R53.82 CHRONIC FATIGUE: ICD-10-CM

## 2019-06-06 DIAGNOSIS — G25.81 RESTLESS LEGS SYNDROME (RLS): ICD-10-CM

## 2019-06-06 DIAGNOSIS — D50.8 IRON DEFICIENCY ANEMIA SECONDARY TO INADEQUATE DIETARY IRON INTAKE: ICD-10-CM

## 2019-06-06 PROCEDURE — 99214 OFFICE O/P EST MOD 30 MIN: CPT | Performed by: NURSE PRACTITIONER

## 2019-06-06 RX ORDER — CYANOCOBALAMIN 1000 UG/ML
1000 INJECTION, SOLUTION INTRAMUSCULAR; SUBCUTANEOUS
OUTPATIENT
Start: 2019-06-06

## 2019-06-06 RX ORDER — ROPINIROLE 1 MG/1
1 TABLET, FILM COATED ORAL 3 TIMES DAILY
Qty: 90 TAB | Refills: 4 | Status: SHIPPED | OUTPATIENT
Start: 2019-06-06

## 2019-06-06 RX ORDER — FERROUS SULFATE 325(65) MG
TABLET ORAL
Qty: 90 TAB | Refills: 3 | Status: SHIPPED | OUTPATIENT
Start: 2019-06-06

## 2019-06-06 RX ADMIN — CYANOCOBALAMIN 1000 MCG: 1000 INJECTION, SOLUTION INTRAMUSCULAR; SUBCUTANEOUS at 11:29

## 2019-06-06 NOTE — TELEPHONE ENCOUNTER
Pt missed appt today. Discussed [facility] labs with APRN.    Have patient increase Bumex to 2 mg in AM and 1 mg PM.  2 potassium in AM and 1 in PM.    Discussed [facility] on 6/12 at 2 PM.     Encouraged labs on Monday AM.     Palma states today's missed appt was my fault as I told her 2 pm.

## 2019-06-06 NOTE — ASSESSMENT & PLAN NOTE
Taking Ferrous sulfate 325 mg once every other day, which was started in march after labs were done. Her Hgb has dropped from 11 to 10 since then. Continues having significant RLS.

## 2019-06-06 NOTE — ASSESSMENT & PLAN NOTE
Patient continues receiving Vit B12 injections monthly as well as taking oral B complex daily. Patient's daughters report that her fatigue and memory significant decline at about the 3 week betina after getting her injection.     Last B12 level was >1500. No anemia currently. Requesting injection today.

## 2019-06-06 NOTE — ASSESSMENT & PLAN NOTE
Started on requip 0.25mg TID in January, symptoms have not improved. Previously tried on gabapentin which caused weakness and confusion. Increased dose to 0.5 mg twice daily in March 2019, this helped improve symptoms, however they return about 6 hours after medication is taken. She takes first dose in the morning, symptoms return around 2:00 pm, she takes her nightly dose 1-2 hours before bedtime, she wakes around 2:00am with worsening symptoms and is unable to fall back asleep as she is very uncomfortable.

## 2019-06-06 NOTE — PROGRESS NOTES
Subjective:   Andree Egan is a 87 y.o. female here today for follow up:    Restless legs syndrome (RLS)  Started on requip 0.25mg TID in January, symptoms have not improved. Previously tried on gabapentin which caused weakness and confusion. Increased dose to 0.5 mg twice daily in March 2019, this helped improve symptoms, however they return about 6 hours after medication is taken. She takes first dose in the morning, symptoms return around 2:00 pm, she takes her nightly dose 1-2 hours before bedtime, she wakes around 2:00am with worsening symptoms and is unable to fall back asleep as she is very uncomfortable.     Pernicious anemia  Patient continues receiving Vit B12 injections monthly as well as taking oral B complex daily. Patient's daughters report that her fatigue and memory significant decline at about the 3 week betina after getting her injection.     Last B12 level was >1500. No anemia currently. Requesting injection today.     Iron deficiency anemia  Taking Ferrous sulfate 325 mg once every other day, which was started in march after labs were done. Her Hgb has dropped from 11 to 10 since then. Continues having significant RLS.        Current medicines (including changes today)  Current Outpatient Prescriptions   Medication Sig Dispense Refill   • ROPINIRole (REQUIP) 1 MG Tab Take 1 Tab by mouth 3 times a day. 90 Tab 4   • metOLazone (ZAROXOLYN) 2.5 MG Tab Take 1 Tab by mouth 1 time daily as needed. Max of 1-2 doses weekly 15 Tab 11   • potassium chloride SA (KDUR) 20 MEQ Tab CR Take 1 Tab by mouth 2 times a day. 30 Tab 11   • bumetanide (BUMEX) 1 MG Tab Take 1 Tab by mouth 2 times a day. 60 Tab 11   • potassium chloride SA (KDUR) 20 MEQ Tab CR Take 1 Tab by mouth 1 time daily as needed (take with Metolazone). 30 Tab 11   • apixaban (ELIQUIS) 2.5mg Tab Take 2.5 mg by mouth 2 Times a Day.     • Home Care Oxygen Inhale 2 L/min by mouth every bedtime. via nasal cannula     • topiramate (TOPAMAX) 25 MG Tab  Take 1 Tab by mouth 2 times a day. TAKE ONE TABLET BY MOUTH TWICE A DAY 60 Tab 6   • omeprazole (PRILOSEC) 20 MG delayed-release capsule Take 1 Cap by mouth 2 times a day. 30 Cap 6   • fluticasone (FLOVENT HFA) 110 MCG/ACT Aerosol Inhale 1 Puff by mouth every day. (Patient not taking: Reported on 5/23/2019) 1 Inhaler 5   • clopidogrel (PLAVIX) 75 MG Tab Take 1 Tab by mouth every day. 30 Tab 6   • Calcium-Vitamin D 500-125 MG-UNIT Tab Take 1 tablet by mouth every morning. 600mg - 400 iu 30 Tab 6   • ascorbic acid (VITAMIN C) 500 MG tablet Take 1 Tab by mouth every day. 30 Tab 6   • albuterol (PROAIR HFA) 108 (90 Base) MCG/ACT Aero Soln inhalation aerosol Inhale 2 Puffs by mouth every 6 hours as needed for Shortness of Breath. 8.5 g 4   • sertraline (ZOLOFT) 100 MG Tab Take 1 Tab by mouth every bedtime. 30 Tab 11   • ferrous sulfate 325 (65 Fe) MG tablet Take 1 Tab by mouth every 48 hours. 30 Tab 6   • acetaminophen (TYLENOL) 325 MG Tab Take 2 Tabs by mouth every 6 hours as needed. 30 Tab 0   • metoprolol SR (TOPROL XL) 25 MG TABLET SR 24 HR Take 1 Tab by mouth every morning. 30 Tab 11   • calcium carbonate (TUMS) 500 MG Chew Tab Take 1 Tab by mouth every four hours as needed (heartburn). 30 Tab 0   • rosuvastatin (CRESTOR) 10 MG Tab Take 1 Tab by mouth every evening. 30 Tab 11   • cyanocobalamin (VITAMIN B12) 1000 MCG Tab Take 1 Tab by mouth every day. 30 Tab 3   • levothyroxine (SYNTHROID) 50 MCG Tab Take 1 Tab by mouth Every morning on an empty stomach. 90 Tab 3     Current Facility-Administered Medications   Medication Dose Route Frequency Provider Last Rate Last Dose   • cyanocobalamin (VITAMIN B-12) injection 1,000 mcg  1,000 mcg Intramuscular Q30 DAYS Yudelka Sanchez A.P.R.NRiki   1,000 mcg at 06/06/19 1129     She  has a past medical history of Arthritis; Atrial fibrillation (CMS-HCC) [I48.91] (3/29/2018); Back pain; Blood clotting disorder (HCC); Breast cancer (HCC); Breath shortness; Cancer (HCC); Cataract;  "Congestive heart failure (CHF) ([facility]) (4/3/2018); Constipation; Coronary heart disease; Daytime sleepiness; Diarrhea; BERTRAND (dyspnea on exertion) (5/4/2018); GERD (gastroesophageal reflux disease); Heart attack ([facility]); Heart burn; Heart valve disease; Heartburn; Hemorrhagic disorder ([facility]); Hiatus hernia syndrome; Hyperlipidemia; Hypertension; Hypothyroidism; Kidney disease; Long term current use of anticoagulant (3/29/2018); Nasal drainage; Osteoporosis; Pacemaker (4/3/2018); Pneumonia; Pulmonary emphysema ([facility]); Restless leg syndrome; Rheumatoid arthritis ([facility]); Ringing in ears; Secondary hypertension (4/3/2018); Shortness of breath; Swelling of lower extremity; Thyroid disease; Tonsillitis; Tremors of nervous system (4/3/2018); Urinary incontinence; Urinary tract infection; and Wears glasses.    ROS   No chest pain, no shortness of breath, no abdominal pain  Positive ROS as per HPI.  All other systems reviewed and are negative.     Objective:     /62 (BP Location: Right arm, Patient Position: Sitting, BP Cuff Size: Adult)   Pulse 67   Temp 36.4 °C (97.6 °F) (Temporal)   Resp 16   Ht 1.613 m (5' 3.5\")   Wt 60.8 kg (134 lb)   SpO2 98%  Body mass index is 23.36 kg/m².     Physical Exam:  Constitutional: Alert, no distress.  Skin: Warm, dry, good turgor, no rashes in visible areas.  Eye: Equal, round and reactive, conjunctiva clear, lids normal.  ENMT: Lips, good dentition, oropharynx clear.  Neck: Trachea midline  Respiratory: Unlabored respiratory effort, BLL fine crackles  Cardiovascular: Normal S1, S2, no murmur, no edema.  Psych: Alert and oriented x3, normal affect and mood.      Assessment and Plan:   The following treatment plan was discussed    1. Restless legs syndrome (RLS)  Unstable  Increase iron supplement to twice daily every other day taken with Vit C.   Recheck iron studies  - ROPINIRole (REQUIP) 1 MG Tab; Take 1 Tab by mouth 3 times a day.  Dispense: 90 Tab; Refill: 4    2. Pernicious " anemia  Stable  Continue monthly injections and oral supplementation  - cyanocobalamin (VITAMIN B-12) injection 1,000 mcg; 1 mL by Intramuscular route every 30 days.    3. Chronic fatigue  Unstable  Continue injections  - cyanocobalamin (VITAMIN B-12) injection 1,000 mcg; 1 mL by Intramuscular route every 30 days.    4. Iron Deficiency Anemia  Unstable  Increase supplement to twice daily QOD with vit C  Recheck labs      Followup: Return in about 4 weeks (around 7/4/2019).    I have placed the below orders and discussed them with an approved delegating provider. The MA is performing the below orders under the direction of Dr. Wang

## 2019-06-07 ENCOUNTER — TELEPHONE (OUTPATIENT)
Dept: MEDICAL GROUP | Facility: MEDICAL CENTER | Age: 84
End: 2019-06-07

## 2019-06-07 NOTE — TELEPHONE ENCOUNTER
Please call and notify patient's daughter that I would like Andree to increase her iron supplement to twice daily, every other day, instead of once daily every other day. I would also like her to take her iron with a vitamin C containing drink (like juice). I have also ordered labs for her to recheck her iron levels before our follow up appointment.     MARIJA Patel.

## 2019-06-10 ENCOUNTER — HOSPITAL ENCOUNTER (OUTPATIENT)
Dept: LAB | Facility: MEDICAL CENTER | Age: 84
End: 2019-06-10
Attending: NURSE PRACTITIONER
Payer: MEDICARE

## 2019-06-10 DIAGNOSIS — D50.8 IRON DEFICIENCY ANEMIA SECONDARY TO INADEQUATE DIETARY IRON INTAKE: ICD-10-CM

## 2019-06-10 PROCEDURE — 36415 COLL VENOUS BLD VENIPUNCTURE: CPT

## 2019-06-10 PROCEDURE — 82728 ASSAY OF FERRITIN: CPT

## 2019-06-10 PROCEDURE — 83540 ASSAY OF IRON: CPT

## 2019-06-10 PROCEDURE — 83550 IRON BINDING TEST: CPT

## 2019-06-11 ENCOUNTER — OFFICE VISIT (OUTPATIENT)
Dept: CARDIOLOGY | Facility: MEDICAL CENTER | Age: 84
End: 2019-06-11
Payer: MEDICARE

## 2019-06-11 VITALS
BODY MASS INDEX: 24.41 KG/M2 | HEART RATE: 72 BPM | OXYGEN SATURATION: 98 % | DIASTOLIC BLOOD PRESSURE: 60 MMHG | HEIGHT: 64 IN | WEIGHT: 143 LBS | SYSTOLIC BLOOD PRESSURE: 126 MMHG

## 2019-06-11 DIAGNOSIS — I25.10 CORONARY ARTERY DISEASE WITHOUT ANGINA PECTORIS, UNSPECIFIED VESSEL OR LESION TYPE, UNSPECIFIED WHETHER NATIVE OR TRANSPLANTED HEART: ICD-10-CM

## 2019-06-11 DIAGNOSIS — I48.20 CHRONIC ATRIAL FIBRILLATION (HCC): ICD-10-CM

## 2019-06-11 DIAGNOSIS — I27.20 PULMONARY HYPERTENSION (HCC): ICD-10-CM

## 2019-06-11 DIAGNOSIS — E87.6 HYPOKALEMIA: ICD-10-CM

## 2019-06-11 DIAGNOSIS — N18.30 CKD (CHRONIC KIDNEY DISEASE) STAGE 3, GFR 30-59 ML/MIN (HCC): ICD-10-CM

## 2019-06-11 DIAGNOSIS — Z95.0 CARDIAC PACEMAKER IN SITU: ICD-10-CM

## 2019-06-11 DIAGNOSIS — Z79.899 HIGH RISK MEDICATION USE: ICD-10-CM

## 2019-06-11 DIAGNOSIS — Z66 DNR (DO NOT RESUSCITATE): ICD-10-CM

## 2019-06-11 DIAGNOSIS — D51.0 PERNICIOUS ANEMIA: ICD-10-CM

## 2019-06-11 DIAGNOSIS — I50.9 HEART FAILURE, NYHA CLASS 2 (HCC): ICD-10-CM

## 2019-06-11 DIAGNOSIS — I50.32 CHRONIC DIASTOLIC CHF (CONGESTIVE HEART FAILURE) (HCC): ICD-10-CM

## 2019-06-11 DIAGNOSIS — I50.30 ACC/AHA STAGE C HEART FAILURE WITH PRESERVED EJECTION FRACTION (HCC): ICD-10-CM

## 2019-06-11 DIAGNOSIS — R60.0 BILATERAL LOWER EXTREMITY EDEMA: ICD-10-CM

## 2019-06-11 DIAGNOSIS — I25.10 ASCVD (ARTERIOSCLEROTIC CARDIOVASCULAR DISEASE): ICD-10-CM

## 2019-06-11 DIAGNOSIS — D50.8 IRON DEFICIENCY ANEMIA SECONDARY TO INADEQUATE DIETARY IRON INTAKE: ICD-10-CM

## 2019-06-11 DIAGNOSIS — I35.0 NONRHEUMATIC AORTIC VALVE STENOSIS: ICD-10-CM

## 2019-06-11 DIAGNOSIS — I50.32 CHRONIC DIASTOLIC CONGESTIVE HEART FAILURE (HCC): ICD-10-CM

## 2019-06-11 LAB
FERRITIN SERPL-MCNC: 30 NG/ML (ref 10–291)
IRON SATN MFR SERPL: 16 % (ref 15–55)
IRON SERPL-MCNC: 66 UG/DL (ref 40–170)
TIBC SERPL-MCNC: 409 UG/DL (ref 250–450)

## 2019-06-11 PROCEDURE — 99215 OFFICE O/P EST HI 40 MIN: CPT | Performed by: INTERNAL MEDICINE

## 2019-06-11 RX ORDER — POTASSIUM CHLORIDE 20 MEQ/1
40 TABLET, EXTENDED RELEASE ORAL 2 TIMES DAILY
Qty: 120 TAB | Refills: 11 | Status: ON HOLD | OUTPATIENT
Start: 2019-06-11 | End: 2019-06-27

## 2019-06-11 ASSESSMENT — ENCOUNTER SYMPTOMS
CHILLS: 0
SHORTNESS OF BREATH: 1
FEVER: 0
EYES NEGATIVE: 1
NEUROLOGICAL NEGATIVE: 1
CONSTITUTIONAL NEGATIVE: 1
MUSCULOSKELETAL NEGATIVE: 1
DIZZINESS: 0
PND: 0
SORE THROAT: 0
GASTROINTESTINAL NEGATIVE: 1
SPUTUM PRODUCTION: 0
WHEEZING: 0
CLAUDICATION: 0
LOSS OF CONSCIOUSNESS: 0
BRUISES/BLEEDS EASILY: 0
STRIDOR: 0
HEMOPTYSIS: 0
WEAKNESS: 0
ORTHOPNEA: 0
PALPITATIONS: 0
COUGH: 0

## 2019-06-11 NOTE — LETTER
[facility]   [address] [place] [address]  [place] [address]  Phone: [phone]  Fax: [phone]              Andree Egan  4/21/1932    Encounter Date: 6/11/2019    Jose Simms M.D.          PROGRESS NOTE:  Chief Complaint   Patient presents with   • Atrial Fibrillation       Subjective:   Andree Egan is a 87 y.o. female who presents today as a follow-up for lower extremity edema.  She has been putting on approximately 10 pounds last 13 days.  She was visited by the paramedics who gave her 40 mg of IV Lasix who noted no changes in her weight.  She takes 2 mg of Bumex in the morning 1 mg in the afternoon.  Her creatinine is been worse but is getting worse again.  She continues on Plavix and the apixaban.  She is having no chest pain.  She is having shortness of breath and lower extremity edema.  Her blood pressure is at goal.    Past Medical History:   Diagnosis Date   • Anemia    • Arthritis     back/neck   • Atrial fibrillation (CMS-HCC) [I48.91] 3/29/2018   • Back pain    • Blood clotting disorder (HCC)    • Breast cancer (HCC)    • Breath shortness     water retention   • Cancer (HCC)     breast   • Cataract    • Congestive heart failure (CHF) ([facility]) 4/3/2018   • Constipation    • Coronary heart disease    • Daytime sleepiness    • Diarrhea    • BERTRAND (dyspnea on exertion) 5/4/2018   • GERD (gastroesophageal reflux disease)    • Heart attack ([facility])    • Heart burn    • Heart valve disease    • Heartburn    • Hemorrhagic disorder ([facility])    • Hiatus hernia syndrome    • Hyperlipidemia    • Hypertension    • Hypothyroidism    • Kidney disease    • Long term current use of anticoagulant 3/29/2018   • Nasal drainage    • Osteoporosis    • Pacemaker 4/3/2018   • Pneumonia    • Pulmonary emphysema (HCC)    • Restless leg syndrome    • Rheumatoid arthritis (HCC)    • Ringing in ears    • Secondary hypertension 4/3/2018   • Shortness of breath    • Swelling of  lower extremity    • Thyroid disease    • Tonsillitis    • Tremors of nervous system 4/3/2018   • Urinary incontinence    • Urinary tract infection    • Wears glasses      Past Surgical History:   Procedure Laterality Date   • HIP NAILING INTRAMEDULLARY Left 12/29/2018    Procedure: HIP NAILING INTRAMEDULLARY;  Surgeon: Juan Daniel Zaragoza M.D.;  Location: SURGERY [facility];  Service: Orthopedics   • HYSTERECTOMY LAPAROSCOPY     • MASTECTOMY     • OTHER      left breast mastectomy   • OTHER CARDIAC SURGERY      pacemaker placement   • PACEMAKER INSERTION     • PB REMV 2ND CATARACT,CORN-SCLER SECTN       Family History   Problem Relation Age of Onset   • Lung Disease Mother    • Cancer Mother    • Heart Disease Mother    • No Known Problems Father    • Diabetes Brother    • Fainting Neg Hx      Social History     Social History   • Marital status: Single     Spouse name: N/A   • Number of children: N/A   • Years of education: N/A     Occupational History   • Not on file.     Social History Main Topics   • Smoking status: Former Smoker     Packs/day: 0.50     Types: Cigarettes     Quit date: 1/1/1970   • Smokeless tobacco: Never Used   • Alcohol use 1.8 oz/week     3 Glasses of wine per week      Comment: 1-2 glasses wine before dinner   • Drug use: No   • Sexual activity: Not on file     Other Topics Concern   • Not on file     Social History Narrative   • No narrative on file     Allergies   Allergen Reactions   • Codeine Vomiting     Outpatient Encounter Prescriptions as of 6/11/2019   Medication Sig Dispense Refill   • potassium chloride SA (KDUR) 20 MEQ Tab CR Take 2 Tabs by mouth 2 times a day. 120 Tab 11   • ROPINIRole (REQUIP) 1 MG Tab Take 1 Tab by mouth 3 times a day. 90 Tab 4   • ferrous sulfate 325 (65 Fe) MG tablet Take one tablet by mouth twice daily with food, every other day. 90 Tab 3   • metOLazone (ZAROXOLYN) 2.5 MG Tab Take 1 Tab by mouth 1 time daily as needed. Max of 1-2 doses weekly 15 Tab  11   • bumetanide (BUMEX) 1 MG Tab Take 1 Tab by mouth 2 times a day. 60 Tab 11   • apixaban (ELIQUIS) 2.5mg Tab Take 2.5 mg by mouth 2 Times a Day.     • Home Care Oxygen Inhale 2 L/min by mouth every bedtime. via nasal cannula     • topiramate (TOPAMAX) 25 MG Tab Take 1 Tab by mouth 2 times a day. TAKE ONE TABLET BY MOUTH TWICE A DAY 60 Tab 6   • omeprazole (PRILOSEC) 20 MG delayed-release capsule Take 1 Cap by mouth 2 times a day. 30 Cap 6   • fluticasone (FLOVENT HFA) 110 MCG/ACT Aerosol Inhale 1 Puff by mouth every day. 1 Inhaler 5   • clopidogrel (PLAVIX) 75 MG Tab Take 1 Tab by mouth every day. 30 Tab 6   • Calcium-Vitamin D 500-125 MG-UNIT Tab Take 1 tablet by mouth every morning. 600mg - 400 iu 30 Tab 6   • ascorbic acid (VITAMIN C) 500 MG tablet Take 1 Tab by mouth every day. 30 Tab 6   • sertraline (ZOLOFT) 100 MG Tab Take 1 Tab by mouth every bedtime. 30 Tab 11   • metoprolol SR (TOPROL XL) 25 MG TABLET SR 24 HR Take 1 Tab by mouth every morning. 30 Tab 11   • rosuvastatin (CRESTOR) 10 MG Tab Take 1 Tab by mouth every evening. 30 Tab 11   • cyanocobalamin (VITAMIN B12) 1000 MCG Tab Take 1 Tab by mouth every day. 30 Tab 3   • levothyroxine (SYNTHROID) 50 MCG Tab Take 1 Tab by mouth Every morning on an empty stomach. 90 Tab 3   • [DISCONTINUED] potassium chloride SA (KDUR) 20 MEQ Tab CR Take 1 Tab by mouth 2 times a day. 30 Tab 11   • [DISCONTINUED] potassium chloride SA (KDUR) 20 MEQ Tab CR Take 1 Tab by mouth 1 time daily as needed (take with Metolazone). (Patient not taking: Reported on 6/11/2019) 30 Tab 11   • albuterol (PROAIR HFA) 108 (90 Base) MCG/ACT Aero Soln inhalation aerosol Inhale 2 Puffs by mouth every 6 hours as needed for Shortness of Breath. 8.5 g 4   • acetaminophen (TYLENOL) 325 MG Tab Take 2 Tabs by mouth every 6 hours as needed. 30 Tab 0   • calcium carbonate (TUMS) 500 MG Chew Tab Take 1 Tab by mouth every four hours as needed (heartburn). 30 Tab 0     Facility-Administered Encounter  "Medications as of 6/11/2019   Medication Dose Route Frequency Provider Last Rate Last Dose   • cyanocobalamin (VITAMIN B-12) injection 1,000 mcg  1,000 mcg Intramuscular Q30 DAYS ELIZABETH Patel   1,000 mcg at 06/06/19 1129     Review of Systems   Constitutional: Negative.  Negative for chills, fever and malaise/fatigue.   HENT: Negative.  Negative for sore throat.    Eyes: Negative.    Respiratory: Positive for shortness of breath. Negative for cough, hemoptysis, sputum production, wheezing and stridor.    Cardiovascular: Positive for leg swelling. Negative for chest pain, palpitations, orthopnea, claudication and PND.   Gastrointestinal: Negative.    Genitourinary: Negative.    Musculoskeletal: Negative.    Skin: Negative.    Neurological: Negative.  Negative for dizziness, loss of consciousness and weakness.   Endo/Heme/Allergies: Negative.  Does not bruise/bleed easily.   All other systems reviewed and are negative.       Objective:   /60 (BP Location: Left arm, Patient Position: Sitting, BP Cuff Size: Adult)   Pulse 72   Ht 1.613 m (5' 3.5\")   Wt 64.9 kg (143 lb)   LMP  (LMP Unknown)   SpO2 98%   BMI 24.93 kg/m²      Physical Exam   Constitutional: She appears well-developed and well-nourished. No distress.   HENT:   Head: Normocephalic and atraumatic.   Right Ear: External ear normal.   Left Ear: External ear normal.   Nose: Nose normal.   Mouth/Throat: No oropharyngeal exudate.   Eyes: Pupils are equal, round, and reactive to light. Conjunctivae and EOM are normal. Right eye exhibits no discharge. Left eye exhibits no discharge. No scleral icterus.   Neck: Neck supple. No JVD present.   Cardiovascular: Normal rate, regular rhythm and intact distal pulses.  Exam reveals no gallop and no friction rub.    No murmur heard.  Pulmonary/Chest: Effort normal. No stridor. No respiratory distress. She has no wheezes. She has no rales. She exhibits no tenderness.   Abdominal: Soft. She exhibits no " distension. There is no guarding.   Musculoskeletal: Normal range of motion. She exhibits no edema, tenderness or deformity.   Neurological: She is alert. She has normal reflexes. She displays normal reflexes. No cranial nerve deficit. She exhibits normal muscle tone. Coordination normal.   Skin: Skin is warm and dry. No rash noted. She is not diaphoretic. No erythema. No pallor.   Psychiatric: She has a normal mood and affect. Her behavior is normal. Judgment and thought content normal.   Nursing note and vitals reviewed.      Assessment:     1. High risk medication use     2. Iron deficiency anemia secondary to inadequate dietary iron intake     3. Nonrheumatic aortic valve stenosis     4. Pernicious anemia     5. Pulmonary hypertension ([facility])     6. CKD (chronic kidney disease) stage 3, GFR 30-59 ml/min ([facility])  potassium chloride SA (KDUR) 20 MEQ Tab CR   7. Chronic diastolic congestive heart failure ([facility])  Basic Metabolic Panel   8. Chronic diastolic CHF (congestive heart failure) ([facility])     9. Chronic atrial fibrillation ([facility])     10. Cardiac pacemaker in situ     11. ASCVD (arteriosclerotic cardiovascular disease)     12. Bilateral lower extremity edema     13. Coronary artery disease without angina pectoris, unspecified vessel or lesion type, unspecified whether native or transplanted heart     14. DNR (do not resuscitate)     15. ACC/AHA stage C heart failure with preserved ejection fraction ([facility])  potassium chloride SA (KDUR) 20 MEQ Tab CR   16. Hypokalemia  potassium chloride SA (KDUR) 20 MEQ Tab CR   17. Heart failure, NYHA class 2 ([facility])  potassium chloride SA (KDUR) 20 MEQ Tab CR       Medical Decision Making:  Today's Assessment / Status / Plan:     87-year-old female with a weight gain in the setting of chronic kidney disease and some diastolic dysfunction.  Of asked her to double her Bumex dosing.  I would like her to be taking 3 mg of Bumex in the morning and 3 mg in the afternoon.  She will do this for  period of 1 week.  We will also double her hold dose of potassium.  I would like to see her back in 1 week with labs prior.  I did discuss with her the indication for hospitalization given her weight gain which she would like to stay at the hospital at this point.  I think we can consider this.  Otherwise we will see her back in 1 week.    Thank for you allowing me to take part in your patient's care, please call should you have any questions or would like to discuss this patient.      Yudelka Sanchez, CARA.P.R.N.  [address] [facility] [address]  [facility] [id number]  VIA In Basket

## 2019-06-11 NOTE — PROGRESS NOTES
Chief Complaint   Patient presents with   • Atrial Fibrillation       Subjective:   Andree Egan is a 87 y.o. female who presents today as a follow-up for lower extremity edema.  She has been putting on approximately 10 pounds last 13 days.  She was visited by the paramedics who gave her 40 mg of IV Lasix who noted no changes in her weight.  She takes 2 mg of Bumex in the morning 1 mg in the afternoon.  Her creatinine is been worse but is getting worse again.  She continues on Plavix and the apixaban.  She is having no chest pain.  She is having shortness of breath and lower extremity edema.  Her blood pressure is at goal.    Past Medical History:   Diagnosis Date   • Anemia    • Arthritis     back/neck   • Atrial fibrillation (CMS-HCC) [I48.91] 3/29/2018   • Back pain    • Blood clotting disorder (HCC)    • Breast cancer (HCC)    • Breath shortness     water retention   • Cancer (HCC)     breast   • Cataract    • Congestive heart failure (CHF) ([facility]) 4/3/2018   • Constipation    • Coronary heart disease    • Daytime sleepiness    • Diarrhea    • BERTRAND (dyspnea on exertion) 5/4/2018   • GERD (gastroesophageal reflux disease)    • Heart attack ([facility])    • Heart burn    • Heart valve disease    • Heartburn    • Hemorrhagic disorder (HCC)    • Hiatus hernia syndrome    • Hyperlipidemia    • Hypertension    • Hypothyroidism    • Kidney disease    • Long term current use of anticoagulant 3/29/2018   • Nasal drainage    • Osteoporosis    • Pacemaker 4/3/2018   • Pneumonia    • Pulmonary emphysema (HCC)    • Restless leg syndrome    • Rheumatoid arthritis (HCC)    • Ringing in ears    • Secondary hypertension 4/3/2018   • Shortness of breath    • Swelling of lower extremity    • Thyroid disease    • Tonsillitis    • Tremors of nervous system 4/3/2018   • Urinary incontinence    • Urinary tract infection    • Wears glasses      Past Surgical History:   Procedure Laterality Date   • HIP NAILING INTRAMEDULLARY Left 12/29/2018     Procedure: HIP NAILING INTRAMEDULLARY;  Surgeon: Juan Daniel Zaragoza M.D.;  Location: SURGERY [facility];  Service: Orthopedics   • HYSTERECTOMY LAPAROSCOPY     • MASTECTOMY     • OTHER      left breast mastectomy   • OTHER CARDIAC SURGERY      pacemaker placement   • PACEMAKER INSERTION     • PB REMV 2ND CATARACT,CORN-SCLER SECTN       Family History   Problem Relation Age of Onset   • Lung Disease Mother    • Cancer Mother    • Heart Disease Mother    • No Known Problems Father    • Diabetes Brother    • Fainting Neg Hx      Social History     Social History   • Marital status: Single     Spouse name: N/A   • Number of children: N/A   • Years of education: N/A     Occupational History   • Not on file.     Social History Main Topics   • Smoking status: Former Smoker     Packs/day: 0.50     Types: Cigarettes     Quit date: 1/1/1970   • Smokeless tobacco: Never Used   • Alcohol use 1.8 oz/week     3 Glasses of wine per week      Comment: 1-2 glasses wine before dinner   • Drug use: No   • Sexual activity: Not on file     Other Topics Concern   • Not on file     Social History Narrative   • No narrative on file     Allergies   Allergen Reactions   • Codeine Vomiting     Outpatient Encounter Prescriptions as of 6/11/2019   Medication Sig Dispense Refill   • potassium chloride SA (KDUR) 20 MEQ Tab CR Take 2 Tabs by mouth 2 times a day. 120 Tab 11   • ROPINIRole (REQUIP) 1 MG Tab Take 1 Tab by mouth 3 times a day. 90 Tab 4   • ferrous sulfate 325 (65 Fe) MG tablet Take one tablet by mouth twice daily with food, every other day. 90 Tab 3   • metOLazone (ZAROXOLYN) 2.5 MG Tab Take 1 Tab by mouth 1 time daily as needed. Max of 1-2 doses weekly 15 Tab 11   • bumetanide (BUMEX) 1 MG Tab Take 1 Tab by mouth 2 times a day. 60 Tab 11   • apixaban (ELIQUIS) 2.5mg Tab Take 2.5 mg by mouth 2 Times a Day.     • Home Care Oxygen Inhale 2 L/min by mouth every bedtime. via nasal cannula     • topiramate (TOPAMAX) 25 MG Tab Take  1 Tab by mouth 2 times a day. TAKE ONE TABLET BY MOUTH TWICE A DAY 60 Tab 6   • omeprazole (PRILOSEC) 20 MG delayed-release capsule Take 1 Cap by mouth 2 times a day. 30 Cap 6   • fluticasone (FLOVENT HFA) 110 MCG/ACT Aerosol Inhale 1 Puff by mouth every day. 1 Inhaler 5   • clopidogrel (PLAVIX) 75 MG Tab Take 1 Tab by mouth every day. 30 Tab 6   • Calcium-Vitamin D 500-125 MG-UNIT Tab Take 1 tablet by mouth every morning. 600mg - 400 iu 30 Tab 6   • ascorbic acid (VITAMIN C) 500 MG tablet Take 1 Tab by mouth every day. 30 Tab 6   • sertraline (ZOLOFT) 100 MG Tab Take 1 Tab by mouth every bedtime. 30 Tab 11   • metoprolol SR (TOPROL XL) 25 MG TABLET SR 24 HR Take 1 Tab by mouth every morning. 30 Tab 11   • rosuvastatin (CRESTOR) 10 MG Tab Take 1 Tab by mouth every evening. 30 Tab 11   • cyanocobalamin (VITAMIN B12) 1000 MCG Tab Take 1 Tab by mouth every day. 30 Tab 3   • levothyroxine (SYNTHROID) 50 MCG Tab Take 1 Tab by mouth Every morning on an empty stomach. 90 Tab 3   • [DISCONTINUED] potassium chloride SA (KDUR) 20 MEQ Tab CR Take 1 Tab by mouth 2 times a day. 30 Tab 11   • [DISCONTINUED] potassium chloride SA (KDUR) 20 MEQ Tab CR Take 1 Tab by mouth 1 time daily as needed (take with Metolazone). (Patient not taking: Reported on 6/11/2019) 30 Tab 11   • albuterol (PROAIR HFA) 108 (90 Base) MCG/ACT Aero Soln inhalation aerosol Inhale 2 Puffs by mouth every 6 hours as needed for Shortness of Breath. 8.5 g 4   • acetaminophen (TYLENOL) 325 MG Tab Take 2 Tabs by mouth every 6 hours as needed. 30 Tab 0   • calcium carbonate (TUMS) 500 MG Chew Tab Take 1 Tab by mouth every four hours as needed (heartburn). 30 Tab 0     Facility-Administered Encounter Medications as of 6/11/2019   Medication Dose Route Frequency Provider Last Rate Last Dose   • cyanocobalamin (VITAMIN B-12) injection 1,000 mcg  1,000 mcg Intramuscular Q30 DAYS Yudelka J Dimpel, A.P.R.N.   1,000 mcg at 06/06/19 1129     Review of Systems  "  Constitutional: Negative.  Negative for chills, fever and malaise/fatigue.   HENT: Negative.  Negative for sore throat.    Eyes: Negative.    Respiratory: Positive for shortness of breath. Negative for cough, hemoptysis, sputum production, wheezing and stridor.    Cardiovascular: Positive for leg swelling. Negative for chest pain, palpitations, orthopnea, claudication and PND.   Gastrointestinal: Negative.    Genitourinary: Negative.    Musculoskeletal: Negative.    Skin: Negative.    Neurological: Negative.  Negative for dizziness, loss of consciousness and weakness.   Endo/Heme/Allergies: Negative.  Does not bruise/bleed easily.   All other systems reviewed and are negative.       Objective:   /60 (BP Location: Left arm, Patient Position: Sitting, BP Cuff Size: Adult)   Pulse 72   Ht 1.613 m (5' 3.5\")   Wt 64.9 kg (143 lb)   LMP  (LMP Unknown)   SpO2 98%   BMI 24.93 kg/m²     Physical Exam   Constitutional: She appears well-developed and well-nourished. No distress.   HENT:   Head: Normocephalic and atraumatic.   Right Ear: External ear normal.   Left Ear: External ear normal.   Nose: Nose normal.   Mouth/Throat: No oropharyngeal exudate.   Eyes: Pupils are equal, round, and reactive to light. Conjunctivae and EOM are normal. Right eye exhibits no discharge. Left eye exhibits no discharge. No scleral icterus.   Neck: Neck supple. No JVD present.   Cardiovascular: Normal rate, regular rhythm and intact distal pulses.  Exam reveals no gallop and no friction rub.    No murmur heard.  Pulmonary/Chest: Effort normal. No stridor. No respiratory distress. She has no wheezes. She has no rales. She exhibits no tenderness.   Abdominal: Soft. She exhibits no distension. There is no guarding.   Musculoskeletal: Normal range of motion. She exhibits no edema, tenderness or deformity.   Neurological: She is alert. She has normal reflexes. She displays normal reflexes. No cranial nerve deficit. She exhibits normal " muscle tone. Coordination normal.   Skin: Skin is warm and dry. No rash noted. She is not diaphoretic. No erythema. No pallor.   Psychiatric: She has a normal mood and affect. Her behavior is normal. Judgment and thought content normal.   Nursing note and vitals reviewed.      Assessment:     1. High risk medication use     2. Iron deficiency anemia secondary to inadequate dietary iron intake     3. Nonrheumatic aortic valve stenosis     4. Pernicious anemia     5. Pulmonary hypertension ([facility])     6. CKD (chronic kidney disease) stage 3, GFR 30-59 ml/min ([facility])  potassium chloride SA (KDUR) 20 MEQ Tab CR   7. Chronic diastolic congestive heart failure (formerly [facility])  Basic Metabolic Panel   8. Chronic diastolic CHF (congestive heart failure) (formerly [facility])     9. Chronic atrial fibrillation (formerly [facility])     10. Cardiac pacemaker in situ     11. ASCVD (arteriosclerotic cardiovascular disease)     12. Bilateral lower extremity edema     13. Coronary artery disease without angina pectoris, unspecified vessel or lesion type, unspecified whether native or transplanted heart     14. DNR (do not resuscitate)     15. ACC/AHA stage C heart failure with preserved ejection fraction ([facility])  potassium chloride SA (KDUR) 20 MEQ Tab CR   16. Hypokalemia  potassium chloride SA (KDUR) 20 MEQ Tab CR   17. Heart failure, NYHA class 2 (formerly [facility])  potassium chloride SA (KDUR) 20 MEQ Tab CR       Medical Decision Making:  Today's Assessment / Status / Plan:     87-year-old female with a weight gain in the setting of chronic kidney disease and some diastolic dysfunction.  Of asked her to double her Bumex dosing.  I would like her to be taking 3 mg of Bumex in the morning and 3 mg in the afternoon.  She will do this for period of 1 week.  We will also double her hold dose of potassium.  I would like to see her back in 1 week with labs prior.  I did discuss with her the indication for hospitalization given her weight gain which she would like to stay at the hospital at  this point.  I think we can consider this.  Otherwise we will see her back in 1 week.    Thank for you allowing me to take part in your patient's care, please call should you have any questions or would like to discuss this patient.

## 2019-06-12 ENCOUNTER — TELEPHONE (OUTPATIENT)
Dept: MEDICAL GROUP | Facility: MEDICAL CENTER | Age: 84
End: 2019-06-12

## 2019-06-12 NOTE — TELEPHONE ENCOUNTER
Phone Number Called: [phone] (home)       Call outcome: spoke to patient regarding message below    Message: Patient notified of results.

## 2019-06-12 NOTE — TELEPHONE ENCOUNTER
----- Message from ELIZABETH Patel sent at 6/12/2019  4:30 PM PDT -----  Please notify patient that her iron levels are improving and now in normal range.  She should continue her iron supplementation as directed.    ELIZABETH Patel

## 2019-06-13 DIAGNOSIS — Z79.01 CHRONIC ANTICOAGULATION: ICD-10-CM

## 2019-06-15 ENCOUNTER — HOSPITAL ENCOUNTER (OUTPATIENT)
Dept: LAB | Facility: MEDICAL CENTER | Age: 84
End: 2019-06-15
Attending: INTERNAL MEDICINE
Payer: MEDICARE

## 2019-06-15 DIAGNOSIS — I50.32 CHRONIC DIASTOLIC CONGESTIVE HEART FAILURE (HCC): ICD-10-CM

## 2019-06-15 LAB
ANION GAP SERPL CALC-SCNC: 8 MMOL/L (ref 0–11.9)
BUN SERPL-MCNC: 46 MG/DL (ref 8–22)
CALCIUM SERPL-MCNC: 8.8 MG/DL (ref 8.5–10.5)
CHLORIDE SERPL-SCNC: 104 MMOL/L (ref 96–112)
CO2 SERPL-SCNC: 27 MMOL/L (ref 20–33)
CREAT SERPL-MCNC: 1.7 MG/DL (ref 0.5–1.4)
GLUCOSE SERPL-MCNC: 92 MG/DL (ref 65–99)
POTASSIUM SERPL-SCNC: 4.1 MMOL/L (ref 3.6–5.5)
SODIUM SERPL-SCNC: 139 MMOL/L (ref 135–145)

## 2019-06-15 PROCEDURE — 80048 BASIC METABOLIC PNL TOTAL CA: CPT

## 2019-06-15 PROCEDURE — 36415 COLL VENOUS BLD VENIPUNCTURE: CPT

## 2019-06-17 ENCOUNTER — NON-PROVIDER VISIT (OUTPATIENT)
Dept: CARDIOLOGY | Facility: MEDICAL CENTER | Age: 84
End: 2019-06-17
Payer: MEDICARE

## 2019-06-17 ENCOUNTER — OFFICE VISIT (OUTPATIENT)
Dept: CARDIOLOGY | Facility: MEDICAL CENTER | Age: 84
End: 2019-06-17
Payer: MEDICARE

## 2019-06-17 VITALS
BODY MASS INDEX: 23.9 KG/M2 | OXYGEN SATURATION: 96 % | DIASTOLIC BLOOD PRESSURE: 68 MMHG | HEART RATE: 62 BPM | WEIGHT: 140 LBS | HEIGHT: 64 IN | SYSTOLIC BLOOD PRESSURE: 124 MMHG

## 2019-06-17 VITALS
HEIGHT: 64 IN | DIASTOLIC BLOOD PRESSURE: 68 MMHG | BODY MASS INDEX: 23.9 KG/M2 | WEIGHT: 140 LBS | OXYGEN SATURATION: 96 % | HEART RATE: 62 BPM | SYSTOLIC BLOOD PRESSURE: 124 MMHG

## 2019-06-17 DIAGNOSIS — G25.81 RESTLESS LEGS SYNDROME (RLS): ICD-10-CM

## 2019-06-17 DIAGNOSIS — R53.1 WEAKNESS: ICD-10-CM

## 2019-06-17 DIAGNOSIS — Z95.0 CARDIAC PACEMAKER IN SITU: ICD-10-CM

## 2019-06-17 DIAGNOSIS — N18.30 CKD (CHRONIC KIDNEY DISEASE) STAGE 3, GFR 30-59 ML/MIN (HCC): ICD-10-CM

## 2019-06-17 DIAGNOSIS — I07.1 SEVERE TRICUSPID REGURGITATION: ICD-10-CM

## 2019-06-17 DIAGNOSIS — Z79.01 CHRONIC ANTICOAGULATION: ICD-10-CM

## 2019-06-17 DIAGNOSIS — R60.0 BILATERAL LOWER EXTREMITY EDEMA: ICD-10-CM

## 2019-06-17 DIAGNOSIS — I25.10 CORONARY ARTERY DISEASE WITHOUT ANGINA PECTORIS, UNSPECIFIED VESSEL OR LESION TYPE, UNSPECIFIED WHETHER NATIVE OR TRANSPLANTED HEART: ICD-10-CM

## 2019-06-17 DIAGNOSIS — Z79.899 HIGH RISK MEDICATION USE: ICD-10-CM

## 2019-06-17 DIAGNOSIS — D50.8 IRON DEFICIENCY ANEMIA SECONDARY TO INADEQUATE DIETARY IRON INTAKE: ICD-10-CM

## 2019-06-17 DIAGNOSIS — Z98.61 S/P CORONARY ANGIOPLASTY: ICD-10-CM

## 2019-06-17 DIAGNOSIS — I50.32 CHRONIC DIASTOLIC CHF (CONGESTIVE HEART FAILURE) (HCC): ICD-10-CM

## 2019-06-17 DIAGNOSIS — I48.20 CHRONIC ATRIAL FIBRILLATION (HCC): ICD-10-CM

## 2019-06-17 DIAGNOSIS — I49.5 SINOATRIAL NODE DYSFUNCTION (HCC): ICD-10-CM

## 2019-06-17 DIAGNOSIS — I27.20 PULMONARY HYPERTENSION (HCC): ICD-10-CM

## 2019-06-17 DIAGNOSIS — I50.32 CHRONIC DIASTOLIC CONGESTIVE HEART FAILURE (HCC): ICD-10-CM

## 2019-06-17 DIAGNOSIS — Z66 DNR (DO NOT RESUSCITATE): ICD-10-CM

## 2019-06-17 PROBLEM — E87.1 HYPONATREMIA: Status: RESOLVED | Noted: 2018-07-04 | Resolved: 2019-06-17

## 2019-06-17 PROBLEM — I15.9 SECONDARY HYPERTENSION: Status: RESOLVED | Noted: 2018-04-03 | Resolved: 2019-06-17

## 2019-06-17 PROCEDURE — 93279 PRGRMG DEV EVAL PM/LDLS PM: CPT | Performed by: NURSE PRACTITIONER

## 2019-06-17 PROCEDURE — 99215 OFFICE O/P EST HI 40 MIN: CPT | Mod: 25 | Performed by: INTERNAL MEDICINE

## 2019-06-17 RX ORDER — APIXABAN 2.5 MG/1
TABLET, FILM COATED ORAL
Qty: 60 TAB | Refills: 5 | Status: SHIPPED | OUTPATIENT
Start: 2019-06-17

## 2019-06-17 RX ORDER — BUMETANIDE 2 MG/1
4 TABLET ORAL 2 TIMES DAILY
Qty: 120 TAB | Refills: 3
Start: 2019-06-17

## 2019-06-17 ASSESSMENT — ENCOUNTER SYMPTOMS
CARDIOVASCULAR NEGATIVE: 1
SPUTUM PRODUCTION: 0
LOSS OF CONSCIOUSNESS: 0
SHORTNESS OF BREATH: 0
CLAUDICATION: 0
ORTHOPNEA: 0
CHILLS: 0
HEMOPTYSIS: 0
FEVER: 0
PND: 0
COUGH: 0
STRIDOR: 0
GASTROINTESTINAL NEGATIVE: 1
CONSTITUTIONAL NEGATIVE: 1
BRUISES/BLEEDS EASILY: 0
SORE THROAT: 0
WHEEZING: 0
PALPITATIONS: 0
MUSCULOSKELETAL NEGATIVE: 1
RESPIRATORY NEGATIVE: 1
NEUROLOGICAL NEGATIVE: 1
EYES NEGATIVE: 1
DIZZINESS: 0
WEAKNESS: 0

## 2019-06-17 NOTE — LETTER
[facility]   [address]   [address]  [place] [address]  Phone: [phone]  Fax: [phone]              Andree Egan  4/21/1932    Encounter Date: 6/17/2019    Jose Simms M.D.          PROGRESS NOTE:  Chief Complaint   Patient presents with   • Congestive Heart Failure       Subjective:   Andree Egan is a 87 y.o. female who presents today as a follow-up for her diastolic dysfunction chronic kidney disease and lower extremity edema.  Her last visit we increased her Bumex to 3 mg twice per day.  She is been taking this at approximately 6 AM and 1 PM.  She continues to report polyuria.  She is also reporting PND.  Her weight since we last saw her is only gone up 1 pound and is been essentially stable since then.  She does not have any chest pain or shortness of breath.    Past Medical History:   Diagnosis Date   • Anemia    • Arthritis     back/neck   • Atrial fibrillation (CMS-HCC) [I48.91] 3/29/2018   • Back pain    • Blood clotting disorder ([facility])    • Breast cancer ([facility])    • Breath shortness     water retention   • Cataract    • Congestive heart failure (CHF) ([facility]) 4/3/2018   • Constipation    • Coronary heart disease    • Daytime sleepiness    • BERTRAND (dyspnea on exertion) 5/4/2018   • GERD (gastroesophageal reflux disease)    • Heart attack ([facility])    • Heart valve disease    • Hemorrhagic disorder ([facility])    • Hiatus hernia syndrome    • Hyperlipidemia    • Hypertension    • Hypothyroidism    • Kidney disease    • Long term current use of anticoagulant 3/29/2018   • Nasal drainage    • Osteoporosis    • Pacemaker 07/2011    Implanted in [place]   • Pneumonia    • Pulmonary emphysema ([facility])    • Restless leg syndrome    • Rheumatoid arthritis ([facility])    • Ringing in ears    • Shortness of breath    • Swelling of lower extremity    • Thyroid disease    • Tonsillitis    • Tremors of nervous system 4/3/2018   • Urinary incontinence    •  Urinary tract infection      Past Surgical History:   Procedure Laterality Date   • HIP NAILING INTRAMEDULLARY Left 12/29/2018    Procedure: HIP NAILING INTRAMEDULLARY;  Surgeon: Juan Daniel Zaragoza M.D.;  Location: SURGERY [facility];  Service: Orthopedics   • PACEMAKER INSERTION Right 07/25/2011    Medtronic Sensia [id number] implanted by Dr. Decker in [place].   • HYSTERECTOMY LAPAROSCOPY     • MASTECTOMY Left    • PB REMV 2ND CATARACT,CORN-SCLER SECTN       Family History   Problem Relation Age of Onset   • Lung Disease Mother    • Cancer Mother    • Heart Disease Mother    • No Known Problems Father    • Diabetes Brother    • Fainting Neg Hx      Social History     Social History   • Marital status: Single     Spouse name: N/A   • Number of children: N/A   • Years of education: N/A     Occupational History   • Not on file.     Social History Main Topics   • Smoking status: Former Smoker     Packs/day: 0.50     Types: Cigarettes     Quit date: 1/1/1970   • Smokeless tobacco: Never Used   • Alcohol use 1.8 oz/week     3 Glasses of wine per week      Comment: 1-2 glasses wine before dinner   • Drug use: No   • Sexual activity: Not on file     Other Topics Concern   • Not on file     Social History Narrative   • No narrative on file     Allergies   Allergen Reactions   • Codeine Vomiting     Outpatient Encounter Prescriptions as of 6/17/2019   Medication Sig Dispense Refill   • ELIQUIS 2.5 MG Tab TAKE ONE TABLET BY MOUTH TWICE A DAY 60 Tab 5   • bumetanide (BUMEX) 2 MG tablet Take 2 Tabs by mouth 2 times a day. 120 Tab 3   • potassium chloride SA (KDUR) 20 MEQ Tab CR Take 2 Tabs by mouth 2 times a day. 120 Tab 11   • ROPINIRole (REQUIP) 1 MG Tab Take 1 Tab by mouth 3 times a day. 90 Tab 4   • ferrous sulfate 325 (65 Fe) MG tablet Take one tablet by mouth twice daily with food, every other day. 90 Tab 3   • metOLazone (ZAROXOLYN) 2.5 MG Tab Take 1 Tab by mouth 1 time daily as needed. Max of 1-2 doses weekly 15 Tab 11    • Home Care Oxygen Inhale 2 L/min by mouth every bedtime. via nasal cannula     • topiramate (TOPAMAX) 25 MG Tab Take 1 Tab by mouth 2 times a day. TAKE ONE TABLET BY MOUTH TWICE A DAY 60 Tab 6   • omeprazole (PRILOSEC) 20 MG delayed-release capsule Take 1 Cap by mouth 2 times a day. 30 Cap 6   • fluticasone (FLOVENT HFA) 110 MCG/ACT Aerosol Inhale 1 Puff by mouth every day. 1 Inhaler 5   • clopidogrel (PLAVIX) 75 MG Tab Take 1 Tab by mouth every day. 30 Tab 6   • Calcium-Vitamin D 500-125 MG-UNIT Tab Take 1 tablet by mouth every morning. 600mg - 400 iu 30 Tab 6   • ascorbic acid (VITAMIN C) 500 MG tablet Take 1 Tab by mouth every day. 30 Tab 6   • albuterol (PROAIR HFA) 108 (90 Base) MCG/ACT Aero Soln inhalation aerosol Inhale 2 Puffs by mouth every 6 hours as needed for Shortness of Breath. 8.5 g 4   • sertraline (ZOLOFT) 100 MG Tab Take 1 Tab by mouth every bedtime. 30 Tab 11   • acetaminophen (TYLENOL) 325 MG Tab Take 2 Tabs by mouth every 6 hours as needed. 30 Tab 0   • metoprolol SR (TOPROL XL) 25 MG TABLET SR 24 HR Take 1 Tab by mouth every morning. 30 Tab 11   • calcium carbonate (TUMS) 500 MG Chew Tab Take 1 Tab by mouth every four hours as needed (heartburn). 30 Tab 0   • rosuvastatin (CRESTOR) 10 MG Tab Take 1 Tab by mouth every evening. 30 Tab 11   • cyanocobalamin (VITAMIN B12) 1000 MCG Tab Take 1 Tab by mouth every day. 30 Tab 3   • levothyroxine (SYNTHROID) 50 MCG Tab Take 1 Tab by mouth Every morning on an empty stomach. 90 Tab 3   • [DISCONTINUED] bumetanide (BUMEX) 1 MG Tab Take 1 Tab by mouth 2 times a day. 60 Tab 11     Facility-Administered Encounter Medications as of 6/17/2019   Medication Dose Route Frequency Provider Last Rate Last Dose   • cyanocobalamin (VITAMIN B-12) injection 1,000 mcg  1,000 mcg Intramuscular Q30 DAYS Yudleka Sanchez, A.P.R.N.   1,000 mcg at 06/06/19 1129     Review of Systems   Constitutional: Negative.  Negative for chills, fever and malaise/fatigue.   HENT:  "Negative.  Negative for sore throat.    Eyes: Negative.    Respiratory: Negative.  Negative for cough, hemoptysis, sputum production, shortness of breath, wheezing and stridor.    Cardiovascular: Negative.  Negative for chest pain, palpitations, orthopnea, claudication, leg swelling and PND.   Gastrointestinal: Negative.    Genitourinary: Negative.    Musculoskeletal: Negative.    Skin: Negative.    Neurological: Negative.  Negative for dizziness, loss of consciousness and weakness.   Endo/Heme/Allergies: Negative.  Does not bruise/bleed easily.   All other systems reviewed and are negative.       Objective:   /68 (BP Location: Left arm, Patient Position: Sitting, BP Cuff Size: Adult)   Pulse 62   Ht 1.613 m (5' 3.5\")   Wt 63.5 kg (140 lb)   LMP  (LMP Unknown)   SpO2 96%   BMI 24.41 kg/m²      Physical Exam   Constitutional: She appears well-developed and well-nourished. No distress.   HENT:   Head: Normocephalic and atraumatic.   Right Ear: External ear normal.   Left Ear: External ear normal.   Nose: Nose normal.   Mouth/Throat: No oropharyngeal exudate.   Eyes: Pupils are equal, round, and reactive to light. Conjunctivae and EOM are normal. Right eye exhibits no discharge. Left eye exhibits no discharge. No scleral icterus.   Neck: Neck supple. No JVD present.   Cardiovascular: Normal rate, regular rhythm and intact distal pulses.  Exam reveals no gallop and no friction rub.    No murmur heard.  2+ LE edema  Beach V-waves   Pulmonary/Chest: Effort normal. No stridor. No respiratory distress. She has no wheezes. She has no rales. She exhibits no tenderness.   Abdominal: Soft. She exhibits no distension. There is no guarding.   Musculoskeletal: Normal range of motion. She exhibits no edema, tenderness or deformity.   Neurological: She is alert. She has normal reflexes. She displays normal reflexes. No cranial nerve deficit. She exhibits normal muscle tone. Coordination normal.   Skin: Skin is warm and " dry. No rash noted. She is not diaphoretic. No erythema. No pallor.   Psychiatric: She has a normal mood and affect. Her behavior is normal. Judgment and thought content normal.   Nursing note and vitals reviewed.      Assessment:     1. Bilateral lower extremity edema     2. CKD (chronic kidney disease) stage 3, GFR 30-59 ml/min ([facility])     3. Coronary artery disease without angina pectoris, unspecified vessel or lesion type, unspecified whether native or transplanted heart     4. DNR (do not resuscitate)     5. Chronic diastolic congestive heart failure ([facility])     6. Chronic diastolic CHF (congestive heart failure) ([facility])  bumetanide (BUMEX) 2 MG tablet   7. Chronic atrial fibrillation ([facility])     8. Chronic anticoagulation     9. High risk medication use  bumetanide (BUMEX) 2 MG tablet   10. Iron deficiency anemia secondary to inadequate dietary iron intake     11. Pulmonary hypertension ([facility])     12. Restless legs syndrome (RLS)     13. S/P coronary angioplasty     14. Severe tricuspid regurgitation     15. Weakness         Medical Decision Making:  Today's Assessment / Status / Plan:     87-year-old female with severe tricuspid regurgitation and chronic kidney disease in the setting of lower extremity edema.  We will now increase her Bumex to 4 mg twice per day.  Of asked her to space this out so that she takes her first dose and 6 in the morning and her second dose later in the afternoon.  She will call the clinic if she is not improving by Thursday.  I did discuss with her that we are getting close to being admitted to the hospital.  We will give this 1 more week of outpatient management and if she fails we will admit her for IV diuresis at that point.  We will also likely repeat an echocardiogram to look at the status of her RV and tricuspid valve.    Thank for you allowing me to take part in your patient's care, please call should you have any questions or would like to discuss this patient.      Yudelka Sanchez,  A.P.R.N.  [address]  [address]  [facility] [address]  VIA In Basket

## 2019-06-17 NOTE — PROGRESS NOTES
Chief Complaint   Patient presents with   • Congestive Heart Failure       Subjective:   Andree Egan is a 87 y.o. female who presents today as a follow-up for her diastolic dysfunction chronic kidney disease and lower extremity edema.  Her last visit we increased her Bumex to 3 mg twice per day.  She is been taking this at approximately 6 AM and 1 PM.  She continues to report polyuria.  She is also reporting PND.  Her weight since we last saw her is only gone up 1 pound and is been essentially stable since then.  She does not have any chest pain or shortness of breath.    Past Medical History:   Diagnosis Date   • Anemia    • Arthritis     back/neck   • Atrial fibrillation (CMS-HCC) [I48.91] 3/29/2018   • Back pain    • Blood clotting disorder ([facility])    • Breast cancer ([facility])    • Breath shortness     water retention   • Cataract    • Congestive heart failure (CHF) ([facility]) 4/3/2018   • Constipation    • Coronary heart disease    • Daytime sleepiness    • BERTRAND (dyspnea on exertion) 5/4/2018   • GERD (gastroesophageal reflux disease)    • Heart attack ([facility])    • Heart valve disease    • Hemorrhagic disorder ([facility])    • Hiatus hernia syndrome    • Hyperlipidemia    • Hypertension    • Hypothyroidism    • Kidney disease    • Long term current use of anticoagulant 3/29/2018   • Nasal drainage    • Osteoporosis    • Pacemaker 07/2011    Implanted in [place]   • Pneumonia    • Pulmonary emphysema ([facility])    • Restless leg syndrome    • Rheumatoid arthritis ([facility])    • Ringing in ears    • Shortness of breath    • Swelling of lower extremity    • Thyroid disease    • Tonsillitis    • Tremors of nervous system 4/3/2018   • Urinary incontinence    • Urinary tract infection      Past Surgical History:   Procedure Laterality Date   • HIP NAILING INTRAMEDULLARY Left 12/29/2018    Procedure: HIP NAILING INTRAMEDULLARY;  Surgeon: Juan Daniel Zaragoza M.D.;  Location: SURGERY [facility];  Service: Orthopedics   • PACEMAKER INSERTION  Right 07/25/2011    Medtronic Sensia [id number] implanted by Dr. Decker in [place].   • HYSTERECTOMY LAPAROSCOPY     • MASTECTOMY Left    • PB REMV 2ND CATARACT,CORN-SCLER SECTN       Family History   Problem Relation Age of Onset   • Lung Disease Mother    • Cancer Mother    • Heart Disease Mother    • No Known Problems Father    • Diabetes Brother    • Fainting Neg Hx      Social History     Social History   • Marital status: Single     Spouse name: N/A   • Number of children: N/A   • Years of education: N/A     Occupational History   • Not on file.     Social History Main Topics   • Smoking status: Former Smoker     Packs/day: 0.50     Types: Cigarettes     Quit date: 1/1/1970   • Smokeless tobacco: Never Used   • Alcohol use 1.8 oz/week     3 Glasses of wine per week      Comment: 1-2 glasses wine before dinner   • Drug use: No   • Sexual activity: Not on file     Other Topics Concern   • Not on file     Social History Narrative   • No narrative on file     Allergies   Allergen Reactions   • Codeine Vomiting     Outpatient Encounter Prescriptions as of 6/17/2019   Medication Sig Dispense Refill   • ELIQUIS 2.5 MG Tab TAKE ONE TABLET BY MOUTH TWICE A DAY 60 Tab 5   • bumetanide (BUMEX) 2 MG tablet Take 2 Tabs by mouth 2 times a day. 120 Tab 3   • potassium chloride SA (KDUR) 20 MEQ Tab CR Take 2 Tabs by mouth 2 times a day. 120 Tab 11   • ROPINIRole (REQUIP) 1 MG Tab Take 1 Tab by mouth 3 times a day. 90 Tab 4   • ferrous sulfate 325 (65 Fe) MG tablet Take one tablet by mouth twice daily with food, every other day. 90 Tab 3   • metOLazone (ZAROXOLYN) 2.5 MG Tab Take 1 Tab by mouth 1 time daily as needed. Max of 1-2 doses weekly 15 Tab 11   • Home Care Oxygen Inhale 2 L/min by mouth every bedtime. via nasal cannula     • topiramate (TOPAMAX) 25 MG Tab Take 1 Tab by mouth 2 times a day. TAKE ONE TABLET BY MOUTH TWICE A DAY 60 Tab 6   • omeprazole (PRILOSEC) 20 MG delayed-release capsule Take 1 Cap by mouth 2 times a  day. 30 Cap 6   • fluticasone (FLOVENT HFA) 110 MCG/ACT Aerosol Inhale 1 Puff by mouth every day. 1 Inhaler 5   • clopidogrel (PLAVIX) 75 MG Tab Take 1 Tab by mouth every day. 30 Tab 6   • Calcium-Vitamin D 500-125 MG-UNIT Tab Take 1 tablet by mouth every morning. 600mg - 400 iu 30 Tab 6   • ascorbic acid (VITAMIN C) 500 MG tablet Take 1 Tab by mouth every day. 30 Tab 6   • albuterol (PROAIR HFA) 108 (90 Base) MCG/ACT Aero Soln inhalation aerosol Inhale 2 Puffs by mouth every 6 hours as needed for Shortness of Breath. 8.5 g 4   • sertraline (ZOLOFT) 100 MG Tab Take 1 Tab by mouth every bedtime. 30 Tab 11   • acetaminophen (TYLENOL) 325 MG Tab Take 2 Tabs by mouth every 6 hours as needed. 30 Tab 0   • metoprolol SR (TOPROL XL) 25 MG TABLET SR 24 HR Take 1 Tab by mouth every morning. 30 Tab 11   • calcium carbonate (TUMS) 500 MG Chew Tab Take 1 Tab by mouth every four hours as needed (heartburn). 30 Tab 0   • rosuvastatin (CRESTOR) 10 MG Tab Take 1 Tab by mouth every evening. 30 Tab 11   • cyanocobalamin (VITAMIN B12) 1000 MCG Tab Take 1 Tab by mouth every day. 30 Tab 3   • levothyroxine (SYNTHROID) 50 MCG Tab Take 1 Tab by mouth Every morning on an empty stomach. 90 Tab 3   • [DISCONTINUED] bumetanide (BUMEX) 1 MG Tab Take 1 Tab by mouth 2 times a day. 60 Tab 11     Facility-Administered Encounter Medications as of 6/17/2019   Medication Dose Route Frequency Provider Last Rate Last Dose   • cyanocobalamin (VITAMIN B-12) injection 1,000 mcg  1,000 mcg Intramuscular Q30 DAYS Yudelka Sanchez, A.P.R.N.   1,000 mcg at 06/06/19 1129     Review of Systems   Constitutional: Negative.  Negative for chills, fever and malaise/fatigue.   HENT: Negative.  Negative for sore throat.    Eyes: Negative.    Respiratory: Negative.  Negative for cough, hemoptysis, sputum production, shortness of breath, wheezing and stridor.    Cardiovascular: Negative.  Negative for chest pain, palpitations, orthopnea, claudication, leg swelling and  "PND.   Gastrointestinal: Negative.    Genitourinary: Negative.    Musculoskeletal: Negative.    Skin: Negative.    Neurological: Negative.  Negative for dizziness, loss of consciousness and weakness.   Endo/Heme/Allergies: Negative.  Does not bruise/bleed easily.   All other systems reviewed and are negative.       Objective:   /68 (BP Location: Left arm, Patient Position: Sitting, BP Cuff Size: Adult)   Pulse 62   Ht 1.613 m (5' 3.5\")   Wt 63.5 kg (140 lb)   LMP  (LMP Unknown)   SpO2 96%   BMI 24.41 kg/m²     Physical Exam   Constitutional: She appears well-developed and well-nourished. No distress.   HENT:   Head: Normocephalic and atraumatic.   Right Ear: External ear normal.   Left Ear: External ear normal.   Nose: Nose normal.   Mouth/Throat: No oropharyngeal exudate.   Eyes: Pupils are equal, round, and reactive to light. Conjunctivae and EOM are normal. Right eye exhibits no discharge. Left eye exhibits no discharge. No scleral icterus.   Neck: Neck supple. No JVD present.   Cardiovascular: Normal rate, regular rhythm and intact distal pulses.  Exam reveals no gallop and no friction rub.    No murmur heard.  2+ LE edema  Beach V-waves   Pulmonary/Chest: Effort normal. No stridor. No respiratory distress. She has no wheezes. She has no rales. She exhibits no tenderness.   Abdominal: Soft. She exhibits no distension. There is no guarding.   Musculoskeletal: Normal range of motion. She exhibits no edema, tenderness or deformity.   Neurological: She is alert. She has normal reflexes. She displays normal reflexes. No cranial nerve deficit. She exhibits normal muscle tone. Coordination normal.   Skin: Skin is warm and dry. No rash noted. She is not diaphoretic. No erythema. No pallor.   Psychiatric: She has a normal mood and affect. Her behavior is normal. Judgment and thought content normal.   Nursing note and vitals reviewed.      Assessment:     1. Bilateral lower extremity edema     2. CKD (chronic " kidney disease) stage 3, GFR 30-59 ml/min ([facility])     3. Coronary artery disease without angina pectoris, unspecified vessel or lesion type, unspecified whether native or transplanted heart     4. DNR (do not resuscitate)     5. Chronic diastolic congestive heart failure (HCC)     6. Chronic diastolic CHF (congestive heart failure) (HCC)  bumetanide (BUMEX) 2 MG tablet   7. Chronic atrial fibrillation (HCC)     8. Chronic anticoagulation     9. High risk medication use  bumetanide (BUMEX) 2 MG tablet   10. Iron deficiency anemia secondary to inadequate dietary iron intake     11. Pulmonary hypertension (HCC)     12. Restless legs syndrome (RLS)     13. S/P coronary angioplasty     14. Severe tricuspid regurgitation     15. Weakness         Medical Decision Making:  Today's Assessment / Status / Plan:     87-year-old female with severe tricuspid regurgitation and chronic kidney disease in the setting of lower extremity edema.  We will now increase her Bumex to 4 mg twice per day.  Of asked her to space this out so that she takes her first dose and 6 in the morning and her second dose later in the afternoon.  She will call the clinic if she is not improving by Thursday.  I did discuss with her that we are getting close to being admitted to the hospital.  We will give this 1 more week of outpatient management and if she fails we will admit her for IV diuresis at that point.  We will also likely repeat an echocardiogram to look at the status of her RV and tricuspid valve.    Thank for you allowing me to take part in your patient's care, please call should you have any questions or would like to discuss this patient.

## 2019-06-17 NOTE — PROGRESS NOTES
Device is working normally. No ventricular high rate episodes; underlying rhythm is persistent atrial fibrillation (she is anticoagulated with Eliquis).  Normal capture of RV lead; unable to measure intrinsic R waves; stable impedances. Battery longevity is 2.5 years.  No changes are made today.    FU in 6 months for next PM check.    She does see Dr. Simms today too.    Collaborating MD: Demi

## 2019-06-20 ENCOUNTER — HOSPITAL ENCOUNTER (OUTPATIENT)
Facility: MEDICAL CENTER | Age: 84
DRG: 682 | End: 2019-06-20
Payer: MEDICARE

## 2019-06-20 ENCOUNTER — HOSPITAL ENCOUNTER (INPATIENT)
Facility: MEDICAL CENTER | Age: 84
LOS: 7 days | DRG: 682 | End: 2019-06-27
Attending: FAMILY MEDICINE | Admitting: FAMILY MEDICINE
Payer: MEDICARE

## 2019-06-20 ENCOUNTER — TELEPHONE (OUTPATIENT)
Dept: CARDIOLOGY | Facility: MEDICAL CENTER | Age: 84
End: 2019-06-20

## 2019-06-20 DIAGNOSIS — N18.30 CKD (CHRONIC KIDNEY DISEASE) STAGE 3, GFR 30-59 ML/MIN (HCC): ICD-10-CM

## 2019-06-20 DIAGNOSIS — I50.9 HEART FAILURE, NYHA CLASS 2 (HCC): ICD-10-CM

## 2019-06-20 DIAGNOSIS — I50.30 ACC/AHA STAGE C HEART FAILURE WITH PRESERVED EJECTION FRACTION (HCC): ICD-10-CM

## 2019-06-20 DIAGNOSIS — E87.6 HYPOKALEMIA: ICD-10-CM

## 2019-06-20 PROBLEM — J96.11 CHRONIC RESPIRATORY FAILURE WITH HYPOXIA (HCC): Status: ACTIVE | Noted: 2019-06-20

## 2019-06-20 LAB
ANION GAP SERPL CALC-SCNC: 8 MMOL/L (ref 0–11.9)
ANISOCYTOSIS BLD QL SMEAR: ABNORMAL
BASOPHILS # BLD AUTO: 0.5 % (ref 0–1.8)
BASOPHILS # BLD: 0.03 K/UL (ref 0–0.12)
BNP SERPL-MCNC: 749 PG/ML (ref 0–100)
BUN SERPL-MCNC: 44 MG/DL (ref 8–22)
CALCIUM SERPL-MCNC: 8.6 MG/DL (ref 8.5–10.5)
CHLORIDE SERPL-SCNC: 104 MMOL/L (ref 96–112)
CO2 SERPL-SCNC: 27 MMOL/L (ref 20–33)
COMMENT 1642: NORMAL
CREAT SERPL-MCNC: 1.79 MG/DL (ref 0.5–1.4)
EOSINOPHIL # BLD AUTO: 0 K/UL (ref 0–0.51)
EOSINOPHIL NFR BLD: 0 % (ref 0–6.9)
ERYTHROCYTE [DISTWIDTH] IN BLOOD BY AUTOMATED COUNT: 57 FL (ref 35.9–50)
GLUCOSE SERPL-MCNC: 98 MG/DL (ref 65–99)
HCT VFR BLD AUTO: 31.4 % (ref 37–47)
HGB BLD-MCNC: 9.1 G/DL (ref 12–16)
HYPOCHROMIA BLD QL SMEAR: ABNORMAL
IMM GRANULOCYTES # BLD AUTO: 0.02 K/UL (ref 0–0.11)
IMM GRANULOCYTES NFR BLD AUTO: 0.4 % (ref 0–0.9)
LYMPHOCYTES # BLD AUTO: 0.65 K/UL (ref 1–4.8)
LYMPHOCYTES NFR BLD: 11.6 % (ref 22–41)
MACROCYTES BLD QL SMEAR: ABNORMAL
MAGNESIUM SERPL-MCNC: 2.5 MG/DL (ref 1.5–2.5)
MCH RBC QN AUTO: 27.2 PG (ref 27–33)
MCHC RBC AUTO-ENTMCNC: 29 G/DL (ref 33.6–35)
MCV RBC AUTO: 94 FL (ref 81.4–97.8)
MICROCYTES BLD QL SMEAR: ABNORMAL
MONOCYTES # BLD AUTO: 0.51 K/UL (ref 0–0.85)
MONOCYTES NFR BLD AUTO: 9.1 % (ref 0–13.4)
MORPHOLOGY BLD-IMP: NORMAL
NEUTROPHILS # BLD AUTO: 4.41 K/UL (ref 2–7.15)
NEUTROPHILS NFR BLD: 78.4 % (ref 44–72)
NRBC # BLD AUTO: 0 K/UL
NRBC BLD-RTO: 0 /100 WBC
OVALOCYTES BLD QL SMEAR: NORMAL
PLATELET # BLD AUTO: 133 K/UL (ref 164–446)
PLATELET BLD QL SMEAR: NORMAL
PMV BLD AUTO: 10.7 FL (ref 9–12.9)
POIKILOCYTOSIS BLD QL SMEAR: NORMAL
POTASSIUM SERPL-SCNC: 3.9 MMOL/L (ref 3.6–5.5)
RBC # BLD AUTO: 3.34 M/UL (ref 4.2–5.4)
RBC BLD AUTO: PRESENT
SODIUM SERPL-SCNC: 139 MMOL/L (ref 135–145)
TSH SERPL DL<=0.005 MIU/L-ACNC: 1.84 UIU/ML (ref 0.38–5.33)
WBC # BLD AUTO: 5.6 K/UL (ref 4.8–10.8)

## 2019-06-20 PROCEDURE — 85025 COMPLETE CBC W/AUTO DIFF WBC: CPT

## 2019-06-20 PROCEDURE — 84443 ASSAY THYROID STIM HORMONE: CPT

## 2019-06-20 PROCEDURE — A9270 NON-COVERED ITEM OR SERVICE: HCPCS | Performed by: FAMILY MEDICINE

## 2019-06-20 PROCEDURE — 99223 1ST HOSP IP/OBS HIGH 75: CPT | Performed by: FAMILY MEDICINE

## 2019-06-20 PROCEDURE — 80048 BASIC METABOLIC PNL TOTAL CA: CPT

## 2019-06-20 PROCEDURE — 770020 HCHG ROOM/CARE - TELE (206)

## 2019-06-20 PROCEDURE — 83735 ASSAY OF MAGNESIUM: CPT

## 2019-06-20 PROCEDURE — 99223 1ST HOSP IP/OBS HIGH 75: CPT | Performed by: INTERNAL MEDICINE

## 2019-06-20 PROCEDURE — 36415 COLL VENOUS BLD VENIPUNCTURE: CPT

## 2019-06-20 PROCEDURE — 83880 ASSAY OF NATRIURETIC PEPTIDE: CPT

## 2019-06-20 PROCEDURE — 700102 HCHG RX REV CODE 250 W/ 637 OVERRIDE(OP): Performed by: FAMILY MEDICINE

## 2019-06-20 RX ORDER — ONDANSETRON 4 MG/1
4 TABLET, ORALLY DISINTEGRATING ORAL EVERY 4 HOURS PRN
Status: DISCONTINUED | OUTPATIENT
Start: 2019-06-20 | End: 2019-06-27 | Stop reason: HOSPADM

## 2019-06-20 RX ORDER — CLOPIDOGREL BISULFATE 75 MG/1
75 TABLET ORAL DAILY
Status: DISCONTINUED | OUTPATIENT
Start: 2019-06-21 | End: 2019-06-27 | Stop reason: HOSPADM

## 2019-06-20 RX ORDER — OMEPRAZOLE 20 MG/1
20 CAPSULE, DELAYED RELEASE ORAL 2 TIMES DAILY
Status: DISCONTINUED | OUTPATIENT
Start: 2019-06-20 | End: 2019-06-27 | Stop reason: HOSPADM

## 2019-06-20 RX ORDER — CHOLECALCIFEROL (VITAMIN D3) 125 MCG
1000 CAPSULE ORAL DAILY
Status: DISCONTINUED | OUTPATIENT
Start: 2019-06-21 | End: 2019-06-27 | Stop reason: HOSPADM

## 2019-06-20 RX ORDER — BISACODYL 10 MG
10 SUPPOSITORY, RECTAL RECTAL
Status: DISCONTINUED | OUTPATIENT
Start: 2019-06-20 | End: 2019-06-27 | Stop reason: HOSPADM

## 2019-06-20 RX ORDER — TOPIRAMATE 25 MG/1
25 TABLET ORAL 2 TIMES DAILY
Status: DISCONTINUED | OUTPATIENT
Start: 2019-06-20 | End: 2019-06-27 | Stop reason: HOSPADM

## 2019-06-20 RX ORDER — AMOXICILLIN 250 MG
2 CAPSULE ORAL 2 TIMES DAILY
Status: DISCONTINUED | OUTPATIENT
Start: 2019-06-20 | End: 2019-06-27 | Stop reason: HOSPADM

## 2019-06-20 RX ORDER — METOPROLOL SUCCINATE 25 MG/1
25 TABLET, EXTENDED RELEASE ORAL EVERY MORNING
Status: DISCONTINUED | OUTPATIENT
Start: 2019-06-21 | End: 2019-06-27 | Stop reason: HOSPADM

## 2019-06-20 RX ORDER — FLUTICASONE PROPIONATE 110 UG/1
1 AEROSOL, METERED RESPIRATORY (INHALATION) DAILY
Status: DISCONTINUED | OUTPATIENT
Start: 2019-06-21 | End: 2019-06-27 | Stop reason: HOSPADM

## 2019-06-20 RX ORDER — ROSUVASTATIN CALCIUM 10 MG/1
10 TABLET, COATED ORAL EVERY EVENING
Status: DISCONTINUED | OUTPATIENT
Start: 2019-06-20 | End: 2019-06-27 | Stop reason: HOSPADM

## 2019-06-20 RX ORDER — FERROUS SULFATE 325(65) MG
325 TABLET ORAL
Status: DISCONTINUED | OUTPATIENT
Start: 2019-06-21 | End: 2019-06-22

## 2019-06-20 RX ORDER — LEVOTHYROXINE SODIUM 0.05 MG/1
50 TABLET ORAL
Status: DISCONTINUED | OUTPATIENT
Start: 2019-06-21 | End: 2019-06-27 | Stop reason: HOSPADM

## 2019-06-20 RX ORDER — SERTRALINE HYDROCHLORIDE 100 MG/1
100 TABLET, FILM COATED ORAL
Status: DISCONTINUED | OUTPATIENT
Start: 2019-06-20 | End: 2019-06-27 | Stop reason: HOSPADM

## 2019-06-20 RX ORDER — ONDANSETRON 2 MG/ML
4 INJECTION INTRAMUSCULAR; INTRAVENOUS EVERY 4 HOURS PRN
Status: DISCONTINUED | OUTPATIENT
Start: 2019-06-20 | End: 2019-06-27 | Stop reason: HOSPADM

## 2019-06-20 RX ORDER — ALBUTEROL SULFATE 90 UG/1
2 AEROSOL, METERED RESPIRATORY (INHALATION) EVERY 6 HOURS PRN
Status: DISCONTINUED | OUTPATIENT
Start: 2019-06-20 | End: 2019-06-27 | Stop reason: HOSPADM

## 2019-06-20 RX ORDER — ACETAMINOPHEN 325 MG/1
650 TABLET ORAL EVERY 6 HOURS PRN
Status: DISCONTINUED | OUTPATIENT
Start: 2019-06-20 | End: 2019-06-27 | Stop reason: HOSPADM

## 2019-06-20 RX ORDER — METOLAZONE 5 MG/1
5 TABLET ORAL ONCE
Status: COMPLETED | OUTPATIENT
Start: 2019-06-21 | End: 2019-06-21

## 2019-06-20 RX ORDER — ROPINIROLE 0.5 MG/1
1 TABLET, FILM COATED ORAL 3 TIMES DAILY
Status: DISCONTINUED | OUTPATIENT
Start: 2019-06-20 | End: 2019-06-27 | Stop reason: HOSPADM

## 2019-06-20 RX ORDER — POLYETHYLENE GLYCOL 3350 17 G/17G
1 POWDER, FOR SOLUTION ORAL
Status: DISCONTINUED | OUTPATIENT
Start: 2019-06-20 | End: 2019-06-27 | Stop reason: HOSPADM

## 2019-06-20 RX ORDER — FERROUS SULFATE 325(65) MG
325 TABLET ORAL
Status: DISCONTINUED | OUTPATIENT
Start: 2019-06-21 | End: 2019-06-27 | Stop reason: HOSPADM

## 2019-06-20 RX ADMIN — APIXABAN 2.5 MG: 2.5 TABLET, FILM COATED ORAL at 18:30

## 2019-06-20 RX ADMIN — SENNOSIDES, DOCUSATE SODIUM 2 TABLET: 50; 8.6 TABLET, FILM COATED ORAL at 18:30

## 2019-06-20 RX ADMIN — ACETAMINOPHEN 650 MG: 325 TABLET, FILM COATED ORAL at 20:12

## 2019-06-20 RX ADMIN — ROPINIROLE HYDROCHLORIDE 1 MG: 0.5 TABLET, FILM COATED ORAL at 18:29

## 2019-06-20 RX ADMIN — ROSUVASTATIN CALCIUM 10 MG: 10 TABLET, FILM COATED ORAL at 18:30

## 2019-06-20 RX ADMIN — SERTRALINE HYDROCHLORIDE 100 MG: 100 TABLET, FILM COATED ORAL at 20:12

## 2019-06-20 RX ADMIN — TOPIRAMATE 25 MG: 25 TABLET, FILM COATED ORAL at 18:30

## 2019-06-20 RX ADMIN — OMEPRAZOLE 20 MG: 20 CAPSULE, DELAYED RELEASE ORAL at 18:29

## 2019-06-20 ASSESSMENT — CHA2DS2 SCORE
CHF OR LEFT VENTRICULAR DYSFUNCTION: YES
PRIOR STROKE OR TIA OR THROMBOEMBOLISM: NO
DIABETES: NO
HYPERTENSION: YES
SEX: FEMALE
AGE 65 TO 74: NO
AGE 75 OR GREATER: YES
CHA2DS2 VASC SCORE: 6
VASCULAR DISEASE: YES

## 2019-06-20 ASSESSMENT — LIFESTYLE VARIABLES
ON A TYPICAL DAY WHEN YOU DRINK ALCOHOL HOW MANY DRINKS DO YOU HAVE: 1
EVER HAD A DRINK FIRST THING IN THE MORNING TO STEADY YOUR NERVES TO GET RID OF A HANGOVER: NO
AVERAGE NUMBER OF DAYS PER WEEK YOU HAVE A DRINK CONTAINING ALCOHOL: 6
ALCOHOL_USE: YES
HAVE YOU EVER FELT YOU SHOULD CUT DOWN ON YOUR DRINKING: NO
HOW MANY TIMES IN THE PAST YEAR HAVE YOU HAD 5 OR MORE DRINKS IN A DAY: 0
EVER_SMOKED: YES
CONSUMPTION TOTAL: NEGATIVE
HAVE PEOPLE ANNOYED YOU BY CRITICIZING YOUR DRINKING: NO
TOTAL SCORE: 0
EVER FELT BAD OR GUILTY ABOUT YOUR DRINKING: NO
TOTAL SCORE: 0
TOTAL SCORE: 0

## 2019-06-20 ASSESSMENT — COGNITIVE AND FUNCTIONAL STATUS - GENERAL
DAILY ACTIVITIY SCORE: 19
MOBILITY SCORE: 19
SUGGESTED CMS G CODE MODIFIER DAILY ACTIVITY: CK
SUGGESTED CMS G CODE MODIFIER MOBILITY: CK
PERSONAL GROOMING: A LITTLE
DRESSING REGULAR UPPER BODY CLOTHING: A LITTLE
MOVING TO AND FROM BED TO CHAIR: A LITTLE
WALKING IN HOSPITAL ROOM: A LITTLE
MOVING FROM LYING ON BACK TO SITTING ON SIDE OF FLAT BED: A LITTLE
CLIMB 3 TO 5 STEPS WITH RAILING: A LITTLE
DRESSING REGULAR LOWER BODY CLOTHING: A LITTLE
HELP NEEDED FOR BATHING: A LITTLE
STANDING UP FROM CHAIR USING ARMS: A LITTLE
TOILETING: A LITTLE

## 2019-06-20 ASSESSMENT — ENCOUNTER SYMPTOMS
DIARRHEA: 0
BACK PAIN: 0
HEADACHES: 0
NECK PAIN: 0
FEVER: 0
DIZZINESS: 0
WHEEZING: 0
ABDOMINAL PAIN: 0
HEARTBURN: 0
COUGH: 0
VOMITING: 0
NERVOUS/ANXIOUS: 0
NAUSEA: 0
CHILLS: 0
SORE THROAT: 0
WEAKNESS: 0
BLURRED VISION: 0
SHORTNESS OF BREATH: 1

## 2019-06-20 ASSESSMENT — PATIENT HEALTH QUESTIONNAIRE - PHQ9
1. LITTLE INTEREST OR PLEASURE IN DOING THINGS: NOT AT ALL
2. FEELING DOWN, DEPRESSED, IRRITABLE, OR HOPELESS: NOT AT ALL
SUM OF ALL RESPONSES TO PHQ9 QUESTIONS 1 AND 2: 0

## 2019-06-20 NOTE — TELEPHONE ENCOUNTER
Per verbal order with read back from Dr. Simms pt need to be direct admitted. Called and informed pts grand daughter. Called transfer center. Hospitalist to contact Dr. Simms. Transfer center to notify us once bed is available.

## 2019-06-20 NOTE — PROGRESS NOTES
Patient is a direct admit from Cardiology office.   Dr Farah is accepting the patient.   Dr Simms is consulting for Cardiology.

## 2019-06-20 NOTE — TELEPHONE ENCOUNTER
----- Message from Anita Low sent at 6/20/2019  8:44 AM PDT -----  Regarding: potential hospital admission / med changes  Contact: [phone]  FLACO/deion    Pt's granddaughter & POCARA Matias calling to discuss   potential admission to hospital following med change FLACO made on 6/17.    Please call Deion [phone]

## 2019-06-20 NOTE — TELEPHONE ENCOUNTER
Received call from transfer center. Pt has a bed T812 bed 2. Attemtped to call pt's grand daughter. No answer, left voicemail to call back.

## 2019-06-20 NOTE — TELEPHONE ENCOUNTER
Called and spoke with pt's grand daughter. Pt's weight has went down at first, but has now gone up into the 140s. She is wondering if the pt needs to be admitted?

## 2019-06-20 NOTE — PROGRESS NOTES
Direct admit from Cardiology - Demi, severe tricuspid regurgitation, increasing bilateral lower extremity edema, Bumex was increased, however creatinine rising, MARICRUZ on CKD 3, cardiology wanted trial of IV diuresis, consult with nephrology (saw Delmi in the past).

## 2019-06-20 NOTE — TELEPHONE ENCOUNTER
Pt's grand daughter called back. Provided her with bed information and directions to [facility]. She states understanding and they will head to the hospital.

## 2019-06-21 LAB
ANION GAP SERPL CALC-SCNC: 7 MMOL/L (ref 0–11.9)
BASOPHILS # BLD AUTO: 0.5 % (ref 0–1.8)
BASOPHILS # BLD: 0.02 K/UL (ref 0–0.12)
BUN SERPL-MCNC: 44 MG/DL (ref 8–22)
CALCIUM SERPL-MCNC: 8 MG/DL (ref 8.5–10.5)
CHLORIDE SERPL-SCNC: 105 MMOL/L (ref 96–112)
CO2 SERPL-SCNC: 26 MMOL/L (ref 20–33)
CREAT SERPL-MCNC: 1.59 MG/DL (ref 0.5–1.4)
EKG IMPRESSION: NORMAL
EOSINOPHIL # BLD AUTO: 0 K/UL (ref 0–0.51)
EOSINOPHIL NFR BLD: 0 % (ref 0–6.9)
ERYTHROCYTE [DISTWIDTH] IN BLOOD BY AUTOMATED COUNT: 55.9 FL (ref 35.9–50)
GLUCOSE SERPL-MCNC: 108 MG/DL (ref 65–99)
HCT VFR BLD AUTO: 27.1 % (ref 37–47)
HGB BLD-MCNC: 8.2 G/DL (ref 12–16)
IMM GRANULOCYTES # BLD AUTO: 0.02 K/UL (ref 0–0.11)
IMM GRANULOCYTES NFR BLD AUTO: 0.5 % (ref 0–0.9)
LYMPHOCYTES # BLD AUTO: 0.61 K/UL (ref 1–4.8)
LYMPHOCYTES NFR BLD: 14.3 % (ref 22–41)
MAGNESIUM SERPL-MCNC: 2.5 MG/DL (ref 1.5–2.5)
MCH RBC QN AUTO: 28 PG (ref 27–33)
MCHC RBC AUTO-ENTMCNC: 30.3 G/DL (ref 33.6–35)
MCV RBC AUTO: 92.5 FL (ref 81.4–97.8)
MONOCYTES # BLD AUTO: 0.39 K/UL (ref 0–0.85)
MONOCYTES NFR BLD AUTO: 9.1 % (ref 0–13.4)
NEUTROPHILS # BLD AUTO: 3.24 K/UL (ref 2–7.15)
NEUTROPHILS NFR BLD: 75.6 % (ref 44–72)
NRBC # BLD AUTO: 0 K/UL
NRBC BLD-RTO: 0 /100 WBC
PLATELET # BLD AUTO: 109 K/UL (ref 164–446)
PMV BLD AUTO: 10.7 FL (ref 9–12.9)
POTASSIUM SERPL-SCNC: 3.4 MMOL/L (ref 3.6–5.5)
RBC # BLD AUTO: 2.93 M/UL (ref 4.2–5.4)
SODIUM SERPL-SCNC: 138 MMOL/L (ref 135–145)
WBC # BLD AUTO: 4.3 K/UL (ref 4.8–10.8)

## 2019-06-21 PROCEDURE — 700111 HCHG RX REV CODE 636 W/ 250 OVERRIDE (IP): Performed by: INTERNAL MEDICINE

## 2019-06-21 PROCEDURE — A9270 NON-COVERED ITEM OR SERVICE: HCPCS | Performed by: INTERNAL MEDICINE

## 2019-06-21 PROCEDURE — 93005 ELECTROCARDIOGRAM TRACING: CPT | Performed by: INTERNAL MEDICINE

## 2019-06-21 PROCEDURE — 700102 HCHG RX REV CODE 250 W/ 637 OVERRIDE(OP): Performed by: INTERNAL MEDICINE

## 2019-06-21 PROCEDURE — 83735 ASSAY OF MAGNESIUM: CPT

## 2019-06-21 PROCEDURE — 99233 SBSQ HOSP IP/OBS HIGH 50: CPT | Performed by: INTERNAL MEDICINE

## 2019-06-21 PROCEDURE — 93010 ELECTROCARDIOGRAM REPORT: CPT | Performed by: INTERNAL MEDICINE

## 2019-06-21 PROCEDURE — 700102 HCHG RX REV CODE 250 W/ 637 OVERRIDE(OP): Performed by: FAMILY MEDICINE

## 2019-06-21 PROCEDURE — A9270 NON-COVERED ITEM OR SERVICE: HCPCS | Performed by: FAMILY MEDICINE

## 2019-06-21 PROCEDURE — 80048 BASIC METABOLIC PNL TOTAL CA: CPT

## 2019-06-21 PROCEDURE — 36415 COLL VENOUS BLD VENIPUNCTURE: CPT

## 2019-06-21 PROCEDURE — 770020 HCHG ROOM/CARE - TELE (206)

## 2019-06-21 PROCEDURE — 85025 COMPLETE CBC W/AUTO DIFF WBC: CPT

## 2019-06-21 RX ADMIN — ROPINIROLE HYDROCHLORIDE 1 MG: 0.5 TABLET, FILM COATED ORAL at 17:38

## 2019-06-21 RX ADMIN — BUMETANIDE 2 MG: 0.25 INJECTION, SOLUTION INTRAMUSCULAR; INTRAVENOUS at 17:39

## 2019-06-21 RX ADMIN — UMECLIDINIUM BROMIDE AND VILANTEROL TRIFENATATE 1 PUFF: 62.5; 25 POWDER RESPIRATORY (INHALATION) at 05:14

## 2019-06-21 RX ADMIN — APIXABAN 2.5 MG: 2.5 TABLET, FILM COATED ORAL at 05:14

## 2019-06-21 RX ADMIN — FERROUS SULFATE TAB 325 MG (65 MG ELEMENTAL FE) 325 MG: 325 (65 FE) TAB at 17:39

## 2019-06-21 RX ADMIN — APIXABAN 2.5 MG: 2.5 TABLET, FILM COATED ORAL at 17:38

## 2019-06-21 RX ADMIN — ACETAMINOPHEN 650 MG: 325 TABLET, FILM COATED ORAL at 17:40

## 2019-06-21 RX ADMIN — CLOPIDOGREL BISULFATE 75 MG: 75 TABLET ORAL at 05:14

## 2019-06-21 RX ADMIN — BUMETANIDE 2 MG: 0.25 INJECTION, SOLUTION INTRAMUSCULAR; INTRAVENOUS at 06:06

## 2019-06-21 RX ADMIN — METOPROLOL SUCCINATE 25 MG: 25 TABLET, EXTENDED RELEASE ORAL at 05:14

## 2019-06-21 RX ADMIN — FERROUS SULFATE TAB 325 MG (65 MG ELEMENTAL FE) 325 MG: 325 (65 FE) TAB at 05:15

## 2019-06-21 RX ADMIN — METOLAZONE 5 MG: 5 TABLET ORAL at 05:20

## 2019-06-21 RX ADMIN — CYANOCOBALAMIN TAB 500 MCG 1000 MCG: 500 TAB at 05:14

## 2019-06-21 RX ADMIN — SERTRALINE HYDROCHLORIDE 100 MG: 100 TABLET, FILM COATED ORAL at 20:30

## 2019-06-21 RX ADMIN — TOPIRAMATE 25 MG: 25 TABLET, FILM COATED ORAL at 05:14

## 2019-06-21 RX ADMIN — ROPINIROLE HYDROCHLORIDE 1 MG: 0.5 TABLET, FILM COATED ORAL at 11:09

## 2019-06-21 RX ADMIN — FLUTICASONE PROPIONATE 110 MCG: 110 AEROSOL, METERED RESPIRATORY (INHALATION) at 05:14

## 2019-06-21 RX ADMIN — SENNOSIDES, DOCUSATE SODIUM 2 TABLET: 50; 8.6 TABLET, FILM COATED ORAL at 05:14

## 2019-06-21 RX ADMIN — LEVOTHYROXINE SODIUM 50 MCG: 50 TABLET ORAL at 05:14

## 2019-06-21 RX ADMIN — OMEPRAZOLE 20 MG: 20 CAPSULE, DELAYED RELEASE ORAL at 05:14

## 2019-06-21 RX ADMIN — ROSUVASTATIN CALCIUM 10 MG: 10 TABLET, FILM COATED ORAL at 17:38

## 2019-06-21 RX ADMIN — ROPINIROLE HYDROCHLORIDE 1 MG: 0.5 TABLET, FILM COATED ORAL at 01:50

## 2019-06-21 RX ADMIN — OMEPRAZOLE 20 MG: 20 CAPSULE, DELAYED RELEASE ORAL at 17:38

## 2019-06-21 RX ADMIN — TOPIRAMATE 25 MG: 25 TABLET, FILM COATED ORAL at 17:38

## 2019-06-21 ASSESSMENT — ENCOUNTER SYMPTOMS
VOMITING: 0
COUGH: 0
FEVER: 0
DIZZINESS: 0
ORTHOPNEA: 1
MYALGIAS: 0
PALPITATIONS: 0
BLURRED VISION: 0
SHORTNESS OF BREATH: 1
CHILLS: 0
ABDOMINAL PAIN: 0
CHEST TIGHTNESS: 0
SPUTUM PRODUCTION: 0
HEARTBURN: 0
SORE THROAT: 0
NAUSEA: 0

## 2019-06-21 NOTE — H&P
Hospital Medicine History & Physical Note    Date of Service  6/20/2019    Primary Care Physician  MARIJA Patel.    Consultants  Consult Cardiology    Code Status  DNR/DNI    Chief Complaint  Lower extremity edema    History of Presenting Illness  87 y.o. female who presented 6/20/2019 with bilateral lower extremity edema and shortness of breath on exertion.  Patient states that she has been dealing with this problem since March of this year.  She is on diuretics in the form of Bumex, her cardiologist noted to be increasing the dose, however she continues to gain weight, continues to have increasing lower extremity edema where she feels she has now some blisters on both legs.  She also has had increasing shortness of breath exertion and according to her daughters after working a few feet she will get short of breath.  She has known history of severe tricuspid regurgitation and pulmonary hypertension.  She has known history of chronic respiratory failure, she is on oxygen supplementation 2 L at night, her daughters also state that she is supposed to wear this during the day but is noncompliant.  She has known chronic kidney disease stage III, on her recent labs her creatinine went up to 1.7, it appears that she is mostly in the 1.2-1.4 range.  She appears to have acute kidney injury at that point.  Her cardiologist called her today at home, apparently she again continued to gain weight and have increasing lower extremity male was asked to come in.  I discussed the case with cardiology, she likely will need a nephrology consult.  Patient has already taken her Bumex doses today.    Review of Systems  Review of Systems   Constitutional: Negative for chills, fever and malaise/fatigue.   HENT: Negative for hearing loss and sore throat.    Eyes: Negative for blurred vision.   Respiratory: Positive for shortness of breath. Negative for cough and wheezing.    Cardiovascular: Positive for leg swelling. Negative  for chest pain.   Gastrointestinal: Negative for abdominal pain, diarrhea, heartburn, nausea and vomiting.   Genitourinary: Negative for dysuria.   Musculoskeletal: Negative for back pain and neck pain.   Skin: Negative for rash.   Neurological: Negative for dizziness, weakness and headaches.   Psychiatric/Behavioral: The patient is not nervous/anxious.        Past Medical History   has a past medical history of Anemia; Arthritis; Atrial fibrillation (CMS-HCC) [I48.91] (3/29/2018); Back pain; Blood clotting disorder ([facility]); Breast cancer ([facility]); Breath shortness; Cataract; Congestive heart failure (CHF) ([facility]) (4/3/2018); Constipation; Coronary heart disease; Daytime sleepiness; BERTRAND (dyspnea on exertion) (5/4/2018); GERD (gastroesophageal reflux disease); Heart attack ([facility]); Heart valve disease; Hemorrhagic disorder ([facility]); Hiatus hernia syndrome; Hyperlipidemia; Hypertension; Hypothyroidism; Kidney disease; Long term current use of anticoagulant (3/29/2018); Nasal drainage; Osteoporosis; Pacemaker (07/2011); Pneumonia; Pulmonary emphysema ([facility]); Restless leg syndrome; Rheumatoid arthritis ([facility]); Ringing in ears; Shortness of breath; Swelling of lower extremity; Thyroid disease; Tonsillitis; Tremors of nervous system (4/3/2018); Urinary incontinence; and Urinary tract infection.    Surgical History   has a past surgical history that includes pr remv 2nd cataract,corn-scler sectn; hysterectomy laparoscopy; pacemaker insertion (Right, 07/25/2011); mastectomy (Left); and hip nailing intramedullary (Left, 12/29/2018).     Family History  family history includes Cancer in her mother; Diabetes in her brother; Heart Disease in her mother; Lung Disease in her mother; No Known Problems in her father.     Social History   reports that she quit smoking about 49 years ago. Her smoking use included Cigarettes. She smoked 0.50 packs per day. She has never used smokeless tobacco. She reports that she drinks about 1.8 oz of alcohol per  week . She reports that she does not use drugs.    Allergies  Allergies   Allergen Reactions   • Codeine Vomiting       Medications  Prior to Admission Medications   Prescriptions Last Dose Informant Patient Reported? Taking?   Calcium-Vitamin D 500-125 MG-UNIT Tab 6/20/2019 at 0700  No No   Sig: Take 1 tablet by mouth every morning. 600mg - 400 iu   ELIQUIS 2.5 MG Tab 6/20/2019 at 0700  No No   Sig: TAKE ONE TABLET BY MOUTH TWICE A DAY   Fluticasone-Umeclidin-Vilant (TRELEGY ELLIPTA) 100-62.5-25 MCG/INH AEROSOL POWDER, BREATH ACTIVATED   Yes Yes   Sig: Inhale  by mouth.   Home Care Oxygen 6/20/2019 at Unknown time  Yes No   Sig: Inhale 2 L/min by mouth every bedtime. via nasal cannula   ROPINIRole (REQUIP) 1 MG Tab 6/20/2019 at 0000  No No   Sig: Take 1 Tab by mouth 3 times a day.   acetaminophen (TYLENOL) 325 MG Tab   No No   Sig: Take 2 Tabs by mouth every 6 hours as needed.   albuterol (PROAIR HFA) 108 (90 Base) MCG/ACT Aero Soln inhalation aerosol   No No   Sig: Inhale 2 Puffs by mouth every 6 hours as needed for Shortness of Breath.   ascorbic acid (VITAMIN C) 500 MG tablet 6/20/2019 at 0700  No No   Sig: Take 1 Tab by mouth every day.   bumetanide (BUMEX) 2 MG tablet 6/20/2019 at 0700  No No   Sig: Take 2 Tabs by mouth 2 times a day.   calcium carbonate (TUMS) 500 MG Chew Tab  Family Member No No   Sig: Take 1 Tab by mouth every four hours as needed (heartburn).   clopidogrel (PLAVIX) 75 MG Tab 6/20/2019 at 0700  No No   Sig: Take 1 Tab by mouth every day.   cyanocobalamin (VITAMIN B12) 1000 MCG Tab 6/20/2019 at 0700 Family Member No No   Sig: Take 1 Tab by mouth every day.   ferrous sulfate 325 (65 Fe) MG tablet 6/20/2019 at 0700  No No   Sig: Take one tablet by mouth twice daily with food, every other day.   fluticasone (FLOVENT HFA) 110 MCG/ACT Aerosol 6/20/2019 at 0700  No No   Sig: Inhale 1 Puff by mouth every day.   levothyroxine (SYNTHROID) 50 MCG Tab 6/20/2019 at 0700 Family Member No No   Sig: Take 1  Tab by mouth Every morning on an empty stomach.   metOLazone (ZAROXOLYN) 2.5 MG Tab   No No   Sig: Take 1 Tab by mouth 1 time daily as needed. Max of 1-2 doses weekly   metoprolol SR (TOPROL XL) 25 MG TABLET SR 24 HR 6/20/2019 at 0700 Family Member No No   Sig: Take 1 Tab by mouth every morning.   omeprazole (PRILOSEC) 20 MG delayed-release capsule 6/20/2019 at 0700  No No   Sig: Take 1 Cap by mouth 2 times a day.   potassium chloride SA (KDUR) 20 MEQ Tab CR 6/20/2019 at 0700  No No   Sig: Take 2 Tabs by mouth 2 times a day.   rosuvastatin (CRESTOR) 10 MG Tab 6/19/2019 at 1700 Family Member No No   Sig: Take 1 Tab by mouth every evening.   sertraline (ZOLOFT) 100 MG Tab 6/19/2019 at 2100  No No   Sig: Take 1 Tab by mouth every bedtime.   topiramate (TOPAMAX) 25 MG Tab 6/20/2019 at 0700  No No   Sig: Take 1 Tab by mouth 2 times a day. TAKE ONE TABLET BY MOUTH TWICE A DAY      Facility-Administered Medications Last Administration Doses Remaining   cyanocobalamin (VITAMIN B-12) injection 1,000 mcg 6/6/2019 11:29 AM           Physical Exam  Temp:  [36.8 °C (98.2 °F)] 36.8 °C (98.2 °F)  Pulse:  [65] 65  Resp:  [14] 14  BP: (118)/(58) 118/58  SpO2:  [95 %] 95 %    Physical Exam   Constitutional: She is oriented to person, place, and time. She appears well-developed and well-nourished.   HENT:   Head: Normocephalic and atraumatic.   Eyes: Pupils are equal, round, and reactive to light. Conjunctivae are normal.   Neck: No tracheal deviation present. No thyromegaly present.   Cardiovascular: Normal rate and regular rhythm.    Murmur heard.  Pulmonary/Chest: Effort normal. She has rales.   Abdominal: Soft. Bowel sounds are normal. She exhibits no distension. There is no tenderness.   Musculoskeletal: She exhibits edema.   +++   Lymphadenopathy:     She has no cervical adenopathy.   Neurological: She is alert and oriented to person, place, and time.   Skin: There is erythema.   Nursing note and vitals  reviewed.      Laboratory:          No results for input(s): ALTSGPT, ASTSGOT, ALKPHOSPHAT, TBILIRUBIN, DBILIRUBIN, GAMMAGT, AMYLASE, LIPASE, ALB, PREALBUMIN, GLUCOSE in the last 72 hours.              No results for input(s): TROPONINI in the last 72 hours.    Urinalysis:    No results found     Imaging:  No orders to display         Assessment/Plan:  I anticipate this patient will require at least two midnights for appropriate medical management, necessitating inpatient admission.    MARICRUZ (acute kidney injury) ([facility])- (present on admission)   Assessment & Plan    BMP now  Will need Nephrology consult     Severe tricuspid regurgitation- (present on admission)   Assessment & Plan    Check Echocardiogram     Pulmonary hypertension ([facility])- (present on admission)   Assessment & Plan    Will need IV diuresis     Bilateral lower extremity edema- (present on admission)   Assessment & Plan    Will need IV diuresis     Chronic respiratory failure with hypoxia ([facility])- (present on admission)   Assessment & Plan    Keep O2 sats above 92%  RT protocol     CKD (chronic kidney disease) stage 3, GFR 30-59 ml/min ([facility])- (present on admission)   Assessment & Plan    Follow bmp     COPD (chronic obstructive pulmonary disease) ([facility])- (present on admission)   Assessment & Plan    Continue Fluticasone, RT protocol     Chronic atrial fibrillation ([facility])- (present on admission)   Assessment & Plan    Telemetry monitoring  Eliquis     Restless legs syndrome (RLS)- (present on admission)   Assessment & Plan    Continue Requip     Hypothyroidism- (present on admission)   Assessment & Plan    Continue Synthroid, check TSH     GERD (gastroesophageal reflux disease)- (present on admission)   Assessment & Plan    Continue Omeprazole     Cardiac pacemaker in situ- (present on admission)   Assessment & Plan    Telemetry monitoring         VTE prophylaxis: Eliquis

## 2019-06-21 NOTE — CONSULTS
Cardiology Initial Consult Note    Date of note:    6/20/2019      Consulting Physician: Storm Farah M.D.    Patient ID:    Name:   Andree Egan   YOB: 1932  Age:   87 y.o.  female   MRN:   [id number]      Reason for Consultation: CHF    HPI:  Andree Egan is a 87 y.o.-year-old female with a history of paroxysmal atrial fibrillation, diastolic heart failure, hypertension, dyslipidemia, CKD stage III, and COPD who presents with worsening LE edema and MARICRUZ.    She is followed by my colleague Dr. Simms as an outpatient.  He has been aggressively titrating her diuretics and her weight has recently been increasing along with MARICRUZ.  She was told to present to the ED today for IV diuresis.     She is a poor historian and does not remember many of her symptoms or medications.  She does tell me she gets mild severity substernal chest pain with walking as well as shortness of breath.  She does not think she can walk down the maria but she can get to the bathroom ok.  Her LE swelling has increased significantly. She thinks her weight might be up around 6 pounds but she is not sure. She does not think she has missed any medication doses and denies increased fluid intake or salt intake.     Patient denies palpitations, pre-syncope, syncope,orthopnea, PND.      ROS  Constitution: Negative for chills, fever and night sweats.   HENT: Negative for nosebleeds.    Eyes: Negative for vision loss in left eye and vision loss in right eye.   Respiratory: Negative for hemoptysis.    Gastrointestinal: Negative for hematemesis and melena. + BRBPR due to hemorrhoids.   Genitourinary: Negative for hematuria.   Neurological: Negative for focal weakness, numbness and paresthesias.      All others reviewed and negative.      Past Medical History:   Diagnosis Date   • Anemia    • Arthritis     back/neck   • Atrial fibrillation (CMS-HCC) [I48.91] 3/29/2018   • Back pain    • Blood clotting disorder (HCC)    • Breast cancer  ([facility])    • Breath shortness     water retention   • Cataract    • Congestive heart failure (CHF) ([facility]) 4/3/2018   • Constipation    • Coronary heart disease    • Daytime sleepiness    • BERTRAND (dyspnea on exertion) 5/4/2018   • GERD (gastroesophageal reflux disease)    • Heart attack ([facility])    • Heart valve disease    • Hemorrhagic disorder ([facility])    • Hiatus hernia syndrome    • Hyperlipidemia    • Hypertension    • Hypothyroidism    • Kidney disease    • Long term current use of anticoagulant 3/29/2018   • Nasal drainage    • Osteoporosis    • Pacemaker 07/2011    Implanted in [place]   • Pneumonia    • Pulmonary emphysema ([facility])    • Restless leg syndrome    • Rheumatoid arthritis ([facility])    • Ringing in ears    • Shortness of breath    • Swelling of lower extremity    • Thyroid disease    • Tonsillitis    • Tremors of nervous system 4/3/2018   • Urinary incontinence    • Urinary tract infection        Past Surgical History:   Procedure Laterality Date   • HIP NAILING INTRAMEDULLARY Left 12/29/2018    Procedure: HIP NAILING INTRAMEDULLARY;  Surgeon: Juan Daniel Zaragoza M.D.;  Location: SURGERY [facility];  Service: Orthopedics   • PACEMAKER INSERTION Right 07/25/2011    Medtronic Sensia [id number] implanted by Dr. Decker in [place].   • HYSTERECTOMY LAPAROSCOPY     • MASTECTOMY Left    • PB REMV 2ND CATARACT,CORN-SCLER SECTN         Prescriptions Prior to Admission   Medication Sig Dispense Refill Last Dose   • Fluticasone-Umeclidin-Vilant (TRELEGY ELLIPTA) 100-62.5-25 MCG/INH AEROSOL POWDER, BREATH ACTIVATED Inhale  by mouth.      • ELIQUIS 2.5 MG Tab TAKE ONE TABLET BY MOUTH TWICE A DAY 60 Tab 5 6/20/2019 at 0700   • bumetanide (BUMEX) 2 MG tablet Take 2 Tabs by mouth 2 times a day. 120 Tab 3 6/20/2019 at 0700   • potassium chloride SA (KDUR) 20 MEQ Tab CR Take 2 Tabs by mouth 2 times a day. 120 Tab 11 6/20/2019 at 0700   • ROPINIRole (REQUIP) 1 MG Tab Take 1 Tab by mouth 3 times a day. 90 Tab 4 6/20/2019 at 0000   •  ferrous sulfate 325 (65 Fe) MG tablet Take one tablet by mouth twice daily with food, every other day. 90 Tab 3 6/20/2019 at 0700   • metOLazone (ZAROXOLYN) 2.5 MG Tab Take 1 Tab by mouth 1 time daily as needed. Max of 1-2 doses weekly 15 Tab 11 Taking   • Home Care Oxygen Inhale 2 L/min by mouth every bedtime. via nasal cannula   6/20/2019 at Unknown time   • topiramate (TOPAMAX) 25 MG Tab Take 1 Tab by mouth 2 times a day. TAKE ONE TABLET BY MOUTH TWICE A DAY 60 Tab 6 6/20/2019 at 0700   • omeprazole (PRILOSEC) 20 MG delayed-release capsule Take 1 Cap by mouth 2 times a day. 30 Cap 6 6/20/2019 at 0700   • fluticasone (FLOVENT HFA) 110 MCG/ACT Aerosol Inhale 1 Puff by mouth every day. 1 Inhaler 5 6/20/2019 at 0700   • clopidogrel (PLAVIX) 75 MG Tab Take 1 Tab by mouth every day. 30 Tab 6 6/20/2019 at 0700   • Calcium-Vitamin D 500-125 MG-UNIT Tab Take 1 tablet by mouth every morning. 600mg - 400 iu 30 Tab 6 6/20/2019 at 0700   • ascorbic acid (VITAMIN C) 500 MG tablet Take 1 Tab by mouth every day. 30 Tab 6 6/20/2019 at 0700   • albuterol (PROAIR HFA) 108 (90 Base) MCG/ACT Aero Soln inhalation aerosol Inhale 2 Puffs by mouth every 6 hours as needed for Shortness of Breath. 8.5 g 4 PRN   • sertraline (ZOLOFT) 100 MG Tab Take 1 Tab by mouth every bedtime. 30 Tab 11 6/19/2019 at 2100   • acetaminophen (TYLENOL) 325 MG Tab Take 2 Tabs by mouth every 6 hours as needed. 30 Tab 0 PRN   • metoprolol SR (TOPROL XL) 25 MG TABLET SR 24 HR Take 1 Tab by mouth every morning. 30 Tab 11 6/20/2019 at 0700   • calcium carbonate (TUMS) 500 MG Chew Tab Take 1 Tab by mouth every four hours as needed (heartburn). 30 Tab 0 PRN   • rosuvastatin (CRESTOR) 10 MG Tab Take 1 Tab by mouth every evening. 30 Tab 11 6/19/2019 at 1700   • cyanocobalamin (VITAMIN B12) 1000 MCG Tab Take 1 Tab by mouth every day. 30 Tab 3 6/20/2019 at 0700   • levothyroxine (SYNTHROID) 50 MCG Tab Take 1 Tab by mouth Every morning on an empty stomach. 90 Tab 3  6/20/2019 at 0700       Allergies   Allergen Reactions   • Codeine Vomiting       Family History   Problem Relation Age of Onset   • Lung Disease Mother    • Cancer Mother    • Heart Disease Mother    • No Known Problems Father    • Diabetes Brother    • Fainting Neg Hx        Social History     Social History   • Marital status: Single     Spouse name: N/A   • Number of children: N/A   • Years of education: N/A     Occupational History   • Not on file.     Social History Main Topics   • Smoking status: Former Smoker     Packs/day: 0.50     Types: Cigarettes     Quit date: 1/1/1970   • Smokeless tobacco: Never Used   • Alcohol use 1.8 oz/week     3 Glasses of wine per week      Comment: 1-2 glasses wine before dinner   • Drug use: No   • Sexual activity: Not on file     Other Topics Concern   • Not on file     Social History Narrative   • No narrative on file       Physical Exam  Body mass index is 24.29 kg/m².  /58   Pulse 65   Temp 36.8 °C (98.2 °F) (Temporal)   Resp 14   Wt 63.2 kg (139 lb 5.3 oz)   SpO2 95%   Vitals:    06/20/19 1700 06/20/19 1717 06/20/19 2000   BP:  118/58    Pulse:  65    Resp:  14    Temp:  36.8 °C (98.2 °F)    TempSrc:  Temporal    SpO2:  95%    Weight: 64.7 kg (142 lb 10.2 oz)  63.2 kg (139 lb 5.3 oz)     Oxygen Therapy:  Pulse Oximetry: 95 %, O2 (LPM): 0, O2 Delivery: None (Room Air)    General: No apparent distress  Eyes: nl conjunctiva  ENT: OP clear  Neck: JVP >15 cm H2O, no carotid bruits  Lungs: normal respiratory effort, bibasilar crackles, poor air movement throughout.  Heart: RRR, 4/6 systolic murmur at LLSB, no rubs or gallops, 3-4+ edema bilateral lower extremities. No LV/RV heave on cardiac palpatation. 2+ bilateral radial pulses.  2+ bilateral dp pulses.   Abdomen: soft, non tender, non distended, no masses, normal bowel sounds.  + enlarged, pulsatile liver  Extremities/MSK: no clubbing, no cyanosis  Neurological: No focal sensory deficits  Psychiatric: Appropriate  affect, A/O x 3  Skin: Warm extremities      Labs (personally reviewed and notable for):   Creatinine 1.8  bnp 749      Cardiac Imaging and Procedures Review:    EKG dated 1/4/2019: My personal interpretation is a fib, v paced    Echo dated 7/8/2018:   CONCLUSIONS  Prior done 05-.  Compared to prior study, the right-sided   chambers appear dilated.    Left ventricular systolic function is low normal.  Left ventricular ejection fraction is visually estimated to be 50-55%.  Abnormal septal motion consistent with right ventricular (RV) volume   overload and/or elevated RV end-diastolic pressure.  Dilated right ventricle with reduced systolic function.  Severe tricuspid regurgitation. Estimated right ventricular systolic   pressure  is 55 mmHg.  Dilated inferior vena cava without inspiratory collapse.    [facility] (7/3/2018):   DATE OF SERVICE:  07/03/2018     REFERRING PHYSICIAN:  Jose Simms MD     PROCEDURES:  1.  Right heart catheterization.  2.  Left heart catheterization.  3.  Coronary angiography.  4.  PTCA/stent placement of the mid left anterior descending artery.  5.  PTCA/stent placement of the proximal left anterior descending artery.  6.  Left ventriculogram.  7.  Ultrasound-guided right femoral arterial and venous access.  8.  Angio-Seal closure.     PREPROCEDURE DIAGNOSES:  Aortic stenosis and congestive heart failure.     POSTPROCEDURE DIAGNOSES:  1.  No evidence of aortic stenosis with 0 mmHg peak to peak gradient, mean gradient of 8 mmHg, calculated CHRISTIANE of 2.09 cm2.  2.  Coronary artery disease with high grade proximal and high grade mid left anterior descending artery.  3.  Successful percutaneous transluminal coronary angioplasty/stent placement of the mid left anterior descending artery with 2.25x12 mm Synergy drug-eluting stent.  4.  Successful percutaneous transluminal coronary angioplasty/stent placement of the proximal left anterior descending artery with 2.5x12 mm Synergy  drug-eluting stent.  5.  Normal left ventricular systolic function with ejection fraction of 55%.  6.  Normal left ventricular end-diastolic pressure.  7.  Elevated right heart pressure with PA systolic pressure of 60 mmHg.  8.  Successful Angio-Seal closure.    Radiology test Review:  CTA Chest 1/5/2019:  1.  There is no CT evidence of acute pulmonary embolism.  2.  There is atherosclerosis with coronary artery calcification.  3.  The heart is enlarged.  4.  The dependent small right and small left pleural effusion are unchanged.  5.  Right greater than left bibasilar airspace disease is unchanged.  6.  Large hiatal hernia is again seen and is increased in size.      Impression and Medical Decision Making:  # Acute on chronic right ventricular systolic failure with severe tricuspid regurgitation  # Acute on chronic diastolic heart failure  # Paroxysmal atrial fibrillation  # Hypertension  # Dyslipidemia  # COPD  # Acute on chronic renal disease  # CAD s/p PCI last 7/2018  # Normocytic anemia with elevated RDW  # DNR/DNI    Recommendations:  # IV diuresis with IV bumex 2mg IV bid with metolazone 5mg PO 30 minutes ahead of first IV dose tomorrow AM  # daily weights, strict I/Os, K>4, Mg>2, HF teaching, Leg elevation, Compression stockings.   # agree with rechecking echocardiogram to confirm LVEF and check for valvular disease progression  # continue eliquis for atrial fibrillation  # continue plavix for CAD  # would check iron panel, work-up anemia in setting of anticoagulation use. She does say she has significant hemorrhoidal bleeding.   # continue crestor, metoprolol   # depending on how easliy she is able to be diuresed, consider palliative care consultation to help with symptom management and introduce her to hospice if not already done.       Thank you for allowing me to participate in the care of this patient, Dr. Armstrong will continue to follow starting tomorrow.  Please contact me with any  questions.      Tanner Sun MD  Cardiologist, [facility] [place]   [phone]

## 2019-06-21 NOTE — PROGRESS NOTES
Two Rn skin check with Raul.  Scab to left FA: CDI.  Scab to right pinky: Band-Aid in place.   Small bruise on right buttock.   Swelling, red and blanching BLE.   Heels boggy, floated on pillows.

## 2019-06-21 NOTE — PROGRESS NOTES
Report received, patient care assumed. Patient sleeping. Room air. Visible chest rise/fall. No s/s of distress. Tele monitor on, afib/paced. Bed low, locked position. Bed alarm on. Call light in reach.

## 2019-06-21 NOTE — RESPIRATORY CARE
COPD EDUCATION by COPD CLINICAL EDUCATOR  6/21/2019 at 8:07 AM by Tequila Lewis     Patient reviewed by COPD education team. Patient does not have a history or diagnosis of COPD and is a non-smoker, therefore does not qualify for the COPD program.

## 2019-06-21 NOTE — PROGRESS NOTES
Cardiology Follow Up Progress Note    Date of Service  6/21/2019    Attending Physician  Litzy Ramirez M.D.    Chief Complaint   Leg edema and shortness of breath    HPI  Andree Egan is a 87 y.o. female admitted 6/20/2019 with a history of paroxysmal atrial fibrillation, diastolic heart failure, hypertension, dyslipidemia, CKD stage III, and COPD who presents with worsening LE edema and MARICRUZ. She is followed by my colleague Dr. Simms as an outpatient.  He has been aggressively titrating her diuretics and her weight has recently been increasing along with MARICRUZ.  She was told to present to the ED today for IV diuresis.     Interim Events  6/21: No new cardiac events.  Feels possibly slightly better but still has lower extremity edema.  The patient recounts that she has had worsening lower extremity edema plus exertional dyspnea.  Lives with her daughter.  Uses a walker for ambulation and is generally able to get around with some assistance when feeling optimal.    Review of Systems  Review of Systems   Respiratory: Positive for shortness of breath. Negative for chest tightness.    Cardiovascular: Positive for leg swelling. Negative for chest pain and palpitations.   Musculoskeletal: Negative for myalgias.   Neurological: Negative for dizziness.       Vital signs in last 24 hours  Temp:  [36.7 °C (98 °F)-37.1 °C (98.8 °F)] 37.1 °C (98.8 °F)  Pulse:  [61-92] 61  Resp:  [14-18] 16  BP: (115-143)/(55-72) 143/62  SpO2:  [94 %-100 %] 94 %    Physical Exam  Physical Exam   Constitutional: She is oriented to person, place, and time. She appears well-nourished. No distress.   HENT:   Head: Normocephalic and atraumatic.   Eyes: EOM are normal. No scleral icterus.   Neck: JVD present.       Cardiovascular: Normal rate, regular rhythm, S1 normal, S2 normal and intact distal pulses.  Exam reveals no gallop and no friction rub.    Murmur heard.  Pulmonary/Chest: Effort normal and breath sounds normal. She has no wheezes. She has no  rhonchi. She has no rales.   Pacemaker generator   Musculoskeletal: She exhibits edema.   Neurological: She is alert and oriented to person, place, and time.   Skin: Skin is warm and dry.   Psychiatric: She has a normal mood and affect. Her behavior is normal.     Lab Review  Lab Results   Component Value Date/Time    WBC 4.3 (L) 06/21/2019 03:01 AM    RBC 2.93 (L) 06/21/2019 03:01 AM    HEMOGLOBIN 8.2 (L) 06/21/2019 03:01 AM    HEMATOCRIT 27.1 (L) 06/21/2019 03:01 AM    MCV 92.5 06/21/2019 03:01 AM    MCH 28.0 06/21/2019 03:01 AM    MCHC 30.3 (L) 06/21/2019 03:01 AM    MPV 10.7 06/21/2019 03:01 AM      Lab Results   Component Value Date/Time    SODIUM 138 06/21/2019 03:01 AM    POTASSIUM 3.4 (L) 06/21/2019 03:01 AM    CHLORIDE 105 06/21/2019 03:01 AM    CO2 26 06/21/2019 03:01 AM    GLUCOSE 108 (H) 06/21/2019 03:01 AM    BUN 44 (H) 06/21/2019 03:01 AM    CREATININE 1.59 (H) 06/21/2019 03:01 AM      Lab Results   Component Value Date/Time    ASTSGOT 19 12/29/2018 05:01 AM    ALTSGPT 15 12/29/2018 05:01 AM     Lab Results   Component Value Date/Time    CHOLSTRLTOT 67 (L) 07/09/2018 02:22 AM    LDL 17 07/09/2018 02:22 AM    HDL 40 07/09/2018 02:22 AM    TRIGLYCERIDE 50 07/09/2018 02:22 AM    TROPONINI 0.04 01/05/2019 02:38 PM       Recent Labs      06/20/19   1738   BNPBTYPENAT  749*     Cardiac Imaging and Procedures Review:    EKG dated 1/4/2019: My personal interpretation is a fib, v paced    Rhythm: 6/21: Atrial fibrillation.  Ventricular paced rhythm, rate 70, reviewed by myself.     Echo dated 7/8/2018:   CONCLUSIONS  Prior done 05-.  Compared to prior study, the right-sided   chambers appear dilated.    Left ventricular systolic function is low normal.  Left ventricular ejection fraction is visually estimated to be 50-55%.  Abnormal septal motion consistent with right ventricular (RV) volume   overload and/or elevated RV end-diastolic pressure.  Dilated right ventricle with reduced systolic  function.  Severe tricuspid regurgitation. Estimated right ventricular systolic   pressure  is 55 mmHg.  Dilated inferior vena cava without inspiratory collapse.     [facility] (7/3/2018):    PROCEDURES:  1.  Right heart catheterization.  2.  Left heart catheterization.  3.  Coronary angiography.  4.  PTCA/stent placement of the mid left anterior descending artery.  5.  PTCA/stent placement of the proximal left anterior descending artery.  6.  Left ventriculogram.  7.  Ultrasound-guided right femoral arterial and venous access.  8.  Angio-Seal closure.  PREPROCEDURE DIAGNOSES:  Aortic stenosis and congestive heart failure.  POSTPROCEDURE DIAGNOSES:  1.  No evidence of aortic stenosis with 0 mmHg peak to peak gradient, mean gradient of 8 mmHg, calculated CHRISTIANE of 2.09 cm2.  2.  Coronary artery disease with high grade proximal and high grade mid left anterior descending artery.  3.  Successful percutaneous transluminal coronary angioplasty/stent placement of the mid left anterior descending artery with 2.25x12 mm Synergy drug-eluting stent.  4.  Successful percutaneous transluminal coronary angioplasty/stent placement of the proximal left anterior descending artery with 2.5x12 mm Synergy drug-eluting stent.  5.  Normal left ventricular systolic function with ejection fraction of 55%.  6.  Normal left ventricular end-diastolic pressure.  7.  Elevated right heart pressure with PA systolic pressure of 60 mmHg.  8.  Successful Angio-Seal closure.     Radiology test Review:  CTA Chest 1/5/2019:  1.  There is no CT evidence of acute pulmonary embolism.  2.  There is atherosclerosis with coronary artery calcification.  3.  The heart is enlarged.  4.  The dependent small right and small left pleural effusion are unchanged.  5.  Right greater than left bibasilar airspace disease is unchanged.  6.  Large hiatal hernia is again seen and is increased in size.    Assessment/Plan  # Acute on chronic right ventricular systolic failure with  severe tricuspid regurgitation  # Acute on chronic diastolic heart failure  # Paroxysmal atrial fibrillation appears chronic with ventricular pacing.  # Permanent pacemaker appears to be functioning normally.  # Hypertension  # Dyslipidemia  # COPD  # Acute on chronic renal disease  # CAD s/p PCI last 7/2018  # Normocytic anemia with elevated RDW  # DNR/DNI  #Renal insufficiency.    Recommendations:  #Continue IV diuretic protocol and adjust as necessary and as tolerated.  # daily weights, strict I/Os, K>4, Mg>2, HF teaching, Leg elevation, Compression stockings.   #Monitor BMP.  #Awaiting echocardiogram.  # continue eliquis for atrial fibrillation  # continue plavix for CAD  # would check iron panel, work-up anemia in setting of anticoagulation use. She does say she has significant hemorrhoidal bleeding.   # continue crestor, metoprolol   # depending on how easliy she is able to be diuresed, consider palliative care consultation to help with symptom management and introduce her to hospice if not already done.      Thank you for allowing me to participate in the care of this patient.      Please contact me with any questions.    Martín Armstrong M.D.   Cardiologist, [facility]  [phone]

## 2019-06-21 NOTE — PROGRESS NOTES
Received report from Marlys at Pts bedside. Pts white board updated and safety measures in place. No questions or needs at this time.

## 2019-06-21 NOTE — ASSESSMENT & PLAN NOTE
Resolving. Likely cardiorenal. Back to baseline. Pt would not like to consult nephrology at this time. She is not interested on dialysis.

## 2019-06-21 NOTE — CARE PLAN
Problem: Safety  Goal: Will remain free from falls  Outcome: PROGRESSING AS EXPECTED  NO FALLS THIS SHIFT, SAFETY MEASURES IN PLACE     Problem: Urinary Elimination:  Goal: Ability to reestablish a normal urinary elimination pattern will improve  Outcome: PROGRESSING AS EXPECTED  STRICT iAND o

## 2019-06-22 LAB
ALBUMIN SERPL BCP-MCNC: 3.2 G/DL (ref 3.2–4.9)
ALBUMIN/GLOB SERPL: 1.3 G/DL
ALP SERPL-CCNC: 114 U/L (ref 30–99)
ALT SERPL-CCNC: 9 U/L (ref 2–50)
ANION GAP SERPL CALC-SCNC: 12 MMOL/L (ref 0–11.9)
AST SERPL-CCNC: 16 U/L (ref 12–45)
BASOPHILS # BLD AUTO: 0.4 % (ref 0–1.8)
BASOPHILS # BLD: 0.02 K/UL (ref 0–0.12)
BILIRUB SERPL-MCNC: 0.5 MG/DL (ref 0.1–1.5)
BNP SERPL-MCNC: 941 PG/ML (ref 0–100)
BUN SERPL-MCNC: 49 MG/DL (ref 8–22)
CALCIUM SERPL-MCNC: 8.3 MG/DL (ref 8.5–10.5)
CHLORIDE SERPL-SCNC: 100 MMOL/L (ref 96–112)
CO2 SERPL-SCNC: 29 MMOL/L (ref 20–33)
CREAT SERPL-MCNC: 1.63 MG/DL (ref 0.5–1.4)
EOSINOPHIL # BLD AUTO: 0 K/UL (ref 0–0.51)
EOSINOPHIL NFR BLD: 0 % (ref 0–6.9)
ERYTHROCYTE [DISTWIDTH] IN BLOOD BY AUTOMATED COUNT: 53.3 FL (ref 35.9–50)
GLOBULIN SER CALC-MCNC: 2.4 G/DL (ref 1.9–3.5)
GLUCOSE SERPL-MCNC: 114 MG/DL (ref 65–99)
HCT VFR BLD AUTO: 27.9 % (ref 37–47)
HGB BLD-MCNC: 8.8 G/DL (ref 12–16)
IMM GRANULOCYTES # BLD AUTO: 0.02 K/UL (ref 0–0.11)
IMM GRANULOCYTES NFR BLD AUTO: 0.4 % (ref 0–0.9)
LYMPHOCYTES # BLD AUTO: 0.8 K/UL (ref 1–4.8)
LYMPHOCYTES NFR BLD: 17.7 % (ref 22–41)
MAGNESIUM SERPL-MCNC: 2.3 MG/DL (ref 1.5–2.5)
MCH RBC QN AUTO: 28.2 PG (ref 27–33)
MCHC RBC AUTO-ENTMCNC: 31.5 G/DL (ref 33.6–35)
MCV RBC AUTO: 89.4 FL (ref 81.4–97.8)
MONOCYTES # BLD AUTO: 0.39 K/UL (ref 0–0.85)
MONOCYTES NFR BLD AUTO: 8.6 % (ref 0–13.4)
NEUTROPHILS # BLD AUTO: 3.28 K/UL (ref 2–7.15)
NEUTROPHILS NFR BLD: 72.9 % (ref 44–72)
NRBC # BLD AUTO: 0 K/UL
NRBC BLD-RTO: 0 /100 WBC
PLATELET # BLD AUTO: 123 K/UL (ref 164–446)
PMV BLD AUTO: 10.8 FL (ref 9–12.9)
POTASSIUM SERPL-SCNC: 2.6 MMOL/L (ref 3.6–5.5)
POTASSIUM SERPL-SCNC: 2.9 MMOL/L (ref 3.6–5.5)
PROT SERPL-MCNC: 5.6 G/DL (ref 6–8.2)
RBC # BLD AUTO: 3.12 M/UL (ref 4.2–5.4)
SODIUM SERPL-SCNC: 141 MMOL/L (ref 135–145)
WBC # BLD AUTO: 4.5 K/UL (ref 4.8–10.8)

## 2019-06-22 PROCEDURE — 83735 ASSAY OF MAGNESIUM: CPT

## 2019-06-22 PROCEDURE — 99232 SBSQ HOSP IP/OBS MODERATE 35: CPT | Mod: 25 | Performed by: INTERNAL MEDICINE

## 2019-06-22 PROCEDURE — 84132 ASSAY OF SERUM POTASSIUM: CPT

## 2019-06-22 PROCEDURE — 99232 SBSQ HOSP IP/OBS MODERATE 35: CPT | Performed by: INTERNAL MEDICINE

## 2019-06-22 PROCEDURE — 700102 HCHG RX REV CODE 250 W/ 637 OVERRIDE(OP): Performed by: FAMILY MEDICINE

## 2019-06-22 PROCEDURE — 85025 COMPLETE CBC W/AUTO DIFF WBC: CPT

## 2019-06-22 PROCEDURE — 700102 HCHG RX REV CODE 250 W/ 637 OVERRIDE(OP): Performed by: HOSPITALIST

## 2019-06-22 PROCEDURE — 99497 ADVNCD CARE PLAN 30 MIN: CPT | Performed by: INTERNAL MEDICINE

## 2019-06-22 PROCEDURE — 80053 COMPREHEN METABOLIC PANEL: CPT

## 2019-06-22 PROCEDURE — 770020 HCHG ROOM/CARE - TELE (206)

## 2019-06-22 PROCEDURE — A9270 NON-COVERED ITEM OR SERVICE: HCPCS | Performed by: HOSPITALIST

## 2019-06-22 PROCEDURE — 36415 COLL VENOUS BLD VENIPUNCTURE: CPT

## 2019-06-22 PROCEDURE — A9270 NON-COVERED ITEM OR SERVICE: HCPCS | Performed by: FAMILY MEDICINE

## 2019-06-22 PROCEDURE — 83880 ASSAY OF NATRIURETIC PEPTIDE: CPT

## 2019-06-22 PROCEDURE — A9270 NON-COVERED ITEM OR SERVICE: HCPCS | Performed by: INTERNAL MEDICINE

## 2019-06-22 PROCEDURE — 700102 HCHG RX REV CODE 250 W/ 637 OVERRIDE(OP): Performed by: INTERNAL MEDICINE

## 2019-06-22 PROCEDURE — 700111 HCHG RX REV CODE 636 W/ 250 OVERRIDE (IP): Performed by: HOSPITALIST

## 2019-06-22 RX ORDER — POTASSIUM CHLORIDE 20 MEQ/1
20 TABLET, EXTENDED RELEASE ORAL ONCE
Status: COMPLETED | OUTPATIENT
Start: 2019-06-22 | End: 2019-06-22

## 2019-06-22 RX ORDER — POTASSIUM CHLORIDE 7.45 MG/ML
10 INJECTION INTRAVENOUS
Status: DISCONTINUED | OUTPATIENT
Start: 2019-06-22 | End: 2019-06-22

## 2019-06-22 RX ORDER — POTASSIUM CHLORIDE 20 MEQ/1
40 TABLET, EXTENDED RELEASE ORAL 2 TIMES DAILY
Status: COMPLETED | OUTPATIENT
Start: 2019-06-22 | End: 2019-06-23

## 2019-06-22 RX ADMIN — ROPINIROLE HYDROCHLORIDE 1 MG: 0.5 TABLET, FILM COATED ORAL at 17:41

## 2019-06-22 RX ADMIN — ACETAMINOPHEN 650 MG: 325 TABLET, FILM COATED ORAL at 00:07

## 2019-06-22 RX ADMIN — CYANOCOBALAMIN TAB 500 MCG 1000 MCG: 500 TAB at 04:42

## 2019-06-22 RX ADMIN — APIXABAN 2.5 MG: 2.5 TABLET, FILM COATED ORAL at 17:41

## 2019-06-22 RX ADMIN — OMEPRAZOLE 20 MG: 20 CAPSULE, DELAYED RELEASE ORAL at 17:41

## 2019-06-22 RX ADMIN — UMECLIDINIUM BROMIDE AND VILANTEROL TRIFENATATE 1 PUFF: 62.5; 25 POWDER RESPIRATORY (INHALATION) at 04:49

## 2019-06-22 RX ADMIN — ROSUVASTATIN CALCIUM 10 MG: 10 TABLET, FILM COATED ORAL at 17:41

## 2019-06-22 RX ADMIN — LEVOTHYROXINE SODIUM 50 MCG: 50 TABLET ORAL at 04:42

## 2019-06-22 RX ADMIN — METOPROLOL SUCCINATE 25 MG: 25 TABLET, EXTENDED RELEASE ORAL at 04:42

## 2019-06-22 RX ADMIN — POTASSIUM CHLORIDE 20 MEQ: 1500 TABLET, EXTENDED RELEASE ORAL at 04:42

## 2019-06-22 RX ADMIN — ROPINIROLE HYDROCHLORIDE 1 MG: 0.5 TABLET, FILM COATED ORAL at 11:19

## 2019-06-22 RX ADMIN — OMEPRAZOLE 20 MG: 20 CAPSULE, DELAYED RELEASE ORAL at 04:42

## 2019-06-22 RX ADMIN — POTASSIUM CHLORIDE 20 MEQ: 1500 TABLET, EXTENDED RELEASE ORAL at 05:58

## 2019-06-22 RX ADMIN — ACETAMINOPHEN 650 MG: 325 TABLET, FILM COATED ORAL at 20:04

## 2019-06-22 RX ADMIN — SERTRALINE HYDROCHLORIDE 100 MG: 100 TABLET, FILM COATED ORAL at 20:04

## 2019-06-22 RX ADMIN — TOPIRAMATE 25 MG: 25 TABLET, FILM COATED ORAL at 04:49

## 2019-06-22 RX ADMIN — ROPINIROLE HYDROCHLORIDE 1 MG: 0.5 TABLET, FILM COATED ORAL at 00:05

## 2019-06-22 RX ADMIN — CLOPIDOGREL BISULFATE 75 MG: 75 TABLET ORAL at 04:42

## 2019-06-22 RX ADMIN — POTASSIUM CHLORIDE 40 MEQ: 1500 TABLET, EXTENDED RELEASE ORAL at 14:25

## 2019-06-22 RX ADMIN — TOPIRAMATE 25 MG: 25 TABLET, FILM COATED ORAL at 17:41

## 2019-06-22 RX ADMIN — FLUTICASONE PROPIONATE 110 MCG: 110 AEROSOL, METERED RESPIRATORY (INHALATION) at 04:49

## 2019-06-22 RX ADMIN — APIXABAN 2.5 MG: 2.5 TABLET, FILM COATED ORAL at 04:42

## 2019-06-22 RX ADMIN — POTASSIUM CHLORIDE 10 MEQ: 7.46 INJECTION, SOLUTION INTRAVENOUS at 05:18

## 2019-06-22 ASSESSMENT — ENCOUNTER SYMPTOMS
VOMITING: 0
MYALGIAS: 0
SHORTNESS OF BREATH: 1
CONSTIPATION: 0
WEIGHT LOSS: 0
NECK PAIN: 0
HEADACHES: 0
COUGH: 0
CHILLS: 0
TREMORS: 0
WEAKNESS: 0
SPUTUM PRODUCTION: 0
HEARTBURN: 0
EYE PAIN: 0
ABDOMINAL PAIN: 0
NAUSEA: 0
SEIZURES: 0
DEPRESSION: 0
SPEECH CHANGE: 0
PALPITATIONS: 0
DIARRHEA: 0
SORE THROAT: 0
FALLS: 0
WHEEZING: 0
BLURRED VISION: 0
PND: 0
FEVER: 0
ORTHOPNEA: 1
DIZZINESS: 0
BACK PAIN: 0
CHEST TIGHTNESS: 0

## 2019-06-22 ASSESSMENT — LIFESTYLE VARIABLES: SUBSTANCE_ABUSE: 0

## 2019-06-22 NOTE — PROGRESS NOTES
[facility] Medicine Daily Progress Note    Date of Service  6/21/2019    Chief Complaint  87 y.o. female admitted 6/20/2019 with leg swelling     Hospital Course    87-year-old female past medical history of severe tricuspid insufficiency, pulmonary hypertension was admitted for worsening leg swelling or shortness of breath. She started on IV lasix and she is doing better. Acute on chronic CKD on admission       Interval Problem Update  Feeling better. Doing better with leg swelling and breathing. On IV lasix     Consultants/Specialty  Cardiology     Code Status  DNAR/DNI     Disposition  inpatient     Review of Systems  Review of Systems   Constitutional: Negative for chills and fever.   HENT: Negative for sore throat.    Eyes: Negative for blurred vision.   Respiratory: Positive for shortness of breath. Negative for cough and sputum production.    Cardiovascular: Positive for orthopnea and leg swelling. Negative for chest pain and palpitations.   Gastrointestinal: Negative for abdominal pain, heartburn, nausea and vomiting.   Genitourinary: Negative for dysuria and urgency.   Musculoskeletal: Negative for myalgias.   Skin: Negative for rash.   Neurological: Negative for dizziness.        Physical Exam  Temp:  [36.3 °C (97.3 °F)-37.1 °C (98.8 °F)] 36.3 °C (97.3 °F)  Pulse:  [61-92] 84  Resp:  [16-18] 16  BP: (105-143)/(48-73) 139/73  SpO2:  [93 %-100 %] 98 %    Physical Exam   Constitutional: She is oriented to person, place, and time. She appears well-developed and well-nourished.   HENT:   Head: Normocephalic and atraumatic.   Neck: Normal range of motion.   Cardiovascular: Normal rate.    Murmur heard.  Pulmonary/Chest: Effort normal. No respiratory distress. She has no wheezes. She has rales.   Abdominal: Soft. She exhibits no distension. There is no tenderness. There is no rebound.   Musculoskeletal: Normal range of motion. She exhibits edema.   Neurological: She is alert and oriented to person, place, and time.    Skin: Skin is warm.   Psychiatric: She has a normal mood and affect.       Fluids    Intake/Output Summary (Last 24 hours) at 06/21/19 1839  Last data filed at 06/21/19 1800   Gross per 24 hour   Intake             1310 ml   Output             2575 ml   Net            -1265 ml       Laboratory  Recent Labs      06/20/19   1738  06/21/19   0301   WBC  5.6  4.3*   RBC  3.34*  2.93*   HEMOGLOBIN  9.1*  8.2*   HEMATOCRIT  31.4*  27.1*   MCV  94.0  92.5   MCH  27.2  28.0   MCHC  29.0*  30.3*   RDW  57.0*  55.9*   PLATELETCT  133*  109*   MPV  10.7  10.7     Recent Labs      06/20/19 1738 06/21/19   0301   SODIUM  139  138   POTASSIUM  3.9  3.4*   CHLORIDE  104  105   CO2  27  26   GLUCOSE  98  108*   BUN  44*  44*   CREATININE  1.79*  1.59*   CALCIUM  8.6  8.0*         Recent Labs      06/20/19 1738   BNPBTYPENAT  749*           Imaging  EC-ECHOCARDIOGRAM COMPLETE W/O CONT    (Results Pending)        Assessment/Plan  MARICRUZ (acute kidney injury) (HCC)- (present on admission)   Assessment & Plan    Resolving. Likely cardiorenal. Back to baseline. Pt would not like to consult nephrology at this time. She is not interested on dialysis.      Severe tricuspid regurgitation- (present on admission)   Assessment & Plan    Echo pending      Pulmonary hypertension (HCC)- (present on admission)   Assessment & Plan    Will need IV diuresis     Bilateral lower extremity edema- (present on admission)   Assessment & Plan    Better with IV diuresis      Chronic respiratory failure with hypoxia (HCC)- (present on admission)   Assessment & Plan    Keep O2 sats above 92%  RT protocol     CKD (chronic kidney disease) stage 3, GFR 30-59 ml/min ([facility])- (present on admission)   Assessment & Plan    Stable      COPD (chronic obstructive pulmonary disease) (HCC)- (present on admission)   Assessment & Plan    Continue Fluticasone, RT protocol     Chronic atrial fibrillation (HCC)- (present on admission)   Assessment & Plan    Telemetry  monitoring  Eliquis     Restless legs syndrome (RLS)- (present on admission)   Assessment & Plan    Continue Requip     Hypothyroidism- (present on admission)   Assessment & Plan    Continue Synthroid, check TSH     GERD (gastroesophageal reflux disease)- (present on admission)   Assessment & Plan    Continue Omeprazole     Cardiac pacemaker in situ- (present on admission)   Assessment & Plan    Telemetry monitoring          VTE prophylaxis: heparin

## 2019-06-22 NOTE — PROGRESS NOTES
From Lab called with critical result of Potassium 2.6 at 0400. Critical lab result read back to.   Dr. Mendoza notified of critical lab result.  Critical lab result read back by Dr. Mendoza.

## 2019-06-22 NOTE — CARE PLAN
Problem: Safety  Goal: Will remain free from falls  Outcome: PROGRESSING AS EXPECTED  Educated on bed alarm. Pt refuse. Will continue to monitor.    Problem: Urinary Elimination:  Goal: Ability to reestablish a normal urinary elimination pattern will improve  Outcome: PROGRESSING AS EXPECTED  Strict I&Os

## 2019-06-22 NOTE — PROGRESS NOTES
Assumed care at 0700, bedside report received from day shift RN. Pt. Is paced on the monitor. Initial assessment completed, orders reviewed, call light within reach, bed alarm is in use, and hourly rounding in place. POC addressed with patient, no additional questions at this time.

## 2019-06-23 ENCOUNTER — APPOINTMENT (OUTPATIENT)
Dept: CARDIOLOGY | Facility: MEDICAL CENTER | Age: 84
DRG: 682 | End: 2019-06-23
Attending: INTERNAL MEDICINE
Payer: MEDICARE

## 2019-06-23 PROBLEM — E87.6 HYPOKALEMIA: Status: ACTIVE | Noted: 2019-06-23

## 2019-06-23 LAB
ANION GAP SERPL CALC-SCNC: 11 MMOL/L (ref 0–11.9)
BASOPHILS # BLD AUTO: 0.2 % (ref 0–1.8)
BASOPHILS # BLD: 0.01 K/UL (ref 0–0.12)
BUN SERPL-MCNC: 51 MG/DL (ref 8–22)
CALCIUM SERPL-MCNC: 8.3 MG/DL (ref 8.5–10.5)
CHLORIDE SERPL-SCNC: 103 MMOL/L (ref 96–112)
CO2 SERPL-SCNC: 28 MMOL/L (ref 20–33)
CREAT SERPL-MCNC: 1.58 MG/DL (ref 0.5–1.4)
EOSINOPHIL # BLD AUTO: 0 K/UL (ref 0–0.51)
EOSINOPHIL NFR BLD: 0 % (ref 0–6.9)
ERYTHROCYTE [DISTWIDTH] IN BLOOD BY AUTOMATED COUNT: 54.5 FL (ref 35.9–50)
GLUCOSE SERPL-MCNC: 135 MG/DL (ref 65–99)
HCT VFR BLD AUTO: 26.7 % (ref 37–47)
HGB BLD-MCNC: 7.9 G/DL (ref 12–16)
IMM GRANULOCYTES # BLD AUTO: 0.02 K/UL (ref 0–0.11)
IMM GRANULOCYTES NFR BLD AUTO: 0.5 % (ref 0–0.9)
LV EJECT FRACT  99904: 50
LV EJECT FRACT MOD 4C 99902: 47.88
LYMPHOCYTES # BLD AUTO: 0.68 K/UL (ref 1–4.8)
LYMPHOCYTES NFR BLD: 17 % (ref 22–41)
MCH RBC QN AUTO: 27.1 PG (ref 27–33)
MCHC RBC AUTO-ENTMCNC: 29.6 G/DL (ref 33.6–35)
MCV RBC AUTO: 91.4 FL (ref 81.4–97.8)
MONOCYTES # BLD AUTO: 0.42 K/UL (ref 0–0.85)
MONOCYTES NFR BLD AUTO: 10.5 % (ref 0–13.4)
NEUTROPHILS # BLD AUTO: 2.88 K/UL (ref 2–7.15)
NEUTROPHILS NFR BLD: 71.8 % (ref 44–72)
NRBC # BLD AUTO: 0 K/UL
NRBC BLD-RTO: 0 /100 WBC
PLATELET # BLD AUTO: 123 K/UL (ref 164–446)
PMV BLD AUTO: 10.6 FL (ref 9–12.9)
POTASSIUM SERPL-SCNC: 3.3 MMOL/L (ref 3.6–5.5)
RBC # BLD AUTO: 2.92 M/UL (ref 4.2–5.4)
SODIUM SERPL-SCNC: 142 MMOL/L (ref 135–145)
WBC # BLD AUTO: 4 K/UL (ref 4.8–10.8)

## 2019-06-23 PROCEDURE — 302112 WASHCLOTH,PERINEAL CARE: Performed by: INTERNAL MEDICINE

## 2019-06-23 PROCEDURE — A9270 NON-COVERED ITEM OR SERVICE: HCPCS | Performed by: INTERNAL MEDICINE

## 2019-06-23 PROCEDURE — 85025 COMPLETE CBC W/AUTO DIFF WBC: CPT

## 2019-06-23 PROCEDURE — 36415 COLL VENOUS BLD VENIPUNCTURE: CPT

## 2019-06-23 PROCEDURE — 700102 HCHG RX REV CODE 250 W/ 637 OVERRIDE(OP): Performed by: INTERNAL MEDICINE

## 2019-06-23 PROCEDURE — 93306 TTE W/DOPPLER COMPLETE: CPT | Mod: 26 | Performed by: INTERNAL MEDICINE

## 2019-06-23 PROCEDURE — 99232 SBSQ HOSP IP/OBS MODERATE 35: CPT | Performed by: INTERNAL MEDICINE

## 2019-06-23 PROCEDURE — A9270 NON-COVERED ITEM OR SERVICE: HCPCS | Performed by: FAMILY MEDICINE

## 2019-06-23 PROCEDURE — 770020 HCHG ROOM/CARE - TELE (206)

## 2019-06-23 PROCEDURE — 80048 BASIC METABOLIC PNL TOTAL CA: CPT

## 2019-06-23 PROCEDURE — 93306 TTE W/DOPPLER COMPLETE: CPT

## 2019-06-23 PROCEDURE — 700111 HCHG RX REV CODE 636 W/ 250 OVERRIDE (IP): Performed by: INTERNAL MEDICINE

## 2019-06-23 PROCEDURE — 700102 HCHG RX REV CODE 250 W/ 637 OVERRIDE(OP): Performed by: FAMILY MEDICINE

## 2019-06-23 RX ORDER — POTASSIUM CHLORIDE 20 MEQ/1
40 TABLET, EXTENDED RELEASE ORAL 2 TIMES DAILY
Status: COMPLETED | OUTPATIENT
Start: 2019-06-23 | End: 2019-06-24

## 2019-06-23 RX ORDER — BENZOCAINE/MENTHOL 6 MG-10 MG
LOZENGE MUCOUS MEMBRANE 2 TIMES DAILY PRN
Status: DISCONTINUED | OUTPATIENT
Start: 2019-06-23 | End: 2019-06-23

## 2019-06-23 RX ADMIN — APIXABAN 2.5 MG: 2.5 TABLET, FILM COATED ORAL at 05:23

## 2019-06-23 RX ADMIN — METOPROLOL SUCCINATE 25 MG: 25 TABLET, EXTENDED RELEASE ORAL at 05:24

## 2019-06-23 RX ADMIN — ACETAMINOPHEN 650 MG: 325 TABLET, FILM COATED ORAL at 16:56

## 2019-06-23 RX ADMIN — POTASSIUM CHLORIDE 40 MEQ: 1500 TABLET, EXTENDED RELEASE ORAL at 16:55

## 2019-06-23 RX ADMIN — OMEPRAZOLE 20 MG: 20 CAPSULE, DELAYED RELEASE ORAL at 05:24

## 2019-06-23 RX ADMIN — ACETAMINOPHEN 650 MG: 325 TABLET, FILM COATED ORAL at 10:53

## 2019-06-23 RX ADMIN — ROPINIROLE HYDROCHLORIDE 1 MG: 0.5 TABLET, FILM COATED ORAL at 10:48

## 2019-06-23 RX ADMIN — BUMETANIDE 1 MG: 0.25 INJECTION, SOLUTION INTRAMUSCULAR; INTRAVENOUS at 12:24

## 2019-06-23 RX ADMIN — OMEPRAZOLE 20 MG: 20 CAPSULE, DELAYED RELEASE ORAL at 16:55

## 2019-06-23 RX ADMIN — TOPIRAMATE 25 MG: 25 TABLET, FILM COATED ORAL at 05:25

## 2019-06-23 RX ADMIN — ROPINIROLE HYDROCHLORIDE 1 MG: 0.5 TABLET, FILM COATED ORAL at 01:07

## 2019-06-23 RX ADMIN — UMECLIDINIUM BROMIDE AND VILANTEROL TRIFENATATE 1 PUFF: 62.5; 25 POWDER RESPIRATORY (INHALATION) at 05:25

## 2019-06-23 RX ADMIN — CYANOCOBALAMIN TAB 500 MCG 1000 MCG: 500 TAB at 05:23

## 2019-06-23 RX ADMIN — BUMETANIDE 1 MG: 0.25 INJECTION, SOLUTION INTRAMUSCULAR; INTRAVENOUS at 16:55

## 2019-06-23 RX ADMIN — POTASSIUM CHLORIDE 40 MEQ: 1500 TABLET, EXTENDED RELEASE ORAL at 05:23

## 2019-06-23 RX ADMIN — ROPINIROLE HYDROCHLORIDE 1 MG: 0.5 TABLET, FILM COATED ORAL at 16:55

## 2019-06-23 RX ADMIN — HYDROCORTISONE 2.5% 1 APPLICATION: 25 CREAM TOPICAL at 12:24

## 2019-06-23 RX ADMIN — ROSUVASTATIN CALCIUM 10 MG: 10 TABLET, FILM COATED ORAL at 16:56

## 2019-06-23 RX ADMIN — SERTRALINE HYDROCHLORIDE 100 MG: 100 TABLET, FILM COATED ORAL at 19:30

## 2019-06-23 RX ADMIN — FLUTICASONE PROPIONATE 110 MCG: 110 AEROSOL, METERED RESPIRATORY (INHALATION) at 05:25

## 2019-06-23 RX ADMIN — LEVOTHYROXINE SODIUM 50 MCG: 50 TABLET ORAL at 05:23

## 2019-06-23 RX ADMIN — FERROUS SULFATE TAB 325 MG (65 MG ELEMENTAL FE) 325 MG: 325 (65 FE) TAB at 05:23

## 2019-06-23 RX ADMIN — TOPIRAMATE 25 MG: 25 TABLET, FILM COATED ORAL at 16:56

## 2019-06-23 RX ADMIN — APIXABAN 2.5 MG: 2.5 TABLET, FILM COATED ORAL at 16:55

## 2019-06-23 RX ADMIN — CLOPIDOGREL BISULFATE 75 MG: 75 TABLET ORAL at 05:23

## 2019-06-23 ASSESSMENT — ENCOUNTER SYMPTOMS
BLURRED VISION: 0
SORE THROAT: 0
SHORTNESS OF BREATH: 1
TREMORS: 0
MYALGIAS: 0
FEVER: 0
CONSTIPATION: 0
WEIGHT LOSS: 0
CHEST TIGHTNESS: 0
EYE PAIN: 0
NAUSEA: 0
HEARTBURN: 0
ORTHOPNEA: 1
WHEEZING: 0
PALPITATIONS: 0
SEIZURES: 0
ABDOMINAL PAIN: 0
WEAKNESS: 0
SPUTUM PRODUCTION: 0
DIZZINESS: 0
PND: 0
COUGH: 0
HEADACHES: 0
SHORTNESS OF BREATH: 0
DEPRESSION: 0
FALLS: 0

## 2019-06-23 ASSESSMENT — LIFESTYLE VARIABLES: SUBSTANCE_ABUSE: 0

## 2019-06-23 NOTE — PROGRESS NOTES
2 RN skin check with Jordana BARNHART. Pt's skin is intact. BLE are swollen, pink, and blanchable. Scattered bruising on arms.

## 2019-06-23 NOTE — PROGRESS NOTES
Bedside report received. POC discussed with pt; all questions answered at this time. MILAN zimmer placed. ECHO tech in room.

## 2019-06-23 NOTE — PROGRESS NOTES
Cardiology Follow Up Progress Note    Date of Service  6/23/2019    Attending Physician  Litzy Ramirez M.D.    Chief Complaint   Leg edema and shortness of breath    HPI  Andree Egan is a 87 y.o. female admitted 6/20/2019 with a history of paroxysmal atrial fibrillation, diastolic heart failure, hypertension, dyslipidemia, CKD stage III, and COPD who presents with worsening LE edema and MARICRUZ. She is followed by my colleague Dr. Simms as an outpatient.  He has been aggressively titrating her diuretics and her weight has recently been increasing along with MARICRUZ.  She was told to present to the ED today for IV diuresis.     Interim Events  6/21: No new cardiac events.  Feels possibly slightly better but still has lower extremity edema.  The patient recounts that she has had worsening lower extremity edema plus exertional dyspnea.  Lives with her daughter.  Uses a walker for ambulation and is generally able to get around with some assistance when feeling optimal.  6/22: No specific cardiac problems.  Would like to go home but acknowledges that lower extremity edema is not baseline and understands that the purpose of her hospitalization was the fact that her heart failure/lower extremity edema was not being effectively managed as an outpatient.  6/23: Edema may be slightly improved from yesterday.  Sitting up in chair.  No specific symptoms.     Review of Systems  Review of Systems   Respiratory: Negative for chest tightness and shortness of breath.    Cardiovascular: Positive for leg swelling. Negative for chest pain and palpitations.   Musculoskeletal: Negative for myalgias.   Neurological: Negative for dizziness.     Vital signs in last 24 hours  Temp:  [36.2 °C (97.2 °F)-36.5 °C (97.7 °F)] 36.5 °C (97.7 °F)  Pulse:  [68-88] 68  Resp:  [16-18] 16  BP: (116-148)/(51-62) 137/51  SpO2:  [96 %-100 %] 100 %    Physical Exam  Physical Exam   Constitutional: She is oriented to person, place, and time. She appears  well-nourished. No distress.   Frail.   HENT:   Head: Normocephalic and atraumatic.   Eyes: EOM are normal. No scleral icterus.   Neck: JVD present.       Cardiovascular: Normal rate, regular rhythm, S1 normal, S2 normal and intact distal pulses.  Exam reveals no gallop and no friction rub.    Murmur heard.  Pulmonary/Chest: Effort normal. She has no wheezes. She has no rhonchi. She has rales.   Pacemaker generator   Musculoskeletal: She exhibits edema.   Lower extremity edema is less   Neurological: She is alert and oriented to person, place, and time.   Skin: Skin is warm and dry.   Psychiatric: She has a normal mood and affect. Her behavior is normal.     Lab Review  Lab Results   Component Value Date/Time    WBC 4.0 (L) 06/23/2019 02:04 AM    RBC 2.92 (L) 06/23/2019 02:04 AM    HEMOGLOBIN 7.9 (L) 06/23/2019 02:04 AM    HEMATOCRIT 26.7 (L) 06/23/2019 02:04 AM    MCV 91.4 06/23/2019 02:04 AM    MCH 27.1 06/23/2019 02:04 AM    MCHC 29.6 (L) 06/23/2019 02:04 AM    MPV 10.6 06/23/2019 02:04 AM      Lab Results   Component Value Date/Time    SODIUM 142 06/23/2019 02:04 AM    POTASSIUM 3.3 (L) 06/23/2019 02:04 AM    CHLORIDE 103 06/23/2019 02:04 AM    CO2 28 06/23/2019 02:04 AM    GLUCOSE 135 (H) 06/23/2019 02:04 AM    BUN 51 (H) 06/23/2019 02:04 AM    CREATININE 1.58 (H) 06/23/2019 02:04 AM      Lab Results   Component Value Date/Time    ASTSGOT 16 06/22/2019 02:51 AM    ALTSGPT 9 06/22/2019 02:51 AM     Lab Results   Component Value Date/Time    CHOLSTRLTOT 67 (L) 07/09/2018 02:22 AM    LDL 17 07/09/2018 02:22 AM    HDL 40 07/09/2018 02:22 AM    TRIGLYCERIDE 50 07/09/2018 02:22 AM    TROPONINI 0.04 01/05/2019 02:38 PM       Recent Labs      06/20/19   1738  06/22/19   0251   BNPBTYPENAT  749*  941*     Cardiac Imaging and Procedures Review:    EKG dated 1/4/2019: My personal interpretation is a fib, v paced    Rhythm: 6/21: Atrial fibrillation.  Ventricular paced rhythm, rate 70, reviewed by myself.  Rhythm: 6/22:  Atrial fibrillation.  Ventricular paced rhythm, rate 70, reviewed by myself.  Rhythm: 6/23: Atrial fibrillation.  Ventricular paced rhythm, rate 97.  Reviewed by myself.     ECHOCARDIOGRAM dated 7/8/2018:   Prior done 05-.  Compared to prior study, the right-sided   chambers appear dilated.    Left ventricular systolic function is low normal.  Left ventricular ejection fraction is visually estimated to be 50-55%.  Abnormal septal motion consistent with right ventricular (RV) volume   overload and/or elevated RV end-diastolic pressure.  Dilated right ventricle with reduced systolic function.  Severe tricuspid regurgitation. Estimated right ventricular systolic   pressure  is 55 mmHg.  Dilated inferior vena cava without inspiratory collapse.     ECHOCARDIOGRAM 06/23/2019  Left ventricular ejection fraction is visually estimated to be 50%.  Severely dilated right ventricle.  Reduced right ventricular systolic   function.  Dilated inferior vena cava without inspiratory collapse.    Severe tricuspid regurgitation.    Mild aortic stenosis.  Moderate eccentric mitral regurgitation.    [facility] (7/3/2018):    PROCEDURES:  1.  Right heart catheterization.  2.  Left heart catheterization.  3.  Coronary angiography.  4.  PTCA/stent placement of the mid left anterior descending artery.  5.  PTCA/stent placement of the proximal left anterior descending artery.  6.  Left ventriculogram.  7.  Ultrasound-guided right femoral arterial and venous access.  8.  Angio-Seal closure.  PREPROCEDURE DIAGNOSES:  Aortic stenosis and congestive heart failure.  POSTPROCEDURE DIAGNOSES:  1.  No evidence of aortic stenosis with 0 mmHg peak to peak gradient, mean gradient of 8 mmHg, calculated CHRISTIANE of 2.09 cm2.  2.  Coronary artery disease with high grade proximal and high grade mid left anterior descending artery.  3.  Successful percutaneous transluminal coronary angioplasty/stent placement of the mid left anterior descending artery with  2.25x12 mm Synergy drug-eluting stent.  4.  Successful percutaneous transluminal coronary angioplasty/stent placement of the proximal left anterior descending artery with 2.5x12 mm Synergy drug-eluting stent.  5.  Normal left ventricular systolic function with ejection fraction of 55%.  6.  Normal left ventricular end-diastolic pressure.  7.  Elevated right heart pressure with PA systolic pressure of 60 mmHg.  8.  Successful Angio-Seal closure.     Radiology test Review:  CTA Chest 1/5/2019:  1.  There is no CT evidence of acute pulmonary embolism.  2.  There is atherosclerosis with coronary artery calcification.  3.  The heart is enlarged.  4.  The dependent small right and small left pleural effusion are unchanged.  5.  Right greater than left bibasilar airspace disease is unchanged.  6.  Large hiatal hernia is again seen and is increased in size.    Assessment/Plan  # Acute on chronic right ventricular systolic failure with severe tricuspid regurgitation, slightly improved with current hospitalization with IV diuretic regiment.  # Acute on chronic diastolic heart failure  # Chronic atrial fibrillation appears chronic with ventricular pacing.  # Permanent pacemaker appears to be functioning normally.  #Anticoagulation, on Eliquis  #Hypokalemia  #Hypertension  # Dyslipidemia  # COPD  # Acute on chronic renal disease  # CAD s/p PCI last 7/2018  # Normocytic ANEMIA with elevated RDW  # DNR/DNI  #Renal insufficiency.  Chronic, remaining fairly stable with current diuretic regiment.    Recommendations:  #Has had 1900 cc, continue IV diuretic protocol and adjust as necessary and as tolerated.  # daily weights, strict I/Os, K>4, Mg>2, HF teaching, Leg elevation, Compression stockings.   #Potassium has been increased.  #Monitor BMP.  #Echocardiogram findings reflects the severity of the patient's valvular heart disease and explains the refractory nature of her heart failure in particular her right heart failure.  #  continue eliquis for atrial fibrillation  # continue plavix for CAD  # would check iron panel, work-up anemia in setting of anticoagulation use. She does say she has significant hemorrhoidal bleeding.   # continue crestor, metoprolol   #I reviewed the echocardiogram images from 6/22/2019 study.  #Depending on how easliy she is able to be diuresed, consider palliative care consultation to help with symptom management and introduce her to hospice if not already done.      Thank you for allowing me to participate in the care of this patient.    Please contact me with any questions.    Martín Armstrong M.D.   Cardiologist, [facility]  [phone]

## 2019-06-23 NOTE — CARE PLAN
Problem: Safety  Goal: Will remain free from falls  Outcome: PROGRESSING AS EXPECTED  Safety measures in place     Problem: Venous Thromboembolism (VTW)/Deep Vein Thrombosis (DVT) Prevention:  Goal: Patient will participate in Venous Thrombosis (VTE)/Deep Vein Thrombosis (DVT)Prevention Measures   06/22/19 2000   Mechanical/VTE Prophylaxis   Mechanical Prophylaxis  MILAN Hose (Graduated Compression Stockings)   MILAN Hose (Graduated Compression Stockings) On   OTHER   Pharmacologic Prophylaxis Used Apixaban

## 2019-06-23 NOTE — PROGRESS NOTES
[facility] Medicine Daily Progress Note    Date of Service  6/22/2019    Chief Complaint  87 y.o. female admitted 6/20/2019 with leg swelling     Hospital Course    87-year-old female past medical history of severe tricuspid insufficiency, pulmonary hypertension was admitted for worsening leg swelling or shortness of breath. She started on IV lasix and she is doing better. Acute on chronic CKD on admission       Interval Problem Update  6/22.  Patient continues to have bilateral lower extremity edema.  Worsening signs of hypokalemia this morning.  Aggressive p.o. replaced.  Patient cannot tolerate IV replace.  Patient complains of general fatigue and body achiness. Patient's pain is general 2-3/10, intermittent and does not radiate to other location, sharp and with some tingling. Can be controlled by pain meds.    Consultants/Specialty  Cardiology     Code Status  DNAR/DNI   Total time spent on advanced care planning, excluding time spent on daily care: 16 minutes.    1st 30 minutes [id number]     I discussed extensively with patient and patient's family is regarding code status and plan of care. We also discussed advanced care planning including diagnosis, prognosis, plan of care, risks and benefits of any therapies that could be offered, as well as alternatives including palliation and hospice, as appropriate. My discussion is summarized above in detail. Confirmed with DNR      Disposition  inpatient     Review of Systems  Review of Systems   Constitutional: Negative for chills, fever and weight loss.   HENT: Negative for congestion, ear discharge, ear pain, hearing loss, nosebleeds and sore throat.    Eyes: Negative for blurred vision and pain.   Respiratory: Positive for shortness of breath. Negative for cough, sputum production and wheezing.    Cardiovascular: Positive for orthopnea and leg swelling. Negative for chest pain, palpitations and PND.   Gastrointestinal: Negative for abdominal pain, constipation, diarrhea,  heartburn, nausea and vomiting.   Genitourinary: Negative for dysuria, frequency, hematuria and urgency.   Musculoskeletal: Negative for back pain, falls, joint pain, myalgias and neck pain.   Skin: Negative for rash.   Neurological: Negative for dizziness, tremors, speech change, seizures, weakness and headaches.   Psychiatric/Behavioral: Negative for depression, substance abuse and suicidal ideas.        Physical Exam  Temp:  [36 °C (96.8 °F)-36.7 °C (98 °F)] 36.5 °C (97.7 °F)  Pulse:  [62-88] 71  Resp:  [16-18] 18  BP: (103-131)/(57-75) 116/61  SpO2:  [94 %-100 %] 96 %    Physical Exam   Constitutional: She is oriented to person, place, and time. She appears well-developed and well-nourished.   HENT:   Head: Normocephalic and atraumatic.   Nose: Nose normal.   Mouth/Throat: No oropharyngeal exudate.   Eyes: Conjunctivae and EOM are normal.   Neck: Normal range of motion. Neck supple. No JVD present. No thyromegaly present.   Cardiovascular: Normal rate and regular rhythm.  Exam reveals no gallop and no friction rub.    Murmur heard.  Pulmonary/Chest: Effort normal. No respiratory distress. She has no wheezes. She has rales. She exhibits no tenderness.   Abdominal: Soft. Bowel sounds are normal. She exhibits no distension and no mass. There is no tenderness. There is no rebound and no guarding.   Musculoskeletal: Normal range of motion. She exhibits edema. She exhibits no tenderness.   Lymphadenopathy:     She has no cervical adenopathy.   Neurological: She is alert and oriented to person, place, and time. No cranial nerve deficit.   Skin: Skin is warm. No rash noted.   Psychiatric: She has a normal mood and affect. Her behavior is normal.       Fluids    Intake/Output Summary (Last 24 hours) at 06/22/19 1745  Last data filed at 06/22/19 0612   Gross per 24 hour   Intake              262 ml   Output             2300 ml   Net            -2038 ml       Laboratory  Recent Labs      06/20/19   1738  06/21/19   0301   06/22/19   0251   WBC  5.6  4.3*  4.5*   RBC  3.34*  2.93*  3.12*   HEMOGLOBIN  9.1*  8.2*  8.8*   HEMATOCRIT  31.4*  27.1*  27.9*   MCV  94.0  92.5  89.4   MCH  27.2  28.0  28.2   MCHC  29.0*  30.3*  31.5*   RDW  57.0*  55.9*  53.3*   PLATELETCT  133*  109*  123*   MPV  10.7  10.7  10.8     Recent Labs      06/20/19   1738  06/21/19   0301  06/22/19   0251  06/22/19   1200   SODIUM  139  138  141   --    POTASSIUM  3.9  3.4*  2.6*  2.9*   CHLORIDE  104  105  100   --    CO2  27  26  29   --    GLUCOSE  98  108*  114*   --    BUN  44*  44*  49*   --    CREATININE  1.79*  1.59*  1.63*   --    CALCIUM  8.6  8.0*  8.3*   --          Recent Labs      06/20/19 1738 06/22/19   0251   BNPBTYPENAT  749*  941*           Imaging  EC-ECHOCARDIOGRAM COMPLETE W/O CONT    (Results Pending)        Assessment/Plan  MARICRUZ (acute kidney injury) (HCC)- (present on admission)   Assessment & Plan    Resolving. Likely cardiorenal. Back to baseline. Pt would not like to consult nephrology at this time. She is not interested on dialysis.   Cr 1.5->1.7  Low K  supplement     Severe tricuspid regurgitation- (present on admission)   Assessment & Plan    Echo pending   Cards recommendation appreciated     Pulmonary hypertension (HCC)- (present on admission)   Assessment & Plan    Patient was put on IV diuretics currently stopped by cardiologist due to severe hypokalemia.  Aggressive replace potassium with oral     Bilateral lower extremity edema- (present on admission)   Assessment & Plan    Better   Currently diuretics has been held by cardiologist due to hypokalemia  Continue sodium restriction and fluid restriction     Chronic respiratory failure with hypoxia (HCC)- (present on admission)   Assessment & Plan    Keep O2 sats above 92%  RT protocol     CKD (chronic kidney disease) stage 3, GFR 30-59 ml/min (HCC)- (present on admission)   Assessment & Plan    Stable      COPD (chronic obstructive pulmonary disease) (HCC)- (present on  admission)   Assessment & Plan    Continue Fluticasone, RT protocol     Chronic atrial fibrillation (HCC)- (present on admission)   Assessment & Plan    Telemetry monitoring  Eliquis     Restless legs syndrome (RLS)- (present on admission)   Assessment & Plan    Continue Requip     Hypothyroidism- (present on admission)   Assessment & Plan    Continue Synthroid, check TSH     GERD (gastroesophageal reflux disease)- (present on admission)   Assessment & Plan    Continue Omeprazole     Cardiac pacemaker in situ- (present on admission)   Assessment & Plan    Telemetry monitoring          VTE prophylaxis: heparin       Current Facility-Administered Medications:   •  potassium chloride SA (Kdur) tablet 40 mEq, 40 mEq, Oral, BID, Katelyn Jacques M.D., 40 mEq at 06/22/19 1425  •  albuterol inhaler 2 Puff, 2 Puff, Inhalation, Q6HRS PRN, Storm Farah M.D.  •  clopidogrel (PLAVIX) tablet 75 mg, 75 mg, Oral, DAILY, Storm Farah M.D., 75 mg at 06/22/19 0442  •  cyanocobalamin (VITAMIN B-12) tablet 1,000 mcg, 1,000 mcg, Oral, DAILY, Storm Farah M.D., 1,000 mcg at 06/22/19 0442  •  apixaban (ELIQUIS) 2.5mg tablet 2.5 mg, 2.5 mg, Oral, BID, Storm Farah M.D., 2.5 mg at 06/22/19 1741  •  ferrous sulfate tablet 325 mg, 325 mg, Oral, Q48HRS, Storm Farah M.D., 325 mg at 06/21/19 0515  •  fluticasone (FLOVENT HFA) 110 MCG/ACT inhaler 110 mcg, 1 Puff, Inhalation, DAILY, Storm Farah M.D., 110 mcg at 06/22/19 0449  •  umeclidinium-vilanterol (ANORO ELLIPTA) inhaler 1 Puff, 1 Puff, Inhalation, DAILY, Storm Farah M.D., 1 Puff at 06/22/19 0449  •  levothyroxine (SYNTHROID) tablet 50 mcg, 50 mcg, Oral, AM ES, Storm Farah M.D., 50 mcg at 06/22/19 0442  •  metoprolol SR (TOPROL XL) tablet 25 mg, 25 mg, Oral, QAM, Storm Farah M.D., 25 mg at 06/22/19 0442  •  omeprazole (PRILOSEC) capsule 20 mg, 20 mg, Oral, BID, Storm Farah M.D., 20 mg at 06/22/19 1741  •  ROPINIRole (REQUIP) tablet 1 mg, 1 mg,  Oral, TID, Storm Farah M.D., 1 mg at 06/22/19 1741  •  rosuvastatin (CRESTOR) tablet 10 mg, 10 mg, Oral, Q EVENING, Storm Farah M.D., 10 mg at 06/22/19 1741  •  sertraline (ZOLOFT) tablet 100 mg, 100 mg, Oral, QHS, Storm Farah M.D., 100 mg at 06/21/19 2030  •  topiramate (TOPAMAX) tablet 25 mg, 25 mg, Oral, BID, Storm Farah M.D., 25 mg at 06/22/19 1741  •  senna-docusate (PERICOLACE or SENOKOT S) 8.6-50 MG per tablet 2 Tab, 2 Tab, Oral, BID, 2 Tab at 06/21/19 0514 **AND** polyethylene glycol/lytes (MIRALAX) PACKET 1 Packet, 1 Packet, Oral, QDAY PRN **AND** magnesium hydroxide (MILK OF MAGNESIA) suspension 30 mL, 30 mL, Oral, QDAY PRN **AND** bisacodyl (DULCOLAX) suppository 10 mg, 10 mg, Rectal, QDAY PRN, Storm Farah M.D.  •  Respiratory Care per Protocol, , Nebulization, Continuous RT, Storm Farah M.D.  •  acetaminophen (TYLENOL) tablet 650 mg, 650 mg, Oral, Q6HRS PRN, Storm Farah M.D., 650 mg at 06/22/19 0007  •  ondansetron (ZOFRAN) syringe/vial injection 4 mg, 4 mg, Intravenous, Q4HRS PRN, Storm Farah M.D.  •  ondansetron (ZOFRAN ODT) dispertab 4 mg, 4 mg, Oral, Q4HRS PRN, Storm Farah M.D.

## 2019-06-23 NOTE — PROGRESS NOTES
Received report from anna at Pts bedside. Pts white board updated and safety measures in place. No questions or needs at this time.

## 2019-06-24 ENCOUNTER — TELEPHONE (OUTPATIENT)
Dept: CARDIOLOGY | Facility: MEDICAL CENTER | Age: 84
End: 2019-06-24

## 2019-06-24 LAB
ANION GAP SERPL CALC-SCNC: 7 MMOL/L (ref 0–11.9)
BASOPHILS # BLD AUTO: 0.6 % (ref 0–1.8)
BASOPHILS # BLD: 0.03 K/UL (ref 0–0.12)
BUN SERPL-MCNC: 49 MG/DL (ref 8–22)
CALCIUM SERPL-MCNC: 8.6 MG/DL (ref 8.5–10.5)
CHLORIDE SERPL-SCNC: 105 MMOL/L (ref 96–112)
CO2 SERPL-SCNC: 31 MMOL/L (ref 20–33)
CREAT SERPL-MCNC: 1.54 MG/DL (ref 0.5–1.4)
EOSINOPHIL # BLD AUTO: 0 K/UL (ref 0–0.51)
EOSINOPHIL NFR BLD: 0 % (ref 0–6.9)
ERYTHROCYTE [DISTWIDTH] IN BLOOD BY AUTOMATED COUNT: 57.9 FL (ref 35.9–50)
GLUCOSE SERPL-MCNC: 96 MG/DL (ref 65–99)
HCT VFR BLD AUTO: 29.5 % (ref 37–47)
HGB BLD-MCNC: 8.4 G/DL (ref 12–16)
IMM GRANULOCYTES # BLD AUTO: 0.03 K/UL (ref 0–0.11)
IMM GRANULOCYTES NFR BLD AUTO: 0.6 % (ref 0–0.9)
LYMPHOCYTES # BLD AUTO: 0.72 K/UL (ref 1–4.8)
LYMPHOCYTES NFR BLD: 15.6 % (ref 22–41)
MCH RBC QN AUTO: 27.1 PG (ref 27–33)
MCHC RBC AUTO-ENTMCNC: 28.5 G/DL (ref 33.6–35)
MCV RBC AUTO: 95.2 FL (ref 81.4–97.8)
MONOCYTES # BLD AUTO: 0.43 K/UL (ref 0–0.85)
MONOCYTES NFR BLD AUTO: 9.3 % (ref 0–13.4)
NEUTROPHILS # BLD AUTO: 3.41 K/UL (ref 2–7.15)
NEUTROPHILS NFR BLD: 73.9 % (ref 44–72)
NRBC # BLD AUTO: 0 K/UL
NRBC BLD-RTO: 0 /100 WBC
PLATELET # BLD AUTO: 130 K/UL (ref 164–446)
PMV BLD AUTO: 10.8 FL (ref 9–12.9)
POTASSIUM SERPL-SCNC: 4.3 MMOL/L (ref 3.6–5.5)
POTASSIUM SERPL-SCNC: 4.7 MMOL/L (ref 3.6–5.5)
RBC # BLD AUTO: 3.1 M/UL (ref 4.2–5.4)
SODIUM SERPL-SCNC: 143 MMOL/L (ref 135–145)
WBC # BLD AUTO: 4.6 K/UL (ref 4.8–10.8)

## 2019-06-24 PROCEDURE — 36415 COLL VENOUS BLD VENIPUNCTURE: CPT

## 2019-06-24 PROCEDURE — 99232 SBSQ HOSP IP/OBS MODERATE 35: CPT | Performed by: INTERNAL MEDICINE

## 2019-06-24 PROCEDURE — A9270 NON-COVERED ITEM OR SERVICE: HCPCS | Performed by: PHYSICIAN ASSISTANT

## 2019-06-24 PROCEDURE — 700102 HCHG RX REV CODE 250 W/ 637 OVERRIDE(OP): Performed by: INTERNAL MEDICINE

## 2019-06-24 PROCEDURE — 700102 HCHG RX REV CODE 250 W/ 637 OVERRIDE(OP): Performed by: PHYSICIAN ASSISTANT

## 2019-06-24 PROCEDURE — 84132 ASSAY OF SERUM POTASSIUM: CPT

## 2019-06-24 PROCEDURE — A9270 NON-COVERED ITEM OR SERVICE: HCPCS | Performed by: INTERNAL MEDICINE

## 2019-06-24 PROCEDURE — 700111 HCHG RX REV CODE 636 W/ 250 OVERRIDE (IP): Performed by: INTERNAL MEDICINE

## 2019-06-24 PROCEDURE — 700111 HCHG RX REV CODE 636 W/ 250 OVERRIDE (IP): Performed by: PHYSICIAN ASSISTANT

## 2019-06-24 PROCEDURE — 85025 COMPLETE CBC W/AUTO DIFF WBC: CPT

## 2019-06-24 PROCEDURE — 770020 HCHG ROOM/CARE - TELE (206)

## 2019-06-24 PROCEDURE — 700102 HCHG RX REV CODE 250 W/ 637 OVERRIDE(OP): Performed by: FAMILY MEDICINE

## 2019-06-24 PROCEDURE — 80048 BASIC METABOLIC PNL TOTAL CA: CPT

## 2019-06-24 PROCEDURE — A9270 NON-COVERED ITEM OR SERVICE: HCPCS | Performed by: FAMILY MEDICINE

## 2019-06-24 RX ORDER — SPIRONOLACTONE 25 MG/1
12.5 TABLET ORAL
Status: DISCONTINUED | OUTPATIENT
Start: 2019-06-24 | End: 2019-06-27 | Stop reason: HOSPADM

## 2019-06-24 RX ORDER — POTASSIUM CHLORIDE 20 MEQ/1
20 TABLET, EXTENDED RELEASE ORAL 2 TIMES DAILY
Status: DISCONTINUED | OUTPATIENT
Start: 2019-06-24 | End: 2019-06-26

## 2019-06-24 RX ADMIN — OMEPRAZOLE 20 MG: 20 CAPSULE, DELAYED RELEASE ORAL at 17:45

## 2019-06-24 RX ADMIN — TOPIRAMATE 25 MG: 25 TABLET, FILM COATED ORAL at 05:36

## 2019-06-24 RX ADMIN — CYANOCOBALAMIN TAB 500 MCG 1000 MCG: 500 TAB at 05:36

## 2019-06-24 RX ADMIN — METOPROLOL SUCCINATE 25 MG: 25 TABLET, EXTENDED RELEASE ORAL at 05:37

## 2019-06-24 RX ADMIN — ROPINIROLE HYDROCHLORIDE 1 MG: 0.5 TABLET, FILM COATED ORAL at 03:20

## 2019-06-24 RX ADMIN — ROSUVASTATIN CALCIUM 10 MG: 10 TABLET, FILM COATED ORAL at 17:45

## 2019-06-24 RX ADMIN — BUMETANIDE 2 MG: 0.25 INJECTION, SOLUTION INTRAMUSCULAR; INTRAVENOUS at 17:45

## 2019-06-24 RX ADMIN — LEVOTHYROXINE SODIUM 50 MCG: 50 TABLET ORAL at 05:37

## 2019-06-24 RX ADMIN — SERTRALINE HYDROCHLORIDE 100 MG: 100 TABLET, FILM COATED ORAL at 20:59

## 2019-06-24 RX ADMIN — UMECLIDINIUM BROMIDE AND VILANTEROL TRIFENATATE 1 PUFF: 62.5; 25 POWDER RESPIRATORY (INHALATION) at 05:41

## 2019-06-24 RX ADMIN — ROPINIROLE HYDROCHLORIDE 1 MG: 0.5 TABLET, FILM COATED ORAL at 17:45

## 2019-06-24 RX ADMIN — APIXABAN 2.5 MG: 2.5 TABLET, FILM COATED ORAL at 17:45

## 2019-06-24 RX ADMIN — POTASSIUM CHLORIDE 40 MEQ: 1500 TABLET, EXTENDED RELEASE ORAL at 05:36

## 2019-06-24 RX ADMIN — BUMETANIDE 1 MG: 0.25 INJECTION, SOLUTION INTRAMUSCULAR; INTRAVENOUS at 05:41

## 2019-06-24 RX ADMIN — TOPIRAMATE 25 MG: 25 TABLET, FILM COATED ORAL at 17:45

## 2019-06-24 RX ADMIN — POTASSIUM CHLORIDE 20 MEQ: 1500 TABLET, EXTENDED RELEASE ORAL at 12:27

## 2019-06-24 RX ADMIN — CLOPIDOGREL BISULFATE 75 MG: 75 TABLET ORAL at 05:37

## 2019-06-24 RX ADMIN — ROPINIROLE HYDROCHLORIDE 1 MG: 0.5 TABLET, FILM COATED ORAL at 11:10

## 2019-06-24 RX ADMIN — APIXABAN 2.5 MG: 2.5 TABLET, FILM COATED ORAL at 05:37

## 2019-06-24 RX ADMIN — OMEPRAZOLE 20 MG: 20 CAPSULE, DELAYED RELEASE ORAL at 05:37

## 2019-06-24 RX ADMIN — HYDROCORTISONE 2.5%: 25 CREAM TOPICAL at 03:20

## 2019-06-24 RX ADMIN — SPIRONOLACTONE 12.5 MG: 25 TABLET ORAL at 12:27

## 2019-06-24 RX ADMIN — FLUTICASONE PROPIONATE 110 MCG: 110 AEROSOL, METERED RESPIRATORY (INHALATION) at 05:39

## 2019-06-24 ASSESSMENT — ENCOUNTER SYMPTOMS
SPUTUM PRODUCTION: 0
NAUSEA: 0
FEVER: 0
CLAUDICATION: 0
DEPRESSION: 0
DIARRHEA: 0
FALLS: 0
MYALGIAS: 0
WEAKNESS: 0
FATIGUE: 0
DIZZINESS: 0
SEIZURES: 0
EYE PAIN: 0
PALPITATIONS: 0
LIGHT-HEADEDNESS: 0
CHEST TIGHTNESS: 0
COUGH: 0
ORTHOPNEA: 1
SORE THROAT: 0
HEARTBURN: 0
ABDOMINAL DISTENTION: 0
HEADACHES: 0
UNEXPECTED WEIGHT CHANGE: 1
BLURRED VISION: 0
PND: 0
ABDOMINAL PAIN: 0
WHEEZING: 0
SHORTNESS OF BREATH: 0
CHILLS: 0
SHORTNESS OF BREATH: 1
TREMORS: 0

## 2019-06-24 ASSESSMENT — LIFESTYLE VARIABLES: SUBSTANCE_ABUSE: 0

## 2019-06-24 NOTE — PROGRESS NOTES
Report received from previous nurse. Patient encouraged to call for assistance, call light within reach. Bed locked and in lowest position.

## 2019-06-24 NOTE — PROGRESS NOTES
Cardiology Follow Up Progress Note    Date of Service  6/24/2019    Attending Physician  Katelyn Jacques M.D.    Chief Complaint   Acute decompensated HF    HPI  Andree Egan is a 87 y.o. female admitted 6/20/2019 with chronic illnesses of preserved ejection fraction, pulmonary hypertension, coronary artery disease, chronic atrial fibrillation.    Interim Events  No overnight events, she is laying flat in bed this morning, denies shortness of breath, no orthopnea. Still has significant lower extremity swelling.     On telemetry she is 100% paced, rates 66-78  Review of Systems  Review of Systems   Constitutional: Positive for unexpected weight change (127lbs on 5/2 in clinic). Negative for chills, fatigue and fever.   Respiratory: Negative for chest tightness and shortness of breath.         No orthopnea   Cardiovascular: Positive for leg swelling. Negative for chest pain and palpitations.   Gastrointestinal: Negative for abdominal distention and nausea.   Genitourinary: Negative for difficulty urinating.   Neurological: Negative for dizziness and light-headedness.       Vital signs in last 24 hours  Temp:  [36.2 °C (97.2 °F)-36.8 °C (98.2 °F)] 36.2 °C (97.2 °F)  Pulse:  [67-88] 69  Resp:  [16-18] 18  BP: (105-123)/(49-80) 121/63  SpO2:  [93 %-97 %] 94 %    Physical Exam  Physical Exam   Constitutional: She is oriented to person, place, and time. She appears well-developed and well-nourished. No distress.   HENT:   Head: Normocephalic and atraumatic.   Mouth/Throat: Oropharynx is clear and moist.   Eyes: No scleral icterus.   Neck: Neck supple. JVD (at jawline in supine position) present.   Cardiovascular: Normal rate and regular rhythm.    Pulses:       Radial pulses are 2+ on the right side, and 2+ on the left side.        Dorsalis pedis pulses are 2+ on the right side, and 2+ on the left side.   Pulmonary/Chest: Effort normal and breath sounds normal. No respiratory distress. She has no rales.   Pectus carinatum  Pacemaker present in right upper chest without evidence of erosion, drainage,or erythema.     Abdominal: Soft. She exhibits no distension. There is no tenderness.   Musculoskeletal: She exhibits edema (2+ pitting edema extending to bilateral knees, compression stockings on).   Neurological: She is alert and oriented to person, place, and time.   Skin: Skin is warm and dry. She is not diaphoretic.   Psychiatric: Judgment normal.   Nursing note and vitals reviewed.      Lab Review  Lab Results   Component Value Date/Time    WBC 4.6 (L) 06/24/2019 02:27 AM    RBC 3.10 (L) 06/24/2019 02:27 AM    HEMOGLOBIN 8.4 (L) 06/24/2019 02:27 AM    HEMATOCRIT 29.5 (L) 06/24/2019 02:27 AM    MCV 95.2 06/24/2019 02:27 AM    MCH 27.1 06/24/2019 02:27 AM    MCHC 28.5 (L) 06/24/2019 02:27 AM    MPV 10.8 06/24/2019 02:27 AM      Lab Results   Component Value Date/Time    SODIUM 143 06/24/2019 02:27 AM    POTASSIUM 4.3 06/24/2019 02:27 AM    CHLORIDE 105 06/24/2019 02:27 AM    CO2 31 06/24/2019 02:27 AM    GLUCOSE 96 06/24/2019 02:27 AM    BUN 49 (H) 06/24/2019 02:27 AM    CREATININE 1.54 (H) 06/24/2019 02:27 AM      Lab Results   Component Value Date/Time    ASTSGOT 16 06/22/2019 02:51 AM    ALTSGPT 9 06/22/2019 02:51 AM     Lab Results   Component Value Date/Time    CHOLSTRLTOT 67 (L) 07/09/2018 02:22 AM    LDL 17 07/09/2018 02:22 AM    HDL 40 07/09/2018 02:22 AM    TRIGLYCERIDE 50 07/09/2018 02:22 AM    TROPONINI 0.04 01/05/2019 02:38 PM       Recent Labs      06/22/19   0251   BNPBTYPENAT  941*       Cardiac Imaging and Procedures Review      Echocardiogram:  6/23/19  Previous exam 05/2018, no change in LV function.  Left ventricular ejection fraction is visually estimated to be 50%.  Severely dilated right ventricle.  Reduced right ventricular systolic   function.  Dilated inferior vena cava without inspiratory collapse.    Severe tricuspid regurgitation.    Mild aortic stenosis.  Moderate eccentric mitral  regurgitation.      Assessment/Plan  Acute on chronic diastolic heart failure  RV dysfunction with severe tricuspid regurgitation  - Still significantly volume overloaded  - increase bumex 2mg IV BID with scheduled potassium replacement and add martínez 12.5mg for K-sparing  - monitor I/O   - home weight was 127lbs in clinic, today is 134lb    History of Atrial fibrillation  PPM for history of sick sinus syndrome  - eliquis 2.5mg bid    Pancytopenia  - likely a component of MDS versus dilution, continue to monitor  - normocytic anemia, reviewed iron studies 6/10, B12 and folate which looked ok     Coronary artery disease s/p stent  - continues on plavix and OAC    Chronic kidney disease stage IIIb  - small improvement since admission with diuresis, suspect a level of cardiorenal syndrome    End of life planning  - DNR/DNI  Recommend palliative consult while inpatient with discussion of hospice, will continue discussion.       Thank you for allowing me to participate in the care of this patient.  I will continue to follow this patient    Staffed with Dr. Thomas

## 2019-06-24 NOTE — CARE PLAN
Problem: Communication  Goal: The ability to communicate needs accurately and effectively will improve  Outcome: PROGRESSING AS EXPECTED    Intervention: [place] patient and significant other/support system to call light to alert staff of needs   06/23/19 4003   OTHER   Oriented to: All of the Following : Location of Bathroom, Visiting Policy, Unit Routine, Call Light and Bedside Controls, Bedside Rail Policy, Smoking Policy, Rights and Responsibilities, Bedside Report, and Patient Education Notebook         Problem: Knowledge Deficit  Goal: Knowledge of disease process/condition, treatment plan, diagnostic tests, and medications will improve  Outcome: PROGRESSING AS EXPECTED  Patient educated about disease process and plan of care for the shift. Patient expressed understanding. All questions answered at this time.

## 2019-06-24 NOTE — TELEPHONE ENCOUNTER
----- Message from Madiha Rcokwell, Med Ass't sent at 6/24/2019  9:44 AM PDT -----  Regarding: hospital concerns  Contact: [phone]  Guillermo/FLACO Waldrop, granddaughter of pt called, stated she is concerned that if the pt is discharged that the same thing will happen, that she will swell up again.  Please call Palma to advise

## 2019-06-24 NOTE — TELEPHONE ENCOUNTER
Called and advised pt's granddaughter that our heart failure PA is rounding on the pt today and would help come up with a plan to keep the pt from being readmitted. She states understanding  .

## 2019-06-25 ENCOUNTER — PATIENT OUTREACH (OUTPATIENT)
Dept: HEALTH INFORMATION MANAGEMENT | Facility: OTHER | Age: 84
End: 2019-06-25

## 2019-06-25 LAB
ANION GAP SERPL CALC-SCNC: 5 MMOL/L (ref 0–11.9)
BASOPHILS # BLD AUTO: 0.8 % (ref 0–1.8)
BASOPHILS # BLD: 0.03 K/UL (ref 0–0.12)
BUN SERPL-MCNC: 48 MG/DL (ref 8–22)
CALCIUM SERPL-MCNC: 9.1 MG/DL (ref 8.5–10.5)
CHLORIDE SERPL-SCNC: 104 MMOL/L (ref 96–112)
CO2 SERPL-SCNC: 31 MMOL/L (ref 20–33)
CREAT SERPL-MCNC: 1.64 MG/DL (ref 0.5–1.4)
EOSINOPHIL # BLD AUTO: 0 K/UL (ref 0–0.51)
EOSINOPHIL NFR BLD: 0 % (ref 0–6.9)
ERYTHROCYTE [DISTWIDTH] IN BLOOD BY AUTOMATED COUNT: 56.7 FL (ref 35.9–50)
GLUCOSE SERPL-MCNC: 116 MG/DL (ref 65–99)
HCT VFR BLD AUTO: 28 % (ref 37–47)
HGB BLD-MCNC: 8.1 G/DL (ref 12–16)
IMM GRANULOCYTES # BLD AUTO: 0.01 K/UL (ref 0–0.11)
IMM GRANULOCYTES NFR BLD AUTO: 0.3 % (ref 0–0.9)
LYMPHOCYTES # BLD AUTO: 0.65 K/UL (ref 1–4.8)
LYMPHOCYTES NFR BLD: 16.9 % (ref 22–41)
MCH RBC QN AUTO: 27.1 PG (ref 27–33)
MCHC RBC AUTO-ENTMCNC: 28.9 G/DL (ref 33.6–35)
MCV RBC AUTO: 93.6 FL (ref 81.4–97.8)
MONOCYTES # BLD AUTO: 0.4 K/UL (ref 0–0.85)
MONOCYTES NFR BLD AUTO: 10.4 % (ref 0–13.4)
NEUTROPHILS # BLD AUTO: 2.75 K/UL (ref 2–7.15)
NEUTROPHILS NFR BLD: 71.6 % (ref 44–72)
NRBC # BLD AUTO: 0 K/UL
NRBC BLD-RTO: 0 /100 WBC
PLATELET # BLD AUTO: 128 K/UL (ref 164–446)
PMV BLD AUTO: 11 FL (ref 9–12.9)
POTASSIUM SERPL-SCNC: 4.3 MMOL/L (ref 3.6–5.5)
RBC # BLD AUTO: 2.99 M/UL (ref 4.2–5.4)
SODIUM SERPL-SCNC: 140 MMOL/L (ref 135–145)
WBC # BLD AUTO: 3.8 K/UL (ref 4.8–10.8)

## 2019-06-25 PROCEDURE — 700111 HCHG RX REV CODE 636 W/ 250 OVERRIDE (IP): Performed by: PHYSICIAN ASSISTANT

## 2019-06-25 PROCEDURE — 770020 HCHG ROOM/CARE - TELE (206)

## 2019-06-25 PROCEDURE — 80048 BASIC METABOLIC PNL TOTAL CA: CPT

## 2019-06-25 PROCEDURE — A9270 NON-COVERED ITEM OR SERVICE: HCPCS | Performed by: PHYSICIAN ASSISTANT

## 2019-06-25 PROCEDURE — 36415 COLL VENOUS BLD VENIPUNCTURE: CPT

## 2019-06-25 PROCEDURE — A9270 NON-COVERED ITEM OR SERVICE: HCPCS | Performed by: FAMILY MEDICINE

## 2019-06-25 PROCEDURE — 99232 SBSQ HOSP IP/OBS MODERATE 35: CPT | Performed by: INTERNAL MEDICINE

## 2019-06-25 PROCEDURE — 85025 COMPLETE CBC W/AUTO DIFF WBC: CPT

## 2019-06-25 PROCEDURE — 700102 HCHG RX REV CODE 250 W/ 637 OVERRIDE(OP): Performed by: FAMILY MEDICINE

## 2019-06-25 PROCEDURE — 700102 HCHG RX REV CODE 250 W/ 637 OVERRIDE(OP): Performed by: PHYSICIAN ASSISTANT

## 2019-06-25 RX ADMIN — CLOPIDOGREL BISULFATE 75 MG: 75 TABLET ORAL at 04:55

## 2019-06-25 RX ADMIN — UMECLIDINIUM BROMIDE AND VILANTEROL TRIFENATATE 1 PUFF: 62.5; 25 POWDER RESPIRATORY (INHALATION) at 04:58

## 2019-06-25 RX ADMIN — TOPIRAMATE 25 MG: 25 TABLET, FILM COATED ORAL at 04:56

## 2019-06-25 RX ADMIN — BUMETANIDE 2 MG: 0.25 INJECTION, SOLUTION INTRAMUSCULAR; INTRAVENOUS at 05:02

## 2019-06-25 RX ADMIN — POTASSIUM CHLORIDE 20 MEQ: 1500 TABLET, EXTENDED RELEASE ORAL at 17:42

## 2019-06-25 RX ADMIN — ROPINIROLE HYDROCHLORIDE 1 MG: 0.5 TABLET, FILM COATED ORAL at 17:42

## 2019-06-25 RX ADMIN — APIXABAN 2.5 MG: 2.5 TABLET, FILM COATED ORAL at 17:41

## 2019-06-25 RX ADMIN — TOPIRAMATE 25 MG: 25 TABLET, FILM COATED ORAL at 17:42

## 2019-06-25 RX ADMIN — OMEPRAZOLE 20 MG: 20 CAPSULE, DELAYED RELEASE ORAL at 17:41

## 2019-06-25 RX ADMIN — ROPINIROLE HYDROCHLORIDE 1 MG: 0.5 TABLET, FILM COATED ORAL at 10:48

## 2019-06-25 RX ADMIN — APIXABAN 2.5 MG: 2.5 TABLET, FILM COATED ORAL at 04:57

## 2019-06-25 RX ADMIN — ROPINIROLE HYDROCHLORIDE 1 MG: 0.5 TABLET, FILM COATED ORAL at 03:21

## 2019-06-25 RX ADMIN — ROSUVASTATIN CALCIUM 10 MG: 10 TABLET, FILM COATED ORAL at 17:42

## 2019-06-25 RX ADMIN — FLUTICASONE PROPIONATE 110 MCG: 110 AEROSOL, METERED RESPIRATORY (INHALATION) at 04:58

## 2019-06-25 RX ADMIN — SERTRALINE HYDROCHLORIDE 100 MG: 100 TABLET, FILM COATED ORAL at 21:00

## 2019-06-25 RX ADMIN — OMEPRAZOLE 20 MG: 20 CAPSULE, DELAYED RELEASE ORAL at 04:57

## 2019-06-25 RX ADMIN — FERROUS SULFATE TAB 325 MG (65 MG ELEMENTAL FE) 325 MG: 325 (65 FE) TAB at 05:04

## 2019-06-25 RX ADMIN — SPIRONOLACTONE 12.5 MG: 25 TABLET ORAL at 04:55

## 2019-06-25 RX ADMIN — LEVOTHYROXINE SODIUM 50 MCG: 50 TABLET ORAL at 04:55

## 2019-06-25 RX ADMIN — CYANOCOBALAMIN TAB 500 MCG 1000 MCG: 500 TAB at 04:55

## 2019-06-25 RX ADMIN — POTASSIUM CHLORIDE 20 MEQ: 1500 TABLET, EXTENDED RELEASE ORAL at 04:56

## 2019-06-25 RX ADMIN — BUMETANIDE 2 MG: 0.25 INJECTION, SOLUTION INTRAMUSCULAR; INTRAVENOUS at 17:41

## 2019-06-25 ASSESSMENT — ENCOUNTER SYMPTOMS
SPUTUM PRODUCTION: 0
BLURRED VISION: 0
NAUSEA: 0
FEVER: 0
MYALGIAS: 0
HEADACHES: 0
LIGHT-HEADEDNESS: 0
CLAUDICATION: 0
DIZZINESS: 0
EYE PAIN: 0
ORTHOPNEA: 1
COUGH: 0
ABDOMINAL DISTENTION: 0
FALLS: 0
VOMITING: 0
PALPITATIONS: 0
SHORTNESS OF BREATH: 1
WHEEZING: 0
PND: 0
SENSORY CHANGE: 0
HEARTBURN: 0
DEPRESSION: 0
LOSS OF CONSCIOUSNESS: 0
WEAKNESS: 1
SORE THROAT: 0
DIARRHEA: 0
FATIGUE: 0
CHEST TIGHTNESS: 0
CHILLS: 0

## 2019-06-25 ASSESSMENT — LIFESTYLE VARIABLES: SUBSTANCE_ABUSE: 0

## 2019-06-25 NOTE — CONSULTS
Reason for PC Consult: Advance Care Planning    Consulted by: Dr Jacques    Assessment:  General: 88 yo female admitted on 6/20/2019 for Acute kidney injury. PMH: Anemia; Arthritis; Atrial fibrillation (3/29/2018); Back pain; Blood clotting disorder; Breast cancer; Breath shortness; Cataract; Congestive heart failure (4/3/2018); Constipation; Coronary heart disease; Daytime sleepiness; Dyspnea on exertion) (5/4/2018); GERD; Heart attack ; Heart valve disease; Hemorrhagic disorder; Hiatus hernia syndrome; Hyperlipidemia; HTN; Hypothyroidism; Kidney disease; Long term current use of anticoagulant (3/29/2018); Nasal drainage; Osteoporosis; Pacemaker (07/2011); Pneumonia; Pulmonary emphysema; Restless leg syndrome; Rheumatoid arthritis; Ringing in ears; Shortness of breath; Swelling of lower extremity; Thyroid disease; Tonsillitis; Tremors of nervous system (4/3/2018); Urinary incontinence; and Urinary tract infection.  Pt has been having increasing bilat LE edema and SOB with exertion since approx March. Pt Cardiologist has been actively working with her with medication management. Pt had labs done outpt cardiology which showed that her creatinine was trending up at 1.7. Pt was called by her Cardiologist who requested that she present to the ED.       Dyspnea: No, resp rate 17, 95% on room air  Last BM: 06/25/19  Pain: No, currently denies  Depression: Mood appropriate for situation   Dementia: No;       Spiritual:  Is Latter day or spirituality important for coping with this illness? No   Has a  or spiritual provider visit been requested? No    Palliative Performance Scale: 50%    Advance Directive: Advance Directive on File  DPOA: Yes, Palma oneillmers [phone], granddaughter  POLST: No, POLST on file does not have MD signature. Appt made with pt and granddaughter to complete this afternoon.     Code Status: DNR/DNI    Outcome:  Met with the pt at the bedside, introduced self and the role of Palliative  care/support. Spoke about pts current admit and cardiac hx. Pt has her AD in place and stated that she has discussed end of life extensively with her Granddaughter, Chuy, her DPOA. Pt states that she also lives with Palma at this time.   Spoke about the severity of her heart condition and the progressiveness. Asked if she and Palma had ever discussed Hospice support for the future for symptom management. Pt adamant that she is not ready and/or interested in Hospice at this time. Spoke briefly on the support that this can give in the future for her and Palma when she is ready.   Discussed POLST. Explained to pt that her current POLST on file was never signed by the MD which invalidates the document. Discussed completing a new POLST. Pt stated that she would want Palma to be at the bedside when this occurred. Agreed that I would call Palma and make arrangements to meet together to go over the POLST form.     Called and spoke with Palma. Arranged to meet at the bedside at 15:00 today to go over POLST for her Grandmother.     Updated pt that Chuy would come in this afternoon to complete the POLST together at 15:00.     Updated: MD    Plan: TBD    Recommendations: I do not recommend an ethics or hospice consult at this time because Pt wishes to continue with current treatment plan. .    Thank you for allowing Palliative Care to participate in this patient's care. Please feel free to call [phone] with any questions or concerns.

## 2019-06-25 NOTE — PROGRESS NOTES
Received report from night shift RN Priscilla. Assumed care of patient. Patient is 100% paced on the monitor at this time. Patient is sitting up in chair with no family present at bedside at this time. Patient declines any needs at this time. Plan of care discussed with patient. Bed locked and in the lowest position with call light within reach.

## 2019-06-25 NOTE — PROGRESS NOTES
Cardiology Follow Up Progress Note    Date of Service  6/25/2019    Attending Physician  Katelyn Jacques M.D.    Chief Complaint   Leg swelling     Cardiology consult  Acute decompensated HF    HPI  Andree Egan is a 87 y.o. female admitted 6/20/2019 with leg swelling.     Past medical history of chronic illnesses of preserved ejection fraction, pulmonary hypertension, coronary artery disease, and  chronic atrial fibrillation.    Interim Events  6/25/19: Patient sitting up in a chair.  Her dyspnea on exertion has improved per patient.  Lower extremity edema is improving.  Paced on the monitor.  Vital signs stable      Review of Systems  Review of Systems   Constitutional: Negative for chills, fatigue and fever.   Respiratory: Positive for shortness of breath. Negative for chest tightness.         No orthopnea   Cardiovascular: Positive for leg swelling. Negative for chest pain and palpitations.   Gastrointestinal: Negative for abdominal distention and nausea.   Genitourinary: Negative for difficulty urinating.   Neurological: Negative for dizziness and light-headedness.       Vital signs in last 24 hours  Temp:  [36.1 °C (97 °F)-37 °C (98.6 °F)] 36.6 °C (97.8 °F)  Pulse:  [65-83] 65  Resp:  [17-18] 17  BP: (101-117)/(48-69) 116/48  SpO2:  [94 %-97 %] 94 %    Physical Exam  Physical Exam   Constitutional: She is oriented to person, place, and time. She appears well-developed and well-nourished. No distress.   HENT:   Head: Normocephalic and atraumatic.   Mouth/Throat: Oropharynx is clear and moist.   Eyes: No scleral icterus.   Neck: Neck supple. JVD (at jawline in supine position) present.   Cardiovascular: Normal rate and regular rhythm.    Pulses:       Radial pulses are 2+ on the right side, and 2+ on the left side.        Dorsalis pedis pulses are 2+ on the right side, and 2+ on the left side.   Pacemaker present in the right upper chest    Pulmonary/Chest: Effort normal and breath sounds normal. No respiratory  distress. She has no rales.        Abdominal: Soft. She exhibits no distension. There is no tenderness.   Musculoskeletal: She exhibits edema (2+ pitting edema extending to bilateral knees, compression stockings on).   Neurological: She is alert and oriented to person, place, and time.   Skin: Skin is warm and dry. She is not diaphoretic.   Psychiatric: Judgment normal.   Nursing note and vitals reviewed.      Lab Review  Lab Results   Component Value Date/Time    WBC 3.8 (L) 06/25/2019 04:39 AM    RBC 2.99 (L) 06/25/2019 04:39 AM    HEMOGLOBIN 8.1 (L) 06/25/2019 04:39 AM    HEMATOCRIT 28.0 (L) 06/25/2019 04:39 AM    MCV 93.6 06/25/2019 04:39 AM    MCH 27.1 06/25/2019 04:39 AM    MCHC 28.9 (L) 06/25/2019 04:39 AM    MPV 11.0 06/25/2019 04:39 AM      Lab Results   Component Value Date/Time    SODIUM 140 06/25/2019 04:39 AM    POTASSIUM 4.3 06/25/2019 04:39 AM    CHLORIDE 104 06/25/2019 04:39 AM    CO2 31 06/25/2019 04:39 AM    GLUCOSE 116 (H) 06/25/2019 04:39 AM    BUN 48 (H) 06/25/2019 04:39 AM    CREATININE 1.64 (H) 06/25/2019 04:39 AM      Lab Results   Component Value Date/Time    ASTSGOT 16 06/22/2019 02:51 AM    ALTSGPT 9 06/22/2019 02:51 AM     Lab Results   Component Value Date/Time    CHOLSTRLTOT 67 (L) 07/09/2018 02:22 AM    LDL 17 07/09/2018 02:22 AM    HDL 40 07/09/2018 02:22 AM    TRIGLYCERIDE 50 07/09/2018 02:22 AM    TROPONINI 0.04 01/05/2019 02:38 PM             Cardiac Imaging and Procedures Review      Echocardiogram:  6/23/19  Previous exam 05/2018, no change in LV function.  Left ventricular ejection fraction is visually estimated to be 50%.  Severely dilated right ventricle.  Reduced right ventricular systolic   function.  Dilated inferior vena cava without inspiratory collapse.    Severe tricuspid regurgitation.    Mild aortic stenosis.  Moderate eccentric mitral regurgitation.  Grade II diastolic dysfunction.      Assessment/Plan    1. Acute on chronic diastolic heart failure:  - improving   -  continue bumex 2mg IV BID with scheduled potassium replacement and martínez 12.5mg .   - continue toprol XL 25mg qd   - monitor strict I and O and daily weight   - dry weight 127lbs, today is 131lb    2. History of Atrial fibrillation  - continue toprol XL as noted above, changed in parameter   - eliquis 2.5mg bid    3. Severe tricuspid regurgitation:  - monitor     4. Coronary artery disease s/p stent  - continues on plavix and OAC  - continue statin therapy     5. Chronic kidney disease stage IIIb  - small improvement since admission with diuresis, suspect a level of cardiorenal syndrome    6. S/p Pacemaker:  - history of SSS      Thank you for allowing me to participate in the care of this patient.  I will continue to follow this patient    [facility]elSaint [facility]

## 2019-06-25 NOTE — PROGRESS NOTES
Received report from night shift RN Linda. Assumed care of patient. Patient is Paced on the monitor at this time. Patient is sitting up in bed with no family present at bedside at this time. Patient declines any needs at this time. Plan of care discussed with patient. Bed locked and in the lowest position with call light within reach.

## 2019-06-25 NOTE — PALLIATIVE CARE
Palliative Care follow-up  Met with pt and Granddaughter Palma at the bedside to discuss POLST. Went over POLST in detail, POLST completed for DNR/DNI with [facility]. Attempted to engage pt in conversation on hospice support while Palma present. Pt stated that did not wish to have th is discussion at this time.   Provided pt and family with Palliative team contact number for any further needs or questions.   MD reviewed and signed POLST, scanned into EMR, original placed on pts hard chart for DC.     Updated: MD, Unit CM RN    Plan: TBD on pts admission progression. Possible need for HH vs SNF

## 2019-06-25 NOTE — PROGRESS NOTES
[facility] Medicine Daily Progress Note    Date of Service  6/24/2019    Chief Complaint  87 y.o. female admitted 6/20/2019 with leg swelling     Hospital Course    87-year-old female past medical history of severe tricuspid insufficiency, pulmonary hypertension was admitted for worsening leg swelling or shortness of breath. She started on IV lasix and she is doing better. Acute on chronic CKD on admission       Interval Problem Update  6/22.  Patient continues to have bilateral lower extremity edema.  Worsening signs of hypokalemia this morning.  Aggressive p.o. replaced.  Patient cannot tolerate IV replace.  Patient complains of general fatigue and body achiness. Patient's pain is general 2-3/10, intermittent and does not radiate to other location, sharp and with some tingling. Can be controlled by pain meds.  6/23. Patient lower extremity edema slightly improved still significant to yesterday.  Patient has no significant signs of shortness of breath.  Patient diuresed very well on IV Bumex.  Potassium slightly improved. Patient otherwise denies fever, chills, nausea, vomiting, adb pain, SOB, CP, headache, constipation, diarrhea, cough, or sputum.  6/24. Patient currently still complains of bilateral lower extremity not improving enough at baseline.  Patient tolerated IV diuretics, appreciate cardiologist input.  We will increase Bumex 2 mg.  Patient's potassium significantly improved.  Close monitor BMP.  Patient complains of general fatigue and body achiness. Patient's pain is general, 3 to/10, intermittent and does not radiate to other location, sharp and with some tingling. Can be controlled by pain meds. Dressing in place.      Consultants/Specialty  Cardiology     Code Status  DNAR/DNI   Total time spent on advanced care planning, excluding time spent on daily care: 16 minutes.    1st 30 minutes [id number]     I discussed extensively with patient and patient's family is regarding code status and plan of care. We  also discussed advanced care planning including diagnosis, prognosis, plan of care, risks and benefits of any therapies that could be offered, as well as alternatives including palliation and hospice, as appropriate. My discussion is summarized above in detail. Confirmed with DNR      Disposition  inpatient     Review of Systems  Review of Systems   Constitutional: Negative for chills and fever.   HENT: Negative for congestion, ear pain, hearing loss, nosebleeds and sore throat.    Eyes: Negative for blurred vision and pain.   Respiratory: Positive for shortness of breath. Negative for cough, sputum production and wheezing.    Cardiovascular: Positive for orthopnea and leg swelling. Negative for palpitations, claudication and PND.   Gastrointestinal: Negative for abdominal pain, diarrhea, heartburn and nausea.   Genitourinary: Negative for dysuria, frequency, hematuria and urgency.   Musculoskeletal: Negative for falls, joint pain and myalgias.   Skin: Negative for rash.   Neurological: Negative for dizziness, tremors, seizures, weakness and headaches.   Psychiatric/Behavioral: Negative for depression and substance abuse.        Physical Exam  Temp:  [36.2 °C (97.2 °F)-36.8 °C (98.2 °F)] 36.3 °C (97.4 °F)  Pulse:  [67-88] 78  Resp:  [16-18] 18  BP: (101-123)/(49-80) 101/57  SpO2:  [93 %-97 %] 95 %    Physical Exam   Constitutional: She is oriented to person, place, and time. She appears well-developed.   HENT:   Head: Normocephalic and atraumatic.   Nose: Nose normal.   Mouth/Throat: No oropharyngeal exudate.   Eyes: Conjunctivae and EOM are normal.   Neck: Normal range of motion. Neck supple. No thyromegaly present.   Cardiovascular: Normal rate and regular rhythm.  Exam reveals no gallop and no friction rub.    Murmur heard.  Pulmonary/Chest: Effort normal. No respiratory distress. She has no rales. She exhibits no tenderness.   Abdominal: Soft. Bowel sounds are normal. She exhibits no distension and no mass.  There is no guarding.   Musculoskeletal: Normal range of motion. She exhibits edema. She exhibits no tenderness.   Lymphadenopathy:     She has no cervical adenopathy.   Neurological: She is alert and oriented to person, place, and time. No cranial nerve deficit.   Skin: Skin is warm. No rash noted. She is not diaphoretic.   Psychiatric: She has a normal mood and affect. Her behavior is normal.       Fluids    Intake/Output Summary (Last 24 hours) at 06/24/19 1708  Last data filed at 06/24/19 1300   Gross per 24 hour   Intake             1410 ml   Output             1500 ml   Net              -90 ml       Laboratory  Recent Labs      06/22/19   0251  06/23/19   0204  06/24/19 0227   WBC  4.5*  4.0*  4.6*   RBC  3.12*  2.92*  3.10*   HEMOGLOBIN  8.8*  7.9*  8.4*   HEMATOCRIT  27.9*  26.7*  29.5*   MCV  89.4  91.4  95.2   MCH  28.2  27.1  27.1   MCHC  31.5*  29.6*  28.5*   RDW  53.3*  54.5*  57.9*   PLATELETCT  123*  123*  130*   MPV  10.8  10.6  10.8     Recent Labs      06/22/19   0251  06/22/19   1200  06/23/19   0204  06/24/19 0227   SODIUM  141   --   142  143   POTASSIUM  2.6*  2.9*  3.3*  4.3   CHLORIDE  100   --   103  105   CO2  29   --   28  31   GLUCOSE  114*   --   135*  96   BUN  49*   --   51*  49*   CREATININE  1.63*   --   1.58*  1.54*   CALCIUM  8.3*   --   8.3*  8.6         Recent Labs      06/22/19   0251   BNPBTYPENAT  941*           Imaging  EC-ECHOCARDIOGRAM COMPLETE W/O CONT   Final Result           Assessment/Plan  Hypokalemia:  -Likely due to diuresis  -Continue aggressive replacement  -Potassium 2.9->3.3->4  Close monitor on telemetry no signs of arrhythmia  MARICRUZ (acute kidney injury) (HCC)- (present on admission)   Assessment & Plan    Resolving. Likely cardiorenal. Back to baseline. Pt would not like to consult nephrology at this time. She is not interested on dialysis.   Cr 1.5->1.7->1.5 stable  Potassium replaced, magnesium at goal  supplement     Severe tricuspid regurgitation-  (present on admission)   Assessment & Plan    Echo pending   Cards recommendation appreciated     Pulmonary hypertension (HCC)- (present on admission)   Assessment & Plan    Patient was put on IV diuretics currently stopped by cardiologist due to severe hypokalemia.  Aggressive replace potassium with oral     Bilateral lower extremity edema- (present on admission)   Assessment & Plan    Better   Currently diuretics, will increase with Bumex IV 2 mg twice daily  Continue sodium restriction and fluid restriction     Chronic respiratory failure with hypoxia (HCC)- (present on admission)   Assessment & Plan    Keep O2 sats above 92%  RT protocol     CKD (chronic kidney disease) stage 3, GFR 30-59 ml/min (HCC)- (present on admission)   Assessment & Plan    Stable      COPD (chronic obstructive pulmonary disease) (HCC)- (present on admission)   Assessment & Plan    Continue Fluticasone, RT protocol     Chronic atrial fibrillation (HCC)- (present on admission)   Assessment & Plan    Telemetry monitoring  Eliquis     Restless legs syndrome (RLS)- (present on admission)   Assessment & Plan    Continue Requip     Hypothyroidism- (present on admission)   Assessment & Plan    Continue Synthroid, check TSH     GERD (gastroesophageal reflux disease)- (present on admission)   Assessment & Plan    Continue Omeprazole     Cardiac pacemaker in situ- (present on admission)   Assessment & Plan    Telemetry monitoring          VTE prophylaxis:  apixaban     Current Facility-Administered Medications:   •  spironolactone (ALDACTONE) tablet 12.5 mg, 12.5 mg, Oral, Q DAY, Nora Cano, P.A.-C., 12.5 mg at 06/24/19 1227  •  potassium chloride SA (Kdur) tablet 20 mEq, 20 mEq, Oral, BID, Nora Cano, P.A.-C., 20 mEq at 06/24/19 1227  •  bumetanide (BUMEX) injection 2 mg, 2 mg, Intravenous, BID DIURETIC, Nora Cano, P.A.-C.  •  hydrocortisone rectal (PROCTOZONE HC) 2.5 % cream, , Rectal, QDAY PRN, Katelyn Jacques M.D.  •   albuterol inhaler 2 Puff, 2 Puff, Inhalation, Q6HRS PRN, Storm Farah M.D.  •  clopidogrel (PLAVIX) tablet 75 mg, 75 mg, Oral, DAILY, Storm Farah M.D., 75 mg at 06/24/19 0537  •  cyanocobalamin (VITAMIN B-12) tablet 1,000 mcg, 1,000 mcg, Oral, DAILY, Storm Farah M.D., 1,000 mcg at 06/24/19 0536  •  apixaban (ELIQUIS) 2.5mg tablet 2.5 mg, 2.5 mg, Oral, BID, Storm Farah M.D., 2.5 mg at 06/24/19 0537  •  ferrous sulfate tablet 325 mg, 325 mg, Oral, Q48HRS, Storm Farah M.D., 325 mg at 06/23/19 0523  •  fluticasone (FLOVENT HFA) 110 MCG/ACT inhaler 110 mcg, 1 Puff, Inhalation, DAILY, Storm Farah M.D., 110 mcg at 06/24/19 0539  •  umeclidinium-vilanterol (ANORO ELLIPTA) inhaler 1 Puff, 1 Puff, Inhalation, DAILY, Storm Farah M.D., 1 Puff at 06/24/19 0541  •  levothyroxine (SYNTHROID) tablet 50 mcg, 50 mcg, Oral, AM ES, Storm Farah M.D., 50 mcg at 06/24/19 0537  •  metoprolol SR (TOPROL XL) tablet 25 mg, 25 mg, Oral, QAM, Storm Farah M.D., 25 mg at 06/24/19 0537  •  omeprazole (PRILOSEC) capsule 20 mg, 20 mg, Oral, BID, Storm Farah M.D., 20 mg at 06/24/19 0537  •  ROPINIRole (REQUIP) tablet 1 mg, 1 mg, Oral, TID, Storm Farah M.D., 1 mg at 06/24/19 1110  •  rosuvastatin (CRESTOR) tablet 10 mg, 10 mg, Oral, Q EVENING, Storm Farah M.D., 10 mg at 06/23/19 1656  •  sertraline (ZOLOFT) tablet 100 mg, 100 mg, Oral, QHS, Storm Farah M.D., 100 mg at 06/23/19 1930  •  topiramate (TOPAMAX) tablet 25 mg, 25 mg, Oral, BID, Storm Farah M.D., 25 mg at 06/24/19 0536  •  senna-docusate (PERICOLACE or SENOKOT S) 8.6-50 MG per tablet 2 Tab, 2 Tab, Oral, BID, Stopped at 06/23/19 0600 **AND** polyethylene glycol/lytes (MIRALAX) PACKET 1 Packet, 1 Packet, Oral, QDAY PRN **AND** magnesium hydroxide (MILK OF MAGNESIA) suspension 30 mL, 30 mL, Oral, QDAY PRN **AND** bisacodyl (DULCOLAX) suppository 10 mg, 10 mg, Rectal, QDAY PRN, Storm Farah M.D.  •   Respiratory Care per Protocol, , Nebulization, Continuous RT, Storm Farah M.D.  •  acetaminophen (TYLENOL) tablet 650 mg, 650 mg, Oral, Q6HRS PRN, Storm Farah M.D., 650 mg at 06/23/19 1656  •  ondansetron (ZOFRAN) syringe/vial injection 4 mg, 4 mg, Intravenous, Q4HRS PRN, Storm Farah M.D.  •  ondansetron (ZOFRAN ODT) dispertab 4 mg, 4 mg, Oral, Q4HRS PRN, Storm Farah M.D.

## 2019-06-25 NOTE — PROGRESS NOTES
Assumed care at 1900, bedside report received from day shift RN. Pt is paced on the telemetry monitor. Patient is AO x 4 and is resting in bed with even respirations. Initial assessment completed and orders reviewed. POC addressed with patient. Call light within reach and hourly rounding in place. No further questions at this time. Fall precautions in place.   3+ pitting edema to lower extremities, suleiman hose in place and skin assessed underneath with no signs of damaged skin integrity underneath suleiman hose. 2L oxygen placed on patient which patient wears at night baseline.

## 2019-06-26 LAB
ANION GAP SERPL CALC-SCNC: 9 MMOL/L (ref 0–11.9)
ANISOCYTOSIS BLD QL SMEAR: ABNORMAL
BASOPHILS # BLD AUTO: 0.4 % (ref 0–1.8)
BASOPHILS # BLD: 0.02 K/UL (ref 0–0.12)
BUN SERPL-MCNC: 51 MG/DL (ref 8–22)
CALCIUM SERPL-MCNC: 8.6 MG/DL (ref 8.5–10.5)
CHLORIDE SERPL-SCNC: 104 MMOL/L (ref 96–112)
CO2 SERPL-SCNC: 28 MMOL/L (ref 20–33)
COMMENT 1642: NORMAL
CREAT SERPL-MCNC: 1.57 MG/DL (ref 0.5–1.4)
EOSINOPHIL # BLD AUTO: 0 K/UL (ref 0–0.51)
EOSINOPHIL NFR BLD: 0 % (ref 0–6.9)
ERYTHROCYTE [DISTWIDTH] IN BLOOD BY AUTOMATED COUNT: 55.8 FL (ref 35.9–50)
GLUCOSE SERPL-MCNC: 119 MG/DL (ref 65–99)
HCT VFR BLD AUTO: 27.5 % (ref 37–47)
HGB BLD-MCNC: 8.2 G/DL (ref 12–16)
IMM GRANULOCYTES # BLD AUTO: 0.03 K/UL (ref 0–0.11)
IMM GRANULOCYTES NFR BLD AUTO: 0.6 % (ref 0–0.9)
LYMPHOCYTES # BLD AUTO: 0.64 K/UL (ref 1–4.8)
LYMPHOCYTES NFR BLD: 13.2 % (ref 22–41)
MCH RBC QN AUTO: 27.2 PG (ref 27–33)
MCHC RBC AUTO-ENTMCNC: 29.8 G/DL (ref 33.6–35)
MCV RBC AUTO: 91.4 FL (ref 81.4–97.8)
MONOCYTES # BLD AUTO: 0.43 K/UL (ref 0–0.85)
MONOCYTES NFR BLD AUTO: 8.9 % (ref 0–13.4)
MORPHOLOGY BLD-IMP: NORMAL
NEUTROPHILS # BLD AUTO: 3.72 K/UL (ref 2–7.15)
NEUTROPHILS NFR BLD: 76.9 % (ref 44–72)
NRBC # BLD AUTO: 0 K/UL
NRBC BLD-RTO: 0 /100 WBC
PLATELET # BLD AUTO: 134 K/UL (ref 164–446)
PLATELET BLD QL SMEAR: NORMAL
PMV BLD AUTO: 11.2 FL (ref 9–12.9)
POIKILOCYTOSIS BLD QL SMEAR: NORMAL
POTASSIUM SERPL-SCNC: 4.6 MMOL/L (ref 3.6–5.5)
RBC # BLD AUTO: 3.01 M/UL (ref 4.2–5.4)
RBC BLD AUTO: PRESENT
SODIUM SERPL-SCNC: 141 MMOL/L (ref 135–145)
WBC # BLD AUTO: 4.8 K/UL (ref 4.8–10.8)

## 2019-06-26 PROCEDURE — 80048 BASIC METABOLIC PNL TOTAL CA: CPT

## 2019-06-26 PROCEDURE — 97165 OT EVAL LOW COMPLEX 30 MIN: CPT

## 2019-06-26 PROCEDURE — 700111 HCHG RX REV CODE 636 W/ 250 OVERRIDE (IP): Performed by: PHYSICIAN ASSISTANT

## 2019-06-26 PROCEDURE — A9270 NON-COVERED ITEM OR SERVICE: HCPCS | Performed by: NURSE PRACTITIONER

## 2019-06-26 PROCEDURE — 36415 COLL VENOUS BLD VENIPUNCTURE: CPT

## 2019-06-26 PROCEDURE — 99232 SBSQ HOSP IP/OBS MODERATE 35: CPT | Performed by: INTERNAL MEDICINE

## 2019-06-26 PROCEDURE — A9270 NON-COVERED ITEM OR SERVICE: HCPCS | Performed by: PHYSICIAN ASSISTANT

## 2019-06-26 PROCEDURE — 770020 HCHG ROOM/CARE - TELE (206)

## 2019-06-26 PROCEDURE — 700102 HCHG RX REV CODE 250 W/ 637 OVERRIDE(OP): Performed by: NURSE PRACTITIONER

## 2019-06-26 PROCEDURE — 97161 PT EVAL LOW COMPLEX 20 MIN: CPT

## 2019-06-26 PROCEDURE — 85025 COMPLETE CBC W/AUTO DIFF WBC: CPT

## 2019-06-26 PROCEDURE — 700102 HCHG RX REV CODE 250 W/ 637 OVERRIDE(OP): Performed by: PHYSICIAN ASSISTANT

## 2019-06-26 PROCEDURE — 700102 HCHG RX REV CODE 250 W/ 637 OVERRIDE(OP): Performed by: FAMILY MEDICINE

## 2019-06-26 PROCEDURE — A9270 NON-COVERED ITEM OR SERVICE: HCPCS | Performed by: FAMILY MEDICINE

## 2019-06-26 RX ORDER — BUMETANIDE 1 MG/1
2 TABLET ORAL
Status: DISCONTINUED | OUTPATIENT
Start: 2019-06-26 | End: 2019-06-27 | Stop reason: HOSPADM

## 2019-06-26 RX ADMIN — APIXABAN 2.5 MG: 2.5 TABLET, FILM COATED ORAL at 16:54

## 2019-06-26 RX ADMIN — FLUTICASONE PROPIONATE 110 MCG: 110 AEROSOL, METERED RESPIRATORY (INHALATION) at 05:41

## 2019-06-26 RX ADMIN — LEVOTHYROXINE SODIUM 50 MCG: 50 TABLET ORAL at 05:34

## 2019-06-26 RX ADMIN — POTASSIUM CHLORIDE 20 MEQ: 1500 TABLET, EXTENDED RELEASE ORAL at 05:34

## 2019-06-26 RX ADMIN — OMEPRAZOLE 20 MG: 20 CAPSULE, DELAYED RELEASE ORAL at 05:34

## 2019-06-26 RX ADMIN — OMEPRAZOLE 20 MG: 20 CAPSULE, DELAYED RELEASE ORAL at 16:55

## 2019-06-26 RX ADMIN — ROSUVASTATIN CALCIUM 10 MG: 10 TABLET, FILM COATED ORAL at 16:55

## 2019-06-26 RX ADMIN — UMECLIDINIUM BROMIDE AND VILANTEROL TRIFENATATE 1 PUFF: 62.5; 25 POWDER RESPIRATORY (INHALATION) at 05:41

## 2019-06-26 RX ADMIN — ROPINIROLE HYDROCHLORIDE 1 MG: 0.5 TABLET, FILM COATED ORAL at 11:05

## 2019-06-26 RX ADMIN — METOPROLOL SUCCINATE 25 MG: 25 TABLET, EXTENDED RELEASE ORAL at 05:34

## 2019-06-26 RX ADMIN — ROPINIROLE HYDROCHLORIDE 1 MG: 0.5 TABLET, FILM COATED ORAL at 03:18

## 2019-06-26 RX ADMIN — BUMETANIDE 2 MG: 0.25 INJECTION, SOLUTION INTRAMUSCULAR; INTRAVENOUS at 05:41

## 2019-06-26 RX ADMIN — CYANOCOBALAMIN TAB 500 MCG 1000 MCG: 500 TAB at 05:34

## 2019-06-26 RX ADMIN — BUMETANIDE 2 MG: 1 TABLET ORAL at 16:55

## 2019-06-26 RX ADMIN — TOPIRAMATE 25 MG: 25 TABLET, FILM COATED ORAL at 16:55

## 2019-06-26 RX ADMIN — TOPIRAMATE 25 MG: 25 TABLET, FILM COATED ORAL at 05:38

## 2019-06-26 RX ADMIN — ROPINIROLE HYDROCHLORIDE 1 MG: 0.5 TABLET, FILM COATED ORAL at 16:55

## 2019-06-26 RX ADMIN — SPIRONOLACTONE 12.5 MG: 25 TABLET ORAL at 05:34

## 2019-06-26 RX ADMIN — CLOPIDOGREL BISULFATE 75 MG: 75 TABLET ORAL at 05:34

## 2019-06-26 RX ADMIN — SERTRALINE HYDROCHLORIDE 100 MG: 100 TABLET, FILM COATED ORAL at 20:26

## 2019-06-26 RX ADMIN — POTASSIUM CHLORIDE 20 MEQ: 1500 TABLET, EXTENDED RELEASE ORAL at 16:55

## 2019-06-26 RX ADMIN — APIXABAN 2.5 MG: 2.5 TABLET, FILM COATED ORAL at 05:34

## 2019-06-26 ASSESSMENT — COGNITIVE AND FUNCTIONAL STATUS - GENERAL
MOBILITY SCORE: 18
HELP NEEDED FOR BATHING: A LITTLE
WALKING IN HOSPITAL ROOM: A LITTLE
MOVING TO AND FROM BED TO CHAIR: A LITTLE
DRESSING REGULAR LOWER BODY CLOTHING: A LITTLE
SUGGESTED CMS G CODE MODIFIER MOBILITY: CK
STANDING UP FROM CHAIR USING ARMS: A LITTLE
SUGGESTED CMS G CODE MODIFIER DAILY ACTIVITY: CJ
CLIMB 3 TO 5 STEPS WITH RAILING: A LITTLE
MOVING FROM LYING ON BACK TO SITTING ON SIDE OF FLAT BED: A LITTLE
TURNING FROM BACK TO SIDE WHILE IN FLAT BAD: A LITTLE
DAILY ACTIVITIY SCORE: 22

## 2019-06-26 ASSESSMENT — ENCOUNTER SYMPTOMS
SPUTUM PRODUCTION: 0
SHORTNESS OF BREATH: 1
ORTHOPNEA: 1
DIZZINESS: 0
ABDOMINAL DISTENTION: 0
PALPITATIONS: 0
EYE PAIN: 0
NERVOUS/ANXIOUS: 0
SORE THROAT: 0
VOMITING: 0
LIGHT-HEADEDNESS: 0
ABDOMINAL PAIN: 0
WHEEZING: 0
LOSS OF CONSCIOUSNESS: 0
NAUSEA: 0
FOCAL WEAKNESS: 0
DOUBLE VISION: 0
MYALGIAS: 0
CHILLS: 0
CONSTIPATION: 0
PND: 0
TREMORS: 0
COUGH: 0
BLURRED VISION: 0
HEADACHES: 0
TINGLING: 0
FATIGUE: 0
BACK PAIN: 0
NECK PAIN: 0
SENSORY CHANGE: 0
HALLUCINATIONS: 0
WEAKNESS: 1
DIARRHEA: 0
CLAUDICATION: 0
CHEST TIGHTNESS: 0
FALLS: 0
WEIGHT LOSS: 0
FEVER: 0
HEARTBURN: 0
DEPRESSION: 0

## 2019-06-26 ASSESSMENT — GAIT ASSESSMENTS
GAIT LEVEL OF ASSIST: SUPERVISED
DISTANCE (FEET): 10
DEVIATION: BRADYKINETIC;SHUFFLED GAIT
ASSISTIVE DEVICE: 4 WHEEL WALKER

## 2019-06-26 ASSESSMENT — LIFESTYLE VARIABLES: SUBSTANCE_ABUSE: 0

## 2019-06-26 ASSESSMENT — ACTIVITIES OF DAILY LIVING (ADL): TOILETING: INDEPENDENT

## 2019-06-26 NOTE — PROGRESS NOTES
Cardiology Follow Up Progress Note    Date of Service  6/26/2019    Attending Physician  Katelyn Jacques M.D.    Chief Complaint   Leg swelling     Cardiology consult  Acute decompensated HF    HPI  Andree Egan is a 87 y.o. female admitted 6/20/2019 with leg swelling.     Past medical history of chronic illnesses of preserved ejection fraction, pulmonary hypertension, coronary artery disease, and  chronic atrial fibrillation.    Interim Events  6/26/19: Patient sitting up in a chair.  Off of oxygen today.  Her lower extremity edema is stable paced on the monitor.  No issues overnight as noted in nursing notes    6/25/19: Patient sitting up in a chair.  Her dyspnea on exertion has improved per patient.  Lower extremity edema is improving.  Paced on the monitor.  Vital signs stable      Review of Systems  Review of Systems   Constitutional: Negative for chills, fatigue and fever.   Respiratory: Positive for shortness of breath. Negative for chest tightness.         No orthopnea   Cardiovascular: Positive for leg swelling. Negative for chest pain and palpitations.   Gastrointestinal: Negative for abdominal distention and nausea.   Genitourinary: Negative for difficulty urinating.   Neurological: Negative for dizziness and light-headedness.   Psychiatric/Behavioral: The patient is not nervous/anxious.        Vital signs in last 24 hours  Temp:  [36.4 °C (97.5 °F)-37.1 °C (98.7 °F)] 37.1 °C (98.7 °F)  Pulse:  [65-99] 79  Resp:  [17] 17  BP: (103-131)/(48-72) 113/63  SpO2:  [94 %-99 %] 98 %    Physical Exam  Physical Exam   Constitutional: She is oriented to person, place, and time. She appears well-developed and well-nourished. No distress.   HENT:   Head: Normocephalic and atraumatic.   Mouth/Throat: Oropharynx is clear and moist.   Eyes: No scleral icterus.   Neck: Neck supple. JVD (at jawline in supine position) present.   Cardiovascular: Normal rate and regular rhythm.    Pulses:       Radial pulses are 2+ on the right  side, and 2+ on the left side.        Dorsalis pedis pulses are 2+ on the right side, and 2+ on the left side.   Pacemaker present in the right upper chest    Pulmonary/Chest: Effort normal and breath sounds normal. No respiratory distress. She has no rales.        Abdominal: Soft. She exhibits no distension. There is no tenderness.   Musculoskeletal: She exhibits edema (2+ pitting edema extending to bilateral knees, compression stockings on).   Neurological: She is alert and oriented to person, place, and time.   Skin: Skin is warm and dry. She is not diaphoretic.   Psychiatric: Judgment normal.   Nursing note and vitals reviewed.      Lab Review  Lab Results   Component Value Date/Time    WBC 4.8 06/26/2019 02:50 AM    RBC 3.01 (L) 06/26/2019 02:50 AM    HEMOGLOBIN 8.2 (L) 06/26/2019 02:50 AM    HEMATOCRIT 27.5 (L) 06/26/2019 02:50 AM    MCV 91.4 06/26/2019 02:50 AM    MCH 27.2 06/26/2019 02:50 AM    MCHC 29.8 (L) 06/26/2019 02:50 AM    MPV 11.2 06/26/2019 02:50 AM      Lab Results   Component Value Date/Time    SODIUM 141 06/26/2019 02:50 AM    POTASSIUM 4.6 06/26/2019 02:50 AM    CHLORIDE 104 06/26/2019 02:50 AM    CO2 28 06/26/2019 02:50 AM    GLUCOSE 119 (H) 06/26/2019 02:50 AM    BUN 51 (H) 06/26/2019 02:50 AM    CREATININE 1.57 (H) 06/26/2019 02:50 AM      Lab Results   Component Value Date/Time    ASTSGOT 16 06/22/2019 02:51 AM    ALTSGPT 9 06/22/2019 02:51 AM     Lab Results   Component Value Date/Time    CHOLSTRLTOT 67 (L) 07/09/2018 02:22 AM    LDL 17 07/09/2018 02:22 AM    HDL 40 07/09/2018 02:22 AM    TRIGLYCERIDE 50 07/09/2018 02:22 AM    TROPONINI 0.04 01/05/2019 02:38 PM             Cardiac Imaging and Procedures Review      Echocardiogram:  6/23/19  Previous exam 05/2018, no change in LV function.  Left ventricular ejection fraction is visually estimated to be 50%.  Severely dilated right ventricle.  Reduced right ventricular systolic   function.  Dilated inferior vena cava without inspiratory  collapse.    Severe tricuspid regurgitation.    Mild aortic stenosis.  Moderate eccentric mitral regurgitation.  Grade II diastolic dysfunction.      Assessment/Plan    1. Acute on chronic diastolic heart failure:  - improving   - switched bumex to PO 2mg BID and martínez 12.5mg .   - continue toprol XL 25mg qd   - monitor strict I and O and daily weight   - dry weight 127lbs, today is 138b    2. History of Atrial fibrillation  - continue toprol XL as noted above, changed in parameter   - eliquis 2.5mg bid     3. Severe tricuspid regurgitation:  - monitor     4. Coronary artery disease s/p stent  - continues on plavix and OAC  - continue statin therapy     5. Chronic kidney disease stage IIIb  - improving creatinine 1.57 and GFR 31    6. S/p Pacemaker:  - history of SSS    Future Appointments  Date Time Provider Department Center   6/27/2019 2:00 PM ELIZABETH Patel [facility]   7/5/2019 9:00 AM Karina Lopez [facility] None   7/12/2019 10:20 AM ELIZABETH Patel   8/9/2019 11:30 AM Jose Simms M.D. [facility] None   12/23/2019 2:15 PM PACER CHECK-CAM B 2 [facility] None         Thank you for allowing me to participate in the care of this patient.  I will continue to follow this patient    Bhaskaret ORALIA Pleitez   [facility]

## 2019-06-26 NOTE — THERAPY
"Physical Therapy Evaluation completed.   Bed Mobility:  Supine to Sit:  (up in a chair )  Transfers: Sit to Stand: Supervised  Gait: Level Of Assist: Supervised with 4-Wheel Walker       Plan of Care: Patient with no further skilled PT needs in the acute care setting at this time and Patient demonstrates safety with mobility in this environment at this time.   Discharge Recommendations: Equipment: No Equipment Needed. Post-acute therapy Currently anticipate no further skilled therapy needs once patient is discharged from the inpatient setting.    See \"Rehab Therapy-Acute\" Patient Summary Report for complete documentation.       Pt is an 86 y/o male presenting to acute setting with LE edema/swelling. Pt currently mobilizing at her baseline and has good support at home. Pt with no further acute PT needs and appears to be safe to DC home from a PT perspective at this time.   "

## 2019-06-26 NOTE — PROGRESS NOTES
[facility] Medicine Daily Progress Note    Date of Service  6/25/2019    Chief Complaint  87 y.o. female admitted 6/20/2019 with leg swelling     Hospital Course    87-year-old female past medical history of severe tricuspid insufficiency, pulmonary hypertension was admitted for worsening leg swelling or shortness of breath. She started on IV lasix and she is doing better. Acute on chronic CKD on admission       Interval Problem Update  6/22.  Patient continues to have bilateral lower extremity edema.  Worsening signs of hypokalemia this morning.  Aggressive p.o. replaced.  Patient cannot tolerate IV replace.  Patient complains of general fatigue and body achiness. Patient's pain is general 2-3/10, intermittent and does not radiate to other location, sharp and with some tingling. Can be controlled by pain meds.  6/23. Patient lower extremity edema slightly improved still significant to yesterday.  Patient has no significant signs of shortness of breath.  Patient diuresed very well on IV Bumex.  Potassium slightly improved. Patient otherwise denies fever, chills, nausea, vomiting, adb pain, SOB, CP, headache, constipation, diarrhea, cough, or sputum.  6/24. Patient currently still complains of bilateral lower extremity not improving enough at baseline.  Patient tolerated IV diuretics, appreciate cardiologist input.  We will increase Bumex 2 mg.  Patient's potassium significantly improved.  Close monitor BMP.  Patient complains of general fatigue and body achiness. Patient's pain is general, 3 to 4 /10, intermittent and does not radiate to other location, sharp and with some tingling. Can be controlled by pain meds.   6/25.  Patient still complains of significant lateral lower extremity edema.  Patient still complains of weakness.  Patient's body weight has been decreased since admission steadily.  Appreciate cardiologist input and continue IV diuretics at this moment. Patient's pain is general, 2-3/10, intermittent and  does not radiate to other location, sharp and with some tingling. Can be controlled by pain meds.         Consultants/Specialty  Cardiology     Code Status  DNAR/DNI   Total time spent on advanced care planning, excluding time spent on daily care: 16 minutes.    1st 30 minutes [id number]     I discussed extensively with patient and patient's family is regarding code status and plan of care. We also discussed advanced care planning including diagnosis, prognosis, plan of care, risks and benefits of any therapies that could be offered, as well as alternatives including palliation and hospice, as appropriate. My discussion is summarized above in detail. Confirmed with DNR      Disposition  inpatient     Review of Systems  Review of Systems   Constitutional: Negative for chills, fever and malaise/fatigue.   HENT: Negative for congestion, ear pain, hearing loss, nosebleeds and sore throat.    Eyes: Negative for blurred vision and pain.   Respiratory: Positive for shortness of breath. Negative for cough, sputum production and wheezing.    Cardiovascular: Positive for orthopnea and leg swelling. Negative for palpitations, claudication and PND.   Gastrointestinal: Negative for diarrhea, heartburn, nausea and vomiting.   Genitourinary: Negative for dysuria, frequency, hematuria and urgency.   Musculoskeletal: Negative for falls, joint pain and myalgias.   Skin: Negative for rash.   Neurological: Positive for weakness. Negative for dizziness, sensory change, loss of consciousness and headaches.   Psychiatric/Behavioral: Negative for depression and substance abuse.        Physical Exam  Temp:  [36.3 °C (97.3 °F)-37 °C (98.6 °F)] 36.6 °C (97.8 °F)  Pulse:  [65-99] 67  Resp:  [17-18] 17  BP: (104-128)/(48-69) 106/48  SpO2:  [94 %-97 %] 96 %    Physical Exam   Constitutional: She is oriented to person, place, and time. She appears well-nourished. No distress.   HENT:   Right Ear: External ear normal.   Left Ear: External ear normal.    Mouth/Throat: Oropharynx is clear and moist. No oropharyngeal exudate.   Eyes: Conjunctivae and EOM are normal.   Neck: Normal range of motion. Neck supple. No thyromegaly present.   Cardiovascular: Normal rate and regular rhythm.  Exam reveals no gallop and no friction rub.    Murmur heard.  Pulmonary/Chest: Effort normal. No respiratory distress. She has no wheezes. She exhibits no tenderness.   Abdominal: Soft. Bowel sounds are normal. She exhibits no distension and no mass. There is no rebound.   Musculoskeletal: Normal range of motion. She exhibits edema.   Lymphadenopathy:     She has no cervical adenopathy.   Neurological: She is alert and oriented to person, place, and time. No cranial nerve deficit. Coordination normal.   Skin: Skin is warm. No rash noted.   Psychiatric: She has a normal mood and affect. Her behavior is normal.       Fluids    Intake/Output Summary (Last 24 hours) at 06/25/19 0449  Last data filed at 06/25/19 1617   Gross per 24 hour   Intake              350 ml   Output             2725 ml   Net            -2375 ml       Laboratory  Recent Labs      06/23/19   0204  06/24/19   0227  06/25/19   0439   WBC  4.0*  4.6*  3.8*   RBC  2.92*  3.10*  2.99*   HEMOGLOBIN  7.9*  8.4*  8.1*   HEMATOCRIT  26.7*  29.5*  28.0*   MCV  91.4  95.2  93.6   MCH  27.1  27.1  27.1   MCHC  29.6*  28.5*  28.9*   RDW  54.5*  57.9*  56.7*   PLATELETCT  123*  130*  128*   MPV  10.6  10.8  11.0     Recent Labs      06/23/19   0204  06/24/19   0227  06/24/19   1624  06/25/19   0439   SODIUM  142  143   --   140   POTASSIUM  3.3*  4.3  4.7  4.3   CHLORIDE  103  105   --   104   CO2  28  31   --   31   GLUCOSE  135*  96   --   116*   BUN  51*  49*   --   48*   CREATININE  1.58*  1.54*   --   1.64*   CALCIUM  8.3*  8.6   --   9.1                   Imaging  EC-ECHOCARDIOGRAM COMPLETE W/O CONT   Final Result           Assessment/Plan  Hypokalemia:  -Likely due to diuresis  -Continue aggressive replacement  -Potassium  2.9->3.3->4->4.3  Close monitor on telemetry no signs of arrhythmia  -Patient was also put on spironolactone and we will continue that.    MARICRUZ (acute kidney injury) (HCC)- (present on admission)   Assessment & Plan    Resolving. Likely cardiorenal. Back to baseline. Pt would not like to consult nephrology at this time. She is not interested on dialysis.   Cr 1.5->1.7->1.5 ->1.6stable  Potassium replaced, magnesium at goal  Close monitor     Severe tricuspid regurgitation- (present on admission)   Assessment & Plan    Echo pending   Cards recommendation appreciated     Pulmonary hypertension (HCC)- (present on admission)   Assessment & Plan    Patient was put on IV diuretics currently stopped by cardiologist due to severe hypokalemia.  Aggressive replace potassium with oral  Palliative recommended by cardiologist, however patient is not ready for hospice at this moment.  Patient confirmed DNR status.     Bilateral lower extremity edema- (present on admission)   Assessment & Plan    Still persistent  Currently diuretics, will increase with Bumex IV 2 mg twice daily  Continue sodium restriction and fluid restriction     Chronic respiratory failure with hypoxia (HCC)- (present on admission)   Assessment & Plan    Keep O2 sats above 92%  RT protocol     CKD (chronic kidney disease) stage 3, GFR 30-59 ml/min (HCC)- (present on admission)   Assessment & Plan    Stable      COPD (chronic obstructive pulmonary disease) (HCC)- (present on admission)   Assessment & Plan    Continue Fluticasone, RT protocol     Chronic atrial fibrillation (HCC)- (present on admission)   Assessment & Plan    Telemetry monitoring  Eliquis     Restless legs syndrome (RLS)- (present on admission)   Assessment & Plan    Continue Requip     Hypothyroidism- (present on admission)   Assessment & Plan    Continue Synthroid, check TSH     GERD (gastroesophageal reflux disease)- (present on admission)   Assessment & Plan    Continue Omeprazole     Cardiac  pacemaker in situ- (present on admission)   Assessment & Plan    Telemetry monitoring          VTE prophylaxis:  apixaban     Current Facility-Administered Medications:   •  spironolactone (ALDACTONE) tablet 12.5 mg, 12.5 mg, Oral, Q DAY, WILLIAN McA.-C., 12.5 mg at 06/25/19 0455  •  potassium chloride SA (Kdur) tablet 20 mEq, 20 mEq, Oral, BID, RAHAT Mc.A.-C., 20 mEq at 06/25/19 1742  •  bumetanide (BUMEX) injection 2 mg, 2 mg, Intravenous, BID DIURETIC, RAHAT Mc.A.-C., 2 mg at 06/25/19 1741  •  hydrocortisone rectal (PROCTOZONE HC) 2.5 % cream, , Rectal, QDAY PRN, Katelyn Jacques M.D.  •  albuterol inhaler 2 Puff, 2 Puff, Inhalation, Q6HRS PRN, Storm Farah M.D.  •  clopidogrel (PLAVIX) tablet 75 mg, 75 mg, Oral, DAILY, Storm Farah M.D., 75 mg at 06/25/19 0455  •  cyanocobalamin (VITAMIN B-12) tablet 1,000 mcg, 1,000 mcg, Oral, DAILY, Storm Farah M.D., 1,000 mcg at 06/25/19 0455  •  apixaban (ELIQUIS) 2.5mg tablet 2.5 mg, 2.5 mg, Oral, BID, Storm Farah M.D., 2.5 mg at 06/25/19 1741  •  ferrous sulfate tablet 325 mg, 325 mg, Oral, Q48HRS, Storm Farah M.D., 325 mg at 06/25/19 0504  •  fluticasone (FLOVENT HFA) 110 MCG/ACT inhaler 110 mcg, 1 Puff, Inhalation, DAILY, Storm Farah M.D., 110 mcg at 06/25/19 0458  •  umeclidinium-vilanterol (ANORO ELLIPTA) inhaler 1 Puff, 1 Puff, Inhalation, DAILY, Storm Farah M.D., 1 Puff at 06/25/19 0458  •  levothyroxine (SYNTHROID) tablet 50 mcg, 50 mcg, Oral, AM ES, Storm Farah M.D., 50 mcg at 06/25/19 0455  •  metoprolol SR (TOPROL XL) tablet 25 mg, 25 mg, Oral, QAM, Redet Maik, Stopped at 06/25/19 0456  •  omeprazole (PRILOSEC) capsule 20 mg, 20 mg, Oral, BID, Storm Farah M.D., 20 mg at 06/25/19 1741  •  ROPINIRole (REQUIP) tablet 1 mg, 1 mg, Oral, TID, Storm Farah M.D., 1 mg at 06/25/19 1742  •  rosuvastatin (CRESTOR) tablet 10 mg, 10 mg, Oral, Q EVENING, Storm Farah M.D., 10 mg at  06/25/19 1742  •  sertraline (ZOLOFT) tablet 100 mg, 100 mg, Oral, QHS, Storm Farah M.D., 100 mg at 06/24/19 2059  •  topiramate (TOPAMAX) tablet 25 mg, 25 mg, Oral, BID, Storm Farah M.D., 25 mg at 06/25/19 1742  •  senna-docusate (PERICOLACE or SENOKOT S) 8.6-50 MG per tablet 2 Tab, 2 Tab, Oral, BID, Stopped at 06/23/19 0600 **AND** polyethylene glycol/lytes (MIRALAX) PACKET 1 Packet, 1 Packet, Oral, QDAY PRN **AND** magnesium hydroxide (MILK OF MAGNESIA) suspension 30 mL, 30 mL, Oral, QDAY PRN **AND** bisacodyl (DULCOLAX) suppository 10 mg, 10 mg, Rectal, QDAY PRN, Storm Farah M.D.  •  Respiratory Care per Protocol, , Nebulization, Continuous RT, Storm Farah M.D.  •  acetaminophen (TYLENOL) tablet 650 mg, 650 mg, Oral, Q6HRS PRN, Storm Farah M.D., 650 mg at 06/23/19 1656  •  ondansetron (ZOFRAN) syringe/vial injection 4 mg, 4 mg, Intravenous, Q4HRS PRN, Storm Farah M.D.  •  ondansetron (ZOFRAN ODT) dispertab 4 mg, 4 mg, Oral, Q4HRS PRN, Storm Farah M.D.

## 2019-06-26 NOTE — PROGRESS NOTES
[facility] Medicine Daily Progress Note    Date of Service  6/26/2019    Chief Complaint  87 y.o. female admitted 6/20/2019 with leg swelling     Hospital Course    87-year-old female past medical history of severe tricuspid insufficiency, pulmonary hypertension was admitted for worsening leg swelling or shortness of breath. She started on IV lasix and she is doing better. Acute on chronic CKD on admission       Interval Problem Update  6/22.  Patient continues to have bilateral lower extremity edema.  Worsening signs of hypokalemia this morning.  Aggressive p.o. replaced.  Patient cannot tolerate IV replace.  Patient complains of general fatigue and body achiness. Patient's pain is general 2-3/10, intermittent and does not radiate to other location, sharp and with some tingling. Can be controlled by pain meds.  6/23. Patient lower extremity edema slightly improved still significant to yesterday.  Patient has no significant signs of shortness of breath.  Patient diuresed very well on IV Bumex.  Potassium slightly improved. Patient otherwise denies fever, chills, nausea, vomiting, adb pain, SOB, CP, headache, constipation, diarrhea, cough, or sputum.  6/24. Patient currently still complains of bilateral lower extremity not improving enough at baseline.  Patient tolerated IV diuretics, appreciate cardiologist input.  We will increase Bumex 2 mg.  Patient's potassium significantly improved.  Close monitor BMP.  Patient complains of general fatigue and body achiness. Patient's pain is general, 3 to 4 /10, intermittent and does not radiate to other location, sharp and with some tingling. Can be controlled by pain meds.   6/25.  Patient still complains of significant lateral lower extremity edema.  Patient still complains of weakness.  Patient's body weight has been decreased since admission steadily.  Appreciate cardiologist input and continue IV diuretics at this moment. Patient's pain is general, 2-3/10, intermittent and  does not radiate to other location, sharp and with some tingling. Can be controlled by pain meds.   6/26. Patient currently on oral diuretics, remained stable on 8, body weight improved since being admitted to the hospital.  IV diuretics discontinued. Patient otherwise denies fever, chills, nausea, vomiting, adb pain, SOB, CP, headache, constipation, diarrhea, cough, or sputum.    Consultants/Specialty  Cardiology     Code Status  DNAR/DNI   Total time spent on advanced care planning, excluding time spent on daily care: 16 minutes.    1st 30 minutes [id number]     I discussed extensively with patient and patient's family is regarding code status and plan of care. We also discussed advanced care planning including diagnosis, prognosis, plan of care, risks and benefits of any therapies that could be offered, as well as alternatives including palliation and hospice, as appropriate. My discussion is summarized above in detail. Confirmed with DNR      Disposition  ihome if tolerated p.o. diuretics    Review of Systems  Review of Systems   Constitutional: Negative for chills, fever, malaise/fatigue and weight loss.   HENT: Negative for congestion, ear pain, hearing loss, nosebleeds and sore throat.    Eyes: Negative for blurred vision, double vision and pain.   Respiratory: Positive for shortness of breath. Negative for cough, sputum production and wheezing.    Cardiovascular: Positive for orthopnea and leg swelling. Negative for chest pain, palpitations, claudication and PND.   Gastrointestinal: Negative for abdominal pain, constipation, diarrhea, heartburn, nausea and vomiting.   Genitourinary: Negative for dysuria, frequency, hematuria and urgency.   Musculoskeletal: Negative for back pain, falls, joint pain, myalgias and neck pain.   Skin: Negative for rash.   Neurological: Positive for weakness. Negative for dizziness, tingling, tremors, sensory change, focal weakness, loss of consciousness and headaches.    Psychiatric/Behavioral: Negative for depression, hallucinations and substance abuse.        Physical Exam  Temp:  [36.4 °C (97.5 °F)-37.1 °C (98.7 °F)] 36.7 °C (98 °F)  Pulse:  [67-88] 67  Resp:  [17] 17  BP: (103-147)/(47-91) 104/47  SpO2:  [93 %-99 %] 93 %    Physical Exam   Constitutional: She is oriented to person, place, and time. She appears well-nourished. No distress.   HENT:   Head: Atraumatic.   Right Ear: External ear normal.   Left Ear: External ear normal.   Nose: Nose normal.   Mouth/Throat: Oropharynx is clear and moist. No oropharyngeal exudate.   Eyes: Conjunctivae and EOM are normal.   Neck: Normal range of motion. Neck supple. No JVD present. No thyromegaly present.   Cardiovascular: Normal rate and regular rhythm.  Exam reveals no gallop and no friction rub.    Murmur heard.  Pulmonary/Chest: Effort normal and breath sounds normal. No respiratory distress. She has no wheezes. She has no rales. She exhibits no tenderness.   Abdominal: Soft. Bowel sounds are normal. She exhibits no distension and no mass. There is no tenderness. There is no rebound and no guarding.   Musculoskeletal: Normal range of motion. She exhibits edema. She exhibits no tenderness.   Lymphadenopathy:     She has no cervical adenopathy.   Neurological: She is alert and oriented to person, place, and time. No cranial nerve deficit. Coordination normal.   Skin: Skin is warm. No rash noted.   Psychiatric: She has a normal mood and affect. Her behavior is normal.       Fluids    Intake/Output Summary (Last 24 hours) at 06/26/19 1654  Last data filed at 06/26/19 0814   Gross per 24 hour   Intake                0 ml   Output             1450 ml   Net            -1450 ml       Laboratory  Recent Labs      06/24/19   0227  06/25/19   0439  06/26/19   0250   WBC  4.6*  3.8*  4.8   RBC  3.10*  2.99*  3.01*   HEMOGLOBIN  8.4*  8.1*  8.2*   HEMATOCRIT  29.5*  28.0*  27.5*   MCV  95.2  93.6  91.4   MCH  27.1  27.1  27.2   MCHC  28.5*   28.9*  29.8*   RDW  57.9*  56.7*  55.8*   PLATELETCT  130*  128*  134*   MPV  10.8  11.0  11.2     Recent Labs      06/24/19   0227  06/24/19   1624  06/25/19   0439  06/26/19   0250   SODIUM  143   --   140  141   POTASSIUM  4.3  4.7  4.3  4.6   CHLORIDE  105   --   104  104   CO2  31   --   31  28   GLUCOSE  96   --   116*  119*   BUN  49*   --   48*  51*   CREATININE  1.54*   --   1.64*  1.57*   CALCIUM  8.6   --   9.1  8.6                   Imaging  EC-ECHOCARDIOGRAM COMPLETE W/O CONT   Final Result           Assessment/Plan  Hypokalemia:  -Likely due to diuresis  -Continue aggressive replacement  -Potassium 2.9->3.3->4->4.3->4.6  Close monitor on telemetry no signs of arrhythmia  -Patient was also put on spironolactone and we will continue that.  - DC po K today, recheck K tmr    MARICRUZ (acute kidney injury) (HCC)- (present on admission)   Assessment & Plan    Resolving. Likely cardiorenal. Back to baseline. Pt would not like to consult nephrology at this time. She is not interested on dialysis.   Cr 1.5->1.7->1.5 ->1.6->1.6stable  Potassium replaced, magnesium at goal  Close monitor     Severe tricuspid regurgitation- (present on admission)   Assessment & Plan    Echo pending   Cards recommendation appreciated     Pulmonary hypertension (HCC)- (present on admission)   Assessment & Plan    Patient was put on IV diuretics currently stopped by cardiologist due to severe hypokalemia.  Aggressive replace potassium with oral  Palliative recommended by cardiologist, however patient is not ready for hospice at this moment.  Patient confirmed DNR status.     Bilateral lower extremity edema- (present on admission)   Assessment & Plan    Still persistent  Currently diuretics, changed to p.o. Bumex 2 mg twice a day.  Continue sodium restriction and fluid restriction     Chronic respiratory failure with hypoxia (HCC)- (present on admission)   Assessment & Plan    Keep O2 sats above 92%  RT protocol     CKD (chronic kidney  disease) stage 3, GFR 30-59 ml/min ([facility])- (present on admission)   Assessment & Plan    Stable      COPD (chronic obstructive pulmonary disease) ([facility])- (present on admission)   Assessment & Plan    Continue Fluticasone, RT protocol     Chronic atrial fibrillation ([facility])- (present on admission)   Assessment & Plan    Telemetry monitoring  Eliquis     Restless legs syndrome (RLS)- (present on admission)   Assessment & Plan    Continue Requip     Hypothyroidism- (present on admission)   Assessment & Plan    Continue Synthroid, check TSH     GERD (gastroesophageal reflux disease)- (present on admission)   Assessment & Plan    Continue Omeprazole     Cardiac pacemaker in situ- (present on admission)   Assessment & Plan    Telemetry monitoring          VTE prophylaxis:  apixaban     Current Facility-Administered Medications:   •  bumetanide (BUMEX) tablet 2 mg, 2 mg, Oral, BID DIURETIC, Redet Maik  •  spironolactone (ALDACTONE) tablet 12.5 mg, 12.5 mg, Oral, Q DAY, Nora Cano P.A.-C., 12.5 mg at 06/26/19 0534  •  potassium chloride SA (Kdur) tablet 20 mEq, 20 mEq, Oral, BID, Nora Cano P.A.-C., 20 mEq at 06/26/19 0534  •  hydrocortisone rectal (PROCTOZONE HC) 2.5 % cream, , Rectal, QDAY PRN, Katelyn Jacques M.D.  •  albuterol inhaler 2 Puff, 2 Puff, Inhalation, Q6HRS PRN, Storm Farah M.D.  •  clopidogrel (PLAVIX) tablet 75 mg, 75 mg, Oral, DAILY, Storm Farah M.D., 75 mg at 06/26/19 0534  •  cyanocobalamin (VITAMIN B-12) tablet 1,000 mcg, 1,000 mcg, Oral, DAILY, Storm Farah M.D., 1,000 mcg at 06/26/19 0534  •  apixaban (ELIQUIS) 2.5mg tablet 2.5 mg, 2.5 mg, Oral, BID, Storm Farah M.D., 2.5 mg at 06/26/19 0534  •  ferrous sulfate tablet 325 mg, 325 mg, Oral, Q48HRS, Storm Farah M.D., 325 mg at 06/25/19 0504  •  fluticasone (FLOVENT HFA) 110 MCG/ACT inhaler 110 mcg, 1 Puff, Inhalation, DAILY, Storm Farah M.D., 110 mcg at 06/26/19 0572  •  umeclidinium-vilanterol (ANORO  ELLIPTA) inhaler 1 Puff, 1 Puff, Inhalation, DAILY, Storm Farah M.D., 1 Puff at 06/26/19 0541  •  levothyroxine (SYNTHROID) tablet 50 mcg, 50 mcg, Oral, AM ES, Storm Farah M.D., 50 mcg at 06/26/19 0534  •  metoprolol SR (TOPROL XL) tablet 25 mg, 25 mg, Oral, QAM, Redet Maik, 25 mg at 06/26/19 0534  •  omeprazole (PRILOSEC) capsule 20 mg, 20 mg, Oral, BID, Storm Farah M.D., 20 mg at 06/26/19 0534  •  ROPINIRole (REQUIP) tablet 1 mg, 1 mg, Oral, TID, Storm Farah M.D., 1 mg at 06/26/19 1105  •  rosuvastatin (CRESTOR) tablet 10 mg, 10 mg, Oral, Q EVENING, Storm Farah M.D., 10 mg at 06/25/19 1742  •  sertraline (ZOLOFT) tablet 100 mg, 100 mg, Oral, QHS, Storm Farah M.D., 100 mg at 06/25/19 2100  •  topiramate (TOPAMAX) tablet 25 mg, 25 mg, Oral, BID, Storm Farah M.D., 25 mg at 06/26/19 0538  •  senna-docusate (PERICOLACE or SENOKOT S) 8.6-50 MG per tablet 2 Tab, 2 Tab, Oral, BID, Stopped at 06/23/19 0600 **AND** polyethylene glycol/lytes (MIRALAX) PACKET 1 Packet, 1 Packet, Oral, QDAY PRN **AND** magnesium hydroxide (MILK OF MAGNESIA) suspension 30 mL, 30 mL, Oral, QDAY PRN **AND** bisacodyl (DULCOLAX) suppository 10 mg, 10 mg, Rectal, QDAY PRN, Storm Farah M.D.  •  Respiratory Care per Protocol, , Nebulization, Continuous RT, Storm Farah M.D.  •  acetaminophen (TYLENOL) tablet 650 mg, 650 mg, Oral, Q6HRS PRN, Storm Farah M.D., 650 mg at 06/23/19 1656  •  ondansetron (ZOFRAN) syringe/vial injection 4 mg, 4 mg, Intravenous, Q4HRS PRN, Storm Farah M.D.  •  ondansetron (ZOFRAN ODT) dispertab 4 mg, 4 mg, Oral, Q4HRS PRN, Storm Farha M.D.

## 2019-06-26 NOTE — PROGRESS NOTES
Assumed care at 1900, bedside report received from day shift BRONWYN Amin. Pt is paced on the telemetry monitor. Patient is AO x 4 and is resting in bed with even respirations. Patient up to bathroom with a steady gate. Initial assessment completed and orders reviewed. POC addressed with patient. Call light within reach and hourly rounding in place. No further questions at this time. Fall precautions in place. Strict I/Os being monitored.

## 2019-06-26 NOTE — THERAPY
"Occupational Therapy Evaluation completed.   Functional Status:  Pt sitting up in chair on arrival.  Pt was supervised for sit-stand & amb with her 4WW to bathroom with SBA.  Pt was supervised with toilet transfer using grab bar.  Pt able to groom standing at the sink with SBA.  Pt left sitting up in chair after tx.  Plan of Care: Patient with no further skilled OT needs in the acute care setting at this time- pt is likely functioning close to her baseline.  Discharge Recommendations:  Equipment: No Equipment Needed. Post-acute therapy Discharge to home with outpatient or home health for additional skilled therapy services.    Pt reports she lives with her granddaughter who helps her as needed.  Pt also has hired help come in 3 x/week to assist as needed.  Pt currently appears to be functioning at or close to her baseline.  Pt can D/C home once medically cleared.    See \"Rehab Therapy-Acute\" Patient Summary Report for complete documentation.    "

## 2019-06-27 VITALS
DIASTOLIC BLOOD PRESSURE: 84 MMHG | WEIGHT: 138.45 LBS | TEMPERATURE: 98.1 F | HEART RATE: 78 BPM | SYSTOLIC BLOOD PRESSURE: 156 MMHG | OXYGEN SATURATION: 95 % | RESPIRATION RATE: 16 BRPM | BODY MASS INDEX: 24.14 KG/M2

## 2019-06-27 LAB
ANION GAP SERPL CALC-SCNC: 10 MMOL/L (ref 0–11.9)
BASOPHILS # BLD AUTO: 0.3 % (ref 0–1.8)
BASOPHILS # BLD: 0.01 K/UL (ref 0–0.12)
BUN SERPL-MCNC: 55 MG/DL (ref 8–22)
CALCIUM SERPL-MCNC: 8.4 MG/DL (ref 8.5–10.5)
CHLORIDE SERPL-SCNC: 105 MMOL/L (ref 96–112)
CO2 SERPL-SCNC: 28 MMOL/L (ref 20–33)
CREAT SERPL-MCNC: 1.7 MG/DL (ref 0.5–1.4)
EOSINOPHIL # BLD AUTO: 0 K/UL (ref 0–0.51)
EOSINOPHIL NFR BLD: 0 % (ref 0–6.9)
ERYTHROCYTE [DISTWIDTH] IN BLOOD BY AUTOMATED COUNT: 57.5 FL (ref 35.9–50)
GLUCOSE SERPL-MCNC: 124 MG/DL (ref 65–99)
HCT VFR BLD AUTO: 27 % (ref 37–47)
HGB BLD-MCNC: 7.9 G/DL (ref 12–16)
IMM GRANULOCYTES # BLD AUTO: 0.02 K/UL (ref 0–0.11)
IMM GRANULOCYTES NFR BLD AUTO: 0.5 % (ref 0–0.9)
LYMPHOCYTES # BLD AUTO: 0.55 K/UL (ref 1–4.8)
LYMPHOCYTES NFR BLD: 14.3 % (ref 22–41)
MCH RBC QN AUTO: 27.4 PG (ref 27–33)
MCHC RBC AUTO-ENTMCNC: 29.3 G/DL (ref 33.6–35)
MCV RBC AUTO: 93.8 FL (ref 81.4–97.8)
MONOCYTES # BLD AUTO: 0.39 K/UL (ref 0–0.85)
MONOCYTES NFR BLD AUTO: 10.2 % (ref 0–13.4)
NEUTROPHILS # BLD AUTO: 2.87 K/UL (ref 2–7.15)
NEUTROPHILS NFR BLD: 74.7 % (ref 44–72)
NRBC # BLD AUTO: 0 K/UL
NRBC BLD-RTO: 0 /100 WBC
PLATELET # BLD AUTO: 127 K/UL (ref 164–446)
PMV BLD AUTO: 11.1 FL (ref 9–12.9)
POTASSIUM SERPL-SCNC: 3.8 MMOL/L (ref 3.6–5.5)
RBC # BLD AUTO: 2.88 M/UL (ref 4.2–5.4)
SODIUM SERPL-SCNC: 143 MMOL/L (ref 135–145)
WBC # BLD AUTO: 3.8 K/UL (ref 4.8–10.8)

## 2019-06-27 PROCEDURE — 85025 COMPLETE CBC W/AUTO DIFF WBC: CPT

## 2019-06-27 PROCEDURE — A9270 NON-COVERED ITEM OR SERVICE: HCPCS | Performed by: PHYSICIAN ASSISTANT

## 2019-06-27 PROCEDURE — 99239 HOSP IP/OBS DSCHRG MGMT >30: CPT | Performed by: INTERNAL MEDICINE

## 2019-06-27 PROCEDURE — A9270 NON-COVERED ITEM OR SERVICE: HCPCS | Performed by: NURSE PRACTITIONER

## 2019-06-27 PROCEDURE — 36415 COLL VENOUS BLD VENIPUNCTURE: CPT

## 2019-06-27 PROCEDURE — A9270 NON-COVERED ITEM OR SERVICE: HCPCS | Performed by: FAMILY MEDICINE

## 2019-06-27 PROCEDURE — 700102 HCHG RX REV CODE 250 W/ 637 OVERRIDE(OP): Performed by: PHYSICIAN ASSISTANT

## 2019-06-27 PROCEDURE — 700102 HCHG RX REV CODE 250 W/ 637 OVERRIDE(OP): Performed by: NURSE PRACTITIONER

## 2019-06-27 PROCEDURE — 700102 HCHG RX REV CODE 250 W/ 637 OVERRIDE(OP): Performed by: FAMILY MEDICINE

## 2019-06-27 PROCEDURE — 80048 BASIC METABOLIC PNL TOTAL CA: CPT

## 2019-06-27 RX ORDER — SPIRONOLACTONE 25 MG/1
12.5 TABLET ORAL DAILY
Qty: 30 TAB | Refills: 3 | Status: SHIPPED | OUTPATIENT
Start: 2019-06-28

## 2019-06-27 RX ORDER — POTASSIUM CHLORIDE 20 MEQ/1
40 TABLET, EXTENDED RELEASE ORAL DAILY
Qty: 60 TAB | Refills: 0 | Status: SHIPPED | OUTPATIENT
Start: 2019-06-27 | End: 2019-07-05 | Stop reason: SDUPTHER

## 2019-06-27 RX ADMIN — APIXABAN 2.5 MG: 2.5 TABLET, FILM COATED ORAL at 06:49

## 2019-06-27 RX ADMIN — LEVOTHYROXINE SODIUM 50 MCG: 50 TABLET ORAL at 06:49

## 2019-06-27 RX ADMIN — SPIRONOLACTONE 12.5 MG: 25 TABLET ORAL at 06:50

## 2019-06-27 RX ADMIN — METOPROLOL SUCCINATE 25 MG: 25 TABLET, EXTENDED RELEASE ORAL at 06:49

## 2019-06-27 RX ADMIN — ROPINIROLE HYDROCHLORIDE 1 MG: 0.5 TABLET, FILM COATED ORAL at 06:49

## 2019-06-27 RX ADMIN — FERROUS SULFATE TAB 325 MG (65 MG ELEMENTAL FE) 325 MG: 325 (65 FE) TAB at 06:49

## 2019-06-27 RX ADMIN — CLOPIDOGREL BISULFATE 75 MG: 75 TABLET ORAL at 06:49

## 2019-06-27 RX ADMIN — OMEPRAZOLE 20 MG: 20 CAPSULE, DELAYED RELEASE ORAL at 06:49

## 2019-06-27 RX ADMIN — BUMETANIDE 2 MG: 1 TABLET ORAL at 06:49

## 2019-06-27 RX ADMIN — FLUTICASONE PROPIONATE 110 MCG: 110 AEROSOL, METERED RESPIRATORY (INHALATION) at 06:51

## 2019-06-27 RX ADMIN — TOPIRAMATE 25 MG: 25 TABLET, FILM COATED ORAL at 06:50

## 2019-06-27 RX ADMIN — UMECLIDINIUM BROMIDE AND VILANTEROL TRIFENATATE 1 PUFF: 62.5; 25 POWDER RESPIRATORY (INHALATION) at 06:51

## 2019-06-27 RX ADMIN — CYANOCOBALAMIN TAB 500 MCG 1000 MCG: 500 TAB at 06:49

## 2019-06-27 NOTE — CARE PLAN
Problem: Safety  Goal: Will remain free from falls  Outcome: PROGRESSING AS EXPECTED  Patient will remain free from falls during shift.    Problem: Skin Integrity  Goal: Risk for impaired skin integrity will decrease  Outcome: PROGRESSING AS EXPECTED  Patient will not have skin breakdown during shift.    Problem: Urinary Elimination:  Goal: Ability to reestablish a normal urinary elimination pattern will improve  Outcome: PROGRESSING AS EXPECTED  Patient will void more fluid than she takes in during shift.

## 2019-06-27 NOTE — DISCHARGE SUMMARY
Discharge Summary    CHIEF COMPLAINT ON ADMISSION  Lower extremity edema    Reason for Admission  Acute Kidney Injury , Lower extremity edema    Admission Date  6/20/2019    CODE STATUS  DNAR/DNI    HPI & HOSPITAL COURSE    87-year-old female past medical history of severe tricuspid insufficiency, pulmonary hypertension was admitted for worsening leg swelling or shortness of breath. She started on IV Bumex and she is doing better. Acute on chronic CKD on admission and became stable after diuresis and monitored in the hospital.  Patient also developed severe hypokalemia but recovered and resolved after replacement.  Patient was seen by cardiologist and recommend couple days of inpatient IV diuretics.  Patient lower extremity edema improved.  IV diuretics changed to oral Bumex and patient tolerated.  Patient currently stable and wants to go home and cleared by cardiologist.  Cardiologist at spironolactone small dose to patient and recommend patient to continue to use as outpatient.    Therefore, she is discharged in fair and stable condition to home with close outpatient follow-up.    The patient met 2-midnight criteria for an inpatient stay at the time of discharge.    Discharge Date  6/27/2019    FOLLOW UP ITEMS POST DISCHARGE  none    DISCHARGE DIAGNOSES      Bilateral lower extremity edema POA: Yes    Pulmonary hypertension (HCC) POA: Yes    Severe tricuspid regurgitation POA: Yes    MARICRUZ (acute kidney injury) (HCC) POA: Yes    Chronic atrial fibrillation (HCC) POA: Yes    COPD (chronic obstructive pulmonary disease) (HCC) POA: Yes    CKD (chronic kidney disease) stage 3, GFR 30-59 ml/min (HCC) POA: Yes    Chronic respiratory failure with hypoxia (HCC) POA: Yes    Cardiac pacemaker in situ POA: Yes    GERD (gastroesophageal reflux disease) POA: Yes    Hypothyroidism POA: Yes    Restless legs syndrome (RLS) POA: Yes    Hypokalemia POA: Yes    FOLLOW UP  Future Appointments  Date Time Provider Department Center    7/1/2019 11:20 AM EL[facility]AB[facility] Patel [facility]   7/5/2019 9:00 AM Karina Lopez [facility] None   7/12/2019 10:20 AM [facility] Patel [facility]   8/9/2019 11:30 AM Jose Simms M.D. [facility] None   12/23/2019 2:15 PM PACER CHECK-CAM B 2 [facility] None     No follow-up provider specified.    MEDICATIONS ON DISCHARGE     Medication List      START taking these medications      Instructions   hydrocortisone rectal 2.5 % Crea  Commonly known as:  PROCTOZONE HC   Once a day as needed to apply for hemorrhoid     spironolactone 25 MG Tabs  Start taking on:  6/28/2019  Commonly known as:  ALDACTONE   Take 0.5 Tabs by mouth every day.  Dose:  12.5 mg        CHANGE how you take these medications      Instructions   potassium chloride SA 20 MEQ Tbcr  What changed:  when to take this  Commonly known as:  Kdur   Take 2 Tabs by mouth every day for 30 days.  Dose:  40 mEq        CONTINUE taking these medications      Instructions   acetaminophen 325 MG Tabs  Commonly known as:  TYLENOL   Take 2 Tabs by mouth every 6 hours as needed.  Dose:  650 mg     albuterol 108 (90 Base) MCG/ACT Aers inhalation aerosol  Commonly known as:  PROAIR HFA   Inhale 2 Puffs by mouth every 6 hours as needed for Shortness of Breath.  Dose:  2 Puff     ascorbic acid 500 MG tablet  Commonly known as:  VITAMIN C   Take 1 Tab by mouth every day.  Dose:  500 mg     bumetanide 2 MG tablet  Commonly known as:  BUMEX   Take 2 Tabs by mouth 2 times a day.  Dose:  4 mg     calcium carbonate 500 MG Chew  Commonly known as:  TUMS   Take 1 Tab by mouth every four hours as needed (heartburn).  Dose:  500 mg     Calcium-Vitamin D 500-125 MG-UNIT Tabs   Take 1 tablet by mouth every morning. 600mg - 400 iu  Dose:  1 tablet     clopidogrel 75 MG Tabs  Commonly known as:  PLAVIX   Take 1 Tab by mouth every day.  Dose:  75 mg     cyanocobalamin 1000 MCG Tabs  Commonly known as:  VITAMIN B12   Take 1 Tab by mouth every day.  Dose:  1000 mcg      ELIQUIS 2.5mg Tabs  Generic drug:  apixaban   TAKE ONE TABLET BY MOUTH TWICE A DAY     ferrous sulfate 325 (65 Fe) MG tablet   Take one tablet by mouth twice daily with food, every other day.     fluticasone 110 MCG/ACT Aero  Commonly known as:  FLOVENT HFA   Inhale 1 Puff by mouth every day.  Dose:  1 Puff     Home Care Oxygen   Inhale 2 L/min by mouth every bedtime. via nasal cannula  Dose:  2 L/min     levothyroxine 50 MCG Tabs  Commonly known as:  SYNTHROID   Take 1 Tab by mouth Every morning on an empty stomach.  Dose:  50 mcg     metOLazone 2.5 MG Tabs  Commonly known as:  ZAROXOLYN   Doctor's comments:  Max of 1-2 doses weekly  Take 1 Tab by mouth 1 time daily as needed. Max of 1-2 doses weekly  Dose:  2.5 mg     metoprolol SR 25 MG Tb24  Commonly known as:  TOPROL XL   Take 1 Tab by mouth every morning.  Dose:  25 mg     omeprazole 20 MG delayed-release capsule  Commonly known as:  PRILOSEC   Take 1 Cap by mouth 2 times a day.  Dose:  20 mg     ROPINIRole 1 MG Tabs  Commonly known as:  REQUIP   Take 1 Tab by mouth 3 times a day.  Dose:  1 mg     rosuvastatin 10 MG Tabs  Commonly known as:  CRESTOR   Take 1 Tab by mouth every evening.  Dose:  10 mg     sertraline 100 MG Tabs  Commonly known as:  ZOLOFT   Take 1 Tab by mouth every bedtime.  Dose:  100 mg     topiramate 25 MG Tabs  Commonly known as:  TOPAMAX   Take 1 Tab by mouth 2 times a day. TAKE ONE TABLET BY MOUTH TWICE A DAY  Dose:  25 mg     TRELEGY ELLIPTA 100-62.5-25 MCG/INH Aepb  Generic drug:  Fluticasone-Umeclidin-Vilant   Inhale  by mouth.            Allergies  Allergies   Allergen Reactions   • Codeine Vomiting       DIET  Orders Placed This Encounter   Procedures   • Diet Order 2 Gram Sodium     Standing Status:   Standing     Number of Occurrences:   1     Order Specific Question:   Diet:     Answer:   2 Gram Sodium [7]     Order Specific Question:   Consistency/Fluid modifications:     Answer:   1200 ml Fluid Restriction [8]   • Discontinue  Diet Tray     Standing Status:   Standing     Number of Occurrences:   1       ACTIVITY  As tolerated.  Weight bearing as tolerated    CONSULTATIONS  Cardiology    PROCEDURES  None    EC-ECHOCARDIOGRAM COMPLETE W/O CONT   Final Result          PE:  Gen: AAOx3, NAD  Eyes: PELLA  Neck: no JVD, no lymphadenopathy  Cardia: RRR, no mrg  Lungs: CTAB, no rales, rhonci or wheezing  Abd: NABS, soft, non extended, no mass  EXT: 1+ edema, peripheral pulse 2+ b/l  Neuro: CN II-XII intact, non focal, reflex 2+ symmetrical  Skin: Intact, no lesion, warm  Psych: Appropriate.      LABORATORY  Lab Results   Component Value Date    SODIUM 143 06/27/2019    POTASSIUM 3.8 06/27/2019    CHLORIDE 105 06/27/2019    CO2 28 06/27/2019    GLUCOSE 124 (H) 06/27/2019    BUN 55 (H) 06/27/2019    CREATININE 1.70 (H) 06/27/2019        Lab Results   Component Value Date    WBC 3.8 (L) 06/27/2019    HEMOGLOBIN 7.9 (L) 06/27/2019    HEMATOCRIT 27.0 (L) 06/27/2019    PLATELETCT 127 (L) 06/27/2019        Total time of the discharge process exceeds 38 minutes.

## 2019-06-27 NOTE — PROGRESS NOTES
2 RN skin check completed with BRONWYN Mckeon.  Devices in place heart monitor.  Skin assessed under devices pink and blanching.  Confirmed pressure ulcers found on N/A.  New potential pressure ulcers noted on N/A. Wound consult placed N/A.  The following interventions in place waffle mattress, barrier wipes, barrier cream.    Bilateral ears are red and blanching.  Bilateral elbows are pink and blanching.  Sacrum is pink and blanching.  Bilateral feet are dry, calloused, heels are pink and blanching.

## 2019-06-27 NOTE — DISCHARGE INSTRUCTIONS
Discharge Instructions    Discharged to home by car with relative. Discharged via wheelchair, hospital escort: Yes.  Special equipment needed: Not Applicable    Be sure to schedule a follow-up appointment with your primary care doctor or any specialists as instructed.     Discharge Plan:   Diet Plan: Discussed  Activity Level: Discussed  Confirmed Follow up Appointment: Appointment Scheduled  Confirmed Symptoms Management: Discussed  Medication Reconciliation Updated: Yes  Influenza Vaccine Indication: Indicated: Not available from distributor/    I understand that a diet low in cholesterol, fat, and sodium is recommended for good health. Unless I have been given specific instructions below for another diet, I accept this instruction as my diet prescription.   Other diet: Low salt, 1.2L fluid restriction, Heart Healthy    Special Instructions: None    · Is patient discharged on Warfarin / Coumadin?   No     Depression / Suicide Risk    As you are discharged from this [facility] facility, it is important to learn how to keep safe from harming yourself.    Recognize the warning signs:  · Abrupt changes in personality, positive or negative- including increase in energy   · Giving away possessions  · Change in eating patterns- significant weight changes-  positive or negative  · Change in sleeping patterns- unable to sleep or sleeping all the time   · Unwillingness or inability to communicate  · Depression  · Unusual sadness, discouragement and loneliness  · Talk of wanting to die  · Neglect of personal appearance   · Rebelliousness- reckless behavior  · Withdrawal from people/activities they love  · Confusion- inability to concentrate     If you or a loved one observes any of these behaviors or has concerns about self-harm, here's what you can do:  · Talk about it- your feelings and reasons for harming yourself  · Remove any means that you might use to hurt yourself (examples: pills, rope, extension  cords, firearm)  · Get professional help from the community (Mental Health, Substance Abuse, psychological counseling)  · Do not be alone:Call your Safe Contact- someone whom you trust who will be there for you.  · Call your local CRISIS HOTLINE 707-3696 or 434-217-7088  · Call your local Children's Mobile Crisis Response Team [place] (067) 661-1498 or www.Unicotrip  · Call the toll free National Suicide Prevention Hotlines   · National Suicide Prevention Lifeline 628-388-KSDB (2134)  · [employer] Hope Line Network 800-SUICIDE (743-8950)      Discharge Instructions per Katelyn Jacques M.D.    Follow-up with PCP and cardiologist in 1 to 2 weeks.    DIET: Low-sodium, fluid restriction, cardiac    ACTIVITY: As tolerated    DIAGNOSIS: Pulmonary hypertension, bilateral lower extremity edema    Return to ER if symptoms recur            Heart Failure      Heart failure means your heart has trouble pumping blood. This makes it hard for your body to work well. Heart failure is usually a long-term (chronic) condition. You must take good care of yourself and follow your doctor's treatment plan.  HOME CARE  Take your heart medicine as told by your doctor.  Do not stop taking medicine unless your doctor tells you to.  Do not skip any dose of medicine.  Refill your medicines before they run out.  Take other medicines only as told by your doctor or pharmacist.  Stay active if told by your doctor. The elderly and people with severe heart failure should talk with a doctor about physical activity.  Eat heart-healthy foods. Choose foods that are without trans fat and are low in saturated fat, cholesterol, and salt (sodium). This includes fresh or frozen fruits and vegetables, fish, lean meats, fat-free or low-fat dairy foods, whole grains, and high-fiber foods. Lentils and dried peas and beans (legumes) are also good choices.  Limit salt if told by your doctor.  Cook in a healthy way. Roast, grill, broil, bake, poach, steam, or  stir-zuniga foods.  Limit fluids as told by your doctor.  Weigh yourself every morning. Do this after you pee (urinate) and before you eat breakfast. Write down your weight to give to your doctor.  Take your blood pressure and write it down if your doctor tells you to.  Ask your doctor how to check your pulse. Check your pulse as told.  Lose weight if told by your doctor.  Stop smoking or chewing tobacco. Do not use gum or patches that help you quit without your doctor's approval.  Schedule and go to doctor visits as told.  Nonpregnant women should have no more than 1 drink a day. Men should have no more than 2 drinks a day. Talk to your doctor about drinking alcohol.  Stop illegal drug use.  Stay current with shots (immunizations).  Manage your health conditions as told by your doctor.  Learn to manage your stress.  Rest when you are tired.  If it is really hot outside:  Avoid intense activities.  Use air conditioning or fans, or get in a cooler place.  Avoid caffeine and alcohol.  Wear loose-fitting, lightweight, and light-colored clothing.  If it is really cold outside:  Avoid intense activities.  Layer your clothing.  Wear mittens or gloves, a hat, and a scarf when going outside.  Avoid alcohol.  Learn about heart failure and get support as needed.  Get help to maintain or improve your quality of life and your ability to care for yourself as needed.  GET HELP IF:   You gain weight quickly.  You are more short of breath than usual.  You cannot do your normal activities.  You tire easily.  You cough more than normal, especially with activity.  You have any or more puffiness (swelling) in areas such as your hands, feet, ankles, or belly (abdomen).  You cannot sleep because it is hard to breathe.  You feel like your heart is beating fast (palpitations).  You get dizzy or light-headed when you stand up.  GET HELP RIGHT AWAY IF:   You have trouble breathing.  There is a change in mental status, such as becoming less  alert or not being able to focus.  You have chest pain or discomfort.  You faint.  MAKE SURE YOU:   Understand these instructions.  Will watch your condition.  Will get help right away if you are not doing well or get worse.  This information is not intended to replace advice given to you by your health care provider. Make sure you discuss any questions you have with your health care provider.  Document Released: 09/26/2009 Document Revised: 01/08/2016 Document Reviewed: 02/03/2014  [employer] Interactive Patient Education © 2017 [employer]  ·             Hypokalemia  Hypokalemia means that the amount of potassium in the blood is lower than normal. Potassium is a chemical that helps regulate the amount of fluid in the body (electrolyte). It also stimulates muscle tightening (contraction) and helps nerves work properly. Normally, most of the body’s potassium is inside of cells, and only a very small amount is in the blood. Because the amount in the blood is so small, minor changes to potassium levels in the blood can be life-threatening.  What are the causes?  This condition may be caused by:  · Antibiotic medicine.  · Diarrhea or vomiting. Taking too much of a medicine that helps you have a bowel movement (laxative) can cause diarrhea and lead to hypokalemia.  · Chronic kidney disease (CKD).  · Medicines that help the body get rid of excess fluid (diuretics).  · Eating disorders, such as bulimia.  · Low magnesium levels in the body.  · Sweating a lot.  What are the signs or symptoms?  Symptoms of this condition include:  · Weakness.  · Constipation.  · Fatigue.  · Muscle cramps.  · Mental confusion.  · Skipped heartbeats or irregular heartbeat (palpitations).  · Tingling or numbness.  How is this diagnosed?  This condition is diagnosed with a blood test.  How is this treated?  Hypokalemia can be treated by taking potassium supplements by mouth or adjusting the medicines that you take. Treatment may also include  eating more foods that contain a lot of potassium. If your potassium level is very low, you may need to get potassium through an IV tube in one of your veins and be monitored in the hospital.  Follow these instructions at home:  · Take over-the-counter and prescription medicines only as told by your health care provider. This includes vitamins and supplements.  · Eat a healthy diet. A healthy diet includes fresh fruits and vegetables, whole grains, healthy fats, and lean proteins.  · If instructed, eat more foods that contain a lot of potassium, such as:  ¨ Nuts, such as peanuts and pistachios.  ¨ Seeds, such as sunflower seeds and pumpkin seeds.  ¨ Peas, lentils, and lima beans.  ¨ Whole grain and bran cereals and breads.  ¨ Fresh fruits and vegetables, such as apricots, avocado, bananas, cantaloupe, kiwi, oranges, tomatoes, asparagus, and potatoes.  ¨ Orange juice.  ¨ Tomato juice.  ¨ Red meats.  ¨ Yogurt.  · Keep all follow-up visits as told by your health care provider. This is important.  Contact a health care provider if:  · You have weakness that gets worse.  · You feel your heart pounding or racing.  · You vomit.  · You have diarrhea.  · You have diabetes (diabetes mellitus) and you have trouble keeping your blood sugar (glucose) in your target range.  Get help right away if:  · You have chest pain.  · You have shortness of breath.  · You have vomiting or diarrhea that lasts for more than 2 days.  · You faint.  This information is not intended to replace advice given to you by your health care provider. Make sure you discuss any questions you have with your health care provider.  Document Released: 12/18/2006 Document Revised: 08/05/2017 Document Reviewed: 08/05/2017  [employer] Interactive Patient Education © 2017 [employer]        Acute Kidney Injury, Adult  Acute kidney injury (MARICRUZ) occurs when there is sudden (acute) damage to the kidneys. A small amount of kidney damage may not cause problems, but a  large amount of damage may make it difficult or impossible for the kidneys to work the way they should. MARICRUZ may develop into long-lasting (chronic) kidney disease. Early detection and treatment of MARICRUZ may prevent kidney damage from becoming permanent or getting worse.  What are the causes?  Common causes of this condition include:  · A problem with blood flow to the kidneys. This may be caused by:  ¨ Blood loss.  ¨ Heart and blood vessel (cardiovascular) disease.  ¨ Severe burns.  ¨ Liver disease.  · Direct damage to the kidneys. This may be caused by:  ¨ Some medicines.  ¨ A kidney infection.  ¨ Poisoning.  ¨ Being around or in contact with poisonous (toxic) substances.  ¨ A surgical wound.  ¨ A hard, direct force to the kidney area.  · A sudden blockage of urine flow. This may be caused by:  ¨ Cancer.  ¨ Kidney stones.  ¨ Enlarged prostate in males.  What are the signs or symptoms?  Symptoms develop slowly and may not be obvious until the kidney damage becomes severe. It is possible to have MARICRUZ for years without showing any symptoms. Symptoms of this condition can include:  · Swelling (edema) of the face, legs, ankles, or feet.  · Numbness, tingling, or loss of feeling (sensation) in the hands or feet.  · Tiredness (lethargy).  · Nausea or vomiting.  · Confusion or trouble concentrating.  · Problems with urination, such as:  ¨ Painful or burning feeling during urination.  ¨ Decreased urine production.  ¨ Frequent urination, especially at night.  ¨ Bloody urine.  · Muscle twitches and cramps, especially in the legs.  · Shortness of breath.  · Weakness.  · Constant itchiness.  · Loss of appetite.  · Metallic taste in the mouth.  · Trouble sleeping.  · Pale lining of the eyelids and surface of the eye (conjunctiva).  How is this diagnosed?  This condition may be diagnosed with various tests. Tests may include:  · Blood tests.  · Urine tests.  · Imaging tests.  · A test in which a sample of tissue is removed from the  kidneys to be looked at under a microscope (kidney biopsy).  How is this treated?  Treatment of MARICRUZ varies depending on the cause and severity of the kidney damage. In mild cases, treatment may not be needed. The kidneys may heal on their own.  If MARICRUZ is more severe, your health care provider will treat the cause of the kidney damage, help the kidneys heal, and prevent problems from occurring. Severe cases may require a procedure to remove toxic wastes from the body (dialysis) or surgery to repair kidney damage. Surgery may involve:  · Repair of a torn kidney.  · Removal of a urine flow obstruction.  Follow these instructions at home:  · Follow your prescribed diet.  · Take over-the-counter and prescription medicines only as told by your health care provider.  ¨ Do not take any new medicines unless approved by your health care provider. Many medicines can worsen your kidney damage.  ¨ Do not take any vitamin and mineral supplements unless approved by your health care provider. Many nutritional supplements can worsen your kidney damage.  ¨ The dose of some medicines that you take may need to be adjusted.  · Do not use any tobacco products, such as cigarettes, chewing tobacco, and e-cigarettes. If you need help quitting, ask your health care provider.  · Keep all follow-up visits as told by your health care provider. This is important.  · Keep track of your blood pressure. Report changes in your blood pressure as told by your health care provider.  · Achieve and maintain a healthy weight. If you need help with this, ask your health care provider.  · Start or continue an exercise plan. Try to exercise at least 30 minutes a day, 5 days a week.  · Stay current with immunizations as told by your health care provider.  Where to find more information:  · American Association of Kidney Patients: www.aakp.org  · National Kidney Foundation: www.kidney.org  · American Kidney Fund: www.akfinc.org  · Life Options Rehabilitation  Program: www.lifeoptions.org and www.kidneyschool.org  Contact a health care provider if:  · Your symptoms get worse.  · You develop new symptoms.  Get help right away if:  · You develop symptoms of end-stage kidney disease, which include:  ¨ Headaches.  ¨ Abnormally dark or light skin.  ¨ Numbness in the hands or feet.  ¨ Easy bruising.  ¨ Frequent hiccups.  ¨ Chest pain.  ¨ Shortness of breath.  ¨ End of menstruation in women.  · You have a fever.  · You have decreased urine production.  · You have pain or bleeding when you urinate.  This information is not intended to replace advice given to you by your health care provider. Make sure you discuss any questions you have with your health care provider.  Document Released: 07/02/2012 Document Revised: 07/27/2017 Document Reviewed: 08/16/2013  [employer] Interactive Patient Education © 2017 [employer].        Bumetanide tablets  What is this medicine?  BUMETANIDE (byoo MET a nide) is a diuretic. It helps you make more urine and to lose salt and excess water from your body. This medicine is used to treat high blood pressure, and edema or swelling from heart, kidney, or liver disease.  This medicine may be used for other purposes; ask your health care provider or pharmacist if you have questions.  COMMON BRAND NAME(S): Bumex  What should I tell my health care provider before I take this medicine?  They need to know if you have any of these conditions:  -abnormal blood electrolytes  -diarrhea or vomiting  -gout  -heart disease  -kidney disease, small amounts of urine, or difficulty passing urine  -liver disease  -an unusual or allergic reaction to bumetanide, sulfa drugs, other medicines, foods, dyes, or preservatives  -pregnant or trying to get pregnant  -breast-feeding  How should I use this medicine?  Take this medicine by mouth with a glass of water. Follow the directions on the prescription label. You may take this medicine with or without food but if it upsets  your stomach, take it with food or milk. Do not take it more often than directed. Remember that you will need to pass more urine after taking this medicine. Do not take it at a time of day that will cause you problems. Do not take at bedtime.  Talk to your pediatrician regarding the use of this medicine in children. Special care may be needed.  Overdosage: If you think you have taken too much of this medicine contact a poison control center or emergency room at once.  NOTE: This medicine is only for you. Do not share this medicine with others.  What if I miss a dose?  If you miss a dose, take it as soon as you can. If it is almost time for your next dose, take only that dose. Do not take double or extra doses.  What may interact with this medicine?  -alcohol  -certain antibiotics given by injection  -diuretics  -heart medicines like digoxin and dofetilide  -hormones like cortisone, fludrocortisone, hydrocortisone  -lithium  -medicines for diabetes  -medicines for high blood pressure  -medicines for inflammation like indomethacin  -OTC supplements like ginseng and ephedra  This list may not describe all possible interactions. Give your health care provider a list of all the medicines, herbs, non-prescription drugs, or dietary supplements you use. Also tell them if you smoke, drink alcohol, or use illegal drugs. Some items may interact with your medicine.  What should I watch for while using this medicine?  Visit your doctor or health care professional for regular check ups. Check your blood pressure regularly. Ask your doctor or health care professional what your blood pressure should be, and when you should contact him or her. If you are a diabetic, check your blood sugar as directed.  You may need to be on a special diet while taking this medicine. Ask your doctor. Also, ask how many glasses of fluid you need to drink each day. You must not get dehydrated.  You may get drowsy or dizzy. Do not drive, use machinery,  or do anything that needs mental alertness until you know how this drug affects you. Do not stand or sit up quickly, especially if you are an older patient. This reduces the risk of dizzy or fainting spells. Alcohol can make you more drowsy and dizzy. Avoid alcoholic drinks.  What side effects may I notice from receiving this medicine?  Side effects that you should report to your doctor or health care professional as soon as possible:  -allergic reactions like skin rash, itching or hives, swelling of the face, lips, or tongue  -blood in the urine or stools  -blurred vision  -dry mouth  -fever or chills  -hearing loss or ringing in the ears  -irregular heartbeat  -muscle cramps, pain or weakness  -unusually weak or tired  -vomiting or diarrhea  Side effects that usually do not require medical attention (report to your doctor or health care professional if they continue or are bothersome):  -headache  -loss of appetite  -unusual bleeding or bruising  This list may not describe all possible side effects. Call your doctor for medical advice about side effects. You may report side effects to FDA at 7-635-FDA-5713.  Where should I keep my medicine?  Keep out of the reach of children.  Store at room temperature between 15 and 30 degrees C (59 and 86 degrees F). Throw away any unused medicine after the expiration date.  NOTE: This sheet is a summary. It may not cover all possible information. If you have questions about this medicine, talk to your doctor, pharmacist, or health care provider.  © 2018 Elsevier/Gold Standard (2009-03-18 15:55:56)      Discharge Instructions per Katelyn Jacques M.D.    Follow-up with PCP and cardiologist as scheduled and as needed    DIET: Cardiac, low-sodium    ACTIVITY: As tolerated    DIAGNOSIS: Diastolic heart failure, pulmonary hypertension, lower extremity edema    Return to ER if symptoms recur

## 2019-06-27 NOTE — PROGRESS NOTES
Patient's BP was 164/70  HR 84.  Patient is asymptomatic.  Paged hospitalist and updated Dr. Zurita.  No new orders at this time.

## 2019-06-28 ENCOUNTER — TELEPHONE (OUTPATIENT)
Dept: CARDIOLOGY | Facility: MEDICAL CENTER | Age: 84
End: 2019-06-28

## 2019-06-28 NOTE — TELEPHONE ENCOUNTER
Called and clarified that pt's Spironolactone was Ok to take in the AM with other medications. Clarified Bumex dose per MAR and discharge summary and instructed her to have the pt remain on a 2L fluid restriction as much as possible until the follow up appointment when they can discuss further. Palma states understanding.

## 2019-06-28 NOTE — TELEPHONE ENCOUNTER
----- Message from Anita Low sent at 6/28/2019  9:31 AM PDT -----  Regarding: 3 items to discuss  Contact: [phone]  FLACO/deion    Pt's granddaughter Palma Matias calling for 3 things:    1. Spironolactone - needs to confirm time of day to administer & if pt should continue taking this med.  2. Bumex - dosing clarification.  3. Fluid restriction - needs to discuss.    Palma will only be in cell service area between 11am and 1pm today.  Please call Palma during that period of time .

## 2019-07-01 ENCOUNTER — APPOINTMENT (OUTPATIENT)
Dept: MEDICAL GROUP | Facility: MEDICAL CENTER | Age: 84
End: 2019-07-01
Payer: MEDICARE

## 2019-07-02 ENCOUNTER — ANTICOAGULATION MONITORING (OUTPATIENT)
Dept: VASCULAR LAB | Facility: MEDICAL CENTER | Age: 84
End: 2019-07-02

## 2019-07-02 DIAGNOSIS — Z79.01 CHRONIC ANTICOAGULATION: ICD-10-CM

## 2019-07-02 DIAGNOSIS — I48.20 CHRONIC ATRIAL FIBRILLATION (HCC): ICD-10-CM

## 2019-07-02 NOTE — PROGRESS NOTES
Diagnosis: at fib  Drug: Eliquis 2.5 mg BID          LOT: indef  BEATRIZVASC: 4 High risk   HAS-BLED: 2    Reviewed labs. H/h, plts stable. Cr 1.7, actual cr cl Actual body weight): 23.1 (ml/min) . Left message for pt to continue taking Eliquis 2.5 mg BID. Instructed to call if she has any questions or concerns. Otherwise, will plan to follow up with pt in 6 months with labs.    The Patient denies any bleeding issues and is not having problems affording the medication. The patient was instructed to go to the ER for falls with a head injury,  blood in urine or stool or any bleeding that last longer than 20 min. She will call into clinic for changes in her health or medication or if she is asked to go off of the medication.   Next test is due in 2 months to check if any bleeding Sept. SANDRITA Chacon  .

## 2019-07-05 ENCOUNTER — HOME CARE VISIT (OUTPATIENT)
Dept: HOSPICE | Facility: HOSPICE | Age: 84
End: 2019-07-05
Payer: MEDICARE

## 2019-07-05 ENCOUNTER — TELEPHONE (OUTPATIENT)
Dept: CARDIOLOGY | Facility: MEDICAL CENTER | Age: 84
End: 2019-07-05

## 2019-07-05 ENCOUNTER — HOSPICE ADMISSION (OUTPATIENT)
Dept: HOSPICE | Facility: HOSPICE | Age: 84
End: 2019-07-05
Payer: MEDICARE

## 2019-07-05 ENCOUNTER — OFFICE VISIT (OUTPATIENT)
Dept: CARDIOLOGY | Facility: MEDICAL CENTER | Age: 84
End: 2019-07-05
Payer: MEDICARE

## 2019-07-05 ENCOUNTER — HOSPITAL ENCOUNTER (OUTPATIENT)
Dept: LAB | Facility: MEDICAL CENTER | Age: 84
End: 2019-07-05
Attending: NURSE PRACTITIONER
Payer: MEDICARE

## 2019-07-05 VITALS
OXYGEN SATURATION: 98 % | HEART RATE: 82 BPM | DIASTOLIC BLOOD PRESSURE: 66 MMHG | BODY MASS INDEX: 25.44 KG/M2 | HEIGHT: 64 IN | WEIGHT: 149 LBS | SYSTOLIC BLOOD PRESSURE: 124 MMHG

## 2019-07-05 DIAGNOSIS — E87.6 HYPOKALEMIA: ICD-10-CM

## 2019-07-05 DIAGNOSIS — N18.30 CKD (CHRONIC KIDNEY DISEASE) STAGE 3, GFR 30-59 ML/MIN (HCC): ICD-10-CM

## 2019-07-05 DIAGNOSIS — Z95.0 CARDIAC PACEMAKER IN SITU: ICD-10-CM

## 2019-07-05 DIAGNOSIS — I48.20 CHRONIC ATRIAL FIBRILLATION (HCC): ICD-10-CM

## 2019-07-05 DIAGNOSIS — I50.9 HEART FAILURE, NYHA CLASS 3 (HCC): ICD-10-CM

## 2019-07-05 DIAGNOSIS — I50.30 ACC/AHA STAGE C HEART FAILURE WITH PRESERVED EJECTION FRACTION (HCC): ICD-10-CM

## 2019-07-05 DIAGNOSIS — I49.5 SINOATRIAL NODE DYSFUNCTION (HCC): ICD-10-CM

## 2019-07-05 DIAGNOSIS — I25.10 CORONARY ARTERY DISEASE WITHOUT ANGINA PECTORIS, UNSPECIFIED VESSEL OR LESION TYPE, UNSPECIFIED WHETHER NATIVE OR TRANSPLANTED HEART: ICD-10-CM

## 2019-07-05 DIAGNOSIS — Z79.01 CHRONIC ANTICOAGULATION: ICD-10-CM

## 2019-07-05 DIAGNOSIS — Z79.899 HIGH RISK MEDICATION USE: ICD-10-CM

## 2019-07-05 DIAGNOSIS — Z98.61 S/P CORONARY ANGIOPLASTY: ICD-10-CM

## 2019-07-05 LAB
ANION GAP SERPL CALC-SCNC: 10 MMOL/L (ref 0–11.9)
BUN SERPL-MCNC: 54 MG/DL (ref 8–22)
CALCIUM SERPL-MCNC: 8.7 MG/DL (ref 8.5–10.5)
CHLORIDE SERPL-SCNC: 103 MMOL/L (ref 96–112)
CO2 SERPL-SCNC: 26 MMOL/L (ref 20–33)
CREAT SERPL-MCNC: 1.7 MG/DL (ref 0.5–1.4)
GLUCOSE SERPL-MCNC: 100 MG/DL (ref 65–99)
POTASSIUM SERPL-SCNC: 4.2 MMOL/L (ref 3.6–5.5)
SODIUM SERPL-SCNC: 139 MMOL/L (ref 135–145)

## 2019-07-05 PROCEDURE — 99497 ADVNCD CARE PLAN 30 MIN: CPT | Performed by: NURSE PRACTITIONER

## 2019-07-05 PROCEDURE — 80048 BASIC METABOLIC PNL TOTAL CA: CPT

## 2019-07-05 PROCEDURE — 99214 OFFICE O/P EST MOD 30 MIN: CPT | Performed by: NURSE PRACTITIONER

## 2019-07-05 PROCEDURE — 36415 COLL VENOUS BLD VENIPUNCTURE: CPT

## 2019-07-05 RX ORDER — POTASSIUM CHLORIDE 20 MEQ/1
60 TABLET, EXTENDED RELEASE ORAL 2 TIMES DAILY
Qty: 180 TAB | Refills: 11 | Status: SHIPPED | OUTPATIENT
Start: 2019-07-05

## 2019-07-05 ASSESSMENT — ENCOUNTER SYMPTOMS
FEVER: 0
PALPITATIONS: 0
CLAUDICATION: 0
PND: 0
MYALGIAS: 0
DIZZINESS: 0
ABDOMINAL PAIN: 0
COUGH: 0
ORTHOPNEA: 0
SHORTNESS OF BREATH: 1

## 2019-07-05 NOTE — TELEPHONE ENCOUNTER
Called granddaughter, Palma, regarding results of stat labs from this morning.  Creatinine stable at 1.7 and potassium within normal limits at 4.2.  Hospice nurse has spoken to patient and family.  Patient was started on metolazone 2.5 mg today along with potassium.  Patient to call with any other additional questions, and to follow-up in clinic next week with labs if not enrolled in hospice.

## 2019-07-05 NOTE — PROGRESS NOTES
Chief Complaint   Patient presents with   • Congestive Heart Failure       Subjective:   Andree Egan is a 87 y.o. female who presents today for follow-up on her heart failure with her granddaughter, Palma.    Patient of Dr. Simms.  Patient was last seen in clinic on 6/17/2019.  During her last visit, her diuretics were increased due to worsening volume overload.    Pt did not improve with increased diuretic use. Pt was admitted to the hospital on 6/20/2019 through 6/27/2019.  She was diuresed and discharged home.  She was resumed on her prior diuretic dosage and started on spironolactone 12.5 mg daily.    Patient's discharge weight was 138 pounds and through the week, her weight has increased to 144 pounds.  Her weight is about 20 pounds more than her prior baseline.  She is taking Bumex 4 mg twice a day, and spironolactone 12.5 mg daily.    For her symptoms, she reports shortness of breath with ADLs and exertion, and swelling up through her thighs.  She is tired and sleeps through the day per her family. Pt does reports symptoms with eating. She denies any chest pain, palpitations, orthopnea, PND or dizziness/lightheadedness.  She does report restless legs.    Family also states patient was not enrolled back into home health.    Additonally, patient has the following medical problems:     -Hospitalization from 7/24/2018-7/28/2018 is for heart failure.      -Hospitalization from 7/8/2018-7/11/2018 for heart failure exacerbation. Pt had a thoracentesis (failed 1st attempt and 2nd attempt successful) for a right Pleural effusion.      -Chronic atrial fibrillation: Rate controlled, taking warfarin, followed by the anticoagulation clinic     -Has permanent pacemaker     -Aortic stenosis     -Pulmonary HTN     -Hypertension     -Hyperlipidemia     -Kidney disease: has an upcoming appointment with Neph.     -Hypothyroidism: Taking levothyroxine.     -COPD, using inhalers and 2 L oxygen at night     -History of breast  cancer    Past Medical History:   Diagnosis Date   • Anemia    • Arthritis     back/neck   • Atrial fibrillation (CMS-HCC) [I48.91] 3/29/2018   • Back pain    • Blood clotting disorder (HCC)    • Breast cancer (HCC)    • Breath shortness     water retention   • Cataract    • Congestive heart failure (CHF) ([facility]) 4/3/2018   • Constipation    • Coronary heart disease    • Daytime sleepiness    • BERTRAND (dyspnea on exertion) 5/4/2018   • GERD (gastroesophageal reflux disease)    • Heart attack ([facility])    • Heart valve disease    • Hemorrhagic disorder ([facility])    • Hiatus hernia syndrome    • Hyperlipidemia    • Hypertension    • Hypothyroidism    • Kidney disease    • Long term current use of anticoagulant 3/29/2018   • Nasal drainage    • Osteoporosis    • Pacemaker 07/2011    Implanted in [place]   • Pneumonia    • Pulmonary emphysema ([facility])    • Restless leg syndrome    • Rheumatoid arthritis ([facility])    • Ringing in ears    • Shortness of breath    • Swelling of lower extremity    • Thyroid disease    • Tonsillitis    • Tremors of nervous system 4/3/2018   • Urinary incontinence    • Urinary tract infection      Past Surgical History:   Procedure Laterality Date   • HIP NAILING INTRAMEDULLARY Left 12/29/2018    Procedure: HIP NAILING INTRAMEDULLARY;  Surgeon: Juan Daniel Zaragoza M.D.;  Location: SURGERY [facility];  Service: Orthopedics   • PACEMAKER INSERTION Right 07/25/2011    Medtronic Sensia [id number] implanted by Dr. Decker in [place].   • HYSTERECTOMY LAPAROSCOPY     • MASTECTOMY Left    • PB REMV 2ND CATARACT,CORN-SCLER SECTN       Family History   Problem Relation Age of Onset   • Lung Disease Mother    • Cancer Mother    • Heart Disease Mother    • No Known Problems Father    • Diabetes Brother    • Fainting Neg Hx      Social History     Social History   • Marital status: Single     Spouse name: N/A   • Number of children: N/A   • Years of education: N/A     Occupational History   • Not on file.     Social History  Main Topics   • Smoking status: Former Smoker     Packs/day: 0.50     Types: Cigarettes     Quit date: 1/1/1970   • Smokeless tobacco: Never Used   • Alcohol use 1.8 oz/week     3 Glasses of wine per week      Comment: 1-2 glasses wine before dinner   • Drug use: No   • Sexual activity: Not on file     Other Topics Concern   • Not on file     Social History Narrative   • No narrative on file     Allergies   Allergen Reactions   • Codeine Vomiting     Outpatient Encounter Prescriptions as of 7/5/2019   Medication Sig Dispense Refill   • potassium chloride SA (KDUR) 20 MEQ Tab CR Take 3 Tabs by mouth 2 times a day. 180 Tab 11   • spironolactone (ALDACTONE) 25 MG Tab Take 0.5 Tabs by mouth every day. 30 Tab 3   • hydrocortisone rectal (PROCTOZONE HC) 2.5 % Cream Once a day as needed to apply for hemorrhoid 30 g 0   • ELIQUIS 2.5 MG Tab TAKE ONE TABLET BY MOUTH TWICE A DAY 60 Tab 5   • bumetanide (BUMEX) 2 MG tablet Take 2 Tabs by mouth 2 times a day. 120 Tab 3   • ROPINIRole (REQUIP) 1 MG Tab Take 1 Tab by mouth 3 times a day. 90 Tab 4   • ferrous sulfate 325 (65 Fe) MG tablet Take one tablet by mouth twice daily with food, every other day. 90 Tab 3   • metOLazone (ZAROXOLYN) 2.5 MG Tab Take 1 Tab by mouth 1 time daily as needed. Max of 1-2 doses weekly 15 Tab 11   • Home Care Oxygen Inhale 2 L/min by mouth every bedtime. via nasal cannula     • topiramate (TOPAMAX) 25 MG Tab Take 1 Tab by mouth 2 times a day. TAKE ONE TABLET BY MOUTH TWICE A DAY 60 Tab 6   • omeprazole (PRILOSEC) 20 MG delayed-release capsule Take 1 Cap by mouth 2 times a day. 30 Cap 6   • fluticasone (FLOVENT HFA) 110 MCG/ACT Aerosol Inhale 1 Puff by mouth every day. 1 Inhaler 5   • clopidogrel (PLAVIX) 75 MG Tab Take 1 Tab by mouth every day. 30 Tab 6   • Calcium-Vitamin D 500-125 MG-UNIT Tab Take 1 tablet by mouth every morning. 600mg - 400 iu 30 Tab 6   • ascorbic acid (VITAMIN C) 500 MG tablet Take 1 Tab by mouth every day. 30 Tab 6   •  "sertraline (ZOLOFT) 100 MG Tab Take 1 Tab by mouth every bedtime. 30 Tab 11   • acetaminophen (TYLENOL) 325 MG Tab Take 2 Tabs by mouth every 6 hours as needed. 30 Tab 0   • metoprolol SR (TOPROL XL) 25 MG TABLET SR 24 HR Take 1 Tab by mouth every morning. 30 Tab 11   • calcium carbonate (TUMS) 500 MG Chew Tab Take 1 Tab by mouth every four hours as needed (heartburn). 30 Tab 0   • rosuvastatin (CRESTOR) 10 MG Tab Take 1 Tab by mouth every evening. 30 Tab 11   • cyanocobalamin (VITAMIN B12) 1000 MCG Tab Take 1 Tab by mouth every day. 30 Tab 3   • levothyroxine (SYNTHROID) 50 MCG Tab Take 1 Tab by mouth Every morning on an empty stomach. 90 Tab 3   • [DISCONTINUED] potassium chloride SA (KDUR) 20 MEQ Tab CR Take 2 Tabs by mouth every day for 30 days. 60 Tab 0   • Fluticasone-Umeclidin-Vilant (TRELEGY ELLIPTA) 100-62.5-25 MCG/INH AEROSOL POWDER, BREATH ACTIVATED Inhale  by mouth.     • albuterol (PROAIR HFA) 108 (90 Base) MCG/ACT Aero Soln inhalation aerosol Inhale 2 Puffs by mouth every 6 hours as needed for Shortness of Breath. 8.5 g 4     Facility-Administered Encounter Medications as of 7/5/2019   Medication Dose Route Frequency Provider Last Rate Last Dose   • cyanocobalamin (VITAMIN B-12) injection 1,000 mcg  1,000 mcg Intramuscular Q30 DAYS uYdelka Sanchez, A.P.R.N.   1,000 mcg at 06/06/19 1129     Review of Systems   Constitutional: Positive for malaise/fatigue. Negative for fever.   Respiratory: Positive for shortness of breath. Negative for cough.    Cardiovascular: Positive for leg swelling. Negative for chest pain, palpitations, orthopnea, claudication and PND.   Gastrointestinal: Negative for abdominal pain.   Musculoskeletal: Negative for myalgias.        Restless legs   Neurological: Negative for dizziness.   All other systems reviewed and are negative.       Objective:   /66 (BP Location: Left arm, Patient Position: Sitting, BP Cuff Size: Adult)   Pulse 82   Ht 1.613 m (5' 3.5\")   Wt 67.6 kg " (149 lb)   LMP  (LMP Unknown)   SpO2 98%   BMI 25.98 kg/m²     Physical Exam   Constitutional: She is oriented to person, place, and time. She appears well-developed and well-nourished.   Using a wheelchair today   HENT:   Head: Normocephalic and atraumatic.   Eyes: Pupils are equal, round, and reactive to light. EOM are normal.   Neck: Normal range of motion. Neck supple. JVD present.   Cardiovascular: Normal rate and regular rhythm.    Murmur heard.  Pulmonary/Chest: Effort normal. No respiratory distress. She has no wheezes. She has rales.   Abdominal: Soft. Bowel sounds are normal.   Musculoskeletal: She exhibits edema (Bilateral 2-3+ lower extremity edema up to thighs).   Neurological: She is alert and oriented to person, place, and time.   Skin: Skin is warm and dry.   Psychiatric: She has a normal mood and affect. Her behavior is normal.   Vitals reviewed.    Lab Results   Component Value Date/Time    CHOLSTRLTOT 67 (L) 07/09/2018 02:22 AM    LDL 17 07/09/2018 02:22 AM    HDL 40 07/09/2018 02:22 AM    TRIGLYCERIDE 50 07/09/2018 02:22 AM       Lab Results   Component Value Date/Time    SODIUM 143 06/27/2019 02:45 AM    POTASSIUM 3.8 06/27/2019 02:45 AM    CHLORIDE 105 06/27/2019 02:45 AM    CO2 28 06/27/2019 02:45 AM    GLUCOSE 124 (H) 06/27/2019 02:45 AM    BUN 55 (H) 06/27/2019 02:45 AM    CREATININE 1.70 (H) 06/27/2019 02:45 AM     Lab Results   Component Value Date/Time    ALKPHOSPHAT 114 (H) 06/22/2019 02:51 AM    ASTSGOT 16 06/22/2019 02:51 AM    ALTSGPT 9 06/22/2019 02:51 AM    TBILIRUBIN 0.5 06/22/2019 02:51 AM      Transthoracic Echo Report 5/19/18  Severe bi-atrial enlargement. Normal LV and RV systolic function. LVEF is 55% by visual estimation. Diastolic function difficult to assess.   There is at least mild aortic stenosis but valve is opening. Mean   gradient is 10 mmHg, with V max of 2.01 m/s. RVSP is elevated at 70   mmHg suggesting pulmonary hypertension. No prior study is available for  comparison.      Heart Cath 7/2/2018   POSTPROCEDURE DIAGNOSES:  1.  No evidence of aortic stenosis with 0 mmHg peak to peak gradient, mean gradient   of 8 mmHg, calculated CHRISTIANE of 2.09 cm2.  2.  Coronary artery disease with high grade proximal and high grade mid left   anterior descending artery.  3.  Successful percutaneous transluminal coronary angioplasty/stent placement   of the mid left anterior descending artery with 2.25x12 mm Synergy   drug-eluting stent.  4.  Successful percutaneous transluminal coronary angioplasty/stent placement   of the proximal left anterior descending artery with 2.5x12 mm Synergy drug-eluting stent.  5.  Normal left ventricular systolic function with ejection fraction of 55%.  6.  Normal left ventricular end-diastolic pressure.  7.  Elevated right heart pressure with PA systolic pressure of 60 mmHg.  8.  Successful Angio-Seal closure.     Transthoracic Echo Report 7/8/2018  Prior done 05-.  Compared to prior study, the right-sided chambers appear dilated.    Left ventricular systolic function is low normal.  Left ventricular ejection fraction is visually estimated to be 50-55%.  Abnormal septal motion consistent with right ventricular (RV) volume overload and/or elevated RV end-diastolic pressure.  Dilated right ventricle with reduced systolic function.  Severe tricuspid regurgitation. Estimated right ventricular systolic pressure  is 55 mmHg.  Dilated inferior vena cava without inspiratory collapse     Ultrasound of neck 10/29/2018  Palpable lump in the posterior right neck corresponds to a small lymph node.    Transthoracic Echo Report 6/23/2019-results reviewed with patient  Previous exam 05/2018, no change in LV function.  Left ventricular ejection fraction is visually estimated to be 50%.  Severely dilated right ventricle.  Reduced right ventricular systolic   function.  Dilated inferior vena cava without inspiratory collapse.    Severe tricuspid regurgitation.    Mild  aortic stenosis.  Moderate eccentric mitral regurgitation.    Assessment:     1. ACC/AHA stage C heart failure with preserved ejection fraction (HCC)  potassium chloride SA (KDUR) 20 MEQ Tab CR    Basic Metabolic Panel    Basic Metabolic Panel    REFERRAL TO HOSPICE    CANCELED: Basic Metabolic Panel   2. Heart failure, NYHA class 3 (HCC)     3. CKD (chronic kidney disease) stage 3, GFR 30-59 ml/min ([facility])  potassium chloride SA (KDUR) 20 MEQ Tab CR    Basic Metabolic Panel    Basic Metabolic Panel    REFERRAL TO HOSPICE    CANCELED: Basic Metabolic Panel   4. Hypokalemia  potassium chloride SA (KDUR) 20 MEQ Tab CR    Basic Metabolic Panel    Basic Metabolic Panel    CANCELED: Basic Metabolic Panel   5. Chronic atrial fibrillation (HCC)     6. Sinoatrial node dysfunction (HCC)     7. Cardiac pacemaker in situ     8. Coronary artery disease without angina pectoris, unspecified vessel or lesion type, unspecified whether native or transplanted heart     9. Chronic anticoagulation     10. High risk medication use     11. S/P coronary angioplasty         Medical Decision Making:  Today's Assessment / Status / Plan:   1. HFpEF, Stage C, Class 2, LVEF 50-55%: Patient continues to be volume overloaded  -.  Discussed with patient and granddaughter about patient's worsening condition.  Patient expresses that she would like to get the swelling off and feel better. She would like to stay out of the hospital. Discussed with patient about hospice and symptom management.  Patient and family agreed to proceed with hospice.  We will also re-enroll patient in [facility] paramedic program in the event that hospice will not be able to establish care this weekend.  We will hold off on enrolling patient into home health  -pt has some anemia that may contribute to her symptoms  -In the meantime we will have patient restart metolazone 2.5 mg every other day with an extra 40 mEq of potassium with each dose  -Continue Bumex 4 mg twice a  day  -Continue potassium to 40 mEq twice a day  -Continue Spironolactone 12.5 mg daily  -Patient to obtain BMP stat today and another BMP in 1 week (if she does not continue with hospice)  -Reinforced s/sx of worsening heart failure with patient and weight monitoring. Pt verbalizes understanding. Pt to call office or RTC if present.     2.  A. fib: Rate controlled  -Continue Toprol-XL 25 mg daily  -Continue Eliquis 2.5 mg twice a day    4.  CAD, s/p BRITTON x2 on 7/2/2018:  -Continue rosuvastatin 10 mg daily  -Continue clopidogrel 75 mg daily (will discuss with Dr. Simms if this will be continued)    5. CKD, due to contrast-induced nephropathy:  -Recommendations per above  -Continue to follow-up with nephrology    I spent 30 minutes talking to patient and family member about worsening right heart failure and significant volume overload.  At this time, patient has multiple comorbidities including end organ damage.  Mortality is high.  Further invasive cardiac procedure or surgery would not change the overall mortality of this patient.  Patient is not a candidate for further advanced heart failure therapies such as left ventricular assistance device or heart transplant.  The best option for this patient is hospice care.  I explained to patient and family member about the option and they both agreed to proceed.  We will place hospice consult. Pt does have a POLST and advanced directive on file.    FU in clinic in 1 week with labs if they wish to not proceed with Hospice. Sooner if needed.    Patient verbalizes understanding and agrees with the plan of care.     Collaborating MD: BLUE Jiménez MD

## 2019-07-05 NOTE — PATIENT INSTRUCTIONS
Labs today  Start Metolazone 2.5 mg every other day with extra 40 mE q of potassium every other day  Repeat labs in 1 week  Continue Bumex 4 mg BID with 40 mEq of potassium twice a day

## 2019-07-11 ENCOUNTER — TELEPHONE (OUTPATIENT)
Dept: MEDICAL GROUP | Facility: MEDICAL CENTER | Age: 84
End: 2019-07-11

## 2019-07-11 NOTE — TELEPHONE ENCOUNTER
ESTABLISHED PATIENT PRE-VISIT PLANNING     Patient was contacted to complete PVP.     Note: Patient will not be contacted if there is no indication to call.     1.  Reviewed notes from the last few office visits within the medical group: Yes    2.  If any orders were placed at last visit or intended to be done for this visit (i.e. 6 mos follow-up), do we have Results/Consult Notes?        •  Labs - Labs ordered, completed on 6/10/19 and results are in chart.   Note: If patient appointment is for lab review and patient did not complete labs, check with provider if OK to reschedule patient until labs completed.       •  Imaging - Imaging was not ordered at last office visit.       •  Referrals - No referrals were ordered at last office visit.    3. Is this appointment scheduled as a Hospital Follow-Up? No    4.  Immunizations were updated in Epic using WebIZ?: Epic matches WebIZ       •  Web Iz Recommendations: FLU, PNEUMOVAX (PPSV23), TDAP, VARICELLA (Chicken Pox)  and SHINGRIX (Shingles)    5.  Patient is due for the following Health Maintenance Topics:   Health Maintenance Due   Topic Date Due   • Annual Wellness Visit  04/21/1932   • PAP SMEAR  04/21/1953   • MAMMOGRAM  04/21/1972   • COLONOSCOPY  04/21/1982   • IMM ZOSTER VACCINES (1 of 2) 04/21/1982   • BONE DENSITY  04/21/1997       - Patient declines Annual Wellness Visit (AWV), Colonoscopy/FIT, Dexa Scan, Immunizations: SHINGRIX (Shingles), Mammogram and Pap.    6. Orders for overdue Health Maintenance topics pended in Pre-Charting? NO    7.  AHA (MDX) form printed for Provider? YES    8.  Patient was informed to arrive 15 min prior to their scheduled appointment and bring in their medication bottles.

## 2019-07-12 ENCOUNTER — OFFICE VISIT (OUTPATIENT)
Dept: MEDICAL GROUP | Facility: MEDICAL CENTER | Age: 84
End: 2019-07-12
Payer: MEDICARE

## 2019-07-12 VITALS
WEIGHT: 149 LBS | DIASTOLIC BLOOD PRESSURE: 62 MMHG | BODY MASS INDEX: 25.44 KG/M2 | TEMPERATURE: 97.5 F | HEART RATE: 64 BPM | OXYGEN SATURATION: 94 % | HEIGHT: 64 IN | SYSTOLIC BLOOD PRESSURE: 124 MMHG

## 2019-07-12 DIAGNOSIS — D51.0 PERNICIOUS ANEMIA: ICD-10-CM

## 2019-07-12 DIAGNOSIS — I50.32 CHRONIC DIASTOLIC CHF (CONGESTIVE HEART FAILURE) (HCC): ICD-10-CM

## 2019-07-12 PROCEDURE — 99214 OFFICE O/P EST MOD 30 MIN: CPT | Performed by: NURSE PRACTITIONER

## 2019-07-12 RX ORDER — SYRINGE W-NEEDLE,DISPOSAB,3 ML 25GX5/8"
SYRINGE, EMPTY DISPOSABLE MISCELLANEOUS
Qty: 6 EACH | Refills: 1 | Status: SHIPPED | OUTPATIENT
Start: 2019-07-12

## 2019-07-12 RX ORDER — CYANOCOBALAMIN 1000 UG/ML
1000 INJECTION, SOLUTION INTRAMUSCULAR; SUBCUTANEOUS
Qty: 3 ML | Refills: 3 | Status: SHIPPED | OUTPATIENT
Start: 2019-07-12 | End: 2019-10-10

## 2019-07-12 RX ADMIN — CYANOCOBALAMIN 1000 MCG: 1000 INJECTION, SOLUTION INTRAMUSCULAR; SUBCUTANEOUS at 11:01

## 2019-07-12 NOTE — PROGRESS NOTES
"Subjective:   Andree Egan is a 87 y.o. female here today for follow-up:    Chronic diastolic CHF (congestive heart failure) (HCC)  Symptoms currently stable with adjustment of diuretics by cardiology.  Patient has recently switched to hospice care through [facility].  She will continue to follow-up with cardiology as needed.  Has lost several pounds over the last week, water weight.  Currently she is feeling well, no shortness of breath, chest pain.  Lower extremity edema is improving.    No longer being seen by [facility] heart failure program.    Pernicious anemia  Stable on vitamin B12 injections monthly.  She is now on hospice care but would like to continue her B12 injections.  Family is requesting if these can be done at home as it is difficult for patient to come to appointments monthly just for injections.  Patient's granddaughter, whom she lives with, is willing to give patient B12 injections.  Hospice nurse stated that she will teach granddaughter how to give these.       Current medicines (including changes today)  Current Outpatient Prescriptions   Medication Sig Dispense Refill   • cyanocobalamin (VITAMIN B-12) 1000 MCG/ML Solution 1 mL by Intramuscular route every 30 days for 90 days. 3 mL 3   • Syringe/Needle, Disp, (SYRINGE 3CC/25GX1\") 25G X 1\" 3 ML Misc Use with B12 injections 6 Each 1   • potassium chloride SA (KDUR) 20 MEQ Tab CR Take 3 Tabs by mouth 2 times a day. 180 Tab 11   • spironolactone (ALDACTONE) 25 MG Tab Take 0.5 Tabs by mouth every day. 30 Tab 3   • hydrocortisone rectal (PROCTOZONE HC) 2.5 % Cream Once a day as needed to apply for hemorrhoid 30 g 0   • Fluticasone-Umeclidin-Vilant (TRELEGY ELLIPTA) 100-62.5-25 MCG/INH AEROSOL POWDER, BREATH ACTIVATED Inhale  by mouth.     • ELIQUIS 2.5 MG Tab TAKE ONE TABLET BY MOUTH TWICE A DAY 60 Tab 5   • bumetanide (BUMEX) 2 MG tablet Take 2 Tabs by mouth 2 times a day. 120 Tab 3   • ROPINIRole (REQUIP) 1 MG Tab Take 1 Tab by mouth 3 times a day. 90 " Tab 4   • ferrous sulfate 325 (65 Fe) MG tablet Take one tablet by mouth twice daily with food, every other day. 90 Tab 3   • metOLazone (ZAROXOLYN) 2.5 MG Tab Take 1 Tab by mouth 1 time daily as needed. Max of 1-2 doses weekly 15 Tab 11   • Home Care Oxygen Inhale 2 L/min by mouth every bedtime. via nasal cannula     • topiramate (TOPAMAX) 25 MG Tab Take 1 Tab by mouth 2 times a day. TAKE ONE TABLET BY MOUTH TWICE A DAY 60 Tab 6   • omeprazole (PRILOSEC) 20 MG delayed-release capsule Take 1 Cap by mouth 2 times a day. 30 Cap 6   • fluticasone (FLOVENT HFA) 110 MCG/ACT Aerosol Inhale 1 Puff by mouth every day. 1 Inhaler 5   • clopidogrel (PLAVIX) 75 MG Tab Take 1 Tab by mouth every day. 30 Tab 6   • Calcium-Vitamin D 500-125 MG-UNIT Tab Take 1 tablet by mouth every morning. 600mg - 400 iu 30 Tab 6   • ascorbic acid (VITAMIN C) 500 MG tablet Take 1 Tab by mouth every day. 30 Tab 6   • albuterol (PROAIR HFA) 108 (90 Base) MCG/ACT Aero Soln inhalation aerosol Inhale 2 Puffs by mouth every 6 hours as needed for Shortness of Breath. 8.5 g 4   • sertraline (ZOLOFT) 100 MG Tab Take 1 Tab by mouth every bedtime. 30 Tab 11   • acetaminophen (TYLENOL) 325 MG Tab Take 2 Tabs by mouth every 6 hours as needed. 30 Tab 0   • metoprolol SR (TOPROL XL) 25 MG TABLET SR 24 HR Take 1 Tab by mouth every morning. 30 Tab 11   • calcium carbonate (TUMS) 500 MG Chew Tab Take 1 Tab by mouth every four hours as needed (heartburn). 30 Tab 0   • rosuvastatin (CRESTOR) 10 MG Tab Take 1 Tab by mouth every evening. 30 Tab 11   • cyanocobalamin (VITAMIN B12) 1000 MCG Tab Take 1 Tab by mouth every day. 30 Tab 3   • levothyroxine (SYNTHROID) 50 MCG Tab Take 1 Tab by mouth Every morning on an empty stomach. 90 Tab 3     Current Facility-Administered Medications   Medication Dose Route Frequency Provider Last Rate Last Dose   • cyanocobalamin (VITAMIN B-12) injection 1,000 mcg  1,000 mcg Intramuscular Q30 DAYS Yudelka Sanchez A.P.R.N.   1,000 mcg at  "07/12/19 1101     She  has a past medical history of Anemia; Arthritis; Atrial fibrillation (CMS-[facility]) [I48.91] (3/29/2018); Back pain; Blood clotting disorder ([facility]); Breast cancer ([facility]); Breath shortness; Cataract; Congestive heart failure (CHF) ([facility]) (4/3/2018); Constipation; Coronary heart disease; Daytime sleepiness; BERTRAND (dyspnea on exertion) (5/4/2018); GERD (gastroesophageal reflux disease); Heart attack ([facility]); Heart valve disease; Hemorrhagic disorder ([facility]); Hiatus hernia syndrome; Hyperlipidemia; Hypertension; Hypothyroidism; Kidney disease; Long term current use of anticoagulant (3/29/2018); Nasal drainage; Osteoporosis; Pacemaker (07/2011); Pneumonia; Pulmonary emphysema ([facility]); Restless leg syndrome; Rheumatoid arthritis ([facility]); Ringing in ears; Shortness of breath; Swelling of lower extremity; Thyroid disease; Tonsillitis; Tremors of nervous system (4/3/2018); Urinary incontinence; and Urinary tract infection.    ROS  No chest pain, no shortness of breath, no abdominal pain  Positive ROS as per HPI.  All other systems reviewed and are negative.     Objective:     /62 (BP Location: Right arm, Patient Position: Sitting, BP Cuff Size: Adult)   Pulse 64   Temp 36.4 °C (97.5 °F) (Temporal)   Ht 1.613 m (5' 3.5\")   Wt 67.6 kg (149 lb)   SpO2 94%  Body mass index is 25.98 kg/m².     Physical Exam:  Constitutional: Alert, no distress.  Skin: Warm, dry, good turgor, no rashes in visible areas.  Eye: Equal, round, conjunctiva clear, lids normal.  ENMT: Lips without lesions, good dentition  Neck: Trachea midline  Respiratory: Unlabored respiratory effort, lungs clear to auscultation, no wheezes, no ronchi.  Cardiovascular: Normal S1, S2, + murmur, 2-3 + BLE edema.  Psych: Alert and oriented x3, normal affect and mood.      Assessment and Plan:   The following treatment plan was discussed    1. Pernicious anemia  Stable  Continue monthly vitamin B12 injections  Prescribed for home use to reduce number of " "visits as patient is now on hospice I would like to continue her injections for symptom management.  - cyanocobalamin (VITAMIN B-12) 1000 MCG/ML Solution; 1 mL by Intramuscular route every 30 days for 90 days.  Dispense: 3 mL; Refill: 3  - Syringe/Needle, Disp, (SYRINGE 3CC/25GX1\") 25G X 1\" 3 ML Misc; Use with B12 injections  Dispense: 6 Each; Refill: 1    2. Chronic diastolic CHF (congestive heart failure) (HCC)  Symptoms stable  Continue medications and follow-up per cardiology  Report worsening to hospice nurses      Followup: Return if symptoms worsen or fail to improve.    I have placed the below orders and discussed them with an approved delegating provider. The MA is performing the below orders under the direction of Dr. Wang           "

## 2019-07-12 NOTE — ASSESSMENT & PLAN NOTE
Stable on vitamin B12 injections monthly.  She is now on hospice care but would like to continue her B12 injections.  Family is requesting if these can be done at home as it is difficult for patient to come to appointments monthly just for injections.  Patient's granddaughter, whom she lives with, is willing to give patient B12 injections.  Hospice nurse stated that she will teach granddaughter how to give these.

## 2019-07-12 NOTE — ASSESSMENT & PLAN NOTE
Symptoms currently stable with adjustment of diuretics by cardiology.  Patient has recently switched to hospice care through [facility].  She will continue to follow-up with cardiology as needed.  Has lost several pounds over the last week, water weight.  Currently she is feeling well, no shortness of breath, chest pain.  Lower extremity edema is improving.    No longer being seen by [facility] heart failure program.

## 2019-07-26 ENCOUNTER — HOSPITAL ENCOUNTER (OUTPATIENT)
Dept: LAB | Facility: MEDICAL CENTER | Age: 84
End: 2019-07-26
Attending: NURSE PRACTITIONER
Payer: MEDICARE

## 2019-07-26 DIAGNOSIS — N18.30 CKD (CHRONIC KIDNEY DISEASE) STAGE 3, GFR 30-59 ML/MIN (HCC): ICD-10-CM

## 2019-07-26 DIAGNOSIS — E87.6 HYPOKALEMIA: ICD-10-CM

## 2019-07-26 DIAGNOSIS — I50.30 ACC/AHA STAGE C HEART FAILURE WITH PRESERVED EJECTION FRACTION (HCC): ICD-10-CM

## 2019-07-26 LAB
ANION GAP SERPL CALC-SCNC: 11 MMOL/L (ref 0–11.9)
BUN SERPL-MCNC: 47 MG/DL (ref 8–22)
CALCIUM SERPL-MCNC: 8.7 MG/DL (ref 8.5–10.5)
CHLORIDE SERPL-SCNC: 101 MMOL/L (ref 96–112)
CO2 SERPL-SCNC: 27 MMOL/L (ref 20–33)
CREAT SERPL-MCNC: 1.69 MG/DL (ref 0.5–1.4)
FASTING STATUS PATIENT QL REPORTED: NORMAL
GLUCOSE SERPL-MCNC: 91 MG/DL (ref 65–99)
POTASSIUM SERPL-SCNC: 3.8 MMOL/L (ref 3.6–5.5)
SODIUM SERPL-SCNC: 139 MMOL/L (ref 135–145)

## 2019-07-26 PROCEDURE — 80048 BASIC METABOLIC PNL TOTAL CA: CPT

## 2019-07-26 PROCEDURE — 36415 COLL VENOUS BLD VENIPUNCTURE: CPT

## 2019-09-10 ENCOUNTER — ANTICOAGULATION MONITORING (OUTPATIENT)
Dept: VASCULAR LAB | Facility: MEDICAL CENTER | Age: 84
End: 2019-09-10

## 2019-09-10 DIAGNOSIS — Z79.01 CHRONIC ANTICOAGULATION: ICD-10-CM

## 2019-09-10 DIAGNOSIS — I48.20 CHRONIC ATRIAL FIBRILLATION (HCC): ICD-10-CM

## 2020-05-14 NOTE — TELEPHONE ENCOUNTER
lab rsults   Received: Today      Call patient   Message Contents   Madiha Rockwell, Med Ass't  Cecilia Hodge R.N.   Cc: Jess Fisher RDONALDO.   Phone Number: [phone]             MAGDY Waldrop, granddaughter of pt called the pumpline, was told to call to get the results of the labs that were drawn today.  Please call.      Palma called. She states patient didn't gain anymore. 1 mg BID still. Informed labs are not back yet and we will re-evaluated in AM. Her call is disconnected.    Detail Level: Detailed

## 2020-11-03 NOTE — ASSESSMENT & PLAN NOTE
Lab Results   Component Value Date    HGBA1C 7.1 (H) 07/31/2020   continue long acting insulin  accuchecks  Sliding scale insulin     Patient was hospitalized 5/18-5/25 and 7/24 for CHF exacerbation.  She was admitted most recently for failure to diurese properly as an outpatient and continued weight gain, she required IV diuresis.  She was discharged about 3 days ago has gained 4 pounds in 3 days since discharge from the hospital.  She is taking Bumex 2 mg twice daily.  She also has a history of chronic kidney disease and severe aortic stenosis.

## 2021-01-11 DIAGNOSIS — Z23 NEED FOR VACCINATION: ICD-10-CM

## 2022-04-06 NOTE — PROGRESS NOTES
Began transfusion of 1 unit red blood cells at 0630.   Consent signed and in the chart, pt aware of need for transfusion.     2 RN check done at bedside with Sachin BARNHART.     Pt tolerating transfusion at this time, no s/s of reaction.      monitor.

## 2022-05-16 NOTE — PROGRESS NOTES
OP Telephone Anticoagulation Service Note    Date: 6/18/2018      Anticoagulation Summary  As of 6/18/2018    INR goal:   2.0-3.0   TTR:   60.6 % (1.9 mo)   Today's INR:   2.2   Warfarin maintenance plan:   2.5 mg (2.5 mg x 1) on Mon, Wed, Fri; 3.75 mg (2.5 mg x 1.5) all other days   Weekly warfarin total:   22.5 mg   Plan last modified:   Lori Chaudhry PharmD (6/18/2018)   Next INR check:   6/28/2018   Target end date:   Indefinite    Indications    Chronic atrial fibrillation (HCC) [I48.2]  Long term current use of anticoagulant (Resolved) [Z79.01]             Anticoagulation Episode Summary     INR check location:       Preferred lab:       Send INR reminders to:       Comments:   [facility]         Anticoagulation Care Providers     Provider Role Specialty Phone number    [facility] Referring  [phone]    Razia Sims D.O. Responsible Family Medicine [phone]    Sebastian Thomas M.D. Responsible Interventional Cardiology [phone]        Anticoagulation Patient Findings        Plan: INR is in range today. Left VM for pt instructing to begin new regime as outlined and to stop warfarin 6-21 for cardiac catheterization 6-26-18 then to resume warfarin regimen and retest INR 2 days after resuming. Also left same message for pts granddaughter and sent fax with dosing instructions to granddaughter at [phone].           Lori Chaudhry, PharmD           [facility] [address] [facility] [address] [facility] [address]  [place] [address]  Dept: [phone]  Dept Fax: [phone]: [phone]    Visit Date:5/16/2022     Kathleen Brown is a 80 y.o. 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 She is status post Whipple procedure. She presents to the medical oncology clinic to continue adjuvant chemotherapy. Patient was admitted to [facility] on 11/09/2021 for jaundice and not feeling well. She noticed juandice since 11/05/21. Associated symptom includes RUQ pain, itching and dark urine with light color stool. Hospital day 2, GI was consulted for possible choledocholithiasis. Liver function test transaminitis with AST//228 and significant Alk phos elevate at 421. CT abdominal (11/9) show Distend gallbladder with dilated common bile duct but no stone and hepatomegaly. RUQ US (11/09): show evidence of distened gall bladder with gallbladder sludge both intra and extra hepatic biliary duct dilation with no stone no para cholecystic edema. She underwent ERCP on 11/10/2021. Ampulla and major papula were of normal appearance on endoscopy. Procedure was complicated by retroperitoneal perforation. Procedure was terminated. Patient was started on Zosyn. Patient was administered lactated Ringer's at 100 cc an hour and kept n.p.o. Tom Morning was then transferred to [address] for higher level care on 11/11/21. She underwent initial exploratory surgery, where a bowel perforation was not noted. She was then monitored for elevated LFTs and hyperbilirubinemia. Patient then returned to  for Whipple surgery on 12/4/21 by Dr. Paris Sevilla. 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   She tolerated Whipple without complications. She initially had 4 drains present. Currently only one pancreatic drain remains. Incision is healing well, without drainage, erythema, or warmth.   After discharge, she was in Rehab facility in [place], recently discharged . She is currently back at home, her son is here to stay with her for a couple of days. Patient didn't have any medical history except cataract , run of A. fib during recent hospitalization at [facility].  (which was schedule for last Monday and it was cancelled due to patient in ED). Take multivitamin daily.      Patient moved to [facility] 5 years ago to be with one of her sons, who unfortunately  last year. She reported hx of 10 years smoking when she was young with 1/5 pack/day (5 pack years). Quited smoking  In . Denied any alcohol use, receational drug use. Have 3 vaginal birth without any blood transfusion product. Patient reported has colonoscopy done in  with 2 polyps was removed      History on 2022:  The patient presents to the medical oncology clinic for cycle # 8 of FOLFOX. She reports that she tolerated last FOLFOX well. She continues to report grade 2 fatigue. However her new complaint is yellow skin discoloration that developed approximately 5 to 6 days ago. She noticed darker urine and no change in the color of bowel movements. Denies having any pruritus. She denies having any oral mucositis, no nausea, no vomiting. She reports occasional peripheral neuropathy in her feet. Intermittent diarrhea, no abdominal pain. Patient denies having any fevers. No skin rash, no palmar and plantar erythema. She reports fair appetite, her weight is stable.     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 Isabel Campuzano COLONOSCOPY      Jason Purple    ERCP N/A 11/10/2021    ERCP WITH STENT INSERTION performed by Ryan Ricardo MD at [facility]  2021    exp lap, radical celiac and portal lymph node dissection,pylorus preserving pancreatoduodenectomy-Dr Ewing [facility]    PORT SURGERY Left 2022    SINGLE LUMEN [id number] INSERTION performed by Guilherme Rich MD at [facility] History   Problem Relation Age of Onset    Breast Cancer Mother     Colon Cancer Mother     Cancer Father         bladder and lung    Lung Cancer Father     Kidney Disease Brother     No Known Problems Maternal Grandmother     No Known Problems Maternal Grandfather     No Known Problems Paternal Grandmother     No Known Problems Paternal Grandfather       Social History     Tobacco Use    Smoking status: Former Smoker     Packs/day: 0.00     Years: 10.00     Pack years: 0.00     Types: Cigarettes     Quit date: 1972     Years since quittin.4    Smokeless tobacco: Never Used    Tobacco comment: social smoker   Substance Use Topics    Alcohol use: Yes     Comment: social- been a long time since last drink      No current facility-administered medications for this visit. No current outpatient medications on file.      Facility-Administered Medications Ordered in Other Visits   Medication Dose Route Frequency Provider Last Rate Last Admin    sodium chloride flush 0.9 % injection 5-40 mL  5-40 mL IntraVENous PRN Francois Cantu MD   20 mL at 22 1316    heparin flush 100 UNIT/ML injection 500 Units  500 Units IntraCATHeter PRN Francois Cantu MD   500 Units at 22 1316    0.9 % sodium chloride infusion  5-250 mL/hr IntraVENous PRN Francois Cantu MD 20 mL/hr at 22 1041 20 mL/hr at 22 1041    [START ON 2022] amiodarone (CORDARONE) tablet 200 mg  200 mg Oral Daily [employer], PA-C        [START ON 2022] amLODIPine (NORVASC) tablet 2.5 mg  2.5 mg Oral Daily [employer], PA-C        apixaban (ELIQUIS) tablet 5 mg  5 mg Oral BID TAO Baker-C        [START ON 5/17/2022] aspirin chewable tablet 81 mg  81 mg Oral Daily Laird TAO Mak-C        docusate sodium (COLACE) capsule 100 mg  100 mg Oral BID TAO Baker-C        [START ON 5/17/2022] ferrous sulfate (IRON 325) tablet 325 mg  325 mg Oral Daily with breakfast TAO Baker-C        sodium chloride flush 0.9 % injection 5-40 mL  5-40 mL IntraVENous 2 times per day Lairdciara Mak, PA-C        sodium chloride flush 0.9 % injection 5-40 mL  5-40 mL IntraVENous PRN Lairdciara Mak, PA-C        0.9 % sodium chloride infusion   IntraVENous PRN Sisi Mak, PA-C        ondansetron (ZOFRAN-ODT) disintegrating tablet 4 mg  4 mg Oral Q8H PRN Sisi Mak PA-C        Or    ondansetron (ZOFRAN) injection 4 mg  4 mg IntraVENous Q6H PRN Erendira R Merry Edmundo, PA-C        polyethylene glycol (GLYCOLAX) packet 17 g  17 g Oral Daily PRN Sisi Mak, PA-C        acetaminophen (TYLENOL) tablet 650 mg  650 mg Oral Q6H PRN Sisi Mak, PA-C        Or    acetaminophen (TYLENOL) suppository 650 mg  650 mg Rectal Q6H PRN Lairdciara Mak, PA-C        potassium chloride (KLOR-CON M) extended release tablet 40 mEq  40 mEq Oral PRN Lairdciara Mak, PA-C        Or    potassium bicarb-citric acid (EFFER-K) effervescent tablet 40 mEq  40 mEq Oral PRN Laird Manger, PA-C        Or    potassium chloride 10 mEq/100 mL IVPB (Peripheral Line)  10 mEq IntraVENous PRN Lairdciara Mak, PA-C        magnesium sulfate 2000 mg in 50 mL IVPB premix  2,000 mg IntraVENous PRN Erendira R Merry Pears, PA-C          No Known Allergies   Health Maintenance   Topic Date Due    Annual Wellness Visit (AWV)  Never done    DTaP/Tdap/Td vaccine (1 - Tdap) Never done    Shingles vaccine (1 of 2) Never done    DEXA (modify frequency per FRAX score)  Never done    Pneumococcal 65+ years Vaccine (2 - PPSV23 or PCV20) 12/21/2021    Depression Screen  12/28/2022    Flu vaccine  Completed    COVID-19 Vaccine  Completed    Hepatitis A vaccine  Aged Out    Hepatitis B vaccine  Aged Out    Hib vaccine  Aged Out    Meningococcal (ACWY) vaccine  Aged Out        Subjective:   Review of Systems   Constitutional: Positive for appetite change and fatigue. Negative for activity change and fever. HENT: Negative for congestion, dental problem, facial swelling, hearing loss, mouth sores, nosebleeds, sore throat, tinnitus and trouble swallowing. Eyes: Negative for discharge and visual disturbance. Respiratory: Negative for cough, chest tightness, shortness of breath and wheezing. Cardiovascular: Negative for chest pain, palpitations and leg swelling. Gastrointestinal: Negative for abdominal distention, abdominal pain, blood in stool, constipation, diarrhea, nausea, rectal pain and vomiting. Endocrine: Negative for cold intolerance, polydipsia and polyuria. Genitourinary: Negative for decreased urine volume, difficulty urinating, dysuria, flank pain, hematuria and urgency. Musculoskeletal: Negative for arthralgias, back pain, gait problem, joint swelling, myalgias and neck stiffness. Skin: Negative for color change, rash and wound. Neurological: Negative for dizziness, tremors, seizures, speech difficulty, weakness, light-headedness, numbness and headaches. Hematological: Negative for adenopathy. Does not bruise/bleed easily. Psychiatric/Behavioral: Negative for confusion and sleep disturbance. The patient is not nervous/anxious. Objective:   Physical Exam  Vitals reviewed. Constitutional:       General: She is not in acute distress. Appearance: She is well-developed. HENT:      Head: Normocephalic. Mouth/Throat:      Pharynx: No oropharyngeal exudate. Eyes:      General: No scleral icterus. Right eye: No discharge. Left eye: No discharge. Pupils: Pupils are equal, round, and reactive to light.    Neck:      Thyroid: No thyromegaly. Vascular: No JVD. Trachea: No tracheal deviation. Cardiovascular:      Rate and Rhythm: Normal rate. Heart sounds: Normal heart sounds. No murmur heard. No friction rub. No gallop. Pulmonary:      Effort: Pulmonary effort is normal. No respiratory distress. Breath sounds: Normal breath sounds. No stridor. No wheezing or rales. Chest:      Chest wall: No tenderness. Abdominal:      General: Bowel sounds are normal. There is no distension. Palpations: Abdomen is soft. There is no mass. Tenderness: There is no abdominal tenderness. There is no rebound. Musculoskeletal:         General: Normal range of motion. Cervical back: Normal range of motion and neck supple. Comments: Good range of motion in all four extremities. Lymphadenopathy:      Cervical: No cervical adenopathy. Skin:     General: Skin is warm. Findings: No erythema or rash. Neurological:      Mental Status: She is alert and oriented to person, place, and time. Cranial Nerves: No cranial nerve deficit. Motor: No abnormal muscle tone. Deep Tendon Reflexes: Reflexes are normal and symmetric. Psychiatric:         Behavior: Behavior normal.         Thought Content: Thought content normal.         Judgment: Judgment normal.         BP (!) 109/55   Pulse 79   Temp 98.4 °F (36.9 °C)   Resp 16   Ht 5' 5\" (1.651 m)   Wt 139 lb 12.8 oz (63.4 kg)   SpO2 97%   BMI 23.26 kg/m²      ECOG status is 1    Imaging studies and labs:   US THYROID    Result Date: 1/10/2022  PROCEDURE: US THYROID CLINICAL INFORMATION: Elevated TSH, Elevated serum free T4 level COMPARISON: No prior study. TECHNIQUE: Grayscale and color sonographic imaging of the thyroid gland performed in longitudinal and transverse planes.  TECHNICAL DATA: Right Thyroid - 4.14 x 1.24 x 2.07 cm Left Thyroid -5.05 x 1.63 x 1.65 cm Isthmus -0.36 cm FINDINGS: THYROID SIZE: Thyroid gland is normal left thyroid lobe is prominent. NODULES: 1. Mixed solid and cystic nodule 1.2 cm x 0.8 x 0.9 cm mid right thyroid lobe consistent with a tirad 2 lesion this is not considered suspicious. 2. Mixed solid and cystic nodule measuring 1.0 x 0.8 x 0.8 cm mid right thyroid lobe adjacent to the first nodule. Also a TR 2 lesion. 3. 0.6 x 0.5 x 0.5 cm mixed solid and cystic lesion mid left thyroid lobe. T are too lesion. 4. Completely cystic lesion medial left thyroid lobe 0.9 x 0.6 x 0.7 cm considered a benign lesion. TR 1 lesion. PARENCHYMAL ECHOGENICITY/VASCULARITY: Normal. MICROLITHIASIS: None. CERVICAL LYMPHADENOPATHY: 1. There are no pathologically enlarged lymph nodes adjacent to the thyroid gland. 1. Mild prominence the left thyroid lobe. Multiple mixed solid and cystic lesions which are not suspicious and do not require FNA. Consider follow-up in one to 2 years **This report has been created using voice recognition software. It may contain minor errors which are inherent in voice recognition technology. ** Final report electronically signed by Dr. Jacqui Car on 1/10/2022 7:38 AM    Lab Results   Component Value Date    WBC 5.8 05/16/2022    HGB 10.3 (L) 05/16/2022    HCT 31.9 (L) 05/16/2022     (H) 05/16/2022     05/16/2022       Chemistry        Component Value Date/Time     05/16/2022 0924     12/28/2021 1031    K 2.9 (L) 05/16/2022 0924    K 3.8 12/28/2021 1031     12/28/2021 1031    CO2 24 12/28/2021 1031    BUN 17 12/28/2021 1031    CREATININE 1.2 05/16/2022 0924    CREATININE 1.4 (H) 12/28/2021 1031        Component Value Date/Time    CALCIUM 9.3 12/28/2021 1031    ALKPHOS 248 (H) 05/16/2022 0923    AST 90 (H) 05/16/2022 0923    ALT 79 (H) 05/16/2022 0923    BILITOT 4.2 (H) 05/16/2022 0923        CT of the chest, abdomen and pelvis on April 13, 2022 showed:  1. Postoperative changes are seen in the upper abdomen involving the gallbladder, pancreas and duodenum. A gastroenterostomy is noted. Lory Santamaria 2. Extensive pneumobilia. 3. A 1.5 cm left ovarian cyst. Follow-up pelvic ultrasound can be obtained. 4. Colonic diverticulosis. 5. Other findings as described above. Assessment:  1. Stage III B periampullary adenocarcinoma, pancreaticobiliary type. Patient is s/p Whipple procedure on December 4, 2021. Alisson-ampullary cancers represent a group of malignancies distinct from those arising from other hepato-biliary structures. These cancers are pathologically adenocarcinomas, and in an  estimated 80% of cases are amenable to surgical [id number], 26. However this treatment regimen is often augmented by the use of adjuvant chemotherapy, especially amongst those with advanced disease. The pooled overall 5 year survival for alisson-ampullary cancers is between 30 to 50%. Treatment decisions are generally extrapolated from pancreatic chemotherapy protocols and consist mainly of traditional chemotherapy drugs. I discussed with the patient her potential treatment options. They include FOLFIRINOX, FOLFOX or Gemzar and Abraxane. I do not think that due to her age the patient will be able to tolerate FOLFIRINOX since this is a very aggressive regimen. I had a lengthy discussion with the patient and her daughter in law. I want to be sure that while the patient is going through chemotherapy treatment she has good social and family support. Patient had follow-up with the surgeon at [facility], last drain was removed and the patient received clearance for surgery. In preparation for chemotherapy the patient underwent successful and uncomplicated Mediport placement. She started FOLFOX on January 26, 2022.  2.  Adjuvant chemotherapy with FOLFOX. With cycle #4 of FOLFOX, the dose of 5-FU bolus and leucovorin was reduced from 400 mg/m² to 200 mg/m² due to mild leukopenia. Oxaliplatin was reduced from 85 mg/m²  to 70 mg/m² due to mild neutropenia.   5-FU continuous infusion reduced from 2400 mg/m² to 2000 mg/m² for mild neutropenia. She had restaging CT scan after 6 cycles of adjuvant chemotherapy. No evidence of recurrent or metastatic disease. Cycle #7 given on May 2, 2022. At that time her liver function was within normal limits, slightly elevated alk phosphatase. The patient reports that approximately 5 to 6 days ago she noticed yellow discoloration of her skin. Denies having any pruritus. Today her bilirubin is elevated to 4.2. No chemotherapy today, the patient will be admitted to the hospital for further evaluation: MRCP and GI consult  3. Hypokalemia. Her potassium is 2.9 today. While waiting for bed placement in the hospital the patient will receive 40 mEq of IV potassium over 2 hours  4. Anemia. Chemotherapy related. Diagnosis Orders   1. Malignant neoplasm of pancreas, unspecified location of malignancy ([facility] [address]     2. Encounter for chemotherapy management     3. History of Whipple procedure     4. Anemia, unspecified type     5. Jaundice     6. Hypokalemia          Plan:   No follow-ups on file. Orders Placed:   No orders of the defined types were placed in this encounter. Medications Prescribed:   No orders of the defined types were placed in this encounter. Discussed use, benefit, and side effectsof prescribed medications. All patient questions answered. Pt voiced understanding. Instructed to continue current medications, diet and exercise. Patient agreed with treatment plan. Follow up as directed.

## 2023-01-30 NOTE — DISCHARGE PLANNING
Medical Social Work    PASRR and LOC completed     OPERATIVE REPORT  PATIENT NAME: Caren Mobley    :  1970  MRN: [id number]  Pt Location: [facility] OR ROOM 02    SURGERY DATE: 2023    Surgeon(s) and Role:     * Harsha Devi MD - Primary     * Darlene Hanson PA-C necessary for the procedure for assistance with retraction of vital structures with assistance with excision of the trapezium and assistance with placement of the guidepin and tensioning techniques for the [facility] arthroplasty  Jarret Koroma ATC and [facility] second Assistant    Preop Diagnosis:  Osteoarthritis of carpometacarpal [place]) joint of left thumb, unspecified osteoarthritis type M18 12    Post-Op Diagnosis Codes:     * Osteoarthritis of carpometacarpal (CMC) joint of left thumb, unspecified osteoarthritis type [M18 12] severe    Procedure(s):  Left - Thumb Carpometacarpal Arthroplasty utilizing Arthrex CMC mini tight rope system 1 1 mm with 2 buttons and mini C-arm guidance    Specimen(s):  * No specimens in log *    Estimated Blood Loss:   Minimal    Drains:  * No LDAs found *    Anesthesia Type:   General w/ Regional    Operative Indications:  Osteoarthritis of carpometacarpal ([facility]) joint of left thumb, unspecified osteoarthritis type M18 12  Manuel Wesley is a 49-year-old female who has been suffering with left thumb CMC joint pain with severe arthritis  She had previous right thumb CMC arthroplasty performed by me and did very well  She has been suffering with significant disability and dysfunction and pain about the left thumb with deformity  She was found to have severe CMC joint arthritis on x-rays  She had failed nonoperative measures and wished to undergo a left hand thumb CMC arthroplasty  She understood the risks and benefits of that procedure and wished to go ahead    The risks are inclusive of but not limited to infection, stiffness, nerve or blood vessel injury causing numbness pain and weakness, worsening of symptoms, failure to achieve anticipated results, failure to regain full strength and ability, persistence of pain, recurrence of deformity, and need for further surgery  Operative Findings:  Left hand examined under loupe magnification demonstrated severe osteoarthritic change about the thumb basilar joint  The trapezium had severe arthritic changes with large osteophytes particularly in between the thumb and index metacarpal   We did take great care to excise these  We were then able to recreate Shenton's line very nicely and placed the thumb and a key pinch position and were able to place the [facility] mini tight rope in good positioning with good ability to lay the hand flat at the end of the procedure  No signs of overtensioning  The button did sit nicely along the ulnar side of the index metacarpal and along the radial side of the thumb metacarpal     Complications:   None    Procedure and Technique:  Chet Clark was taken to the operating room and placed supine on the OR table  She was given preoperative IV antibiotics as well as preoperative regional anesthesia by attending anesthesiologist   General anesthesia was induced and the left upper extremity was prepped and draped in the usual sterile fashion  A surgical timeout was taken  We exsanguinated and inflated the tourniquet  Total tourniquet time was less than 90 minutes  We did utilized loupe magnification and a 15 blade was utilized to make a longitudinal incision over the [facility] joint of the thumb  We did careful dissect past subcutaneous structures and protected small vasculature in this region as well as small sensory branch of the radial nerve  We were then able to identify the extensor tendons and protected them as well throughout the procedure  We did utilize mini fluoroscopy with a mini C arm as guidance and identify the [facility] joint of the thumb and then incised that longitudinally at the capsule    Significant amounts of capsular redundancy was found and significant fluid in the joint was found which was yellow clear fluid consistent with the severe arthritis  We then exposed quite readily the trapezium and once again verified its positioning with mini C arm guidance  We then utilized a saw to cut the trapezium in half and then excised piecemeal with a rongeur and a mini rongeur the trapezium  We did find significant osteophytes as we had seen on the preoperative x-rays that ran in the interval between the thumb and index metacarpal bones  We took great care to excise these and did find the flexor carpi radialis tendon nicely intact and the floor of our exposure at the [facility] joint  We then utilized fluoroscopic guidance to place a guidepin from the thumb metacarpal on its radial side to the index metacarpal on its ulnar side  We did need to adjust the guidepin to make certain that we were through all 4 cortices in appropriate positioning  We then made a small incision over the index metacarpal guidepin site and did dissect and protected the small nerve structures in this region  We were then able to pass the guidepin through and then passed the mini tight rope in standard fashion with the thumb button lying flush along the radial side of the thumb at its base  We then placed the thumb and a key pinch position and placed a button over the ulnar aspect of the index  We did tie sequential knots in this making certain not to over tension this region and checking the region on imaging after every throw  We did nicely recreate Shenton's line  The thumb and index metacarpal buttons did palpate and visually inspect to be nicely flat along the respective metacarpals  The hand was able to lay nicely flat on the OR table showing no signs of overtensioning  We had a good interval at the base of the thumb metacarpal to the scaphoid visual inspection as well  We took final x-ray images demonstrating good recreation of Shenton's line and good positioning of the buttons    We then cut the excess suture and irrigated both wounds and closed the capsule with interrupted Vicryl suture  We did remove some of the redundant capsule sharply  We then closed skin with 4-0 nylon interrupted suture  Dry, sterile dressings were then applied with a thumb spica splint  The tourniquet was let down  She tolerated procedure well and transferred to the recovery room in stable condition  She will follow-up in approximately 2 weeks for suture removal   She will be on the thumb [facility] arthroplasty rehabilitation protocol     I was present for the entire procedure and A qualified resident physician was not available    Patient Disposition:  PACU         SIGNATURE: Rodney Centeno MD  DATE: January 30, 2023  TIME: 3:04 PM

## 2023-04-11 NOTE — PROGRESS NOTES
12 hour chart check     Prednisone Counseling:  I discussed with the patient the risks of prolonged use of prednisone including but not limited to weight gain, insomnia, osteoporosis, mood changes, diabetes, susceptibility to infection, glaucoma and high blood pressure.  In cases where prednisone use is prolonged, patients should be monitored with blood pressure checks, serum glucose levels and an eye exam.  Additionally, the patient may need to be placed on GI prophylaxis, PCP prophylaxis, and calcium and vitamin D supplementation and/or a bisphosphonate.  The patient verbalized understanding of the proper use and the possible adverse effects of prednisone.  All of the patient's questions and concerns were addressed.

## 2024-01-31 NOTE — ED TRIAGE NOTES
Chief Complaint   Patient presents with   • T-5000 FALL   • Hip Pain     left     Pt had MGLF while attempting to get into her car.  Left hip pain.  Positive deformity, shortening, or rotation.  + cms.  denies head/neck/back pain.  Pt here for above complaint.  PTA received 50 mcg fentanyl and 4 mg zofran IV.  Connected to cardiac monitor, BP cuff, and continuous pulse ox upon arrival.  Pt in gown, call light in reach, bed in lowest position.  Chart up for ERP.     No

## 2024-09-14 NOTE — ED NOTES
As tolerated   Receicing BRONWYN Tran called to notify US has been completed and pt ready for transport to floor

## (undated) DEVICE — CANISTER SUCTION 3000ML MECHANICAL FILTER AUTO SHUTOFF MEDI-VAC NONSTERILE LF DISP  (40EA/CA)

## (undated) DEVICE — SENSOR SPO2 NEO LNCS ADHESIVE (20/BX) SEE USER NOTES

## (undated) DEVICE — GLOVE BIOGEL SZ 8 SURGICAL PF LTX - (50PR/BX 4BX/CA)

## (undated) DEVICE — ELECTRODE 850 FOAM ADHESIVE - HYDROGEL RADIOTRNSPRNT (50/PK)

## (undated) DEVICE — GLOVE BIOGEL SZ 7.5 SURGICAL PF LTX - (50PR/BX 4BX/CA)

## (undated) DEVICE — DRAPE C-ARM LARGE 41IN X 74 IN - (10/BX 2BX/CA)

## (undated) DEVICE — DRESSING TRANSPARENT FILM TEGADERM 4 X 4.75" (50EA/BX)"

## (undated) DEVICE — HEAD HOLDER JUNIOR/ADULT

## (undated) DEVICE — SET EXTENSION WITH 2 PORTS (48EA/CA) ***PART #2C8610 IS A SUBSTITUTE*****

## (undated) DEVICE — GLOVE SZ 6.5 BIOGEL PI MICRO - PF LF (50PR/BX)

## (undated) DEVICE — SLEEVE, VASO, THIGH, MED

## (undated) DEVICE — GLOVE BIOGEL INDICATOR SZ 8 SURGICAL PF LTX - (50/BX 4BX/CA)

## (undated) DEVICE — KIT ANESTHESIA W/CIRCUIT & 3/LT BAG W/FILTER (20EA/CA)

## (undated) DEVICE — CHLORAPREP 26 ML APPLICATOR - ORANGE TINT(25/CA)

## (undated) DEVICE — DRAPE U ORTHOPEDIC - (10/BX)

## (undated) DEVICE — SUTURE 2-0 VICRYL PLUS CT-1 - 8 X 18 INCH(12/BX)

## (undated) DEVICE — GLOVE BIOGEL PI INDICATOR SZ 6.5 SURGICAL PF LF - (50/BX 4BX/CA)

## (undated) DEVICE — LACTATED RINGERS INJ 1000 ML - (14EA/CA 60CA/PF)

## (undated) DEVICE — DRAPE 36X28IN RAD CARM BND BG - (25/CA) O

## (undated) DEVICE — MASK ANESTHESIA ADULT  - (100/CA)

## (undated) DEVICE — PACK MAJOR ORTHO - (2EA/CA)

## (undated) DEVICE — NEPTUNE 4 PORT MANIFOLD - (20/PK)

## (undated) DEVICE — KIT ROOM DECONTAMINATION

## (undated) DEVICE — SUTURE GENERAL

## (undated) DEVICE — SUCTION INSTRUMENT YANKAUER BULBOUS TIP W/O VENT (50EA/CA)

## (undated) DEVICE — TOWELS CLOTH SURGICAL - (4/PK 20PK/CA)

## (undated) DEVICE — DRAPE SURGICAL U 77X120 - (10/CA)

## (undated) DEVICE — TUBING CLEARLINK DUO-VENT - C-FLO (48EA/CA)

## (undated) DEVICE — GOWN WARMING STANDARD FLEX - (30/CA)

## (undated) DEVICE — SET LEADWIRE 5 LEAD BEDSIDE DISPOSABLE ECG (1SET OF 5/EA)

## (undated) DEVICE — DRAPE LARGE 3 QUARTER - (20/CA)

## (undated) DEVICE — PROTECTOR ULNA NERVE - (36PR/CA)